# Patient Record
Sex: MALE | Race: WHITE | Employment: OTHER | URBAN - NONMETROPOLITAN AREA
[De-identification: names, ages, dates, MRNs, and addresses within clinical notes are randomized per-mention and may not be internally consistent; named-entity substitution may affect disease eponyms.]

---

## 2017-03-08 ENCOUNTER — OFFICE VISIT (OUTPATIENT)
Dept: FAMILY MEDICINE CLINIC | Age: 27
End: 2017-03-08

## 2017-03-08 VITALS
SYSTOLIC BLOOD PRESSURE: 116 MMHG | DIASTOLIC BLOOD PRESSURE: 58 MMHG | RESPIRATION RATE: 12 BRPM | HEIGHT: 70 IN | TEMPERATURE: 97.8 F | WEIGHT: 96 LBS | BODY MASS INDEX: 13.74 KG/M2 | HEART RATE: 96 BPM

## 2017-03-08 DIAGNOSIS — G83.10 PARESIS OF LOWER EXTREMITY (HCC): ICD-10-CM

## 2017-03-08 DIAGNOSIS — F32.2 SEVERE SINGLE CURRENT EPISODE OF MAJOR DEPRESSIVE DISORDER, WITHOUT PSYCHOTIC FEATURES (HCC): Primary | ICD-10-CM

## 2017-03-08 DIAGNOSIS — G89.4 CHRONIC PAIN SYNDROME: ICD-10-CM

## 2017-03-08 DIAGNOSIS — K21.9 GASTROESOPHAGEAL REFLUX DISEASE WITHOUT ESOPHAGITIS: ICD-10-CM

## 2017-03-08 DIAGNOSIS — D64.89 ANEMIA DUE TO OTHER CAUSE: ICD-10-CM

## 2017-03-08 DIAGNOSIS — R11.0 NAUSEA: ICD-10-CM

## 2017-03-08 DIAGNOSIS — S06.9X9D TBI (TRAUMATIC BRAIN INJURY), WITH LOSS OF CONSCIOUSNESS OF UNSPECIFIED DURATION, SUBSEQUENT ENCOUNTER: ICD-10-CM

## 2017-03-08 DIAGNOSIS — Z93.0 TRACHEOSTOMY IN PLACE (HCC): ICD-10-CM

## 2017-03-08 DIAGNOSIS — J30.0 VASOMOTOR RHINITIS: ICD-10-CM

## 2017-03-08 DIAGNOSIS — K59.09 OTHER CONSTIPATION: ICD-10-CM

## 2017-03-08 DIAGNOSIS — Z93.3 COLOSTOMY PRESENT (HCC): ICD-10-CM

## 2017-03-08 DIAGNOSIS — G82.20 PARAPLEGIA (HCC): ICD-10-CM

## 2017-03-08 DIAGNOSIS — G47.09 OTHER INSOMNIA: ICD-10-CM
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
## 2017-03-08 DIAGNOSIS — J96.10 CHRONIC RESPIRATORY FAILURE, UNSPECIFIED WHETHER WITH HYPOXIA OR HYPERCAPNIA (HCC): ICD-10-CM

## 2017-03-08 PROBLEM — K59.00 CONSTIPATION: Status: ACTIVE | Noted: 2017-03-08

## 2017-03-08 PROBLEM — S06.9XAA TBI (TRAUMATIC BRAIN INJURY): Status: ACTIVE | Noted: 2017-03-08
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
## 2017-03-08 PROCEDURE — 99205 OFFICE O/P NEW HI 60 MIN: CPT | Performed by: FAMILY MEDICINE

## 2017-03-08 RX ORDER — ONDANSETRON 4 MG/1
4 TABLET, FILM COATED ORAL EVERY 8 HOURS PRN
Qty: 90 TABLET | Refills: 5 | Status: ON HOLD | OUTPATIENT
Start: 2017-03-08 | End: 2017-11-28 | Stop reason: ALTCHOICE

## 2017-03-08 RX ORDER — FAMOTIDINE 20 MG/1
20 TABLET, FILM COATED ORAL 2 TIMES DAILY
Qty: 60 TABLET | Refills: 5 | Status: SHIPPED | OUTPATIENT
Start: 2017-03-08 | End: 2017-08-22

## 2017-03-08 RX ORDER — FAMOTIDINE 20 MG/1
20 TABLET, FILM COATED ORAL 2 TIMES DAILY
COMMUNITY
End: 2017-03-08 | Stop reason: SDUPTHER

## 2017-03-08 RX ORDER — LANOLIN ALCOHOL/MO/W.PET/CERES
325 CREAM (GRAM) TOPICAL 2 TIMES DAILY WITH MEALS
COMMUNITY
End: 2017-03-08 | Stop reason: SDUPTHER

## 2017-03-08 RX ORDER — TRAZODONE HYDROCHLORIDE 50 MG/1
50 TABLET ORAL NIGHTLY
COMMUNITY
End: 2017-03-08 | Stop reason: SDUPTHER

## 2017-03-08 RX ORDER — LANOLIN ALCOHOL/MO/W.PET/CERES
325 CREAM (GRAM) TOPICAL 2 TIMES DAILY WITH MEALS
Qty: 60 TABLET | Refills: 5 | Status: SHIPPED | OUTPATIENT
Start: 2017-03-08 | End: 2017-03-09

## 2017-03-08 RX ORDER — FLUTICASONE PROPIONATE 50 MCG
2 SPRAY, SUSPENSION (ML) NASAL 2 TIMES DAILY
Qty: 1 BOTTLE | Refills: 5 | Status: SHIPPED | OUTPATIENT
Start: 2017-03-08 | End: 2017-03-09 | Stop reason: SDUPTHER

## 2017-03-08 RX ORDER — MORPHINE SULFATE 15 MG/1
15 TABLET ORAL 2 TIMES DAILY PRN
COMMUNITY
End: 2017-03-08 | Stop reason: SDUPTHER

## 2017-03-08 RX ORDER — CHOLECALCIFEROL (VITAMIN D3) 125 MCG
50000 TABLET ORAL
COMMUNITY
End: 2017-03-08 | Stop reason: SDUPTHER

## 2017-03-08 RX ORDER — CHOLECALCIFEROL (VITAMIN D3) 125 MCG
50000 TABLET ORAL
Qty: 12 TABLET | Refills: 3 | Status: SHIPPED | OUTPATIENT
Start: 2017-03-08 | End: 2017-06-15 | Stop reason: SDUPTHER

## 2017-03-08 RX ORDER — POLYETHYLENE GLYCOL 3350 17 G/17G
17 POWDER, FOR SOLUTION ORAL DAILY PRN
COMMUNITY
End: 2017-03-08 | Stop reason: SDUPTHER

## 2017-03-08 RX ORDER — ARIPIPRAZOLE 5 MG/1
5 TABLET ORAL DAILY
COMMUNITY
End: 2017-03-08

## 2017-03-08 RX ORDER — TRAZODONE HYDROCHLORIDE 50 MG/1
50 TABLET ORAL NIGHTLY
Qty: 30 TABLET | Refills: 5 | Status: ON HOLD | OUTPATIENT
Start: 2017-03-08 | End: 2017-03-15

## 2017-03-08 RX ORDER — MORPHINE SULFATE 30 MG/1
30 TABLET ORAL EVERY 12 HOURS
Qty: 60 TABLET | Refills: 0 | Status: SHIPPED | OUTPATIENT
Start: 2017-03-08 | End: 2017-03-09

## 2017-03-08 RX ORDER — MIRTAZAPINE 15 MG/1
15 TABLET, FILM COATED ORAL NIGHTLY
COMMUNITY
End: 2017-03-08 | Stop reason: SDUPTHER

## 2017-03-08 RX ORDER — LANOLIN ALCOHOL/MO/W.PET/CERES
6 CREAM (GRAM) TOPICAL NIGHTLY
Qty: 60 TABLET | Refills: 5 | Status: ON HOLD | OUTPATIENT
Start: 2017-03-08 | End: 2017-03-15 | Stop reason: HOSPADM

## 2017-03-08 RX ORDER — MIRTAZAPINE 15 MG/1
15 TABLET, FILM COATED ORAL NIGHTLY
Qty: 30 TABLET | Refills: 5 | Status: ON HOLD | OUTPATIENT
Start: 2017-03-08 | End: 2017-03-15 | Stop reason: HOSPADM

## 2017-03-08 RX ORDER — ARIPIPRAZOLE 5 MG/1
5 TABLET ORAL 2 TIMES DAILY
COMMUNITY
End: 2017-03-08

## 2017-03-08 RX ORDER — DOCUSATE SODIUM 100 MG/1
100 CAPSULE, LIQUID FILLED ORAL 2 TIMES DAILY
Qty: 60 CAPSULE | Refills: 5 | Status: SHIPPED | OUTPATIENT
Start: 2017-03-08 | End: 2017-10-19 | Stop reason: SDUPTHER

## 2017-03-08 RX ORDER — LANOLIN ALCOHOL/MO/W.PET/CERES
6 CREAM (GRAM) TOPICAL NIGHTLY
COMMUNITY
End: 2017-03-08 | Stop reason: SDUPTHER

## 2017-03-08 RX ORDER — GABAPENTIN 300 MG/1
CAPSULE ORAL
Qty: 120 CAPSULE | Refills: 5 | Status: SHIPPED | OUTPATIENT
Start: 2017-03-08 | End: 2017-10-19 | Stop reason: SDUPTHER

## 2017-03-08 RX ORDER — AMANTADINE HYDROCHLORIDE 100 MG/1
100 CAPSULE, GELATIN COATED ORAL 2 TIMES DAILY
Qty: 60 CAPSULE | Refills: 5 | Status: ON HOLD | OUTPATIENT
Start: 2017-03-08 | End: 2017-03-16 | Stop reason: HOSPADM

## 2017-03-08 RX ORDER — GABAPENTIN 300 MG/1
300 CAPSULE ORAL 4 TIMES DAILY
COMMUNITY
End: 2017-03-08 | Stop reason: SDUPTHER

## 2017-03-08 RX ORDER — OXYCODONE HYDROCHLORIDE 5 MG/1
5 TABLET ORAL EVERY 4 HOURS PRN
COMMUNITY
End: 2017-03-08 | Stop reason: SDUPTHER

## 2017-03-08 RX ORDER — AMANTADINE HYDROCHLORIDE 100 MG/1
100 CAPSULE, GELATIN COATED ORAL 2 TIMES DAILY
COMMUNITY
End: 2017-03-08 | Stop reason: SDUPTHER

## 2017-03-08 RX ORDER — POLYETHYLENE GLYCOL 3350 17 G/17G
17 POWDER, FOR SOLUTION ORAL DAILY PRN
Qty: 1 BOTTLE | Refills: 5 | Status: SHIPPED | OUTPATIENT
Start: 2017-03-08 | End: 2017-10-23

## 2017-03-08 RX ORDER — ECHINACEA PURPUREA EXTRACT 125 MG
2 TABLET ORAL PRN
COMMUNITY
End: 2017-04-10 | Stop reason: ALTCHOICE

## 2017-03-08 RX ORDER — QUETIAPINE FUMARATE 25 MG/1
25 TABLET, FILM COATED ORAL 2 TIMES DAILY
Qty: 60 TABLET | Refills: 3 | Status: ON HOLD | OUTPATIENT
Start: 2017-03-08 | End: 2017-03-15 | Stop reason: HOSPADM

## 2017-03-08 RX ORDER — OXYCODONE HYDROCHLORIDE 5 MG/1
TABLET ORAL
Qty: 360 TABLET | Refills: 0 | Status: SHIPPED | OUTPATIENT
Start: 2017-03-08 | End: 2017-03-09

## 2017-03-08 RX ORDER — ONDANSETRON 4 MG/1
4 TABLET, FILM COATED ORAL EVERY 8 HOURS PRN
COMMUNITY
End: 2017-03-08 | Stop reason: SDUPTHER

## 2017-03-08 RX ORDER — DOCUSATE SODIUM 100 MG/1
100 CAPSULE, LIQUID FILLED ORAL 2 TIMES DAILY
COMMUNITY
End: 2017-03-08 | Stop reason: SDUPTHER

## 2017-03-08 RX ORDER — FLUTICASONE PROPIONATE 50 MCG
2 SPRAY, SUSPENSION (ML) NASAL 2 TIMES DAILY
COMMUNITY
End: 2017-03-08 | Stop reason: SDUPTHER

## 2017-03-08 ASSESSMENT — ENCOUNTER SYMPTOMS
HEMOPTYSIS: 0
HEARTBURN: 0
WHEEZING: 0
CONSTIPATION: 0
COUGH: 0
EYE REDNESS: 0
BACK PAIN: 1
NAUSEA: 0
EYE PAIN: 0
VOMITING: 0
SHORTNESS OF BREATH: 0
DIARRHEA: 0
SORE THROAT: 0
BLURRED VISION: 0
ORTHOPNEA: 0
DOUBLE VISION: 0
BLOOD IN STOOL: 0
EYE DISCHARGE: 0

## 2017-03-09 ENCOUNTER — TELEPHONE (OUTPATIENT)
Dept: FAMILY MEDICINE CLINIC | Age: 27
End: 2017-03-09

## 2017-03-09 DIAGNOSIS — J30.0 VASOMOTOR RHINITIS: ICD-10-CM

## 2017-03-09 DIAGNOSIS — D64.89 ANEMIA DUE TO OTHER CAUSE: ICD-10-CM

## 2017-03-09 DIAGNOSIS — G89.4 CHRONIC PAIN SYNDROME: Primary | ICD-10-CM

## 2017-03-09 RX ORDER — OXYCODONE HYDROCHLORIDE 10 MG/1
TABLET ORAL
Qty: 180 TABLET | Refills: 0 | Status: SHIPPED | OUTPATIENT
Start: 2017-03-09 | End: 2017-03-28

## 2017-03-09 RX ORDER — LANOLIN ALCOHOL/MO/W.PET/CERES
325 CREAM (GRAM) TOPICAL 2 TIMES DAILY
Qty: 60 TABLET | Refills: 5 | Status: SHIPPED | OUTPATIENT
Start: 2017-03-09 | End: 2017-10-19 | Stop reason: SDUPTHER

## 2017-03-09 RX ORDER — MORPHINE SULFATE 30 MG/1
30 TABLET, FILM COATED, EXTENDED RELEASE ORAL 2 TIMES DAILY
Qty: 60 TABLET | Refills: 0 | Status: ON HOLD | OUTPATIENT
Start: 2017-03-09 | End: 2017-03-12

## 2017-03-09 RX ORDER — FLUTICASONE PROPIONATE 50 MCG
2 SPRAY, SUSPENSION (ML) NASAL DAILY
Qty: 1 BOTTLE | Refills: 5 | Status: SHIPPED | OUTPATIENT
Start: 2017-03-09 | End: 2017-04-10 | Stop reason: ALTCHOICE

## 2017-03-11 PROBLEM — N39.0 UTI (URINARY TRACT INFECTION): Status: ACTIVE | Noted: 2017-03-11

## 2017-03-11 PROBLEM — J18.9 PNEUMONIA DUE TO INFECTIOUS ORGANISM: Status: ACTIVE | Noted: 2017-03-11

## 2017-03-12 PROBLEM — N31.9 NEUROGENIC DYSFUNCTION OF THE URINARY BLADDER: Chronic | Status: ACTIVE | Noted: 2017-03-12
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
## 2017-03-13 ENCOUNTER — TELEPHONE (OUTPATIENT)
Dept: FAMILY MEDICINE CLINIC | Age: 27
End: 2017-03-13

## 2017-03-16 ENCOUNTER — TELEPHONE (OUTPATIENT)
Dept: FAMILY MEDICINE CLINIC | Age: 27
End: 2017-03-16

## 2017-03-16 DIAGNOSIS — G82.20 PARAPLEGIA (HCC): Primary | ICD-10-CM

## 2017-03-16 RX ORDER — WHEELCHAIR
EACH MISCELLANEOUS
Qty: 1 EACH | Refills: 0 | Status: SHIPPED | OUTPATIENT
Start: 2017-03-16 | End: 2017-08-22

## 2017-03-17 ENCOUNTER — TELEPHONE (OUTPATIENT)
Dept: FAMILY MEDICINE CLINIC | Age: 27
End: 2017-03-17

## 2017-03-20 ENCOUNTER — TELEPHONE (OUTPATIENT)
Dept: FAMILY MEDICINE CLINIC | Age: 27
End: 2017-03-20

## 2017-03-20 DIAGNOSIS — G82.20 PARAPLEGIA (HCC): Primary | ICD-10-CM

## 2017-03-20 DIAGNOSIS — L98.429 SACRAL ULCER, WITH UNSPECIFIED SEVERITY (HCC): ICD-10-CM

## 2017-03-22 ENCOUNTER — TELEPHONE (OUTPATIENT)
Dept: FAMILY MEDICINE CLINIC | Age: 27
End: 2017-03-22

## 2017-03-22 DIAGNOSIS — E46 MALNUTRITION (HCC): Primary | ICD-10-CM

## 2017-03-23 ENCOUNTER — TELEPHONE (OUTPATIENT)
Dept: FAMILY MEDICINE CLINIC | Age: 27
End: 2017-03-23

## 2017-03-27 ENCOUNTER — TELEPHONE (OUTPATIENT)
Dept: FAMILY MEDICINE CLINIC | Age: 27
End: 2017-03-27

## 2017-03-28 ENCOUNTER — OFFICE VISIT (OUTPATIENT)
Dept: FAMILY MEDICINE CLINIC | Age: 27
End: 2017-03-28

## 2017-03-28 ENCOUNTER — TELEPHONE (OUTPATIENT)
Dept: FAMILY MEDICINE CLINIC | Age: 27
End: 2017-03-28

## 2017-03-28 VITALS
TEMPERATURE: 98.2 F | DIASTOLIC BLOOD PRESSURE: 70 MMHG | RESPIRATION RATE: 16 BRPM | HEART RATE: 84 BPM | SYSTOLIC BLOOD PRESSURE: 102 MMHG

## 2017-03-28 DIAGNOSIS — I95.1 ORTHOSTATIC HYPOTENSION: Primary | ICD-10-CM

## 2017-03-28 DIAGNOSIS — S06.9X9D TBI (TRAUMATIC BRAIN INJURY), WITH LOSS OF CONSCIOUSNESS OF UNSPECIFIED DURATION, SUBSEQUENT ENCOUNTER: ICD-10-CM

## 2017-03-28 DIAGNOSIS — L98.429 SACRAL ULCER, WITH UNSPECIFIED SEVERITY (HCC): ICD-10-CM

## 2017-03-28 DIAGNOSIS — F06.31 MOOD DISORDER DUE TO KNOWN PHYSIOLOGICAL CONDITION WITH DEPRESSIVE FEATURES: ICD-10-CM

## 2017-03-28 PROCEDURE — 99495 TRANSJ CARE MGMT MOD F2F 14D: CPT | Performed by: FAMILY MEDICINE

## 2017-03-28 RX ORDER — OXYCODONE HYDROCHLORIDE 15 MG/1
TABLET ORAL
Qty: 180 TABLET | Refills: 0 | Status: SHIPPED | OUTPATIENT
Start: 2017-03-28 | End: 2017-04-10

## 2017-03-30 PROBLEM — L89.154 PRESSURE ULCER OF COCCYGEAL REGION, STAGE 4 (HCC): Status: ACTIVE | Noted: 2017-03-30

## 2017-04-03 ENCOUNTER — TELEPHONE (OUTPATIENT)
Dept: UROLOGY | Age: 27
End: 2017-04-03

## 2017-04-03 ENCOUNTER — OFFICE VISIT (OUTPATIENT)
Dept: CARDIOLOGY | Age: 27
End: 2017-04-03

## 2017-04-03 VITALS
HEART RATE: 70 BPM | HEIGHT: 70 IN | WEIGHT: 104 LBS | SYSTOLIC BLOOD PRESSURE: 121 MMHG | DIASTOLIC BLOOD PRESSURE: 64 MMHG | BODY MASS INDEX: 14.89 KG/M2

## 2017-04-03 DIAGNOSIS — R55 SYNCOPE, UNSPECIFIED SYNCOPE TYPE: ICD-10-CM

## 2017-04-03 DIAGNOSIS — I95.9 HYPOTENSION, UNSPECIFIED HYPOTENSION TYPE: Primary | ICD-10-CM

## 2017-04-03 DIAGNOSIS — R06.00 DYSPNEA, UNSPECIFIED TYPE: ICD-10-CM

## 2017-04-03 PROCEDURE — 99203 OFFICE O/P NEW LOW 30 MIN: CPT | Performed by: NUCLEAR MEDICINE

## 2017-04-03 PROCEDURE — 93000 ELECTROCARDIOGRAM COMPLETE: CPT | Performed by: NUCLEAR MEDICINE

## 2017-04-03 RX ORDER — MIDODRINE HYDROCHLORIDE 2.5 MG/1
2.5 TABLET ORAL 3 TIMES DAILY
Qty: 90 TABLET | Refills: 5 | Status: SHIPPED | OUTPATIENT
Start: 2017-04-03 | End: 2017-04-10 | Stop reason: ALTCHOICE

## 2017-04-03 ASSESSMENT — ENCOUNTER SYMPTOMS
CHEST TIGHTNESS: 0
ANAL BLEEDING: 0
BLOOD IN STOOL: 0
DIARRHEA: 0
SHORTNESS OF BREATH: 0
BACK PAIN: 0
ABDOMINAL DISTENTION: 0
ABDOMINAL PAIN: 0
PHOTOPHOBIA: 0
CONSTIPATION: 0

## 2017-04-04 ENCOUNTER — TELEPHONE (OUTPATIENT)
Dept: FAMILY MEDICINE CLINIC | Age: 27
End: 2017-04-04

## 2017-04-05 ENCOUNTER — TELEPHONE (OUTPATIENT)
Dept: UROLOGY | Age: 27
End: 2017-04-05

## 2017-04-05 DIAGNOSIS — G82.20 PARAPLEGIA (HCC): ICD-10-CM

## 2017-04-05 DIAGNOSIS — Z01.818 PRE-OP TESTING: ICD-10-CM

## 2017-04-05 DIAGNOSIS — N31.9 NEUROGENIC BLADDER: Primary | Chronic | ICD-10-CM

## 2017-04-06 ENCOUNTER — TELEPHONE (OUTPATIENT)
Dept: UROLOGY | Age: 27
End: 2017-04-06

## 2017-04-06 DIAGNOSIS — L89.154 PRESSURE ULCER OF COCCYGEAL REGION, STAGE 4 (HCC): Primary | ICD-10-CM

## 2017-04-06 RX ORDER — MORPHINE SULFATE 15 MG/1
15 TABLET, FILM COATED, EXTENDED RELEASE ORAL 2 TIMES DAILY
Qty: 60 TABLET | Refills: 0 | Status: SHIPPED | OUTPATIENT
Start: 2017-04-06 | End: 2017-04-10

## 2017-04-10 ENCOUNTER — OFFICE VISIT (OUTPATIENT)
Dept: PHYSICAL MEDICINE AND REHAB | Age: 27
End: 2017-04-10

## 2017-04-10 VITALS
DIASTOLIC BLOOD PRESSURE: 79 MMHG | HEIGHT: 70 IN | BODY MASS INDEX: 14.9 KG/M2 | WEIGHT: 104.06 LBS | SYSTOLIC BLOOD PRESSURE: 115 MMHG | HEART RATE: 102 BPM

## 2017-04-10 DIAGNOSIS — S24.109D SPINAL CORD INJURY, THORACIC REGION, SUBSEQUENT ENCOUNTER (HCC): ICD-10-CM

## 2017-04-10 DIAGNOSIS — S06.9X9D TBI (TRAUMATIC BRAIN INJURY), WITH LOSS OF CONSCIOUSNESS OF UNSPECIFIED DURATION, SUBSEQUENT ENCOUNTER: Primary | ICD-10-CM

## 2017-04-10 DIAGNOSIS — G82.20 PARAPLEGIA (HCC): ICD-10-CM

## 2017-04-10 PROCEDURE — 99213 OFFICE O/P EST LOW 20 MIN: CPT | Performed by: NURSE PRACTITIONER

## 2017-04-10 RX ORDER — OXYCODONE HCL 20 MG/1
20 TABLET, FILM COATED, EXTENDED RELEASE ORAL EVERY 8 HOURS
Qty: 90 TABLET | Refills: 0 | Status: SHIPPED | OUTPATIENT
Start: 2017-04-10 | End: 2017-04-12

## 2017-04-12 ENCOUNTER — TELEPHONE (OUTPATIENT)
Dept: PHYSICAL MEDICINE AND REHAB | Age: 27
End: 2017-04-12

## 2017-04-12 ENCOUNTER — TELEPHONE (OUTPATIENT)
Dept: UROLOGY | Age: 27
End: 2017-04-12

## 2017-04-12 RX ORDER — OXYCODONE HYDROCHLORIDE 30 MG/1
30 TABLET, FILM COATED, EXTENDED RELEASE ORAL 2 TIMES DAILY
Qty: 60 EACH | Refills: 0 | Status: SHIPPED | OUTPATIENT
Start: 2017-04-12 | End: 2017-04-24

## 2017-04-13 ENCOUNTER — TELEPHONE (OUTPATIENT)
Dept: CARDIOLOGY | Age: 27
End: 2017-04-13

## 2017-04-13 ENCOUNTER — TELEPHONE (OUTPATIENT)
Dept: UROLOGY | Age: 27
End: 2017-04-13

## 2017-04-13 PROBLEM — L89.512 DECUBITUS ULCER OF RIGHT ANKLE, STAGE 2 (HCC): Status: ACTIVE | Noted: 2017-04-13

## 2017-04-13 PROBLEM — S11.90XA WOUND, OPEN, NECK: Status: ACTIVE | Noted: 2017-04-13

## 2017-04-18 ENCOUNTER — TELEPHONE (OUTPATIENT)
Dept: FAMILY MEDICINE CLINIC | Age: 27
End: 2017-04-18

## 2017-04-18 ENCOUNTER — OFFICE VISIT (OUTPATIENT)
Dept: FAMILY MEDICINE CLINIC | Age: 27
End: 2017-04-18

## 2017-04-18 VITALS
RESPIRATION RATE: 16 BRPM | TEMPERATURE: 98.1 F | SYSTOLIC BLOOD PRESSURE: 130 MMHG | HEART RATE: 64 BPM | DIASTOLIC BLOOD PRESSURE: 78 MMHG

## 2017-04-18 DIAGNOSIS — M25.512 CHRONIC LEFT SHOULDER PAIN: Primary | ICD-10-CM

## 2017-04-18 DIAGNOSIS — M25.512 CHRONIC LEFT SHOULDER PAIN: ICD-10-CM

## 2017-04-18 DIAGNOSIS — G89.29 CHRONIC LEFT SHOULDER PAIN: Primary | ICD-10-CM

## 2017-04-18 DIAGNOSIS — L98.429 SACRAL ULCER, WITH UNSPECIFIED SEVERITY (HCC): ICD-10-CM

## 2017-04-18 DIAGNOSIS — S06.9X9D TBI (TRAUMATIC BRAIN INJURY), WITH LOSS OF CONSCIOUSNESS OF UNSPECIFIED DURATION, SUBSEQUENT ENCOUNTER: Primary | ICD-10-CM

## 2017-04-18 DIAGNOSIS — G89.29 CHRONIC LEFT SHOULDER PAIN: ICD-10-CM

## 2017-04-18 DIAGNOSIS — F32.2 SEVERE SINGLE CURRENT EPISODE OF MAJOR DEPRESSIVE DISORDER, WITHOUT PSYCHOTIC FEATURES (HCC): ICD-10-CM

## 2017-04-18 DIAGNOSIS — Z99.3 WHEELCHAIR DEPENDENT: ICD-10-CM

## 2017-04-18 PROCEDURE — 99214 OFFICE O/P EST MOD 30 MIN: CPT | Performed by: FAMILY MEDICINE

## 2017-04-18 RX ORDER — CUSHION
EACH MISCELLANEOUS
Qty: 1 EACH | Refills: 0 | Status: SHIPPED | OUTPATIENT
Start: 2017-04-18 | End: 2017-08-22

## 2017-04-18 RX ORDER — LANOLIN ALCOHOL/MO/W.PET/CERES
6 CREAM (GRAM) TOPICAL NIGHTLY
Qty: 60 TABLET | Refills: 5 | Status: SHIPPED | OUTPATIENT
Start: 2017-04-18 | End: 2017-10-23

## 2017-04-18 RX ORDER — CYCLOBENZAPRINE HCL 5 MG
5 TABLET ORAL EVERY 8 HOURS PRN
Qty: 90 TABLET | Refills: 2 | Status: SHIPPED | OUTPATIENT
Start: 2017-04-18 | End: 2017-05-18

## 2017-04-18 RX ORDER — OXYCODONE HYDROCHLORIDE 15 MG/1
TABLET ORAL
Qty: 120 TABLET | Refills: 0 | Status: CANCELLED | COMMUNITY
Start: 2017-04-18

## 2017-04-18 RX ORDER — TIZANIDINE 2 MG/1
2 TABLET ORAL EVERY 8 HOURS PRN
Qty: 90 TABLET | Refills: 2 | Status: SHIPPED | OUTPATIENT
Start: 2017-04-18 | End: 2017-04-18

## 2017-04-24 ENCOUNTER — OFFICE VISIT (OUTPATIENT)
Dept: PHYSICAL MEDICINE AND REHAB | Age: 27
End: 2017-04-24

## 2017-04-24 VITALS — SYSTOLIC BLOOD PRESSURE: 114 MMHG | HEART RATE: 120 BPM | HEIGHT: 70 IN | DIASTOLIC BLOOD PRESSURE: 80 MMHG

## 2017-04-24 DIAGNOSIS — S06.9X9D TBI (TRAUMATIC BRAIN INJURY), WITH LOSS OF CONSCIOUSNESS OF UNSPECIFIED DURATION, SUBSEQUENT ENCOUNTER: Primary | ICD-10-CM

## 2017-04-24 DIAGNOSIS — G82.20 PARAPLEGIA (HCC): ICD-10-CM

## 2017-04-24 DIAGNOSIS — S24.109D SPINAL CORD INJURY, THORACIC REGION, SUBSEQUENT ENCOUNTER (HCC): ICD-10-CM
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
## 2017-04-24 PROBLEM — Z93.0 TRACHEOSTOMY IN PLACE (HCC): Status: RESOLVED | Noted: 2017-03-08 | Resolved: 2017-04-24

## 2017-04-24 PROCEDURE — 99213 OFFICE O/P EST LOW 20 MIN: CPT | Performed by: NURSE PRACTITIONER

## 2017-04-24 RX ORDER — OXYCODONE HYDROCHLORIDE 10 MG/1
10 TABLET ORAL EVERY 4 HOURS PRN
Qty: 180 TABLET | Refills: 0 | Status: SHIPPED | OUTPATIENT
Start: 2017-04-24 | End: 2017-05-22 | Stop reason: SDUPTHER

## 2017-04-25 ENCOUNTER — OFFICE VISIT (OUTPATIENT)
Dept: UROLOGY | Age: 27
End: 2017-04-25

## 2017-04-25 VITALS
SYSTOLIC BLOOD PRESSURE: 104 MMHG | BODY MASS INDEX: 15.03 KG/M2 | HEIGHT: 70 IN | WEIGHT: 105 LBS | DIASTOLIC BLOOD PRESSURE: 58 MMHG

## 2017-04-25 DIAGNOSIS — N31.9 NEUROGENIC BLADDER: Primary | ICD-10-CM

## 2017-04-25 PROCEDURE — 99024 POSTOP FOLLOW-UP VISIT: CPT | Performed by: UROLOGY

## 2017-04-29 ENCOUNTER — NURSE TRIAGE (OUTPATIENT)
Dept: ADMINISTRATIVE | Age: 27
End: 2017-04-29

## 2017-05-01 ENCOUNTER — TELEPHONE (OUTPATIENT)
Dept: FAMILY MEDICINE CLINIC | Age: 27
End: 2017-05-01

## 2017-05-01 PROBLEM — Z93.59 SUPRAPUBIC CATHETER (HCC): Status: ACTIVE | Noted: 2017-05-01

## 2017-05-03 ENCOUNTER — TELEPHONE (OUTPATIENT)
Dept: FAMILY MEDICINE CLINIC | Age: 27
End: 2017-05-03

## 2017-05-03 ENCOUNTER — TELEPHONE (OUTPATIENT)
Dept: UROLOGY | Age: 27
End: 2017-05-03

## 2017-05-04 ENCOUNTER — TELEPHONE (OUTPATIENT)
Dept: FAMILY MEDICINE CLINIC | Age: 27
End: 2017-05-04

## 2017-05-08 ENCOUNTER — TELEPHONE (OUTPATIENT)
Dept: FAMILY MEDICINE CLINIC | Age: 27
End: 2017-05-08

## 2017-05-08 RX ORDER — FLUTICASONE PROPIONATE 50 MCG
2 SPRAY, SUSPENSION (ML) NASAL DAILY
Qty: 1 BOTTLE | Refills: 5 | Status: SHIPPED | OUTPATIENT
Start: 2017-05-08 | End: 2017-08-22

## 2017-05-09 ENCOUNTER — OFFICE VISIT (OUTPATIENT)
Dept: FAMILY MEDICINE CLINIC | Age: 27
End: 2017-05-09

## 2017-05-09 VITALS
RESPIRATION RATE: 12 BRPM | BODY MASS INDEX: 15.03 KG/M2 | DIASTOLIC BLOOD PRESSURE: 62 MMHG | HEIGHT: 70 IN | HEART RATE: 110 BPM | TEMPERATURE: 98.6 F | SYSTOLIC BLOOD PRESSURE: 105 MMHG | WEIGHT: 105 LBS

## 2017-05-09 DIAGNOSIS — F32.2 SEVERE SINGLE CURRENT EPISODE OF MAJOR DEPRESSIVE DISORDER, WITHOUT PSYCHOTIC FEATURES (HCC): ICD-10-CM

## 2017-05-09 DIAGNOSIS — G47.09 OTHER INSOMNIA: ICD-10-CM

## 2017-05-09 DIAGNOSIS — S06.9X9D TBI (TRAUMATIC BRAIN INJURY), WITH LOSS OF CONSCIOUSNESS OF UNSPECIFIED DURATION, SUBSEQUENT ENCOUNTER: Primary | ICD-10-CM

## 2017-05-09 DIAGNOSIS — L21.9 SEBORRHEIC DERMATITIS OF SCALP: ICD-10-CM

## 2017-05-09 DIAGNOSIS — L89.154 PRESSURE ULCER OF COCCYGEAL REGION, STAGE 4 (HCC): ICD-10-CM

## 2017-05-09 DIAGNOSIS — G82.20 PARAPLEGIA (HCC): ICD-10-CM

## 2017-05-09 DIAGNOSIS — R55 VASOVAGAL SYNDROME: ICD-10-CM

## 2017-05-09 PROCEDURE — 99214 OFFICE O/P EST MOD 30 MIN: CPT | Performed by: FAMILY MEDICINE

## 2017-05-09 RX ORDER — TRAZODONE HYDROCHLORIDE 100 MG/1
100 TABLET ORAL NIGHTLY PRN
Qty: 30 TABLET | Refills: 2 | Status: SHIPPED | OUTPATIENT
Start: 2017-05-09 | End: 2017-08-22 | Stop reason: SDUPTHER

## 2017-05-10 ENCOUNTER — TELEPHONE (OUTPATIENT)
Dept: FAMILY MEDICINE CLINIC | Age: 27
End: 2017-05-10

## 2017-05-11 ENCOUNTER — TELEPHONE (OUTPATIENT)
Dept: PHYSICAL MEDICINE AND REHAB | Age: 27
End: 2017-05-11

## 2017-05-15 ENCOUNTER — TELEPHONE (OUTPATIENT)
Dept: FAMILY MEDICINE CLINIC | Age: 27
End: 2017-05-15

## 2017-05-15 ENCOUNTER — TELEPHONE (OUTPATIENT)
Dept: UROLOGY | Age: 27
End: 2017-05-15

## 2017-05-15 DIAGNOSIS — Z87.892 HISTORY OF ANAPHYLAXIS: Primary | ICD-10-CM

## 2017-05-15 RX ORDER — EPINEPHRINE 0.3 MG/.3ML
INJECTION SUBCUTANEOUS
Qty: 2 EACH | Refills: 1 | Status: SHIPPED | OUTPATIENT
Start: 2017-05-15

## 2017-05-15 RX ORDER — LEVOFLOXACIN 250 MG/1
250 TABLET ORAL DAILY
Qty: 10 TABLET | Refills: 0 | Status: SHIPPED | OUTPATIENT
Start: 2017-05-15 | End: 2017-05-25

## 2017-05-16 ENCOUNTER — OFFICE VISIT (OUTPATIENT)
Dept: UROLOGY | Age: 27
End: 2017-05-16

## 2017-05-16 ENCOUNTER — TELEPHONE (OUTPATIENT)
Dept: FAMILY MEDICINE CLINIC | Age: 27
End: 2017-05-16

## 2017-05-16 VITALS — SYSTOLIC BLOOD PRESSURE: 122 MMHG | TEMPERATURE: 99.2 F | HEART RATE: 112 BPM | DIASTOLIC BLOOD PRESSURE: 80 MMHG

## 2017-05-16 DIAGNOSIS — M79.89 SWELLING OF BOTH LOWER EXTREMITIES: Primary | ICD-10-CM

## 2017-05-16 DIAGNOSIS — N39.0 RECURRENT UTI: ICD-10-CM

## 2017-05-16 DIAGNOSIS — N31.9 NEUROGENIC BLADDER: Primary | ICD-10-CM

## 2017-05-16 PROCEDURE — 99024 POSTOP FOLLOW-UP VISIT: CPT | Performed by: UROLOGY

## 2017-05-16 RX ORDER — CATHETER MALE,EXTERNAL 41MM
EACH MISCELLANEOUS
Qty: 1 EACH | Refills: 11 | Status: SHIPPED | OUTPATIENT
Start: 2017-05-16 | End: 2020-06-17

## 2017-05-17 RX ORDER — FUROSEMIDE 20 MG/1
20 TABLET ORAL DAILY
Qty: 90 TABLET | Refills: 0 | Status: SHIPPED | OUTPATIENT
Start: 2017-05-17 | End: 2017-08-22 | Stop reason: SDUPTHER

## 2017-05-17 RX ORDER — FUROSEMIDE 20 MG/1
20 TABLET ORAL DAILY
COMMUNITY
End: 2017-05-17 | Stop reason: SDUPTHER

## 2017-05-18 ENCOUNTER — OFFICE VISIT (OUTPATIENT)
Dept: FAMILY MEDICINE CLINIC | Age: 27
End: 2017-05-18

## 2017-05-18 VITALS
RESPIRATION RATE: 14 BRPM | WEIGHT: 153.2 LBS | SYSTOLIC BLOOD PRESSURE: 138 MMHG | BODY MASS INDEX: 21.93 KG/M2 | HEART RATE: 67 BPM | TEMPERATURE: 98.6 F | HEIGHT: 70 IN | DIASTOLIC BLOOD PRESSURE: 98 MMHG

## 2017-05-18 DIAGNOSIS — R60.9 DEPENDENT EDEMA: Primary | ICD-10-CM

## 2017-05-18 PROCEDURE — 99213 OFFICE O/P EST LOW 20 MIN: CPT | Performed by: FAMILY MEDICINE

## 2017-05-22 ENCOUNTER — OFFICE VISIT (OUTPATIENT)
Dept: PHYSICAL MEDICINE AND REHAB | Age: 27
End: 2017-05-22

## 2017-05-22 VITALS
WEIGHT: 153.2 LBS | DIASTOLIC BLOOD PRESSURE: 84 MMHG | SYSTOLIC BLOOD PRESSURE: 122 MMHG | HEART RATE: 109 BPM | BODY MASS INDEX: 21.93 KG/M2 | HEIGHT: 70 IN

## 2017-05-22 DIAGNOSIS — G82.20 PARAPLEGIA (HCC): ICD-10-CM

## 2017-05-22 DIAGNOSIS — M54.5 CHRONIC BILATERAL LOW BACK PAIN, WITH SCIATICA PRESENCE UNSPECIFIED: ICD-10-CM

## 2017-05-22 DIAGNOSIS — G89.29 CHRONIC BILATERAL LOW BACK PAIN, WITH SCIATICA PRESENCE UNSPECIFIED: ICD-10-CM

## 2017-05-22 DIAGNOSIS — S42.302S FRACTURE OF LEFT HUMERUS, SEQUELA: ICD-10-CM

## 2017-05-22 DIAGNOSIS — S42.302S FRACTURE OF LEFT HUMERUS, SEQUELA: Primary | ICD-10-CM

## 2017-05-22 DIAGNOSIS — S06.9X9D TBI (TRAUMATIC BRAIN INJURY), WITH LOSS OF CONSCIOUSNESS OF UNSPECIFIED DURATION, SUBSEQUENT ENCOUNTER: Primary | ICD-10-CM

## 2017-05-22 DIAGNOSIS — S24.109D SPINAL CORD INJURY, THORACIC REGION, SUBSEQUENT ENCOUNTER (HCC): ICD-10-CM

## 2017-05-22 PROCEDURE — 99213 OFFICE O/P EST LOW 20 MIN: CPT | Performed by: NURSE PRACTITIONER

## 2017-05-22 RX ORDER — OXYCODONE HYDROCHLORIDE 10 MG/1
10 TABLET ORAL EVERY 4 HOURS PRN
Qty: 180 TABLET | Refills: 0 | Status: SHIPPED | OUTPATIENT
Start: 2017-05-22 | End: 2017-07-23 | Stop reason: ALTCHOICE

## 2017-05-22 RX ORDER — OXYCODONE HYDROCHLORIDE 10 MG/1
10 TABLET ORAL EVERY 4 HOURS PRN
Qty: 180 TABLET | Refills: 0 | Status: SHIPPED | OUTPATIENT
Start: 2017-05-22 | End: 2017-05-22 | Stop reason: DRUGHIGH

## 2017-05-23 ENCOUNTER — TELEPHONE (OUTPATIENT)
Dept: FAMILY MEDICINE CLINIC | Age: 27
End: 2017-05-23

## 2017-05-23 DIAGNOSIS — R51.9 GENERALIZED HEADACHES: Primary | ICD-10-CM

## 2017-05-23 DIAGNOSIS — F32.2 SEVERE SINGLE CURRENT EPISODE OF MAJOR DEPRESSIVE DISORDER, WITHOUT PSYCHOTIC FEATURES (HCC): ICD-10-CM

## 2017-05-23 RX ORDER — SERTRALINE HYDROCHLORIDE 100 MG/1
100 TABLET, FILM COATED ORAL DAILY
Qty: 30 TABLET | Refills: 5 | Status: SHIPPED | OUTPATIENT
Start: 2017-05-23 | End: 2017-07-13 | Stop reason: ALTCHOICE

## 2017-05-24 RX ORDER — SUMATRIPTAN 50 MG/1
50 TABLET, FILM COATED ORAL
Qty: 9 TABLET | Refills: 3 | Status: SHIPPED | OUTPATIENT
Start: 2017-05-24 | End: 2017-06-02

## 2017-05-25 ENCOUNTER — TELEPHONE (OUTPATIENT)
Dept: FAMILY MEDICINE CLINIC | Age: 27
End: 2017-05-25

## 2017-05-26 ENCOUNTER — TELEPHONE (OUTPATIENT)
Dept: PHYSICAL MEDICINE AND REHAB | Age: 27
End: 2017-05-26

## 2017-05-30 ENCOUNTER — TELEPHONE (OUTPATIENT)
Dept: FAMILY MEDICINE CLINIC | Age: 27
End: 2017-05-30

## 2017-05-30 DIAGNOSIS — G82.20 PARAPLEGIA (HCC): ICD-10-CM

## 2017-05-30 DIAGNOSIS — S06.9X9D TBI (TRAUMATIC BRAIN INJURY), WITH LOSS OF CONSCIOUSNESS OF UNSPECIFIED DURATION, SUBSEQUENT ENCOUNTER: Primary | ICD-10-CM

## 2017-05-31 ENCOUNTER — TELEPHONE (OUTPATIENT)
Dept: FAMILY MEDICINE CLINIC | Age: 27
End: 2017-05-31

## 2017-06-06 ENCOUNTER — TELEPHONE (OUTPATIENT)
Dept: FAMILY MEDICINE CLINIC | Age: 27
End: 2017-06-06

## 2017-06-06 DIAGNOSIS — F32.2 SEVERE SINGLE CURRENT EPISODE OF MAJOR DEPRESSIVE DISORDER, WITHOUT PSYCHOTIC FEATURES (HCC): ICD-10-CM

## 2017-06-06 DIAGNOSIS — G89.21 CHRONIC PAIN DUE TO TRAUMA: Primary | ICD-10-CM

## 2017-06-06 RX ORDER — VENLAFAXINE HYDROCHLORIDE 75 MG/1
75 CAPSULE, EXTENDED RELEASE ORAL DAILY
Qty: 30 CAPSULE | Refills: 3 | Status: SHIPPED | OUTPATIENT
Start: 2017-06-06 | End: 2017-07-18 | Stop reason: SDUPTHER

## 2017-06-08 ENCOUNTER — TELEPHONE (OUTPATIENT)
Dept: FAMILY MEDICINE CLINIC | Age: 27
End: 2017-06-08

## 2017-06-08 ENCOUNTER — TELEPHONE (OUTPATIENT)
Dept: PHYSICAL MEDICINE AND REHAB | Age: 27
End: 2017-06-08

## 2017-06-15 ENCOUNTER — OFFICE VISIT (OUTPATIENT)
Dept: PHYSICAL MEDICINE AND REHAB | Age: 27
End: 2017-06-15

## 2017-06-15 VITALS
BODY MASS INDEX: 21.46 KG/M2 | SYSTOLIC BLOOD PRESSURE: 141 MMHG | DIASTOLIC BLOOD PRESSURE: 89 MMHG | HEIGHT: 70 IN | WEIGHT: 149.91 LBS | HEART RATE: 117 BPM

## 2017-06-15 DIAGNOSIS — S06.9X9D TBI (TRAUMATIC BRAIN INJURY), WITH LOSS OF CONSCIOUSNESS OF UNSPECIFIED DURATION, SUBSEQUENT ENCOUNTER: ICD-10-CM

## 2017-06-15 DIAGNOSIS — M54.5 CHRONIC BILATERAL LOW BACK PAIN, WITH SCIATICA PRESENCE UNSPECIFIED: ICD-10-CM

## 2017-06-15 DIAGNOSIS — L89.154 PRESSURE ULCER OF COCCYGEAL REGION, STAGE 4 (HCC): ICD-10-CM

## 2017-06-15 DIAGNOSIS — S42.302S FRACTURE OF LEFT HUMERUS, SEQUELA: Primary | ICD-10-CM

## 2017-06-15 DIAGNOSIS — G89.29 CHRONIC BILATERAL LOW BACK PAIN, WITH SCIATICA PRESENCE UNSPECIFIED: ICD-10-CM

## 2017-06-15 DIAGNOSIS — G82.20 PARAPLEGIA (HCC): ICD-10-CM

## 2017-06-15 DIAGNOSIS — S24.109D SPINAL CORD INJURY, THORACIC REGION, SUBSEQUENT ENCOUNTER (HCC): ICD-10-CM

## 2017-06-15 PROCEDURE — 99244 OFF/OP CNSLTJ NEW/EST MOD 40: CPT | Performed by: PAIN MEDICINE

## 2017-06-15 RX ORDER — BUPRENORPHINE 7.5 UG/H
1 PATCH TRANSDERMAL WEEKLY
Qty: 4 PATCH | Refills: 0 | Status: SHIPPED | OUTPATIENT
Start: 2017-06-15 | End: 2017-07-13

## 2017-06-15 RX ORDER — ERGOCALCIFEROL 1.25 MG/1
50000 CAPSULE ORAL WEEKLY
COMMUNITY
Start: 2017-06-06 | End: 2017-11-13 | Stop reason: SDUPTHER

## 2017-06-15 RX ORDER — ALBUTEROL SULFATE 2.5 MG/3ML
SOLUTION RESPIRATORY (INHALATION)
Status: ON HOLD | COMMUNITY
Start: 2017-06-06 | End: 2017-11-28

## 2017-06-15 ASSESSMENT — ENCOUNTER SYMPTOMS
WHEEZING: 0
EYE PAIN: 0
BACK PAIN: 1
ABDOMINAL PAIN: 0
VOMITING: 0
CONSTIPATION: 0
SORE THROAT: 0
SINUS PRESSURE: 0
COUGH: 0
COLOR CHANGE: 0
NAUSEA: 0
CHEST TIGHTNESS: 0
RHINORRHEA: 0
SHORTNESS OF BREATH: 0
DIARRHEA: 0
PHOTOPHOBIA: 0

## 2017-06-23 ENCOUNTER — TELEPHONE (OUTPATIENT)
Dept: PHYSICAL MEDICINE AND REHAB | Age: 27
End: 2017-06-23

## 2017-06-26 ENCOUNTER — TELEPHONE (OUTPATIENT)
Dept: PHYSICAL MEDICINE AND REHAB | Age: 27
End: 2017-06-26

## 2017-06-26 ENCOUNTER — TELEPHONE (OUTPATIENT)
Dept: FAMILY MEDICINE CLINIC | Age: 27
End: 2017-06-26

## 2017-06-26 RX ORDER — IBUPROFEN 800 MG/1
800 TABLET ORAL 3 TIMES DAILY PRN
Qty: 90 TABLET | Refills: 0 | Status: SHIPPED | OUTPATIENT
Start: 2017-06-26 | End: 2017-08-22 | Stop reason: SDUPTHER

## 2017-06-29 ENCOUNTER — NURSE ONLY (OUTPATIENT)
Dept: UROLOGY | Age: 27
End: 2017-06-29

## 2017-06-29 ENCOUNTER — TELEPHONE (OUTPATIENT)
Dept: FAMILY MEDICINE CLINIC | Age: 27
End: 2017-06-29

## 2017-06-29 VITALS
HEIGHT: 70 IN | WEIGHT: 145 LBS | BODY MASS INDEX: 20.76 KG/M2 | SYSTOLIC BLOOD PRESSURE: 116 MMHG | DIASTOLIC BLOOD PRESSURE: 68 MMHG

## 2017-06-29 DIAGNOSIS — Z93.59 SUPRAPUBIC CATHETER (HCC): ICD-10-CM

## 2017-06-29 DIAGNOSIS — N31.9 NEUROGENIC BLADDER: Primary | ICD-10-CM

## 2017-06-29 PROBLEM — T21.24XA PARTIAL THICKNESS BURN OF BACK: Status: ACTIVE | Noted: 2017-06-29

## 2017-06-29 PROCEDURE — 99024 POSTOP FOLLOW-UP VISIT: CPT | Performed by: UROLOGY

## 2017-07-03 ENCOUNTER — TELEPHONE (OUTPATIENT)
Age: 27
End: 2017-07-03

## 2017-07-13 ENCOUNTER — TELEPHONE (OUTPATIENT)
Dept: FAMILY MEDICINE CLINIC | Age: 27
End: 2017-07-13

## 2017-07-13 PROBLEM — S21.201A WOUND OF RIGHT SIDE OF BACK: Status: ACTIVE | Noted: 2017-07-13

## 2017-07-13 PROBLEM — T21.34XA: Status: ACTIVE | Noted: 2017-06-29

## 2017-07-18 ENCOUNTER — TELEPHONE (OUTPATIENT)
Dept: FAMILY MEDICINE CLINIC | Age: 27
End: 2017-07-18

## 2017-07-18 ENCOUNTER — HOSPITAL ENCOUNTER (OUTPATIENT)
Dept: PHYSICAL THERAPY | Age: 27
Setting detail: THERAPIES SERIES
Discharge: HOME | End: 2017-07-18
Payer: MEDICAID

## 2017-07-18 ENCOUNTER — OFFICE VISIT (OUTPATIENT)
Dept: FAMILY MEDICINE CLINIC | Age: 27
End: 2017-07-18
Payer: MEDICAID

## 2017-07-18 VITALS
BODY MASS INDEX: 19.33 KG/M2 | HEIGHT: 70 IN | HEART RATE: 110 BPM | RESPIRATION RATE: 16 BRPM | DIASTOLIC BLOOD PRESSURE: 70 MMHG | TEMPERATURE: 98.4 F | WEIGHT: 135 LBS | SYSTOLIC BLOOD PRESSURE: 122 MMHG

## 2017-07-18 DIAGNOSIS — G89.21 CHRONIC PAIN DUE TO TRAUMA: ICD-10-CM

## 2017-07-18 DIAGNOSIS — F42.4 SKIN-PICKING DISORDER: Primary | ICD-10-CM

## 2017-07-18 DIAGNOSIS — G82.20 PARAPLEGIA (HCC): ICD-10-CM

## 2017-07-18 DIAGNOSIS — F32.2 SEVERE SINGLE CURRENT EPISODE OF MAJOR DEPRESSIVE DISORDER, WITHOUT PSYCHOTIC FEATURES (HCC): ICD-10-CM

## 2017-07-18 PROCEDURE — 99214 OFFICE O/P EST MOD 30 MIN: CPT | Performed by: FAMILY MEDICINE

## 2017-07-18 RX ORDER — VENLAFAXINE HYDROCHLORIDE 150 MG/1
150 CAPSULE, EXTENDED RELEASE ORAL DAILY
Qty: 30 CAPSULE | Refills: 5 | Status: SHIPPED | OUTPATIENT
Start: 2017-07-18 | End: 2017-11-13 | Stop reason: SDUPTHER

## 2017-07-18 ASSESSMENT — PATIENT HEALTH QUESTIONNAIRE - PHQ9
SUM OF ALL RESPONSES TO PHQ9 QUESTIONS 1 & 2: 2
SUM OF ALL RESPONSES TO PHQ QUESTIONS 1-9: 2
2. FEELING DOWN, DEPRESSED OR HOPELESS: 1
1. LITTLE INTEREST OR PLEASURE IN DOING THINGS: 1

## 2017-07-19 ENCOUNTER — TELEPHONE (OUTPATIENT)
Dept: FAMILY MEDICINE CLINIC | Age: 27
End: 2017-07-19

## 2017-07-19 DIAGNOSIS — G82.20 PARAPLEGIA (HCC): ICD-10-CM

## 2017-07-19 DIAGNOSIS — F42.4 SKIN-PICKING DISORDER: Primary | ICD-10-CM

## 2017-07-19 DIAGNOSIS — S06.9X9D TBI (TRAUMATIC BRAIN INJURY), WITH LOSS OF CONSCIOUSNESS OF UNSPECIFIED DURATION, SUBSEQUENT ENCOUNTER: Primary | ICD-10-CM

## 2017-07-19 DIAGNOSIS — R20.2 PARESTHESIA: ICD-10-CM

## 2017-07-19 RX ORDER — HYDROXYZINE HYDROCHLORIDE 25 MG/1
25 TABLET, FILM COATED ORAL 3 TIMES DAILY PRN
Qty: 90 TABLET | Refills: 3 | Status: SHIPPED | OUTPATIENT
Start: 2017-07-19 | End: 2017-11-13 | Stop reason: SDUPTHER

## 2017-07-23 ENCOUNTER — APPOINTMENT (OUTPATIENT)
Dept: GENERAL RADIOLOGY | Age: 27
DRG: 720 | End: 2017-07-23
Payer: MEDICAID

## 2017-07-23 ENCOUNTER — HOSPITAL ENCOUNTER (INPATIENT)
Age: 27
LOS: 3 days | Discharge: HOME | DRG: 720 | End: 2017-07-26
Attending: EMERGENCY MEDICINE | Admitting: FAMILY MEDICINE
Payer: MEDICAID

## 2017-07-23 ENCOUNTER — APPOINTMENT (OUTPATIENT)
Dept: CT IMAGING | Age: 27
DRG: 720 | End: 2017-07-23
Payer: MEDICAID

## 2017-07-23 DIAGNOSIS — R11.2 NAUSEA VOMITING AND DIARRHEA: Primary | ICD-10-CM

## 2017-07-23 DIAGNOSIS — L89.154 PRESSURE ULCER OF COCCYGEAL REGION, STAGE 4 (HCC): ICD-10-CM

## 2017-07-23 DIAGNOSIS — Z87.820 HISTORY OF TRAUMATIC BRAIN INJURY: ICD-10-CM

## 2017-07-23 DIAGNOSIS — R19.7 NAUSEA VOMITING AND DIARRHEA: Primary | ICD-10-CM

## 2017-07-23 DIAGNOSIS — N39.0 URINARY TRACT INFECTION, SITE UNSPECIFIED: ICD-10-CM

## 2017-07-23 DIAGNOSIS — L89.93: ICD-10-CM

## 2017-07-23 DIAGNOSIS — G82.20 PARAPLEGIA FOLLOWING SPINAL CORD INJURY (HCC): ICD-10-CM

## 2017-07-23 PROBLEM — K52.9 GASTROENTERITIS: Status: ACTIVE | Noted: 2017-07-23

## 2017-07-23 LAB
ALBUMIN SERPL-MCNC: 3.4 G/DL (ref 3.5–5.1)
ALP BLD-CCNC: 106 U/L (ref 38–126)
ALT SERPL-CCNC: 10 U/L (ref 11–66)
ANION GAP SERPL CALCULATED.3IONS-SCNC: 17 MEQ/L (ref 8–16)
ANISOCYTOSIS: ABNORMAL
AST SERPL-CCNC: 11 U/L (ref 5–40)
AVERAGE GLUCOSE: 90 MG/DL (ref 70–126)
BACTERIA: ABNORMAL /HPF
BASOPHILS # BLD: 0 %
BASOPHILS ABSOLUTE: 0 THOU/MM3 (ref 0–0.1)
BILIRUB SERPL-MCNC: < 0.2 MG/DL (ref 0.3–1.2)
BILIRUBIN DIRECT: < 0.2 MG/DL (ref 0–0.3)
BILIRUBIN URINE: NEGATIVE
BLOOD, URINE: ABNORMAL
BUN BLDV-MCNC: 15 MG/DL (ref 7–22)
CALCIUM SERPL-MCNC: 9.5 MG/DL (ref 8.5–10.5)
CASTS 2: ABNORMAL /LPF
CASTS UA: ABNORMAL /LPF
CHARACTER, URINE: ABNORMAL
CHLORIDE BLD-SCNC: 95 MEQ/L (ref 98–111)
CO2: 26 MEQ/L (ref 23–33)
COLOR: YELLOW
CREAT SERPL-MCNC: 0.9 MG/DL (ref 0.4–1.2)
CRYSTALS, UA: ABNORMAL
EOSINOPHIL # BLD: 0 %
EOSINOPHILS ABSOLUTE: 0 THOU/MM3 (ref 0–0.4)
EPITHELIAL CELLS, UA: ABNORMAL /HPF
GFR SERPL CREATININE-BSD FRML MDRD: > 90 ML/MIN/1.73M2
GLUCOSE BLD-MCNC: 131 MG/DL (ref 70–108)
GLUCOSE URINE: NEGATIVE MG/DL
HBA1C MFR BLD: 5 % (ref 4.4–6.4)
HCT VFR BLD CALC: 33.8 % (ref 42–52)
HEMOGLOBIN: 11.1 GM/DL (ref 14–18)
KETONES, URINE: NEGATIVE
LACTIC ACID: 1.8 MMOL/L (ref 0.5–2.2)
LEUKOCYTE ESTERASE, URINE: ABNORMAL
LYMPHOCYTES # BLD: 8.2 %
LYMPHOCYTES ABSOLUTE: 1.2 THOU/MM3 (ref 1–4.8)
MCH RBC QN AUTO: 27.5 PG (ref 27–31)
MCHC RBC AUTO-ENTMCNC: 33 GM/DL (ref 33–37)
MCV RBC AUTO: 83.4 FL (ref 80–94)
MISCELLANEOUS 2: ABNORMAL
MONOCYTES # BLD: 5.4 %
MONOCYTES ABSOLUTE: 0.8 THOU/MM3 (ref 0.4–1.3)
NITRITE, URINE: POSITIVE
NUCLEATED RED BLOOD CELLS: 0 /100 WBC
OSMOLALITY CALCULATION: 278.3 MOSMOL/KG (ref 275–300)
PDW BLD-RTO: 19.4 % (ref 11.5–14.5)
PH UA: 6
PLATELET # BLD: 327 THOU/MM3 (ref 130–400)
PMV BLD AUTO: 7.5 MCM (ref 7.4–10.4)
POTASSIUM SERPL-SCNC: 3.5 MEQ/L (ref 3.5–5.2)
PROTEIN UA: 100
RBC # BLD: 4.05 MILL/MM3 (ref 4.7–6.1)
RBC # BLD: NORMAL 10*6/UL
RBC URINE: ABNORMAL /HPF
RENAL EPITHELIAL, UA: ABNORMAL
SEG NEUTROPHILS: 86.4 %
SEGMENTED NEUTROPHILS ABSOLUTE COUNT: 12.7 THOU/MM3 (ref 1.8–7.7)
SODIUM BLD-SCNC: 138 MEQ/L (ref 135–145)
SPECIFIC GRAVITY, URINE: 1.02 (ref 1–1.03)
TOTAL PROTEIN: 7.8 G/DL (ref 6.1–8)
UROBILINOGEN, URINE: 0.2 EU/DL
WBC # BLD: 14.7 THOU/MM3 (ref 4.8–10.8)
WBC UA: ABNORMAL /HPF
YEAST: ABNORMAL

## 2017-07-23 PROCEDURE — 2580000003 HC RX 258: Performed by: EMERGENCY MEDICINE

## 2017-07-23 PROCEDURE — 87186 SC STD MICRODIL/AGAR DIL: CPT

## 2017-07-23 PROCEDURE — 87184 SC STD DISK METHOD PER PLATE: CPT

## 2017-07-23 PROCEDURE — 83605 ASSAY OF LACTIC ACID: CPT

## 2017-07-23 PROCEDURE — 87493 C DIFF AMPLIFIED PROBE: CPT

## 2017-07-23 PROCEDURE — 85025 COMPLETE CBC W/AUTO DIFF WBC: CPT

## 2017-07-23 PROCEDURE — 74177 CT ABD & PELVIS W/CONTRAST: CPT

## 2017-07-23 PROCEDURE — 87040 BLOOD CULTURE FOR BACTERIA: CPT

## 2017-07-23 PROCEDURE — A4421 OSTOMY SUPPLY MISC: HCPCS

## 2017-07-23 PROCEDURE — 51701 INSERT BLADDER CATHETER: CPT

## 2017-07-23 PROCEDURE — 6360000002 HC RX W HCPCS: Performed by: FAMILY MEDICINE

## 2017-07-23 PROCEDURE — 80053 COMPREHEN METABOLIC PANEL: CPT

## 2017-07-23 PROCEDURE — 6360000004 HC RX CONTRAST MEDICATION: Performed by: INTERNAL MEDICINE

## 2017-07-23 PROCEDURE — 2580000003 HC RX 258: Performed by: FAMILY MEDICINE

## 2017-07-23 PROCEDURE — 81001 URINALYSIS AUTO W/SCOPE: CPT

## 2017-07-23 PROCEDURE — 99222 1ST HOSP IP/OBS MODERATE 55: CPT | Performed by: FAMILY MEDICINE

## 2017-07-23 PROCEDURE — 99285 EMERGENCY DEPT VISIT HI MDM: CPT

## 2017-07-23 PROCEDURE — 96361 HYDRATE IV INFUSION ADD-ON: CPT

## 2017-07-23 PROCEDURE — 6360000002 HC RX W HCPCS: Performed by: EMERGENCY MEDICINE

## 2017-07-23 PROCEDURE — 82248 BILIRUBIN DIRECT: CPT

## 2017-07-23 PROCEDURE — 83036 HEMOGLOBIN GLYCOSYLATED A1C: CPT

## 2017-07-23 PROCEDURE — 36415 COLL VENOUS BLD VENIPUNCTURE: CPT

## 2017-07-23 PROCEDURE — 87086 URINE CULTURE/COLONY COUNT: CPT

## 2017-07-23 PROCEDURE — 6370000000 HC RX 637 (ALT 250 FOR IP): Performed by: FAMILY MEDICINE

## 2017-07-23 PROCEDURE — 96374 THER/PROPH/DIAG INJ IV PUSH: CPT

## 2017-07-23 PROCEDURE — 87077 CULTURE AEROBIC IDENTIFY: CPT

## 2017-07-23 PROCEDURE — 1210000002 HC MED SURG R&B - NEUROSCIENCE

## 2017-07-23 PROCEDURE — 71020 XR CHEST STANDARD TWO VW: CPT

## 2017-07-23 RX ORDER — TRAZODONE HYDROCHLORIDE 100 MG/1
100 TABLET ORAL NIGHTLY PRN
Status: DISCONTINUED | OUTPATIENT
Start: 2017-07-23 | End: 2017-07-26 | Stop reason: HOSPADM

## 2017-07-23 RX ORDER — ONDANSETRON 2 MG/ML
4 INJECTION INTRAMUSCULAR; INTRAVENOUS ONCE
Status: COMPLETED | OUTPATIENT
Start: 2017-07-23 | End: 2017-07-23

## 2017-07-23 RX ORDER — NICOTINE 21 MG/24HR
1 PATCH, TRANSDERMAL 24 HOURS TRANSDERMAL DAILY
Status: DISCONTINUED | OUTPATIENT
Start: 2017-07-23 | End: 2017-07-26 | Stop reason: HOSPADM

## 2017-07-23 RX ORDER — HYDROXYZINE HYDROCHLORIDE 25 MG/1
25 TABLET, FILM COATED ORAL 3 TIMES DAILY PRN
Status: DISCONTINUED | OUTPATIENT
Start: 2017-07-23 | End: 2017-07-26 | Stop reason: HOSPADM

## 2017-07-23 RX ORDER — ACETAMINOPHEN 325 MG/1
650 TABLET ORAL EVERY 4 HOURS PRN
Status: DISCONTINUED | OUTPATIENT
Start: 2017-07-23 | End: 2017-07-26 | Stop reason: HOSPADM

## 2017-07-23 RX ORDER — FERROUS SULFATE 325(65) MG
325 TABLET ORAL 2 TIMES DAILY
Status: DISCONTINUED | OUTPATIENT
Start: 2017-07-23 | End: 2017-07-26 | Stop reason: HOSPADM

## 2017-07-23 RX ORDER — FLUTICASONE PROPIONATE 50 MCG
2 SPRAY, SUSPENSION (ML) NASAL DAILY
Status: DISCONTINUED | OUTPATIENT
Start: 2017-07-23 | End: 2017-07-26 | Stop reason: HOSPADM

## 2017-07-23 RX ORDER — POTASSIUM CHLORIDE 20MEQ/15ML
40 LIQUID (ML) ORAL PRN
Status: DISCONTINUED | OUTPATIENT
Start: 2017-07-23 | End: 2017-07-26 | Stop reason: HOSPADM

## 2017-07-23 RX ORDER — SODIUM CHLORIDE 0.9 % (FLUSH) 0.9 %
10 SYRINGE (ML) INJECTION EVERY 12 HOURS SCHEDULED
Status: DISCONTINUED | OUTPATIENT
Start: 2017-07-23 | End: 2017-07-26 | Stop reason: HOSPADM

## 2017-07-23 RX ORDER — GABAPENTIN 300 MG/1
300 CAPSULE ORAL 2 TIMES DAILY
Status: DISCONTINUED | OUTPATIENT
Start: 2017-07-23 | End: 2017-07-26 | Stop reason: HOSPADM

## 2017-07-23 RX ORDER — LANOLIN ALCOHOL/MO/W.PET/CERES
6 CREAM (GRAM) TOPICAL NIGHTLY
Status: DISCONTINUED | OUTPATIENT
Start: 2017-07-23 | End: 2017-07-26 | Stop reason: HOSPADM

## 2017-07-23 RX ORDER — FAMOTIDINE 20 MG/1
20 TABLET, FILM COATED ORAL 2 TIMES DAILY
Status: DISCONTINUED | OUTPATIENT
Start: 2017-07-23 | End: 2017-07-26 | Stop reason: HOSPADM

## 2017-07-23 RX ORDER — POTASSIUM CHLORIDE 20 MEQ/1
40 TABLET, EXTENDED RELEASE ORAL PRN
Status: DISCONTINUED | OUTPATIENT
Start: 2017-07-23 | End: 2017-07-26 | Stop reason: HOSPADM

## 2017-07-23 RX ORDER — ASCORBIC ACID 500 MG
500 TABLET ORAL 2 TIMES DAILY
Status: DISCONTINUED | OUTPATIENT
Start: 2017-07-23 | End: 2017-07-26 | Stop reason: HOSPADM

## 2017-07-23 RX ORDER — ONDANSETRON 4 MG/1
4 TABLET, FILM COATED ORAL EVERY 6 HOURS PRN
Status: DISCONTINUED | OUTPATIENT
Start: 2017-07-23 | End: 2017-07-26 | Stop reason: HOSPADM

## 2017-07-23 RX ORDER — FUROSEMIDE 20 MG/1
20 TABLET ORAL DAILY
Status: DISCONTINUED | OUTPATIENT
Start: 2017-07-23 | End: 2017-07-26

## 2017-07-23 RX ORDER — 0.9 % SODIUM CHLORIDE 0.9 %
500 INTRAVENOUS SOLUTION INTRAVENOUS ONCE
Status: COMPLETED | OUTPATIENT
Start: 2017-07-23 | End: 2017-07-23

## 2017-07-23 RX ORDER — POTASSIUM CHLORIDE 7.45 MG/ML
10 INJECTION INTRAVENOUS PRN
Status: DISCONTINUED | OUTPATIENT
Start: 2017-07-23 | End: 2017-07-26 | Stop reason: HOSPADM

## 2017-07-23 RX ORDER — AMANTADINE HYDROCHLORIDE 100 MG/1
100 CAPSULE, GELATIN COATED ORAL 2 TIMES DAILY
Status: DISCONTINUED | OUTPATIENT
Start: 2017-07-23 | End: 2017-07-26 | Stop reason: HOSPADM

## 2017-07-23 RX ORDER — SODIUM CHLORIDE 9 MG/ML
INJECTION, SOLUTION INTRAVENOUS CONTINUOUS
Status: DISCONTINUED | OUTPATIENT
Start: 2017-07-23 | End: 2017-07-25

## 2017-07-23 RX ORDER — GABAPENTIN 300 MG/1
600 CAPSULE ORAL NIGHTLY
Status: DISCONTINUED | OUTPATIENT
Start: 2017-07-23 | End: 2017-07-26 | Stop reason: HOSPADM

## 2017-07-23 RX ORDER — SODIUM CHLORIDE 0.9 % (FLUSH) 0.9 %
10 SYRINGE (ML) INJECTION PRN
Status: DISCONTINUED | OUTPATIENT
Start: 2017-07-23 | End: 2017-07-26 | Stop reason: HOSPADM

## 2017-07-23 RX ORDER — GABAPENTIN 300 MG/1
600 CAPSULE ORAL 2 TIMES DAILY
Status: DISCONTINUED | OUTPATIENT
Start: 2017-07-23 | End: 2017-07-23

## 2017-07-23 RX ORDER — VENLAFAXINE HYDROCHLORIDE 150 MG/1
150 CAPSULE, EXTENDED RELEASE ORAL DAILY
Status: DISCONTINUED | OUTPATIENT
Start: 2017-07-23 | End: 2017-07-26 | Stop reason: HOSPADM

## 2017-07-23 RX ORDER — MIDODRINE HYDROCHLORIDE 2.5 MG/1
2.5 TABLET ORAL 3 TIMES DAILY
Status: DISCONTINUED | OUTPATIENT
Start: 2017-07-23 | End: 2017-07-26 | Stop reason: HOSPADM

## 2017-07-23 RX ADMIN — GABAPENTIN 600 MG: 300 CAPSULE ORAL at 21:30

## 2017-07-23 RX ADMIN — AMANTADINE HYDROCHLORIDE 100 MG: 100 CAPSULE ORAL at 21:32

## 2017-07-23 RX ADMIN — SODIUM CHLORIDE 500 ML: 9 INJECTION, SOLUTION INTRAVENOUS at 11:50

## 2017-07-23 RX ADMIN — MEROPENEM 1 G: 1 INJECTION, POWDER, FOR SOLUTION INTRAVENOUS at 16:56

## 2017-07-23 RX ADMIN — Medication 325 MG: at 20:34

## 2017-07-23 RX ADMIN — PIPERACILLIN SODIUM,TAZOBACTAM SODIUM 3.38 G: 3; .375 INJECTION, POWDER, FOR SOLUTION INTRAVENOUS at 14:11

## 2017-07-23 RX ADMIN — ACETAMINOPHEN 650 MG: 325 TABLET ORAL at 16:55

## 2017-07-23 RX ADMIN — OXYCODONE HYDROCHLORIDE AND ACETAMINOPHEN 500 MG: 500 TABLET ORAL at 20:33

## 2017-07-23 RX ADMIN — MIDODRINE HYDROCHLORIDE 2.5 MG: 2.5 TABLET ORAL at 20:34

## 2017-07-23 RX ADMIN — HYDROXYZINE HYDROCHLORIDE 25 MG: 25 TABLET ORAL at 20:33

## 2017-07-23 RX ADMIN — FAMOTIDINE 20 MG: 20 TABLET, FILM COATED ORAL at 20:34

## 2017-07-23 RX ADMIN — IOPAMIDOL 80 ML: 755 INJECTION, SOLUTION INTRAVENOUS at 19:07

## 2017-07-23 RX ADMIN — MIDODRINE HYDROCHLORIDE 2.5 MG: 2.5 TABLET ORAL at 16:55

## 2017-07-23 RX ADMIN — SODIUM CHLORIDE: 9 INJECTION, SOLUTION INTRAVENOUS at 13:00

## 2017-07-23 RX ADMIN — ONDANSETRON 4 MG: 2 INJECTION INTRAMUSCULAR; INTRAVENOUS at 12:09

## 2017-07-23 RX ADMIN — GABAPENTIN 300 MG: 300 CAPSULE ORAL at 17:57

## 2017-07-23 ASSESSMENT — ENCOUNTER SYMPTOMS
SORE THROAT: 0
BACK PAIN: 0
WHEEZING: 0
DIARRHEA: 1
COUGH: 0
VOMITING: 1
NAUSEA: 0
ABDOMINAL PAIN: 0
BLOOD IN STOOL: 0
SHORTNESS OF BREATH: 0

## 2017-07-23 ASSESSMENT — PAIN DESCRIPTION - FREQUENCY
FREQUENCY: CONTINUOUS
FREQUENCY: CONTINUOUS

## 2017-07-23 ASSESSMENT — PAIN SCALES - GENERAL
PAINLEVEL_OUTOF10: 6
PAINLEVEL_OUTOF10: 7
PAINLEVEL_OUTOF10: 5
PAINLEVEL_OUTOF10: 3

## 2017-07-23 ASSESSMENT — PAIN DESCRIPTION - DESCRIPTORS
DESCRIPTORS: CONSTANT
DESCRIPTORS: CONSTANT

## 2017-07-23 ASSESSMENT — PAIN DESCRIPTION - ORIENTATION
ORIENTATION: LEFT

## 2017-07-23 ASSESSMENT — PAIN DESCRIPTION - PAIN TYPE
TYPE: CHRONIC PAIN

## 2017-07-23 ASSESSMENT — PAIN DESCRIPTION - LOCATION
LOCATION: SHOULDER

## 2017-07-24 ENCOUNTER — HOSPITAL ENCOUNTER (OUTPATIENT)
Dept: PHYSICAL THERAPY | Age: 27
Setting detail: THERAPIES SERIES
Discharge: HOME | End: 2017-07-24
Payer: MEDICAID

## 2017-07-24 LAB
ADENOVIRUS F 40 41 PCR: NOT DETECTED
ANION GAP SERPL CALCULATED.3IONS-SCNC: 17 MEQ/L (ref 8–16)
ANISOCYTOSIS: ABNORMAL
ASTROVIRUS PCR: NOT DETECTED
BASOPHILS # BLD: 0.1 %
BASOPHILS ABSOLUTE: 0 THOU/MM3 (ref 0–0.1)
BUN BLDV-MCNC: 8 MG/DL (ref 7–22)
CALCIUM SERPL-MCNC: 8.6 MG/DL (ref 8.5–10.5)
CAMPYLOBACTER PCR: NOT DETECTED
CHLORIDE BLD-SCNC: 100 MEQ/L (ref 98–111)
CLOSTRIDIUM DIFFICILE, PCR: NOT DETECTED
CO2: 21 MEQ/L (ref 23–33)
CREAT SERPL-MCNC: 0.5 MG/DL (ref 0.4–1.2)
CRYPTOSPORIDIUM PCR: NOT DETECTED
CYCLOSPORIDIUM GI FILM ARRAY: NOT DETECTED
E COLI 0157 PCR: NORMAL
E COLI ENTEROAGGREGATIVE PCR: NOT DETECTED
E COLI ENTEROPATHOGENIC PCR: NOT DETECTED
E COLI ENTEROTOXIGENIC PCR: NOT DETECTED
E COLI SHIGA LIKE TOXIN PCR: NOT DETECTED
E COLI SHIGELLA/ENTEROINVASIVE PCR: NOT DETECTED
E HISTOLYTICA GI FILM ARRAY: NOT DETECTED
EOSINOPHIL # BLD: 0.1 %
EOSINOPHILS ABSOLUTE: 0 THOU/MM3 (ref 0–0.4)
GFR SERPL CREATININE-BSD FRML MDRD: > 90 ML/MIN/1.73M2
GIARDIA LAMBLIA PCR: NOT DETECTED
GLUCOSE BLD-MCNC: 112 MG/DL (ref 70–108)
HCT VFR BLD CALC: 32.4 % (ref 42–52)
HEMOGLOBIN: 10.8 GM/DL (ref 14–18)
LYMPHOCYTES # BLD: 14.7 %
LYMPHOCYTES ABSOLUTE: 1.6 THOU/MM3 (ref 1–4.8)
MAGNESIUM: 1.8 MG/DL (ref 1.6–2.4)
MCH RBC QN AUTO: 27.7 PG (ref 27–31)
MCHC RBC AUTO-ENTMCNC: 33.3 GM/DL (ref 33–37)
MCV RBC AUTO: 83.4 FL (ref 80–94)
MONOCYTES # BLD: 11.4 %
MONOCYTES ABSOLUTE: 1.3 THOU/MM3 (ref 0.4–1.3)
NOROVIRUS GI GII PCR: NOT DETECTED
NUCLEATED RED BLOOD CELLS: 0 /100 WBC
PDW BLD-RTO: 19.7 % (ref 11.5–14.5)
PLATELET # BLD: 285 THOU/MM3 (ref 130–400)
PLESIOMONAS SHIGELLOIDES PCR: NOT DETECTED
PMV BLD AUTO: 7.6 MCM (ref 7.4–10.4)
POTASSIUM SERPL-SCNC: 3.1 MEQ/L (ref 3.5–5.2)
RBC # BLD: 3.89 MILL/MM3 (ref 4.7–6.1)
RBC # BLD: NORMAL 10*6/UL
ROTAVIRUS A PCR: NOT DETECTED
SALMONELLA PCR: NOT DETECTED
SAPOVIRUS PCR: NOT DETECTED
SEG NEUTROPHILS: 73.7 %
SEGMENTED NEUTROPHILS ABSOLUTE COUNT: 8.2 THOU/MM3 (ref 1.8–7.7)
SODIUM BLD-SCNC: 138 MEQ/L (ref 135–145)
VIBRIO CHOLERAE PCR: NOT DETECTED
VIBRIO PCR: NOT DETECTED
WBC # BLD: 11.1 THOU/MM3 (ref 4.8–10.8)
YERSINIA ENTEROCOLITICA PCR: NOT DETECTED

## 2017-07-24 PROCEDURE — 1210000002 HC MED SURG R&B - NEUROSCIENCE

## 2017-07-24 PROCEDURE — 83735 ASSAY OF MAGNESIUM: CPT

## 2017-07-24 PROCEDURE — 2580000003 HC RX 258: Performed by: EMERGENCY MEDICINE

## 2017-07-24 PROCEDURE — 6370000000 HC RX 637 (ALT 250 FOR IP): Performed by: INTERNAL MEDICINE

## 2017-07-24 PROCEDURE — 6360000002 HC RX W HCPCS: Performed by: FAMILY MEDICINE

## 2017-07-24 PROCEDURE — 99232 SBSQ HOSP IP/OBS MODERATE 35: CPT | Performed by: NURSE PRACTITIONER

## 2017-07-24 PROCEDURE — 6370000000 HC RX 637 (ALT 250 FOR IP): Performed by: FAMILY MEDICINE

## 2017-07-24 PROCEDURE — 87507 IADNA-DNA/RNA PROBE TQ 12-25: CPT

## 2017-07-24 PROCEDURE — 80048 BASIC METABOLIC PNL TOTAL CA: CPT

## 2017-07-24 PROCEDURE — 2580000003 HC RX 258: Performed by: FAMILY MEDICINE

## 2017-07-24 PROCEDURE — 85025 COMPLETE CBC W/AUTO DIFF WBC: CPT

## 2017-07-24 PROCEDURE — 36415 COLL VENOUS BLD VENIPUNCTURE: CPT

## 2017-07-24 PROCEDURE — A6212 FOAM DRG <=16 SQ IN W/BORDER: HCPCS

## 2017-07-24 PROCEDURE — 99232 SBSQ HOSP IP/OBS MODERATE 35: CPT | Performed by: INTERNAL MEDICINE

## 2017-07-24 RX ORDER — SUMATRIPTAN 50 MG/1
50 TABLET, FILM COATED ORAL DAILY PRN
Status: DISCONTINUED | OUTPATIENT
Start: 2017-07-24 | End: 2017-07-26 | Stop reason: HOSPADM

## 2017-07-24 RX ORDER — POTASSIUM CHLORIDE 20 MEQ/1
40 TABLET, EXTENDED RELEASE ORAL ONCE
Status: COMPLETED | OUTPATIENT
Start: 2017-07-24 | End: 2017-07-24

## 2017-07-24 RX ORDER — IBUPROFEN 400 MG/1
400 TABLET ORAL ONCE
Status: COMPLETED | OUTPATIENT
Start: 2017-07-24 | End: 2017-07-24

## 2017-07-24 RX ADMIN — FLUTICASONE PROPIONATE 2 SPRAY: 50 SPRAY, METERED NASAL at 10:14

## 2017-07-24 RX ADMIN — ACETAMINOPHEN 650 MG: 325 TABLET ORAL at 10:14

## 2017-07-24 RX ADMIN — GABAPENTIN 300 MG: 300 CAPSULE ORAL at 10:15

## 2017-07-24 RX ADMIN — SODIUM CHLORIDE: 9 INJECTION, SOLUTION INTRAVENOUS at 04:04

## 2017-07-24 RX ADMIN — FUROSEMIDE 20 MG: 20 TABLET ORAL at 10:14

## 2017-07-24 RX ADMIN — VENLAFAXINE HYDROCHLORIDE 150 MG: 150 CAPSULE, EXTENDED RELEASE ORAL at 10:14

## 2017-07-24 RX ADMIN — Medication 6 MG: at 21:51

## 2017-07-24 RX ADMIN — TRAZODONE HYDROCHLORIDE 100 MG: 100 TABLET ORAL at 21:50

## 2017-07-24 RX ADMIN — POTASSIUM CHLORIDE 40 MEQ: 1500 TABLET, EXTENDED RELEASE ORAL at 06:00

## 2017-07-24 RX ADMIN — SODIUM CHLORIDE: 9 INJECTION, SOLUTION INTRAVENOUS at 17:06

## 2017-07-24 RX ADMIN — Medication 10 ML: at 20:10

## 2017-07-24 RX ADMIN — MIDODRINE HYDROCHLORIDE 2.5 MG: 2.5 TABLET ORAL at 13:34

## 2017-07-24 RX ADMIN — Medication 325 MG: at 20:10

## 2017-07-24 RX ADMIN — MEROPENEM 1 G: 1 INJECTION, POWDER, FOR SOLUTION INTRAVENOUS at 16:12

## 2017-07-24 RX ADMIN — AMANTADINE HYDROCHLORIDE 100 MG: 100 CAPSULE ORAL at 20:11

## 2017-07-24 RX ADMIN — OXYCODONE HYDROCHLORIDE AND ACETAMINOPHEN 500 MG: 500 TABLET ORAL at 10:14

## 2017-07-24 RX ADMIN — FAMOTIDINE 20 MG: 20 TABLET, FILM COATED ORAL at 20:11

## 2017-07-24 RX ADMIN — OXYCODONE HYDROCHLORIDE AND ACETAMINOPHEN 500 MG: 500 TABLET ORAL at 20:10

## 2017-07-24 RX ADMIN — AMANTADINE HYDROCHLORIDE 100 MG: 100 CAPSULE ORAL at 10:14

## 2017-07-24 RX ADMIN — GABAPENTIN 600 MG: 300 CAPSULE ORAL at 20:10

## 2017-07-24 RX ADMIN — MIDODRINE HYDROCHLORIDE 2.5 MG: 2.5 TABLET ORAL at 20:10

## 2017-07-24 RX ADMIN — Medication 325 MG: at 10:17

## 2017-07-24 RX ADMIN — ACETAMINOPHEN 650 MG: 325 TABLET ORAL at 04:04

## 2017-07-24 RX ADMIN — MEROPENEM 1 G: 1 INJECTION, POWDER, FOR SOLUTION INTRAVENOUS at 10:15

## 2017-07-24 RX ADMIN — FAMOTIDINE 20 MG: 20 TABLET, FILM COATED ORAL at 10:15

## 2017-07-24 RX ADMIN — GABAPENTIN 300 MG: 300 CAPSULE ORAL at 17:31

## 2017-07-24 RX ADMIN — MEROPENEM 1 G: 1 INJECTION, POWDER, FOR SOLUTION INTRAVENOUS at 00:01

## 2017-07-24 RX ADMIN — IBUPROFEN 400 MG: 400 TABLET, FILM COATED ORAL at 13:33

## 2017-07-24 RX ADMIN — MIDODRINE HYDROCHLORIDE 2.5 MG: 2.5 TABLET ORAL at 10:15

## 2017-07-24 RX ADMIN — ACETAMINOPHEN 650 MG: 325 TABLET ORAL at 00:00

## 2017-07-24 ASSESSMENT — PAIN SCALES - GENERAL
PAINLEVEL_OUTOF10: 0

## 2017-07-25 ENCOUNTER — TELEPHONE (OUTPATIENT)
Dept: FAMILY MEDICINE CLINIC | Age: 27
End: 2017-07-25

## 2017-07-25 PROBLEM — K52.9 GASTROENTERITIS: Status: RESOLVED | Noted: 2017-07-23 | Resolved: 2017-07-25

## 2017-07-25 PROBLEM — Z93.59 SUPRAPUBIC CATHETER (HCC): Status: RESOLVED | Noted: 2017-05-01 | Resolved: 2017-07-25

## 2017-07-25 PROBLEM — S06.9XAA TBI (TRAUMATIC BRAIN INJURY) (HCC): Status: RESOLVED | Noted: 2017-03-08 | Resolved: 2017-07-25

## 2017-07-25 PROBLEM — J18.9 HOSPITAL ACQUIRED PNA: Status: RESOLVED | Noted: 2017-03-11 | Resolved: 2017-07-25

## 2017-07-25 PROBLEM — Z93.3 COLOSTOMY PRESENT (HCC): Status: RESOLVED | Noted: 2017-03-08 | Resolved: 2017-07-25

## 2017-07-25 PROBLEM — F32.2 SEVERE SINGLE CURRENT EPISODE OF MAJOR DEPRESSIVE DISORDER, WITHOUT PSYCHOTIC FEATURES (HCC): Status: RESOLVED | Noted: 2017-03-08 | Resolved: 2017-07-25

## 2017-07-25 PROBLEM — R78.81 E COLI BACTEREMIA: Status: ACTIVE | Noted: 2017-07-25
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
## 2017-07-25 PROBLEM — S21.201A WOUND OF RIGHT SIDE OF BACK: Status: RESOLVED | Noted: 2017-07-13 | Resolved: 2017-07-25
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
## 2017-07-25 PROBLEM — B96.20 E COLI BACTEREMIA: Status: ACTIVE | Noted: 2017-07-25

## 2017-07-25 PROBLEM — J96.10 CHRONIC RESPIRATORY FAILURE (HCC): Status: RESOLVED | Noted: 2017-03-08 | Resolved: 2017-07-25

## 2017-07-25 PROBLEM — N20.0 RIGHT NEPHROLITHIASIS: Status: ACTIVE | Noted: 2017-07-25

## 2017-07-25 PROBLEM — T21.34XA: Status: RESOLVED | Noted: 2017-06-29 | Resolved: 2017-07-25

## 2017-07-25 PROBLEM — S11.90XA WOUND, OPEN, NECK: Status: RESOLVED | Noted: 2017-04-13 | Resolved: 2017-07-25

## 2017-07-25 PROBLEM — K59.00 CONSTIPATION: Status: RESOLVED | Noted: 2017-03-08 | Resolved: 2017-07-25

## 2017-07-25 PROBLEM — L89.512 DECUBITUS ULCER OF RIGHT ANKLE, STAGE 2 (HCC): Status: RESOLVED | Noted: 2017-04-13 | Resolved: 2017-07-25

## 2017-07-25 PROBLEM — E87.6 HYPOKALEMIA: Status: ACTIVE | Noted: 2017-07-25

## 2017-07-25 PROBLEM — N31.9 NEUROGENIC BLADDER: Status: ACTIVE | Noted: 2017-03-12

## 2017-07-25 PROBLEM — A41.9 SEPSIS SECONDARY TO UTI (HCC): Status: ACTIVE | Noted: 2017-03-11

## 2017-07-25 LAB
ALBUMIN SERPL-MCNC: 3 G/DL (ref 3.5–5.1)
ALP BLD-CCNC: 81 U/L (ref 38–126)
ALT SERPL-CCNC: 21 U/L (ref 11–66)
ANION GAP SERPL CALCULATED.3IONS-SCNC: 17 MEQ/L (ref 8–16)
ANISOCYTOSIS: ABNORMAL
AST SERPL-CCNC: 22 U/L (ref 5–40)
BASOPHILS # BLD: 0.5 %
BASOPHILS ABSOLUTE: 0 THOU/MM3 (ref 0–0.1)
BILIRUB SERPL-MCNC: 0.2 MG/DL (ref 0.3–1.2)
BUN BLDV-MCNC: 3 MG/DL (ref 7–22)
CALCIUM SERPL-MCNC: 9.5 MG/DL (ref 8.5–10.5)
CHLORIDE BLD-SCNC: 103 MEQ/L (ref 98–111)
CO2: 25 MEQ/L (ref 23–33)
CREAT SERPL-MCNC: 0.5 MG/DL (ref 0.4–1.2)
EOSINOPHIL # BLD: 0.8 %
EOSINOPHILS ABSOLUTE: 0.1 THOU/MM3 (ref 0–0.4)
FERRITIN: 458 NG/ML (ref 22–322)
FOLATE: 5.8 NG/ML (ref 4.8–24.2)
GFR SERPL CREATININE-BSD FRML MDRD: > 90 ML/MIN/1.73M2
GLUCOSE BLD-MCNC: 97 MG/DL (ref 70–108)
HCT VFR BLD CALC: 33.3 % (ref 42–52)
HEMOGLOBIN: 10.9 GM/DL (ref 14–18)
LACTIC ACID: 1 MMOL/L (ref 0.5–2.2)
LYMPHOCYTES # BLD: 23.8 %
LYMPHOCYTES ABSOLUTE: 1.9 THOU/MM3 (ref 1–4.8)
MAGNESIUM: 1.7 MG/DL (ref 1.6–2.4)
MCH RBC QN AUTO: 27.4 PG (ref 27–31)
MCHC RBC AUTO-ENTMCNC: 32.8 GM/DL (ref 33–37)
MCV RBC AUTO: 83.7 FL (ref 80–94)
MONOCYTES # BLD: 12.3 %
MONOCYTES ABSOLUTE: 1 THOU/MM3 (ref 0.4–1.3)
NUCLEATED RED BLOOD CELLS: 0 /100 WBC
ORGANISM: ABNORMAL
PDW BLD-RTO: 18.8 % (ref 11.5–14.5)
PLATELET # BLD: 301 THOU/MM3 (ref 130–400)
PMV BLD AUTO: 7.6 MCM (ref 7.4–10.4)
POTASSIUM SERPL-SCNC: 3.4 MEQ/L (ref 3.5–5.2)
RBC # BLD: 3.98 MILL/MM3 (ref 4.7–6.1)
RBC # BLD: NORMAL 10*6/UL
RETICULOCYTE ABSOLUTE COUNT: 0.4 % (ref 0.5–2)
SEG NEUTROPHILS: 62.6 %
SEGMENTED NEUTROPHILS ABSOLUTE COUNT: 4.9 THOU/MM3 (ref 1.8–7.7)
SODIUM BLD-SCNC: 145 MEQ/L (ref 135–145)
TOTAL PROTEIN: 7.1 G/DL (ref 6.1–8)
URINE CULTURE REFLEX: ABNORMAL
VITAMIN B-12: 679 PG/ML (ref 211–911)
WBC # BLD: 7.9 THOU/MM3 (ref 4.8–10.8)

## 2017-07-25 PROCEDURE — 6360000002 HC RX W HCPCS: Performed by: INTERNAL MEDICINE

## 2017-07-25 PROCEDURE — 6360000002 HC RX W HCPCS: Performed by: NURSE PRACTITIONER

## 2017-07-25 PROCEDURE — 99233 SBSQ HOSP IP/OBS HIGH 50: CPT | Performed by: INTERNAL MEDICINE

## 2017-07-25 PROCEDURE — 2580000003 HC RX 258: Performed by: EMERGENCY MEDICINE

## 2017-07-25 PROCEDURE — 85045 AUTOMATED RETICULOCYTE COUNT: CPT

## 2017-07-25 PROCEDURE — 80053 COMPREHEN METABOLIC PANEL: CPT

## 2017-07-25 PROCEDURE — A6212 FOAM DRG <=16 SQ IN W/BORDER: HCPCS

## 2017-07-25 PROCEDURE — 85025 COMPLETE CBC W/AUTO DIFF WBC: CPT

## 2017-07-25 PROCEDURE — 1200000003 HC TELEMETRY R&B

## 2017-07-25 PROCEDURE — 6360000002 HC RX W HCPCS: Performed by: FAMILY MEDICINE

## 2017-07-25 PROCEDURE — 2580000003 HC RX 258: Performed by: FAMILY MEDICINE

## 2017-07-25 PROCEDURE — 99232 SBSQ HOSP IP/OBS MODERATE 35: CPT | Performed by: NURSE PRACTITIONER

## 2017-07-25 PROCEDURE — 82607 VITAMIN B-12: CPT

## 2017-07-25 PROCEDURE — 36415 COLL VENOUS BLD VENIPUNCTURE: CPT

## 2017-07-25 PROCEDURE — 82746 ASSAY OF FOLIC ACID SERUM: CPT

## 2017-07-25 PROCEDURE — 2580000003 HC RX 258: Performed by: INTERNAL MEDICINE

## 2017-07-25 PROCEDURE — 83605 ASSAY OF LACTIC ACID: CPT

## 2017-07-25 PROCEDURE — 82728 ASSAY OF FERRITIN: CPT

## 2017-07-25 PROCEDURE — 6370000000 HC RX 637 (ALT 250 FOR IP): Performed by: FAMILY MEDICINE

## 2017-07-25 PROCEDURE — 83735 ASSAY OF MAGNESIUM: CPT

## 2017-07-25 RX ORDER — POTASSIUM CHLORIDE 20 MEQ/1
40 TABLET, EXTENDED RELEASE ORAL ONCE
Status: COMPLETED | OUTPATIENT
Start: 2017-07-25 | End: 2017-07-25

## 2017-07-25 RX ORDER — SODIUM CHLORIDE AND POTASSIUM CHLORIDE .9; .15 G/100ML; G/100ML
SOLUTION INTRAVENOUS CONTINUOUS
Status: DISCONTINUED | OUTPATIENT
Start: 2017-07-25 | End: 2017-07-26 | Stop reason: HOSPADM

## 2017-07-25 RX ADMIN — GABAPENTIN 600 MG: 300 CAPSULE ORAL at 21:53

## 2017-07-25 RX ADMIN — ACETAMINOPHEN 650 MG: 325 TABLET ORAL at 16:20

## 2017-07-25 RX ADMIN — Medication 325 MG: at 21:53

## 2017-07-25 RX ADMIN — Medication 325 MG: at 11:26

## 2017-07-25 RX ADMIN — MIDODRINE HYDROCHLORIDE 2.5 MG: 2.5 TABLET ORAL at 21:53

## 2017-07-25 RX ADMIN — HYDROXYZINE HYDROCHLORIDE 25 MG: 25 TABLET ORAL at 16:20

## 2017-07-25 RX ADMIN — MEROPENEM 1 G: 1 INJECTION, POWDER, FOR SOLUTION INTRAVENOUS at 00:00

## 2017-07-25 RX ADMIN — AMANTADINE HYDROCHLORIDE 100 MG: 100 CAPSULE ORAL at 21:53

## 2017-07-25 RX ADMIN — OXYCODONE HYDROCHLORIDE AND ACETAMINOPHEN 500 MG: 500 TABLET ORAL at 21:53

## 2017-07-25 RX ADMIN — MIDODRINE HYDROCHLORIDE 2.5 MG: 2.5 TABLET ORAL at 11:25

## 2017-07-25 RX ADMIN — POTASSIUM CHLORIDE AND SODIUM CHLORIDE: 900; 150 INJECTION, SOLUTION INTRAVENOUS at 23:51

## 2017-07-25 RX ADMIN — AMANTADINE HYDROCHLORIDE 100 MG: 100 CAPSULE ORAL at 11:25

## 2017-07-25 RX ADMIN — FUROSEMIDE 20 MG: 20 TABLET ORAL at 11:25

## 2017-07-25 RX ADMIN — CEFTRIAXONE 1 G: 1 INJECTION, POWDER, FOR SOLUTION INTRAMUSCULAR; INTRAVENOUS at 11:26

## 2017-07-25 RX ADMIN — MEROPENEM 1 G: 1 INJECTION, POWDER, FOR SOLUTION INTRAVENOUS at 08:48

## 2017-07-25 RX ADMIN — MIDODRINE HYDROCHLORIDE 2.5 MG: 2.5 TABLET ORAL at 16:22

## 2017-07-25 RX ADMIN — ACETAMINOPHEN 650 MG: 325 TABLET ORAL at 06:39

## 2017-07-25 RX ADMIN — FAMOTIDINE 20 MG: 20 TABLET, FILM COATED ORAL at 11:26

## 2017-07-25 RX ADMIN — SODIUM CHLORIDE: 9 INJECTION, SOLUTION INTRAVENOUS at 02:51

## 2017-07-25 RX ADMIN — ENOXAPARIN SODIUM 40 MG: 40 INJECTION SUBCUTANEOUS at 11:24

## 2017-07-25 RX ADMIN — Medication 6 MG: at 21:54

## 2017-07-25 RX ADMIN — FAMOTIDINE 20 MG: 20 TABLET, FILM COATED ORAL at 21:53

## 2017-07-25 RX ADMIN — VENLAFAXINE HYDROCHLORIDE 150 MG: 150 CAPSULE, EXTENDED RELEASE ORAL at 11:25

## 2017-07-25 RX ADMIN — FLUTICASONE PROPIONATE 2 SPRAY: 50 SPRAY, METERED NASAL at 11:27

## 2017-07-25 RX ADMIN — OXYCODONE HYDROCHLORIDE AND ACETAMINOPHEN 500 MG: 500 TABLET ORAL at 11:26

## 2017-07-25 RX ADMIN — POTASSIUM CHLORIDE AND SODIUM CHLORIDE: 900; 150 INJECTION, SOLUTION INTRAVENOUS at 11:24

## 2017-07-25 RX ADMIN — Medication 10 ML: at 21:52

## 2017-07-25 RX ADMIN — TRAZODONE HYDROCHLORIDE 100 MG: 100 TABLET ORAL at 21:53

## 2017-07-25 RX ADMIN — POTASSIUM CHLORIDE 40 MEQ: 1500 TABLET, EXTENDED RELEASE ORAL at 16:20

## 2017-07-25 RX ADMIN — GABAPENTIN 300 MG: 300 CAPSULE ORAL at 11:26

## 2017-07-25 ASSESSMENT — PAIN SCALES - GENERAL
PAINLEVEL_OUTOF10: 0
PAINLEVEL_OUTOF10: 3
PAINLEVEL_OUTOF10: 0
PAINLEVEL_OUTOF10: 0
PAINLEVEL_OUTOF10: 3
PAINLEVEL_OUTOF10: 0
PAINLEVEL_OUTOF10: 0

## 2017-07-25 ASSESSMENT — PAIN DESCRIPTION - LOCATION: LOCATION: SHOULDER

## 2017-07-25 ASSESSMENT — PAIN DESCRIPTION - PROGRESSION: CLINICAL_PROGRESSION: GRADUALLY IMPROVING

## 2017-07-25 ASSESSMENT — PAIN DESCRIPTION - PAIN TYPE: TYPE: CHRONIC PAIN

## 2017-07-25 ASSESSMENT — PAIN DESCRIPTION - ORIENTATION: ORIENTATION: LEFT

## 2017-07-25 ASSESSMENT — PAIN DESCRIPTION - DESCRIPTORS: DESCRIPTORS: ACHING;SHARP;STABBING

## 2017-07-26 ENCOUNTER — APPOINTMENT (OUTPATIENT)
Dept: ULTRASOUND IMAGING | Age: 27
DRG: 720 | End: 2017-07-26
Payer: MEDICAID

## 2017-07-26 VITALS
SYSTOLIC BLOOD PRESSURE: 136 MMHG | RESPIRATION RATE: 15 BRPM | TEMPERATURE: 98.5 F | DIASTOLIC BLOOD PRESSURE: 70 MMHG | BODY MASS INDEX: 18.2 KG/M2 | OXYGEN SATURATION: 98 % | HEART RATE: 70 BPM | WEIGHT: 127.1 LBS | HEIGHT: 70 IN

## 2017-07-26 LAB
ALBUMIN SERPL-MCNC: 3 G/DL (ref 3.5–5.1)
ALP BLD-CCNC: 79 U/L (ref 38–126)
ALT SERPL-CCNC: 65 U/L (ref 11–66)
ANION GAP SERPL CALCULATED.3IONS-SCNC: 15 MEQ/L (ref 8–16)
ANISOCYTOSIS: ABNORMAL
AST SERPL-CCNC: 72 U/L (ref 5–40)
BASOPHILS # BLD: 0.4 %
BASOPHILS ABSOLUTE: 0 THOU/MM3 (ref 0–0.1)
BILIRUB SERPL-MCNC: 0.2 MG/DL (ref 0.3–1.2)
BUN BLDV-MCNC: 3 MG/DL (ref 7–22)
CALCIUM SERPL-MCNC: 9.3 MG/DL (ref 8.5–10.5)
CHLORIDE BLD-SCNC: 103 MEQ/L (ref 98–111)
CO2: 26 MEQ/L (ref 23–33)
CREAT SERPL-MCNC: 0.5 MG/DL (ref 0.4–1.2)
EOSINOPHIL # BLD: 2 %
EOSINOPHILS ABSOLUTE: 0.1 THOU/MM3 (ref 0–0.4)
GFR SERPL CREATININE-BSD FRML MDRD: > 90 ML/MIN/1.73M2
GLUCOSE BLD-MCNC: 97 MG/DL (ref 70–108)
HCT VFR BLD CALC: 32 % (ref 42–52)
HEMOGLOBIN: 10.9 GM/DL (ref 14–18)
LYMPHOCYTES # BLD: 27.6 %
LYMPHOCYTES ABSOLUTE: 2 THOU/MM3 (ref 1–4.8)
MCH RBC QN AUTO: 28.2 PG (ref 27–31)
MCHC RBC AUTO-ENTMCNC: 33.9 GM/DL (ref 33–37)
MCV RBC AUTO: 83.1 FL (ref 80–94)
MONOCYTES # BLD: 9.3 %
MONOCYTES ABSOLUTE: 0.7 THOU/MM3 (ref 0.4–1.3)
NUCLEATED RED BLOOD CELLS: 0 /100 WBC
PDW BLD-RTO: 18.7 % (ref 11.5–14.5)
PLATELET # BLD: 363 THOU/MM3 (ref 130–400)
PMV BLD AUTO: 7.5 MCM (ref 7.4–10.4)
POTASSIUM SERPL-SCNC: 3.1 MEQ/L (ref 3.5–5.2)
RBC # BLD: 3.85 MILL/MM3 (ref 4.7–6.1)
RBC # BLD: NORMAL 10*6/UL
SEG NEUTROPHILS: 60.7 %
SEGMENTED NEUTROPHILS ABSOLUTE COUNT: 4.4 THOU/MM3 (ref 1.8–7.7)
SODIUM BLD-SCNC: 144 MEQ/L (ref 135–145)
TOTAL PROTEIN: 7.2 G/DL (ref 6.1–8)
WBC # BLD: 7.3 THOU/MM3 (ref 4.8–10.8)

## 2017-07-26 PROCEDURE — 2580000003 HC RX 258: Performed by: INTERNAL MEDICINE

## 2017-07-26 PROCEDURE — 99239 HOSP IP/OBS DSCHRG MGMT >30: CPT | Performed by: INTERNAL MEDICINE

## 2017-07-26 PROCEDURE — 36415 COLL VENOUS BLD VENIPUNCTURE: CPT

## 2017-07-26 PROCEDURE — 76775 US EXAM ABDO BACK WALL LIM: CPT

## 2017-07-26 PROCEDURE — 6370000000 HC RX 637 (ALT 250 FOR IP): Performed by: INTERNAL MEDICINE

## 2017-07-26 PROCEDURE — 6370000000 HC RX 637 (ALT 250 FOR IP): Performed by: FAMILY MEDICINE

## 2017-07-26 PROCEDURE — 80053 COMPREHEN METABOLIC PANEL: CPT

## 2017-07-26 PROCEDURE — 99232 SBSQ HOSP IP/OBS MODERATE 35: CPT | Performed by: NURSE PRACTITIONER

## 2017-07-26 PROCEDURE — 2580000003 HC RX 258: Performed by: FAMILY MEDICINE

## 2017-07-26 PROCEDURE — 85025 COMPLETE CBC W/AUTO DIFF WBC: CPT

## 2017-07-26 PROCEDURE — A6212 FOAM DRG <=16 SQ IN W/BORDER: HCPCS

## 2017-07-26 PROCEDURE — 6360000002 HC RX W HCPCS: Performed by: INTERNAL MEDICINE

## 2017-07-26 PROCEDURE — 6360000002 HC RX W HCPCS: Performed by: NURSE PRACTITIONER

## 2017-07-26 RX ORDER — POTASSIUM CHLORIDE 20 MEQ/1
40 TABLET, EXTENDED RELEASE ORAL ONCE
Status: COMPLETED | OUTPATIENT
Start: 2017-07-26 | End: 2017-07-26

## 2017-07-26 RX ORDER — SULFAMETHOXAZOLE AND TRIMETHOPRIM 800; 160 MG/1; MG/1
1 TABLET ORAL 2 TIMES DAILY
Qty: 20 TABLET | Refills: 0 | Status: SHIPPED | OUTPATIENT
Start: 2017-07-26 | End: 2017-08-05

## 2017-07-26 RX ADMIN — FLUTICASONE PROPIONATE 2 SPRAY: 50 SPRAY, METERED NASAL at 08:26

## 2017-07-26 RX ADMIN — Medication 10 ML: at 08:28

## 2017-07-26 RX ADMIN — Medication 325 MG: at 08:27

## 2017-07-26 RX ADMIN — CEFTRIAXONE 1 G: 1 INJECTION, POWDER, FOR SOLUTION INTRAMUSCULAR; INTRAVENOUS at 11:17

## 2017-07-26 RX ADMIN — GABAPENTIN 300 MG: 300 CAPSULE ORAL at 08:27

## 2017-07-26 RX ADMIN — OXYCODONE HYDROCHLORIDE AND ACETAMINOPHEN 500 MG: 500 TABLET ORAL at 08:27

## 2017-07-26 RX ADMIN — MIDODRINE HYDROCHLORIDE 2.5 MG: 2.5 TABLET ORAL at 13:28

## 2017-07-26 RX ADMIN — FUROSEMIDE 20 MG: 20 TABLET ORAL at 08:27

## 2017-07-26 RX ADMIN — SUMATRIPTAN 50 MG: 50 TABLET ORAL at 11:16

## 2017-07-26 RX ADMIN — VENLAFAXINE HYDROCHLORIDE 150 MG: 150 CAPSULE, EXTENDED RELEASE ORAL at 08:27

## 2017-07-26 RX ADMIN — AMANTADINE HYDROCHLORIDE 100 MG: 100 CAPSULE ORAL at 08:27

## 2017-07-26 RX ADMIN — FAMOTIDINE 20 MG: 20 TABLET, FILM COATED ORAL at 08:26

## 2017-07-26 RX ADMIN — MIDODRINE HYDROCHLORIDE 2.5 MG: 2.5 TABLET ORAL at 08:27

## 2017-07-26 RX ADMIN — POTASSIUM CHLORIDE 40 MEQ: 1500 TABLET, EXTENDED RELEASE ORAL at 08:28

## 2017-07-26 RX ADMIN — ENOXAPARIN SODIUM 40 MG: 40 INJECTION SUBCUTANEOUS at 08:29

## 2017-07-26 ASSESSMENT — PAIN SCALES - GENERAL
PAINLEVEL_OUTOF10: 0
PAINLEVEL_OUTOF10: 7
PAINLEVEL_OUTOF10: 0

## 2017-07-27 ENCOUNTER — TELEPHONE (OUTPATIENT)
Dept: FAMILY MEDICINE CLINIC | Age: 27
End: 2017-07-27

## 2017-07-27 DIAGNOSIS — G82.20 PARAPLEGIA (HCC): Primary | ICD-10-CM

## 2017-07-29 LAB
BLOOD CULTURE, ROUTINE: NORMAL
BLOOD CULTURE, ROUTINE: NORMAL

## 2017-07-31 ENCOUNTER — HOSPITAL ENCOUNTER (OUTPATIENT)
Dept: OCCUPATIONAL THERAPY | Age: 27
Discharge: HOME | End: 2017-07-31

## 2017-08-01 ENCOUNTER — APPOINTMENT (OUTPATIENT)
Dept: PHYSICAL THERAPY | Age: 27
End: 2017-08-01
Payer: MEDICAID

## 2017-08-01 ENCOUNTER — APPOINTMENT (OUTPATIENT)
Dept: OCCUPATIONAL THERAPY | Age: 27
End: 2017-08-01
Payer: MEDICAID

## 2017-08-01 ENCOUNTER — HOSPITAL ENCOUNTER (OUTPATIENT)
Dept: SPEECH THERAPY | Age: 27
Setting detail: THERAPIES SERIES
Discharge: HOME | End: 2017-08-01
Payer: MEDICAID

## 2017-08-02 ENCOUNTER — APPOINTMENT (OUTPATIENT)
Dept: SPEECH THERAPY | Age: 27
End: 2017-08-02
Payer: MEDICAID

## 2017-08-02 ENCOUNTER — APPOINTMENT (OUTPATIENT)
Dept: OCCUPATIONAL THERAPY | Age: 27
End: 2017-08-02
Payer: MEDICAID

## 2017-08-03 ENCOUNTER — HOSPITAL ENCOUNTER (OUTPATIENT)
Dept: OCCUPATIONAL THERAPY | Age: 27
Setting detail: THERAPIES SERIES
End: 2017-08-03
Payer: MEDICAID

## 2017-08-03 ENCOUNTER — HOSPITAL ENCOUNTER (OUTPATIENT)
Dept: PHYSICAL THERAPY | Age: 27
Setting detail: THERAPIES SERIES
Discharge: HOME | End: 2017-08-03
Payer: MEDICAID

## 2017-08-03 ENCOUNTER — APPOINTMENT (OUTPATIENT)
Dept: SPEECH THERAPY | Age: 27
End: 2017-08-03
Payer: MEDICAID

## 2017-08-04 ENCOUNTER — HOSPITAL ENCOUNTER (OUTPATIENT)
Dept: OCCUPATIONAL THERAPY | Age: 27
Setting detail: THERAPIES SERIES
Discharge: HOME | End: 2017-08-04
Payer: MEDICAID

## 2017-08-04 ENCOUNTER — HOSPITAL ENCOUNTER (OUTPATIENT)
Dept: SPEECH THERAPY | Age: 27
Setting detail: THERAPIES SERIES
Discharge: HOME | End: 2017-08-04
Payer: MEDICAID

## 2017-08-04 ENCOUNTER — HOSPITAL ENCOUNTER (OUTPATIENT)
Dept: PHYSICAL THERAPY | Age: 27
Setting detail: THERAPIES SERIES
Discharge: HOME | End: 2017-08-04
Payer: MEDICAID

## 2017-08-04 PROCEDURE — 97532 HC COGNITIVE THERAPY 15 MIN: CPT | Performed by: SPEECH-LANGUAGE PATHOLOGIST

## 2017-08-07 ENCOUNTER — TELEPHONE (OUTPATIENT)
Dept: FAMILY MEDICINE CLINIC | Age: 27
End: 2017-08-07

## 2017-08-07 ENCOUNTER — HOSPITAL ENCOUNTER (OUTPATIENT)
Dept: OCCUPATIONAL THERAPY | Age: 27
Setting detail: THERAPIES SERIES
Discharge: HOME | End: 2017-08-07
Payer: MEDICAID

## 2017-08-07 ENCOUNTER — HOSPITAL ENCOUNTER (OUTPATIENT)
Dept: SPEECH THERAPY | Age: 27
Setting detail: THERAPIES SERIES
Discharge: HOME | End: 2017-08-07
Payer: MEDICAID

## 2017-08-07 ENCOUNTER — HOSPITAL ENCOUNTER (OUTPATIENT)
Dept: PHYSICAL THERAPY | Age: 27
Setting detail: THERAPIES SERIES
Discharge: HOME | End: 2017-08-07
Payer: MEDICAID

## 2017-08-07 DIAGNOSIS — F42.4 SKIN-PICKING DISORDER: ICD-10-CM

## 2017-08-07 DIAGNOSIS — F33.2 SEVERE EPISODE OF RECURRENT MAJOR DEPRESSIVE DISORDER, WITHOUT PSYCHOTIC FEATURES (HCC): ICD-10-CM

## 2017-08-07 DIAGNOSIS — R60.9 DEPENDENT EDEMA: Primary | ICD-10-CM

## 2017-08-07 PROCEDURE — 97110 THERAPEUTIC EXERCISES: CPT

## 2017-08-07 PROCEDURE — 97532 HC COGNITIVE THERAPY 15 MIN: CPT | Performed by: SPEECH-LANGUAGE PATHOLOGIST

## 2017-08-07 ASSESSMENT — PAIN SCALES - GENERAL
PAINLEVEL_OUTOF10: 5
PAINLEVEL_OUTOF10: 5

## 2017-08-07 ASSESSMENT — PAIN DESCRIPTION - PAIN TYPE: TYPE: CHRONIC PAIN

## 2017-08-07 ASSESSMENT — PAIN DESCRIPTION - ORIENTATION
ORIENTATION: LEFT
ORIENTATION: LEFT

## 2017-08-07 ASSESSMENT — PAIN DESCRIPTION - LOCATION
LOCATION: SHOULDER
LOCATION: SHOULDER

## 2017-08-09 ENCOUNTER — TELEPHONE (OUTPATIENT)
Dept: UROLOGY | Age: 27
End: 2017-08-09

## 2017-08-15 ENCOUNTER — PROCEDURE VISIT (OUTPATIENT)
Dept: UROLOGY | Age: 27
End: 2017-08-15
Payer: MEDICAID

## 2017-08-15 VITALS
DIASTOLIC BLOOD PRESSURE: 92 MMHG | BODY MASS INDEX: 19.33 KG/M2 | SYSTOLIC BLOOD PRESSURE: 132 MMHG | HEIGHT: 70 IN | WEIGHT: 135 LBS

## 2017-08-15 DIAGNOSIS — N31.9 NEUROGENIC BLADDER: Primary | ICD-10-CM

## 2017-08-15 PROCEDURE — 99213 OFFICE O/P EST LOW 20 MIN: CPT | Performed by: UROLOGY

## 2017-08-16 ENCOUNTER — HOSPITAL ENCOUNTER (OUTPATIENT)
Dept: WOUND CARE | Age: 27
Discharge: HOME | End: 2017-08-16
Payer: MEDICAID

## 2017-08-16 ENCOUNTER — HOSPITAL ENCOUNTER (OUTPATIENT)
Dept: SPEECH THERAPY | Age: 27
Setting detail: THERAPIES SERIES
Discharge: HOME | End: 2017-08-16
Payer: MEDICAID

## 2017-08-16 ENCOUNTER — HOSPITAL ENCOUNTER (OUTPATIENT)
Dept: OCCUPATIONAL THERAPY | Age: 27
Setting detail: THERAPIES SERIES
Discharge: HOME | End: 2017-08-16
Payer: MEDICAID

## 2017-08-16 ENCOUNTER — HOSPITAL ENCOUNTER (OUTPATIENT)
Dept: PHYSICAL THERAPY | Age: 27
Setting detail: THERAPIES SERIES
Discharge: HOME | End: 2017-08-16
Payer: MEDICAID

## 2017-08-16 VITALS
HEART RATE: 70 BPM | OXYGEN SATURATION: 99 % | SYSTOLIC BLOOD PRESSURE: 173 MMHG | WEIGHT: 135 LBS | TEMPERATURE: 97.9 F | RESPIRATION RATE: 16 BRPM | BODY MASS INDEX: 19.37 KG/M2 | DIASTOLIC BLOOD PRESSURE: 107 MMHG

## 2017-08-16 PROCEDURE — 17250 CHEM CAUT OF GRANLTJ TISSUE: CPT

## 2017-08-16 PROCEDURE — 97110 THERAPEUTIC EXERCISES: CPT

## 2017-08-16 PROCEDURE — 97532 HC COGNITIVE THERAPY 15 MIN: CPT | Performed by: SPEECH-LANGUAGE PATHOLOGIST

## 2017-08-16 PROCEDURE — 6370000000 HC RX 637 (ALT 250 FOR IP): Performed by: INTERNAL MEDICINE

## 2017-08-16 PROCEDURE — 97112 NEUROMUSCULAR REEDUCATION: CPT

## 2017-08-16 PROCEDURE — 99214 OFFICE O/P EST MOD 30 MIN: CPT

## 2017-08-16 RX ADMIN — SILVER NITRATE APPLICATORS 2 EACH: 25; 75 STICK TOPICAL at 09:26

## 2017-08-16 ASSESSMENT — PAIN SCALES - GENERAL: PAINLEVEL_OUTOF10: 7

## 2017-08-16 ASSESSMENT — PAIN DESCRIPTION - ORIENTATION: ORIENTATION: LEFT

## 2017-08-16 ASSESSMENT — PAIN DESCRIPTION - LOCATION: LOCATION: SHOULDER

## 2017-08-18 ENCOUNTER — HOSPITAL ENCOUNTER (OUTPATIENT)
Dept: PHYSICAL THERAPY | Age: 27
Setting detail: THERAPIES SERIES
End: 2017-08-18
Payer: MEDICAID

## 2017-08-18 ENCOUNTER — HOSPITAL ENCOUNTER (OUTPATIENT)
Dept: OCCUPATIONAL THERAPY | Age: 27
Setting detail: THERAPIES SERIES
End: 2017-08-18
Payer: MEDICAID

## 2017-08-18 ENCOUNTER — APPOINTMENT (OUTPATIENT)
Dept: SPEECH THERAPY | Age: 27
End: 2017-08-18
Payer: MEDICAID

## 2017-08-22 ENCOUNTER — OFFICE VISIT (OUTPATIENT)
Dept: FAMILY MEDICINE CLINIC | Age: 27
End: 2017-08-22
Payer: MEDICAID

## 2017-08-22 ENCOUNTER — OFFICE VISIT (OUTPATIENT)
Dept: BEHAVIORAL/MENTAL HEALTH CLINIC | Age: 27
End: 2017-08-22
Payer: MEDICAID

## 2017-08-22 ENCOUNTER — TELEPHONE (OUTPATIENT)
Dept: FAMILY MEDICINE CLINIC | Age: 27
End: 2017-08-22

## 2017-08-22 VITALS
RESPIRATION RATE: 12 BRPM | HEART RATE: 62 BPM | WEIGHT: 135 LBS | HEIGHT: 70 IN | SYSTOLIC BLOOD PRESSURE: 124 MMHG | DIASTOLIC BLOOD PRESSURE: 62 MMHG | TEMPERATURE: 98.6 F | BODY MASS INDEX: 19.33 KG/M2

## 2017-08-22 DIAGNOSIS — F41.9 ANXIETY DISORDER, UNSPECIFIED TYPE: Primary | ICD-10-CM

## 2017-08-22 DIAGNOSIS — G43.909 MIGRAINE WITHOUT STATUS MIGRAINOSUS, NOT INTRACTABLE, UNSPECIFIED MIGRAINE TYPE: ICD-10-CM

## 2017-08-22 DIAGNOSIS — F06.31 MOOD DISORDER DUE TO KNOWN PHYSIOLOGICAL CONDITION WITH DEPRESSIVE FEATURES: Primary | ICD-10-CM

## 2017-08-22 DIAGNOSIS — G89.4 CHRONIC PAIN SYNDROME: ICD-10-CM

## 2017-08-22 DIAGNOSIS — F32.2 SEVERE SINGLE CURRENT EPISODE OF MAJOR DEPRESSIVE DISORDER, WITHOUT PSYCHOTIC FEATURES (HCC): ICD-10-CM

## 2017-08-22 DIAGNOSIS — L21.9 SEBORRHEIC DERMATITIS OF SCALP: ICD-10-CM

## 2017-08-22 DIAGNOSIS — M95.4 RIB DEFORMITY: ICD-10-CM

## 2017-08-22 DIAGNOSIS — G82.20 PARAPLEGIA (HCC): ICD-10-CM

## 2017-08-22 DIAGNOSIS — F32.89 DEPRESSIVE DISORDER, NOT ELSEWHERE CLASSIFIED: ICD-10-CM

## 2017-08-22 DIAGNOSIS — R60.0 BILATERAL EDEMA OF LOWER EXTREMITY: ICD-10-CM

## 2017-08-22 PROCEDURE — 99214 OFFICE O/P EST MOD 30 MIN: CPT | Performed by: FAMILY MEDICINE

## 2017-08-22 PROCEDURE — 90791 PSYCH DIAGNOSTIC EVALUATION: CPT | Performed by: PSYCHOLOGIST

## 2017-08-22 RX ORDER — SUMATRIPTAN 100 MG/1
100 TABLET, FILM COATED ORAL
Qty: 9 TABLET | Refills: 3 | Status: ON HOLD | OUTPATIENT
Start: 2017-08-22 | End: 2017-11-28 | Stop reason: SDUPTHER

## 2017-08-22 RX ORDER — FUROSEMIDE 20 MG/1
20 TABLET ORAL DAILY
Qty: 90 TABLET | Refills: 1 | Status: SHIPPED | OUTPATIENT
Start: 2017-08-22 | End: 2017-12-15 | Stop reason: SDUPTHER

## 2017-08-22 RX ORDER — IBUPROFEN 800 MG/1
800 TABLET ORAL 3 TIMES DAILY PRN
Qty: 90 TABLET | Refills: 0 | Status: SHIPPED | OUTPATIENT
Start: 2017-08-22 | End: 2017-11-15 | Stop reason: SDUPTHER

## 2017-08-22 RX ORDER — TRAZODONE HYDROCHLORIDE 100 MG/1
200 TABLET ORAL NIGHTLY PRN
Qty: 60 TABLET | Refills: 2 | Status: SHIPPED | OUTPATIENT
Start: 2017-08-22 | End: 2017-11-13 | Stop reason: SDUPTHER

## 2017-08-22 ASSESSMENT — PATIENT HEALTH QUESTIONNAIRE - PHQ9
SUM OF ALL RESPONSES TO PHQ9 QUESTIONS 1 & 2: 5
SUM OF ALL RESPONSES TO PHQ QUESTIONS 1-9: 13
4. FEELING TIRED OR HAVING LITTLE ENERGY: 0
8. MOVING OR SPEAKING SO SLOWLY THAT OTHER PEOPLE COULD HAVE NOTICED. OR THE OPPOSITE, BEING SO FIGETY OR RESTLESS THAT YOU HAVE BEEN MOVING AROUND A LOT MORE THAN USUAL: 3
5. POOR APPETITE OR OVEREATING: 1
2. FEELING DOWN, DEPRESSED OR HOPELESS: 3
10. IF YOU CHECKED OFF ANY PROBLEMS, HOW DIFFICULT HAVE THESE PROBLEMS MADE IT FOR YOU TO DO YOUR WORK, TAKE CARE OF THINGS AT HOME, OR GET ALONG WITH OTHER PEOPLE: 2
1. LITTLE INTEREST OR PLEASURE IN DOING THINGS: 2
9. THOUGHTS THAT YOU WOULD BE BETTER OFF DEAD, OR OF HURTING YOURSELF: 0
6. FEELING BAD ABOUT YOURSELF - OR THAT YOU ARE A FAILURE OR HAVE LET YOURSELF OR YOUR FAMILY DOWN: 0
7. TROUBLE CONCENTRATING ON THINGS, SUCH AS READING THE NEWSPAPER OR WATCHING TELEVISION: 2
3. TROUBLE FALLING OR STAYING ASLEEP: 2

## 2017-08-23 ENCOUNTER — HOSPITAL ENCOUNTER (OUTPATIENT)
Dept: SPEECH THERAPY | Age: 27
Setting detail: THERAPIES SERIES
Discharge: HOME | End: 2017-08-23
Payer: MEDICAID

## 2017-08-23 ENCOUNTER — HOSPITAL ENCOUNTER (OUTPATIENT)
Dept: PHYSICAL THERAPY | Age: 27
Setting detail: THERAPIES SERIES
Discharge: HOME | End: 2017-08-23
Payer: MEDICAID

## 2017-08-23 ENCOUNTER — TELEPHONE (OUTPATIENT)
Dept: PHYSICAL MEDICINE AND REHAB | Age: 27
End: 2017-08-23

## 2017-08-23 ENCOUNTER — HOSPITAL ENCOUNTER (OUTPATIENT)
Dept: OCCUPATIONAL THERAPY | Age: 27
Setting detail: THERAPIES SERIES
Discharge: HOME | End: 2017-08-23
Payer: MEDICAID

## 2017-08-23 PROCEDURE — 97110 THERAPEUTIC EXERCISES: CPT

## 2017-08-23 PROCEDURE — 97112 NEUROMUSCULAR REEDUCATION: CPT

## 2017-08-23 PROCEDURE — 97532 HC COGNITIVE THERAPY 15 MIN: CPT | Performed by: SPEECH-LANGUAGE PATHOLOGIST

## 2017-08-23 ASSESSMENT — PAIN SCALES - GENERAL: PAINLEVEL_OUTOF10: 6

## 2017-08-23 ASSESSMENT — PAIN DESCRIPTION - LOCATION: LOCATION: SHOULDER

## 2017-08-23 ASSESSMENT — PAIN DESCRIPTION - PAIN TYPE: TYPE: CHRONIC PAIN

## 2017-08-23 ASSESSMENT — PAIN DESCRIPTION - ORIENTATION: ORIENTATION: LEFT

## 2017-08-25 ENCOUNTER — HOSPITAL ENCOUNTER (OUTPATIENT)
Dept: OCCUPATIONAL THERAPY | Age: 27
Setting detail: THERAPIES SERIES
End: 2017-08-25
Payer: MEDICAID

## 2017-08-25 ENCOUNTER — TELEPHONE (OUTPATIENT)
Dept: FAMILY MEDICINE CLINIC | Age: 27
End: 2017-08-25

## 2017-08-25 ENCOUNTER — PRE-PROCEDURE TELEPHONE (OUTPATIENT)
Dept: WOUND CARE | Age: 27
End: 2017-08-25

## 2017-08-25 ENCOUNTER — HOSPITAL ENCOUNTER (OUTPATIENT)
Dept: PHYSICAL THERAPY | Age: 27
Setting detail: THERAPIES SERIES
Discharge: HOME | End: 2017-08-25
Payer: MEDICAID

## 2017-08-25 ENCOUNTER — HOSPITAL ENCOUNTER (OUTPATIENT)
Dept: SPEECH THERAPY | Age: 27
Setting detail: THERAPIES SERIES
Discharge: HOME | End: 2017-08-25
Payer: MEDICAID

## 2017-08-25 DIAGNOSIS — L21.9 SEBORRHEIC DERMATITIS OF SCALP: ICD-10-CM

## 2017-08-25 PROCEDURE — 97532 HC COGNITIVE THERAPY 15 MIN: CPT | Performed by: SPEECH-LANGUAGE PATHOLOGIST

## 2017-08-28 ENCOUNTER — TELEPHONE (OUTPATIENT)
Dept: FAMILY MEDICINE CLINIC | Age: 27
End: 2017-08-28

## 2017-08-28 ENCOUNTER — HOSPITAL ENCOUNTER (OUTPATIENT)
Dept: OCCUPATIONAL THERAPY | Age: 27
Setting detail: THERAPIES SERIES
End: 2017-08-28
Payer: MEDICAID

## 2017-08-28 ENCOUNTER — HOSPITAL ENCOUNTER (OUTPATIENT)
Dept: SPEECH THERAPY | Age: 27
Setting detail: THERAPIES SERIES
Discharge: HOME | End: 2017-08-28
Payer: MEDICAID

## 2017-08-28 ENCOUNTER — HOSPITAL ENCOUNTER (OUTPATIENT)
Dept: PHYSICAL THERAPY | Age: 27
Setting detail: THERAPIES SERIES
Discharge: HOME | End: 2017-08-28
Payer: MEDICAID

## 2017-08-28 PROCEDURE — 97110 THERAPEUTIC EXERCISES: CPT

## 2017-08-28 PROCEDURE — 97532 HC COGNITIVE THERAPY 15 MIN: CPT | Performed by: SPEECH-LANGUAGE PATHOLOGIST

## 2017-08-28 ASSESSMENT — PAIN DESCRIPTION - LOCATION: LOCATION: SHOULDER

## 2017-08-28 ASSESSMENT — PAIN DESCRIPTION - PAIN TYPE: TYPE: CHRONIC PAIN

## 2017-08-28 ASSESSMENT — PAIN SCALES - GENERAL: PAINLEVEL_OUTOF10: 5

## 2017-08-28 ASSESSMENT — PAIN DESCRIPTION - ORIENTATION: ORIENTATION: LEFT

## 2017-08-30 ENCOUNTER — HOSPITAL ENCOUNTER (OUTPATIENT)
Dept: OCCUPATIONAL THERAPY | Age: 27
Setting detail: THERAPIES SERIES
End: 2017-08-30
Payer: MEDICAID

## 2017-08-30 ENCOUNTER — HOSPITAL ENCOUNTER (OUTPATIENT)
Dept: PHYSICAL THERAPY | Age: 27
Setting detail: THERAPIES SERIES
End: 2017-08-30
Payer: MEDICAID

## 2017-08-30 ENCOUNTER — APPOINTMENT (OUTPATIENT)
Dept: SPEECH THERAPY | Age: 27
End: 2017-08-30
Payer: MEDICAID

## 2017-08-30 ENCOUNTER — TELEPHONE (OUTPATIENT)
Dept: UROLOGY | Age: 27
End: 2017-08-30

## 2017-08-30 DIAGNOSIS — R50.9 LOW GRADE FEVER: Primary | ICD-10-CM

## 2017-08-30 RX ORDER — SULFAMETHOXAZOLE AND TRIMETHOPRIM 800; 160 MG/1; MG/1
1 TABLET ORAL 2 TIMES DAILY
Qty: 20 TABLET | Refills: 0 | Status: SHIPPED | OUTPATIENT
Start: 2017-08-30 | End: 2017-09-09

## 2017-08-31 ENCOUNTER — HOSPITAL ENCOUNTER (OUTPATIENT)
Age: 27
Setting detail: SPECIMEN
Discharge: HOME | End: 2017-08-31
Payer: MEDICAID

## 2017-08-31 PROCEDURE — 87086 URINE CULTURE/COLONY COUNT: CPT

## 2017-08-31 PROCEDURE — 87184 SC STD DISK METHOD PER PLATE: CPT

## 2017-08-31 PROCEDURE — 87186 SC STD MICRODIL/AGAR DIL: CPT

## 2017-08-31 PROCEDURE — 87077 CULTURE AEROBIC IDENTIFY: CPT

## 2017-09-01 ENCOUNTER — HOSPITAL ENCOUNTER (OUTPATIENT)
Dept: CT IMAGING | Age: 27
Discharge: HOME | End: 2017-09-01
Payer: MEDICAID

## 2017-09-01 DIAGNOSIS — R20.2 PARESTHESIA: ICD-10-CM

## 2017-09-01 DIAGNOSIS — G82.20 PARAPLEGIA (HCC): ICD-10-CM

## 2017-09-01 DIAGNOSIS — S06.9X9D TBI (TRAUMATIC BRAIN INJURY), WITH LOC OF UNSPECIFIED DURATION, SUBSEQUENT ENCOUNTER: ICD-10-CM

## 2017-09-01 PROCEDURE — 72128 CT CHEST SPINE W/O DYE: CPT

## 2017-09-01 PROCEDURE — 72125 CT NECK SPINE W/O DYE: CPT

## 2017-09-02 LAB
ORGANISM: ABNORMAL
URINE CULTURE, ROUTINE: ABNORMAL

## 2017-09-03 ENCOUNTER — HOSPITAL ENCOUNTER (EMERGENCY)
Age: 27
Discharge: HOME | End: 2017-09-03
Attending: FAMILY MEDICINE
Payer: MEDICAID

## 2017-09-03 VITALS
HEART RATE: 99 BPM | DIASTOLIC BLOOD PRESSURE: 85 MMHG | RESPIRATION RATE: 16 BRPM | TEMPERATURE: 98.8 F | SYSTOLIC BLOOD PRESSURE: 130 MMHG | OXYGEN SATURATION: 97 %

## 2017-09-03 DIAGNOSIS — T83.021A DISLODGED FOLEY CATHETER, INITIAL ENCOUNTER: Primary | ICD-10-CM

## 2017-09-03 PROCEDURE — 99282 EMERGENCY DEPT VISIT SF MDM: CPT

## 2017-09-03 PROCEDURE — 51702 INSERT TEMP BLADDER CATH: CPT

## 2017-09-03 ASSESSMENT — ENCOUNTER SYMPTOMS
DIARRHEA: 0
BLOOD IN STOOL: 0
WHEEZING: 0
BACK PAIN: 0
VOMITING: 0
SHORTNESS OF BREATH: 0
SORE THROAT: 0
ABDOMINAL PAIN: 0
COUGH: 0
NAUSEA: 0

## 2017-09-05 ENCOUNTER — TELEPHONE (OUTPATIENT)
Dept: UROLOGY | Age: 27
End: 2017-09-05

## 2017-09-05 ENCOUNTER — TELEPHONE (OUTPATIENT)
Dept: FAMILY MEDICINE CLINIC | Age: 27
End: 2017-09-05

## 2017-09-05 DIAGNOSIS — R06.2 WHEEZING: Primary | ICD-10-CM

## 2017-09-05 DIAGNOSIS — S06.9X9D TBI (TRAUMATIC BRAIN INJURY), WITH LOC OF UNSPECIFIED DURATION, SUBSEQUENT ENCOUNTER: ICD-10-CM

## 2017-09-05 DIAGNOSIS — G82.20 PARAPLEGIA (HCC): Primary | ICD-10-CM

## 2017-09-05 RX ORDER — ALBUTEROL SULFATE 90 UG/1
2 AEROSOL, METERED RESPIRATORY (INHALATION) EVERY 6 HOURS PRN
Qty: 1 INHALER | Refills: 3 | Status: SHIPPED | OUTPATIENT
Start: 2017-09-05 | End: 2018-10-15 | Stop reason: SDUPTHER

## 2017-09-06 ENCOUNTER — HOSPITAL ENCOUNTER (OUTPATIENT)
Dept: SPEECH THERAPY | Age: 27
Setting detail: THERAPIES SERIES
Discharge: HOME | End: 2017-09-06
Payer: MEDICAID

## 2017-09-06 ENCOUNTER — HOSPITAL ENCOUNTER (OUTPATIENT)
Dept: PHYSICAL THERAPY | Age: 27
Setting detail: THERAPIES SERIES
Discharge: HOME | End: 2017-09-06
Payer: MEDICAID

## 2017-09-06 ENCOUNTER — APPOINTMENT (OUTPATIENT)
Dept: OCCUPATIONAL THERAPY | Age: 27
End: 2017-09-06
Payer: MEDICAID

## 2017-09-06 ENCOUNTER — HOSPITAL ENCOUNTER (OUTPATIENT)
Dept: WOUND CARE | Age: 27
Discharge: HOME | End: 2017-09-06
Payer: MEDICAID

## 2017-09-06 VITALS
DIASTOLIC BLOOD PRESSURE: 78 MMHG | TEMPERATURE: 97.8 F | HEART RATE: 81 BPM | OXYGEN SATURATION: 100 % | SYSTOLIC BLOOD PRESSURE: 133 MMHG | BODY MASS INDEX: 18.65 KG/M2 | WEIGHT: 130 LBS

## 2017-09-06 DIAGNOSIS — L89.324 DECUBITUS ULCER OF LEFT ISCHIUM, STAGE 4 (HCC): ICD-10-CM

## 2017-09-06 DIAGNOSIS — L89.620 DECUBITUS ULCER OF LEFT HEEL, UNSTAGEABLE (HCC): ICD-10-CM

## 2017-09-06 PROCEDURE — 97112 NEUROMUSCULAR REEDUCATION: CPT

## 2017-09-06 PROCEDURE — 97532 HC COGNITIVE THERAPY 15 MIN: CPT | Performed by: SPEECH-LANGUAGE PATHOLOGIST

## 2017-09-06 PROCEDURE — 6370000000 HC RX 637 (ALT 250 FOR IP): Performed by: NURSE PRACTITIONER

## 2017-09-06 PROCEDURE — 97110 THERAPEUTIC EXERCISES: CPT

## 2017-09-06 PROCEDURE — 97597 DBRDMT OPN WND 1ST 20 CM/<: CPT | Performed by: NURSE PRACTITIONER

## 2017-09-06 PROCEDURE — 17250 CHEM CAUT OF GRANLTJ TISSUE: CPT | Performed by: NURSE PRACTITIONER

## 2017-09-06 RX ADMIN — SILVER NITRATE APPLICATORS 2 EACH: 25; 75 STICK TOPICAL at 16:20

## 2017-09-06 ASSESSMENT — PAIN DESCRIPTION - ORIENTATION: ORIENTATION: LEFT

## 2017-09-06 ASSESSMENT — PAIN SCALES - GENERAL
PAINLEVEL_OUTOF10: 5
PAINLEVEL_OUTOF10: 5

## 2017-09-06 ASSESSMENT — PAIN DESCRIPTION - PAIN TYPE: TYPE: CHRONIC PAIN

## 2017-09-06 ASSESSMENT — PAIN DESCRIPTION - LOCATION: LOCATION: SHOULDER

## 2017-09-07 ENCOUNTER — HOSPITAL ENCOUNTER (OUTPATIENT)
Dept: OCCUPATIONAL THERAPY | Age: 27
Setting detail: THERAPIES SERIES
Discharge: HOME | End: 2017-09-07
Payer: MEDICAID

## 2017-09-07 ENCOUNTER — HOSPITAL ENCOUNTER (OUTPATIENT)
Dept: SPEECH THERAPY | Age: 27
Setting detail: THERAPIES SERIES
Discharge: HOME | End: 2017-09-07
Payer: MEDICAID

## 2017-09-07 ENCOUNTER — HOSPITAL ENCOUNTER (OUTPATIENT)
Dept: PHYSICAL THERAPY | Age: 27
Setting detail: THERAPIES SERIES
End: 2017-09-07
Payer: MEDICAID

## 2017-09-07 PROCEDURE — 97110 THERAPEUTIC EXERCISES: CPT

## 2017-09-07 PROCEDURE — 97532 HC COGNITIVE THERAPY 15 MIN: CPT | Performed by: SPEECH-LANGUAGE PATHOLOGIST

## 2017-09-07 ASSESSMENT — PAIN DESCRIPTION - LOCATION: LOCATION: SHOULDER

## 2017-09-07 ASSESSMENT — PAIN DESCRIPTION - ORIENTATION: ORIENTATION: LEFT

## 2017-09-07 ASSESSMENT — PAIN SCALES - GENERAL: PAINLEVEL_OUTOF10: 2

## 2017-09-12 ENCOUNTER — OFFICE VISIT (OUTPATIENT)
Dept: BEHAVIORAL/MENTAL HEALTH CLINIC | Age: 27
End: 2017-09-12
Payer: MEDICAID

## 2017-09-12 ENCOUNTER — APPOINTMENT (OUTPATIENT)
Dept: SPEECH THERAPY | Age: 27
End: 2017-09-12
Payer: MEDICAID

## 2017-09-12 ENCOUNTER — TELEPHONE (OUTPATIENT)
Dept: WOUND CARE | Age: 27
End: 2017-09-12

## 2017-09-12 ENCOUNTER — OFFICE VISIT (OUTPATIENT)
Dept: PHYSICAL MEDICINE AND REHAB | Age: 27
End: 2017-09-12
Payer: MEDICAID

## 2017-09-12 VITALS — HEIGHT: 70 IN | SYSTOLIC BLOOD PRESSURE: 117 MMHG | HEART RATE: 110 BPM | DIASTOLIC BLOOD PRESSURE: 74 MMHG

## 2017-09-12 DIAGNOSIS — N39.0 URINARY TRACT INFECTION ASSOCIATED WITH CATHETERIZATION OF URINARY TRACT, UNSPECIFIED INDWELLING URINARY CATHETER TYPE, INITIAL ENCOUNTER (HCC): Primary | ICD-10-CM

## 2017-09-12 DIAGNOSIS — F41.9 ANXIETY DISORDER, UNSPECIFIED TYPE: Primary | ICD-10-CM

## 2017-09-12 DIAGNOSIS — R25.2 SPASTICITY: ICD-10-CM

## 2017-09-12 DIAGNOSIS — G82.20 PARAPLEGIA (HCC): ICD-10-CM

## 2017-09-12 DIAGNOSIS — T83.511A URINARY TRACT INFECTION ASSOCIATED WITH CATHETERIZATION OF URINARY TRACT, UNSPECIFIED INDWELLING URINARY CATHETER TYPE, INITIAL ENCOUNTER (HCC): Primary | ICD-10-CM

## 2017-09-12 DIAGNOSIS — F32.89 DEPRESSIVE DISORDER, NOT ELSEWHERE CLASSIFIED: ICD-10-CM

## 2017-09-12 PROCEDURE — 99214 OFFICE O/P EST MOD 30 MIN: CPT | Performed by: NURSE PRACTITIONER

## 2017-09-12 PROCEDURE — 90832 PSYTX W PT 30 MINUTES: CPT | Performed by: PSYCHOLOGIST

## 2017-09-12 RX ORDER — BACLOFEN 10 MG/1
10 TABLET ORAL 3 TIMES DAILY
Qty: 90 TABLET | Refills: 0 | Status: SHIPPED | OUTPATIENT
Start: 2017-09-12 | End: 2017-10-03 | Stop reason: DRUGHIGH

## 2017-09-14 ENCOUNTER — TELEPHONE (OUTPATIENT)
Dept: PHYSICAL MEDICINE AND REHAB | Age: 27
End: 2017-09-14

## 2017-09-14 ENCOUNTER — TELEPHONE (OUTPATIENT)
Dept: WOUND CARE | Age: 27
End: 2017-09-14

## 2017-09-15 ENCOUNTER — HOSPITAL ENCOUNTER (OUTPATIENT)
Dept: PHYSICAL THERAPY | Age: 27
Setting detail: THERAPIES SERIES
Discharge: HOME | End: 2017-09-15
Payer: MEDICAID

## 2017-09-15 ENCOUNTER — HOSPITAL ENCOUNTER (OUTPATIENT)
Dept: SPEECH THERAPY | Age: 27
Setting detail: THERAPIES SERIES
Discharge: HOME | End: 2017-09-15
Payer: MEDICAID

## 2017-09-15 ENCOUNTER — HOSPITAL ENCOUNTER (OUTPATIENT)
Dept: OCCUPATIONAL THERAPY | Age: 27
Setting detail: THERAPIES SERIES
Discharge: HOME | End: 2017-09-15
Payer: MEDICAID

## 2017-09-15 PROCEDURE — 97110 THERAPEUTIC EXERCISES: CPT

## 2017-09-15 PROCEDURE — 97532 HC COGNITIVE THERAPY 15 MIN: CPT | Performed by: SPEECH-LANGUAGE PATHOLOGIST

## 2017-09-15 ASSESSMENT — PAIN DESCRIPTION - ORIENTATION: ORIENTATION: LEFT

## 2017-09-15 ASSESSMENT — PAIN SCALES - GENERAL
PAINLEVEL_OUTOF10: 5
PAINLEVEL_OUTOF10: 3

## 2017-09-15 ASSESSMENT — PAIN DESCRIPTION - LOCATION
LOCATION: ABDOMEN
LOCATION: SHOULDER

## 2017-09-18 ENCOUNTER — TELEPHONE (OUTPATIENT)
Dept: WOUND CARE | Age: 27
End: 2017-09-18

## 2017-09-19 ENCOUNTER — TELEPHONE (OUTPATIENT)
Dept: FAMILY MEDICINE CLINIC | Age: 27
End: 2017-09-19

## 2017-09-20 ENCOUNTER — HOSPITAL ENCOUNTER (OUTPATIENT)
Dept: OCCUPATIONAL THERAPY | Age: 27
Setting detail: THERAPIES SERIES
End: 2017-09-20
Payer: MEDICAID

## 2017-09-20 ENCOUNTER — HOSPITAL ENCOUNTER (OUTPATIENT)
Dept: PHYSICAL THERAPY | Age: 27
Setting detail: THERAPIES SERIES
Discharge: HOME | End: 2017-09-20
Payer: MEDICAID

## 2017-09-20 ENCOUNTER — HOSPITAL ENCOUNTER (OUTPATIENT)
Dept: WOUND CARE | Age: 27
Discharge: HOME | End: 2017-09-20
Payer: MEDICAID

## 2017-09-20 ENCOUNTER — HOSPITAL ENCOUNTER (OUTPATIENT)
Dept: SPEECH THERAPY | Age: 27
Setting detail: THERAPIES SERIES
End: 2017-09-20
Payer: MEDICAID

## 2017-09-20 VITALS
SYSTOLIC BLOOD PRESSURE: 132 MMHG | DIASTOLIC BLOOD PRESSURE: 89 MMHG | OXYGEN SATURATION: 99 % | RESPIRATION RATE: 18 BRPM | HEART RATE: 90 BPM | TEMPERATURE: 98.3 F

## 2017-09-20 PROCEDURE — 97597 DBRDMT OPN WND 1ST 20 CM/<: CPT | Performed by: NURSE PRACTITIONER

## 2017-09-20 PROCEDURE — 6370000000 HC RX 637 (ALT 250 FOR IP): Performed by: NURSE PRACTITIONER

## 2017-09-20 PROCEDURE — 17250 CHEM CAUT OF GRANLTJ TISSUE: CPT

## 2017-09-20 PROCEDURE — 17250 CHEM CAUT OF GRANLTJ TISSUE: CPT | Performed by: NURSE PRACTITIONER

## 2017-09-20 RX ADMIN — SILVER NITRATE APPLICATORS 1 EACH: 25; 75 STICK TOPICAL at 14:14

## 2017-09-20 ASSESSMENT — PAIN DESCRIPTION - ORIENTATION: ORIENTATION: LEFT

## 2017-09-20 ASSESSMENT — PAIN DESCRIPTION - PAIN TYPE: TYPE: CHRONIC PAIN

## 2017-09-20 ASSESSMENT — PAIN SCALES - GENERAL: PAINLEVEL_OUTOF10: 4

## 2017-09-20 ASSESSMENT — PAIN DESCRIPTION - LOCATION: LOCATION: SHOULDER

## 2017-09-21 ENCOUNTER — TELEPHONE (OUTPATIENT)
Dept: FAMILY MEDICINE CLINIC | Age: 27
End: 2017-09-21

## 2017-09-21 DIAGNOSIS — N31.9 NEUROGENIC BLADDER: ICD-10-CM

## 2017-09-21 DIAGNOSIS — G82.20 PARAPLEGIA (HCC): Primary | ICD-10-CM

## 2017-09-21 RX ORDER — DIAPER,BRIEF,ADULT, DISPOSABLE
EACH MISCELLANEOUS
Qty: 5 PACKAGE | Refills: 3 | Status: SHIPPED | OUTPATIENT
Start: 2017-09-21 | End: 2020-06-17

## 2017-09-21 RX ORDER — MEDICAL SUPPLY, MISCELLANEOUS
EACH MISCELLANEOUS
Qty: 10 EACH | Refills: 3 | Status: SHIPPED | OUTPATIENT
Start: 2017-09-21 | End: 2018-10-01 | Stop reason: SDUPTHER

## 2017-09-21 RX ORDER — FACIAL-BODY WIPES
EACH TOPICAL
Qty: 96 EACH | Refills: 3 | Status: SHIPPED | OUTPATIENT
Start: 2017-09-21 | End: 2020-06-17

## 2017-09-22 ENCOUNTER — TELEPHONE (OUTPATIENT)
Dept: PHYSICAL MEDICINE AND REHAB | Age: 27
End: 2017-09-22

## 2017-09-22 ENCOUNTER — HOSPITAL ENCOUNTER (OUTPATIENT)
Dept: OCCUPATIONAL THERAPY | Age: 27
Setting detail: THERAPIES SERIES
Discharge: HOME | End: 2017-09-22
Payer: MEDICAID

## 2017-09-22 ENCOUNTER — HOSPITAL ENCOUNTER (OUTPATIENT)
Dept: SPEECH THERAPY | Age: 27
Setting detail: THERAPIES SERIES
Discharge: HOME | End: 2017-09-22
Payer: MEDICAID

## 2017-09-22 ENCOUNTER — HOSPITAL ENCOUNTER (OUTPATIENT)
Dept: PHYSICAL THERAPY | Age: 27
Setting detail: THERAPIES SERIES
Discharge: HOME | End: 2017-09-22
Payer: MEDICAID

## 2017-09-22 LAB — URINE CULTURE, ROUTINE: ABNORMAL

## 2017-09-22 PROCEDURE — 97532 HC COGNITIVE THERAPY 15 MIN: CPT | Performed by: SPEECH-LANGUAGE PATHOLOGIST

## 2017-09-22 PROCEDURE — 97110 THERAPEUTIC EXERCISES: CPT

## 2017-09-22 RX ORDER — MIDODRINE HYDROCHLORIDE 2.5 MG/1
TABLET ORAL
Qty: 84 TABLET | Refills: 1 | Status: SHIPPED | OUTPATIENT
Start: 2017-09-22 | End: 2017-12-08 | Stop reason: SDUPTHER

## 2017-09-22 ASSESSMENT — PAIN SCALES - GENERAL
PAINLEVEL_OUTOF10: 4
PAINLEVEL_OUTOF10: 3

## 2017-09-22 ASSESSMENT — PAIN DESCRIPTION - LOCATION
LOCATION: SHOULDER
LOCATION: SHOULDER

## 2017-09-22 ASSESSMENT — PAIN DESCRIPTION - PAIN TYPE: TYPE: CHRONIC PAIN

## 2017-09-22 ASSESSMENT — PAIN DESCRIPTION - ORIENTATION
ORIENTATION: LEFT
ORIENTATION: LEFT

## 2017-09-26 ENCOUNTER — NURSE ONLY (OUTPATIENT)
Dept: UROLOGY | Age: 27
End: 2017-09-26
Payer: MEDICAID

## 2017-09-26 DIAGNOSIS — N31.9 NEUROGENIC BLADDER: ICD-10-CM

## 2017-09-26 PROCEDURE — 51705 CHANGE OF BLADDER TUBE: CPT | Performed by: UROLOGY

## 2017-09-26 PROCEDURE — 99999 PR OFFICE/OUTPT VISIT,PROCEDURE ONLY: CPT | Performed by: UROLOGY

## 2017-09-27 ENCOUNTER — HOSPITAL ENCOUNTER (OUTPATIENT)
Dept: SPEECH THERAPY | Age: 27
Setting detail: THERAPIES SERIES
End: 2017-09-27
Payer: MEDICAID

## 2017-09-27 ENCOUNTER — APPOINTMENT (OUTPATIENT)
Dept: PHYSICAL THERAPY | Age: 27
End: 2017-09-27
Payer: MEDICAID

## 2017-09-27 ENCOUNTER — TELEPHONE (OUTPATIENT)
Dept: FAMILY MEDICINE CLINIC | Age: 27
End: 2017-09-27

## 2017-09-27 ENCOUNTER — HOSPITAL ENCOUNTER (OUTPATIENT)
Dept: OCCUPATIONAL THERAPY | Age: 27
Setting detail: THERAPIES SERIES
End: 2017-09-27
Payer: MEDICAID

## 2017-09-27 NOTE — TELEPHONE ENCOUNTER
AMB REFERRAL TO PSYCHIATRY/Left c/b message for patient's mother, Maggie Lopez. Need to know if he has psychiatry appt (or had one) scheduled. Need the doctor's name, date and time of appt for documentation purposes.

## 2017-09-29 ENCOUNTER — HOSPITAL ENCOUNTER (OUTPATIENT)
Dept: SPEECH THERAPY | Age: 27
Setting detail: THERAPIES SERIES
Discharge: HOME | End: 2017-09-29
Payer: MEDICAID

## 2017-09-29 ENCOUNTER — HOSPITAL ENCOUNTER (OUTPATIENT)
Dept: OCCUPATIONAL THERAPY | Age: 27
Setting detail: THERAPIES SERIES
Discharge: HOME | End: 2017-09-29
Payer: MEDICAID

## 2017-09-29 ENCOUNTER — HOSPITAL ENCOUNTER (OUTPATIENT)
Dept: PHYSICAL THERAPY | Age: 27
Setting detail: THERAPIES SERIES
Discharge: HOME | End: 2017-09-29
Payer: MEDICAID

## 2017-09-29 PROCEDURE — 97532 HC COGNITIVE THERAPY 15 MIN: CPT | Performed by: SPEECH-LANGUAGE PATHOLOGIST

## 2017-09-29 PROCEDURE — 97110 THERAPEUTIC EXERCISES: CPT

## 2017-09-29 ASSESSMENT — PAIN SCALES - GENERAL: PAINLEVEL_OUTOF10: 7

## 2017-09-29 ASSESSMENT — PAIN DESCRIPTION - ORIENTATION: ORIENTATION: LEFT

## 2017-09-29 NOTE — TELEPHONE ENCOUNTER
Pt. Mother requesting UA results. Do they need sent to the urologist? Mother states pt. Has been sluggish and wanting to stay in bed. She is also asking if his muscle relaxer could be increased. He is currently on Baclofen 10 mg TID. Your office note states pt. Mother will call in one week with an update. Please advise.

## 2017-10-02 ENCOUNTER — OFFICE VISIT (OUTPATIENT)
Dept: NEUROSURGERY | Age: 27
End: 2017-10-02
Payer: MEDICAID

## 2017-10-02 ENCOUNTER — TELEPHONE (OUTPATIENT)
Dept: UROLOGY | Age: 27
End: 2017-10-02

## 2017-10-02 VITALS
WEIGHT: 130 LBS | HEART RATE: 103 BPM | DIASTOLIC BLOOD PRESSURE: 70 MMHG | SYSTOLIC BLOOD PRESSURE: 102 MMHG | BODY MASS INDEX: 18.61 KG/M2 | HEIGHT: 70 IN

## 2017-10-02 DIAGNOSIS — S24.109A: Primary | ICD-10-CM

## 2017-10-02 PROCEDURE — 99201 PR OFFICE OUTPATIENT NEW 10 MINUTES: CPT | Performed by: NEUROLOGICAL SURGERY

## 2017-10-02 RX ORDER — CIPROFLOXACIN 500 MG/1
500 TABLET, FILM COATED ORAL 2 TIMES DAILY
Qty: 14 TABLET | Refills: 0 | Status: SHIPPED | OUTPATIENT
Start: 2017-10-02 | End: 2017-10-09

## 2017-10-02 NOTE — TELEPHONE ENCOUNTER
10/2/17 Angella from Dr Surendra James office () wanted to let Dr Sukhi Andrea know a urine culture was ordered per KARLEE Winchester CNP and wanted Dr Sukhi Andrea to review the results pls, thanks/katiek
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
## 2017-10-03 ENCOUNTER — HOSPITAL ENCOUNTER (OUTPATIENT)
Dept: SPEECH THERAPY | Age: 27
Setting detail: THERAPIES SERIES
Discharge: HOME | End: 2017-10-03
Payer: MEDICAID

## 2017-10-03 ENCOUNTER — HOSPITAL ENCOUNTER (OUTPATIENT)
Dept: OCCUPATIONAL THERAPY | Age: 27
Setting detail: THERAPIES SERIES
Discharge: HOME | End: 2017-10-03
Payer: MEDICAID

## 2017-10-03 ENCOUNTER — HOSPITAL ENCOUNTER (OUTPATIENT)
Dept: PHYSICAL THERAPY | Age: 27
Setting detail: THERAPIES SERIES
Discharge: HOME | End: 2017-10-03
Payer: MEDICAID

## 2017-10-03 PROCEDURE — 97532 HC COGNITIVE THERAPY 15 MIN: CPT

## 2017-10-03 PROCEDURE — 97530 THERAPEUTIC ACTIVITIES: CPT

## 2017-10-03 PROCEDURE — 97110 THERAPEUTIC EXERCISES: CPT

## 2017-10-03 RX ORDER — BACLOFEN 20 MG/1
20 TABLET ORAL 3 TIMES DAILY
Qty: 90 TABLET | Refills: 0 | Status: SHIPPED | OUTPATIENT
Start: 2017-10-03 | End: 2017-11-09 | Stop reason: SDUPTHER

## 2017-10-03 ASSESSMENT — PAIN SCALES - GENERAL: PAINLEVEL_OUTOF10: 4

## 2017-10-03 NOTE — PROGRESS NOTES
[address] [address]  OUTPATIENT OCCUPATIONAL THERAPY  Daily Note  [address]    Time In: 0800  Time Out: 0845  Minutes: 45  Timed Code Treatment Minutes: 45 Minutes     Date: 10/3/2017  Patient Name: Rosaura Reed        CSN: [id number]   : 1990  (24 y.o.)  Gender: male   Referring Practitioner: Dr. J Carlos Baeza  Diagnosis: S06.9X9D TBI with loss of consciousness of unspecified duration, subsequent encounter; G82.20 paraplegia           General:  OT Visit Information  Onset Date: 17  OT Insurance Information: Medicaid - unlimited PT/OT/ST benefits - hot and cold packs are not covered  Total # of Visits to Date: 10  Certification Period Expiration Date: 10/19/17  Progress Note Counter: PN 17, 4/10 for PN  Comments: return to referring physician in 1 month. Restrictions/Precautions:       Position Activity Restriction  Other position/activity restrictions: Memory deficits,suprapubic catheter, decubitus ulcer coccyx, colostomy. paraplegia: impaired sensation. Should be wearing shanda hose and binder, +2 for slide board transfer. Subjective:  Subjective: Patient presents with flat affect today, reporting he is tired and these appointments are \"too early. \" Reports no pain or discomfort in left shoulder this morning. Pain:  Patient Currently in Pain: No       Objective:     Upper Extremity Function  UE PROM: Supine PROM to L shoulder all motions to tolerance, GH mobilizations, massage to anterior shoulder and lateral deltoid to decrease tightness. UE Isometrics: Slide board transfers from power chair to edge of mat to R side with max A for slide board placement, Min A x2 for transfer with patient demonstrating increased use of UE's this date. UE Strengthing: Pink foam gripping with left hand x20 reps for  strength. Supine 1# bicep curls x10, 1# chest press x10, 1# circles CW x10 reps with fatigue noted needing RB.      Activity

## 2017-10-03 NOTE — PROGRESS NOTES
from trunk and LEs being produced during transfer. Transfer to Right today. Exercise 2: Sitting balance: min A to mod assist of one person positioned behind patient while therapist had patient unweight each UE x10 with opposite UE in wt bearing. Posture exercises  shrugs, retro rolls and scapular retractions x10 each focusing on balance, posture, and maintaining midline  Exercise 4: PROM Bilateral LES:  hip flexion SKTC, hip abduction in hooklying, trunk rotation right/left with knees flexed, piriformis stretch 2 ways  5x 10-15 second hold each  Exercise 7: In hooklying: hip abduction/adduction with mod A B and each leg, LTR x10 mod<>max A    Exercise 10: Sitting balance with mirror in front of patient working on sititng balance for short periods of time with assistance of one person behind patient. Patient able to sit without UE support for 10 seconds x2-3 trials with posture corrections and light tactile cues. Activity Tolerance:  Activity Tolerance: Patient Tolerated treatment well    Assessment: Body structures, Functions, Activity limitations: Decreased functional mobility , Decreased ADL status, Decreased ROM, Decreased strength, Decreased balance  Assessment: Did not complete standing frame today due to blood pressure issues with previous trial of standing frame. Patient frequently closing his eyes during therapy and not engaged in activity today. When questioned, patient states he is tired today. Increased difficulty noted with sitting balance today. Prognosis: Fair  Discharge Recommendations: Continue to assess pending progress    Patient Education:  Patient Education: HEP, ROM, sitting balance, frequent position changes and get out of w/c and sidelying in bed more frequently for pressure relief and to promote wound healing and decreasing pressure on ischial and coccyx ulcer.      Plan:  Times per week: 2x  Plan weeks: 8 weeks  Specific instructions for Next Treatment: Monitor wounds for drainage. Initiate standing frame next session. Work on sitting balance with mirror in front of patient. +2 for transfers, bed mobility and sitting balance activities. Further review and instruction in pressure relief in sitting position with lateral shift in w/c, push up with arms using armrests of chair, Position changes in supine to sidelying. Positoning to decrease extensor tone with pillows under knees, sidelying positoin with pillow between knees. Lateral transfers +2 person using sliding board,  PROM BLES. Family instruction and education. Current Treatment Recommendations: ROM, Balance Training, Transfer Training, Functional Mobility Training, Home Exercise Program, Safety Education & Training, Patient/Caregiver Education & Training  Plan Comment: Continue per POC    Goals:  Patient goals : Patient would like to have less assist with his transfers and improve sitting balance. Short term goals  Time Frame for Short term goals: 3 weeks   Short term goal 1: Patient to demonstrate lateral transfers using sliding board with moderate assistance of one person in order to transfer to bed<>w/c. Short term goal 2: Patient to demonstrate knowledge of frequent wt shifts in wc; to decreased pressure on ischiium to provide pressure relief to promote healing of ulcers and prevent  decubitus ulcers. Short term goal 3: Patient to demonstrate increased sitting balance statically with UE support on mat surface with SBA of one person. Progressing towards:  Patient able to sit statically at EOB with UE support with SBA/CGA. Short term goal 4: Patient to demonstrate increased dynamic sitting balance to CGA when reaching within base of support . Short term goal 5: Patient and caregiver instruction in PROM and positioning to decrease extensor tone while laying in bed and sitting in w/c.       Long term goals  Time Frame for Long term goals : 6 weeks  Long term goal 1: Patient to demonstrate lateral transfers using sliding board with minimum assistance of one person in order to transfer from bed<>w/c. Long term goal 2: Patient to demonstrate independence in home ex program for PROM and proper positioning techniques to provide pressure relief and decrease extensor tone. Long term goal 3: Patient to demonstrate stable BP and increased standing tolerance in standing frame for 15 minutes while working on trunk control , posture and wb through LEs with standing frame support. Alice Middleton.  [place], [address], 10/3/2017

## 2017-10-03 NOTE — PROGRESS NOTES
manager following session. Decision made to hold on OP therapies for the time being to address medical conditions, including wounds and decreased sleep. Jaxson Christianson Please refer to CM note for further information. SHORT TERM GOAL #1:Patient will utilize compensatory memory strategies/aids to help with recall of functional information with mod I, 75% of the time for improved success with managing his medications. GOAL MET. CONTINUE FOR CONSISTENCY. INTERVENTIONS:   Pt completed short term memory task characterized by recall of 6 unassociated pictures when provided 30 seconds for initial encoding of information. Following 5 minute time delay, pt independently recalled 6/6 pictures. SHORT TERM GOAL #2:Patient will complete divided attention tasks with no more than 2 errors or redirects within a 3+ minute task for eventual return to the work environment (with assistance as needed). GOAL NOT MET. CONTINUE GOAL. INTERVENTIONS:  Pt completed divided attention task characterized by double visual cancellation of target numbers while simultaneously responding to auditory presentation of target color. Pt completed with 2 visual errors, 4 self-corrections, and required x1 reminder/redirection of the target numbers to dee within visual portion of task. This activity took 3 minutes to complete. SHORT TERM GOAL #3:Patient will complete functional thought organization tasks, including divergent naming with mod I, 75% of the time for successful return to managing his finances. GOAL NOT MET. CONTINUE GOAL. INTERVENTIONS:  Pt completed thought organization task characterized by scheduling x8 errands when provided 3 paragraph narrative of unorganized information. Pt only able to complete 5/8 prompts within this session due to time constraints/shortened session. Pt completed 1/5 prompts independently and required mod assist to complete 4/5 prompts. Decreased thought organization and reasoning noted throughout. Long-term Goals  Long-term Goals: 4 weeks  Long-term Goal 1: Patient will utilize strategies independently at least 75% of the time for successful return to managing his finances and assisting with managing his daily schedule. - GOAL NOT MET. CONTINUE GOAL. Assessment: [x]Progressing towards goals []Not Progressing towards goals    The patient has met 1/3 short term goals and has demonstrated some progress in the remaining goals. However, rehab potential has been somewhat limited due to several cancellations of therapy appointments with patient report of significant fatigue due to lack of sleep. In addition, the patient has several wounds. The patient has improved his short term memory skills within structured tasks, but reports he continues to have difficulty remembering new people's names. The patient would benefit from ongoing intervention to address complex attention and higher level thought organization skills given patient's long term goal to return to a work setting. However, per discussion with patient, mother, and , recommend a hold on therapy for ~4 weeks for patient to address medical barriers.       Plan for Next Session: functional short term memory task, divided attention task, thought organization task, divergent naming    Patient Tolerance of Treatment:  []Tolerated well []Tolerated fair []Required rest breaks  [x]Fatigued     Education:  [x]Education was provided []Education not provided due to:  Learner:  [x]Patient []Significant Other [x]Other - mother   Education provided regarding:  [x]Goals and POC [] Memory strategies   []Home Exercise Program [x]Progress and/or discharge information  Method of Education: [x]Discussion [x]Demonstration []Handout []Other:  Evaluation of Education:  [x]Verbalized understanding []Demonstrates without assistance  []Demonstrates with assistance [x]Needs further instruction    []No evidence of learning  []No family present during the session      Plan: []Continue with current plan of care []Modify current plan of care   [x]Progress note - frequency and duration: hold on therapy to address medical conditions - 4 week recheck recommended    []Discharge notes - reason for discharge:     [x]Patient continues to require treatment by a licensed therapist to address functional deficits as outlined in the established plan of care. Certification required: [x] Yes - See Physician Certification above.       [] No        Time in: 0930   Time out: 0955  Untimed treatment: 0  Timed treatment: 25  Total time: 25 minutes      Shreya Kingsley [address], [address]

## 2017-10-03 NOTE — PROGRESS NOTES
Cm spoke with patient and his mother today after therapy. CM discussed therapists report of patient being unable to tolerate OP therapy, specifically the standing frame and also patient having the inability to participate in therapy due to possible UTI, increasing severity of pressure wounds and need for psychosocial intervention. Both patient and mother agree at this point patient cannot continue OP therapies until these issues above are more resolved. Team/patient/family agree to hold OP therapy at this time. Patient is to follow up with psychology and wound care as well as his family physician and physiatrist to receive recommendations from here - possible return to [facility] care until wounds are improved. Patient's mother to call this CM with patient's plan of care to determine hold from OP therapy or discharge and transition back to [facility]. CM to follow up. Remaining appts cancelled at this time.   Thuy Gonzalez, RN 10/3/2017

## 2017-10-04 ENCOUNTER — TELEPHONE (OUTPATIENT)
Dept: FAMILY MEDICINE CLINIC | Age: 27
End: 2017-10-04

## 2017-10-04 DIAGNOSIS — G82.20 PARAPLEGIA (HCC): Primary | ICD-10-CM

## 2017-10-05 ENCOUNTER — APPOINTMENT (OUTPATIENT)
Dept: OCCUPATIONAL THERAPY | Age: 27
End: 2017-10-05
Payer: MEDICAID

## 2017-10-05 ENCOUNTER — TELEPHONE (OUTPATIENT)
Dept: PHYSICAL MEDICINE AND REHAB | Age: 27
End: 2017-10-05

## 2017-10-05 ENCOUNTER — APPOINTMENT (OUTPATIENT)
Dept: PHYSICAL THERAPY | Age: 27
End: 2017-10-05
Payer: MEDICAID

## 2017-10-05 ENCOUNTER — HOSPITAL ENCOUNTER (OUTPATIENT)
Dept: SPEECH THERAPY | Age: 27
Setting detail: THERAPIES SERIES
End: 2017-10-05
Payer: MEDICAID

## 2017-10-05 NOTE — TELEPHONE ENCOUNTER
Pt. Mother called stating the patient seems to be having trouble with falling forward in his chair. They have a seatbelt but it is not working. She is questioning if there is anything else they can do to help. Please advise.

## 2017-10-06 ENCOUNTER — HOSPITAL ENCOUNTER (OUTPATIENT)
Dept: WOUND CARE | Age: 27
Discharge: HOME | End: 2017-10-06
Payer: MEDICAID

## 2017-10-06 VITALS
HEART RATE: 64 BPM | OXYGEN SATURATION: 100 % | TEMPERATURE: 97.6 F | DIASTOLIC BLOOD PRESSURE: 82 MMHG | SYSTOLIC BLOOD PRESSURE: 164 MMHG | RESPIRATION RATE: 16 BRPM

## 2017-10-06 PROCEDURE — 99214 OFFICE O/P EST MOD 30 MIN: CPT | Performed by: NURSE PRACTITIONER

## 2017-10-06 PROCEDURE — 99214 OFFICE O/P EST MOD 30 MIN: CPT

## 2017-10-06 PROCEDURE — A6223 GAUZE >16<=48 NO W/SAL W/O B: HCPCS

## 2017-10-06 ASSESSMENT — PAIN SCALES - GENERAL: PAINLEVEL_OUTOF10: 3

## 2017-10-06 ASSESSMENT — PAIN DESCRIPTION - PAIN TYPE: TYPE: ACUTE PAIN

## 2017-10-06 ASSESSMENT — PAIN DESCRIPTION - LOCATION: LOCATION: SHOULDER

## 2017-10-06 ASSESSMENT — PAIN DESCRIPTION - ORIENTATION: ORIENTATION: LEFT

## 2017-10-06 NOTE — IP AVS SNAPSHOT
Take 1 tablet by mouth every 8 hours as needed for Nausea or Vomiting                                         polyethylene glycol powder   Commonly known as:  GLYCOLAX   Take 17 g by mouth daily as needed (constipation)                                         sodium hypochlorite 0.125 % Soln external solution   Commonly known as:  DAKINS   Apply Dakin's moistened gauze to coccyx. Cover with dry gauze. Change twice a day. SUMAtriptan 100 MG tablet   Commonly known as:  IMITREX   Take 1 tablet by mouth once as needed for Migraine                                         traZODone 100 MG tablet   Commonly known as:  DESYREL   Take 2 tablets by mouth nightly as needed for Sleep                                         tucks 50 % Pads   Apply 1 each topically as needed for Irritation                                         venlafaxine 150 MG extended release capsule   Commonly known as:  EFFEXOR XR   Take 1 capsule by mouth daily                                         vitamin C 500 MG tablet   Take 500 mg by mouth 2 times daily                                         vitamin D 28321 units Caps capsule   Commonly known as:  ERGOCALCIFEROL   Take 50,000 Units by mouth once a week Sundays                                                 Allergies as of 10/6/2017        Reactions    Bee Venom Shortness Of Breath    Tramadol Hives      Immunizations as of 10/6/2017     No immunizations on file. Last Vitals          Most Recent Value    Temp  97.6 °F (36.4 °C)    Pulse  64    Resp  16    BP  (!)  164/82         After Visit Summary    This summary was created for you. Thank you for entrusting your care to us.   The following information includes details about your hospital/visit stay along with steps you should take to help with your recovery once you leave the hospital.  In this packet, you will find information about the topics listed below: · Instructions about your medications including a list of your home medications  · A summary of your hospital visit  · Follow-up appointments once you have left the hospital  · Your care plan at home      You may receive a survey regarding the care you received during your stay. Your input is valuable to us. We encourage you to complete and return your survey in the envelope provided. We hope you will choose us in the future for your healthcare needs. Patient Information     Patient Name SARAH Gomez 1990      Care Provided at:     Name Address Phone       [address] [phone]            Your Visit    Here you will find information about your visit, including the reason for your visit. Please take this sheet with you when you visit your doctor or other health care provider in the future. It will help determine the best possible medical care for you at that time. If you have any questions once you leave the hospital, please call the department phone number listed below. Why you were here     Your primary diagnosis was:  Not on File      Visit Information     Date & Time Department Dept. Phone    10/6/2017 Zackary Ramos [phone]       Follow-up Appointments    Below is a list of your follow-up and future appointments. This may not be a complete list as you may have made appointments directly with providers that we are not aware of or your providers may have made some for you. Please call your providers to confirm appointments. It is important to keep your appointments. Please bring your current insurance card, photo ID, co-pay, and all medication bottles to your appointment. If self-pay, payment is expected at the time of service.         Future Appointments     10/9/2017 9:45 AM     Appointment with Zenaida Ashford MD at [facility] ([phone]) Please arrive 15 minutes prior to appointment time, bring insurance card and photo ID. Please arrive 15 minutes prior to appointment time, bring insurance card and photo ID.   [address] [facility] [address]       10/16/2017 3:00 PM     Appointment with Rex Cabrera NP at [address] ([phone])   Please arrive 15 minutes prior to appointment, bring photo ID and insurance card. [address] [facility] [address]  [facility] [address]       10/20/2017 1:00 PM     Appointment with Gilberto Puente CNP at [address] [facility] ([phone])   Please arrive 15 minutes prior to appointment time, bring insurance card and photo ID.    [address]  [facility] [address]       11/7/2017 1:30 PM     Appointment with Barbara Broussard MD at [facility] ([phone])   If this is a fasting lab, please do not eat or drink past midnight other than water. If this is a fasting lab, please do not eat or drink past midnight other than water. [address] [facility].  [address]  [facility] [address]       11/22/2017 1:40 PM     Appointment with Isabel Macias DO at [address] [place] [address] ([phone])   Please arrive 15 minutes prior to appointment, bring photo ID and insurance card. [address] [facility] [id number]         Preventive Care        Date Due    HIV screening is recommended for all people regardless of risk factors  aged 15-65 years at least once (lifetime) who have never been HIV tested.  7/17/2007    Tetanus Combination Vaccine (1 - Tdap) 7/17/2011    Pneumococcal Vaccine - Pneumovax for adults aged 19-64 years with: chronic heart disease, chronic lung disease, diabetes mellitus, alcoholism, chronic liver disease, or cigarette smoking. (1 of 1 - PPSV23) 7/17/2011    Yearly Flu Vaccine (1) 9/1/2017                 Care Plan Once You Return Home    This section includes instructions you will need to follow once you leave the hospital.  Your care team will discuss these with you, so you and Important information for a smoker       SMOKING: QUIT SMOKING. THIS IS THE MOST IMPORTANT ACTION YOU CAN TAKE TO IMPROVE YOUR CURRENT AND FUTURE HEALTH. Call the [facility] [place] NOW ([phone])    Smoking harms nonsmokers. When nonsmokers are around people who smoke, they absorb nicotine, carbon monoxide, and other ingredients of tobacco smoke. DO NOT SMOKE AROUND CHILDREN     Children exposed to secondhand smoke are at an increased risk of:  Sudden Infant Death Syndrome (SIDS), acute respiratory infections, inflammation of the middle ear, and severe asthma. Over a longer time, it causes heart disease and lung cancer. There is no safe level of exposure to secondhand smoke. MyChart Signup     Our records indicate that you have declined MyChart signup. View your information online  ? Review your current list of  medications, immunization, and allergies. ? Review your future test results online . ? Review your discharge instructions provided by your caregivers at discharge    Certain functionality such as prescription refills, scheduling appointments or sending messages to your provider are not activated if your provider does not use [employer] in his/her office    For questions regarding your [employer] account call [phone]. If you have a clinical question, please call your doctor's office. The information on all pages of the After Visit Summary has been reviewed with me, the patient and/or responsible adult, by my health care provider(s). I had the opportunity to ask questions regarding this information. I understand I should dispose of my armband safely at home to protect my health information. A complete copy of the After Visit Summary has been given to me, the patient and/or responsible adult.            Patient Signature/Responsible Adult:____________________    Clinician Signature:_____________________ Date:_____________________    Time:_____________________

## 2017-10-06 NOTE — TELEPHONE ENCOUNTER
Increase tilt in chair as tolerated. If unable, will need to contact wheelchair vendor for further adjustments/recommendations.   Thanks

## 2017-10-06 NOTE — IP AVS SNAPSHOT
Patient Information     Patient Name SARAH Mccoy [facility] 1990         This is your updated medication list to keep with you all times      ASK your doctor about these medications     * albuterol (2.5 MG/3ML) 0.083% nebulizer solution   Commonly known as:  PROVENTIL       * albuterol sulfate  (90 Base) MCG/ACT inhaler   Commonly known as:  VENTOLIN HFA   Inhale 2 puffs into the lungs every 6 hours as needed for Wheezing       amantadine 100 MG capsule   Commonly known as:  SYMMETREL   Take 1 capsule by mouth 2 times daily       baclofen 20 MG tablet   Commonly known as:  LIORESAL   Take 1 tablet by mouth 3 times daily       * Bedside Drainage Bag Misc   Large 2000 cc bedside drainage bag       * Urinary Leg Bag Misc   900 cc Aleida Urinary catheter leg bag       * Urinary Leg Bag Straps Misc   Leg bag straps to go with urinary catheter leg bag       betamethasone valerate 0.1 % cream   Commonly known as:  VALISONE   Apply topically 2 times daily PRN. ciprofloxacin 500 MG tablet   Commonly known as:  CIPRO   Take 1 tablet by mouth 2 times daily for 7 days       * DEPEND UNDERWEAR SM/MED Misc   Use depends prn for incontinence care       * PREVAIL WET WIPES Misc   Use wet wipes prn for personal care       docusate sodium 100 MG capsule   Commonly known as:  DOCQLACE   Take 1 capsule by mouth 2 times daily       EPINEPHrine 0.3 MG/0.3ML Soaj injection   Commonly known as:  EPIPEN   Use as directed for allergic reaction       ferrous sulfate 325 (65 Fe) MG EC tablet   Commonly known as:  FE TABS   Take 1 tablet by mouth 2 times daily       Foly Catheter Lubricious Misc   16 Fr 10 cc rodrigues       furosemide 20 MG tablet   Commonly known as:  LASIX   Take 1 tablet by mouth daily       gabapentin 300 MG capsule   Commonly known as:  NEURONTIN   Twice a day and two capsules at bedtime.        ibuprofen 800 MG tablet   Commonly known as:  ADVIL;MOTRIN Take 1 tablet by mouth 3 times daily as needed for Pain       * JOBST KNEE HIGH COMPRESSION SM Misc   1 each by Does not apply route daily       * JOBST ACTIVE 20-30MMHG MEDIUM Misc   Apply q daily       * T.E.D. Knee Length/M-Regular Misc   Use as directed       Latex Gloves Medium Misc   Use gloves prn for personal care       Lift Chair Misc   by Does not apply route Use as directed       melatonin 3 MG Tabs tablet   Take 2 tablets by mouth nightly       midodrine 2.5 MG tablet   Commonly known as:  PROAMATINE   TAKE 1 TABLET THREE TIMES A DAY       Nebulizer Compressor Kit   1 kit by Does not apply route once for 1 dose       ondansetron 4 MG tablet   Commonly known as:  ZOFRAN   Take 1 tablet by mouth every 8 hours as needed for Nausea or Vomiting       polyethylene glycol powder   Commonly known as:  GLYCOLAX   Take 17 g by mouth daily as needed (constipation)       sodium hypochlorite 0.125 % Soln external solution   Commonly known as:  DAKINS   Apply Dakin's moistened gauze to coccyx. Cover with dry gauze. Change twice a day. SUMAtriptan 100 MG tablet   Commonly known as:  IMITREX   Take 1 tablet by mouth once as needed for Migraine       traZODone 100 MG tablet   Commonly known as:  DESYREL   Take 2 tablets by mouth nightly as needed for Sleep       tucks 50 % Pads   Apply 1 each topically as needed for Irritation       venlafaxine 150 MG extended release capsule   Commonly known as:  EFFEXOR XR   Take 1 capsule by mouth daily       vitamin C 500 MG tablet       vitamin D 02426 units Caps capsule   Commonly known as:  ERGOCALCIFEROL       * Notice: This list has 10 medication(s) that are the same as other medications prescribed for you. Read the directions carefully, and ask your doctor or other care provider to review them with you.

## 2017-10-09 ENCOUNTER — TELEPHONE (OUTPATIENT)
Dept: FAMILY MEDICINE CLINIC | Age: 27
End: 2017-10-09

## 2017-10-09 ENCOUNTER — OFFICE VISIT (OUTPATIENT)
Dept: CARDIOLOGY CLINIC | Age: 27
End: 2017-10-09
Payer: MEDICAID

## 2017-10-09 VITALS
HEART RATE: 93 BPM | WEIGHT: 135 LBS | DIASTOLIC BLOOD PRESSURE: 92 MMHG | SYSTOLIC BLOOD PRESSURE: 120 MMHG | HEIGHT: 70 IN | BODY MASS INDEX: 19.33 KG/M2

## 2017-10-09 DIAGNOSIS — R00.0 TACHYCARDIA: Primary | ICD-10-CM

## 2017-10-09 DIAGNOSIS — R55 SYNCOPE AND COLLAPSE: ICD-10-CM

## 2017-10-09 PROCEDURE — 99213 OFFICE O/P EST LOW 20 MIN: CPT | Performed by: NUCLEAR MEDICINE

## 2017-10-09 PROCEDURE — 93000 ELECTROCARDIOGRAM COMPLETE: CPT | Performed by: NUCLEAR MEDICINE

## 2017-10-09 NOTE — PROGRESS NOTES
bag 1 each 11    Incontinence Supplies (URINARY LEG BAG) [facility] 900 cc [place] Urinary catheter leg bag 1 each 11    Incontinence Supplies (URINARY LEG BAG STRAPS) MISC Leg bag straps to go with urinary catheter leg bag 1 each 11    Catheters (FOLY CATHETER LUBRICIOUS) MISC 16 Fr 10 cc rodrigues 1 each 11    EPINEPHrine (EPIPEN) 0.3 MG/0.3ML SOAJ injection Use as directed for allergic reaction 2 each 1    Lift Chair MISC by Does not apply route Use as directed 1 each 0    amantadine (SYMMETREL) 100 MG capsule Take 1 capsule by mouth 2 times daily 60 capsule 3    ferrous sulfate (FE TABS) 325 (65 FE) MG EC tablet Take 1 tablet by mouth 2 times daily 60 tablet 5    docusate sodium (DOCQLACE) 100 MG capsule Take 1 capsule by mouth 2 times daily 60 capsule 5    gabapentin (NEURONTIN) 300 MG capsule Twice a day and two capsules at bedtime. 120 capsule 5    polyethylene glycol (GLYCOLAX) powder Take 17 g by mouth daily as needed (constipation) 1 Bottle 5    ondansetron (ZOFRAN) 4 MG tablet Take 1 tablet by mouth every 8 hours as needed for Nausea or Vomiting (Patient taking differently: Take 4 mg by mouth every 6 hours as needed for Nausea or Vomiting ) 90 tablet 5    Respiratory Therapy Supplies (NEBULIZER COMPRESSOR) KIT 1 kit by Does not apply route once for 1 dose 1 kit 0    melatonin 3 MG TABS tablet Take 2 tablets by mouth nightly 60 tablet 5     No current facility-administered medications for this visit.       Allergies   Allergen Reactions    Bee Venom Shortness Of Breath    Tramadol Hives     Health Maintenance   Topic Date Due    HIV screen  07/17/2007    DTaP/Tdap/Td vaccine (1 - Tdap) 07/17/2011    Pneumococcal med risk (1 of 1 - PPSV23) 07/17/2011    Flu vaccine (1) 09/01/2017       Subjective:  Review of Systems  General:   No fever, no chills, No fatigue or weight loss  Pulmonary:    No dyspnea, no wheezing  Cardiac:    Denies recent chest pain,   GI:     No nausea or vomiting, no abdominal pain  Neuro:    No dizziness or light headedness,   Musculoskeletal:  lower extremity issues   Extremities:   No edema, good peripheral pulses      Objective:  Physical Exam  BP (!) 120/92   Pulse 93   Ht 5' 10\" (1.778 m)   Wt 135 lb (61.2 kg)   BMI 19.37 kg/m²   General:   Well developed, well nourished  Lungs:   Clear to auscultation  Heart:    Normal S1 S2, Slight murmur. no rubs, no gallops  Abdomen:   Soft, non tender, no organomegalies, positive bowel sounds  Extremities:   No edema, no cyanosis, good peripheral pulses  Neurological:   Awake, alert, oriented. Chronic lower extremity parralysis   Musculoskelatal:  No obvious deformities    Assessment:     1. Tachycardia  EKG 12 Lead   2. Syncope and collapse     orthostatic issues   higher Bp at times   ECG in office was done today. I reviewed the ECG. No acute findings    Plan:  No Follow-up on file. Consider weaning midodrine   Vs just monitor for now   Continue risk factor modification and medical management  Thank you for allowing me to participate in the care of your patient. Please don't hesitate to contact me regarding any further issues related to the patient care    Orders Placed:  Orders Placed This Encounter   Procedures    EKG 12 Lead     Order Specific Question:   Reason for Exam?     Answer:   Irregular heart rate       Medications Prescribed:  No orders of the defined types were placed in this encounter. Discussed use, benefit, and side effects of prescribed medications. All patient questions answered. Pt voiced understanding. Instructed to continue current medications, diet and exercise. Continue risk factor modification and medical management. Patient agreed with treatment plan. Follow up as directed.     Electronically signed by Jacob Sherwood MD on 10/9/2017 at 10:30 AM

## 2017-10-09 NOTE — PROGRESS NOTES
Patient here for 6 mo follow up to echo. Patient complains of some elevated BP in therapy. 180/100's. Patient denies chest pain and sob.

## 2017-10-09 NOTE — PROGRESS NOTES
the left heel. 10/6/17: The coccyx and left ischium pressure ulcers continue to decline. Patient is spending majority of the day sitting in his chair and he continues to smoke. Wound/Ulcer Pain Timing/Severity: mild  Quality of pain: aching, tender  Severity:  3 / 10   Modifying Factors: Pain is relieved/improved with rest  Associated Signs/Symptoms: drainage and pain    Interval History:   Patient presents today for follow up on wound/ulcer's progression. The patient is currently on antibiotics. Current dressing care includes Left ischium, coccyx- Pack wounds with dakins moistened gauze. Cover with dry gauze/ABD pad. Change twice daily. Back, left heel- Cover wound with AMD gauze and secure with tape. Change daily.         PAST MEDICAL HISTORY        Diagnosis Date    Depression     Hyperthyroidism 9/2014    MDRO (multiple drug resistant organisms) resistance     MRSA LUNGS    Pneumonia     TMJ locking        PAST SURGICAL HISTORY    Past Surgical History:   Procedure Laterality Date    APPENDECTOMY      BACK SURGERY  11/2016    BRAIN SURGERY  11/05/2016    CLOT REMOVED    CYSTOSCOPY  04/17/2017    FACIAL RECONSTRUCTION SURGERY  2012    left zygomatic arch and sinus    FRACTURE SURGERY Left 11/2016    HARDWARE REMOVAL  2012    left zygomatic arch    OTHER SURGICAL HISTORY  01/09/2017    Laparoscopic Diverting Colostomy    OTHER SURGICAL HISTORY  01/09/2017    Excisional Debridment Sacral Decubitus Ulcer    OTHER SURGICAL HISTORY  04/17/2017    Placement of suprapubic catheter    TONSILLECTOMY         FAMILY HISTORY    Family History   Problem Relation Age of Onset    Heart Disease Mother     Heart Disease Father        SOCIAL HISTORY    Social History   Substance Use Topics    Smoking status: Current Every Day Smoker     Packs/day: 1.00     Years: 10.00     Types: Cigarettes    Smokeless tobacco: Never Used    Alcohol use No       ALLERGIES    Allergies   Allergen Reactions    Bee Venom 0.083% nebulizer solution       Elastic Bandages & Supports (JOBST KNEE HIGH COMPRESSION SM) MISC 1 each by Does not apply route daily 2 each 0    Incontinence Supplies (BEDSIDE DRAINAGE BAG) MISC Large 2000 cc bedside drainage bag 1 each 11    Incontinence Supplies (URINARY LEG BAG) [facility] 900 cc Aleida Urinary catheter leg bag 1 each 11    Incontinence Supplies (URINARY LEG BAG STRAPS) MISC Leg bag straps to go with urinary catheter leg bag 1 each 11    Catheters (FOLY CATHETER LUBRICIOUS) MISC 16 Fr 10 cc rodrigues 1 each 11    Lift Chair MISC by Does not apply route Use as directed 1 each 0    melatonin 3 MG TABS tablet Take 2 tablets by mouth nightly 60 tablet 5    amantadine (SYMMETREL) 100 MG capsule Take 1 capsule by mouth 2 times daily 60 capsule 3    ferrous sulfate (FE TABS) 325 (65 FE) MG EC tablet Take 1 tablet by mouth 2 times daily 60 tablet 5    docusate sodium (DOCQLACE) 100 MG capsule Take 1 capsule by mouth 2 times daily 60 capsule 5    gabapentin (NEURONTIN) 300 MG capsule Twice a day and two capsules at bedtime. 120 capsule 5    polyethylene glycol (GLYCOLAX) powder Take 17 g by mouth daily as needed (constipation) 1 Bottle 5    ondansetron (ZOFRAN) 4 MG tablet Take 1 tablet by mouth every 8 hours as needed for Nausea or Vomiting (Patient taking differently: Take 4 mg by mouth every 6 hours as needed for Nausea or Vomiting ) 90 tablet 5    baclofen (LIORESAL) 20 MG tablet Take 1 tablet by mouth 3 times daily 90 tablet 0    Respiratory Therapy Supplies (NEBULIZER COMPRESSOR) KIT 1 kit by Does not apply route once for 1 dose 1 kit 0    SUMAtriptan (IMITREX) 100 MG tablet Take 1 tablet by mouth once as needed for Migraine 9 tablet 3    EPINEPHrine (EPIPEN) 0.3 MG/0.3ML SOAJ injection Use as directed for allergic reaction 2 each 1     No current facility-administered medications on file prior to encounter.         REVIEW OF SYSTEMS    Constitutional: negative for chills, fevers and sweats  Eyes: negative for irritation and redness  Ears, nose, mouth, throat, and face: negative for hearing loss and hoarseness  Respiratory: negative for cough and shortness of breath  Cardiovascular: negative for chest pain, dyspnea and lower extremity edema  Gastrointestinal: positive for colostomy, negative for abdominal pain, nausea and vomiting  Integument/breast: positive for coccyx ulcer, left ischium ulcer, left heel ulcer, and upper back burn, negative for rash  Musculoskeletal:positive for paraplegia  Neurological: negative for dizziness, speech problems and positive for paraplegia  Behavioral/Psych: positive for good mood    Objective:      BP (!) 164/82   Pulse 64   Temp 97.6 °F (36.4 °C) (Oral)   Resp 16   SpO2 100%     Wt Readings from Last 3 Encounters:   10/02/17 130 lb (59 kg)   09/06/17 130 lb (59 kg)   08/22/17 135 lb (61.2 kg)       PHYSICAL EXAM    General Appearance: alert and oriented to person, place and time    Skin: warm and dry  Head: normocephalic and atraumatic  Eyes: pupils equal, round, and reactive to light  ENT: hearing grossly normal bilaterally  Pulmonary/Chest: clear to auscultation bilaterally- no wheezes, rales or rhonchi, normal air movement, no respiratory distress  Cardiovascular: normal rate and normal S1 and S2  Abdomen: soft, non-tender, bowel sounds normal   Extremities: no cyanosis, no clubbing and no edema  Musculoskeletal: normal range of motion, no joint deformity, paraplegia lower extremities  Neurologic: speech normal and gait abnormal- wheelchair bound  Colostomy, LLQ: pouch intact  Coccyx: pressure ulcer stage 4- Ulcer bed is 70% pink/red hyper-granular, 20% yellow slough, and 10% black necrotic noted. Bone palpable in the base. Draining moderate amount of serosanguineous drainage.  No odor noted.  Periulcer skin is slightly macerated with scarring noted.  No warmth or induration noted. Mid upper back: Full thickness. Healed. Scarring noted.   Left ischium: stage 4- Ulcer bed is 50% red, 10% brown/black, and 40% thick yellow slough. Bone is palpable in the base. Periulcer skin is intact. No redness, warmth, or induration noted. Left heel: unstageable- Ulcer bed was 60% moist, yellow slough and 40% brown. Moderate amount of serosanguineous drainage noted. Periulcer skin is intact. No redness, warmth, or induration noted. Coccyx    Left ischium    Left heel    Mid upper back    Pressure Ulcer 03/12/17 Coccyx stage 4 #1 (Active)   Kiana-wound Assessment Maceration;Dry;Pink; White 10/6/2017  1:37 PM   Kiana-Wound Texture Scarring 10/6/2017  1:37 PM   Kiana-Wound Moisture Dry 10/6/2017  1:37 PM   Kiana-Wound Color Pink; White 10/6/2017  1:37 PM   Wound Assessment Yellow;Red;Black 10/6/2017  1:37 PM   Wound Length (cm) 3.9 cm 10/6/2017  1:37 PM   Wound Width (cm) 3.8 cm 10/6/2017  1:37 PM   Wound Depth (cm)  0.7 10/6/2017  1:37 PM   Calculated Wound Size (cm^2) (l*w) 14.82 cm^2 10/6/2017  1:37 PM   Change in Wound Size % (l*w) 78.89 10/6/2017  1:37 PM   Dressing Status Intact 10/6/2017  1:37 PM   Dressing Changed Changed/New 10/6/2017  1:37 PM   Dressing/Treatment Packing 9/20/2017  2:21 PM   Wound Cleansed Rinsed/Irrigated with saline 10/6/2017  1:37 PM   Drainage Amount Moderate 10/6/2017  1:37 PM   Drainage Description Serosanguinous 10/6/2017  1:37 PM   Odor None 10/6/2017  1:37 PM   [place]%Wound Bed 50 9/6/2017  3:37 PM   Red%Wound Bed 70 10/6/2017  1:37 PM   Yellow%Wound Bed 20 10/6/2017  1:37 PM   Black%Wound Bed 10 10/6/2017  1:37 PM   Margins Attached edges 10/6/2017  1:37 PM   Number of days: 211       Pressure Ulcer 07/23/17 Heel Left DTPI #3 (Active)   Kiana-wound Assessment Dry 10/6/2017  1:37 PM   Kiana-Wound Texture Scarring 9/20/2017  1:52 PM   Kiana-Wound Moisture Dry 10/6/2017  1:37 PM   Kiana-Wound Color Ecchymosis 10/6/2017  1:37 PM   Wound Assessment Black; Yellow 10/6/2017  1:37 PM   Wound Length (cm) 1 cm 10/6/2017  1:37 PM   Wound Width (cm) 1.1 days: 76       Wound 07/13/17 Back Left;Upper #4 (Active)   Dressing Status Intact 8/16/2017  1:29 PM   Dressing Changed Changed/New 8/16/2017  1:29 PM   Wound Cleansed Rinsed/Irrigated with saline 8/16/2017  1:29 PM   Wound Length (cm) 0 cm 9/6/2017  3:37 PM   Wound Width (cm) 0 cm 9/6/2017  3:37 PM   Wound Depth (cm)  0 9/6/2017  3:37 PM   Calculated Wound Size (cm^2) (l*w) 0 cm^2 9/6/2017  3:37 PM   Change in Wound Size % (l*w) 100 9/6/2017  3:37 PM   Margins Attached edges 8/16/2017  1:29 PM   Kiana-wound Assessment Dry;Pink 8/16/2017  1:29 PM   Red%Wound Bed 90 8/16/2017  1:29 PM   Yellow%Wound Bed 10 8/16/2017  1:29 PM   Drainage Amount Moderate 8/16/2017  1:29 PM   Drainage Description Yellow;Green 8/16/2017  1:29 PM   Odor None 8/16/2017  1:29 PM   Number of days: 87       Wound 08/16/17 Back Upper #4 (Active)   Wound Type Wound 10/6/2017  1:37 PM   Dressing Status Intact 10/6/2017  1:37 PM   Dressing Changed Changed/New 10/6/2017  1:37 PM   Dressing/Treatment Open to air 10/6/2017  1:37 PM   Wound Cleansed Rinsed/Irrigated with saline 10/6/2017  1:37 PM   Wound Length (cm) 0.1 cm 10/6/2017  1:37 PM   Wound Width (cm) 0.1 cm 10/6/2017  1:37 PM   Wound Depth (cm)  scab 10/6/2017  1:37 PM   Calculated Wound Size (cm^2) (l*w) 0.01 cm^2 10/6/2017  1:37 PM   Change in Wound Size % (l*w) 99.09 10/6/2017  1:37 PM   Wound Assessment Black 10/6/2017  1:37 PM   Margins Attached edges 10/6/2017  1:37 PM   Kiana-wound Assessment Dry;Pink 10/6/2017  1:37 PM   [place]%Wound Bed 100 9/6/2017  3:37 PM   Red%Wound Bed 20 9/20/2017  1:52 PM   Yellow%Wound Bed 80 9/20/2017  1:52 PM   Black%Wound Bed 100 10/6/2017  1:37 PM   Drainage Amount None 10/6/2017  1:37 PM   Drainage Description Serous 9/6/2017  3:37 PM   Odor None 10/6/2017  1:37 PM   Op First Treatment Date 08/16/17 8/16/2017  1:29 PM   Number of days: 48       LABS      CBC:   Lab Results   Component Value Date    WBC 7.3 07/26/2017    HGB 10.9 07/26/2017    HCT 32.0 couple hours to limit time in chair.   -Reposition yourself in chair every 1-2 hours.    -Call [facility] to order your ostomy and catheter supplies, number [phone].  -We ordered more gauze and tape today. Home Care: [facility] 230 nurse wound care visits, continue aide services with supervisory nurse visits.       Wound Location: Coccyx, left ischium, left heel      Do not shower, take baths or get wound wet, unless otherwise instructed by your Wound Care doctor.   Zara Offer all dressings clean & dry.      Dressing orders: Cleanse all wounds with normal saline and gauze, pat dry with gauze.     Left ischium, coccyx- Pack wounds with dakins moistened gauze. Cover with dry gauze/ABD pad. Change twice daily.      Left heel- Cover wound with iodoflex (remove mesh from one side and apply that side to wound. Cover with gauze and secure with tape. Change every other day.      Follow up visit: 2 weeks on Friday October 20th at 1:00 pm      Keep next scheduled appointment. Please give 24 hour notice if unable to keep appointment. [phone]      If you experience any of the following, please call the Wound Care Service during business hours: [phone].                        *Increase in pain                        *Temperature over 101                        *Increase in drainage from your wound or a foul odor                        *Uncontrolled swelling                        *Need for compression bandage changes due to slippage, breakthrough drainage      If you need medical attention outside of business hours, please contact your Primary Care Doctor or go to the nearest emergency room.      Electronically signed by Linden Sharma CNP on 10/9/17 at 8:26 AM            Electronically signed by Linden Sharma CNP on 10/9/2017 at 8:26 AM

## 2017-10-12 ENCOUNTER — HOSPITAL ENCOUNTER (OUTPATIENT)
Dept: OCCUPATIONAL THERAPY | Age: 27
Setting detail: THERAPIES SERIES
End: 2017-10-12
Payer: MEDICAID

## 2017-10-12 ENCOUNTER — HOSPITAL ENCOUNTER (OUTPATIENT)
Dept: SPEECH THERAPY | Age: 27
Setting detail: THERAPIES SERIES
Discharge: HOME | End: 2017-10-12
Payer: MEDICAID

## 2017-10-12 ENCOUNTER — APPOINTMENT (OUTPATIENT)
Dept: PHYSICAL THERAPY | Age: 27
End: 2017-10-12
Payer: MEDICAID

## 2017-10-18 DIAGNOSIS — R15.9 INCONTINENCE OF FECES, UNSPECIFIED FECAL INCONTINENCE TYPE: Primary | ICD-10-CM

## 2017-10-19 ENCOUNTER — TELEPHONE (OUTPATIENT)
Dept: FAMILY MEDICINE CLINIC | Age: 27
End: 2017-10-19

## 2017-10-19 DIAGNOSIS — G89.4 CHRONIC PAIN SYNDROME: ICD-10-CM

## 2017-10-19 DIAGNOSIS — K59.09 OTHER CONSTIPATION: ICD-10-CM

## 2017-10-19 RX ORDER — LANOLIN ALCOHOL/MO/W.PET/CERES
325 CREAM (GRAM) TOPICAL 2 TIMES DAILY
Qty: 60 TABLET | Refills: 5 | Status: SHIPPED | OUTPATIENT
Start: 2017-10-19 | End: 2018-04-02 | Stop reason: SDUPTHER

## 2017-10-19 RX ORDER — DOCUSATE SODIUM 100 MG/1
100 CAPSULE, LIQUID FILLED ORAL 2 TIMES DAILY
Qty: 60 CAPSULE | Refills: 5 | Status: SHIPPED | OUTPATIENT
Start: 2017-10-19 | End: 2018-04-02 | Stop reason: SDUPTHER

## 2017-10-19 RX ORDER — GABAPENTIN 300 MG/1
CAPSULE ORAL
Qty: 120 CAPSULE | Refills: 5 | Status: ON HOLD | OUTPATIENT
Start: 2017-10-19 | End: 2017-11-28

## 2017-10-19 NOTE — PROGRESS NOTES
[facility]    Patient Name: Michelle Wolfe        CSN: [id number]   YOB: 1990  Gender: male  Referring Practitioner: Dr. Brynn Olivas  Diagnosis: X64.8C6N (ICD-10-CM) - TBI (traumatic brain injury), with loss of consciousness of unspecified duration, subsequent [id number] (ICD-10-CM) - Paraplegia ([facility] [address]    Patient is discharged from Physical Therapy services at this time. See last note for details related to results of therapy and goal achievement.     Reason for discharge:  Patient is being discharged from PT due to now receiving home health PT.       Deepali Dougherty, [address]

## 2017-10-19 NOTE — TELEPHONE ENCOUNTER
10/19/17   [facility] called requesting a refill on the following medications:  Requested Prescriptions     Pending Prescriptions Disp Refills    ferrous sulfate (FE TABS) 325 (65 Fe) MG EC tablet 60 tablet 5     Sig: Take 1 tablet by mouth 2 times daily    gabapentin (NEURONTIN) 300 MG capsule 120 capsule 5     Sig: Twice a day and two capsules at bedtime.     docusate sodium (DOCQLACE) 100 MG capsule 60 capsule 5     Sig: Take 1 capsule by mouth 2 times daily     Pharmacy verified:  [email or web address]      Date of last visit: 8/22/17  Date of next visit (if applicable): 19/71/64  blm      Date of last fill and quantity (to be completed by clinical staff)  Pharmacy name: [facility]

## 2017-10-19 NOTE — TELEPHONE ENCOUNTER
On 8/9/17: AMB REFERRAL TO PSYCHIATRY/ as noted on 8/7/17  Spoke to pt's mother Jeovany Mckenzie (listed on pt's HIPAA form) & notified her of Dr Froy Tucker response from earlier today. Erlin Wolf stated she had not gotten the VM that was left) Pt's mother was also notified of the protocol for a referral to Psychiatry, the pt must first check w/their ins to find out who will be covered. The pt must then call & make the appt themselves. Pt's mother was told this ofc could mail her a list of local Psychiatrists that she could ask her ins about if that would be helpful & the pt's mother stated she would appreciate that. The pt's address listed in the chart was confirmed & the referral/list of providers was mailed. Pt's mother expressed understanding of the info given. On 9/27/17: AMB REFERRAL TO PSYCHIATRY/Left c/b message for patient's mother, Jeovany Agent. Need to know if he has psychiatry appt (or had one) scheduled. Need the doctor's name, date and time of appt for documentation purposes. Never received c/b message. Please advise.

## 2017-10-20 NOTE — PROGRESS NOTES
[address]    Date: 10/20/2017  Patient Name: Corina Horne        CSN: [id number]   : 1990  (24 y.o.)  Gender: male   Referring Practitioner: Dr. Iesha Naidu  Diagnosis: S06.9X9D TBI with loss of consciousness of unspecified duration, subsequent encounter; G82.20 paraplegia     Patient is discharged from Occupational Therapy services at this time. See last note for details related to results of therapy and goal achievement. Reason for discharge:  [facility] RN CM spoke with the patient's mother and she reported [facility]. Discharge this date.         Jessi Varghese, JOANIE, OTR/L 1145

## 2017-10-23 ENCOUNTER — HOSPITAL ENCOUNTER (OUTPATIENT)
Dept: WOUND CARE | Age: 27
Discharge: HOME | End: 2017-10-23
Payer: MEDICAID

## 2017-10-23 VITALS
OXYGEN SATURATION: 99 % | RESPIRATION RATE: 18 BRPM | SYSTOLIC BLOOD PRESSURE: 139 MMHG | TEMPERATURE: 98.3 F | HEART RATE: 62 BPM | DIASTOLIC BLOOD PRESSURE: 84 MMHG | WEIGHT: 139.2 LBS | BODY MASS INDEX: 19.97 KG/M2

## 2017-10-23 DIAGNOSIS — L89.324 DECUBITUS ULCER OF LEFT ISCHIUM, STAGE 4 (HCC): ICD-10-CM

## 2017-10-23 DIAGNOSIS — L89.623 DECUBITUS ULCER OF LEFT HEEL, STAGE 3 (HCC): ICD-10-CM

## 2017-10-23 PROCEDURE — A6197 ALGINATE DRSG >16 <=48 SQ IN: HCPCS

## 2017-10-23 PROCEDURE — 99214 OFFICE O/P EST MOD 30 MIN: CPT | Performed by: NURSE PRACTITIONER

## 2017-10-23 PROCEDURE — 99214 OFFICE O/P EST MOD 30 MIN: CPT

## 2017-10-23 ASSESSMENT — PAIN DESCRIPTION - LOCATION: LOCATION: SHOULDER

## 2017-10-23 ASSESSMENT — PAIN SCALES - GENERAL: PAINLEVEL_OUTOF10: 8

## 2017-10-23 ASSESSMENT — PAIN DESCRIPTION - PROGRESSION: CLINICAL_PROGRESSION: NOT CHANGED

## 2017-10-23 ASSESSMENT — PAIN DESCRIPTION - ORIENTATION: ORIENTATION: LEFT

## 2017-10-23 ASSESSMENT — PAIN DESCRIPTION - DESCRIPTORS: DESCRIPTORS: BURNING;STABBING

## 2017-10-23 ASSESSMENT — PAIN DESCRIPTION - FREQUENCY: FREQUENCY: CONTINUOUS

## 2017-10-23 ASSESSMENT — PAIN DESCRIPTION - ONSET: ONSET: ON-GOING

## 2017-10-23 ASSESSMENT — PAIN DESCRIPTION - PAIN TYPE: TYPE: CHRONIC PAIN

## 2017-10-23 NOTE — PLAN OF CARE
Problem: Pressure Ulcer:  Goal: Will show no infection signs and symptoms  Will show no infection signs and symptoms   Outcome: Ongoing  Pt presents to wound clinic for follow up of multiple wounds. No s/s of infection. Pt was afebrile. New wound noted on lower and mid abdomen. Wound on lower back healed. Pt advised per provider to Lay down at least twice daily for a couple hours to limit time in chair. Pt instructed to Reposition in chair every 1-2 hours. Pt advised to Off load heels to prevent new pressure wounds. Pt advised per provider at Next appointment bring extra flange and ostomy bag for stoma. Will adjust. Pt instructed per provider to Apply skin prep before applying flange. Pt instructed per provider to Increase protein. Pt advised to Try protein shake or, eat eggs. Next appt in 2 weeks. Comments: Care plan reviewed with patient and family. Patient and family verbalize understanding of the plan of care and contribute to goal setting.

## 2017-10-23 NOTE — PROGRESS NOTES
Navid OCATS has reviewed and agrees with CARINA Roberts LPN's shift  Assessment.     Asa Scriver  10/23/2017

## 2017-10-24 PROBLEM — L89.623 DECUBITUS ULCER OF LEFT HEEL, STAGE 3 (HCC): Status: ACTIVE | Noted: 2017-09-06
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
## 2017-10-30 ENCOUNTER — TELEPHONE (OUTPATIENT)
Dept: PHYSICAL MEDICINE AND REHAB | Age: 27
End: 2017-10-30

## 2017-10-30 NOTE — TELEPHONE ENCOUNTER
Called patient to reschedule, mother wanted advice about medication. States that the medication is not working, he is taking baclofen 20mg 3x a day. States that the patient is having severe leg stiffness, shakiness and jerking. Would like to know how to handle. Please advise.

## 2017-11-07 ENCOUNTER — NURSE ONLY (OUTPATIENT)
Dept: UROLOGY | Age: 27
End: 2017-11-07
Payer: MEDICAID

## 2017-11-07 ENCOUNTER — TELEPHONE (OUTPATIENT)
Dept: FAMILY MEDICINE CLINIC | Age: 27
End: 2017-11-07

## 2017-11-07 DIAGNOSIS — N31.9 NEUROGENIC BLADDER: ICD-10-CM

## 2017-11-07 PROCEDURE — 51705 CHANGE OF BLADDER TUBE: CPT | Performed by: UROLOGY

## 2017-11-07 PROCEDURE — 99999 PR OFFICE/OUTPT VISIT,PROCEDURE ONLY: CPT | Performed by: UROLOGY

## 2017-11-07 NOTE — PROGRESS NOTES
I have personally verified, reviewed, released, signed, authenticated, authorized, confirmed,finalized, and approved the actions of the LPN (or CMA).     Ashley Gaston MD on 11/7/2017 at 2:23 PM

## 2017-11-07 NOTE — TELEPHONE ENCOUNTER
1st attempt to contact the pt re:overdue that Xray Dr Jeanne Lesches ordered on 8/22/17. Spoke to pt's mother, Mor Thomas (listed on HIPAA) who stated she had been told not to have the testing done. Mor Thomas states she does believe it may have actually gotten done through a different Dr when the pt was in the hospital. Order canceled.

## 2017-11-08 ENCOUNTER — TELEPHONE (OUTPATIENT)
Dept: FAMILY MEDICINE CLINIC | Age: 27
End: 2017-11-08

## 2017-11-08 DIAGNOSIS — L30.4 INTERTRIGO: Primary | ICD-10-CM

## 2017-11-08 RX ORDER — NYSTATIN 100000 [USP'U]/G
POWDER TOPICAL
Qty: 1 BOTTLE | Refills: 5 | Status: SHIPPED | OUTPATIENT
Start: 2017-11-08 | End: 2017-11-09 | Stop reason: SDUPTHER

## 2017-11-08 NOTE — TELEPHONE ENCOUNTER
Patients father is asking for refill of nystatin powder. I do not see on med list.  He uses for rash between his legs.   dolv 8/22/17  donv 11/15/17  Pharmacy is [facility]

## 2017-11-08 NOTE — TELEPHONE ENCOUNTER
Per HIPAA left detailed message letting pt's dad, Bryson Govea, know a new script was sent to the pharmacy.

## 2017-11-09 ENCOUNTER — OFFICE VISIT (OUTPATIENT)
Dept: PHYSICAL MEDICINE AND REHAB | Age: 27
End: 2017-11-09
Payer: MEDICAID

## 2017-11-09 ENCOUNTER — HOSPITAL ENCOUNTER (OUTPATIENT)
Age: 27
Setting detail: SPECIMEN
Discharge: HOME | End: 2017-11-09
Payer: MEDICAID

## 2017-11-09 VITALS
WEIGHT: 139 LBS | HEART RATE: 92 BPM | DIASTOLIC BLOOD PRESSURE: 81 MMHG | BODY MASS INDEX: 19.9 KG/M2 | HEIGHT: 70 IN | SYSTOLIC BLOOD PRESSURE: 124 MMHG

## 2017-11-09 DIAGNOSIS — N39.0 URINARY TRACT INFECTION ASSOCIATED WITH CATHETERIZATION OF URINARY TRACT, UNSPECIFIED INDWELLING URINARY CATHETER TYPE, INITIAL ENCOUNTER (HCC): ICD-10-CM

## 2017-11-09 DIAGNOSIS — G82.20 PARAPLEGIA (HCC): Primary | ICD-10-CM

## 2017-11-09 DIAGNOSIS — L30.4 INTERTRIGO: ICD-10-CM

## 2017-11-09 DIAGNOSIS — R25.2 SPASTICITY: ICD-10-CM

## 2017-11-09 DIAGNOSIS — T83.511A URINARY TRACT INFECTION ASSOCIATED WITH CATHETERIZATION OF URINARY TRACT, UNSPECIFIED INDWELLING URINARY CATHETER TYPE, INITIAL ENCOUNTER (HCC): ICD-10-CM

## 2017-11-09 PROCEDURE — 99213 OFFICE O/P EST LOW 20 MIN: CPT | Performed by: NURSE PRACTITIONER

## 2017-11-09 PROCEDURE — 87086 URINE CULTURE/COLONY COUNT: CPT

## 2017-11-09 RX ORDER — CYCLOBENZAPRINE HCL 5 MG
5 TABLET ORAL 3 TIMES DAILY PRN
Qty: 90 TABLET | Refills: 0 | Status: SHIPPED | OUTPATIENT
Start: 2017-11-09 | End: 2017-12-07 | Stop reason: SDUPTHER

## 2017-11-09 RX ORDER — BACLOFEN 20 MG/1
20 TABLET ORAL 3 TIMES DAILY
Qty: 90 TABLET | Refills: 0 | Status: SHIPPED | OUTPATIENT
Start: 2017-11-09 | End: 2017-12-18 | Stop reason: SDUPTHER

## 2017-11-09 RX ORDER — NYSTATIN 100000 [USP'U]/G
POWDER TOPICAL
Qty: 1 BOTTLE | Refills: 5 | Status: SHIPPED | OUTPATIENT
Start: 2017-11-09 | End: 2018-09-19 | Stop reason: ALTCHOICE

## 2017-11-09 RX ORDER — CYCLOBENZAPRINE HCL 5 MG
5 TABLET ORAL 3 TIMES DAILY PRN
Qty: 30 TABLET | Refills: 0 | Status: SHIPPED | OUTPATIENT
Start: 2017-11-09 | End: 2017-11-09
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
## 2017-11-10 LAB
ORGANISM: ABNORMAL
URINE CULTURE, ROUTINE: ABNORMAL

## 2017-11-13 ENCOUNTER — TELEPHONE (OUTPATIENT)
Dept: FAMILY MEDICINE CLINIC | Age: 27
End: 2017-11-13

## 2017-11-13 ENCOUNTER — HOSPITAL ENCOUNTER (OUTPATIENT)
Dept: WOUND CARE | Age: 27
Discharge: HOME | End: 2017-11-13
Payer: MEDICAID

## 2017-11-13 VITALS
TEMPERATURE: 99.3 F | OXYGEN SATURATION: 97 % | HEART RATE: 122 BPM | SYSTOLIC BLOOD PRESSURE: 114 MMHG | DIASTOLIC BLOOD PRESSURE: 63 MMHG | RESPIRATION RATE: 18 BRPM

## 2017-11-13 DIAGNOSIS — S31.109A WOUND OF ABDOMEN: ICD-10-CM

## 2017-11-13 DIAGNOSIS — F42.4 SKIN-PICKING DISORDER: ICD-10-CM

## 2017-11-13 DIAGNOSIS — F32.2 SEVERE SINGLE CURRENT EPISODE OF MAJOR DEPRESSIVE DISORDER, WITHOUT PSYCHOTIC FEATURES (HCC): ICD-10-CM

## 2017-11-13 DIAGNOSIS — F42.4 COMPULSIVE SKIN PICKING: ICD-10-CM

## 2017-11-13 DIAGNOSIS — Z43.3 COLOSTOMY CARE (HCC): ICD-10-CM

## 2017-11-13 DIAGNOSIS — G89.21 CHRONIC PAIN DUE TO TRAUMA: ICD-10-CM

## 2017-11-13 PROCEDURE — A6258 TRANSPARENT FILM >16<=48 IN: HCPCS

## 2017-11-13 PROCEDURE — 17250 CHEM CAUT OF GRANLTJ TISSUE: CPT | Performed by: NURSE PRACTITIONER

## 2017-11-13 PROCEDURE — A6259 TRANSPARENT FILM > 48 SQ IN: HCPCS

## 2017-11-13 PROCEDURE — 99214 OFFICE O/P EST MOD 30 MIN: CPT | Performed by: NURSE PRACTITIONER

## 2017-11-13 PROCEDURE — 17250 CHEM CAUT OF GRANLTJ TISSUE: CPT

## 2017-11-13 PROCEDURE — A6403 STERILE GAUZE>16 <= 48 SQ IN: HCPCS

## 2017-11-13 PROCEDURE — 6370000000 HC RX 637 (ALT 250 FOR IP): Performed by: NURSE PRACTITIONER

## 2017-11-13 PROCEDURE — A4421 OSTOMY SUPPLY MISC: HCPCS

## 2017-11-13 RX ORDER — ERGOCALCIFEROL 1.25 MG/1
50000 CAPSULE ORAL WEEKLY
Qty: 12 CAPSULE | Refills: 3 | Status: SHIPPED | OUTPATIENT
Start: 2017-11-13 | End: 2018-11-30

## 2017-11-13 RX ORDER — HYDROXYZINE HYDROCHLORIDE 25 MG/1
25 TABLET, FILM COATED ORAL 3 TIMES DAILY PRN
Qty: 90 TABLET | Refills: 3 | Status: SHIPPED | OUTPATIENT
Start: 2017-11-13 | End: 2017-11-15 | Stop reason: SDUPTHER

## 2017-11-13 RX ORDER — TRAZODONE HYDROCHLORIDE 100 MG/1
200 TABLET ORAL NIGHTLY PRN
Qty: 60 TABLET | Refills: 2 | Status: SHIPPED | OUTPATIENT
Start: 2017-11-13 | End: 2018-02-05 | Stop reason: SDUPTHER

## 2017-11-13 RX ORDER — VENLAFAXINE HYDROCHLORIDE 150 MG/1
150 CAPSULE, EXTENDED RELEASE ORAL DAILY
Qty: 30 CAPSULE | Refills: 5 | Status: SHIPPED | OUTPATIENT
Start: 2017-11-13 | End: 2018-05-24 | Stop reason: SDUPTHER

## 2017-11-13 RX ADMIN — SILVER NITRATE APPLICATORS 1 EACH: 25; 75 STICK TOPICAL at 13:51

## 2017-11-13 ASSESSMENT — PAIN DESCRIPTION - ORIENTATION: ORIENTATION: LEFT

## 2017-11-13 ASSESSMENT — PAIN DESCRIPTION - PAIN TYPE: TYPE: CHRONIC PAIN

## 2017-11-13 ASSESSMENT — PAIN DESCRIPTION - LOCATION: LOCATION: SHOULDER

## 2017-11-13 ASSESSMENT — PAIN SCALES - GENERAL: PAINLEVEL_OUTOF10: 4

## 2017-11-13 NOTE — PLAN OF CARE
Problem: Pressure Ulcer:  Goal: Signs of wound healing will improve  Signs of wound healing will improve   Outcome: Ongoing  Patient presents to wound clinic for follow up of coccyx, left ischium, abdomen, left heel wounds and colostomy care. New wounds to abdomen and back from patient itching/scratching. Instructed patient to follow up with psychiatrist regarding anxiety, itching/scratching. New ostomy supply order numbers given and sample kit ordered from [facility]. Wounds dressed as ordered. Follow up in wound clinic in 3 weeks. Comments: Care plan reviewed with patient and father. Patient and father verbalize understanding of the plan of care and contribute to goal setting.

## 2017-11-13 NOTE — PROGRESS NOTES
left ischium and the left heel. 10/6/17: The coccyx and left ischium pressure ulcers continue to decline. Patient is spending majority of the day sitting in his chair and he continues to smoke. 10/23/17: Patients left ischium and coccyx ulcers are slightly improved. The left heel ulcer has declined. His mother thinks it is related to the Iodoflex. He has multiple new open wounds to his right upper back and lower abdomen from picking and scratching. He mom is trying to get an appt set up to see Dr. Cristy Lubin. He had been seeing a psychologist but they are unable to prescribe medications for him. He has seen Dr. Cristy Lubin in the past.   11/13/17: Patient's mother is been unsuccessful with getting him an appointment scheduled with Dr. Cristy Lubin. He continues to pick and create more open wounds to his abdomen and right upper back. Instructed them to make him an appointment with [facility] he has seen another psychiatrist there. His left heel, coccyx, and left ischium ulcer is improved since his previous visit. He is spending less time up in his chair and relieving pressure to the areas. He has been having issues with his ostomy flange is leaking every 1-2 days. Wound/Ulcer Pain Timing/Severity: mild  Quality of pain: aching  Severity:  4 / 10   Modifying Factors: Pain is relieved/improved with rest  Associated Signs/Symptoms: drainage and pain    Interval History:   Patient presents today for follow up on wound/ulcer's progression. The patient is currently not on antibiotics. Current dressing care includes Left ischium, coccyx- Pack wounds with dakins moistened gauze. Cover with dry gauze/ABD pad. Change twice daily.      Left heel- Apply aquacel ag to wound bed. Cover with bordered foam. Wrap with kerlix. Change every 3 days or as needed for increase drainage.      Abdomen-  Apply bordered foam to lower abdomen wounds. Change every 3 days.  Upper/mid abdomen wound-   Apply triple antibiotic to wound daily.      Upper back- 18   SpO2 97%     Wt Readings from Last 3 Encounters:   11/09/17 139 lb (63 kg)   10/23/17 139 lb 3.2 oz (63.1 kg)   10/09/17 135 lb (61.2 kg)       PHYSICAL EXAM    General Appearance: alert and oriented to person, place and time    Skin: warm and dry  Head: normocephalic and atraumatic  Eyes: pupils equal, round, and reactive to light  ENT: hearing grossly normal bilaterally  Pulmonary/Chest: clear to auscultation bilaterally- no wheezes, rales or rhonchi, normal air movement, no respiratory distress  Cardiovascular: normal rate and normal S1 and S2  Abdomen: soft, non-tender, bowel sounds normal   Extremities: no cyanosis, no clubbing and no edema  Musculoskeletal: no joint deformity, paraplegia lower extremities  Neurologic: speech normal and gait abnormal- wheelchair bound  Colostomy, LLQ: Pouching system removed. Large amount stool noted on the skin leaking from the back of the flange. Stoma is red, moist, protrudes slightly, measures 1-1/4 inches. Crease noted around the stoma with deep crease noted between the stoma and the left upper thigh/groin fold. Abdomen is soft. Lower abdomen: small superficial scattered open areas noted from picking and digging with red/yellow dry bases. Draining moderate amount of serosanguinous drainage. Periwound skin is intact. No redness, warmth, or induration noted. Coccyx: pressure ulcer stage 4- Significantly improved. Measurements are smaller. Ulcer bed is 100% pink/red hyper-granular. Bone palpable in the base. Draining moderate amount of serosanguineous drainage.  No odor noted.  Periulcer skin is slightly macerated, and irritated with scarring noted.  No redness, warmth, or induration noted. Right upper back: scattered superficial open areas noted with dry yellow bases from patient picking and digging. Draining small amount of drainage. Periwound skin is intact. No redness, warmth, or induration noted. Left ischium: stage 4- Significantly improved. Black%Wound Bed 10 10/6/2017  1:37 PM   Margins Attached edges 11/13/2017  1:22 PM   Number of days: 246       Pressure Ulcer 07/23/17 Heel Left #3 (Active)   Kiana-wound Assessment Calloused;Dry 11/13/2017  1:22 PM   Kiana-Wound Texture Scarring 9/20/2017  1:52 PM   Kiana-Wound Moisture Dry 11/13/2017  1:22 PM   Kiana-Wound Color Ecchymosis 10/6/2017  1:37 PM   Wound Assessment Black; Yellow 10/6/2017  1:37 PM   Wound Length (cm) 0.5 cm 11/13/2017  1:22 PM   Wound Width (cm) 0.9 cm 11/13/2017  1:22 PM   Wound Depth (cm)  eschar 11/13/2017  1:22 PM   Calculated Wound Size (cm^2) (l*w) 0.45 cm^2 11/13/2017  1:22 PM   Change in Wound Size % (l*w) 96.25 11/13/2017  1:22 PM   Dressing Status Intact 11/13/2017  1:22 PM   Dressing Changed Changed/New 11/13/2017  1:22 PM   Wound Cleansed Rinsed/Irrigated with saline 11/13/2017  1:22 PM   Drainage Amount None 11/13/2017  1:22 PM   Drainage Description Serosanguinous; Yellow 10/23/2017  2:34 PM   Odor None 11/13/2017  1:22 PM   Undermining Starts ___ O'Clock 12 10/23/2017  3:02 PM   Undermining Ends___ O'Clock 12 10/23/2017  3:02 PM   Undermining Maxium Distance (cm) 0.7 10/23/2017  3:02 PM   [place]%Wound Bed 50 10/23/2017  2:34 PM   Yellow%Wound Bed 50 10/23/2017  2:34 PM   Black%Wound Bed 100 11/13/2017  1:22 PM   Margins Attached edges 11/13/2017  1:22 PM   Number of days: 113       Pressure Ulcer 07/24/17 Ischium Left  #2 (Active)   Kiana-wound Assessment White;Purple;Pink 11/13/2017  1:22 PM   Kiana-Wound Texture Scarring 10/23/2017  3:02 PM   Kiana-Wound Moisture Macerated 11/13/2017  1:22 PM   Kiana-Wound Color Red 10/23/2017  3:02 PM   Wound Assessment Red 11/13/2017  1:22 PM   Wound Length (cm) 1.4 cm 11/13/2017  1:22 PM   Wound Width (cm) 1.4 cm 11/13/2017  1:22 PM   Wound Depth (cm)  1.9 11/13/2017  1:22 PM   Calculated Wound Size (cm^2) (l*w) 1.96 cm^2 11/13/2017  1:22 PM   Change in Wound Size % (l*w) 34.67 11/13/2017  1:22 PM   Dressing Status Intact 11/13/2017  1:22 PM Dressing Changed Changed/New 11/13/2017  1:22 PM   Dressing/Treatment Packing 11/13/2017  1:22 PM   Wound Cleansed Rinsed/Irrigated with saline 11/13/2017  1:22 PM   Drainage Amount Moderate 11/13/2017  1:22 PM   Drainage Description Yellow 11/13/2017  1:22 PM   Odor None 11/13/2017  1:22 PM   Tunneling Position ___ O'Clock 12 10/6/2017  1:37 PM   Tunneling Maxium Distance (cm) 2.5 10/6/2017  1:37 PM   Undermining Starts ___ O'Clock 9 11/13/2017  1:22 PM   Undermining Ends___ O'Clock 2 11/13/2017  1:22 PM   Undermining Maxium Distance (cm) 2.5 11/13/2017  1:22 PM   [employer]%Wound Bed 50 9/6/2017  3:25 PM   Red%Wound Bed 100 11/13/2017  1:22 PM   Yellow%Wound Bed 60 10/23/2017  3:02 PM   Black%Wound Bed 25 8/16/2017  1:29 PM   Purple%Wound Bed 10 10/23/2017  3:02 PM   Margins Attached edges; Unattached edges 11/13/2017  1:22 PM   Number of days: 112       Wound 08/16/17 Back Upper #4 (Active)   Wound Type Wound 11/13/2017  1:22 PM   Dressing Status Intact 11/13/2017  1:22 PM   Dressing Changed Changed/New 11/13/2017  1:22 PM   Dressing/Treatment Open to air 10/6/2017  1:37 PM   Wound Cleansed Rinsed/Irrigated with saline 11/13/2017  1:22 PM   Wound Length (cm) 3.6 cm 11/13/2017  1:22 PM   Wound Width (cm) 4.5 cm 11/13/2017  1:22 PM   Wound Depth (cm)  0.05 11/13/2017  1:22 PM   Calculated Wound Size (cm^2) (l*w) 16.2 cm^2 11/13/2017  1:22 PM   Change in Wound Size % (l*w) -1372.73 11/13/2017  1:22 PM   Wound Assessment Yellow 11/13/2017  1:22 PM   Margins Attached edges 11/13/2017  1:22 PM   Kiana-wound Assessment Dry;Pink 11/13/2017  1:22 PM   [employer]%Wound Bed 100 9/6/2017  3:37 PM   Red%Wound Bed 20 9/20/2017  1:52 PM   Yellow%Wound Bed 60 11/13/2017  1:22 PM   Black%Wound Bed 100 10/6/2017  1:37 PM   Other%Wound Bed 40 11/13/2017  1:22 PM   Drainage Amount Small 11/13/2017  1:22 PM   Drainage Description Serosanguinous; Yellow 11/13/2017  1:22 PM   Odor None 11/13/2017  1:22 PM   Op First Treatment Date 08/16/17 8/16/2017  1:29 PM   Number of days: 89       Wound 10/23/17 Abdomen Right;Distal;Lower #5 (Active)   Wound Type Wound 11/13/2017  1:22 PM   Dressing Status Intact 11/13/2017  1:22 PM   Dressing Changed Changed/New 11/13/2017  1:22 PM   Wound Cleansed Rinsed/Irrigated with saline 11/13/2017  1:22 PM   Wound Length (cm) 1.1 cm 11/13/2017  1:22 PM   Wound Width (cm) 1.7 cm 11/13/2017  1:22 PM   Wound Depth (cm)  0.05 11/13/2017  1:22 PM   Calculated Wound Size (cm^2) (l*w) 1.87 cm^2 11/13/2017  1:22 PM   Change in Wound Size % (l*w) 46.57 11/13/2017  1:22 PM   Wound Assessment Yellow;Pink 11/13/2017  1:22 PM   Margins Attached edges 11/13/2017  1:22 PM   Kiana-wound Assessment Dry;Pink 11/13/2017  1:22 PM   [place]%Wound Bed 10 11/13/2017  1:22 PM   Yellow%Wound Bed 90 11/13/2017  1:22 PM   Drainage Amount Small 11/13/2017  1:22 PM   Drainage Description Yellow 11/13/2017  1:22 PM   Odor None 11/13/2017  1:22 PM   Op First Treatment Date 10/23/17 10/23/2017  3:09 PM   Number of days: 21       Wound 14/06/12 Umbilicus #6 (Active)   Wound Type Wound 11/13/2017  1:22 PM   Dressing Status Intact 11/13/2017  1:22 PM   Dressing Changed Changed/New 11/13/2017  1:22 PM   Wound Cleansed Rinsed/Irrigated with saline 11/13/2017  1:22 PM   Wound Length (cm) 0.6 cm 11/13/2017  1:22 PM   Wound Width (cm) 1.5 cm 11/13/2017  1:22 PM   Wound Depth (cm)  0.05 11/13/2017  1:22 PM   Calculated Wound Size (cm^2) (l*w) 0.9 cm^2 11/13/2017  1:22 PM   Wound Assessment Yellow 11/13/2017  1:22 PM   Margins Attached edges 11/13/2017  1:22 PM   Kiana-wound Assessment Dry;Pink;Red 11/13/2017  1:22 PM   Yellow%Wound Bed 100 11/13/2017  1:22 PM   Drainage Amount Small 11/13/2017  1:22 PM   Drainage Description Yellow 11/13/2017  1:22 PM   Odor None 11/13/2017  1:22 PM   Op First Treatment Date 11/13/17 11/13/2017  1:22 PM   Number of days: 0       Wound 11/13/17 Abdomen Right;Proximal #7 (Active)   Wound Type Wound 11/13/2017  1:22 PM   Dressing Status Intact 11/13/2017  1:22 PM   Dressing Changed Changed/New 11/13/2017  1:22 PM   Wound Cleansed Rinsed/Irrigated with saline 11/13/2017  1:22 PM   Wound Length (cm) 0.5 cm 11/13/2017  1:22 PM   Wound Width (cm) 0.5 cm 11/13/2017  1:22 PM   Wound Depth (cm)  superficial 11/13/2017  1:22 PM   Calculated Wound Size (cm^2) (l*w) 0.25 cm^2 11/13/2017  1:22 PM   Wound Assessment Yellow;Red 11/13/2017  1:22 PM   Margins Attached edges 11/13/2017  1:22 PM   Kiana-wound Assessment Red;Dry 11/13/2017  1:22 PM   Red%Wound Bed 40 11/13/2017  1:22 PM   Yellow%Wound Bed 60 11/13/2017  1:22 PM   Drainage Amount Scant 11/13/2017  1:22 PM   Drainage Description Yellow 11/13/2017  1:22 PM   Odor None 11/13/2017  1:22 PM   Op First Treatment Date 11/13/17 11/13/2017  1:22 PM   Number of days: 0       Wound 11/13/17 Abdomen Mid;Lower #8 (Active)   Wound Type Wound 11/13/2017  1:22 PM   Dressing Status Intact 11/13/2017  1:22 PM   Dressing Changed Changed/New 11/13/2017  1:22 PM   Wound Cleansed Rinsed/Irrigated with saline 11/13/2017  1:22 PM   Wound Length (cm) 0.6 cm 11/13/2017  1:22 PM   Wound Width (cm) 0.9 cm 11/13/2017  1:22 PM   Wound Depth (cm)  scab 11/13/2017  1:22 PM   Calculated Wound Size (cm^2) (l*w) 0.54 cm^2 11/13/2017  1:22 PM   Wound Assessment Black 11/13/2017  1:22 PM   Margins Attached edges 11/13/2017  1:22 PM   Kiana-wound Assessment Dry;Pink;Red 11/13/2017  1:22 PM   Black%Wound Bed 100 11/13/2017  1:22 PM   Drainage Amount None 11/13/2017  1:22 PM   Odor None 11/13/2017  1:22 PM   Op First Treatment Date 11/13/17 11/13/2017  1:22 PM   Number of days: 0       LABS      CBC:   Lab Results   Component Value Date    WBC 7.3 07/26/2017    HGB 10.9 07/26/2017    HCT 32.0 07/26/2017    MCV 83.1 07/26/2017     07/26/2017     BMP:   Lab Results   Component Value Date     07/26/2017    K 3.1 07/26/2017     07/26/2017    CO2 26 07/26/2017    BUN 3 07/26/2017    CREATININE 0.5 07/26/2017     PT/INR:   Lab Hyperkeratotic      Pain Control:   N/A    Method: silver nitrate    Location of Chemical Cautery:   Wound/Ulcer #1    Procedural Pain: 0  / 10     Post Procedural Pain: 0 / 10     Response to treatment: Well tolerated by patient. Plan:     Patient examined and evaluated. Patient has been much more compliant with staying off of his coccyx and ischium. His coccyx, left ischium, and left heel ulcers are significantly improved. He has more wounds noted to his right upper back and right abdomen from picking and digging at his skin. He has not yet seen a psychiatrist.  He is excited that his ulcers are getting better. He states that he can get Botox injections in his legs when his ulcers have improved and is looking forward to that. Call [facility] to get appointment with the psychiatrist he has been seen there in the past    Silver nitrate applied to the coccyx friable/hyper granular tissue today    Colostomy was assessed due to frequent leaking. Patient needs a precut pouching system in order for his flange to be smaller and not interfere with his groin fold. It appears that his groin fold/crease is causing his flange to pop off and leak. Applied a ConvaTec two-piece 1-3/4 inch Durahesive convex flange precut to 1-1/4 inches with non-drainable pouch. Patient and his father were instructed on pouching application instructions as well as pouch maintenance. Both verbalized understanding. Sample kit will be ordered of the above products as well as a drainable pouch for them to try, will be sent to the [place] [employer]. See discharge instructions for product numbers and ordering information. Continue to limit time in the chair to no more than 2 hours at one time    Use offloading boots when in bed to elevate heels    Coccyx- Cleanse wound with dakins solution and gauze. Pack wound with dry AMD gauze. Apply zinc oxide cream to irritated skin around wound. Cover with dry gauze/ABD pad.  Secure otherwise instructed by your Wound Care doctor.   Herbert Dozier all dressings clean & dry.      Dressing orders: Cleanse all wounds with normal saline and gauze, pat dry with clean gauze.     Coccyx- Cleanse wound with dakins solution and gauze. Pack wound with dry AMD gauze. Apply zinc oxide cream to irritated skin around wound. Cover with dry gauze/ABD pad. Secure with medipore tape. Change twice daily. Left ischium- Pack wound with dakins moistened gauze. Cover with dry gauze/ABD pad. Secure with medipore tape. Change twice daily.     Left heel- Apply betadine to wound bed. Cover with gauze. Wrap with kerlix. Change every other day.     Abdomen and Right upper back wounds- Apply betadine to wounds. Cover with gauze and secure with tape or tegaderm. Change every other day.        Follow up visit: 3 weeks on Monday December 4th at 1:00 pm      Keep next scheduled appointment. Please give 24 hour notice if unable to keep appointment. [phone]      If you experience any of the following, please call the Wound Care Service during business hours: [phone].                        *Increase in pain                        *Temperature over 101                        *Increase in drainage from your wound or a foul odor                        *Uncontrolled swelling                        *Need for compression bandage changes due to slippage, breakthrough drainage      If you need medical attention outside of business hours, please contact your Primary Care Doctor or go to the nearest emergency room. Ostomy Care-    [facility] Nurse:  Jaron Castañeda CNP, CWOCN-AP       Supplies   Size   Order #     Convatec Convex Flange 1 1/4  Pre-cut [id number]   Convatec Pouch Deysi-fit Natura Closed End Pouch (nondrainable), opaque  [id number]   Convatec Deysi-fit Natura Drainable Pouch, opaque  [id number]     Change your pouch 1-2   times / week. Application of Pouch:  1.  Assemble above supplies in order of application before removing pouch.  2. Remove your worn appliance by gently pulling away from skin and discard. 3. Wash skin with warm water, rinse and pat dry. 4. Inspect your skin for redness or irritation. If present, apply a small amount of powder to skin around stoma. Brush off excess powder with a Kleenex. Do not use ointments.  ___  Stoma Powder  (for wet, weepy skin)  ___  Desenex Powder  ([employer])  (itches, rash)  5. Center the flange around your stoma. Smooth the adhesive collar to your abdomen. 6. Apply your pouch  7.  Discard nondrainable pouching system    Electronically signed by Linden Sharma CNP on 11/13/17 at 2:29 PM                  Electronically signed by Linden Sharma CNP on 11/13/2017 at 2:30 PM

## 2017-11-14 ENCOUNTER — TELEPHONE (OUTPATIENT)
Dept: FAMILY MEDICINE CLINIC | Age: 27
End: 2017-11-14

## 2017-11-14 NOTE — TELEPHONE ENCOUNTER
Called pt's listed number and pt's mom answered. She is not listed on pt's HIPAA so I informed her that I could not give her results, but I did ask her to have her son call when he is available. She got very angry and cursed me out, she told me she had no clue why she wasn't on the form because she does all his DrAisha Appointment scheduling. She then told me I better not dare call her number again. I removed her from the pt's chart and removed pt's listed number.

## 2017-11-15 ENCOUNTER — OFFICE VISIT (OUTPATIENT)
Dept: FAMILY MEDICINE CLINIC | Age: 27
End: 2017-11-15
Payer: MEDICAID

## 2017-11-15 VITALS
HEART RATE: 102 BPM | RESPIRATION RATE: 14 BRPM | SYSTOLIC BLOOD PRESSURE: 84 MMHG | DIASTOLIC BLOOD PRESSURE: 60 MMHG | TEMPERATURE: 98.5 F

## 2017-11-15 DIAGNOSIS — R60.9 DEPENDENT EDEMA: ICD-10-CM

## 2017-11-15 DIAGNOSIS — G89.4 CHRONIC PAIN SYNDROME: ICD-10-CM

## 2017-11-15 DIAGNOSIS — G43.709 CHRONIC MIGRAINE WITHOUT AURA WITHOUT STATUS MIGRAINOSUS, NOT INTRACTABLE: ICD-10-CM

## 2017-11-15 DIAGNOSIS — G82.20 PARAPLEGIA (HCC): ICD-10-CM

## 2017-11-15 DIAGNOSIS — F42.4 SKIN-PICKING DISORDER: ICD-10-CM

## 2017-11-15 DIAGNOSIS — F06.31 MOOD DISORDER DUE TO KNOWN PHYSIOLOGICAL CONDITION WITH DEPRESSIVE FEATURES: Primary | ICD-10-CM

## 2017-11-15 PROCEDURE — 99214 OFFICE O/P EST MOD 30 MIN: CPT | Performed by: FAMILY MEDICINE

## 2017-11-15 RX ORDER — HYDROXYZINE HYDROCHLORIDE 25 MG/1
50 TABLET, FILM COATED ORAL 3 TIMES DAILY PRN
Qty: 180 TABLET | Refills: 3 | Status: SHIPPED | OUTPATIENT
Start: 2017-11-15 | End: 2018-03-08 | Stop reason: SDUPTHER

## 2017-11-15 RX ORDER — IBUPROFEN 800 MG/1
800 TABLET ORAL 3 TIMES DAILY PRN
Qty: 90 TABLET | Refills: 5 | Status: SHIPPED | OUTPATIENT
Start: 2017-11-15 | End: 2018-04-28 | Stop reason: SDUPTHER

## 2017-11-15 NOTE — PROGRESS NOTES
Visit Information    Have you changed or started any medications since your last visit including any over-the-counter medicines, vitamins, or herbal medicines? no   Are you having any side effects from any of your medications? -  no  Have you stopped taking any of your medications? Is so, why? -  no    Have you seen any other physician or provider since your last visit? Yes - Records Obtained  Have you had any other diagnostic tests since your last visit? No  Have you been seen in the emergency room and/or had an admission to a hospital since we last saw you? No  Have you had your routine dental cleaning in the past 6 months? no    Have you activated your [employer] account? If not, what are your barriers? No     Patient Care Team:  Travis More DO as PCP - General (Family Medicine)    Medical History Review  Past Medical, Family, and Social History     Defer to provider.
Disease Father          I have reviewed the patient's past medical history, past surgical history, allergies, medications, social and family history and I have made updates where appropriate. ROS        PHYSICAL EXAM:  BP 84/60   Pulse 102   Temp 98.5 °F (36.9 °C) (Oral)   Resp 14     General Appearance: well developed and well- nourished, in no acute distress  Head: normocephalic and atraumatic  ENT: external ear and ear canal normal bilaterally, nose without deformity, nasal mucosa and turbinates normal without polyps, oropharynx normal, dentition is normal for age, no lip or gum lesions noted  Neck: supple and non-tender without mass, no thyromegaly or thyroid nodules, no cervical lymphadenopathy  Pulmonary/Chest: clear to auscultation bilaterally- no wheezes, rales or rhonchi, normal air movement, no respiratory distress or retractions  Cardiovascular: normal rate, regular rhythm, normal S1 and S2, no murmurs, rubs, clicks, or gallops, distal pulses intact  Extremities: no cyanosis, clubbing or edema of the lower extremities  Psych:  Normal affect without evidence of depression or anxiety, insight and judgement are appropriate, memory appears intact  Skin: warm and dry, multiple excoriations noted on the abdomen, face and back    ASSESSMENT & PLAN  Sarah Cornelius was seen today for 3 month follow-up. 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

## 2017-11-27 ENCOUNTER — APPOINTMENT (OUTPATIENT)
Dept: CT IMAGING | Age: 27
DRG: 247 | End: 2017-11-27
Payer: MEDICAID

## 2017-11-27 ENCOUNTER — HOSPITAL ENCOUNTER (INPATIENT)
Age: 27
LOS: 1 days | Discharge: HOME HEALTH | DRG: 247 | End: 2017-11-29
Attending: INTERNAL MEDICINE | Admitting: INTERNAL MEDICINE
Payer: MEDICAID

## 2017-11-27 DIAGNOSIS — R41.82 ALTERED MENTAL STATUS, UNSPECIFIED ALTERED MENTAL STATUS TYPE: Primary | ICD-10-CM

## 2017-11-27 DIAGNOSIS — R45.851 DEPRESSION WITH SUICIDAL IDEATION: ICD-10-CM

## 2017-11-27 DIAGNOSIS — F32.A DEPRESSION WITH SUICIDAL IDEATION: ICD-10-CM

## 2017-11-27 LAB
ACETAMINOPHEN LEVEL: < 5 UG/ML (ref 0–20)
ALBUMIN SERPL-MCNC: 3.8 G/DL (ref 3.5–5.1)
ALP BLD-CCNC: 125 U/L (ref 38–126)
ALT SERPL-CCNC: 7 U/L (ref 11–66)
AMPHETAMINE+METHAMPHETAMINE URINE SCREEN: NEGATIVE
ANION GAP SERPL CALCULATED.3IONS-SCNC: 12 MEQ/L (ref 8–16)
ANISOCYTOSIS: ABNORMAL
AST SERPL-CCNC: 14 U/L (ref 5–40)
BACTERIA: ABNORMAL /HPF
BARBITURATE QUANTITATIVE URINE: NEGATIVE
BASOPHILS # BLD: 0.1 %
BASOPHILS ABSOLUTE: 0 THOU/MM3 (ref 0–0.1)
BENZODIAZEPINE QUANTITATIVE URINE: NEGATIVE
BILIRUB SERPL-MCNC: 0.2 MG/DL (ref 0.3–1.2)
BILIRUBIN URINE: NEGATIVE
BLOOD, URINE: ABNORMAL
BUN BLDV-MCNC: 8 MG/DL (ref 7–22)
CALCIUM SERPL-MCNC: 9.2 MG/DL (ref 8.5–10.5)
CANNABINOID QUANTITATIVE URINE: POSITIVE
CASTS 2: ABNORMAL /LPF
CASTS UA: ABNORMAL /LPF
CHARACTER, URINE: ABNORMAL
CHLORIDE BLD-SCNC: 99 MEQ/L (ref 98–111)
CO2: 30 MEQ/L (ref 23–33)
COCAINE METABOLITE QUANTITATIVE URINE: NEGATIVE
COLOR: YELLOW
CREAT SERPL-MCNC: 0.6 MG/DL (ref 0.4–1.2)
CRYSTALS, UA: ABNORMAL
EKG ATRIAL RATE: 88 BPM
EKG P AXIS: 76 DEGREES
EKG P-R INTERVAL: 156 MS
EKG Q-T INTERVAL: 362 MS
EKG QRS DURATION: 92 MS
EKG QTC CALCULATION (BAZETT): 438 MS
EKG R AXIS: 72 DEGREES
EKG T AXIS: 83 DEGREES
EKG VENTRICULAR RATE: 88 BPM
EOSINOPHIL # BLD: 3.2 %
EOSINOPHILS ABSOLUTE: 0.3 THOU/MM3 (ref 0–0.4)
EPITHELIAL CELLS, UA: ABNORMAL /HPF
ETHYL ALCOHOL, SERUM: < 0.01 %
GFR SERPL CREATININE-BSD FRML MDRD: > 90 ML/MIN/1.73M2
GLUCOSE BLD-MCNC: 85 MG/DL (ref 70–108)
GLUCOSE URINE: NEGATIVE MG/DL
HCT VFR BLD CALC: 40.5 % (ref 42–52)
HEMOGLOBIN: 13.3 GM/DL (ref 14–18)
KETONES, URINE: NEGATIVE
LACTIC ACID: 2.3 MMOL/L (ref 0.5–2.2)
LEUKOCYTE ESTERASE, URINE: ABNORMAL
LYMPHOCYTES # BLD: 23 %
LYMPHOCYTES ABSOLUTE: 2.3 THOU/MM3 (ref 1–4.8)
MCH RBC QN AUTO: 28.1 PG (ref 27–31)
MCHC RBC AUTO-ENTMCNC: 32.8 GM/DL (ref 33–37)
MCV RBC AUTO: 85.8 FL (ref 80–94)
MISCELLANEOUS 2: ABNORMAL
MONOCYTES # BLD: 4.6 %
MONOCYTES ABSOLUTE: 0.5 THOU/MM3 (ref 0.4–1.3)
NITRITE, URINE: POSITIVE
NUCLEATED RED BLOOD CELLS: 0 /100 WBC
OPIATES, URINE: NEGATIVE
OSMOLALITY CALCULATION: 278.8 MOSMOL/KG (ref 275–300)
OXYCODONE: NEGATIVE
PDW BLD-RTO: 17 % (ref 11.5–14.5)
PH UA: 6.5
PHENCYCLIDINE QUANTITATIVE URINE: NEGATIVE
PLATELET # BLD: 407 THOU/MM3 (ref 130–400)
PMV BLD AUTO: 7 MCM (ref 7.4–10.4)
POTASSIUM SERPL-SCNC: 3.4 MEQ/L (ref 3.5–5.2)
PROCALCITONIN: 0.03 NG/ML (ref 0.01–0.09)
PROTEIN UA: ABNORMAL
RBC # BLD: 4.73 MILL/MM3 (ref 4.7–6.1)
RBC URINE: ABNORMAL /HPF
RENAL EPITHELIAL, UA: ABNORMAL
SALICYLATE, SERUM: < 0.3 MG/DL (ref 2–10)
SEG NEUTROPHILS: 69.1 %
SEGMENTED NEUTROPHILS ABSOLUTE COUNT: 6.8 THOU/MM3 (ref 1.8–7.7)
SODIUM BLD-SCNC: 141 MEQ/L (ref 135–145)
SPECIFIC GRAVITY, URINE: 1.01 (ref 1–1.03)
TOTAL PROTEIN: 7.6 G/DL (ref 6.1–8)
TSH SERPL DL<=0.05 MIU/L-ACNC: 1.01 UIU/ML (ref 0.4–4.2)
UROBILINOGEN, URINE: 0.2 EU/DL
WBC # BLD: 9.8 THOU/MM3 (ref 4.8–10.8)
WBC UA: > 200 /HPF
YEAST: ABNORMAL

## 2017-11-27 PROCEDURE — 99223 1ST HOSP IP/OBS HIGH 75: CPT | Performed by: INTERNAL MEDICINE

## 2017-11-27 PROCEDURE — 96375 TX/PRO/DX INJ NEW DRUG ADDON: CPT

## 2017-11-27 PROCEDURE — 81001 URINALYSIS AUTO W/SCOPE: CPT

## 2017-11-27 PROCEDURE — 87086 URINE CULTURE/COLONY COUNT: CPT

## 2017-11-27 PROCEDURE — 6360000002 HC RX W HCPCS: Performed by: PHYSICIAN ASSISTANT

## 2017-11-27 PROCEDURE — 36415 COLL VENOUS BLD VENIPUNCTURE: CPT

## 2017-11-27 PROCEDURE — 93005 ELECTROCARDIOGRAM TRACING: CPT

## 2017-11-27 PROCEDURE — 96361 HYDRATE IV INFUSION ADD-ON: CPT

## 2017-11-27 PROCEDURE — 6360000004 HC RX CONTRAST MEDICATION: Performed by: PHYSICIAN ASSISTANT

## 2017-11-27 PROCEDURE — G0480 DRUG TEST DEF 1-7 CLASSES: HCPCS

## 2017-11-27 PROCEDURE — 80050 GENERAL HEALTH PANEL: CPT

## 2017-11-27 PROCEDURE — 80307 DRUG TEST PRSMV CHEM ANLYZR: CPT

## 2017-11-27 PROCEDURE — 96376 TX/PRO/DX INJ SAME DRUG ADON: CPT

## 2017-11-27 PROCEDURE — 84145 PROCALCITONIN (PCT): CPT

## 2017-11-27 PROCEDURE — 83605 ASSAY OF LACTIC ACID: CPT

## 2017-11-27 PROCEDURE — 99285 EMERGENCY DEPT VISIT HI MDM: CPT

## 2017-11-27 PROCEDURE — 74177 CT ABD & PELVIS W/CONTRAST: CPT

## 2017-11-27 PROCEDURE — 96374 THER/PROPH/DIAG INJ IV PUSH: CPT

## 2017-11-27 PROCEDURE — 70450 CT HEAD/BRAIN W/O DYE: CPT

## 2017-11-27 PROCEDURE — 2580000003 HC RX 258: Performed by: PHYSICIAN ASSISTANT

## 2017-11-27 RX ORDER — DIPHENHYDRAMINE HYDROCHLORIDE 50 MG/ML
25 INJECTION INTRAMUSCULAR; INTRAVENOUS ONCE
Status: COMPLETED | OUTPATIENT
Start: 2017-11-27 | End: 2017-11-27

## 2017-11-27 RX ORDER — 0.9 % SODIUM CHLORIDE 0.9 %
1000 INTRAVENOUS SOLUTION INTRAVENOUS ONCE
Status: COMPLETED | OUTPATIENT
Start: 2017-11-27 | End: 2017-11-27

## 2017-11-27 RX ADMIN — DIPHENHYDRAMINE HYDROCHLORIDE 25 MG: 50 INJECTION, SOLUTION INTRAMUSCULAR; INTRAVENOUS at 21:01

## 2017-11-27 RX ADMIN — SODIUM CHLORIDE 1000 ML: 9 INJECTION, SOLUTION INTRAVENOUS at 19:49

## 2017-11-27 RX ADMIN — CEFTRIAXONE 1 G: 1 INJECTION, POWDER, FOR SOLUTION INTRAMUSCULAR; INTRAVENOUS at 19:49

## 2017-11-27 RX ADMIN — IOPAMIDOL 80 ML: 755 INJECTION, SOLUTION INTRAVENOUS at 22:38

## 2017-11-27 RX ADMIN — DIPHENHYDRAMINE HYDROCHLORIDE 25 MG: 50 INJECTION, SOLUTION INTRAMUSCULAR; INTRAVENOUS at 19:49

## 2017-11-27 ASSESSMENT — ENCOUNTER SYMPTOMS
DIARRHEA: 0
VOMITING: 0
ABDOMINAL PAIN: 0
EYE DISCHARGE: 0
WHEEZING: 0
RHINORRHEA: 0
NAUSEA: 0
BACK PAIN: 0
EYE REDNESS: 0
SORE THROAT: 0
SHORTNESS OF BREATH: 0
COUGH: 0

## 2017-11-27 ASSESSMENT — SLEEP AND FATIGUE QUESTIONNAIRES
DIFFICULTY ARISING: NO
DO YOU USE A SLEEP AID: YES
DO YOU HAVE DIFFICULTY SLEEPING: YES
SLEEP PATTERN: DIFFICULTY FALLING ASLEEP;DIFFICULTY ARISING;RESTLESSNESS
AVERAGE NUMBER OF SLEEP HOURS: 6
DIFFICULTY FALLING ASLEEP: YES
DIFFICULTY STAYING ASLEEP: YES
RESTFUL SLEEP: NO

## 2017-11-27 ASSESSMENT — PAIN SCALES - GENERAL: PAINLEVEL_OUTOF10: 7

## 2017-11-27 ASSESSMENT — PAIN DESCRIPTION - ORIENTATION: ORIENTATION: LEFT

## 2017-11-27 ASSESSMENT — LIFESTYLE VARIABLES: HISTORY_ALCOHOL_USE: NO

## 2017-11-27 ASSESSMENT — PAIN DESCRIPTION - PAIN TYPE: TYPE: CHRONIC PAIN

## 2017-11-27 ASSESSMENT — PAIN DESCRIPTION - LOCATION: LOCATION: SHOULDER

## 2017-11-27 ASSESSMENT — PAIN DESCRIPTION - FREQUENCY: FREQUENCY: CONTINUOUS

## 2017-11-27 NOTE — ED TRIAGE NOTES
Patient to ED with c/o altered mental status and mental health problem. Patient states his daughters mom won't let him see her and he has been upset about that. Reports feeling suicidal. Denies plan. Mom states he has not been making sense when he is talking for the past day and a half. Mom states he has been picking at his skin for the past couple months. Respirations easy and unlabored.

## 2017-11-27 NOTE — ED PROVIDER NOTES
[facility] EMERGENCY DEPT      CHIEF COMPLAINT       Chief Complaint   Patient presents with    Altered Mental Status     Not acting right.  Mental Health Problem    Suicidal       Nurses Notes reviewed and I agree except as noted in the HPI. HISTORY OF PRESENT ILLNESS    Ziyad Mcadams is a 24 y.o. male who presents for evaluation of AMS/speech difficulties. The pt's mother states the patient was not making sense when he spoke last night. She states he was missing words and his speech was slow. She reports calling the pt's PCP, who told the to present to the ED, though the pt did not want to come. She states the symptoms persisted today, prompting her to bring the pt to the ED. The pt reports a h/o anxiety, depression, paralysis, and narcotic abuse. He states he has been depressed for the past couple of weeks due to his child's mother not allowing him to see his child. Per father, the pt cried for 3 hours straight today. The pt states he has been experiencing auditory hallucinations, though that is normal for him. He states one voice told him to kill himself, though he denies feeling suicidal or having any plan to act on the voice's suggestion. He states he has had angry thoughts to hurt someone else, though he denies wanting to act on it. He states he was arrested for narcotic abuse 3 years ago, though he has been clean since. He was put on Flexeril 1 week ago and recently had his Hydroxyzine increased from 25 to 50. He states he has been doing fine on these medications until last night. He reports missing 2 doses of his amantadine, though it is not abnormal. He states he smokes 2 bowls to 1 joint per day for pain control. He denies any other drugs. No further complaints at the time of the initial encounter. HPI provided by the pt, his mother, and his father. REVIEW OF SYSTEMS     Review of Systems   Constitutional: Negative for appetite change, chills, fatigue and fever.    HENT: Negative for congestion, ear pain, rhinorrhea and sore throat. Eyes: Negative for discharge, redness and visual disturbance. Respiratory: Negative for cough, shortness of breath and wheezing. Cardiovascular: Negative for chest pain, palpitations and leg swelling. Gastrointestinal: Negative for abdominal pain, diarrhea, nausea and vomiting. Genitourinary: Negative for decreased urine volume, difficulty urinating and dysuria. Musculoskeletal: Negative for arthralgias, back pain, joint swelling and neck pain. Skin: Negative for pallor and rash. Allergic/Immunologic: Negative for environmental allergies. Neurological: Positive for speech difficulty. Negative for dizziness, syncope, weakness, light-headedness and headaches. Hematological: Negative for adenopathy. Psychiatric/Behavioral: Positive for dysphoric mood and hallucinations (Chronic). Negative for agitation, confusion and suicidal ideas. The patient is nervous/anxious. PAST MEDICAL HISTORY    has a past medical history of Depression; Hyperthyroidism; MDRO (multiple drug resistant organisms) resistance; Pneumonia; and TMJ locking. SURGICAL HISTORY      has a past surgical history that includes Appendectomy; Facial reconstruction surgery (2012); Tonsillectomy; Hardware Removal (2012); other surgical history (01/09/2017); other surgical history (01/09/2017); brain surgery (11/05/2016); back surgery (11/2016); fracture surgery (Left, 11/2016); Cystocopy (04/17/2017); and other surgical history (04/17/2017).     CURRENT MEDICATIONS       Previous Medications    ALBUTEROL (PROVENTIL) (2.5 MG/3ML) 0.083% NEBULIZER SOLUTION        ALBUTEROL SULFATE HFA (VENTOLIN HFA) 108 (90 BASE) MCG/ACT INHALER    Inhale 2 puffs into the lungs every 6 hours as needed for Wheezing    AMANTADINE (SYMMETREL) 100 MG CAPSULE    Take 1 capsule by mouth 2 times daily    ASCORBIC ACID (VITAMIN C) 500 MG TABLET    Take 500 mg by mouth 2 times daily    BACLOFEN (LIORESAL) 20 MG TABLET    Take 1 tablet by mouth 3 times daily    CATHETERS (FOLY CATHETER LUBRICIOUS) MISC    16 Fr 10 cc rodrigues    CYCLOBENZAPRINE (FLEXERIL) 5 MG TABLET    Take 1 tablet by mouth 3 times daily as needed for Muscle spasms    DISPOSABLE GLOVES (LATEX GLOVES MEDIUM) MISC    Use gloves prn for personal care    DOCUSATE SODIUM (DOCQLACE) 100 MG CAPSULE    Take 1 capsule by mouth 2 times daily    ELASTIC BANDAGES & SUPPORTS (JOBST ACTIVE 20-30MMHG MEDIUM) MISC    Apply q daily    ELASTIC BANDAGES & SUPPORTS (JOBST KNEE HIGH COMPRESSION SM) MISC    1 each by Does not apply route daily    ELASTIC BANDAGES & SUPPORTS (T.E.D. KNEE LENGTH/M-REGULAR) MISC    Use as directed    EPINEPHRINE (EPIPEN) 0.3 MG/0.3ML SOAJ INJECTION    Use as directed for allergic reaction    FERROUS SULFATE (FE TABS) 325 (65 FE) MG EC TABLET    Take 1 tablet by mouth 2 times daily    FUROSEMIDE (LASIX) 20 MG TABLET    Take 1 tablet by mouth daily    GABAPENTIN (NEURONTIN) 300 MG CAPSULE    Twice a day and two capsules at bedtime. HYDROXYZINE (ATARAX) 25 MG TABLET    Take 2 tablets by mouth 3 times daily as needed for Itching    IBUPROFEN (ADVIL;MOTRIN) 800 MG TABLET    Take 1 tablet by mouth 3 times daily as needed for Pain    INCONTINENCE SUPPLIES (BEDSIDE DRAINAGE BAG) MISC    Large 2000 cc bedside drainage bag    INCONTINENCE SUPPLIES (URINARY LEG BAG STRAPS) MISC    Leg bag straps to go with urinary catheter leg bag    INCONTINENCE SUPPLIES (URINARY LEG BAG) MISC    900 cc [place] Urinary catheter leg bag    INCONTINENCE SUPPLY DISPOSABLE (DEPEND UNDERWEAR SM/MED) MISC    Use depends prn for incontinence care    INCONTINENCE SUPPLY DISPOSABLE (PREVAIL WET WIPES) MISC    Use wet wipes prn for personal care    LIFT CHAIR [facility]    by Does not apply route Use as directed    MIDODRINE (PROAMATINE) 2.5 MG TABLET    TAKE 1 TABLET THREE TIMES A DAY    NYSTATIN (MYCOSTATIN) 443270 UNIT/GM POWDER    Apply 3 times daily.     ONDANSETRON (ZOFRAN) 4 otherwise stated below. CT HEAD WO CONTRAST   Final Result   No mass effect or acute hemorrhage. **This report has been created using voice recognition software. It may contain minor errors which are inherent in voice recognition technology. **      Final report electronically signed by Dr. Octavio Cotter on 11/27/2017 5:55 PM      CT ABDOMEN PELVIS W IV CONTRAST Additional Contrast? Oral    (Results Pending)       LABS:   Labs Reviewed   CBC WITH AUTO DIFFERENTIAL - Abnormal; Notable for the following:        Result Value    Hemoglobin 13.3 (*)     Hematocrit 40.5 (*)     MCHC 32.8 (*)     RDW 17.0 (*)     Platelets 570 (*)     MPV 7.0 (*)     All other components within normal limits   COMPREHENSIVE METABOLIC PANEL - Abnormal; Notable for the following:     Potassium 3.4 (*)     Total Bilirubin 0.2 (*)     ALT 7 (*)     All other components within normal limits   LACTIC ACID, PLASMA - Abnormal; Notable for the following:     Lactic Acid 2.3 (*)     All other components within normal limits   SALICYLATE LEVEL - Abnormal; Notable for the following:     Salicylate, Serum < 0.3 (*)     All other components within normal limits   URINE WITH REFLEXED MICRO - Abnormal; Notable for the following:     Blood, Urine MODERATE (*)     Protein, UA TRACE (*)     Nitrite, Urine POSITIVE (*)     Leukocyte Esterase, Urine LARGE (*)     Character, Urine TURBID (*)     All other components within normal limits   URINE CULTURE, REFLEXED   URINE DRUG SCREEN   ETHANOL   ACETAMINOPHEN LEVEL   TSH WITH REFLEX   GLOMERULAR FILTRATION RATE, ESTIMATED   OSMOLALITY   ANION GAP   PROCALCITONIN       EMERGENCY DEPARTMENT COURSE:   Vitals:    Vitals:    11/27/17 1608 11/27/17 1708 11/27/17 1902 11/27/17 2119   BP: 104/78 112/72 (!) 130/97 128/78   Pulse: 97 76 76 76   Resp: 16 16 16 16   Temp: 97.7 °F (36.5 °C)      TempSrc: Oral      SpO2: 96% 96% 96% 96%   Weight: 134 lb (60.8 kg)      Height: 5' 10\" (1.778 m)        The patient was seen in emergency room by me. Old records were reviewed. Physical exam revealed a tearful, depressed young man who is otherwise neurologically intact and non-toxic appearing. Appropriate labs and imaging were ordered. The patient was closely observed and vital signs monitored. Labs and imaging were reviewed upon completion. Labs reflect no acute abnormalities although urine was pending. Imaging reveals no acute abnormal intracranial process. The results thus far were discussed with the patient and family. On re-exam the patient is stable with good vital signs. The patient was turned over to [facility] for evaluation of medical clearance and pending ABBIE evaluation. The patient and family are aware of provider change. CRITICAL CARE:   None    CONSULTS:  None    PROCEDURES:  None    FINAL IMPRESSION      1. Altered mental status, unspecified altered mental status type    2. Depression with suicidal ideation          DISPOSITION/PLAN   Pending; likely admission      (Please note that portions of this note were completed with a voice recognition program.  Efforts were made to edit the dictations but occasionally words are mis-transcribed.)    Scribe:  Calli Dhaliwal 11/27/17 4:37 PM Scribing for and in the presence of CHIP Wallace. Signed by: Jaime Man, 11/27/17 9:41 PM    Provider:  I personally performed the services described in the documentation, reviewed and edited the documentation which was dictated to the scribe in my presence, and it accurately records my words and actions.     CHIP Wallace 11/27/17 9:41 PM    CHIP Wallace PA-C  11/27/17 9405

## 2017-11-27 NOTE — ED NOTES
Patient resting in bed. Respirations easy and unlabored. No distress noted. Call light within reach.         Luis Felipe Ochoa RN  11/27/17 1318

## 2017-11-27 NOTE — ED NOTES
Mother came in to register pt making it sound like pt already here and after pt registration started she states pt is 5 minutes away. Waiting for pt to arrive, charge nurse made aware of this pt.      Donn Altamirano, RN  11/27/17 0278 [facility]  11/27/17 0813

## 2017-11-28 ENCOUNTER — APPOINTMENT (OUTPATIENT)
Dept: CT IMAGING | Age: 27
DRG: 247 | End: 2017-11-28
Payer: MEDICAID

## 2017-11-28 ENCOUNTER — APPOINTMENT (OUTPATIENT)
Dept: INTERVENTIONAL RADIOLOGY/VASCULAR | Age: 27
DRG: 247 | End: 2017-11-28
Payer: MEDICAID

## 2017-11-28 PROBLEM — N39.0 UTI (URINARY TRACT INFECTION): Status: ACTIVE | Noted: 2017-11-28

## 2017-11-28 PROBLEM — K56.1 INTUSSUSCEPTION (HCC): Status: ACTIVE | Noted: 2017-11-28

## 2017-11-28 PROBLEM — R07.9 CHEST PAIN: Status: ACTIVE | Noted: 2017-11-28

## 2017-11-28 LAB
ANION GAP SERPL CALCULATED.3IONS-SCNC: 14 MEQ/L (ref 8–16)
BUN BLDV-MCNC: 5 MG/DL (ref 7–22)
CALCIUM SERPL-MCNC: 9.4 MG/DL (ref 8.5–10.5)
CHLORIDE BLD-SCNC: 100 MEQ/L (ref 98–111)
CO2: 28 MEQ/L (ref 23–33)
CREAT SERPL-MCNC: 0.5 MG/DL (ref 0.4–1.2)
GFR SERPL CREATININE-BSD FRML MDRD: > 90 ML/MIN/1.73M2
GLUCOSE BLD-MCNC: 89 MG/DL (ref 70–108)
ORGANISM: ABNORMAL
POTASSIUM SERPL-SCNC: 4.1 MEQ/L (ref 3.5–5.2)
SODIUM BLD-SCNC: 142 MEQ/L (ref 135–145)
URINE CULTURE REFLEX: ABNORMAL

## 2017-11-28 PROCEDURE — A6252 ABSORPT DRG >16 <=48 W/O BDR: HCPCS

## 2017-11-28 PROCEDURE — 6370000000 HC RX 637 (ALT 250 FOR IP): Performed by: HOSPITALIST

## 2017-11-28 PROCEDURE — 36415 COLL VENOUS BLD VENIPUNCTURE: CPT

## 2017-11-28 PROCEDURE — 6360000002 HC RX W HCPCS: Performed by: INTERNAL MEDICINE

## 2017-11-28 PROCEDURE — A4421 OSTOMY SUPPLY MISC: HCPCS

## 2017-11-28 PROCEDURE — 87040 BLOOD CULTURE FOR BACTERIA: CPT

## 2017-11-28 PROCEDURE — 6360000002 HC RX W HCPCS

## 2017-11-28 PROCEDURE — 2580000003 HC RX 258: Performed by: HOSPITALIST

## 2017-11-28 PROCEDURE — 6370000000 HC RX 637 (ALT 250 FOR IP): Performed by: INTERNAL MEDICINE

## 2017-11-28 PROCEDURE — 74177 CT ABD & PELVIS W/CONTRAST: CPT

## 2017-11-28 PROCEDURE — 6360000002 HC RX W HCPCS: Performed by: HOSPITALIST

## 2017-11-28 PROCEDURE — A6446 CONFORM BAND S W>=3" <5"/YD: HCPCS

## 2017-11-28 PROCEDURE — A6210 FOAM DRG >16<=48 SQ IN W/O B: HCPCS

## 2017-11-28 PROCEDURE — 93970 EXTREMITY STUDY: CPT

## 2017-11-28 PROCEDURE — 6360000004 HC RX CONTRAST MEDICATION: Performed by: INTERNAL MEDICINE

## 2017-11-28 PROCEDURE — 94640 AIRWAY INHALATION TREATMENT: CPT

## 2017-11-28 PROCEDURE — 1200000000 HC SEMI PRIVATE

## 2017-11-28 PROCEDURE — 80048 BASIC METABOLIC PNL TOTAL CA: CPT

## 2017-11-28 RX ORDER — GENTAMICIN SULFATE 80 MG/100ML
80 INJECTION, SOLUTION INTRAVENOUS EVERY 8 HOURS
Status: DISCONTINUED | OUTPATIENT
Start: 2017-11-28 | End: 2017-11-28

## 2017-11-28 RX ORDER — ONDANSETRON 4 MG/1
4 TABLET, FILM COATED ORAL EVERY 6 HOURS PRN
Status: DISCONTINUED | OUTPATIENT
Start: 2017-11-28 | End: 2017-11-29 | Stop reason: HOSPADM

## 2017-11-28 RX ORDER — ERGOCALCIFEROL 1.25 MG/1
50000 CAPSULE ORAL WEEKLY
Status: DISCONTINUED | OUTPATIENT
Start: 2017-12-03 | End: 2017-11-29 | Stop reason: HOSPADM

## 2017-11-28 RX ORDER — MIDODRINE HYDROCHLORIDE 2.5 MG/1
2.5 TABLET ORAL
Status: DISCONTINUED | OUTPATIENT
Start: 2017-11-28 | End: 2017-11-29 | Stop reason: HOSPADM

## 2017-11-28 RX ORDER — DOCUSATE SODIUM 100 MG/1
100 CAPSULE, LIQUID FILLED ORAL 2 TIMES DAILY
Status: DISCONTINUED | OUTPATIENT
Start: 2017-11-28 | End: 2017-11-29 | Stop reason: HOSPADM

## 2017-11-28 RX ORDER — ASCORBIC ACID 500 MG
500 TABLET ORAL 2 TIMES DAILY
Status: DISCONTINUED | OUTPATIENT
Start: 2017-11-28 | End: 2017-11-29 | Stop reason: HOSPADM

## 2017-11-28 RX ORDER — BACLOFEN 10 MG/1
20 TABLET ORAL 3 TIMES DAILY
Status: DISCONTINUED | OUTPATIENT
Start: 2017-11-28 | End: 2017-11-29 | Stop reason: HOSPADM

## 2017-11-28 RX ORDER — SUMATRIPTAN 100 MG/1
100 TABLET, FILM COATED ORAL
COMMUNITY
End: 2018-05-15

## 2017-11-28 RX ORDER — NICOTINE 21 MG/24HR
1 PATCH, TRANSDERMAL 24 HOURS TRANSDERMAL DAILY
Status: DISCONTINUED | OUTPATIENT
Start: 2017-11-28 | End: 2017-11-29 | Stop reason: HOSPADM

## 2017-11-28 RX ORDER — VENLAFAXINE HYDROCHLORIDE 150 MG/1
150 CAPSULE, EXTENDED RELEASE ORAL DAILY
Status: DISCONTINUED | OUTPATIENT
Start: 2017-11-28 | End: 2017-11-29 | Stop reason: HOSPADM

## 2017-11-28 RX ORDER — CYCLOBENZAPRINE HCL 10 MG
5 TABLET ORAL 3 TIMES DAILY PRN
Status: DISCONTINUED | OUTPATIENT
Start: 2017-11-28 | End: 2017-11-29 | Stop reason: HOSPADM

## 2017-11-28 RX ORDER — FERROUS SULFATE 325(65) MG
325 TABLET ORAL 2 TIMES DAILY
Status: DISCONTINUED | OUTPATIENT
Start: 2017-11-28 | End: 2017-11-29 | Stop reason: HOSPADM

## 2017-11-28 RX ORDER — FUROSEMIDE 20 MG/1
20 TABLET ORAL DAILY
Status: DISCONTINUED | OUTPATIENT
Start: 2017-11-28 | End: 2017-11-29 | Stop reason: HOSPADM

## 2017-11-28 RX ORDER — SODIUM HYPOCHLORITE 1.25 MG/ML
SOLUTION TOPICAL 2 TIMES DAILY
Status: DISCONTINUED | OUTPATIENT
Start: 2017-11-28 | End: 2017-11-29 | Stop reason: HOSPADM

## 2017-11-28 RX ORDER — ALBUTEROL SULFATE 90 UG/1
2 AEROSOL, METERED RESPIRATORY (INHALATION) EVERY 6 HOURS PRN
Status: DISCONTINUED | OUTPATIENT
Start: 2017-11-28 | End: 2017-11-29 | Stop reason: HOSPADM

## 2017-11-28 RX ORDER — SUMATRIPTAN 50 MG/1
100 TABLET, FILM COATED ORAL
Status: ACTIVE | OUTPATIENT
Start: 2017-11-28 | End: 2017-11-28

## 2017-11-28 RX ORDER — LORAZEPAM 2 MG/ML
INJECTION INTRAMUSCULAR
Status: DISPENSED
Start: 2017-11-28 | End: 2017-11-28

## 2017-11-28 RX ORDER — TRAZODONE HYDROCHLORIDE 100 MG/1
200 TABLET ORAL NIGHTLY PRN
Status: DISCONTINUED | OUTPATIENT
Start: 2017-11-28 | End: 2017-11-29 | Stop reason: HOSPADM

## 2017-11-28 RX ORDER — GABAPENTIN 300 MG/1
300 CAPSULE ORAL 2 TIMES DAILY
Status: DISCONTINUED | OUTPATIENT
Start: 2017-11-28 | End: 2017-11-29 | Stop reason: HOSPADM

## 2017-11-28 RX ORDER — GABAPENTIN 300 MG/1
600 CAPSULE ORAL NIGHTLY
Status: DISCONTINUED | OUTPATIENT
Start: 2017-11-28 | End: 2017-11-29 | Stop reason: HOSPADM

## 2017-11-28 RX ORDER — AMANTADINE HYDROCHLORIDE 100 MG/1
100 CAPSULE, GELATIN COATED ORAL DAILY
COMMUNITY
End: 2018-02-05 | Stop reason: SDUPTHER

## 2017-11-28 RX ORDER — GABAPENTIN 300 MG/1
CAPSULE ORAL
COMMUNITY
End: 2018-04-02 | Stop reason: SDUPTHER

## 2017-11-28 RX ORDER — LORAZEPAM 2 MG/ML
1 INJECTION INTRAMUSCULAR ONCE
Status: COMPLETED | OUTPATIENT
Start: 2017-11-28 | End: 2017-11-28

## 2017-11-28 RX ORDER — IBUPROFEN 800 MG/1
800 TABLET ORAL 3 TIMES DAILY PRN
Status: DISCONTINUED | OUTPATIENT
Start: 2017-11-28 | End: 2017-11-29 | Stop reason: HOSPADM

## 2017-11-28 RX ORDER — ALBUTEROL SULFATE 2.5 MG/3ML
2.5 SOLUTION RESPIRATORY (INHALATION) 4 TIMES DAILY
Status: DISCONTINUED | OUTPATIENT
Start: 2017-11-28 | End: 2017-11-29 | Stop reason: HOSPADM

## 2017-11-28 RX ORDER — HYDROXYZINE HYDROCHLORIDE 25 MG/1
50 TABLET, FILM COATED ORAL 3 TIMES DAILY PRN
Status: DISCONTINUED | OUTPATIENT
Start: 2017-11-28 | End: 2017-11-29 | Stop reason: HOSPADM

## 2017-11-28 RX ORDER — AMANTADINE HYDROCHLORIDE 100 MG/1
100 CAPSULE, GELATIN COATED ORAL 2 TIMES DAILY
Status: DISCONTINUED | OUTPATIENT
Start: 2017-11-28 | End: 2017-11-29 | Stop reason: HOSPADM

## 2017-11-28 RX ORDER — LORAZEPAM 2 MG/ML
INJECTION INTRAMUSCULAR
Status: COMPLETED
Start: 2017-11-28 | End: 2017-11-28

## 2017-11-28 RX ADMIN — IOPAMIDOL 90 ML: 755 INJECTION, SOLUTION INTRAVENOUS at 17:51

## 2017-11-28 RX ADMIN — GABAPENTIN 300 MG: 300 CAPSULE ORAL at 12:07

## 2017-11-28 RX ADMIN — VENLAFAXINE HYDROCHLORIDE 150 MG: 150 CAPSULE, EXTENDED RELEASE ORAL at 12:07

## 2017-11-28 RX ADMIN — TRAZODONE HYDROCHLORIDE 200 MG: 100 TABLET ORAL at 22:10

## 2017-11-28 RX ADMIN — AMANTADINE HYDROCHLORIDE 100 MG: 100 CAPSULE ORAL at 10:53

## 2017-11-28 RX ADMIN — LORAZEPAM 1 MG: 2 INJECTION INTRAMUSCULAR; INTRAVENOUS at 02:16

## 2017-11-28 RX ADMIN — BACLOFEN 20 MG: 10 TABLET ORAL at 10:53

## 2017-11-28 RX ADMIN — CYCLOBENZAPRINE 5 MG: 10 TABLET, FILM COATED ORAL at 10:56

## 2017-11-28 RX ADMIN — MIDODRINE HYDROCHLORIDE 2.5 MG: 2.5 TABLET ORAL at 10:53

## 2017-11-28 RX ADMIN — CYCLOBENZAPRINE 5 MG: 10 TABLET, FILM COATED ORAL at 22:11

## 2017-11-28 RX ADMIN — Medication 500 MG: at 22:10

## 2017-11-28 RX ADMIN — WATER 2 G: 1 INJECTION INTRAMUSCULAR; INTRAVENOUS; SUBCUTANEOUS at 12:09

## 2017-11-28 RX ADMIN — Medication 500 MG: at 10:55

## 2017-11-28 RX ADMIN — DOCUSATE SODIUM 100 MG: 100 CAPSULE ORAL at 22:10

## 2017-11-28 RX ADMIN — ALBUTEROL SULFATE 2.5 MG: 2.5 SOLUTION RESPIRATORY (INHALATION) at 14:04

## 2017-11-28 RX ADMIN — BACLOFEN 20 MG: 10 TABLET ORAL at 22:10

## 2017-11-28 RX ADMIN — ONDANSETRON HYDROCHLORIDE 4 MG: 4 TABLET, FILM COATED ORAL at 16:21

## 2017-11-28 RX ADMIN — FUROSEMIDE 20 MG: 20 TABLET ORAL at 10:53

## 2017-11-28 RX ADMIN — ONDANSETRON HYDROCHLORIDE 4 MG: 4 TABLET, FILM COATED ORAL at 09:24

## 2017-11-28 RX ADMIN — MIDODRINE HYDROCHLORIDE 2.5 MG: 2.5 TABLET ORAL at 14:20

## 2017-11-28 RX ADMIN — GABAPENTIN 600 MG: 300 CAPSULE ORAL at 22:10

## 2017-11-28 RX ADMIN — FERROUS SULFATE TAB 325 MG (65 MG ELEMENTAL FE) 325 MG: 325 (65 FE) TAB at 10:53

## 2017-11-28 RX ADMIN — FERROUS SULFATE TAB 325 MG (65 MG ELEMENTAL FE) 325 MG: 325 (65 FE) TAB at 22:10

## 2017-11-28 RX ADMIN — LORAZEPAM 1 MG: 2 INJECTION INTRAMUSCULAR at 02:16

## 2017-11-28 RX ADMIN — ENOXAPARIN SODIUM 40 MG: 40 INJECTION SUBCUTANEOUS at 10:59

## 2017-11-28 NOTE — ED NOTES
Patient resting in bed call light in reach states no needs at this time no acute distress noted respirations easy and unlabored at this time        Martha Linares, RN  11/27/17 1039

## 2017-11-28 NOTE — CONSULTS
Consult Note    Patient: Niru Harrison : 1990  Med Rec#: [id number] Acc#: [id number]   Provider Performing Procedure: Darlene Hudson  Primary Care Physician: Mariela Mustafa DO     Chief Complaint   Patient presents with    Altered Mental Status     Not acting right.  Mental Health Problem    Suicidal   Has abnormal CT scan suspicious for intussusception , no symptoms has colostomy no abdominal pain or other GI symptoms. ROS  General Denies any fever or chills  HEENT Denies any diplopia, tinnitus or vertigo  Resp Denies SOB, chest pain, hemoptysis  Cardiac Denies any chest pain, palpitations, claudication or edema  GI Denies any melena, hematochezia, hematemesis or pyrosis   Denies any frequency, urgency, hesitancy or incontinence  Heme Denies bruising or bleeding easily  Endocrine Denies any history of diabetes or thyroid disease  Neuro Denies any focal motor or sensory deficits  Psychiatric Denies anxiety, depression, suicidal ideation  Skin Denies rashes, itching, open sores    MEDICAL HISTORY   has a past medical history of Depression; Hyperthyroidism; MDRO (multiple drug resistant organisms) resistance; Pneumonia; and TMJ locking. SURGICAL HISTORY   has a past surgical history that includes Appendectomy; Facial reconstruction surgery (); Tonsillectomy; Hardware Removal (); other surgical history (2017); other surgical history (2017); brain surgery (2016); back surgery (2016); fracture surgery (Left, 2016); Cystocopy (2017); and other surgical history (2017).   Additional information:       ALLERGIES   Allergies as of 2017 - Review Complete 2017   Allergen Reaction Noted    Bee venom Shortness Of Breath 2014    Tramadol Hives 2015     Additional information:       MEDICATIONS   Coumadin Use Last 7 Days [x]No []Yes  Antiplatelet drug therapy use last 7 days  [x]No []Yes  Other anticoagulant use last 7 days  [x]No []Yes  Current Facility-Administered Medications   Medication Dose Route Frequency Provider Last Rate Last Dose    enoxaparin (LOVENOX) injection 40 mg  40 mg Subcutaneous Daily Blaise Morelos MD   40 mg at 11/28/17 1059    nicotine (NICODERM CQ) 21 MG/24HR 1 patch  1 patch Transdermal Daily Blaise Morelos MD        albuterol (PROVENTIL) nebulizer solution 2.5 mg  2.5 mg Nebulization 4x daily Blaise Morelos MD   2.5 mg at 11/28/17 1404    albuterol sulfate  (90 Base) MCG/ACT inhaler 2 puff  2 puff Inhalation Q6H PRN Blaise Morelos MD        amantadine (SYMMETREL) capsule 100 mg  100 mg Oral BID Balise Morelos MD   100 mg at 11/28/17 1053    vitamin C (ASCORBIC ACID) tablet 500 mg  500 mg Oral BID Blaise Morelos MD   500 mg at 11/28/17 1055    baclofen (LIORESAL) tablet 20 mg  20 mg Oral TID Blaise Morelos MD   20 mg at 11/28/17 1053    cyclobenzaprine (FLEXERIL) tablet 5 mg  5 mg Oral TID PRN Blaise Morelos MD   5 mg at 11/28/17 1056    docusate sodium (COLACE) capsule 100 mg  100 mg Oral BID Blaise Morelos MD        ferrous sulfate tablet 325 mg  325 mg Oral BID Blaise Morelos MD   325 mg at 11/28/17 1053    furosemide (LASIX) tablet 20 mg  20 mg Oral Daily Blaise Morelos MD   20 mg at 11/28/17 1053    gabapentin (NEURONTIN) capsule 300 mg  300 mg Oral BID Blaise oMrelos MD   300 mg at 11/28/17 1207    hydrOXYzine (ATARAX) tablet 50 mg  50 mg Oral TID PRN Blaise Morelos MD        ibuprofen (ADVIL;MOTRIN) tablet 800 mg  800 mg Oral TID PRN Blaise Morelos MD        midodrine (PROAMATINE) tablet 2.5 mg  2.5 mg Oral TID WC Blaise Morelos MD   2.5 mg at 11/28/17 1420    miconazole (MICOTIN) 2 % powder   Topical BID PRN Blaise Morelos MD        ondansetron [facility] tablet 4 mg  4 mg Oral Q6H PRN Blaise Morelos MD   4 mg at 11/28/17 4676    Unremarkable. No mass. Kidneys and ureters: Unremarkable. No hydroureteronephrosis, mass, or cyst. No renal or ureteral calculi. Stomach, small bowel, and colon: Stomach and duodenum are unremarkable. There is a left lower quadrant colostomy with distal descending colectomy and proximal sigmoid colectomy. ? A blind ending rectosigmoid colon with a staple line closure is seen. ? Findings are consistent with a Moni's procedure. There is a moderate to large amount of stool throughout the colon consistent with constipation. There is a right upper quadrant enteroenteric intussusception (axial image 34, coronal image 23, sagittal image 44) which appears to be nonobstructing in nature. Proximal to the intussusception there is no bowel dilatation and enteric contrast has passed  into the more distal small bowel. It is possible that the intussusception has occurred recently and may be intermittent. No bowel wall thickening. Appendix: The appendix is not visualized however there are no findings to suggest acute appendicitis. . Omentum and mesentery: Unremarkable Abdominal and pelvic body wall soft tissues: Unremarkable Aorta, vascular: No aortic aneurysm or dissection. No significant vascular abnormality. Reproductive: Unremarkable Bladder: There is again a suprapubic bladder catheter. Bladder is empty. No stones. Intraperitoneal Space: There is no ascites, abscess, adenopathy, or mass. No pneumoperitoneum. Soft Tissues: Unremarkable. Musculoskeletal structures: There is again an old healed fracture deformity of the right ilium. . There is a probably chronic lower sacral decubitus ulcer with stable partial destruction of the lower sacrum consistent with chronic osteomyelitis. No significant interval change is noted to suggest acute osteomyelitis. Nonobstructing right upper quadrant enteroenteric intussusception. See discussion above. Status post Moni's procedure.  Moderate to large amount of stool throughout the colon

## 2017-11-28 NOTE — PROGRESS NOTES
View patient chart to enter times for statistics. SW completed assessment on 2nd shift but transitioned to this SW on 3rd.

## 2017-11-28 NOTE — ED NOTES
Patient resting in bed. Respirations easy and unlabored. No distress noted. Call light within reach. PAC at bedside.      Cindy Story RN  11/27/17 6814
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
## 2017-11-28 NOTE — PROGRESS NOTES
Pt admitted to  [id number] from ED via motorized wheelchair. Complaints: altered mental status. IV:INT right forearm. IV site free of s/s of infection or infiltration. Vital signs obtained. Assessment and data collection initiated. Oriented to room. All questions answered with no further questions at this time. Fall prevention and safety brochure discussed with patient.    Az Ruiz RN 11/28/2017 4:34 AM

## 2017-11-28 NOTE — PROGRESS NOTES
Spoke with Jennifer Jimenez in xray reguarding patient not drinking all contrast.  Will do what can be done for pictures

## 2017-11-28 NOTE — H&P
patient lives with  parents. Grandmother has blood clots. Great grandmother has blood clots. MEDICATIONS:  Ibuprofen 800 mg three times a day, Atarax 50 mg three times  a day, trazodone 200 mg at night p.r.n., Effexor 150 mg daily, vitamin D  50,000 units weekly, Flexeril 5000 units three times a day, baclofen 20 mg  three times a day, ferrous sulfate 325 mg twice a day, Neurontin, Colace  100 mg twice daily, midodrine 2.5 mg daily, albuterol 2 puffs every 8  hours, Imitrex 100 mg p.r.n., Lasix 20 mg daily, amantadine 100 mg twice a  day, Zofran 4 mg every 8 hours. REVIEW OF SYSTEMS:  CNS:  No headaches, no dizziness, no lightheadedness. NOSE AND THROAT:  No runny nose. No postnasal drip. RESPIRATORY SYSTEM:  He has a bit of cough. He had chest pain last time  for 20 minutes. CARDIOVASCULAR:  Chest pain, did not sustain. It is the  first time he is having it. GI:  Good appetite. No abdominal pain. Her colostomy bag seems to be  working. No abdominal distention. He cannot feel pain below the T4 level. URINARY SYSTEM:  No dysuria, frequency, urgency or hematuria. PERIPHERAL VASCULAR SYSTEM:  No claudication or cramps. PERIPHERAL NERVOUS SYSTEM:  No neuropathy. SKIN:  No bruises, rashes, ecchymosis. HEMATOLOGY/ONCOLOGY:  No active bleeding. EARS:  No hearing loss, tinnitus or ringing. PSYCHIATRY:  No depression. He has no disorientation in time, place, and  person. CENTRAL NERVOUS SYSTEM:  He seems to have autonomic neuropathy with heat  and cold sensations and sweating. PHYSICAL EXAMINATION:  GENERAL:  He is conscious, alert, oriented in time, place, person. VITAL SIGNS:  Blood pressure 124/74, pulse 80 per minute, respirations 18,  temperature 97.7. HEAD AND SCALP:  Atraumatic. Oropharynx soft hard palate. Dentition is  normal.  CHEST:  Chest wall palpation, percussion, auscultation is normal.  Basal  crackles.   HEART:  Palpation, percussion, auscultation is normal.  First and second  heart sounds only heard. ABDOMEN:  Full, soft. Tenderness cannot be elicited. Colostomy does not  have much stool in it. Bowel sounds are normal.  No hernia. MUSCULOSKELETAL SYSTEM:  Shoulders, elbows, wrists, fingers full range of  movement actively and passively in all directions. NEUROLOGICAL SYSTEM:  Hips, knees and ankles only passive range of movement  with some contractures. There are some involuntary contractions of the  lower extremities. VASCULAR SYSTEM:  Radial, ulnar, dorsalis pedis, posterior tibial, femoral  and carotid pulses. SKIN:  No bruises, rashes, ecchymosis, petechial hemorrhages. EYES:  No double vision or blurred vision. EARS:  No hearing loss, tinnitus. External auditory canals normal.  PSYCHIATRY:  Some depression and anxiety because he wants to quit smoking. LABORATORY DATA:  Urine tox screen positive for cannabinoids. Sodium 141,  potassium 3.4, chloride 99, CO2 30, BUN 8, creatinine 0.6, osmolality 278. Liver enzymes negative. White cell count 9.8, hemoglobin 13.3, hematocrit  40.5. Urinalysis positive for nitrite, large amount of leukocyte esterase,  white cell count more than 200 per high-power field, turbid urine, large  amount of leukocyte esterase. DIAGNOSTIC DATA:  Chest x-ray, she has no acute process. ASSESSMENT:  1. The patient has chest pain. He is bed ridden and could have a  pulmonary embolus. 2.  The patient has urinary tract infection, neurogenic bladder from  paraplegia, multiple drug-resistant organisms. We have had some cultures  in the past.  Enterobacter cloacae was cultured on 08/31/2017. It was  sensitive to Bactrim, gentamicin, Cipro, and cefepime. The patient has  anxiety. Also has autonomic dysfunction with sweating, feeling heat and  cold from spinal autonomic dysfunction from paraplegia. PLAN:  Send urine for culture. Ativan 1 mg every 6 hours p.r.n., Nicoderm  21 mg daily. Continue Rocephin 1 gm daily.   Order a Doppler ultrasound of  the lower extremities. She already had IV contrast.  If it is positive, we  will get a PE study. DVT prophylaxis with Lovenox 40 mg subcutaneously. Activity, bedrest, moving upper extremities. Can turn the patient  frequently every 2-3 hours. If the patient does not get better, we will  consult Infectious Disease.         Valentino Kotyk, MD, [facility]    D: 11/28/2017 1:27:03       T: 11/28/2017 1:29:47     [email or web address]  Job#: [id number]     Doc#: [id number]    CC:

## 2017-11-28 NOTE — ED NOTES
Dressings applied to patients abdomen 4x4 and tape patient picking at self states that the benydral didn't help.  Lights dimmed and tv turned on for redirection and decrease in stimulation patient tolerating well at this time states no other needs family at bedside      Latisha Pedroza[address]  11/27/17 2035

## 2017-11-28 NOTE — PROGRESS NOTES
(g): 85-98 grams    Estimated Intake vs Estimated Needs: Intake Less Than Needs    Nutrition Risk Level: High    Nutrition Interventions:   Continue current diet, Start ONS  Continued Inpatient Monitoring, Education not appropriate at this time    Nutrition Evaluation:   · Evaluation: Goals set   · Goals: Pt. to consume greater than 75% of meals during LOS    · Monitoring: Meal Intake, Supplement Intake, Diet Tolerance, Skin Integrity, Wound Healing, Weight, Pertinent Labs, Nausea or Vomiting, Constipation    See Adult Nutrition Doc Flowsheet for more detail.      Electronically signed by Vishal Goncalves RD, LD on 11/28/17 at 3:24 PM    Contact Number: [phone]

## 2017-11-28 NOTE — PROGRESS NOTES
Pharmacy Medication History Note      List of current medications patient is taking is complete. Source of information: Patient, [facility] list    Changes made to medication list:  Medications removed (include reason, ex. therapy complete or physician discontinued): · Albuterol nebs- doesn't use anymore; nebulizer doesn't work  · Zofran - not currently taking    Medications added/doses adjusted:  · Decreased amantadine to 100mg QAM  · Clarified gabapentin as 300mg in morning, 300mg in afternoon and 600mg at bedtime      Other notes (ex. Recent course of antibiotics, Coumadin dosing):  · Patient states he only takes amantadine 100mg once a day instead of BID because it interfered with his sleep  · Denies use of other OTC or herbal medications.           Electronically signed by Shazia Ledesma, [facility] on 11/28/2017 at 4:23 PM

## 2017-11-28 NOTE — ED NOTES
Patient resting in bed with call light in reach states no needs at this time. Respirations are easy and unlabored at this time call light within reach plan of care reviewed with patient voices understanding.  elimination offered 2400ml of urine out from rodrigues catheter      Joana Pichardo RN  11/27/17 1666

## 2017-11-29 VITALS
OXYGEN SATURATION: 95 % | TEMPERATURE: 98.9 F | BODY MASS INDEX: 20.64 KG/M2 | SYSTOLIC BLOOD PRESSURE: 110 MMHG | RESPIRATION RATE: 18 BRPM | WEIGHT: 144.18 LBS | HEIGHT: 70 IN | HEART RATE: 117 BPM | DIASTOLIC BLOOD PRESSURE: 71 MMHG

## 2017-11-29 PROBLEM — F32.A DEPRESSION: Status: ACTIVE | Noted: 2017-11-29

## 2017-11-29 LAB
ANION GAP SERPL CALCULATED.3IONS-SCNC: 16 MEQ/L (ref 8–16)
ANISOCYTOSIS: ABNORMAL
BASOPHILS # BLD: 0.6 %
BASOPHILS ABSOLUTE: 0.1 THOU/MM3 (ref 0–0.1)
BUN BLDV-MCNC: 9 MG/DL (ref 7–22)
CALCIUM SERPL-MCNC: 10.1 MG/DL (ref 8.5–10.5)
CHLORIDE BLD-SCNC: 97 MEQ/L (ref 98–111)
CO2: 30 MEQ/L (ref 23–33)
CREAT SERPL-MCNC: 0.9 MG/DL (ref 0.4–1.2)
EOSINOPHIL # BLD: 1.9 %
EOSINOPHILS ABSOLUTE: 0.2 THOU/MM3 (ref 0–0.4)
GFR SERPL CREATININE-BSD FRML MDRD: > 90 ML/MIN/1.73M2
GLUCOSE BLD-MCNC: 89 MG/DL (ref 70–108)
GLUCOSE BLD-MCNC: 91 MG/DL (ref 70–108)
HCT VFR BLD CALC: 49.3 % (ref 42–52)
HEMOGLOBIN: 16.2 GM/DL (ref 14–18)
LYMPHOCYTES # BLD: 18.9 %
LYMPHOCYTES ABSOLUTE: 2.1 THOU/MM3 (ref 1–4.8)
MCH RBC QN AUTO: 28.1 PG (ref 27–31)
MCHC RBC AUTO-ENTMCNC: 32.9 GM/DL (ref 33–37)
MCV RBC AUTO: 85.2 FL (ref 80–94)
MONOCYTES # BLD: 7.1 %
MONOCYTES ABSOLUTE: 0.8 THOU/MM3 (ref 0.4–1.3)
NUCLEATED RED BLOOD CELLS: 0 /100 WBC
PDW BLD-RTO: 16.4 % (ref 11.5–14.5)
PLATELET # BLD: 425 THOU/MM3 (ref 130–400)
PMV BLD AUTO: 7.4 MCM (ref 7.4–10.4)
POTASSIUM SERPL-SCNC: 4.2 MEQ/L (ref 3.5–5.2)
PROCALCITONIN: 0.04 NG/ML (ref 0.01–0.09)
RBC # BLD: 5.78 MILL/MM3 (ref 4.7–6.1)
SEG NEUTROPHILS: 71.5 %
SEGMENTED NEUTROPHILS ABSOLUTE COUNT: 7.8 THOU/MM3 (ref 1.8–7.7)
SODIUM BLD-SCNC: 143 MEQ/L (ref 135–145)
WBC # BLD: 10.9 THOU/MM3 (ref 4.8–10.8)

## 2017-11-29 PROCEDURE — 85025 COMPLETE CBC W/AUTO DIFF WBC: CPT

## 2017-11-29 PROCEDURE — 99233 SBSQ HOSP IP/OBS HIGH 50: CPT | Performed by: PHYSICIAN ASSISTANT

## 2017-11-29 PROCEDURE — 6370000000 HC RX 637 (ALT 250 FOR IP): Performed by: INTERNAL MEDICINE

## 2017-11-29 PROCEDURE — 84145 PROCALCITONIN (PCT): CPT

## 2017-11-29 PROCEDURE — A6252 ABSORPT DRG >16 <=48 W/O BDR: HCPCS

## 2017-11-29 PROCEDURE — 82948 REAGENT STRIP/BLOOD GLUCOSE: CPT

## 2017-11-29 PROCEDURE — 99239 HOSP IP/OBS DSCHRG MGMT >30: CPT | Performed by: HOSPITALIST

## 2017-11-29 PROCEDURE — 36415 COLL VENOUS BLD VENIPUNCTURE: CPT

## 2017-11-29 PROCEDURE — 2580000003 HC RX 258: Performed by: HOSPITALIST

## 2017-11-29 PROCEDURE — 80048 BASIC METABOLIC PNL TOTAL CA: CPT

## 2017-11-29 PROCEDURE — 6360000002 HC RX W HCPCS: Performed by: INTERNAL MEDICINE

## 2017-11-29 PROCEDURE — 6360000002 HC RX W HCPCS: Performed by: HOSPITALIST

## 2017-11-29 RX ADMIN — DAKIN'S SOLUTION 0.125% (QUARTER STRENGTH): 0.12 SOLUTION at 00:45

## 2017-11-29 RX ADMIN — DOCUSATE SODIUM 100 MG: 100 CAPSULE ORAL at 09:13

## 2017-11-29 RX ADMIN — IBUPROFEN 800 MG: 800 TABLET, FILM COATED ORAL at 14:12

## 2017-11-29 RX ADMIN — FERROUS SULFATE TAB 325 MG (65 MG ELEMENTAL FE) 325 MG: 325 (65 FE) TAB at 09:13

## 2017-11-29 RX ADMIN — WATER 2 G: 1 INJECTION INTRAMUSCULAR; INTRAVENOUS; SUBCUTANEOUS at 01:48

## 2017-11-29 RX ADMIN — AMANTADINE HYDROCHLORIDE 100 MG: 100 CAPSULE ORAL at 09:13

## 2017-11-29 RX ADMIN — MIDODRINE HYDROCHLORIDE 2.5 MG: 2.5 TABLET ORAL at 09:15

## 2017-11-29 RX ADMIN — BACLOFEN 20 MG: 10 TABLET ORAL at 09:14

## 2017-11-29 RX ADMIN — AMANTADINE HYDROCHLORIDE 100 MG: 100 CAPSULE ORAL at 01:48

## 2017-11-29 RX ADMIN — ENOXAPARIN SODIUM 40 MG: 40 INJECTION SUBCUTANEOUS at 09:15

## 2017-11-29 RX ADMIN — ONDANSETRON HYDROCHLORIDE 4 MG: 4 TABLET, FILM COATED ORAL at 14:13

## 2017-11-29 RX ADMIN — FUROSEMIDE 20 MG: 20 TABLET ORAL at 09:13

## 2017-11-29 RX ADMIN — VENLAFAXINE HYDROCHLORIDE 150 MG: 150 CAPSULE, EXTENDED RELEASE ORAL at 09:13

## 2017-11-29 RX ADMIN — DAKIN'S SOLUTION 0.125% (QUARTER STRENGTH): 0.12 SOLUTION at 09:17

## 2017-11-29 RX ADMIN — BACLOFEN 20 MG: 10 TABLET ORAL at 14:13

## 2017-11-29 RX ADMIN — GABAPENTIN 300 MG: 300 CAPSULE ORAL at 14:13

## 2017-11-29 RX ADMIN — MIDODRINE HYDROCHLORIDE 2.5 MG: 2.5 TABLET ORAL at 14:22

## 2017-11-29 RX ADMIN — Medication 500 MG: at 09:16

## 2017-11-29 RX ADMIN — GABAPENTIN 300 MG: 300 CAPSULE ORAL at 09:14

## 2017-11-29 ASSESSMENT — PAIN SCALES - GENERAL: PAINLEVEL_OUTOF10: 7

## 2017-11-29 NOTE — PROGRESS NOTES
Called by Marily Blanchard RN to assess leaking ostomy pouching system. Tech in room assisting with cleansing patients upper body. Patients father present and stated the ostomy pouching is not leaking from the flange, the pouching system leaked from the end of the bag. The patients father applied a clip to help keep the velcro closure closed. Partial bath given at this time, to legs, feet and  Buttocks. Linen changed under patient. Changed dressings to coccyx and left ischium healing stage 4. Applied ordered dakins to left ischium and AMD gauze to coccyx ulcer. Noted to right lateral ankle a small unstageable. Area is a black eschar. Area measured 0.7 cm x 0.7 cm. Painted area with betadine to eschar and covered with 4x4 gauze. Repositioned patient with pillow support. Family will bring in offloading boots. Recommended SPR mattress again to bed, educated patient and patients family in regards to the risks and benefits for a low air loss mattress. Patient stated he did not want mattress to bed. Patient stated he wants to be discharged today. Will continue to follow.
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
## 2017-11-29 NOTE — PLAN OF CARE
Problem: Discharge Planning:  Goal: Patients continuum of care needs are met  Patients continuum of care needs are met  Outcome: Ongoing  Home with parents-[facility] for therapies and [facility]isha Este[facility] for aides services. Patient is a home care waiver consumer. See SW notes.
Problem: Nutrition  Goal: Optimal nutrition therapy  Outcome: Ongoing  Nutrition Problem: Inadequate oral intake  Intervention: Food and/or Nutrient Delivery: Continue current diet, Start ONS, Vitamin Supplement, Continue current ONS  Nutritional Goals: Pt. to consume greater than 75% of meals during LOS
Problem: Suicide risk  Goal: Provide patient with safe environment  Provide patient with safe environment   Outcome: Ongoing  Sitter at the bedside. He denies having any suicidal feelings at this time or an active plan. Problem: Risk for Impaired Skin Integrity  Goal: Tissue integrity - skin and mucous membranes  Structural intactness and normal physiological function of skin and  mucous membranes. Outcome: Ongoing  Dressings changed per ostomy this am. Order received this afternoon for an SPR mattress and he is refusing that. He states that he is unable to sleep on the air mattress. Turned and repositioned every two hours. Tolerated well    Problem: Nutrition  Goal: Optimal nutrition therapy  Outcome: Ongoing  Appetite is poor today. Denied nausea. Comments: Care plan reviewed with patient. Patient verbalize understanding of the plan of care and contribute to goal setting.
Problem: Suicide risk  Goal: Provide patient with safe environment  Provide patient with safe environment   Outcome: Ongoing  Sitter at the bedside. He denies having any suicidal feelings at this time or an active plan. Problem: Risk for Impaired Skin Integrity  Goal: Tissue integrity - skin and mucous membranes  Structural intactness and normal physiological function of skin and  mucous membranes. Outcome: Ongoing  Dressings changed per ostomy this am. Order received this afternoon for an SPR mattress and he is refusing that. He states that he is unable to sleep on the air mattress. Turned and repositioned every two hours. Tolerated well. Problem: Nutrition  Goal: Optimal nutrition therapy  Outcome: Ongoing  Appetite is poor today. He has had nausea today. Zofran is effective.
nani-stomal skin and back of flange. Applied paste to inner edge of flange when cement became tacky. Applied to pt notching away the distal edge. Applied Brava strips to edges. Tolerated well. Denies needs at present. Supplies in room. Will continue to follow. Comments:      Coccyx     Left ischium     LEft side     Left upper back     Left mid back     Right lower abdomen     Lower mid abdomen     Left upper abdomen     Left posterior heel      Care plan reviewed with patient and RN. Patient and RN verbalize understanding of the plan of care and contribute to goal setting.

## 2017-11-29 NOTE — DISCHARGE SUMMARY
discharge instruction. Available consultant are in agreement with discharge planning. Exam:     Vitals:  Vitals:    11/29/17 0545 11/29/17 0900 11/29/17 1415 11/29/17 1646   BP: 106/70 114/71 (!) 88/56 110/71   Pulse: 74 88 131 117   Resp: 16 14 18   Temp: 98.2 °F (36.8 °C) 98.9 °F (37.2 °C)  98.9 °F (37.2 °C)   TempSrc: Oral Oral  Oral   SpO2: 96% 95%     Weight:       Height:         Weight: Weight: 144 lb 2.9 oz (65.4 kg)     24 hour intake/output:  Intake/Output Summary (Last 24 hours) at 11/29/17 1828  Last data filed at 11/29/17 1333   Gross per 24 hour   Intake             1560 ml   Output             2525 ml   Net             -965 ml           See my note today. Labs: For convenience and continuity at follow-up the following most recent labs are provided:      CBC:    Lab Results   Component Value Date    WBC 10.9 11/29/2017    HGB 16.2 11/29/2017    HCT 49.3 11/29/2017     11/29/2017       Renal:  Lab Results   Component Value Date     11/29/2017    K 4.2 11/29/2017    CL 97 11/29/2017    CO2 30 11/29/2017    BUN 9 11/29/2017    CREATININE 0.9 11/29/2017    CALCIUM 10.1 11/29/2017         Significant Diagnostic Studies    Radiology:   CT ENTEROGRAPHY W CONTRAST   Final Result   INTERVAL RESOLUTION OF THE ENTEROENTERIC INTUSSUSCEPTION. NO EVIDENCE OF FOCAL ABNORMALITY OR POSSIBLE LEAD POINT LESION, WITHIN THE LIMITS OF THIS EXAM.      INCIDENTAL FINDINGS EVIDENT, AS DISCUSSED. **This report has been created using voice recognition software. It may contain minor errors which are inherent in voice recognition technology. **            Final report electronically signed by Dr. Juma Gomez on 11/29/2017 8:39 AM      VL DUP LOWER EXTREMITY VENOUS BILATERAL   Final Result   No sonographic evidence of deep venous thrombosis in either lower extremity. **This report has been created using voice recognition software.   It may contain minor errors which are inherent in

## 2017-11-29 NOTE — PROGRESS NOTES
Patient being discharged per physician order. All discharge instructions reviewed with patient with all questions answered. Verbalized to patient and mother that follow appt need to be made due to offices already closed to make appt. Verbalized understanding to make follow up appointments. All medications reviewed with patient also. Patient being discharged without complaints of respiratory distress or pain.
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
## 2017-11-29 NOTE — PROGRESS NOTES
clinically significant  · Ideal Body Wt: 150 lb (68 kg), % Ideal Body 96% - adjusted for paraplegia  · BMI Classification: BMI 18.5 - 24.9 Normal Weight (20.7)  · Comparative Standards (Estimated Nutrition Needs):  · Estimated Daily Total Kcal: 9482-0629 kcal/day (subtracted 5% for paraplegia)  · Estimated Daily Protein (g): 85-98 grams    Estimated Intake vs Estimated Needs: Intake Less Than Needs    Nutrition Risk Level: High    Nutrition Interventions:   Continue current diet, Start ONS, Vitamin Supplement, Continue current ONS  Continued Inpatient Monitoring, Education Initiated (Encouraged adequate po intake of meals at best effort & good lean protein sources daily)    Nutrition Evaluation:   · Evaluation: Progress towards goals declining   · Goals: Pt. to consume greater than 75% of meals during LOS    · Monitoring: Meal Intake, Supplement Intake, Diet Tolerance, Nausea or Vomiting, Skin Integrity, Wound Healing, Weight, Comparative Standards, Pertinent Labs    See Adult Nutrition Doc Flowsheet for more detail.      Electronically signed by Nurys Art RD, LD on 11/29/17 at 2:23 PM    Contact Number: [phone]

## 2017-11-29 NOTE — PROGRESS NOTES
Tried to wake pt up for breathing tx, he said to come back later when he's awake. Will check back in a bit.

## 2017-11-30 NOTE — PROGRESS NOTES
[address] [address]  SPEECH THERAPY QUICK DISCHARGE NOTE  OUTPATIENT    **Note completed on 11/30/17    [x]Outpatient [address]  [][place] [address]  [][facility]      Name: Giovanni Salas  YOB: 1990  Gender: male  Patient Name: Giovanni Salas        CSN: [id number]   Referring Physician:  Dr. Ubaldo Gallegos  Diagnosis:  TBI with loss of consciousness of unspecified duration, subsequent encounter; paraplegia  Secondary Diagnosis: cognitive deficits, short term memory deficits, attention deficits    Patient is discharged from Speech Therapy services at this time. See last progress note for details related to results of therapy and goal achievement. Reason for discharge:  Patient transitioned to home health therapy.       Cassandra Porter M.S. [id number] [id number]
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
## 2017-12-03 LAB
BLOOD CULTURE, ROUTINE: NORMAL
BLOOD CULTURE, ROUTINE: NORMAL

## 2017-12-05 ENCOUNTER — TELEPHONE (OUTPATIENT)
Dept: FAMILY MEDICINE CLINIC | Age: 27
End: 2017-12-05

## 2017-12-05 NOTE — TELEPHONE ENCOUNTER
Patient was referred to [facility] 11/15/17. Referral note states that Dr Oxana Armenta is no longer located at [facility] so a message was left for their office to return our call. Referral was never closed and no f/u completed.  Were we supposed to schedule an appointment for the pt?

## 2017-12-05 NOTE — TELEPHONE ENCOUNTER
Spoke with pts mother and informed her of Dr Pastor Johnson recommendation. She states that she tried to schedule with [facility], but she was told that they do not make regular appointments and the schedule there is based on first arrival. She was instructed to bring the pt in at 8am and he would be put on a list to be evaluated that day. They are unable to give a waiting time frame and this makes scheduling the pt for care there very difficult because of the risk of sore formation after sitting for long periods of time as well as risk for incontinence while waiting. Please advise.

## 2017-12-07 ENCOUNTER — OFFICE VISIT (OUTPATIENT)
Dept: PHYSICAL MEDICINE AND REHAB | Age: 27
End: 2017-12-07
Payer: MEDICAID

## 2017-12-07 VITALS
DIASTOLIC BLOOD PRESSURE: 77 MMHG | SYSTOLIC BLOOD PRESSURE: 123 MMHG | WEIGHT: 144.18 LBS | HEIGHT: 70 IN | BODY MASS INDEX: 20.64 KG/M2 | HEART RATE: 78 BPM

## 2017-12-07 DIAGNOSIS — R25.2 SPASTICITY: ICD-10-CM

## 2017-12-07 DIAGNOSIS — G82.20 PARAPLEGIA (HCC): Primary | ICD-10-CM

## 2017-12-07 PROCEDURE — 99213 OFFICE O/P EST LOW 20 MIN: CPT | Performed by: NURSE PRACTITIONER

## 2017-12-07 RX ORDER — CYCLOBENZAPRINE HCL 5 MG
7.5 TABLET ORAL 3 TIMES DAILY PRN
Qty: 90 TABLET | Refills: 2 | Status: SHIPPED | OUTPATIENT
Start: 2017-12-07 | End: 2018-02-02 | Stop reason: SDUPTHER

## 2017-12-07 NOTE — PROGRESS NOTES
[address] [facility]  Outpatient progress note    Chief Complaint:   Chief Complaint   Patient presents with    Follow-up        Subjective: Corina Horne is a 24 y.o. male who returns to the office today for further follow up. Spasticity in patient's legs is improved since last visit, feel that the flexeril helped, although there is still room for improvement. Therapy is now able to stretch him a little better. He was recently in the hospital with depression as well as some GI issues. Patient states he continues to be depressed. Denies any thoughts of harming himself or others. Mother shares he is also having some compulsive behaviors of picking at his skin, he has multiple large open wounds on his back and stomach. Patient states he does not even realize he is doing it. He has an appointment to see wound clinic tomorrow. Dr. Harmeet Reyes office has recommended he see psychiatry, and patient has been to [facility] in the past. Mother shares that she is having difficulty with them because it is on a first come first serve basis, and patient is not allowed to be up in his chair for greater than 2 hours at a time, so they cannot wait long in the waiting room. She has tried calling several times to explain with no success. She is following with Dr. Harmeet Reyes office in regards to getting into contact with psychiatrist.     Otherwise overall doing OK.       Review of Systems:  CONSTITUTIONAL: negative  RESPIRATORY: negative  CARDIOVASCULAR: negative  GASTROINTESTINAL: negative  GENITOURINARY: positive for neurogenic bladder; suprapubic cath in place  MUSCULOSKELETAL: positive for pain, decreased ROM in left shoulder, muscle weakness  NEUROLOGICAL: positive for weakness, paraplegia  BEHAVIOR/PSYCH: hx bipolar, memory problems  10 point system review otherwise negative    Physical Exam:  /77 (Site: Right Arm, Position: Sitting)   Pulse 78   Ht with trazodone  · Continue with wound management  · Continue bowel and bladder program  · Continue baclofen  · Increase Flexeril to 7.5 mg TID  · Agree with psychiatry, Dr. Bowen Vallejo office working on referral    Return in about 2 months (around 2/7/2018). It was my pleasure to evaluate [address] today. Please call with any concerns or questions.   20 minutes spent in evaluation efforts    Arin Anaya NP

## 2017-12-08 ENCOUNTER — HOSPITAL ENCOUNTER (OUTPATIENT)
Dept: WOUND CARE | Age: 27
Discharge: HOME | End: 2017-12-08
Payer: MEDICAID

## 2017-12-08 VITALS
SYSTOLIC BLOOD PRESSURE: 133 MMHG | TEMPERATURE: 97.7 F | RESPIRATION RATE: 18 BRPM | HEIGHT: 70 IN | DIASTOLIC BLOOD PRESSURE: 80 MMHG | OXYGEN SATURATION: 97 % | HEART RATE: 93 BPM

## 2017-12-08 DIAGNOSIS — L89.324 DECUBITUS ULCER OF LEFT ISCHIUM, STAGE 4 (HCC): ICD-10-CM

## 2017-12-08 DIAGNOSIS — S31.109A WOUND OF ABDOMEN: ICD-10-CM

## 2017-12-08 DIAGNOSIS — L89.510: ICD-10-CM

## 2017-12-08 DIAGNOSIS — F42.4 COMPULSIVE SKIN PICKING: ICD-10-CM

## 2017-12-08 DIAGNOSIS — L89.623 DECUBITUS ULCER OF LEFT HEEL, STAGE 3 (HCC): ICD-10-CM

## 2017-12-08 PROBLEM — S21.209A WOUND OF BACK: Status: ACTIVE | Noted: 2017-07-13

## 2017-12-08 PROCEDURE — 99214 OFFICE O/P EST MOD 30 MIN: CPT | Performed by: NURSE PRACTITIONER

## 2017-12-08 PROCEDURE — 17250 CHEM CAUT OF GRANLTJ TISSUE: CPT

## 2017-12-08 PROCEDURE — 17250 CHEM CAUT OF GRANLTJ TISSUE: CPT | Performed by: NURSE PRACTITIONER

## 2017-12-08 PROCEDURE — A4421 OSTOMY SUPPLY MISC: HCPCS

## 2017-12-08 PROCEDURE — A4367 OSTOMY BELT: HCPCS

## 2017-12-08 PROCEDURE — A6207 CONTACT LAYER >16<= 48 SQ IN: HCPCS

## 2017-12-08 PROCEDURE — 6370000000 HC RX 637 (ALT 250 FOR IP): Performed by: NURSE PRACTITIONER

## 2017-12-08 RX ORDER — MIDODRINE HYDROCHLORIDE 2.5 MG/1
TABLET ORAL
Qty: 90 TABLET | Refills: 3 | Status: SHIPPED | OUTPATIENT
Start: 2017-12-08 | End: 2018-03-30 | Stop reason: SDUPTHER

## 2017-12-08 RX ADMIN — SILVER NITRATE APPLICATORS 2 EACH: 25; 75 STICK TOPICAL at 11:08

## 2017-12-08 ASSESSMENT — PAIN DESCRIPTION - ONSET: ONSET: ON-GOING

## 2017-12-08 ASSESSMENT — PAIN DESCRIPTION - PAIN TYPE: TYPE: CHRONIC PAIN

## 2017-12-08 ASSESSMENT — PAIN DESCRIPTION - DESCRIPTORS: DESCRIPTORS: STABBING;SHARP

## 2017-12-08 ASSESSMENT — PAIN DESCRIPTION - ORIENTATION: ORIENTATION: LEFT

## 2017-12-08 ASSESSMENT — PAIN DESCRIPTION - LOCATION: LOCATION: SHOULDER

## 2017-12-08 ASSESSMENT — PAIN DESCRIPTION - PROGRESSION: CLINICAL_PROGRESSION: NOT CHANGED

## 2017-12-08 ASSESSMENT — PAIN SCALES - GENERAL: PAINLEVEL_OUTOF10: 5

## 2017-12-08 ASSESSMENT — PAIN DESCRIPTION - FREQUENCY: FREQUENCY: CONTINUOUS

## 2017-12-08 NOTE — PLAN OF CARE
Problem: Pressure Ulcer:  Goal: Signs of wound healing will improve  Signs of wound healing will improve   Outcome: Not Met This Shift  Pt presents to wound clinic for follow up of coccyx, back, ischium, left heel and abdomen wound. Left heel healed. New wounds noted on upper and lower back and, right ankle. Coccyx Wound measures smaller in size. Stoma appear red and moist. Kiana skin appears slightly irritated with some redness noted. No s/s of infection. Pt was afebrile. Pt instructed to Lay down at least twice daily for a couple hours to limit time in chair. Pt instructed to Reposition yourself in chair every 1-2 hours. Pt instructed Off load heels. Pt advised toWear off loading boots when laying down or sitting in chair. Pt instructed to Increase protein. Try protein shake, eat eggs. Pt advised to Make appointment with psychiatrist regarding anxiety and itching/scratching. Pt advised it is Ok to go to [facility] and sit for appointment. Pt instructed to Wear gloves to help stop scratching. Pt advised not to smoke with gloves on. Silver nitrate applied to coccyx/ischium wound per provider. Pt advised Wound may appear grayish. Pt advised we Will order ostomy samples through [facility]. Cleansed all wounds with normal saline and gauze. Patted dry with clean gauze. Coccyx- ( At home pt is to cleanse wound with dakins solution and gauze.) Packed wound with dry AMD gauze. Applied zinc oxide cream to irritated skin around wound. Covered with dry gauze/ABD pad. Secured with medipore tape. Pt advised to Change twice daily. Left ischium- Packed wound with saline moistened gauze. ( At home pt is to pack wound with dakins moistened gauze.) Covered with dry gauze/ABD pad. Secured with medipore tape. Pt advised to Change twice daily. Right ankle- Applied mepitel ag to wound bed. Covered with bordered foam to wound bed. Pt advised to Change every 3 days. Abdomen and upper/lower back wounds-  Applied sensacare to wound beds.  Pt advised to Apply daily. Next appt in 3 weeks.           Supplies Size Order #   Aleida Convex Flange 1 1/4 [id number]   Adapt Powder  [id number]   Aleida drainable pouch with clamp    40438   Adapt belt  7300   Adapt paste  [id number]    Nuhope Adhesive    2401      Change your pouch  1-2                    times / week.     Application of Pouch:  1. Assemble above supplies in order of application before removing pouch. 2. Cut opening in flange. 3. Remove your worn appliance by gently pulling away from skin and discard. 4. Wash skin with warm water, rinse and pat dry. 5. Inspect your skin for redness or irritation. If present, apply a small amount of powder to skin around stoma. Brush off excess powder with a Kleenex. Do not use ointments. __X_        Stoma Powder             (for wet, weepy skin)   ___X        Desenex Powder         ([employer])  (itches, rash)       6. Apply thin layer of nuhope adhesive to back of flange. Let It dry until it gets tachy. 7.  Apply paste to inner opening of flange. 8. Center the flange around your stoma. Smooth the adhesive collar to your abdomen. 9. Apply your pouch  10. Apply belt snuggly. Comments: Care plan reviewed with patient and parents. Patient and parents verbalize understanding of the plan of care and contribute to goal setting.

## 2017-12-08 NOTE — PROGRESS NOTES
Navid COATS has reviewed and agrees with CARINA Dinero LPN's shift  Assessment.     Xavier Richardson  12/8/2017
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
## 2017-12-13 ENCOUNTER — TELEPHONE (OUTPATIENT)
Dept: FAMILY MEDICINE CLINIC | Age: 27
End: 2017-12-13

## 2017-12-15 DIAGNOSIS — F32.2 SEVERE SINGLE CURRENT EPISODE OF MAJOR DEPRESSIVE DISORDER, WITHOUT PSYCHOTIC FEATURES (HCC): ICD-10-CM

## 2017-12-15 DIAGNOSIS — R60.0 BILATERAL EDEMA OF LOWER EXTREMITY: ICD-10-CM

## 2017-12-15 RX ORDER — LANOLIN ALCOHOL/MO/W.PET/CERES
6 CREAM (GRAM) TOPICAL NIGHTLY
Qty: 60 TABLET | Refills: 5 | Status: SHIPPED | OUTPATIENT
Start: 2017-12-15 | End: 2018-07-23

## 2017-12-15 RX ORDER — FUROSEMIDE 20 MG/1
20 TABLET ORAL DAILY
Qty: 90 TABLET | Refills: 1 | Status: SHIPPED | OUTPATIENT
Start: 2017-12-15 | End: 2018-05-24 | Stop reason: SDUPTHER

## 2017-12-18 RX ORDER — BACLOFEN 20 MG/1
20 TABLET ORAL 3 TIMES DAILY
Qty: 90 TABLET | Refills: 0 | Status: SHIPPED | OUTPATIENT
Start: 2017-12-18 | End: 2018-01-16 | Stop reason: SDUPTHER

## 2017-12-21 ENCOUNTER — NURSE ONLY (OUTPATIENT)
Dept: UROLOGY | Age: 27
End: 2017-12-21

## 2017-12-21 DIAGNOSIS — R82.90 CLOUDY URINE: Primary | ICD-10-CM

## 2017-12-21 DIAGNOSIS — N31.9 NEUROGENIC BLADDER: ICD-10-CM

## 2017-12-23 LAB
ORGANISM: ABNORMAL
URINE CULTURE, ROUTINE: ABNORMAL

## 2017-12-26 ENCOUNTER — TELEPHONE (OUTPATIENT)
Dept: UROLOGY | Age: 27
End: 2017-12-26

## 2017-12-26 ENCOUNTER — TELEPHONE (OUTPATIENT)
Dept: FAMILY MEDICINE CLINIC | Age: 27
End: 2017-12-26

## 2017-12-26 DIAGNOSIS — G82.20 PARAPLEGIA (HCC): Primary | ICD-10-CM

## 2017-12-26 RX ORDER — SULFAMETHOXAZOLE AND TRIMETHOPRIM 800; 160 MG/1; MG/1
1 TABLET ORAL 2 TIMES DAILY
Qty: 20 TABLET | Refills: 0 | Status: SHIPPED | OUTPATIENT
Start: 2017-12-26 | End: 2018-01-05

## 2018-01-04 ENCOUNTER — TELEPHONE (OUTPATIENT)
Dept: FAMILY MEDICINE CLINIC | Age: 28
End: 2018-01-04

## 2018-01-04 NOTE — TELEPHONE ENCOUNTER
Concerning AMB EXTERNAL REFERRAL TO PSYCHIATRY -    On 11/15/17 by Maria G Barriga is no longer at [facility] left message to call office back to schedule pt w someone else    On 12/13/17 by Chun c/b message for Srikanth's mother, Shalini Hodgson, asking if he was ever able to get in with a psychiatrist. If so, we need the doctor's name and date of appt. No response. Please advise.

## 2018-01-10 ENCOUNTER — TELEPHONE (OUTPATIENT)
Dept: FAMILY MEDICINE CLINIC | Age: 28
End: 2018-01-10

## 2018-01-11 ENCOUNTER — HOSPITAL ENCOUNTER (OUTPATIENT)
Dept: WOUND CARE | Age: 28
Discharge: HOME | End: 2018-01-11
Payer: MEDICAID

## 2018-01-11 VITALS
SYSTOLIC BLOOD PRESSURE: 97 MMHG | DIASTOLIC BLOOD PRESSURE: 59 MMHG | HEART RATE: 97 BPM | OXYGEN SATURATION: 96 % | RESPIRATION RATE: 18 BRPM | TEMPERATURE: 98.4 F

## 2018-01-11 PROBLEM — L89.513 DECUBITUS ULCER OF RIGHT ANKLE, STAGE 3 (HCC): Status: ACTIVE | Noted: 2017-12-08

## 2018-01-11 PROCEDURE — 99214 OFFICE O/P EST MOD 30 MIN: CPT | Performed by: NURSE PRACTITIONER

## 2018-01-11 PROCEDURE — 99214 OFFICE O/P EST MOD 30 MIN: CPT

## 2018-01-11 PROCEDURE — A6210 FOAM DRG >16<=48 SQ IN W/O B: HCPCS

## 2018-01-11 PROCEDURE — A6206 CONTACT LAYER <= 16 SQ IN: HCPCS

## 2018-01-11 ASSESSMENT — PAIN SCALES - GENERAL: PAINLEVEL_OUTOF10: 0

## 2018-01-11 NOTE — PROGRESS NOTES
ulcer to the left ischium and the left heel. 10/6/17: The coccyx and left ischium pressure ulcers continue to decline. Patient is spending majority of the day sitting in his chair and he continues to smoke. 10/23/17: Patients left ischium and coccyx ulcers are slightly improved. The left heel ulcer has declined. His mother thinks it is related to the Iodoflex. He has multiple new open wounds to his right upper back and lower abdomen from picking and scratching. He mom is trying to get an appt set up to see Dr. Krzysztof Naqvi. He had been seeing a psychologist but they are unable to prescribe medications for him. He has seen Dr. Krzysztof Naqvi in the past.   11/13/17: Patient's mother is been unsuccessful with getting him an appointment scheduled with Dr. Joanne Curling continues to pick and create more open wounds to his abdomen and right upper back.  Instructed them to make him an appointment with [facility] he has seen another psychiatrist there.  His left heel, coccyx, and left ischium ulcer is improved since his previous visit. Troy Velasquez is spending less time up in his chair and relieving pressure to the areas. Troy Velasquez has been having issues with his ostomy flanges leaking every 1-2 days. 12/8/17: Patient was recently admitted to the hospital from 11/27/2017 through 11/29/2017 for suicidal thoughts and intussusception. He was cleared by psychiatry inpatient and referred to [facility] [place] after discharge. He continues to pick him to get his skin causing multiple open wounds across his back and abdomen. Patient does not realize that he is picking. They have not taken to [facility] yet because they state that he will have to sit and wait to be seen rather than being able to schedule an appointment. They were concerned about him having to sit and wait for more than 2 hours due to it causing increased pressure on his coccyx and ischium.   I did instruct them that at this point it is more important for him to go and be seen and get a treatment tablets by mouth 3 times daily as needed for Muscle spasms 90 tablet 2    amantadine (SYMMETREL) 100 MG capsule Take 100 mg by mouth daily      SUMAtriptan (IMITREX) 100 MG tablet Take 100 mg by mouth once as needed for Migraine      gabapentin (NEURONTIN) 300 MG capsule 1 capsule in morning, 1 capsule in afternoon and 2 capsules at bedtime      ibuprofen (ADVIL;MOTRIN) 800 MG tablet Take 1 tablet by mouth 3 times daily as needed for Pain 90 tablet 5    traZODone (DESYREL) 100 MG tablet Take 2 tablets by mouth nightly as needed for Sleep 60 tablet 2    venlafaxine (EFFEXOR XR) 150 MG extended release capsule Take 1 capsule by mouth daily 30 capsule 5    vitamin D (ERGOCALCIFEROL) 56005 units CAPS capsule Take 1 capsule by mouth once a week Sundays 12 capsule 3    nystatin (MYCOSTATIN) 479736 UNIT/GM powder Apply 3 times daily. 1 Bottle 5    ferrous sulfate (FE TABS) 325 (65 Fe) MG EC tablet Take 1 tablet by mouth 2 times daily 60 tablet 5    docusate sodium (DOCQLACE) 100 MG capsule Take 1 capsule by mouth 2 times daily 60 capsule 5    Disposable Gloves (LATEX GLOVES MEDIUM) MISC Use gloves prn for personal care 10 each 3    Incontinence Supply Disposable (DEPEND UNDERWEAR SM/MED) MISC Use depends prn for incontinence care 5 Package 3    Incontinence Supply Disposable (PREVAIL WET WIPES) MISC Use wet wipes prn for personal care 96 each 3    albuterol sulfate HFA (VENTOLIN HFA) 108 (90 Base) MCG/ACT inhaler Inhale 2 puffs into the lungs every 6 hours as needed for Wheezing 1 Inhaler 3    Elastic Bandages & Supports (T.E.D. KNEE LENGTH/M-REGULAR) MISC Use as directed 2 each 0    Elastic Bandages & Supports (JOBST ACTIVE 20-30MMHG MEDIUM) MISC Apply q daily 2 each 0    Ascorbic Acid (VITAMIN C) 500 MG tablet Take 500 mg by mouth 2 times daily      sodium hypochlorite (DAKINS) 0.125 % SOLN external solution Apply Dakin's moistened gauze to coccyx. Cover with dry gauze. Change twice a day.  1 Bottle 2  Elastic Bandages & Supports (JOBST KNEE HIGH COMPRESSION SM) MISC 1 each by Does not apply route daily 2 each 0    Incontinence Supplies (BEDSIDE DRAINAGE BAG) MISC Large 2000 cc bedside drainage bag 1 each 11    Incontinence Supplies (URINARY LEG BAG) [facility] 900 cc Aleida Urinary catheter leg bag 1 each 11    Incontinence Supplies (URINARY LEG BAG STRAPS) MISC Leg bag straps to go with urinary catheter leg bag 1 each 11    Catheters (FOLY CATHETER LUBRICIOUS) MISC 16 Fr 10 cc rodrigues 1 each 11    Lift Chair MISC by Does not apply route Use as directed 1 each 0    Respiratory Therapy Supplies (NEBULIZER COMPRESSOR) KIT 1 kit by Does not apply route once for 1 dose 1 kit 0    EPINEPHrine (EPIPEN) 0.3 MG/0.3ML SOAJ injection Use as directed for allergic reaction 2 each 1     No current facility-administered medications on file prior to encounter.         REVIEW OF SYSTEMS    Constitutional: negative for chills, fevers and sweats  Eyes: negative for irritation and redness  Ears, nose, mouth, throat, and face: negative for hearing loss and hoarseness  Respiratory: negative for cough and shortness of breath  Cardiovascular: negative for chest pain, dyspnea and lower extremity edema  Gastrointestinal: positive for colostomy, negative for abdominal pain, nausea and vomiting  Genitourinary: positive for suprapubic catheter  Integument/breast: positive for coccyx ulcer, left ischium ulcer, left heel ulcer, right lateral malleolus ulcer, upper and lower back wounds, and lower abdomen wounds, negative for rash  Musculoskeletal:positive for paraplegia  Neurological: negative for dizziness, speech problems and positive for paraplegia  Behavioral/Psych: positive for good mood    Objective:      BP (!) 97/59   Pulse 97   Temp 98.4 °F (36.9 °C) (Oral)   Resp 18   SpO2 96%     Wt Readings from Last 3 Encounters:   12/07/17 144 lb 2.9 oz (65.4 kg)   11/28/17 144 lb 2.9 oz (65.4 kg)   11/09/17 139 lb (63 kg) PHYSICAL EXAM    General Appearance: alert and oriented to person, place and time    Skin: warm and dry  Head: normocephalic and atraumatic  Eyes: pupils equal, round, and reactive to light  ENT: hearing grossly normal bilaterally  Pulmonary/Chest: clear to auscultation bilaterally- no wheezes, rales or rhonchi, normal air movement, no respiratory distress  Cardiovascular: normal rate and normal S1 and S2  Abdomen: soft, non-tender, bowel sounds normal   Extremities: no cyanosis, no clubbing and no edema  Musculoskeletal: no joint deformity, paraplegia lower extremities  Neurologic: speech normal and gait abnormal- wheelchair bound, frequent muscle spasms  Colostomy, LLQ: Pouching system intact. Patient denies any issues with skin irritation or leaking. Lower abdomen: multiple small superficial scattered open areas noted with stable scabs. Draining scant amount of serosanguinous drainage. Periwound skin is intact. No redness, warmth, or induration noted.    Coccyx: pressure ulcer stage 4- Significantly improved.  Measurements are smaller.  Ulcer bed is 100% pink/red hyper-granular and friable. Draining moderate amount of serosanguineous drainage.  No odor noted.  Periulcer skin is  intact, slightly red with scarring noted.  No warmth or induration noted. Upper and lower back: Multiple scattered superficial open areas noted with dry stable scabs. Draining scant amount of serosanguinous drainage. Periwound skin is intact. No redness, warmth, or induration noted. Left ischium: stage 4- Measurements are smaller tunneling is improved.  Ulcer bed is 100% red and friable, unable to visualize the entire base. Bone is palpable in the base with undermining/tunneling noted on the 12 o'clock side. Draining moderate amount of yellow/serosanguinous drainage. Periulcer skin is intact with scarring noted. No redness, warmth, or induration noted. Left heel: stage 3 pressure ulcer- Nearly healed. Small scab noted. 1/11/2018  1:33 PM   Drainage Description Yellow;Green 12/8/2017 10:30 AM   Odor None 1/11/2018  1:33 PM   Margins Attached edges 1/11/2018  1:33 PM   Kiana-wound Assessment Dry 1/11/2018  1:33 PM   [place]%Wound Bed 20 12/8/2017 10:30 AM   Red%Wound Bed 100 1/11/2018  1:33 PM   Yellow%Wound Bed 80 12/8/2017 10:30 AM   Black%Wound Bed 100 11/13/2017  1:22 PM   Op First Treatment Date 11/13/17 11/13/2017  1:22 PM   Number of days: 59       Wound 11/29/17 Ankle Right;Lateral #9 (Active)   Wound Image   12/8/2017 10:28 AM   Wound Type Wound 1/11/2018  1:33 PM   Dressing Status Intact 1/11/2018  1:33 PM   Dressing Changed Changed/New 1/11/2018  1:33 PM   Wound Cleansed Rinsed/Irrigated with saline 1/11/2018  1:33 PM   Wound Length (cm) 0.5 cm 1/11/2018  1:33 PM   Wound Width (cm) 0.4 cm 1/11/2018  1:33 PM   Wound Depth (cm)  0.05 1/11/2018  1:33 PM   Calculated Wound Size (cm^2) (l*w) 0.2 cm^2 1/11/2018  1:33 PM   Change in Wound Size % (l*w) 77.27 1/11/2018  1:33 PM   Wound Assessment Red 1/11/2018  1:33 PM   Drainage Amount Scant 1/11/2018  1:33 PM   Drainage Description Serosanguinous 1/11/2018  1:33 PM   Odor None 1/11/2018  1:33 PM   Margins Attached edges 1/11/2018  1:33 PM   Kiana-wound Assessment Dry 1/11/2018  1:33 PM   Red%Wound Bed 100 1/11/2018  1:33 PM   Black%Wound Bed 100 12/8/2017 10:28 AM   Number of days: 43       Wound 12/08/17 Back Lower #3 (Active)   Wound Type Wound 1/11/2018  1:33 PM   Dressing Status Intact 1/11/2018  1:33 PM   Dressing Changed Changed/New 1/11/2018  1:33 PM   Wound Cleansed Rinsed/Irrigated with saline 1/11/2018  1:33 PM   Wound Length (cm) 0.6 cm 1/11/2018  1:33 PM   Wound Width (cm) 0.6 cm 1/11/2018  1:33 PM   Wound Depth (cm)  scab 1/11/2018  1:33 PM   Calculated Wound Size (cm^2) (l*w) 0.36 cm^2 1/11/2018  1:33 PM   Change in Wound Size % (l*w) 99.88 1/11/2018  1:33 PM   Wound Assessment Red 1/11/2018  1:33 PM   Drainage Amount None 1/11/2018  1:33 PM   Drainage Description wound with normal saline and gauze. Pat dry with clean gauze. Apply zinc oxide to wound bed. Cover with gauze. Tape in place. Change every 3 days.     Abdomen and upper/lower back wounds- Cleanse wounds with normal saline and gauze. Pat dry with clean gauze. Apply udderbalm to wound beds. Apply daily. Continue to turn and reposition every 2 hours. Limit time sitting in a chair to no more than 2 hours at one time. Treatment:   Orders Placed This Encounter   Procedures    Wound care     Right ankle: zinc oxide, gauze, tape. Left ischium and coccxy: zinc to nani wound, dry amd gauze to wound bed, dry 4x4 gauze,tape. Standing Status:   Standing     Number of Occurrences:   1         Antibiotics: No    Follow up: 4 weeks    Please see attached Discharge Instructions    Written patient dismissal instructions given to patient and signed by patient or POA. Discharge Instructions         Visit Discharge/Physician Orders:   - Renford Italian down at least twice daily for a couple hours to limit time in chair.   - Reposition yourself in chair every 1-2 hours.    - Off load heels. Wear off loading boots when laying down or sitting in chair.  - Increase protein. Try protein shake, eat eggs. - Continue appt with [facility] and psychologist.  - Wear gloves to help stop scratching. Do not smoke with gloves on.        Wound Location: Coccyx, left ischium, abdomen, upper/lower back, right ankle, left heel      Do not shower, take baths or get wound wet, unless otherwise instructed by your Wound Care doctor.   [facility] all dressings clean & dry.      Dressing orders: Cleanse all wounds with normal saline and gauze, pat dry with clean gauze.     Coccyx- Cleanse wound with dakins solution and gauze. Pack wound with dry AMD gauze. Apply zinc oxide cream to irritated skin around wound. Cover with dry gauze/ABD pad. Secure with medipore tape.  Change twice daily.     Left ischium- Cleanse wound with normal saline or wound cleanser

## 2018-01-16 ENCOUNTER — TELEPHONE (OUTPATIENT)
Dept: FAMILY MEDICINE CLINIC | Age: 28
End: 2018-01-16

## 2018-01-16 DIAGNOSIS — R11.0 NAUSEA: Primary | ICD-10-CM

## 2018-01-16 RX ORDER — ONDANSETRON 4 MG/1
4 TABLET, FILM COATED ORAL EVERY 8 HOURS PRN
Qty: 90 TABLET | Refills: 5 | Status: SHIPPED | OUTPATIENT
Start: 2018-01-16 | End: 2019-07-18 | Stop reason: SDUPTHER

## 2018-01-16 RX ORDER — BACLOFEN 20 MG/1
20 TABLET ORAL 3 TIMES DAILY
Qty: 90 TABLET | Refills: 0 | Status: SHIPPED | OUTPATIENT
Start: 2018-01-16 | End: 2018-02-05 | Stop reason: SDUPTHER

## 2018-01-26 ENCOUNTER — TELEPHONE (OUTPATIENT)
Dept: FAMILY MEDICINE CLINIC | Age: 28
End: 2018-01-26

## 2018-01-26 ENCOUNTER — PRE-PROCEDURE TELEPHONE (OUTPATIENT)
Dept: WOUND CARE | Age: 28
End: 2018-01-26

## 2018-01-26 DIAGNOSIS — G82.20 PARAPLEGIA (HCC): Primary | ICD-10-CM

## 2018-01-26 NOTE — TELEPHONE ENCOUNTER
Home PT called stating pt is doing much better in PT and wounds are looking a lot better. PT wanted to know if we could switch pt to out pt PT. Advised blayne WANG. Per Blayne WANG PT advised that is fine as long as pt and his parents are ok with this.

## 2018-02-01 ENCOUNTER — TELEPHONE (OUTPATIENT)
Dept: PHYSICAL MEDICINE AND REHAB | Age: 28
End: 2018-02-01

## 2018-02-01 ENCOUNTER — NURSE ONLY (OUTPATIENT)
Dept: UROLOGY | Age: 28
End: 2018-02-01
Payer: MEDICAID

## 2018-02-01 DIAGNOSIS — N31.9 NEUROGENIC BLADDER: ICD-10-CM

## 2018-02-01 DIAGNOSIS — F42.4 COMPULSIVE SKIN PICKING: Primary | ICD-10-CM

## 2018-02-01 PROCEDURE — 51705 CHANGE OF BLADDER TUBE: CPT | Performed by: UROLOGY

## 2018-02-02 RX ORDER — CYCLOBENZAPRINE HCL 10 MG
10 TABLET ORAL 3 TIMES DAILY PRN
Qty: 90 TABLET | Refills: 5 | Status: SHIPPED | OUTPATIENT
Start: 2018-02-02 | End: 2018-07-20 | Stop reason: SDUPTHER

## 2018-02-05 ENCOUNTER — TELEPHONE (OUTPATIENT)
Dept: FAMILY MEDICINE CLINIC | Age: 28
End: 2018-02-05

## 2018-02-05 DIAGNOSIS — F32.2 SEVERE SINGLE CURRENT EPISODE OF MAJOR DEPRESSIVE DISORDER, WITHOUT PSYCHOTIC FEATURES (HCC): ICD-10-CM

## 2018-02-05 RX ORDER — TRAZODONE HYDROCHLORIDE 100 MG/1
200 TABLET ORAL NIGHTLY PRN
Qty: 60 TABLET | Refills: 2 | Status: SHIPPED | OUTPATIENT
Start: 2018-02-05 | End: 2018-04-06 | Stop reason: SDUPTHER

## 2018-02-05 RX ORDER — BACLOFEN 20 MG/1
20 TABLET ORAL 3 TIMES DAILY
Qty: 90 TABLET | Refills: 0 | Status: SHIPPED | OUTPATIENT
Start: 2018-02-05 | End: 2018-03-08 | Stop reason: SDUPTHER

## 2018-02-05 RX ORDER — AMANTADINE HYDROCHLORIDE 100 MG/1
100 CAPSULE, GELATIN COATED ORAL DAILY
Qty: 60 CAPSULE | Refills: 5 | Status: SHIPPED | OUTPATIENT
Start: 2018-02-05 | End: 2018-07-23 | Stop reason: SDUPTHER

## 2018-02-08 ENCOUNTER — OFFICE VISIT (OUTPATIENT)
Dept: PHYSICAL MEDICINE AND REHAB | Age: 28
End: 2018-02-08
Payer: MEDICAID

## 2018-02-08 VITALS
DIASTOLIC BLOOD PRESSURE: 82 MMHG | WEIGHT: 144.18 LBS | HEIGHT: 70 IN | HEART RATE: 90 BPM | BODY MASS INDEX: 20.64 KG/M2 | SYSTOLIC BLOOD PRESSURE: 122 MMHG

## 2018-02-08 DIAGNOSIS — G82.20 PARAPLEGIA (HCC): Primary | ICD-10-CM

## 2018-02-08 DIAGNOSIS — R25.2 SPASTICITY: ICD-10-CM

## 2018-02-08 PROCEDURE — 99213 OFFICE O/P EST LOW 20 MIN: CPT | Performed by: NURSE PRACTITIONER

## 2018-02-09 ENCOUNTER — TELEPHONE (OUTPATIENT)
Dept: FAMILY MEDICINE CLINIC | Age: 28
End: 2018-02-09

## 2018-02-09 NOTE — TELEPHONE ENCOUNTER
Debbie Brewster with [employer] called wanting to check if the sig on Amantadine 100 mg has changed from BID to QD. In media tab the order that was recd from [id number] is for BID. Please advise if the sig is to be changed to QD.

## 2018-02-12 ENCOUNTER — HOSPITAL ENCOUNTER (OUTPATIENT)
Dept: WOUND CARE | Age: 28
Discharge: HOME | End: 2018-02-12
Payer: MEDICAID

## 2018-02-12 VITALS
DIASTOLIC BLOOD PRESSURE: 73 MMHG | TEMPERATURE: 97.7 F | SYSTOLIC BLOOD PRESSURE: 118 MMHG | BODY MASS INDEX: 19.94 KG/M2 | HEART RATE: 96 BPM | HEIGHT: 70 IN | OXYGEN SATURATION: 100 % | RESPIRATION RATE: 18 BRPM | WEIGHT: 139.3 LBS

## 2018-02-12 DIAGNOSIS — S31.109A WOUND OF ABDOMEN: ICD-10-CM

## 2018-02-12 DIAGNOSIS — F42.4 COMPULSIVE SKIN PICKING: ICD-10-CM

## 2018-02-12 DIAGNOSIS — L89.324 DECUBITUS ULCER OF LEFT ISCHIUM, STAGE 4 (HCC): ICD-10-CM

## 2018-02-12 DIAGNOSIS — L89.623 DECUBITUS ULCER OF LEFT HEEL, STAGE 3 (HCC): ICD-10-CM

## 2018-02-12 PROCEDURE — 99213 OFFICE O/P EST LOW 20 MIN: CPT | Performed by: NURSE PRACTITIONER

## 2018-02-12 PROCEDURE — 17250 CHEM CAUT OF GRANLTJ TISSUE: CPT | Performed by: NURSE PRACTITIONER

## 2018-02-12 PROCEDURE — 6370000000 HC RX 637 (ALT 250 FOR IP): Performed by: NURSE PRACTITIONER

## 2018-02-12 PROCEDURE — 17250 CHEM CAUT OF GRANLTJ TISSUE: CPT

## 2018-02-12 RX ADMIN — SILVER NITRATE APPLICATORS 1 EACH: 25; 75 STICK TOPICAL at 14:01

## 2018-02-12 ASSESSMENT — PAIN DESCRIPTION - PROGRESSION: CLINICAL_PROGRESSION: NOT CHANGED

## 2018-02-12 ASSESSMENT — PAIN DESCRIPTION - FREQUENCY: FREQUENCY: CONTINUOUS

## 2018-02-12 ASSESSMENT — PAIN SCALES - GENERAL: PAINLEVEL_OUTOF10: 3

## 2018-02-12 ASSESSMENT — PAIN DESCRIPTION - DESCRIPTORS: DESCRIPTORS: OTHER (COMMENT)

## 2018-02-12 ASSESSMENT — PAIN DESCRIPTION - ONSET: ONSET: ON-GOING

## 2018-02-12 ASSESSMENT — PAIN DESCRIPTION - LOCATION: LOCATION: SHOULDER

## 2018-02-12 ASSESSMENT — PAIN DESCRIPTION - PAIN TYPE: TYPE: CHRONIC PAIN

## 2018-02-12 ASSESSMENT — PAIN DESCRIPTION - ORIENTATION: ORIENTATION: LEFT

## 2018-02-12 NOTE — PROGRESS NOTES
treatment plan established for his psychological issues rather than waiting. [facility] continues to have issues with his ostomy pouches leaking. The pouching system recommended at the last visit leaked within a couple of hours. The inpatient ostomy nurses suggested Nu Hope adhesive which they have been doing and may have had decreased leaking with this but he continues to have difficulty with peristomal skin irritation and weepy skin. He has a new pressure ulcer noted to the right lateral ankle which his mother believes is related to his ankle rubbing on his wheelchair wheel when up.  1/11/18: He denies any issues with his ostomy pouches, he has not had any leaking. His ulcers have all improved since his previous visit. He continues to have significant amount of tunneling to the left ischium ulcer. He has still not gotten into his psychiatrist but his picking has improved with his new Atarax dosage and if he does start to pick his parents apply gloves to his hands. They stopped using the Mepitel AG and foam to the right ankle ulcer due to is causing increased irritation, they have been applying zinc cream instead. 2/12/18: Patients picking and creating wounds has improved, he is working with [employer]. He has been having issues with his ostomy flanges leaking several times a week. Wound/Ulcer Pain Timing/Severity: none  Quality of pain: N/A  Severity:  0 / 10   Modifying Factors: None  Associated Signs/Symptoms: drainage    Interval History:   Patient presents today for follow up on wound/ulcer's progression. The patient is currently not on antibiotics. Current dressing care includes:    Coccyx- Cleanse wound with dakins solution and gauze. Pack wound with dry AMD gauze. Apply zinc oxide cream to irritated skin around wound. Cover with dry gauze/ABD pad. Secure with medipore tape. Change twice daily.     Left ischium- Cleanse wound with Dakin's and gauze. Pat dry with clean gauze.  Apply zinc oxide cream to 6 hours as needed for Wheezing 1 Inhaler 3    Elastic Bandages & Supports (T.E.D. KNEE LENGTH/M-REGULAR) MISC Use as directed 2 each 0    Elastic Bandages & Supports (JOBST ACTIVE 20-30MMHG MEDIUM) MISC Apply q daily 2 each 0    sodium hypochlorite (DAKINS) 0.125 % SOLN external solution Apply Dakin's moistened gauze to coccyx. Cover with dry gauze. Change twice a day. 1 Bottle 2    Elastic Bandages & Supports (JOBST KNEE HIGH COMPRESSION SM) MISC 1 each by Does not apply route daily 2 each 0    Incontinence Supplies (BEDSIDE DRAINAGE BAG) MISC Large 2000 cc bedside drainage bag 1 each 11    Incontinence Supplies (URINARY LEG BAG) MISC 900 cc Aleida Urinary catheter leg bag 1 each 11    Incontinence Supplies (URINARY LEG BAG STRAPS) MISC Leg bag straps to go with urinary catheter leg bag 1 each 11    Catheters (FOLY CATHETER LUBRICIOUS) MISC 16 Fr 10 cc rodrigues 1 each 11    EPINEPHrine (EPIPEN) 0.3 MG/0.3ML SOAJ injection Use as directed for allergic reaction 2 each 1    Lift Chair MISC by Does not apply route Use as directed 1 each 0    nystatin (MYCOSTATIN) 012765 UNIT/GM powder Apply 3 times daily. 1 Bottle 5    Ascorbic Acid (VITAMIN C) 500 MG tablet Take 500 mg by mouth 2 times daily       No current facility-administered medications on file prior to encounter.         REVIEW OF SYSTEMS    Constitutional: negative for chills, fevers and sweats  Eyes: negative for irritation and redness  Ears, nose, mouth, throat, and face: negative for hearing loss and hoarseness  Respiratory: negative for cough and shortness of breath  Cardiovascular: negative for chest pain, dyspnea and lower extremity edema  Gastrointestinal: positive for colostomy, negative for abdominal pain, nausea and vomiting  Genitourinary: positive for suprapubic catheter  Integument/breast: positive for coccyx ulcer, left ischium ulcer, left heel ulcer, right lateral malleolus ulcer, upper and lower back wounds, groin wound, and lower Margins Attached edges; Unattached edges 2/12/2018  1:21 PM   Number of days: 337       Pressure Ulcer 07/23/17 Heel Left DTPI #3 (Active)   Kiana-wound Assessment Calloused;Dry 11/13/2017  1:22 PM   Kiana-Wound Texture Scarring 9/20/2017  1:52 PM   Kiana-Wound Moisture Dry 11/13/2017  1:22 PM   Kiana-Wound Color Ecchymosis 10/6/2017  1:37 PM   Wound Assessment Black; Yellow 10/6/2017  1:37 PM   Wound Length (cm) 0 cm 12/8/2017 10:28 AM   Wound Width (cm) 0 cm 12/8/2017 10:28 AM   Wound Depth (cm)  0 12/8/2017 10:28 AM   Calculated Wound Size (cm^2) (l*w) 0 cm^2 12/8/2017 10:28 AM   Change in Wound Size % (l*w) 100 12/8/2017 10:28 AM   Dressing Status Intact 11/13/2017  1:22 PM   Dressing Changed Changed/New 11/13/2017  1:22 PM   Wound Cleansed Rinsed/Irrigated with saline 11/13/2017  1:22 PM   Drainage Amount None 11/13/2017  1:22 PM   Drainage Description Serosanguinous; Yellow 10/23/2017  2:34 PM   Odor None 11/13/2017  1:22 PM   Undermining Starts ___ O'Clock 12 10/23/2017  3:02 PM   Undermining Ends___ O'Clock 12 10/23/2017  3:02 PM   Undermining Maxium Distance (cm) 0.7 10/23/2017  3:02 PM   [place]%Wound Bed 50 10/23/2017  2:34 PM   Yellow%Wound Bed 50 10/23/2017  2:34 PM   Black%Wound Bed 100 11/13/2017  1:22 PM   Margins Attached edges 11/13/2017  1:22 PM   Number of days: 204       Pressure Ulcer 07/24/17 Ischium Left DTPI #2 (Active)   Kiana-wound Assessment Maceration; White 2/12/2018  1:21 PM   Kiana-Wound Texture Localized edema 2/12/2018  1:21 PM   Kiana-Wound Moisture Macerated 2/12/2018  1:21 PM   Kiana-Wound Color White 2/12/2018  1:21 PM   Wound Assessment Red 11/13/2017  1:22 PM   Wound Length (cm) 0.5 cm 2/12/2018  1:21 PM   Wound Width (cm) 0.5 cm 2/12/2018  1:21 PM   Wound Depth (cm)  0.9 2/12/2018  1:21 PM   Calculated Wound Size (cm^2) (l*w) 0.25 cm^2 2/12/2018  1:21 PM   Change in Wound Size % (l*w) 91.67 2/12/2018  1:21 PM   Dressing Status Intact; New drainage; Old drainage 2/12/2018  1:21 PM   Dressing Dressing Changed Changed/New 2/12/2018  1:21 PM   Dressing/Treatment Open to air 2/12/2018  1:21 PM   Wound Cleansed Rinsed/Irrigated with saline 2/12/2018  1:21 PM   Wound Length (cm) 0.8 cm 2/12/2018  1:21 PM   Wound Width (cm) 1.5 cm 2/12/2018  1:21 PM   Wound Depth (cm)  scan 2/12/2018  1:21 PM   Calculated Wound Size (cm^2) (l*w) 1.2 cm^2 2/12/2018  1:21 PM   Change in Wound Size % (l*w) -122.22 2/12/2018  1:21 PM   Wound Assessment Brown;Red 2/12/2018  1:21 PM   Drainage Amount None 2/12/2018  1:21 PM   Drainage Description Yellow;Green 12/8/2017 10:30 AM   Odor None 2/12/2018  1:21 PM   Margins Attached edges 2/12/2018  1:21 PM   Kiana-wound Assessment Dry;Pink 2/12/2018  1:21 PM   [place]%Wound Bed 20 12/8/2017 10:30 AM   Red%Wound Bed 50 2/12/2018  1:21 PM   Yellow%Wound Bed 80 12/8/2017 10:30 AM   Black%Wound Bed 100 11/13/2017  1:22 PM   Other%Wound Bed 50 2/12/2018  1:21 PM   Op First Treatment Date 11/13/17 11/13/2017  1:22 PM   Number of days: 91       Wound 11/29/17 Ankle Right;Lateral #9 (Active)   Wound Image   2/12/2018  1:21 PM   Wound Type Wound 2/12/2018  1:21 PM   Dressing Status Intact 2/12/2018  1:21 PM   Dressing Changed Changed/New 2/12/2018  1:21 PM   Wound Cleansed Rinsed/Irrigated with saline 2/12/2018  1:21 PM   Wound Length (cm) 0.7 cm 2/12/2018  1:21 PM   Wound Width (cm) 0.6 cm 2/12/2018  1:21 PM   Wound Depth (cm)  scab 2/12/2018  1:21 PM   Calculated Wound Size (cm^2) (l*w) 0.42 cm^2 2/12/2018  1:21 PM   Change in Wound Size % (l*w) 52.27 2/12/2018  1:21 PM   Wound Assessment Brown;Red 2/12/2018  1:21 PM   Drainage Amount None 2/12/2018  1:21 PM   Drainage Description Serosanguinous 1/11/2018  1:33 PM   Odor None 2/12/2018  1:21 PM   Margins Attached edges 2/12/2018  1:21 PM   Kiana-wound Assessment Dry 2/12/2018  1:21 PM   Red%Wound Bed 50 2/12/2018  1:21 PM   Black%Wound Bed 100 12/8/2017 10:28 AM   Other%Wound Bed 50 2/12/2018  1:21 PM   Number of days: 75       Wound 12/08/17 Back Right ureteral stone N20.1    Decubitus ulcer of left ischium, stage 4 ([facility]) L89.324    Decubitus ulcer of left heel, stage 3 ([facility]) K96.475    Spasticity M92.1    Self-inflicted injury O57.17    Compulsive skin picking L98.1    Wound of abdomen S31.109A    Colostomy care ([facility]) Z43.3    Intussusception ([facility])  entero enteric non obstructing K56.1    UTI (urinary tract infection) N39.0    Chest pain R07.9    Altered mental status R41.82    Depression F32.9    Bacteria in urine R82.71    Suicidal thoughts R45.851    Decubitus ulcer of right ankle, stage 3 ([address] L89.513       Chemical Cautery    Performed by: Malia Torres CNP    Consent obtained? Yes, verbal     Time out taken: Yes    Tissue: friable     Pain Control:   N/A    Method: silver nitrate    Location of Chemical Cautery:   Wound/Ulcer #1    Procedural Pain: 0  / 10     Post Procedural Pain: 0 / 10     Response to treatment: Well tolerated by patient. Plan:     Patient examined and evaluated. Patient's coccyx, left ischium, and right lateral ankle ulcers continue to improve. His picking at his skin has improved although he does have a new open area to the right groin from picking. [facility] to [facility]. He has been having issues with his ostomy flange is leaking, it appears to be related to how low on his abdomen is ostomy flange sits in relation to his groin and then he also has spasms in his legs which interferes seal.    Silver nitrate applied to the coccyx ulcer friable tissue today    Patient is ostomy pouch was changed and his stoma and skin was assessed. Apply his [place] convex flange with drainable Aleida pouch using stoma paste. Patient was provided with samples of a [place] 2 piece 1-3/4 inch pouching system with flat flange and drainable pouch. Was also given samples of a Coloplast 2 piece 2-1/4 inch light convex click flange with drainable pouch.   His mother was educated on proper application and how to open and close the pouches. Coccyx- Cleanse wound with dakins solution and gauze. Pack wound with dry AMD gauze. Leave tail outside of wound. Cover with dry gauze/ABD pad. Secure with medipore tape. Change twice daily.     Left ischium- Cleanse wound with normal saline or wound cleanser and gauze. Pat dry with clean gauze. Apply zinc oxide cream to skin around wound as needed for irritated skin. Pack lightly with dry amd gauze. Leave tail outside wound. Cover with dry gauze. Secure with medipore tape. Change twice daily.     Right ankle-  Cleanse wound with normal saline and gauze. Pat dry with clean gauze. Apply zinc oxide to wound bed as needed for irritated skin. Cover with bordered foam dressing. Change every 3 days.     Abdomen and upper/lower back wounds/right groin- Cleanse wounds with normal saline and gauze. Pat dry with clean gauze. Apply utterbalm or zinc oxide to wound beds. Apply daily. Treatment:   Orders Placed This Encounter   Procedures    Apply dressing     Cleanse all wounds with normal saline and gauze, pat dry with clean gauze.     Coccyx-  Pack wound with dry AMD gauze. Leave tail outside of wound. Cover with dry gauze/ABD pad. Secure with medipore tape.      Left ischium- Cleanse wound with normal saline or wound cleanser and gauze. Pat dry with clean gauze. Apply zinc oxide cream to skin around wound as needed for irritated skin. Pack lightly with dry amd gauze. Leave tail outside wound. Cover with dry gauze. Secure with medipore tape. Change twice daily.     Right ankle-  Cover with bordered foam dressing.         Standing Status:   Standing     Number of Occurrences:   1         Antibiotics: No    Follow up: 4 weeks    Please see attached Discharge Instructions    Written patient dismissal instructions given to patient and signed by patient or POA.          Discharge Instructions         Visit Discharge/Physician Orders:   - Lay down at least twice daily for a couple hours to limit time in chair.   - Reposition yourself in chair every 1-2 hours.    - Off load heels. Wear off loading boots when laying down or sitting in chair.  - Increase protein. Try protein shake, eat eggs. - Wear gloves to help stop scratching. Do not smoke with gloves on.   - Silver nitrate applied to coccyx wound. Wound may appear grayish. - Pt given samples of aleida 2 piece 1 3/4 flange flat with tapeless border and drainable clamp pouch and  Coloplast 2 piece-  2 1/4 click convex light flange with drainable pouch        Wound Location: Coccyx, left ischium, abdomen, upper/lower back, right ankle, left heel, right groin, ostomy       Do not shower, take baths or get wound wet, unless otherwise instructed by your Wound Care doctor.       Keep all dressings clean & dry.      Dressing orders: Cleanse all wounds with normal saline and gauze, pat dry with clean gauze.     Coccyx- Cleanse wound with dakins solution and gauze. Pack wound with dry AMD gauze. Leave tail outside of wound. Cover with dry gauze/ABD pad. Secure with medipore tape. Change twice daily.     Left ischium- Cleanse wound with normal saline or wound cleanser and gauze. Pat dry with clean gauze. Apply zinc oxide cream to skin around wound as needed for irritated skin. Pack lightly with dry amd gauze. Leave tail outside wound. Cover with dry gauze. Secure with medipore tape. Change twice daily.     Right ankle-  Cleanse wound with normal saline and gauze. Pat dry with clean gauze. Apply zinc oxide to wound bed as needed for irritated skin. Cover with bordered foam dressing. Change every 3 days.     Abdomen and upper/lower back wounds/right groin- Cleanse wounds with normal saline and gauze. Pat dry with clean gauze. Apply utterbalm to wound beds.  Apply daily.              Supplies Size Order #   Aleida Convex Flange 1 1/4 [id number]   Adapt Powder   [id number]   Aleida drainable pouch with clamp    93671   Adapt belt   7300   Adapt paste   32288    Nuhope Adhesive    2401      Change your SOATB  2-8                    XTCLZ / week.     Application of Pouch:  1. Assemble above supplies in order of application before removing pouch. 2. Cut opening in flange. 3. Remove your worn appliance by gently pulling away from skin and discard. 4. Wash skin with warm water, rinse and pat dry.    5. Inspect your skin for redness or irritation.  If present, apply a small amount of powder to skin around stoma.  Brush off excess powder with a Kleenex. Do not use ointments. __X_        Stoma Powder             (for wet, weepy skin)   ___X        Desenex Powder         ([employer])  (itches, rash)       6.   Apply thin layer of nuhope adhesive to back of flange. Let It dry until it gets tachy.             7.  Apply paste to inner opening of flange. 8. Center the flange around your stoma.  Smooth the adhesive collar to your abdomen. 9. Apply your pouch  10. Apply belt snuggly.      Follow up visit: 1 month -  March 12th @ 2 :30 pm     Keep next scheduled appointment. Please give 24 hour notice if unable to keep appointment.  [phone]      If you experience any of the following, please call the Wound Care Service during business hours: [phone].                        *Increase in pain                        *Temperature over 101                        *Increase in drainage from your wound or a foul odor                        *Uncontrolled swelling                        *Need for compression bandage changes due to slippage, breakthrough drainage      If you need medical attention outside of business hours, please contact your Primary Care Doctor or go to the nearest emergency room.      Electronically signed by Jonh Agarwal CNP on 2/12/18 at 4:25 PM            Electronically signed by Jonh Agarwal CNP on 2/12/2018 at 4:26 PM

## 2018-02-13 ENCOUNTER — HOSPITAL ENCOUNTER (OUTPATIENT)
Dept: PHYSICAL THERAPY | Age: 28
Setting detail: THERAPIES SERIES
Discharge: HOME | End: 2018-02-13
Payer: MEDICAID

## 2018-02-13 ENCOUNTER — APPOINTMENT (OUTPATIENT)
Dept: OCCUPATIONAL THERAPY | Age: 28
End: 2018-02-13
Payer: MEDICAID

## 2018-02-13 ENCOUNTER — APPOINTMENT (OUTPATIENT)
Dept: PHYSICAL THERAPY | Age: 28
End: 2018-02-13
Payer: MEDICAID

## 2018-02-13 ENCOUNTER — HOSPITAL ENCOUNTER (OUTPATIENT)
Dept: PHYSICAL THERAPY | Age: 28
Setting detail: THERAPIES SERIES
End: 2018-02-13
Payer: MEDICAID

## 2018-02-19 ENCOUNTER — HOSPITAL ENCOUNTER (OUTPATIENT)
Dept: OCCUPATIONAL THERAPY | Age: 28
Setting detail: THERAPIES SERIES
End: 2018-02-19
Payer: MEDICAID

## 2018-02-22 ENCOUNTER — HOSPITAL ENCOUNTER (OUTPATIENT)
Dept: OCCUPATIONAL THERAPY | Age: 28
Setting detail: THERAPIES SERIES
Discharge: HOME | End: 2018-02-22
Payer: MEDICAID

## 2018-02-22 PROCEDURE — 97110 THERAPEUTIC EXERCISES: CPT

## 2018-02-22 ASSESSMENT — PAIN DESCRIPTION - ORIENTATION: ORIENTATION: LEFT

## 2018-02-22 ASSESSMENT — PAIN DESCRIPTION - LOCATION: LOCATION: SHOULDER;NECK

## 2018-02-22 NOTE — PROGRESS NOTES
[address] [address]  OUTPATIENT OCCUPATIONAL THERAPY  Daily Note  [address]    Time In: 1500  Time Out: 1515  Minutes: 15  Timed Code Treatment Minutes: 15 Minutes     Date: 2018  Patient Name: Ese Nixon        CSN: [id number]   : 1990  (24 y.o.)  Gender: male   Referring Practitioner: Dr. Robel Hubbard  Diagnosis: Paraplegia G82.20          General:  OT Visit Information  Onset Date: 18  OT Insurance Information: Medicaid - unlimited PT/OT/ST benefits - hot and cold packs are not covered  Total # of Visits to Date: 2  Certification Period Expiration Date: 18  Progress Note Counter: 2/10 for PN  Chart Reviewed: Yes (TBI, SCI T2, Neurogenic bowel/bladder, Stage I pressure ulcer, compulsive skin pickiong, Spasticity in the lower extremity, anxiety/bipolar)    Restrictions/Precautions:       Position Activity Restriction  Other position/activity restrictions: slide board transfer; TBI; SCI T2; Neurogenic bowel/bladdeer; compulsive skin picking; spasticity lowe extremities; suprapubic catheter; stage I pressure ulcer, anxiety/bipolar         Subjective:  Subjective: Patient into the clinic appearing pale with flushed cheeks and forehead. Discouraged treatment this date until he felt better but patient reported that he wanted to power through it. Reported that he started taking medication for a UTI this date, no fevers present. However, when sitting forward in the chair, patient reported that he could NOT continue with therapy this date. He did not feel well. Checked sugar and at 103. Provided orange juice and one bite of cracker with peanut butter. Pain:  Patient Currently in Pain: Yes  Pain Assessment:  (no number provided.  )  Pain Location: Shoulder, Neck  Pain Orientation: Left       Objective:     Upper Extremity Function  UE AROM: sitting scapular pinches, backward shoulder circles.     UE Stretching: sitting left scapular mobs

## 2018-02-26 ENCOUNTER — APPOINTMENT (OUTPATIENT)
Dept: OCCUPATIONAL THERAPY | Age: 28
End: 2018-02-26
Payer: MEDICAID

## 2018-02-26 ENCOUNTER — TELEPHONE (OUTPATIENT)
Dept: FAMILY MEDICINE CLINIC | Age: 28
End: 2018-02-26

## 2018-02-26 NOTE — TELEPHONE ENCOUNTER
Recommend we see their recommendation first.  If sx are worsening or if the confusion returns, recommend that he go to the ER.

## 2018-02-26 NOTE — TELEPHONE ENCOUNTER
Patient is having a temp of 101-102 yesterday. Today it is 99. His wound is having brown discharge. He is acting ok today. Yesterday he seemed a little disoriented. His nose is a little stuffy. They called wound clinic and lvm but havent heard back. Not sure if he needs seen today at that office or here?

## 2018-02-27 ENCOUNTER — TELEPHONE (OUTPATIENT)
Dept: WOUND CARE | Age: 28
End: 2018-02-27

## 2018-02-27 ENCOUNTER — HOSPITAL ENCOUNTER (OUTPATIENT)
Age: 28
Discharge: HOME | End: 2018-02-27
Payer: MEDICAID

## 2018-02-27 ENCOUNTER — TELEPHONE (OUTPATIENT)
Dept: UROLOGY | Age: 28
End: 2018-02-27

## 2018-02-27 DIAGNOSIS — R50.9 FEVER, UNSPECIFIED FEVER CAUSE: ICD-10-CM

## 2018-02-27 DIAGNOSIS — R82.90 CLOUDY URINE: ICD-10-CM

## 2018-02-27 DIAGNOSIS — R82.90 CLOUDY URINE: Primary | ICD-10-CM

## 2018-02-27 PROCEDURE — 87147 CULTURE TYPE IMMUNOLOGIC: CPT

## 2018-02-27 PROCEDURE — 87186 SC STD MICRODIL/AGAR DIL: CPT

## 2018-02-27 PROCEDURE — 87077 CULTURE AEROBIC IDENTIFY: CPT

## 2018-02-27 PROCEDURE — 87086 URINE CULTURE/COLONY COUNT: CPT

## 2018-02-27 NOTE — TELEPHONE ENCOUNTER
Patient mother called in (on [employer]) son has symptoms of UTI- fever, thick urine with a tan color. She has kits at home, will place clean bag and collect urine from there. Is taking patient UA to new [facility].

## 2018-02-27 NOTE — TELEPHONE ENCOUNTER
Reported patients symptoms to RICARDO Bee CNP including mother stating pink color in urinary bag/tubing. CNP recommends patient be seen by family dr or urologist for urine culture, updated mother of this. CNP wants ischium dressings to be changed to dakins moistened gauze, change once daily and increase to twice daily if needed for increased drainage. Mother verbalized understanding of new dressing orders. Instructed mother to call with any increased redness, swelling, foul smelling odor of ischium wound.

## 2018-02-28 ENCOUNTER — TELEPHONE (OUTPATIENT)
Dept: UROLOGY | Age: 28
End: 2018-02-28

## 2018-02-28 LAB
ORGANISM: ABNORMAL
URINE CULTURE, ROUTINE: ABNORMAL

## 2018-02-28 RX ORDER — SULFAMETHOXAZOLE AND TRIMETHOPRIM 800; 160 MG/1; MG/1
1 TABLET ORAL 2 TIMES DAILY
Qty: 10 TABLET | Refills: 0 | Status: SHIPPED | OUTPATIENT
Start: 2018-02-28 | End: 2018-03-05

## 2018-02-28 NOTE — TELEPHONE ENCOUNTER
Please let patient know I sent Bactrim for him to pharmacy. Urine culture is positive. Sensitivity is not resulted but since he has fever we need to start him on something.

## 2018-03-01 ENCOUNTER — TELEPHONE (OUTPATIENT)
Dept: UROLOGY | Age: 28
End: 2018-03-01

## 2018-03-01 NOTE — TELEPHONE ENCOUNTER
Pt's urine significant for staph sensitive to the Bactrim sent to the pharmacy already by [facility].

## 2018-03-02 ENCOUNTER — HOSPITAL ENCOUNTER (OUTPATIENT)
Dept: OCCUPATIONAL THERAPY | Age: 28
Setting detail: THERAPIES SERIES
Discharge: HOME | End: 2018-03-02
Payer: MEDICAID

## 2018-03-02 PROCEDURE — 97110 THERAPEUTIC EXERCISES: CPT

## 2018-03-05 RX ORDER — CHOLECALCIFEROL (VITAMIN D3) 125 MCG
50000 TABLET ORAL
Qty: 30 TABLET | OUTPATIENT
Start: 2018-03-05

## 2018-03-06 ENCOUNTER — HOSPITAL ENCOUNTER (OUTPATIENT)
Dept: OCCUPATIONAL THERAPY | Age: 28
Setting detail: THERAPIES SERIES
Discharge: HOME | End: 2018-03-06
Payer: MEDICAID

## 2018-03-06 PROCEDURE — 97110 THERAPEUTIC EXERCISES: CPT

## 2018-03-06 ASSESSMENT — PAIN DESCRIPTION - ORIENTATION: ORIENTATION: LEFT

## 2018-03-06 ASSESSMENT — PAIN SCALES - GENERAL: PAINLEVEL_OUTOF10: 3

## 2018-03-06 ASSESSMENT — PAIN DESCRIPTION - LOCATION: LOCATION: SHOULDER

## 2018-03-08 ENCOUNTER — HOSPITAL ENCOUNTER (OUTPATIENT)
Dept: OCCUPATIONAL THERAPY | Age: 28
Setting detail: THERAPIES SERIES
Discharge: HOME | End: 2018-03-08
Payer: MEDICAID

## 2018-03-08 ENCOUNTER — TELEPHONE (OUTPATIENT)
Dept: PHYSICAL MEDICINE AND REHAB | Age: 28
End: 2018-03-08

## 2018-03-08 ENCOUNTER — TELEPHONE (OUTPATIENT)
Dept: FAMILY MEDICINE CLINIC | Age: 28
End: 2018-03-08

## 2018-03-08 DIAGNOSIS — R11.0 NAUSEA: Primary | ICD-10-CM

## 2018-03-08 DIAGNOSIS — F42.4 SKIN-PICKING DISORDER: ICD-10-CM

## 2018-03-08 DIAGNOSIS — F06.31 MOOD DISORDER DUE TO KNOWN PHYSIOLOGICAL CONDITION WITH DEPRESSIVE FEATURES: ICD-10-CM

## 2018-03-08 PROCEDURE — 97110 THERAPEUTIC EXERCISES: CPT

## 2018-03-08 RX ORDER — BACLOFEN 20 MG/1
20 TABLET ORAL 3 TIMES DAILY
Qty: 90 TABLET | Refills: 5 | Status: SHIPPED | OUTPATIENT
Start: 2018-03-08 | End: 2018-08-08 | Stop reason: SDUPTHER

## 2018-03-08 RX ORDER — ERGOCALCIFEROL 1.25 MG/1
CAPSULE ORAL
Qty: 4 CAPSULE | Refills: 1 | OUTPATIENT
Start: 2018-03-08

## 2018-03-08 RX ORDER — ERGOCALCIFEROL 1.25 MG/1
50000 CAPSULE ORAL WEEKLY
Qty: 12 CAPSULE | Refills: 3 | OUTPATIENT
Start: 2018-03-08

## 2018-03-08 RX ORDER — HYDROXYZINE 50 MG/1
TABLET, FILM COATED ORAL
Qty: 90 TABLET | Refills: 5 | Status: SHIPPED | OUTPATIENT
Start: 2018-03-08 | End: 2018-08-08 | Stop reason: SDUPTHER

## 2018-03-08 RX ORDER — ONDANSETRON 4 MG/1
4 TABLET, ORALLY DISINTEGRATING ORAL EVERY 8 HOURS PRN
Qty: 90 TABLET | Refills: 2 | Status: SHIPPED | OUTPATIENT
Start: 2018-03-08 | End: 2018-03-15 | Stop reason: SDUPTHER

## 2018-03-08 ASSESSMENT — PAIN SCALES - GENERAL: PAINLEVEL_OUTOF10: 7

## 2018-03-08 ASSESSMENT — PAIN DESCRIPTION - ORIENTATION: ORIENTATION: LEFT

## 2018-03-08 ASSESSMENT — PAIN DESCRIPTION - LOCATION: LOCATION: SHOULDER

## 2018-03-08 NOTE — PROGRESS NOTES
1# 10 reps and 2# 10 reps from side to overhead to tolerance. Activity Tolerance: Additional Comments: Patient tolerated treatment well. Increased motivation continues. Trialed 2# weight with alphabet but noted increased difficulty and decreased stability with 2#, decreased to 1#. Assessment:  Assessment: Patient tolerated treatment well. Eager to increase to weights, education provided regarding appropriate progression with exercises.       Patient Education:  Patient Education: review safety with shoulder strengthening progression            Plan:  Current Treatment Recommendations: Modalities (comment), Self-Care / ADL, Manual Therapy:  Jt Manip, Manual Therapy:  STM, Safety Education & Training, ROM, Patient/Caregiver Education & Training, Strengthening  Plan Comment: Continue with established POC  Specific instructions for Next Treatment: ROM, strengthening B UE, AE as needed, modalities as needed to increase soft tissue length or pain management, ADLs          Melania Pham [place], OTR/L [id number]

## 2018-03-12 ENCOUNTER — HOSPITAL ENCOUNTER (OUTPATIENT)
Dept: WOUND CARE | Age: 28
Discharge: HOME | End: 2018-03-12
Payer: MEDICAID

## 2018-03-12 ENCOUNTER — NURSE ONLY (OUTPATIENT)
Dept: UROLOGY | Age: 28
End: 2018-03-12
Payer: MEDICAID

## 2018-03-12 VITALS
WEIGHT: 149 LBS | BODY MASS INDEX: 21.33 KG/M2 | DIASTOLIC BLOOD PRESSURE: 76 MMHG | OXYGEN SATURATION: 95 % | RESPIRATION RATE: 18 BRPM | TEMPERATURE: 98.1 F | SYSTOLIC BLOOD PRESSURE: 139 MMHG | HEIGHT: 70 IN | HEART RATE: 79 BPM

## 2018-03-12 DIAGNOSIS — N31.9 NEUROGENIC BLADDER: ICD-10-CM

## 2018-03-12 PROBLEM — T21.34XA: Status: RESOLVED | Noted: 2017-06-29 | Resolved: 2018-03-12

## 2018-03-12 PROCEDURE — 17250 CHEM CAUT OF GRANLTJ TISSUE: CPT

## 2018-03-12 PROCEDURE — 99999 PR OFFICE/OUTPT VISIT,PROCEDURE ONLY: CPT | Performed by: UROLOGY

## 2018-03-12 PROCEDURE — 17250 CHEM CAUT OF GRANLTJ TISSUE: CPT | Performed by: NURSE PRACTITIONER

## 2018-03-12 PROCEDURE — 51705 CHANGE OF BLADDER TUBE: CPT | Performed by: UROLOGY

## 2018-03-12 PROCEDURE — 6370000000 HC RX 637 (ALT 250 FOR IP): Performed by: NURSE PRACTITIONER

## 2018-03-12 RX ADMIN — SILVER NITRATE APPLICATORS 1 EACH: 25; 75 STICK TOPICAL at 15:50

## 2018-03-12 NOTE — PROGRESS NOTES
Following Dr. Benitez Pomona of [facility]. Changed 20 Fr suprapubic Catheter without difficulty. Once balloon was deflated, removed catheter without difficulty. Cleansed suprapubic opening with betadine swab. 20 Fr regular rodrigues was inserted without difficulty. Catheter was flushed with 10cc water insuring return and inflated balloon with   10 ml of water. Rodrigues Catheter was hooked up to leg bag with straps. Patient instructed on catheter care including draining catheter bag and keeping catheter bag above the knee to prevent pulling on catheter causing blood.

## 2018-03-12 NOTE — PROGRESS NOTES
Secure with medipore tape. Change twice daily.     Left ischium- Cleanse wound with normal saline or wound cleanser and gauze. Pat dry with clean gauze. Apply zinc oxide cream to skin around wound as needed for irritated skin. Pack lightly with dry amd gauze. Leave tail outside wound. Cover with dry gauze. Secure with medipore tape. Change twice daily.     Right ankle-  Cleanse wound with normal saline and gauze. Pat dry with clean gauze. Apply zinc oxide to wound bed as needed for irritated skin. Cover with bordered foam dressing. Change every 3 days.     Abdomen and upper/lower back wounds/right groin- Cleanse wounds with normal saline and gauze. Pat dry with clean gauze. Apply utterbalm or zinc oxide to wound beds. Apply daily.          PAST MEDICAL HISTORY        Diagnosis Date    Depression     Hyperthyroidism 9/2014    MDRO (multiple drug resistant organisms) resistance     MRSA LUNGS    Pneumonia     TMJ locking        PAST SURGICAL HISTORY    Past Surgical History:   Procedure Laterality Date    APPENDECTOMY      BACK SURGERY  11/2016    BRAIN SURGERY  11/05/2016    CLOT REMOVED    CYSTOSCOPY  04/17/2017    FACIAL RECONSTRUCTION SURGERY  2012    left zygomatic arch and sinus    FRACTURE SURGERY Left 11/2016    HARDWARE REMOVAL  2012    left zygomatic arch    OTHER SURGICAL HISTORY  01/09/2017    Laparoscopic Diverting Colostomy    OTHER SURGICAL HISTORY  01/09/2017    Excisional Debridment Sacral Decubitus Ulcer    OTHER SURGICAL HISTORY  04/17/2017    Placement of suprapubic catheter    TONSILLECTOMY         FAMILY HISTORY    Family History   Problem Relation Age of Onset    Heart Disease Mother     Heart Disease Father        SOCIAL HISTORY    Social History   Substance Use Topics    Smoking status: Current Every Day Smoker     Packs/day: 1.00     Years: 10.00     Types: Cigarettes, Cigars    Smokeless tobacco: Never Used    Alcohol use No       ALLERGIES    Allergies   Allergen for irritation and redness  Ears, nose, mouth, throat, and face: negative for hearing loss and hoarseness  Respiratory: negative for cough and shortness of breath  Cardiovascular: negative for chest pain, dyspnea and lower extremity edema  Gastrointestinal: positive for colostomy, negative for abdominal pain, nausea and vomiting  Genitourinary: positive for suprapubic catheter  Integument/breast: positive for coccyx ulcer, left ischium ulcer, right lateral malleolus ulcer, right upper back wound, and lower abdomen wounds, negative for rash  Musculoskeletal: positive for paraplegia  Neurological: negative for dizziness, speech problems and positive for paraplegia  Behavioral/Psych: positive for good mood    Objective:      /76   Pulse 79   Temp 98.1 °F (36.7 °C) (Oral)   Resp 18   Ht 5' 10\" (1.778 m)   Wt 149 lb (67.6 kg)   SpO2 95%   BMI 21.38 kg/m²     Wt Readings from Last 3 Encounters:   03/12/18 149 lb (67.6 kg)   02/12/18 139 lb 4.8 oz (63.2 kg)   02/08/18 144 lb 2.9 oz (65.4 kg)       PHYSICAL EXAM    General Appearance: alert and oriented to person, place and time    Skin: warm and dry  Head: normocephalic and atraumatic  Eyes: pupils equal, round, and reactive to light  ENT: hearing grossly normal bilaterally  Pulmonary/Chest: clear to auscultation bilaterally- no wheezes, rales or rhonchi, normal air movement, no respiratory distress  Cardiovascular: normal rate and normal S1 and S2  Abdomen: soft, non-tender, bowel sounds normal   Extremities: no cyanosis, no clubbing and no edema  Musculoskeletal: no joint deformity, paraplegia lower extremities  Neurologic: speech normal and gait abnormal- wheelchair bound, frequent muscle spasms  Colostomy, LLQ: pouch intact  Lower abdomen: Significantly improved. Two small scabbed areas noted. No drainage. Periwound skin is intact. No redness, warmth, or induration noted.    Right groin: Significantly improved. Stable scab noted. No drainage.   Periwound 3:24 PM   Drainage Description Yellow 3/12/2018  3:24 PM   Odor None 3/12/2018  3:24 PM   Undermining Starts ___ O'Clock 12 3/12/2018  3:24 PM   Undermining Ends___ O'Clock 3 3/12/2018  3:24 PM   Undermining Maxium Distance (cm) 1.2 3/12/2018  3:24 PM   Bier%Wound Bed 70 12/8/2017 10:40 AM   Red%Wound Bed 10 3/12/2018  3:24 PM   Yellow%Wound Bed 90 3/12/2018  3:24 PM   Black%Wound Bed 10 10/6/2017  1:37 PM   Margins Unattached edges; Attached edges 3/12/2018  3:24 PM   Number of days: 365       Pressure Ulcer 07/24/17 Ischium Left  #2 (Active)   Kiana-wound Assessment Dry 3/12/2018  3:24 PM   Kiana-Wound Texture Localized edema 2/12/2018  1:21 PM   Kiana-Wound Moisture Dry 3/12/2018  3:24 PM   Kiana-Wound Color Pink 3/12/2018  3:24 PM   Wound Assessment Red 11/13/2017  1:22 PM   Wound Length (cm) 0.5 cm 3/12/2018  3:24 PM   Wound Width (cm) 0.5 cm 3/12/2018  3:24 PM   Wound Depth (cm)  1.5 3/12/2018  3:24 PM   Calculated Wound Size (cm^2) (l*w) 0.25 cm^2 3/12/2018  3:24 PM   Change in Wound Size % (l*w) 91.67 3/12/2018  3:24 PM   Dressing Status Intact; Old drainage; Changed 3/12/2018  3:24 PM   Dressing Changed Changed/New 3/12/2018  3:24 PM   Dressing/Treatment Packing 11/13/2017  1:22 PM   Wound Cleansed Rinsed/Irrigated with saline 3/12/2018  3:24 PM   Drainage Amount Moderate 3/12/2018  3:24 PM   Drainage Description Serosanguinous 3/12/2018  3:24 PM   Odor None 3/12/2018  3:24 PM   Tunneling Position ___ O'Clock 12 3/12/2018  3:24 PM   Tunneling Maxium Distance (cm) 6.2 3/12/2018  3:24 PM   Undermining Starts ___ O'Clock 0 12/8/2017 10:40 AM   Undermining Ends___ O'Clock 0 12/8/2017 10:40 AM   Undermining Maxium Distance (cm) 0 12/8/2017 10:40 AM   Bier%Wound Bed 100 2/12/2018  1:21 PM   Red%Wound Bed 100 3/12/2018  3:24 PM   Yellow%Wound Bed 50 12/8/2017 10:40 AM   Black%Wound Bed 25 8/16/2017  1:29 PM   Purple%Wound Bed 10 10/23/2017  3:02 PM   Margins Attached edges; Unattached edges 3/12/2018  3:24 PM   Number of injury Z72.89    Compulsive skin picking L98.1    Wound of abdomen S31.109A    Colostomy care ([facility] [address] Z43.3    Intussusception (HCC)  entero enteric non obstructing K56.1    UTI (urinary tract infection) N39.0    Chest pain R07.9    Altered mental status R41.82    Depression F32.9    Bacteria in urine R82.71    Suicidal thoughts R45.851    Decubitus ulcer of right ankle, stage 3 ([facility] [address] L89.513         Chemical Cautery    Performed by: Alexus Aceves CNP    Consent obtained? Yes, verbal     Time out taken: Yes    Tissue: friable      Pain Control:   N/A    Method: silver nitrate    Location of Chemical Cautery:   Wound/Ulcer #1    Procedural Pain: 0  / 10     Post Procedural Pain: 0 / 10     Response to treatment: Well tolerated by patient. Plan:     Patient examined and evaluated. The left ischium ulcer is improving. The coccyx ulcer is slightly larger. Patient has been spending more time on his back with head of bed elevated. He has been getting more pressure to the area. Reeducated patient on the importance of turning and frequent repositioning. He verbalized understanding. Patient has also not been consistantly wearing his off loading boots to the bilateral heels when in bed. Silver nitrate applied to the coccyx ulcer friable tissue    Coccyx- Cleanse wound with dakins solution and gauze. Pack wound with dry AMD gauze. Leave tail outside of wound. Cover with dry gauze/ABD pad. Secure with medipore tape. Change twice daily. Left ischium- Cleanse wound with normal saline or wound cleanser and gauze. Pat dry with clean gauze. Apply zinc oxide cream to skin around wound as needed for irritated skin. Pack lightly with dry AMD ribbon. Leave tail outside wound. Cover with dry gauze. Secure with medipore tape. Change twice daily. Right ankle-  Cleanse wound with normal saline and gauze. Pat dry with clean gauze. Apply zinc oxide to wound bed as needed for irritated skin.  Cover with

## 2018-03-13 ENCOUNTER — HOSPITAL ENCOUNTER (OUTPATIENT)
Dept: OCCUPATIONAL THERAPY | Age: 28
Setting detail: THERAPIES SERIES
Discharge: HOME | End: 2018-03-13
Payer: MEDICAID

## 2018-03-13 PROCEDURE — 97110 THERAPEUTIC EXERCISES: CPT

## 2018-03-13 ASSESSMENT — PAIN DESCRIPTION - ORIENTATION: ORIENTATION: LEFT

## 2018-03-13 ASSESSMENT — PAIN SCALES - GENERAL: PAINLEVEL_OUTOF10: 4

## 2018-03-13 ASSESSMENT — PAIN DESCRIPTION - LOCATION: LOCATION: SHOULDER

## 2018-03-15 RX ORDER — ONDANSETRON 4 MG/1
4 TABLET, ORALLY DISINTEGRATING ORAL EVERY 8 HOURS PRN
Qty: 90 TABLET | Refills: 2 | Status: SHIPPED | OUTPATIENT
Start: 2018-03-15 | End: 2018-04-13 | Stop reason: SDUPTHER

## 2018-03-15 NOTE — TELEPHONE ENCOUNTER
This script went to incorrect pharmacy.   Please sent the dissolveable Zofran to [facility]

## 2018-03-15 NOTE — PROGRESS NOTES
[place]  CASE CONFERENCE    Date: 3/15/2018  Patient Name: Darin Cervantes,  Gender:  male        : 1990  (27 y.o.)      Outpatient Case Conference held on 3/15/2018. Plan of care reviewed and progress discussed. Patient currently receiving OT services at this time. Focused on left shoulder pain and B UE strength. Patient using heavier weights than recommended at home. Educated patient regarding appropriate strengthening with weights and progression. Voices understanding but does not follow through. Recommend PT services, but patient not in agreement at this time.       Andreas Juan, MOT, OTR/L [id number]
abduction/adduction 2 sets of 10 (patient doubled band without instruction from Luisa Kuhn), supine L shoulder rhythmic stabilization 30 seconds on 30 seconds off 3 intervals, green theraband bicep curls 2 sets of 15, tricep extension 2 sets of 15, yellow theraband D2 flexion x10 reps, 2# flexion full arc of motion x10 reps. 2# scapular punch x8 reps before reporting pain so stopped. Activity Tolerance: Additional Comments: Patient tolerated treatment well. Question benefit of HEP that patient is completing at home, using 15# weights with decreased shoulder stability. Assessment:  Assessment: Patient is progressing towards goals.      Patient Education:  Patient Education: review safety with shoulder strengthening progression    Plan:  Plan Comment: Continue with established Meg #[id number]

## 2018-03-16 ENCOUNTER — APPOINTMENT (OUTPATIENT)
Dept: OCCUPATIONAL THERAPY | Age: 28
End: 2018-03-16
Payer: MEDICAID

## 2018-03-19 ENCOUNTER — TELEPHONE (OUTPATIENT)
Dept: PHYSICAL MEDICINE AND REHAB | Age: 28
End: 2018-03-19

## 2018-03-19 ENCOUNTER — APPOINTMENT (OUTPATIENT)
Dept: OCCUPATIONAL THERAPY | Age: 28
End: 2018-03-19
Payer: MEDICAID

## 2018-03-19 DIAGNOSIS — G82.20 PARAPLEGIA (HCC): Primary | ICD-10-CM

## 2018-03-22 ENCOUNTER — HOSPITAL ENCOUNTER (OUTPATIENT)
Dept: OCCUPATIONAL THERAPY | Age: 28
Setting detail: THERAPIES SERIES
Discharge: HOME | End: 2018-03-22
Payer: MEDICAID

## 2018-03-22 NOTE — PROGRESS NOTES
[address]  OCCUPATIONAL THERAPY MISSED TREATMENT NOTE  [facility]      Date: 3/22/2018  Patient Name: Shanti Pearson        CSN: [id number]   : 1990  (24 y.o.)  Gender: male   Referring Practitioner: Dr. Vladimir Ambrose  Diagnosis: Paraplegia G82.20         REASON FOR MISSED TREATMENT:  Patient called the clinic this date and requested to speak with a therapist.  I returned a call to the patient. He questioned if he was going to get to lift weights today in therapy. I instructed him that there is a potential to increase by 1#. Patient reported that he desired to use heavier weights. I instructed the patient that heavier weights than what he is currently lifting is not appropriate for his shoulder due to lack of motion and current strength abilities. I educated the patient that too great of weights is not appropriate for his shoulder and would create other painful conditions/injuries. Patient reported that he wanted to cancel his appointment this date and would discuss with his mother whether he wanted to continue with therapy at all at this time.       Marin Judd, MOT, OTR/L [id number]

## 2018-03-26 ENCOUNTER — OFFICE VISIT (OUTPATIENT)
Dept: PHYSICAL MEDICINE AND REHAB | Age: 28
End: 2018-03-26
Payer: MEDICAID

## 2018-03-26 VITALS
WEIGHT: 149.03 LBS | DIASTOLIC BLOOD PRESSURE: 86 MMHG | HEIGHT: 70 IN | SYSTOLIC BLOOD PRESSURE: 125 MMHG | HEART RATE: 80 BPM | BODY MASS INDEX: 21.34 KG/M2

## 2018-03-26 DIAGNOSIS — R25.2 SPASTICITY: ICD-10-CM

## 2018-03-26 DIAGNOSIS — G82.20 PARAPLEGIA (HCC): Primary | ICD-10-CM

## 2018-03-26 PROCEDURE — 99213 OFFICE O/P EST LOW 20 MIN: CPT | Performed by: NURSE PRACTITIONER

## 2018-03-26 NOTE — PROGRESS NOTES
[address] [facility]  Outpatient progress note    Chief Complaint:   Chief Complaint   Patient presents with    Follow-up     discuss botox injections        Subjective: Lindsay Alexandra is a 24 y.o. male who returns to the office today for further follow up. Patient continues with spasticity in bilateral lower limbs. Reports no improvement with current doses of baclofen and flexeril. Would like to be evaluated for botulinum toxin injections today. He is no longer doing any therapy. Patient states he was frustrated that they weren't working with his legs. He did not want to participate any longer. Discussed importance of doing therapy in conjunction with the botulinum toxin injections in order to get maximum results. Patient and father express understanding. He continues to follow with wound management, wounds are healing well. The scattered wounds over his trunk are beginning to scab over. Father states the picking is improving. He is following with [employer]. Patient continues to be more talkative than previous, mood is improved.       Review of Systems:  CONSTITUTIONAL: negative  RESPIRATORY: negative  CARDIOVASCULAR: negative  GASTROINTESTINAL: positive for neurogenic bowel- ostomy in place  GENITOURINARY: positive for neurogenic bladder; suprapubic cath in place  MUSCULOSKELETAL: positive for pain, decreased ROM in left shoulder, muscle weakness  NEUROLOGICAL: positive for weakness, paraplegia  BEHAVIOR/PSYCH: hx bipolar, memory problems  10 point system review otherwise negative    Physical Exam:  /86 (Site: Right Arm, Position: Sitting)   Pulse 80   Ht 5' 10\" (1.778 m)   Wt 149 lb 0.5 oz (67.6 kg)   BMI 21.38 kg/m²      awake  Orientation:   person, place, time  Mood: within normal limits  Affect: calm, talkative  General appearance: Patient is well nourished, well developed, well groomed and in no acute

## 2018-03-28 ENCOUNTER — PRE-PROCEDURE TELEPHONE (OUTPATIENT)
Dept: WOUND CARE | Age: 28
End: 2018-03-28

## 2018-03-30 RX ORDER — MIDODRINE HYDROCHLORIDE 2.5 MG/1
TABLET ORAL
Qty: 90 TABLET | Refills: 3 | Status: SHIPPED | OUTPATIENT
Start: 2018-03-30 | End: 2018-07-20 | Stop reason: SDUPTHER

## 2018-04-02 DIAGNOSIS — K59.09 OTHER CONSTIPATION: ICD-10-CM

## 2018-04-02 RX ORDER — GABAPENTIN 300 MG/1
CAPSULE ORAL
Qty: 120 CAPSULE | Refills: 5 | Status: SHIPPED | OUTPATIENT
Start: 2018-04-02 | End: 2018-09-10 | Stop reason: SDUPTHER

## 2018-04-02 RX ORDER — LANOLIN ALCOHOL/MO/W.PET/CERES
325 CREAM (GRAM) TOPICAL 2 TIMES DAILY
Qty: 60 TABLET | Refills: 5 | Status: SHIPPED | OUTPATIENT
Start: 2018-04-02 | End: 2018-09-10 | Stop reason: SDUPTHER

## 2018-04-02 RX ORDER — DOCUSATE SODIUM 100 MG/1
100 CAPSULE, LIQUID FILLED ORAL 2 TIMES DAILY
Qty: 60 CAPSULE | Refills: 5 | Status: SHIPPED | OUTPATIENT
Start: 2018-04-02 | End: 2018-08-08 | Stop reason: SDUPTHER

## 2018-04-06 DIAGNOSIS — F32.2 SEVERE SINGLE CURRENT EPISODE OF MAJOR DEPRESSIVE DISORDER, WITHOUT PSYCHOTIC FEATURES (HCC): ICD-10-CM

## 2018-04-06 RX ORDER — TRAZODONE HYDROCHLORIDE 100 MG/1
200 TABLET ORAL NIGHTLY PRN
Qty: 60 TABLET | Refills: 2 | Status: SHIPPED | OUTPATIENT
Start: 2018-04-06 | End: 2018-06-29 | Stop reason: SDUPTHER

## 2018-04-09 ENCOUNTER — OFFICE VISIT (OUTPATIENT)
Dept: PHYSICAL MEDICINE AND REHAB | Age: 28
End: 2018-04-09
Payer: MEDICAID

## 2018-04-09 VITALS
HEART RATE: 98 BPM | WEIGHT: 149 LBS | SYSTOLIC BLOOD PRESSURE: 120 MMHG | HEIGHT: 70 IN | BODY MASS INDEX: 21.33 KG/M2 | DIASTOLIC BLOOD PRESSURE: 77 MMHG

## 2018-04-09 DIAGNOSIS — R25.2 SPASTICITY: Primary | ICD-10-CM

## 2018-04-09 DIAGNOSIS — G82.20 PARAPLEGIA (HCC): ICD-10-CM

## 2018-04-09 PROCEDURE — 64644 CHEMODENERV 1 EXTREM 5/> MUS: CPT | Performed by: PHYSICAL MEDICINE & REHABILITATION

## 2018-04-09 PROCEDURE — 64643 CHEMODENERV 1 EXTREM 1-4 EA: CPT | Performed by: PHYSICAL MEDICINE & REHABILITATION

## 2018-04-12 PROBLEM — N39.0 UTI (URINARY TRACT INFECTION): Status: RESOLVED | Noted: 2017-11-28 | Resolved: 2018-04-12

## 2018-04-13 ENCOUNTER — HOSPITAL ENCOUNTER (OUTPATIENT)
Dept: WOUND CARE | Age: 28
Discharge: HOME | End: 2018-04-13
Payer: MEDICAID

## 2018-04-13 VITALS
OXYGEN SATURATION: 97 % | RESPIRATION RATE: 18 BRPM | SYSTOLIC BLOOD PRESSURE: 100 MMHG | HEART RATE: 95 BPM | TEMPERATURE: 98.4 F | DIASTOLIC BLOOD PRESSURE: 67 MMHG

## 2018-04-13 PROCEDURE — 17250 CHEM CAUT OF GRANLTJ TISSUE: CPT | Performed by: NURSE PRACTITIONER

## 2018-04-13 PROCEDURE — 17250 CHEM CAUT OF GRANLTJ TISSUE: CPT

## 2018-04-13 PROCEDURE — 6370000000 HC RX 637 (ALT 250 FOR IP): Performed by: NURSE PRACTITIONER

## 2018-04-13 RX ADMIN — SILVER NITRATE APPLICATORS 2 EACH: 25; 75 STICK TOPICAL at 14:57

## 2018-04-16 ENCOUNTER — TELEPHONE (OUTPATIENT)
Dept: PHYSICAL MEDICINE AND REHAB | Age: 28
End: 2018-04-16

## 2018-04-23 ENCOUNTER — NURSE ONLY (OUTPATIENT)
Dept: UROLOGY | Age: 28
End: 2018-04-23
Payer: MEDICAID

## 2018-04-23 DIAGNOSIS — N31.9 NEUROGENIC BLADDER: Primary | ICD-10-CM

## 2018-04-23 PROCEDURE — 99999 PR OFFICE/OUTPT VISIT,PROCEDURE ONLY: CPT | Performed by: UROLOGY

## 2018-04-23 PROCEDURE — 51705 CHANGE OF BLADDER TUBE: CPT | Performed by: UROLOGY

## 2018-04-24 ENCOUNTER — TELEPHONE (OUTPATIENT)
Dept: UROLOGY | Age: 28
End: 2018-04-24

## 2018-04-28 DIAGNOSIS — G89.4 CHRONIC PAIN SYNDROME: ICD-10-CM

## 2018-04-28 RX ORDER — IBUPROFEN 800 MG/1
800 TABLET ORAL 3 TIMES DAILY PRN
Qty: 90 TABLET | Refills: 5 | Status: SHIPPED | OUTPATIENT
Start: 2018-04-28 | End: 2018-11-05 | Stop reason: SDUPTHER

## 2018-05-15 ENCOUNTER — OFFICE VISIT (OUTPATIENT)
Dept: FAMILY MEDICINE CLINIC | Age: 28
End: 2018-05-15
Payer: MEDICAID

## 2018-05-15 VITALS
HEART RATE: 68 BPM | SYSTOLIC BLOOD PRESSURE: 124 MMHG | RESPIRATION RATE: 20 BRPM | DIASTOLIC BLOOD PRESSURE: 72 MMHG | WEIGHT: 142 LBS | BODY MASS INDEX: 20.37 KG/M2 | TEMPERATURE: 98.8 F

## 2018-05-15 DIAGNOSIS — G89.4 CHRONIC PAIN SYNDROME: ICD-10-CM

## 2018-05-15 DIAGNOSIS — Z43.3 COLOSTOMY CARE (HCC): ICD-10-CM

## 2018-05-15 DIAGNOSIS — G82.20 PARAPLEGIA (HCC): Primary | ICD-10-CM

## 2018-05-15 DIAGNOSIS — G43.709 CHRONIC MIGRAINE WITHOUT AURA WITHOUT STATUS MIGRAINOSUS, NOT INTRACTABLE: ICD-10-CM

## 2018-05-15 PROCEDURE — 99214 OFFICE O/P EST MOD 30 MIN: CPT | Performed by: FAMILY MEDICINE

## 2018-05-15 RX ORDER — RIZATRIPTAN BENZOATE 5 MG/1
5 TABLET ORAL
Qty: 15 TABLET | Refills: 3 | Status: SHIPPED | OUTPATIENT
Start: 2018-05-15 | End: 2018-05-15 | Stop reason: SDUPTHER

## 2018-05-15 RX ORDER — RIZATRIPTAN BENZOATE 5 MG/1
5 TABLET ORAL
Qty: 15 TABLET | Refills: 3 | Status: SHIPPED | OUTPATIENT
Start: 2018-05-15 | End: 2020-07-24 | Stop reason: SDUPTHER

## 2018-05-15 RX ORDER — ARIPIPRAZOLE 1 MG/ML
5 SOLUTION ORAL DAILY
COMMUNITY
End: 2018-09-19 | Stop reason: ALTCHOICE

## 2018-05-16 ENCOUNTER — TELEPHONE (OUTPATIENT)
Dept: UROLOGY | Age: 28
End: 2018-05-16

## 2018-05-22 ENCOUNTER — OFFICE VISIT (OUTPATIENT)
Dept: PHYSICAL MEDICINE AND REHAB | Age: 28
End: 2018-05-22
Payer: MEDICAID

## 2018-05-22 VITALS
DIASTOLIC BLOOD PRESSURE: 85 MMHG | SYSTOLIC BLOOD PRESSURE: 122 MMHG | BODY MASS INDEX: 20.33 KG/M2 | WEIGHT: 141.98 LBS | HEIGHT: 70 IN | HEART RATE: 99 BPM

## 2018-05-22 DIAGNOSIS — G82.20 PARAPLEGIA (HCC): ICD-10-CM

## 2018-05-22 DIAGNOSIS — R25.2 SPASTICITY: Primary | ICD-10-CM

## 2018-05-22 PROCEDURE — 99213 OFFICE O/P EST LOW 20 MIN: CPT | Performed by: NURSE PRACTITIONER

## 2018-05-24 DIAGNOSIS — R60.0 BILATERAL EDEMA OF LOWER EXTREMITY: ICD-10-CM

## 2018-05-24 DIAGNOSIS — F32.2 SEVERE SINGLE CURRENT EPISODE OF MAJOR DEPRESSIVE DISORDER, WITHOUT PSYCHOTIC FEATURES (HCC): ICD-10-CM

## 2018-05-24 DIAGNOSIS — G89.21 CHRONIC PAIN DUE TO TRAUMA: ICD-10-CM

## 2018-05-24 RX ORDER — FUROSEMIDE 20 MG/1
20 TABLET ORAL DAILY
Qty: 90 TABLET | Refills: 1 | Status: SHIPPED | OUTPATIENT
Start: 2018-05-24 | End: 2018-10-29 | Stop reason: ALTCHOICE

## 2018-05-24 RX ORDER — VENLAFAXINE HYDROCHLORIDE 150 MG/1
150 CAPSULE, EXTENDED RELEASE ORAL DAILY
Qty: 30 CAPSULE | Refills: 5 | Status: SHIPPED | OUTPATIENT
Start: 2018-05-24 | End: 2018-11-05 | Stop reason: SDUPTHER

## 2018-06-04 ENCOUNTER — NURSE ONLY (OUTPATIENT)
Dept: UROLOGY | Age: 28
End: 2018-06-04
Payer: MEDICAID

## 2018-06-04 DIAGNOSIS — N31.9 NEUROGENIC BLADDER: Primary | ICD-10-CM

## 2018-06-04 PROCEDURE — 99999 PR OFFICE/OUTPT VISIT,PROCEDURE ONLY: CPT | Performed by: UROLOGY

## 2018-06-04 PROCEDURE — 51705 CHANGE OF BLADDER TUBE: CPT | Performed by: UROLOGY

## 2018-06-12 ENCOUNTER — TELEPHONE (OUTPATIENT)
Dept: FAMILY MEDICINE CLINIC | Age: 28
End: 2018-06-12

## 2018-06-15 ENCOUNTER — HOSPITAL ENCOUNTER (OUTPATIENT)
Dept: WOUND CARE | Age: 28
Discharge: HOME | End: 2018-06-15
Payer: MEDICAID

## 2018-06-15 VITALS
DIASTOLIC BLOOD PRESSURE: 67 MMHG | HEART RATE: 99 BPM | OXYGEN SATURATION: 97 % | RESPIRATION RATE: 18 BRPM | TEMPERATURE: 98 F | SYSTOLIC BLOOD PRESSURE: 118 MMHG

## 2018-06-15 PROBLEM — K56.1 INTUSSUSCEPTION (HCC): Status: RESOLVED | Noted: 2017-11-28 | Resolved: 2018-06-15

## 2018-06-15 PROCEDURE — 99213 OFFICE O/P EST LOW 20 MIN: CPT | Performed by: NURSE PRACTITIONER

## 2018-06-15 PROCEDURE — 17250 CHEM CAUT OF GRANLTJ TISSUE: CPT | Performed by: NURSE PRACTITIONER

## 2018-06-15 PROCEDURE — 6370000000 HC RX 637 (ALT 250 FOR IP): Performed by: NURSE PRACTITIONER

## 2018-06-15 PROCEDURE — 17250 CHEM CAUT OF GRANLTJ TISSUE: CPT

## 2018-06-15 RX ADMIN — SILVER NITRATE APPLICATORS 1 EACH: 25; 75 STICK TOPICAL at 11:19

## 2018-06-15 ASSESSMENT — PAIN SCALES - GENERAL: PAINLEVEL_OUTOF10: 0

## 2018-06-21 ENCOUNTER — TELEPHONE (OUTPATIENT)
Dept: FAMILY MEDICINE CLINIC | Age: 28
End: 2018-06-21

## 2018-06-29 DIAGNOSIS — F32.2 SEVERE SINGLE CURRENT EPISODE OF MAJOR DEPRESSIVE DISORDER, WITHOUT PSYCHOTIC FEATURES (HCC): ICD-10-CM

## 2018-06-29 RX ORDER — TRAZODONE HYDROCHLORIDE 100 MG/1
200 TABLET ORAL NIGHTLY PRN
Qty: 60 TABLET | Refills: 2 | Status: SHIPPED | OUTPATIENT
Start: 2018-06-29 | End: 2018-09-10 | Stop reason: SDUPTHER

## 2018-07-13 ENCOUNTER — HOSPITAL ENCOUNTER (OUTPATIENT)
Dept: WOUND CARE | Age: 28
Discharge: HOME | End: 2018-07-13
Payer: MEDICAID

## 2018-07-13 VITALS
SYSTOLIC BLOOD PRESSURE: 88 MMHG | DIASTOLIC BLOOD PRESSURE: 51 MMHG | TEMPERATURE: 97.9 F | RESPIRATION RATE: 16 BRPM | HEART RATE: 107 BPM | OXYGEN SATURATION: 95 %

## 2018-07-13 PROCEDURE — A4419 OST PCH FOR BAR W FLANGE/FLT: HCPCS

## 2018-07-13 PROCEDURE — 6370000000 HC RX 637 (ALT 250 FOR IP): Performed by: NURSE PRACTITIONER

## 2018-07-13 PROCEDURE — 17250 CHEM CAUT OF GRANLTJ TISSUE: CPT

## 2018-07-13 PROCEDURE — 17250 CHEM CAUT OF GRANLTJ TISSUE: CPT | Performed by: NURSE PRACTITIONER

## 2018-07-13 PROCEDURE — A4406 PECTIN BASED OSTOMY PASTE: HCPCS

## 2018-07-13 PROCEDURE — A4407 EXT WEAR OST SKN BARR <=4SQ": HCPCS

## 2018-07-13 PROCEDURE — A6022 COLLAGEN DRSG>16<=48 SQ IN: HCPCS

## 2018-07-13 RX ADMIN — SILVER NITRATE APPLICATORS 1 EACH: 25; 75 STICK TOPICAL at 14:35

## 2018-07-13 ASSESSMENT — PAIN SCALES - GENERAL: PAINLEVEL_OUTOF10: 0

## 2018-07-13 NOTE — PROGRESS NOTES
recommended at the last visit leaked within a couple of hours. The inpatient ostomy nurses suggested Nu Hope adhesive which they have been doing and may have had decreased leaking with this but he continues to have difficulty with peristomal skin irritation and weepy skin. He has a new pressure ulcer noted to the right lateral ankle which his mother believes is related to his ankle rubbing on his wheelchair wheel when up.  1/11/18: He denies any issues with his ostomy pouches, he has not had any leaking.  His ulcers have all improved since his previous visit. Amie Andersen continues to have significant amount of tunneling to the left ischium ulcer. Amie Andersen has still not gotten into his psychiatrist but his picking has improved with his new Atarax dosage and if he does start to pick his parents apply gloves to his hands. They stopped using the Mepitel AG and foam to the right ankle ulcer due to is causing increased irritation, they have been applying zinc cream instead. 2/12/18: Patients picking and creating wounds has improved, he is working with [employer]. He has been having issues with his ostomy flanges leaking several times a week. 3/12/18: Patient's had a fever last week and was having increased drainage from his left ischium ulcer as well as dark discolored urine. He was started on Bactrim which is now completed and the left ischium treatment was switched back to Dakin's. His urine and ulcer drainage is improved. His ostomy pouches are working well. 4/13/18: The coccyx ulcer is improving but the tape is irritating the skin to his bilateral buttocks. His left ischial ulcer is becoming more and more narrow but continues to have significant depth. His back wounds are healed and he only has a couple a small scabbed areas remaining to his lower abdomen and groin. 6/15/18: The right lateral malleolus and right groin wound/ulcers are healed. The left ischium ulcer is improved. The coccyx ulcer is stable.  He continues by mouth 3 times daily as needed for Muscle spasms 90 tablet 5    ondansetron (ZOFRAN) 4 MG tablet Take 1 tablet by mouth every 8 hours as needed for Nausea or Vomiting 90 tablet 5    melatonin 3 MG TABS tablet Take 2 tablets by mouth nightly 60 tablet 5    CRANBERRY-VITAMIN C PO Take by mouth      vitamin D (ERGOCALCIFEROL) 08937 units CAPS capsule Take 1 capsule by mouth once a week Sundays 12 capsule 3    nystatin (MYCOSTATIN) 067021 UNIT/GM powder Apply 3 times daily. 1 Bottle 5    Disposable Gloves (LATEX GLOVES MEDIUM) MISC Use gloves prn for personal care 10 each 3    Incontinence Supply Disposable (DEPEND UNDERWEAR SM/MED) MISC Use depends prn for incontinence care 5 Package 3    Incontinence Supply Disposable (PREVAIL WET WIPES) MISC Use wet wipes prn for personal care 96 each 3    albuterol sulfate HFA (VENTOLIN HFA) 108 (90 Base) MCG/ACT inhaler Inhale 2 puffs into the lungs every 6 hours as needed for Wheezing 1 Inhaler 3    Elastic Bandages & Supports (T.E.D. KNEE LENGTH/M-REGULAR) MISC Use as directed 2 each 0    Elastic Bandages & Supports (JOBST ACTIVE 20-30MMHG MEDIUM) MISC Apply q daily 2 each 0    Ascorbic Acid (VITAMIN C) 500 MG tablet Take 500 mg by mouth 2 times daily      sodium hypochlorite (DAKINS) 0.125 % SOLN external solution Apply Dakin's moistened gauze to coccyx. Cover with dry gauze. Change twice a day.  1 Bottle 2    Elastic Bandages & Supports (JOBST KNEE HIGH COMPRESSION SM) MISC 1 each by Does not apply route daily 2 each 0    Incontinence Supplies (BEDSIDE DRAINAGE BAG) MISC Large 2000 cc bedside drainage bag 1 each 11    Incontinence Supplies (URINARY LEG BAG) [facility] 900 cc [place] Urinary catheter leg bag 1 each 11    Incontinence Supplies (URINARY LEG BAG STRAPS) MISC Leg bag straps to go with urinary catheter leg bag 1 each 11    Catheters (FOLY CATHETER LUBRICIOUS) MISC 16 Fr 10 cc rodrigues 1 each 11    Lift Chair MISC by Does not apply route Use as directed 1 Reposition yourself in chair every 1-2 hours.    - Off load heels. Wear off loading boots when laying down or sitting in chair.  - Increase protein. Try protein shake, eat eggs. - Wear gloves to help stop scratching. Do not smoke with gloves on.   - Silver nitrate applied to coccyx wound today. Wound Location: Coccyx, Colostomy       Do not shower, take baths or get wound wet, unless otherwise instructed by your Wound Care doctor.       Keep all dressings clean & dry.      Dressing orders:      Coccyx- Cleanse wound with dakins solution and gauze. Pack wound with promogran and moisten 1-2 drops of saline dry AMD gauze. Cover with dry gauze/ABD pad. Secure with opsite or tape. Change daily.         Supplies Size Order #   [place] Convex Flange 1 1/4 [id number]   Adapt Powder   [id number]   Aleida drainable pouch with clamp    73089   Adapt belt   7300   Adapt paste   54796    Nuhope Adhesive    2401      Change your VAPWX  4-5                    AMWEN / week.     Application of Pouch:  1. Assemble above supplies in order of application before removing pouch. 2. Cut opening in flange. 3. Remove your worn appliance by gently pulling away from skin and discard. 4. Wash skin with warm water, rinse and pat dry.    5. Inspect your skin for redness or irritation.  If present, apply a small amount of powder to skin around stoma.  Brush off excess powder with a Kleenex. Do not use ointments. __X_        Stoma Powder             (for wet, weepy skin)   ___X        Desenex Powder         ([employer])  (itches, rash)       6.   Apply thin layer of nuhope adhesive to back of flange. Let It dry until it gets tachy.             7.  Apply paste to inner opening of flange. 8. Center the flange around your stoma.  Smooth the adhesive collar to your abdomen. 9. Apply your pouch  10. Apply belt snuggly.      Follow up visit: 4 weeks     Keep next scheduled appointment. Please give 24 hour notice if unable to keep appointment.

## 2018-07-13 NOTE — PLAN OF CARE
Problem: Wound:  Goal: Will show signs of wound healing; wound closure and no evidence of infection  Will show signs of wound healing; wound closure and no evidence of infection   Outcome: Ongoing  Patient presents to wound clinic for follow up appointment. Back and ischium wounds are healed. Colostomy is leaking, new flange/pouch applied with NuHope adhesive and ostomy paste. Silver nitrate applied to coccyx wound per Ines Bee CNP. Coccyx wound dressed with promogran and moistened with saline, covered with AMD gauze and secured with opsite- dressing to be changed daily. Follow up in wound clinic in 4 weeks. Comments: Care plan reviewed with patient and father. Patient and father verbalize understanding of the plan of care and contribute to goal setting.

## 2018-07-20 RX ORDER — MIDODRINE HYDROCHLORIDE 2.5 MG/1
TABLET ORAL
Qty: 90 TABLET | Refills: 3 | Status: SHIPPED | OUTPATIENT
Start: 2018-07-20 | End: 2018-11-02 | Stop reason: SDUPTHER

## 2018-07-20 RX ORDER — CYCLOBENZAPRINE HCL 10 MG
10 TABLET ORAL 3 TIMES DAILY PRN
Qty: 90 TABLET | Refills: 0 | Status: SHIPPED | OUTPATIENT
Start: 2018-07-20 | End: 2018-07-23 | Stop reason: SDUPTHER

## 2018-07-23 ENCOUNTER — NURSE ONLY (OUTPATIENT)
Dept: UROLOGY | Age: 28
End: 2018-07-23
Payer: MEDICAID

## 2018-07-23 ENCOUNTER — OFFICE VISIT (OUTPATIENT)
Dept: PHYSICAL MEDICINE AND REHAB | Age: 28
End: 2018-07-23
Payer: MEDICAID

## 2018-07-23 VITALS
BODY MASS INDEX: 20.19 KG/M2 | HEIGHT: 70 IN | WEIGHT: 141 LBS | DIASTOLIC BLOOD PRESSURE: 89 MMHG | SYSTOLIC BLOOD PRESSURE: 132 MMHG | HEART RATE: 88 BPM

## 2018-07-23 DIAGNOSIS — N31.9 NEUROGENIC BLADDER: Primary | ICD-10-CM

## 2018-07-23 DIAGNOSIS — G82.20 PARAPLEGIA (HCC): Primary | ICD-10-CM

## 2018-07-23 DIAGNOSIS — R25.2 SPASTICITY: ICD-10-CM

## 2018-07-23 PROCEDURE — 99999 PR OFFICE/OUTPT VISIT,PROCEDURE ONLY: CPT | Performed by: UROLOGY

## 2018-07-23 PROCEDURE — 51705 CHANGE OF BLADDER TUBE: CPT | Performed by: UROLOGY

## 2018-07-23 PROCEDURE — 99213 OFFICE O/P EST LOW 20 MIN: CPT | Performed by: NURSE PRACTITIONER

## 2018-07-23 PROCEDURE — 64644 CHEMODENERV 1 EXTREM 5/> MUS: CPT | Performed by: PHYSICAL MEDICINE & REHABILITATION

## 2018-07-23 PROCEDURE — 64645 CHEMODENERV 1 EXTREM 5/> EA: CPT | Performed by: PHYSICAL MEDICINE & REHABILITATION

## 2018-07-23 RX ORDER — AMANTADINE HYDROCHLORIDE 100 MG/1
100 CAPSULE, GELATIN COATED ORAL DAILY
Qty: 60 CAPSULE | Refills: 5 | Status: SHIPPED | OUTPATIENT
Start: 2018-07-23 | End: 2020-01-13 | Stop reason: SDUPTHER

## 2018-07-23 RX ORDER — CYCLOBENZAPRINE HCL 10 MG
10 TABLET ORAL 3 TIMES DAILY
Qty: 90 TABLET | Refills: 5 | Status: SHIPPED | OUTPATIENT
Start: 2018-07-23 | End: 2019-02-27 | Stop reason: SDUPTHER

## 2018-07-23 NOTE — PROGRESS NOTES
[address] [facility]  Outpatient progress note    Chief Complaint:   Chief Complaint   Patient presents with    Botox Injection     800 units bilateral lower limbs         Subjective: Laurie Beard is a 34 y.o. male who returns to the office today for further follow up. Patient is here for repeat botulinum toxin injections to bilateral lower limbs. First round of injections were not very beneficial. Discussed that 2 rounds of botulinum toxin injections is a reasonable trial to determine if they will be effective. Dr. Booker Nevarez is doing increased dosing (800 units) and attempting some different muscle groups to see if this will help. Patient remains on Flexeril and baclofen. He is having a cousin help stretch him. He continues to follow with wound management, wound is healing well. The scattered wounds over his trunk are beginning to scab over. He is following with [facility] for psychiatry. Currently overall mood is improved, patient much more interactive.       Review of Systems:  CONSTITUTIONAL: negative  RESPIRATORY: negative  CARDIOVASCULAR: negative  GASTROINTESTINAL: positive for neurogenic bowel- ostomy in place  GENITOURINARY: positive for neurogenic bladder; suprapubic cath in place  MUSCULOSKELETAL: positive for pain, decreased ROM in left shoulder, muscle weakness  NEUROLOGICAL: positive for weakness, paraplegia  BEHAVIOR/PSYCH: hx bipolar, memory problems  10 point system review otherwise negative    Physical Exam:  /89 (Site: Right Arm, Position: Sitting)   Pulse 88   Ht 5' 10\" (1.778 m)   Wt 141 lb (64 kg)   BMI 20.23 kg/m²      awake  Orientation:   person, place, time  Mood: within normal limits  Affect: calm  General appearance: Patient is well nourished, well developed, well groomed and in no acute distress    Memory:   abnormal - does not recall events of hospitalization or accident; overall improved

## 2018-08-08 DIAGNOSIS — K59.09 OTHER CONSTIPATION: ICD-10-CM

## 2018-08-08 DIAGNOSIS — F06.31 MOOD DISORDER DUE TO KNOWN PHYSIOLOGICAL CONDITION WITH DEPRESSIVE FEATURES: ICD-10-CM

## 2018-08-08 DIAGNOSIS — F42.4 SKIN-PICKING DISORDER: ICD-10-CM

## 2018-08-08 RX ORDER — HYDROXYZINE 50 MG/1
TABLET, FILM COATED ORAL
Qty: 90 TABLET | Refills: 5 | Status: SHIPPED | OUTPATIENT
Start: 2018-08-08 | End: 2019-01-26 | Stop reason: SDUPTHER

## 2018-08-08 RX ORDER — DOCUSATE SODIUM 100 MG/1
100 CAPSULE, LIQUID FILLED ORAL 2 TIMES DAILY
Qty: 60 CAPSULE | Refills: 5 | Status: SHIPPED | OUTPATIENT
Start: 2018-08-08 | End: 2018-08-28 | Stop reason: SDUPTHER

## 2018-08-08 RX ORDER — BACLOFEN 20 MG/1
20 TABLET ORAL 3 TIMES DAILY
Qty: 90 TABLET | Refills: 5 | Status: SHIPPED | OUTPATIENT
Start: 2018-08-08 | End: 2019-01-26 | Stop reason: SDUPTHER

## 2018-08-10 ENCOUNTER — HOSPITAL ENCOUNTER (OUTPATIENT)
Dept: WOUND CARE | Age: 28
Discharge: HOME | End: 2018-08-10
Payer: MEDICAID

## 2018-08-10 VITALS
SYSTOLIC BLOOD PRESSURE: 104 MMHG | OXYGEN SATURATION: 96 % | HEART RATE: 116 BPM | RESPIRATION RATE: 16 BRPM | DIASTOLIC BLOOD PRESSURE: 59 MMHG | TEMPERATURE: 97.9 F

## 2018-08-10 DIAGNOSIS — S91.101A OPEN WOUND OF RIGHT GREAT TOE, INITIAL ENCOUNTER: ICD-10-CM

## 2018-08-10 DIAGNOSIS — L89.312 DECUBITUS ULCER OF RIGHT ISCHIUM, STAGE 2 (HCC): ICD-10-CM

## 2018-08-10 DIAGNOSIS — L03.031 CELLULITIS OF GREAT TOE OF RIGHT FOOT: Primary | ICD-10-CM

## 2018-08-10 DIAGNOSIS — B36.9 FUNGAL DERMATITIS: ICD-10-CM

## 2018-08-10 PROCEDURE — 99213 OFFICE O/P EST LOW 20 MIN: CPT | Performed by: NURSE PRACTITIONER

## 2018-08-10 PROCEDURE — 11750 EXCISION NAIL&NAIL MATRIX: CPT | Performed by: NURSE PRACTITIONER

## 2018-08-10 PROCEDURE — 11730 AVULSION NAIL PLATE SIMPLE 1: CPT

## 2018-08-10 PROCEDURE — A6022 COLLAGEN DRSG>16<=48 SQ IN: HCPCS

## 2018-08-10 PROCEDURE — A4364 ADHESIVE, LIQUID OR EQUAL: HCPCS

## 2018-08-10 PROCEDURE — 2709999900 HC NON-CHARGEABLE SUPPLY

## 2018-08-10 RX ORDER — DOXYCYCLINE HYCLATE 100 MG/1
100 CAPSULE ORAL 2 TIMES DAILY
Qty: 28 CAPSULE | Refills: 0 | Status: SHIPPED | OUTPATIENT
Start: 2018-08-10 | End: 2018-08-15 | Stop reason: SDUPTHER

## 2018-08-10 ASSESSMENT — PAIN DESCRIPTION - LOCATION: LOCATION: SHOULDER

## 2018-08-10 ASSESSMENT — PAIN DESCRIPTION - ORIENTATION: ORIENTATION: LEFT

## 2018-08-10 ASSESSMENT — PAIN DESCRIPTION - PAIN TYPE: TYPE: CHRONIC PAIN

## 2018-08-10 ASSESSMENT — PAIN DESCRIPTION - FREQUENCY: FREQUENCY: CONTINUOUS

## 2018-08-10 ASSESSMENT — PAIN SCALES - GENERAL: PAINLEVEL_OUTOF10: 3
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
## 2018-08-10 NOTE — PLAN OF CARE
Problem: Wound:  Goal: Will show signs of wound healing; wound closure and no evidence of infection  Will show signs of wound healing; wound closure and no evidence of infection   Outcome: Ongoing  Pt presents to wound clinic for follow up of coccyx and ischium wounds. Wounds measure larger in size. New wound noted to right great toe wound. Right great toe has redness and swelling noted. Warm to touch. Purulent drainage noted of right great toe. No odor noted. Pt was afebrile. Toe nail avulsions on right great toe. Script called into pharmacy for antibiotic. Pt given order for xray advised to get done before next appt. Pt advised to not wear jeans everyday to decrease pressure on coccyx. Supplies ordered through [facility]. Pt advised to apply otc antifungal cream to rash area on buttocks. Pt advised every 1-2 hrs reposition in chair from side to side or when lying down. Pt advised to off load heels at all times. Coccyx- Pt advised to Cleanse wound with dakins solution and gauze. Today cleansed wound with normal saline. Applied aquacel ag to wound bed. Covered with bordered foam dressing. Pt advised to Change every 3 days. Right great toe wound-   Cleansed wound with normal saline and gauze. Patted dry with clean gauze. Pt advised to Apply antibiotic ointment to adaptic. Today applied betadine to wound bed. Covered wound with adaptic. Pt advised when adaptic is gone use gauze. Then covered with gauze. Wrapped with sindy. Pt advised to Change daily. Next appt in 1 week. Comments: Care plan reviewed with patient and father. Patient and father verbalize understanding of the plan of care and contribute to goal setting.

## 2018-08-15 RX ORDER — DOXYCYCLINE HYCLATE 100 MG/1
100 CAPSULE ORAL 2 TIMES DAILY
Qty: 28 CAPSULE | Refills: 0 | Status: SHIPPED | OUTPATIENT
Start: 2018-08-15 | End: 2018-08-15 | Stop reason: CLARIF

## 2018-08-15 RX ORDER — DOXYCYCLINE HYCLATE 100 MG/1
100 CAPSULE ORAL 2 TIMES DAILY
Qty: 28 CAPSULE | Refills: 0 | Status: SHIPPED | OUTPATIENT
Start: 2018-08-15 | End: 2018-08-29

## 2018-08-20 ENCOUNTER — TELEPHONE (OUTPATIENT)
Dept: FAMILY MEDICINE CLINIC | Age: 28
End: 2018-08-20

## 2018-08-20 DIAGNOSIS — F41.9 ANXIETY: Primary | ICD-10-CM

## 2018-08-20 RX ORDER — BUSPIRONE HYDROCHLORIDE 10 MG/1
10 TABLET ORAL 3 TIMES DAILY
Qty: 90 TABLET | Refills: 5 | Status: SHIPPED | OUTPATIENT
Start: 2018-08-20 | End: 2019-01-26 | Stop reason: SDUPTHER

## 2018-08-20 NOTE — TELEPHONE ENCOUNTER
Patient is calling in to see if Dr Oakley Gitelman will prescribe his buspirone 10 mg that he was taking three times daily to [facility]. He said he was originally prescribed from [facility] for his anxiety. Please advise.

## 2018-08-24 ENCOUNTER — TELEPHONE (OUTPATIENT)
Dept: FAMILY MEDICINE CLINIC | Age: 28
End: 2018-08-24

## 2018-08-24 NOTE — TELEPHONE ENCOUNTER
Pt's mother, Brittany Ruff (listed on HIPAA) called very upset stating [facility] Persons came to take the pt's hospital bad \"right out from under him. \" Brittany Ruff states Tommiealyn Persons told her they've tried numerous times to get a Med Nec form from our ofc & received no reply. There is no record of this in the pt's chart. Brittany Ruff stated her , Nazario Negron (also listed on HIPAA) had called our ofc over two months ago re:this (there is also no phone encounter re:this call). [facility] was contacted & according to their records their ofc had made several attempts including a certified letter to contact the pt's parents NOT our ofc as they did not even have the pt's PCP's name or contact info. [facility]. [facility] was given Dr Paula Brewster name/info & they will fax the form to our ofc to be completed.     Nazario Soninichole, pt's father was contacted & given the [address] stated he had called our ofc but only to get the fax number which he states he gave to [facility]. Pt's father was told as soon as our ofc receives the form it will be completed/faxed back to [facility].

## 2018-08-28 DIAGNOSIS — K59.09 OTHER CONSTIPATION: ICD-10-CM

## 2018-08-28 RX ORDER — DOCUSATE SODIUM 100 MG/1
100 CAPSULE, LIQUID FILLED ORAL 2 TIMES DAILY
Qty: 60 CAPSULE | Refills: 5 | Status: SHIPPED | OUTPATIENT
Start: 2018-08-28 | End: 2018-10-29 | Stop reason: ALTCHOICE

## 2018-08-29 ENCOUNTER — HOSPITAL ENCOUNTER (OUTPATIENT)
Dept: WOUND CARE | Age: 28
Discharge: HOME | End: 2018-08-29
Payer: MEDICAID

## 2018-08-29 VITALS
DIASTOLIC BLOOD PRESSURE: 73 MMHG | HEART RATE: 105 BPM | BODY MASS INDEX: 20.15 KG/M2 | SYSTOLIC BLOOD PRESSURE: 115 MMHG | RESPIRATION RATE: 16 BRPM | WEIGHT: 140.4 LBS | TEMPERATURE: 97.9 F | OXYGEN SATURATION: 100 %

## 2018-08-29 PROCEDURE — 17250 CHEM CAUT OF GRANLTJ TISSUE: CPT | Performed by: NURSE PRACTITIONER

## 2018-08-29 PROCEDURE — 99213 OFFICE O/P EST LOW 20 MIN: CPT

## 2018-08-29 PROCEDURE — 17250 CHEM CAUT OF GRANLTJ TISSUE: CPT

## 2018-08-29 PROCEDURE — 2709999900 HC NON-CHARGEABLE SUPPLY

## 2018-08-29 PROCEDURE — 6370000000 HC RX 637 (ALT 250 FOR IP): Performed by: NURSE PRACTITIONER

## 2018-08-29 RX ORDER — GINSENG 100 MG
CAPSULE ORAL ONCE
Status: COMPLETED | OUTPATIENT
Start: 2018-08-29 | End: 2018-08-29

## 2018-08-29 RX ADMIN — SILVER NITRATE APPLICATORS 1 EACH: 25; 75 STICK TOPICAL at 15:38

## 2018-08-29 RX ADMIN — BACITRACIN: 500 OINTMENT TOPICAL at 15:54

## 2018-08-29 NOTE — PLAN OF CARE
Problem: Wound:  Intervention: Assess wound size, appearance and drainage  Care plan reviewed with patient and parents. Patient and parents verbalize understanding of the plan of care and contribute to goal setting. Goal: Will show signs of wound healing; wound closure and no evidence of infection  Will show signs of wound healing; wound closure and no evidence of infection   Outcome: Ongoing  Pt.'s wounds are measuring smaller. Silver nitrate applied to the coccyx wound. Pt. To cleanse coccyx wound with dakins, apply aqaucel ag rolan bordered foam every 3 days. Bordered foam applied to right buttock and zinc to the reddened areas. Bacitracin and gauze applied to the right great toe change daily. Follow up in 3 weeks. Pt to get x-ray of right great toe.     Comments:
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
## 2018-09-06 ENCOUNTER — TELEPHONE (OUTPATIENT)
Dept: UROLOGY | Age: 28
End: 2018-09-06

## 2018-09-06 NOTE — TELEPHONE ENCOUNTER
Pt is an sp cath change and was scheduled on [place] schedule Monday. Pt is going to need to be rescheduled on a day when Dr Teresa Mason or Dr Arely Brink are here as he probably shouldn't wait until Dr Abimbola Harrison is back from being OOT.

## 2018-09-07 ENCOUNTER — TELEPHONE (OUTPATIENT)
Dept: UROLOGY | Age: 28
End: 2018-09-07

## 2018-09-07 NOTE — TELEPHONE ENCOUNTER
Patient needs rescheduled, this is an SP cath change and office visit, needs to be scheduled with Dr. Graciela Mina not an NP.  Thank you

## 2018-09-10 DIAGNOSIS — F32.2 SEVERE SINGLE CURRENT EPISODE OF MAJOR DEPRESSIVE DISORDER, WITHOUT PSYCHOTIC FEATURES (HCC): ICD-10-CM

## 2018-09-10 RX ORDER — GABAPENTIN 300 MG/1
CAPSULE ORAL
Qty: 120 CAPSULE | Refills: 5 | Status: SHIPPED | OUTPATIENT
Start: 2018-09-10 | End: 2019-02-20 | Stop reason: SDUPTHER

## 2018-09-10 RX ORDER — TRAZODONE HYDROCHLORIDE 100 MG/1
200 TABLET ORAL NIGHTLY PRN
Qty: 60 TABLET | Refills: 2 | Status: SHIPPED | OUTPATIENT
Start: 2018-09-10 | End: 2018-11-30 | Stop reason: SDUPTHER

## 2018-09-10 RX ORDER — LANOLIN ALCOHOL/MO/W.PET/CERES
325 CREAM (GRAM) TOPICAL 2 TIMES DAILY
Qty: 60 TABLET | Refills: 5 | Status: SHIPPED | OUTPATIENT
Start: 2018-09-10 | End: 2019-02-20 | Stop reason: SDUPTHER

## 2018-09-14 ENCOUNTER — NURSE ONLY (OUTPATIENT)
Dept: UROLOGY | Age: 28
End: 2018-09-14
Payer: MEDICAID

## 2018-09-14 DIAGNOSIS — N31.9 NEUROGENIC BLADDER: Primary | ICD-10-CM

## 2018-09-14 DIAGNOSIS — R33.9 URINARY RETENTION: ICD-10-CM

## 2018-09-14 PROCEDURE — 99999 PR OFFICE/OUTPT VISIT,PROCEDURE ONLY: CPT | Performed by: UROLOGY

## 2018-09-14 PROCEDURE — 51705 CHANGE OF BLADDER TUBE: CPT | Performed by: UROLOGY

## 2018-09-14 NOTE — PROGRESS NOTES
Following Dr. Poli Rodriguez of [facility]. Changed 20 Fr suprapubic Catheter without difficulty. Once balloon was deflated, removed catheter without difficulty. Cleansed suprapubic opening with betadine swab. 20 Fr regular rodrigues was inserted without difficulty. Catheter was flushed with 10cc water insuring return and inflated balloon with 10 ml of water. Rodrigues Catheter was hooked up to leg bag with straps. Patient instructed on catheter care including draining catheter bag and keeping catheter bag above the knee to prevent pulling on catheter causing blood. Patient to follow up in 6 weeks.

## 2018-09-19 ENCOUNTER — HOSPITAL ENCOUNTER (OUTPATIENT)
Dept: WOUND CARE | Age: 28
Discharge: HOME | End: 2018-09-19
Payer: MEDICAID

## 2018-09-19 VITALS
SYSTOLIC BLOOD PRESSURE: 98 MMHG | HEIGHT: 70 IN | WEIGHT: 136.3 LBS | BODY MASS INDEX: 19.51 KG/M2 | DIASTOLIC BLOOD PRESSURE: 50 MMHG | RESPIRATION RATE: 16 BRPM | TEMPERATURE: 98.4 F | HEART RATE: 129 BPM | OXYGEN SATURATION: 94 %

## 2018-09-19 DIAGNOSIS — L89.623 DECUBITUS ULCER OF LEFT HEEL, STAGE 3 (HCC): ICD-10-CM

## 2018-09-19 DIAGNOSIS — L89.892 PRESSURE ULCER OF TOE OF LEFT FOOT, STAGE 2 (HCC): ICD-10-CM

## 2018-09-19 DIAGNOSIS — S31.109A WOUND OF ABDOMEN: ICD-10-CM

## 2018-09-19 DIAGNOSIS — L03.031 CELLULITIS OF GREAT TOE OF RIGHT FOOT: ICD-10-CM

## 2018-09-19 DIAGNOSIS — L89.513 DECUBITUS ULCER OF RIGHT ANKLE, STAGE 3 (HCC): ICD-10-CM

## 2018-09-19 DIAGNOSIS — L89.324 DECUBITUS ULCER OF LEFT ISCHIUM, STAGE 4 (HCC): ICD-10-CM

## 2018-09-19 DIAGNOSIS — B36.9 FUNGAL DERMATITIS: ICD-10-CM

## 2018-09-19 DIAGNOSIS — L30.9 DERMATITIS DUE TO UNKNOWN CAUSE: Primary | ICD-10-CM

## 2018-09-19 DIAGNOSIS — L89.312 DECUBITUS ULCER OF RIGHT ISCHIUM, STAGE 2 (HCC): ICD-10-CM

## 2018-09-19 DIAGNOSIS — F42.4 COMPULSIVE SKIN PICKING: ICD-10-CM

## 2018-09-19 PROCEDURE — 17250 CHEM CAUT OF GRANLTJ TISSUE: CPT

## 2018-09-19 PROCEDURE — 2709999900 HC NON-CHARGEABLE SUPPLY

## 2018-09-19 PROCEDURE — 6370000000 HC RX 637 (ALT 250 FOR IP): Performed by: NURSE PRACTITIONER

## 2018-09-19 PROCEDURE — 17250 CHEM CAUT OF GRANLTJ TISSUE: CPT | Performed by: NURSE PRACTITIONER

## 2018-09-19 RX ADMIN — SILVER NITRATE APPLICATORS 1 EACH: 25; 75 STICK TOPICAL at 14:49

## 2018-09-19 ASSESSMENT — PAIN DESCRIPTION - ORIENTATION: ORIENTATION: LEFT

## 2018-09-19 ASSESSMENT — PAIN DESCRIPTION - PAIN TYPE: TYPE: CHRONIC PAIN

## 2018-09-19 ASSESSMENT — PAIN SCALES - GENERAL: PAINLEVEL_OUTOF10: 4

## 2018-09-19 ASSESSMENT — PAIN DESCRIPTION - FREQUENCY: FREQUENCY: CONTINUOUS

## 2018-09-19 ASSESSMENT — PAIN DESCRIPTION - ONSET: ONSET: ON-GOING

## 2018-09-19 ASSESSMENT — PAIN DESCRIPTION - DESCRIPTORS: DESCRIPTORS: CONSTANT

## 2018-09-19 ASSESSMENT — PAIN DESCRIPTION - LOCATION: LOCATION: SHOULDER

## 2018-09-19 ASSESSMENT — PAIN DESCRIPTION - PROGRESSION: CLINICAL_PROGRESSION: NOT CHANGED

## 2018-09-19 NOTE — PROGRESS NOTES
Navid COATS has reviewed and agrees with CARINA Hidalgo LPN's shift  Assessment.     Chidi Jarquin  9/19/2018
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
## 2018-09-25 ENCOUNTER — HOSPITAL ENCOUNTER (OUTPATIENT)
Dept: GENERAL RADIOLOGY | Age: 28
Discharge: HOME | End: 2018-09-25
Payer: MEDICAID

## 2018-09-25 ENCOUNTER — HOSPITAL ENCOUNTER (OUTPATIENT)
Age: 28
Discharge: HOME | End: 2018-09-25
Payer: MEDICAID

## 2018-09-25 DIAGNOSIS — L03.031 CELLULITIS OF GREAT TOE OF RIGHT FOOT: ICD-10-CM

## 2018-09-25 PROCEDURE — 73660 X-RAY EXAM OF TOE(S): CPT

## 2018-10-01 ENCOUNTER — HOSPITAL ENCOUNTER (OUTPATIENT)
Dept: WOUND CARE | Age: 28
Discharge: HOME | End: 2018-10-01
Payer: MEDICAID

## 2018-10-01 VITALS
TEMPERATURE: 98.1 F | SYSTOLIC BLOOD PRESSURE: 101 MMHG | DIASTOLIC BLOOD PRESSURE: 68 MMHG | HEART RATE: 100 BPM | RESPIRATION RATE: 20 BRPM | OXYGEN SATURATION: 99 %

## 2018-10-01 DIAGNOSIS — L89.513 DECUBITUS ULCER OF RIGHT ANKLE, STAGE 3 (HCC): ICD-10-CM

## 2018-10-01 DIAGNOSIS — L89.623 DECUBITUS ULCER OF LEFT HEEL, STAGE 3 (HCC): ICD-10-CM

## 2018-10-01 DIAGNOSIS — B36.9 FUNGAL DERMATITIS: ICD-10-CM

## 2018-10-01 DIAGNOSIS — L30.9 DERMATITIS DUE TO UNKNOWN CAUSE: ICD-10-CM

## 2018-10-01 DIAGNOSIS — N31.9 NEUROGENIC BLADDER: ICD-10-CM

## 2018-10-01 DIAGNOSIS — S31.109A WOUND OF ABDOMEN: ICD-10-CM

## 2018-10-01 DIAGNOSIS — G82.20 PARAPLEGIA (HCC): ICD-10-CM

## 2018-10-01 DIAGNOSIS — F42.4 COMPULSIVE SKIN PICKING: ICD-10-CM

## 2018-10-01 DIAGNOSIS — L89.324 DECUBITUS ULCER OF LEFT ISCHIUM, STAGE 4 (HCC): ICD-10-CM

## 2018-10-01 DIAGNOSIS — L89.312 DECUBITUS ULCER OF RIGHT ISCHIUM, STAGE 2 (HCC): ICD-10-CM

## 2018-10-01 DIAGNOSIS — L89.892 PRESSURE ULCER OF TOE OF LEFT FOOT, STAGE 2 (HCC): ICD-10-CM

## 2018-10-01 DIAGNOSIS — L03.031 CELLULITIS OF GREAT TOE OF RIGHT FOOT: ICD-10-CM

## 2018-10-01 PROCEDURE — 2709999900 HC NON-CHARGEABLE SUPPLY

## 2018-10-01 PROCEDURE — 17250 CHEM CAUT OF GRANLTJ TISSUE: CPT

## 2018-10-01 PROCEDURE — 11730 AVULSION NAIL PLATE SIMPLE 1: CPT

## 2018-10-01 PROCEDURE — 17250 CHEM CAUT OF GRANLTJ TISSUE: CPT | Performed by: NURSE PRACTITIONER

## 2018-10-01 RX ORDER — MEDICAL SUPPLY, MISCELLANEOUS
EACH MISCELLANEOUS
Qty: 10 EACH | Refills: 3 | Status: SHIPPED | OUTPATIENT
Start: 2018-10-01 | End: 2021-02-12

## 2018-10-01 ASSESSMENT — PAIN DESCRIPTION - FREQUENCY: FREQUENCY: CONTINUOUS

## 2018-10-01 ASSESSMENT — PAIN DESCRIPTION - LOCATION: LOCATION: SHOULDER

## 2018-10-01 ASSESSMENT — PAIN SCALES - GENERAL: PAINLEVEL_OUTOF10: 6

## 2018-10-01 ASSESSMENT — PAIN DESCRIPTION - ONSET: ONSET: ON-GOING

## 2018-10-01 ASSESSMENT — PAIN DESCRIPTION - DESCRIPTORS: DESCRIPTORS: CONSTANT

## 2018-10-01 ASSESSMENT — PAIN DESCRIPTION - PROGRESSION: CLINICAL_PROGRESSION: NOT CHANGED

## 2018-10-01 ASSESSMENT — PAIN DESCRIPTION - ORIENTATION: ORIENTATION: LEFT

## 2018-10-01 ASSESSMENT — PAIN DESCRIPTION - PAIN TYPE: TYPE: CHRONIC PAIN

## 2018-10-01 NOTE — PROGRESS NOTES
negative. Objective:      /68   Pulse 100   Temp 98.1 °F (36.7 °C) (Oral)   Resp 20   SpO2 99%     Wt Readings from Last 3 Encounters:   09/19/18 136 lb 4.8 oz (61.8 kg)   08/29/18 140 lb 6.4 oz (63.7 kg)   07/23/18 141 lb (64 kg)       PHYSICAL EXAMINATION  CONSTITUTIONAL:  awake, alert, cooperative, no apparent distress, and appears stated age  EYES:  pupils equal, round and reactive to light, extra ocular muscles intact, sclera clear, conjunctiva normal  ENT:  normocepalic, without obvious abnormality  NECK:  Supple, symmetrical, trachea midline, no adenopathy, thyroid symmetric, not enlarged and no tenderness, skin normal  LUNGS:  No increased work of breathing, good air exchange, clear to auscultation bilaterally, no crackles or wheezing  CARDIOVASCULAR: regular rate and rhythm  ABDOMEN: Diverting colostomy  Vascular: Dorsalis pedis and posterior tibial pulses are palpable bilaterally. Skin temperature is warm to warm from proximal tibial tuberosity to distal digits. CFT immediate to exposed digits. Edema nonpitting edema, right hallux. Dermatologic: see wound documentation for further details. Pressure induced ulceration of right hallux pulp, with questionable periosteal reaction on xray. Superficial excoriations on dorsum of 1st/2nd digits, left foot, stable. Neurovascular: epicritic and protopathic sensations are grossly diminished  Musculoskeletal: paraplegic, with gross motor function/spasm. Rigid contracture right hallux, with pressure induced ulceration and incurvation of nail. Semi reducible hammertoes left 1st/2nd digits. Pressure Ulcer 03/12/17 Coccyx #1 (Active)   Kiana-wound Assessment Dry;Red 10/1/2018 11:14 AM   Kiana-Wound Texture Scarring 10/1/2018 11:14 AM   Kiana-Wound Moisture Dry;Macerated; Weeping 10/1/2018 11:14 AM   Kiana-Wound Color Red; White 10/1/2018 11:14 AM   Wound Assessment Red;Yellow 7/13/2018  2:08 PM   Wound Length (cm) 3.4 cm 10/1/2018 11:14 AM 10/1/2018 11:14 AM   Purple%Wound Bed 100 9/19/2018  2:26 PM   Op First Treatment Date 08/10/18 8/10/2018  2:36 PM   Number of days: 51       Wound 09/19/18 Toe (Comment  which one) Left 2nd toe- #6 (Active)   Wound Image   9/19/2018  2:30 PM   Wound Type Wound 10/1/2018 11:14 AM   Dressing Changed Changed/New 10/1/2018 11:14 AM   Wound Cleansed Rinsed/Irrigated with saline 10/1/2018 11:14 AM   Wound Length (cm) 1.5 cm 10/1/2018 11:14 AM   Wound Width (cm) 0.7 cm 10/1/2018 11:14 AM   Wound Depth (cm)  scab 10/1/2018 11:14 AM   Calculated Wound Size (cm^2) (l*w) 1.05 cm^2 10/1/2018 11:14 AM   Change in Wound Size % (l*w) -1650 10/1/2018 11:14 AM   Wound Assessment Red 9/19/2018  2:30 PM   Drainage Amount None 10/1/2018 11:14 AM   Drainage Description Serosanguinous 9/19/2018  2:30 PM   Odor None 10/1/2018 11:14 AM   Margins Attached edges 10/1/2018 11:14 AM   Kiana-wound Assessment Dry 10/1/2018 11:14 AM   Red%Wound Bed 100 9/19/2018  2:30 PM   Yellow%Wound Bed 75 10/1/2018 11:14 AM   Black%Wound Bed 25 10/1/2018 11:14 AM   Op First Treatment Date 09/19/18 9/19/2018  2:30 PM   Number of days: 11       LABS      CBC:   Lab Results   Component Value Date    WBC 10.9 11/29/2017    HGB 16.2 11/29/2017    HCT 49.3 11/29/2017    MCV 85.2 11/29/2017     11/29/2017     BMP:   Lab Results   Component Value Date     11/29/2017    K 4.2 11/29/2017    CL 97 11/29/2017    CO2 30 11/29/2017    BUN 9 11/29/2017    CREATININE 0.9 11/29/2017     PT/INR:   Lab Results   Component Value Date    PROTIME 14.1 01/09/2017    INR 1.22 01/09/2017     Prealbumin:   Lab Results   Component Value Date    PREALBUMIN 19.3 03/06/2017     Albumin:  Lab Results   Component Value Date    LABALBU 3.8 11/27/2017     Sed Rate:  Lab Results   Component Value Date    SEDRATE 1 02/14/2015     CRP:   Lab Results   Component Value Date    CRP 0.32 02/14/2015     Micro:   Lab Results   Component Value Date    BC No growth-preliminary  No growth loading boots when laying down or sitting in chair.  - Increase protein. Try protein shake, eat eggs. - Wear gloves to help stop scratching. Do not smoke with gloves on. - Apply pinxav or zinc to rash area on buttocks   - Do not wear jeans daily or shoes  - [facility]   - Silver nitrate applied to coccyx wound bed. Wound may appear grayish  - Steroid cream script sent to pharmacy  - Apply steroid cream to rash/red areas on buttocks twice daily      Wound Location: Coccyx, Colostomy , right great toe, left 2nd toe  Do not shower, take baths or get wound wet, unless otherwise instructed by your Wound Care doctor. Keep all dressings clean & dry. Dressing orders:   Abdomen-  Apply vaseline to wound/scabbed areas once daily. Coccyx- Cleanse wound with dakins solution and gauze. Apply aquacel ag to wound bed. Cover with gauze and 1 ABD pads. Hold in place with tape. Change every other day. right great toe- Cover toe wound with Adaptic and gauze. Hold in place with coban. Leave in place until Friday and then remove. Apply betadine and a band aid to toe and change once daily and as needed. Left 2 nd toe wound-   Cleanse wound with normal saline and gauze. Pat dry with clean gauze. Apply betadine to wound beds. Cover with band aids.  Change once daily.        Naresh Olivas [address] [place] [address]   Electronically signed by Joanna Perez DPM on 10/1/2018 at 3:01 PM

## 2018-10-01 NOTE — PROGRESS NOTES
tobacco: Never Used    Alcohol use No       ALLERGIES    Allergies   Allergen Reactions    Bee Venom Shortness Of Breath    Tramadol Hives       MEDICATIONS    Current Outpatient Prescriptions on File Prior to Encounter   Medication Sig Dispense Refill    fluocinonide (LIDEX) 0.05 % cream Apply topically 2 times daily. 60 g 2    ferrous sulfate (FE TABS) 325 (65 Fe) MG EC tablet Take 1 tablet by mouth 2 times daily 60 tablet 5    gabapentin (NEURONTIN) 300 MG capsule 1 capsule in morning, 1 capsule in afternoon and 2 capsules at bedtime . 120 capsule 5    traZODone (DESYREL) 100 MG tablet Take 2 tablets by mouth nightly as needed for Sleep 60 tablet 2    busPIRone (BUSPAR) 10 MG tablet Take 1 tablet by mouth 3 times daily 90 tablet 5    baclofen (LIORESAL) 20 MG tablet Take 1 tablet by mouth 3 times daily 90 tablet 5    cyclobenzaprine (FLEXERIL) 10 MG tablet Take 1 tablet by mouth 3 times daily 90 tablet 5    amantadine (SYMMETREL) 100 MG capsule Take 1 capsule by mouth daily 60 capsule 5    midodrine (PROAMATINE) 2.5 MG tablet TAKE 1 TABLET THREE TIMES A DAY 90 tablet 3    venlafaxine (EFFEXOR XR) 150 MG extended release capsule Take 1 capsule by mouth daily 30 capsule [address] by Does not apply route [facility] bed with 4 rails. 1 each 0    Misc.  Devices (EXTENDABLE BEDSIDE RAIL) MISC For hospital bed 2 each 0    ibuprofen (ADVIL;MOTRIN) 800 MG tablet Take 1 tablet by mouth 3 times daily as needed for Pain 90 tablet 5    ondansetron (ZOFRAN) 4 MG tablet Take 1 tablet by mouth every 8 hours as needed for Nausea or Vomiting 90 tablet 5    vitamin D (ERGOCALCIFEROL) 49180 units CAPS capsule Take 1 capsule by mouth once a week Sundays 12 capsule 3    Incontinence Supply Disposable (DEPEND UNDERWEAR SM/MED) MISC Use depends prn for incontinence care 5 Package 3    Incontinence Supply Disposable (PREVAIL WET WIPES) MISC Use wet wipes prn for personal care 96 each 3    albuterol sulfate HFA (VENTOLIN HFA) 108 (90 Base) MCG/ACT inhaler Inhale 2 puffs into the lungs every 6 hours as needed for Wheezing 1 Inhaler 3    Elastic Bandages & Supports (T.E.D. KNEE LENGTH/M-REGULAR) MISC Use as directed 2 each 0    Elastic Bandages & Supports (JOBST ACTIVE 20-30MMHG MEDIUM) MISC Apply q daily 2 each 0    Ascorbic Acid (VITAMIN C) 500 MG tablet Take 500 mg by mouth 2 times daily      sodium hypochlorite (DAKINS) 0.125 % SOLN external solution Apply Dakin's moistened gauze to coccyx. Cover with dry gauze. Change twice a day. 1 Bottle 2    Elastic Bandages & Supports (JOBST KNEE HIGH COMPRESSION SM) MISC 1 each by Does not apply route daily 2 each 0    Incontinence Supplies (BEDSIDE DRAINAGE BAG) MISC Large 2000 cc bedside drainage bag 1 each 11    Incontinence Supplies (URINARY LEG BAG) MISC 900 cc Aleida Urinary catheter leg bag 1 each 11    Incontinence Supplies (URINARY LEG BAG STRAPS) MISC Leg bag straps to go with urinary catheter leg bag 1 each 11    Catheters (FOLY CATHETER LUBRICIOUS) MISC 16 Fr 10 cc rodrigues 1 each 11    Lift Chair MISC by Does not apply route Use as directed 1 each 0    docusate sodium (DOCQLACE) 100 MG capsule Take 1 capsule by mouth 2 times daily 60 capsule 5    hydrOXYzine (ATARAX) 50 MG tablet TAKE 1 TABLET BY MOUTH THREE TIMES A DAY AS NEEDED FOR ITCHING 90 tablet 5    furosemide (LASIX) 20 MG tablet Take 1 tablet by mouth daily 90 tablet 1    rizatriptan (MAXALT) 5 MG tablet Take 1 tablet by mouth once as needed for Migraine May repeat in 2 hours if needed 15 tablet 3    Respiratory Therapy Supplies (NEBULIZER COMPRESSOR) KIT 1 kit by Does not apply route once for 1 dose 1 kit 0    EPINEPHrine (EPIPEN) 0.3 MG/0.3ML SOAJ injection Use as directed for allergic reaction 2 each 1     No current facility-administered medications on file prior to encounter.         REVIEW OF SYSTEMS    Constitutional: negative for chills, fevers and sweats  Eyes: serosanguineous drainage.  No odor noted.  Periulcer skin is scarred with some dry rashy skin noted. No warmth or induration noted. Right buttock: Improved. Dry, flaky, skin with red rash noted. Right ischium: healed with dry skin noted. No warmth or induration noted. Right great toe: assessed by Dr. Nicole Mg. Nail removed by Dr. Nicole Mg. Left great toe: Small stable scabbed area noted. No drainage noted. No redness, warmth, or induration noted. Left 2nd toe: Ulcer is dry scab. Periulcer skin is intact. No drainage noted. No warmth or induration noted. Right great toe    Left 1st and 2nd toes    Pressure Ulcer 03/12/17 Coccyx #1 (Active)   Kiana-wound Assessment Dry;Red 10/1/2018 11:14 AM   Kiana-Wound Texture Scarring 10/1/2018 11:14 AM   Kiana-Wound Moisture Dry;Macerated; Weeping 10/1/2018 11:14 AM   Kiana-Wound Color Red; White 10/1/2018 11:14 AM   Wound Assessment Red;Yellow 7/13/2018  2:08 PM   Wound Length (cm) 3.4 cm 10/1/2018 11:14 AM   Wound Width (cm) 1.5 cm 10/1/2018 11:14 AM   Wound Depth (cm)  0.8 10/1/2018 11:14 AM   Calculated Wound Size (cm^2) (l*w) 5.1 cm^2 10/1/2018 11:14 AM   Change in Wound Size % (l*w) 92.74 10/1/2018 11:14 AM   Dressing Status Intact; New drainage; Old drainage 10/1/2018 11:14 AM   Dressing Changed Changed/New 10/1/2018 11:14 AM   Dressing/Treatment Packing 9/20/2017  2:21 PM   Wound Cleansed Rinsed/Irrigated with saline 10/1/2018 11:14 AM   Drainage Amount Moderate 10/1/2018 11:14 AM   Drainage Description Serosanguinous 10/1/2018 11:14 AM   Odor None 10/1/2018 11:14 AM   Undermining Starts ___ O'Clock 12 10/1/2018 11:14 AM   Undermining Ends___ O'Clock 2 10/1/2018 11:14 AM   Undermining Maxium Distance (cm) 0.7 10/1/2018 11:14 AM   Kerr%Wound Bed 70 12/8/2017 10:40 AM   Red%Wound Bed 75 10/1/2018 11:14 AM   Yellow%Wound Bed 25 10/1/2018 11:14 AM   Black%Wound Bed 10 10/6/2017  1:37 PM   Margins Epibole (rolled edges) 10/1/2018 11:14 AM   Number of days: 853 Wound 08/10/18 Toe (Comment  which one) Right great (Active) #2   Wound Image   9/19/2018  2:26 PM   Wound Type Wound 10/1/2018 11:14 AM   Dressing Status Intact; Old drainage 8/29/2018  2:49 PM   Dressing Changed Changed/New 10/1/2018 11:14 AM   Dressing/Treatment Antibacterial Ointment;Dry dressing 8/29/2018  2:49 PM   Wound Cleansed Rinsed/Irrigated with saline 8/29/2018  2:49 PM   Wound Length (cm) 0.6 cm 10/1/2018 11:14 AM   Wound Width (cm) 1.3 cm 10/1/2018 11:14 AM   Wound Depth (cm)  0.1 10/1/2018 11:14 AM   Calculated Wound Size (cm^2) (l*w) 0.78 cm^2 10/1/2018 11:14 AM   Change in Wound Size % (l*w) 60 10/1/2018 11:14 AM   Wound Assessment Red;Yellow 10/1/2018 11:14 AM   Drainage Amount Scant 10/1/2018 11:14 AM   Drainage Description Serosanguinous 10/1/2018 11:14 AM   Odor None 10/1/2018 11:14 AM   Margins Attached edges 10/1/2018 11:14 AM   Kiana-wound Assessment Dry;Red 10/1/2018 11:14 AM   Red%Wound Bed 50 10/1/2018 11:14 AM   Yellow%Wound Bed 50 10/1/2018 11:14 AM   Purple%Wound Bed 100 9/19/2018  2:26 PM   Op First Treatment Date 08/10/18 8/10/2018  2:36 PM   Number of days: 51       Wound 09/19/18 Toe (Comment  which one) Left 2nd toe- #6 (Active)   Wound Image   9/19/2018  2:30 PM   Wound Type Wound 10/1/2018 11:14 AM   Dressing Changed Changed/New 10/1/2018 11:14 AM   Wound Cleansed Rinsed/Irrigated with saline 10/1/2018 11:14 AM   Wound Length (cm) 1.5 cm 10/1/2018 11:14 AM   Wound Width (cm) 0.7 cm 10/1/2018 11:14 AM   Wound Depth (cm)  scab 10/1/2018 11:14 AM   Calculated Wound Size (cm^2) (l*w) 1.05 cm^2 10/1/2018 11:14 AM   Change in Wound Size % (l*w) -1650 10/1/2018 11:14 AM   Wound Assessment Red 9/19/2018  2:30 PM   Drainage Amount None 10/1/2018 11:14 AM   Drainage Description Serosanguinous 9/19/2018  2:30 PM   Odor None 10/1/2018 11:14 AM   Margins Attached edges 10/1/2018 11:14 AM   Kiana-wound Assessment Dry 10/1/2018 11:14 AM   Red%Wound Bed 100 9/19/2018  2:30 PM   Yellow%Wound Bed Hold in place with tape. Change every other day.     right great toe- Cover toe wound with Adaptic and gauze. Hold in place with coban. Leave in place until Friday and then remove. Apply betadine and a band aid to toe and change once daily and as needed. Left 2 nd toe wound-   Cleanse wound with normal saline and gauze. Pat dry with clean gauze. Apply betadine to wound beds. Cover with band aids. Change once daily.      Supplies Size Order #   Aleida Convex Flange 1 1/4 [id number]   Adapt Powder   2375   Aleida drainable pouch with clamp    46178   Adapt belt   7300   Adapt paste   85523    Nuhope Adhesive    2401      Change your LAQNK  7-3                    PDDTS / week.     Application of Pouch:  1. Assemble above supplies in order of application before removing pouch. 2. Cut opening in flange. 3. Remove your worn appliance by gently pulling away from skin and discard. 4. Wash skin with warm water, rinse and pat dry.    5. Inspect your skin for redness or irritation.  If present, apply a small amount of powder to skin around stoma.  Brush off excess powder with a Kleenex. Do not use ointments. __X_        Stoma Powder             (for wet, weepy skin)   ___X        Desenex Powder         ([employer])  (itches, rash)       6.   Apply thin layer of nuhope adhesive to back of flange. Let It dry until it gets tachy.             7.  Apply paste to inner opening of flange. 8. Center the flange around your stoma.  Smooth the adhesive collar to your abdomen. 9. Apply your pouch  10. Apply belt snuggly.      Follow up visit:   Thelma Arevalo CNP - 3 weeks October      Keep next scheduled appointment. Please give 24 hour notice if unable to keep appointment.  [phone]      If you experience any of the following, please call the Wound Care Service during business hours: [phone].                        *Increase in pain                        *Temperature over 101                        *Increase in drainage from

## 2018-10-03 NOTE — PROGRESS NOTES
NPO after midnight  [employer] and drivers license  Wear comfortable clean clothing  Do not bring jewelry   Shower night before and morning of surgery with a liquid antibacterial soap  Bring medications in original bottles  Follow all instructions given by your physician   needed at discharge  Call [phone] for any questions

## 2018-10-03 NOTE — PROGRESS NOTES
In preparation for their surgical procedure above patient was screened for Obstructive Sleep Apnea (YESSENIA) using the STOP-Bang Questionnaire by the Pre-Admission Testing department. This is a pre-surgical screening tool for patient safety and serves as a recommendation, this WILL NOT cause cancellation of surgery. STOP-Bang Questionnaire  * Do you currently see a pulmonologist?  No     If yes STOP, do not complete. Patient follows with  .    1. Do you snore loudly (able to be heard in the next room)? No    2. Do you often feel tired or sleepy during the daytime? No       3. Has anyone ever told you that you stop breathing during your sleep? No    4. Do you have or are you being treated for high blood pressure? No      5. BMI more than 35? BMI (Calculated): 18.7        No    6. Age over 48 years? 34 y.o. No    7. Neck Circumference greater than 17 inches for male or 16 inches for female? Measured           (visits only)            Not Applicable    8. Gender Male? Yes      TOTAL SCORE: 1    YESSENIA - Low Risk : Yes to 0 - 2 questions  YESSENIA - Intermediate Risk : Yes to 3 - 4 questions  YESSENIA - High Risk : Yes to 5 - 8 questions    Adapted from:   STOP Questionnaire: A Tool to Screen Patients for Obstructive Sleep Apnea   MELISSA Milton.P.C., ADRIEL Deluna.B.B.S., Nanette Alexander M.D., Laurie Hall. Eddy Ferguson, Ph.D., ADRIEL Mcwilliams.B.B.S., Kaitlin Senior M.Sc., Marla Loya M.D., Mone Bull. MELISSA Greene.P.C.    Anesthesiology 2008; 502:020-24 Copyright 2008, the [address] of [employer]est[employer], [facility] 37.   ----------------------------------------------------------------------------------------------------------------

## 2018-10-04 NOTE — PROGRESS NOTES
Notifies Aubrie Xie at Dr Jerod Barrera office for lab and cxr reports for the chart also H&P and orders

## 2018-10-05 ENCOUNTER — HOSPITAL ENCOUNTER (OUTPATIENT)
Age: 28
Setting detail: OUTPATIENT SURGERY
Discharge: HOME | End: 2018-10-05
Attending: PODIATRIST | Admitting: PODIATRIST
Payer: MEDICAID

## 2018-10-05 VITALS
OXYGEN SATURATION: 98 % | BODY MASS INDEX: 18.61 KG/M2 | RESPIRATION RATE: 18 BRPM | SYSTOLIC BLOOD PRESSURE: 153 MMHG | WEIGHT: 130 LBS | DIASTOLIC BLOOD PRESSURE: 87 MMHG | HEIGHT: 70 IN | TEMPERATURE: 99.2 F | HEART RATE: 79 BPM

## 2018-10-05 DIAGNOSIS — S91.101A OPEN WOUND OF RIGHT GREAT TOE, INITIAL ENCOUNTER: ICD-10-CM

## 2018-10-05 DIAGNOSIS — L03.031 CELLULITIS OF GREAT TOE OF RIGHT FOOT: Primary | ICD-10-CM

## 2018-10-05 PROCEDURE — 87205 SMEAR GRAM STAIN: CPT

## 2018-10-05 PROCEDURE — 2500000003 HC RX 250 WO HCPCS: Performed by: PODIATRIST

## 2018-10-05 PROCEDURE — 87075 CULTR BACTERIA EXCEPT BLOOD: CPT

## 2018-10-05 PROCEDURE — 87077 CULTURE AEROBIC IDENTIFY: CPT

## 2018-10-05 PROCEDURE — 87102 FUNGUS ISOLATION CULTURE: CPT

## 2018-10-05 PROCEDURE — 87186 SC STD MICRODIL/AGAR DIL: CPT

## 2018-10-05 PROCEDURE — 88311 DECALCIFY TISSUE: CPT

## 2018-10-05 PROCEDURE — 87147 CULTURE TYPE IMMUNOLOGIC: CPT

## 2018-10-05 PROCEDURE — 3600000013 HC SURGERY LEVEL 3 ADDTL 15MIN: Performed by: PODIATRIST

## 2018-10-05 PROCEDURE — 88305 TISSUE EXAM BY PATHOLOGIST: CPT

## 2018-10-05 PROCEDURE — 7100000011 HC PHASE II RECOVERY - ADDTL 15 MIN: Performed by: PODIATRIST

## 2018-10-05 PROCEDURE — 3600000003 HC SURGERY LEVEL 3 BASE: Performed by: PODIATRIST

## 2018-10-05 PROCEDURE — 87116 MYCOBACTERIA CULTURE: CPT

## 2018-10-05 PROCEDURE — 87070 CULTURE OTHR SPECIMN AEROBIC: CPT

## 2018-10-05 PROCEDURE — 2580000003 HC RX 258: Performed by: STUDENT IN AN ORGANIZED HEALTH CARE EDUCATION/TRAINING PROGRAM

## 2018-10-05 PROCEDURE — 7100000010 HC PHASE II RECOVERY - FIRST 15 MIN: Performed by: PODIATRIST

## 2018-10-05 PROCEDURE — 2709999900 HC NON-CHARGEABLE SUPPLY: Performed by: PODIATRIST

## 2018-10-05 RX ORDER — SODIUM CHLORIDE 0.9 % (FLUSH) 0.9 %
10 SYRINGE (ML) INJECTION PRN
Status: DISCONTINUED | OUTPATIENT
Start: 2018-10-05 | End: 2018-10-05 | Stop reason: HOSPADM

## 2018-10-05 RX ORDER — BUPIVACAINE HYDROCHLORIDE 5 MG/ML
INJECTION, SOLUTION EPIDURAL; INTRACAUDAL PRN
Status: DISCONTINUED | OUTPATIENT
Start: 2018-10-05 | End: 2018-10-05 | Stop reason: HOSPADM

## 2018-10-05 RX ORDER — ONDANSETRON 2 MG/ML
4 INJECTION INTRAMUSCULAR; INTRAVENOUS EVERY 6 HOURS PRN
Status: DISCONTINUED | OUTPATIENT
Start: 2018-10-05 | End: 2018-10-05 | Stop reason: HOSPADM

## 2018-10-05 RX ORDER — SODIUM CHLORIDE 0.9 % (FLUSH) 0.9 %
10 SYRINGE (ML) INJECTION EVERY 12 HOURS SCHEDULED
Status: DISCONTINUED | OUTPATIENT
Start: 2018-10-05 | End: 2018-10-05 | Stop reason: HOSPADM

## 2018-10-05 RX ORDER — CEFADROXIL 500 MG/1
500 CAPSULE ORAL 2 TIMES DAILY
Qty: 20 CAPSULE | Refills: 0 | Status: SHIPPED | OUTPATIENT
Start: 2018-10-05 | End: 2018-10-15

## 2018-10-05 RX ORDER — HYDROCODONE BITARTRATE AND ACETAMINOPHEN 5; 325 MG/1; MG/1
1 TABLET ORAL EVERY 4 HOURS PRN
Qty: 42 TABLET | Refills: 0 | Status: SHIPPED | OUTPATIENT
Start: 2018-10-05 | End: 2018-10-12

## 2018-10-05 RX ORDER — ACETAMINOPHEN 325 MG/1
650 TABLET ORAL EVERY 4 HOURS PRN
Status: DISCONTINUED | OUTPATIENT
Start: 2018-10-05 | End: 2018-10-05 | Stop reason: HOSPADM

## 2018-10-05 RX ORDER — SODIUM CHLORIDE 9 MG/ML
INJECTION, SOLUTION INTRAVENOUS CONTINUOUS
Status: DISCONTINUED | OUTPATIENT
Start: 2018-10-05 | End: 2018-10-05 | Stop reason: HOSPADM

## 2018-10-05 RX ADMIN — SODIUM CHLORIDE: 9 INJECTION, SOLUTION INTRAVENOUS at 13:06

## 2018-10-05 ASSESSMENT — PAIN - FUNCTIONAL ASSESSMENT: PAIN_FUNCTIONAL_ASSESSMENT: 0-10

## 2018-10-05 ASSESSMENT — PAIN SCALES - GENERAL
PAINLEVEL_OUTOF10: 4
PAINLEVEL_OUTOF10: 0

## 2018-10-05 ASSESSMENT — PAIN DESCRIPTION - DESCRIPTORS: DESCRIPTORS: ACHING

## 2018-10-05 NOTE — PROGRESS NOTES
Patient admitted to [facility]. Allergy and fall risk bands placed on patient. Family at bedside. Dr. Christie Mireles called to update about patient wheezing .  No new orders

## 2018-10-05 NOTE — H&P (VIEW-ONLY)
Supply Disposable (PREVAIL WET WIPES) MISC Use wet wipes prn for personal care 96 each 3    albuterol sulfate HFA (VENTOLIN HFA) 108 (90 Base) MCG/ACT inhaler Inhale 2 puffs into the lungs every 6 hours as needed for Wheezing 1 Inhaler 3    Elastic Bandages & Supports (T.E.D. KNEE LENGTH/M-REGULAR) MISC Use as directed 2 each 0    Elastic Bandages & Supports (JOBST ACTIVE 20-30MMHG MEDIUM) MISC Apply q daily 2 each 0    Ascorbic Acid (VITAMIN C) 500 MG tablet Take 500 mg by mouth 2 times daily      sodium hypochlorite (DAKINS) 0.125 % SOLN external solution Apply Dakin's moistened gauze to coccyx. Cover with dry gauze. Change twice a day. 1 Bottle 2    Elastic Bandages & Supports (JOBST KNEE HIGH COMPRESSION SM) MISC 1 each by Does not apply route daily 2 each 0    Incontinence Supplies (BEDSIDE DRAINAGE BAG) MISC Large 2000 cc bedside drainage bag 1 each 11    Incontinence Supplies (URINARY LEG BAG) [facility] 900 cc Aleida Urinary catheter leg bag 1 each 11    Incontinence Supplies (URINARY LEG BAG STRAPS) MISC Leg bag straps to go with urinary catheter leg bag 1 each 11    Catheters (FOLY CATHETER LUBRICIOUS) MISC 16 Fr 10 cc rodrigues 1 each 11    Lift Chair MISC by Does not apply route Use as directed 1 each 0    docusate sodium (DOCQLACE) 100 MG capsule Take 1 capsule by mouth 2 times daily 60 capsule 5    hydrOXYzine (ATARAX) 50 MG tablet TAKE 1 TABLET BY MOUTH THREE TIMES A DAY AS NEEDED FOR ITCHING 90 tablet 5    furosemide (LASIX) 20 MG tablet Take 1 tablet by mouth daily 90 tablet 1    rizatriptan (MAXALT) 5 MG tablet Take 1 tablet by mouth once as needed for Migraine May repeat in 2 hours if needed 15 tablet 3    Respiratory Therapy Supplies (NEBULIZER COMPRESSOR) KIT 1 kit by Does not apply route once for 1 dose 1 kit 0    EPINEPHrine (EPIPEN) 0.3 MG/0.3ML SOAJ injection Use as directed for allergic reaction 2 each 1     No current facility-administered medications on file prior to encounter. REVIEW OF SYSTEMS    A comprehensive review of systems was negative. Objective:      /68   Pulse 100   Temp 98.1 °F (36.7 °C) (Oral)   Resp 20   SpO2 99%     Wt Readings from Last 3 Encounters:   09/19/18 136 lb 4.8 oz (61.8 kg)   08/29/18 140 lb 6.4 oz (63.7 kg)   07/23/18 141 lb (64 kg)       PHYSICAL EXAMINATION  CONSTITUTIONAL:  awake, alert, cooperative, no apparent distress, and appears stated age  EYES:  pupils equal, round and reactive to light, extra ocular muscles intact, sclera clear, conjunctiva normal  ENT:  normocepalic, without obvious abnormality  NECK:  Supple, symmetrical, trachea midline, no adenopathy, thyroid symmetric, not enlarged and no tenderness, skin normal  LUNGS:  No increased work of breathing, good air exchange, clear to auscultation bilaterally, no crackles or wheezing  CARDIOVASCULAR: regular rate and rhythm  ABDOMEN: Diverting colostomy  Vascular: Dorsalis pedis and posterior tibial pulses are palpable bilaterally. Skin temperature is warm to warm from proximal tibial tuberosity to distal digits. CFT immediate to exposed digits. Edema nonpitting edema, right hallux. Dermatologic: see wound documentation for further details. Pressure induced ulceration of right hallux pulp, with questionable periosteal reaction on xray. Superficial excoriations on dorsum of 1st/2nd digits, left foot, stable. Neurovascular: epicritic and protopathic sensations are grossly diminished  Musculoskeletal: paraplegic, with gross motor function/spasm. Rigid contracture right hallux, with pressure induced ulceration and incurvation of nail. Semi reducible hammertoes left 1st/2nd digits. Pressure Ulcer 03/12/17 Coccyx #1 (Active)   Kiana-wound Assessment Dry;Red 10/1/2018 11:14 AM   Kiana-Wound Texture Scarring 10/1/2018 11:14 AM   Kiana-Wound Moisture Dry;Macerated; Weeping 10/1/2018 11:14 AM   Kiana-Wound Color Red; White 10/1/2018 11:14 AM   Wound Assessment Red;Yellow 7/13/2018  2:08 PM   Wound Length (cm) 3.4 cm 10/1/2018 11:14 AM   Wound Width (cm) 1.5 cm 10/1/2018 11:14 AM   Wound Depth (cm)  0.8 10/1/2018 11:14 AM   Calculated Wound Size (cm^2) (l*w) 5.1 cm^2 10/1/2018 11:14 AM   Change in Wound Size % (l*w) 92.74 10/1/2018 11:14 AM   Dressing Status Intact; New drainage; Old drainage 10/1/2018 11:14 AM   Dressing Changed Changed/New 10/1/2018 11:14 AM   Dressing/Treatment Packing 9/20/2017  2:21 PM   Wound Cleansed Rinsed/Irrigated with saline 10/1/2018 11:14 AM   Drainage Amount Moderate 10/1/2018 11:14 AM   Drainage Description Serosanguinous 10/1/2018 11:14 AM   Odor None 10/1/2018 11:14 AM   Undermining Starts ___ O'Clock 12 10/1/2018 11:14 AM   Undermining Ends___ O'Clock 2 10/1/2018 11:14 AM   Undermining Maxium Distance (cm) 0.7 10/1/2018 11:14 AM   Molino%Wound Bed 70 12/8/2017 10:40 AM   Red%Wound Bed 75 10/1/2018 11:14 AM   Yellow%Wound Bed 25 10/1/2018 11:14 AM   Black%Wound Bed 10 10/6/2017  1:37 PM   Margins Epibole (rolled edges) 10/1/2018 11:14 AM   Number of days: 568       Wound 08/10/18 Toe (Comment  which one) Right great (Active)   Wound Image   9/19/2018  2:26 PM   Wound Type Wound 10/1/2018 11:14 AM   Dressing Status Intact; Old drainage 8/29/2018  2:49 PM   Dressing Changed Changed/New 10/1/2018 11:14 AM   Dressing/Treatment Antibacterial Ointment;Dry dressing 8/29/2018  2:49 PM   Wound Cleansed Rinsed/Irrigated with saline 8/29/2018  2:49 PM   Wound Length (cm) 0.6 cm 10/1/2018 11:14 AM   Wound Width (cm) 1.3 cm 10/1/2018 11:14 AM   Wound Depth (cm)  0.1 10/1/2018 11:14 AM   Calculated Wound Size (cm^2) (l*w) 0.78 cm^2 10/1/2018 11:14 AM   Change in Wound Size % (l*w) 60 10/1/2018 11:14 AM   Wound Assessment Red;Yellow 10/1/2018 11:14 AM   Drainage Amount Scant 10/1/2018 11:14 AM   Drainage Description Serosanguinous 10/1/2018 11:14 AM   Odor None 10/1/2018 11:14 AM   Margins Attached edges 10/1/2018 11:14 AM   Kiana-wound Assessment Dry;Red 10/1/2018 11:14 evaluated  Nail plate avulsion performed to right hallux  Site dresses with xeroform and coban  Plan for terminal symes amputatation 10. 5.18    Treatment:   No orders of the defined types were placed in this encounter. Visit Discharge/Physician Orders:   - Dorthula Ferns down at least twice daily for a couple hours to limit time in chair.   - Reposition yourself in chair every 1-2 hours.    - Off load heels and the right great toe. Wear off loading boots when laying down or sitting in chair.  - Increase protein. Try protein shake, eat eggs. - Wear gloves to help stop scratching. Do not smoke with gloves on. - Apply pinxav or zinc to rash area on buttocks   - Do not wear jeans daily or shoes  - [facility]   - Silver nitrate applied to coccyx wound bed. Wound may appear grayish  - Steroid cream script sent to pharmacy  - Apply steroid cream to rash/red areas on buttocks twice daily      Wound Location: Coccyx, Colostomy , right great toe, left 2nd toe  Do not shower, take baths or get wound wet, unless otherwise instructed by your Wound Care doctor. Keep all dressings clean & dry. Dressing orders:      Abdomen-  Apply vaseline to wound/scabbed areas once daily. Coccyx- Cleanse wound with dakins solution and gauze. Apply aquacel ag to wound bed. Cover with gauze and 1 ABD pads. Hold in place with tape. Change every other day. right great toe- Cover toe wound with Adaptic and gauze. Hold in place with coban. Leave in place until Friday and then remove. Apply betadine and a band aid to toe and change once daily and as needed. left 2 nd toe wound-   Cleanse wound with normal saline and gauze. Pat dry with clean gauze. Apply betadine to wound beds. Cover with band aids.  Change once daily.    Visit Discharge/Physician Orders:   - Dorthula Ferns down at least twice daily for a couple hours to limit time in chair.   - Reposition yourself in chair every 1-2 hours.    - Off load heels and the right great toe. Wear off loading boots when laying down or sitting in chair.  - Increase protein. Try protein shake, eat eggs. - Wear gloves to help stop scratching. Do not smoke with gloves on. - Apply pinxav or zinc to rash area on buttocks   - Do not wear jeans daily or shoes  - [facility]   - Silver nitrate applied to coccyx wound bed. Wound may appear grayish  - Steroid cream script sent to pharmacy  - Apply steroid cream to rash/red areas on buttocks twice daily      Wound Location: Coccyx, Colostomy , right great toe, left 2nd toe  Do not shower, take baths or get wound wet, unless otherwise instructed by your Wound Care doctor. Keep all dressings clean & dry. Dressing orders:   Abdomen-  Apply vaseline to wound/scabbed areas once daily. Coccyx- Cleanse wound with dakins solution and gauze. Apply aquacel ag to wound bed. Cover with gauze and 1 ABD pads. Hold in place with tape. Change every other day. right great toe- Cover toe wound with Adaptic and gauze. Hold in place with coban. Leave in place until Friday and then remove. Apply betadine and a band aid to toe and change once daily and as needed. Left 2 nd toe wound-   Cleanse wound with normal saline and gauze. Pat dry with clean gauze. Apply betadine to wound beds. Cover with band aids.  Change once daily.        Naresh Olivas [address] [place] [address]   Electronically signed by Jeni Saenz DPM on 10/5/2018 at 7:26 AM

## 2018-10-05 NOTE — BRIEF OP NOTE
Brief Postoperative Note  ______________________________________________________________    Patient: Monica Huff  YOB: 1990  MRN: [id number]  Date of Procedure: 10/5/2018    Pre-Op Diagnosis: RIGHT HALLUX    Post-Op Diagnosis: Same       Procedure(s):  RIGHT HALLUX AMPUTATION    Anesthesia: Anesthesia type not filed in the log. Surgeon(s):  Basilia Noriega DPM     Assistants:   Denise Rodriguez PGY1    Staff:  Scrub Person First: Amie Ignacio     Estimated Blood Loss: * No values recorded between 10/5/2018  2:46 PM and 10/5/2018  3:15 PM 10 mL    Complications: None    Specimens:   ID Type Source Tests Collected by Time Destination   A : BIG RIGHT TOE Tissue Toe SURGICAL PATHOLOGY, FUNGUS CULTURE, TISSUE CULTURE, FUNGAL STAIN, ACID FAST CULTURE, ANAEROBIC AND AEROBIC CULTURE Basilia Noriega DPM 10/5/2018 1512        Implants:  * No implants in log *      Drains:   Colostomy LUQ (Active)   Peristomal Assessment Clean; Intact 10/5/2018 12:49 PM       Suprapubic Catheter 18 fr (Active)   Urine Color Yellow 10/5/2018 12:49 PM       Findings: consistent with diagnosis     Denise Rodriguez DPM  Date: 10/5/2018  Time: 3:18 PM

## 2018-10-10 LAB
AEROBIC CULTURE: ABNORMAL
AEROBIC CULTURE: ABNORMAL
ANAEROBIC CULTURE: ABNORMAL
GRAM STAIN RESULT: ABNORMAL
ORGANISM: ABNORMAL
ORGANISM: ABNORMAL

## 2018-10-15 DIAGNOSIS — R06.2 WHEEZING: ICD-10-CM

## 2018-10-15 RX ORDER — ALBUTEROL SULFATE 90 UG/1
2 AEROSOL, METERED RESPIRATORY (INHALATION) EVERY 6 HOURS PRN
Qty: 1 INHALER | Refills: 3 | Status: SHIPPED | OUTPATIENT
Start: 2018-10-15 | End: 2020-07-24 | Stop reason: SDUPTHER

## 2018-10-22 ENCOUNTER — HOSPITAL ENCOUNTER (OUTPATIENT)
Dept: WOUND CARE | Age: 28
Discharge: HOME | End: 2018-10-22
Payer: MEDICAID

## 2018-10-22 VITALS
BODY MASS INDEX: 20.63 KG/M2 | OXYGEN SATURATION: 99 % | HEART RATE: 74 BPM | WEIGHT: 144.1 LBS | SYSTOLIC BLOOD PRESSURE: 140 MMHG | DIASTOLIC BLOOD PRESSURE: 64 MMHG | RESPIRATION RATE: 16 BRPM | TEMPERATURE: 97.8 F | HEIGHT: 70 IN

## 2018-10-22 DIAGNOSIS — L89.312 DECUBITUS ULCER OF RIGHT ISCHIUM, STAGE 2 (HCC): ICD-10-CM

## 2018-10-22 DIAGNOSIS — L89.324 DECUBITUS ULCER OF LEFT ISCHIUM, STAGE 4 (HCC): ICD-10-CM

## 2018-10-22 DIAGNOSIS — L89.513 DECUBITUS ULCER OF RIGHT ANKLE, STAGE 3 (HCC): ICD-10-CM

## 2018-10-22 DIAGNOSIS — F42.4 COMPULSIVE SKIN PICKING: ICD-10-CM

## 2018-10-22 DIAGNOSIS — S31.109A WOUND OF ABDOMEN: ICD-10-CM

## 2018-10-22 DIAGNOSIS — L89.623 DECUBITUS ULCER OF LEFT HEEL, STAGE 3 (HCC): ICD-10-CM

## 2018-10-22 DIAGNOSIS — L03.031 CELLULITIS OF GREAT TOE OF RIGHT FOOT: ICD-10-CM

## 2018-10-22 DIAGNOSIS — B36.9 FUNGAL DERMATITIS: ICD-10-CM

## 2018-10-22 DIAGNOSIS — L30.9 DERMATITIS DUE TO UNKNOWN CAUSE: ICD-10-CM

## 2018-10-22 DIAGNOSIS — L89.892 PRESSURE ULCER OF TOE OF LEFT FOOT, STAGE 2 (HCC): ICD-10-CM

## 2018-10-22 PROCEDURE — 6370000000 HC RX 637 (ALT 250 FOR IP): Performed by: NURSE PRACTITIONER

## 2018-10-22 PROCEDURE — 87077 CULTURE AEROBIC IDENTIFY: CPT

## 2018-10-22 PROCEDURE — 87186 SC STD MICRODIL/AGAR DIL: CPT

## 2018-10-22 PROCEDURE — 17250 CHEM CAUT OF GRANLTJ TISSUE: CPT | Performed by: NURSE PRACTITIONER

## 2018-10-22 PROCEDURE — 17250 CHEM CAUT OF GRANLTJ TISSUE: CPT

## 2018-10-22 PROCEDURE — 87205 SMEAR GRAM STAIN: CPT

## 2018-10-22 PROCEDURE — 87184 SC STD DISK METHOD PER PLATE: CPT

## 2018-10-22 PROCEDURE — 87070 CULTURE OTHR SPECIMN AEROBIC: CPT

## 2018-10-22 PROCEDURE — 2709999900 HC NON-CHARGEABLE SUPPLY

## 2018-10-22 RX ORDER — CEFADROXIL 500 MG/1
500 CAPSULE ORAL 2 TIMES DAILY
Qty: 20 CAPSULE | Refills: 0 | Status: SHIPPED | OUTPATIENT
Start: 2018-10-22 | End: 2018-11-01

## 2018-10-22 RX ORDER — DOXYCYCLINE HYCLATE 100 MG
100 TABLET ORAL 2 TIMES DAILY
Qty: 20 TABLET | Refills: 0 | Status: SHIPPED | OUTPATIENT
Start: 2018-10-22 | End: 2018-11-01

## 2018-10-22 RX ORDER — DOXYCYCLINE HYCLATE 100 MG/1
100 CAPSULE ORAL 2 TIMES DAILY
COMMUNITY
End: 2018-10-29 | Stop reason: SDUPTHER

## 2018-10-22 RX ADMIN — SILVER NITRATE APPLICATORS 2 EACH: 25; 75 STICK TOPICAL at 10:15

## 2018-10-22 ASSESSMENT — PAIN DESCRIPTION - FREQUENCY: FREQUENCY: CONTINUOUS

## 2018-10-22 ASSESSMENT — PAIN DESCRIPTION - ORIENTATION: ORIENTATION: LEFT;RIGHT

## 2018-10-22 ASSESSMENT — PAIN DESCRIPTION - LOCATION: LOCATION: SHOULDER;TOE (COMMENT WHICH ONE)

## 2018-10-22 ASSESSMENT — PAIN SCALES - GENERAL: PAINLEVEL_OUTOF10: 4

## 2018-10-22 ASSESSMENT — PAIN DESCRIPTION - DESCRIPTORS: DESCRIPTORS: CONSTANT

## 2018-10-22 ASSESSMENT — PAIN DESCRIPTION - PAIN TYPE: TYPE: CHRONIC PAIN

## 2018-10-22 ASSESSMENT — PAIN DESCRIPTION - ONSET: ONSET: ON-GOING

## 2018-10-22 ASSESSMENT — PAIN DESCRIPTION - PROGRESSION: CLINICAL_PROGRESSION: NOT CHANGED

## 2018-10-22 NOTE — H&P
Take 1 tablet by mouth 3 times daily 90 tablet 5    hydrOXYzine (ATARAX) 50 MG tablet TAKE 1 TABLET BY MOUTH THREE TIMES A DAY AS NEEDED FOR ITCHING 90 tablet 5    cyclobenzaprine (FLEXERIL) 10 MG tablet Take 1 tablet by mouth 3 times daily 90 tablet 5    amantadine (SYMMETREL) 100 MG capsule Take 1 capsule by mouth daily 60 capsule 5    midodrine (PROAMATINE) 2.5 MG tablet TAKE 1 TABLET THREE TIMES A DAY 90 tablet 3    venlafaxine (EFFEXOR XR) 150 MG extended release capsule Take 1 capsule by mouth daily 30 capsule 5    furosemide (LASIX) 20 MG tablet Take 1 tablet by mouth daily 90 tablet 1   [address] [facility] [address] by Does not apply route [facility]. 1 each 0    Misc. Devices (EXTENDABLE BEDSIDE RAIL) MISC For hospital bed 2 each 0    rizatriptan (MAXALT) 5 MG tablet Take 1 tablet by mouth once as needed for Migraine May repeat in 2 hours if needed 15 tablet 3    ibuprofen (ADVIL;MOTRIN) 800 MG tablet Take 1 tablet by mouth 3 times daily as needed for Pain 90 tablet 5    ondansetron (ZOFRAN) 4 MG tablet Take 1 tablet by mouth every 8 hours as needed for Nausea or Vomiting 90 tablet 5    vitamin D (ERGOCALCIFEROL) 39354 units CAPS capsule Take 1 capsule by mouth once a week Sundays 12 capsule 3    Incontinence Supply Disposable (DEPEND UNDERWEAR SM/MED) MISC Use depends prn for incontinence care 5 Package 3    Incontinence Supply Disposable (PREVAIL WET WIPES) MISC Use wet wipes prn for personal care 96 each 3    Respiratory Therapy Supplies (NEBULIZER COMPRESSOR) KIT 1 kit by Does not apply route once for 1 dose 1 kit 0    Elastic Bandages & Supports (T.E.D.  KNEE LENGTH/M-REGULAR) MISC Use as directed 2 each 0    Elastic Bandages & Supports (JOBST ACTIVE 20-30MMHG MEDIUM) MISC Apply q daily 2 each 0    Ascorbic Acid (VITAMIN C) 500 MG tablet Take 500 mg by mouth 2 times daily      sodium hypochlorite (DAKINS) 0.125 % SOLN external solution Apply Dakin's moistened gauze QNBRV  7-5                    WSTLV / week.     Application of Pouch:  1. Assemble above supplies in order of application before removing pouch. 2. Cut opening in flange. 3. Remove your worn appliance by gently pulling away from skin and discard. 4. Wash skin with warm water, rinse and pat dry.    5. Inspect your skin for redness or irritation.  If present, apply a small amount of powder to skin around stoma.  Brush off excess powder with a Kleenex. Do not use ointments. __X_        Stoma Powder             (for wet, weepy skin)   ___X        Desenex Powder         ([employer])  (itches, rash)       6.   Apply thin layer of nuhope adhesive to back of flange. Let It dry until it gets tachy.             7.  Apply paste to inner opening of flange. 8. Center the flange around your stoma.  Smooth the adhesive collar to your abdomen. 9. Apply your pouch  10. Apply belt snuggly.      Follow up visit:   Dr. Bisi Luna and Ines WANG - 2 weeks November 5th @ 10:30 am      Keep next scheduled appointment. Please give 24 hour notice if unable to keep appointment.  [phone]      If you experience any of the following, please call the Wound Care Service during business hours: [phone].                        *Increase in pain                        *Temperature over 101                        *Increase in drainage from your wound or a foul odor                        *Uncontrolled swelling                        *Need for compression bandage changes due to slippage, breakthrough drainage      If you need medical attention outside of business hours, please contact your Primary Care Doctor or go to the nearest emergency room    Electronically signed by RANJIT Carreon CNP on 10/22/18 at [address] [address] [facility]   Electronically signed by Cb Enciso DPM on 10/22/2018 at 10:29 AM

## 2018-10-22 NOTE — PROGRESS NOTES
[facility]          Progress Note and Procedure Note      [facility]-BEHAVIORAL HEALTH DEPARTMENT  MEDICAL RECORD NUMBER:  [id number]  AGE: 34 y.o. GENDER: male  : 1990  EPISODE DATE:  10/22/2018    Subjective:     Chief Complaint   Patient presents with    Wound Check     coccyx, right foot         HISTORY of PRESENT ILLNESS LISA Ramirez is a 34 y.o. male Established patient, who presents today for wound/ulcer evaluation. History of Wound Context: Patient is s/p right hallux amputation on 10.05.18   Wound/Ulcer Pain Timing/Severity: intermittent  Quality of pain: sharp, aching, throbbing, shooting  Severity:  4 / 10   Modifying Factors: Pain worsens with palpation/touch  Associated Signs/Symptoms: erythema and drainage    Interval History:   Patient presents today for follow up on wound/ulcer's progression. The patient is currently on antibiotics. Current dressing care includes betadine soaked gauze to the right great toe. Patient states that he originally wanted to preserve as much as the toe as possible and elected to have just a partial amputation of the right great toe (DOS: 10.05.18) Patient states that the right great toe has continued to cause him pain and he would like to have the whole toe amputated.          PAST MEDICAL HISTORY        Diagnosis Date    Depression     Fracture of lower leg     Hyperthyroidism 2014    MDRO (multiple drug resistant organisms) resistance     MRSA LUNGS    Pneumonia        PAST SURGICAL HISTORY    Past Surgical History:   Procedure Laterality Date    APPENDECTOMY      BACK SURGERY  2016    BRAIN SURGERY  2016    CLOT REMOVED    CYSTOSCOPY  2017    FACIAL RECONSTRUCTION SURGERY  2012    left zygomatic arch and sinus    FRACTURE SURGERY Left 2016    HARDWARE REMOVAL  2012    left zygomatic arch    OTHER SURGICAL HISTORY  2017    Laparoscopic Diverting Colostomy    OTHER SURGICAL HISTORY  2017    Excisional 11/29/2017     11/29/2017     BMP:   Lab Results   Component Value Date     11/29/2017    K 4.2 11/29/2017    CL 97 11/29/2017    CO2 30 11/29/2017    BUN 9 11/29/2017    CREATININE 0.9 11/29/2017     PT/INR:   Lab Results   Component Value Date    PROTIME 14.1 01/09/2017    INR 1.22 01/09/2017     Prealbumin:   Lab Results   Component Value Date    PREALBUMIN 19.3 03/06/2017     Albumin:  Lab Results   Component Value Date    LABALBU 3.8 11/27/2017     Sed Rate:  Lab Results   Component Value Date    SEDRATE 1 02/14/2015     CRP:   Lab Results   Component Value Date    CRP 0.32 02/14/2015     Micro:   Lab Results   Component Value Date    BC No growth-preliminary  No growth   11/28/2017    BC No growth-preliminary  No growth   11/28/2017      Hemoglobin A1C:   Lab Results   Component Value Date    LABA1C 5.0 07/23/2017       Assessment:     Ulcer Identification:  Ulcer Type: non-healing surgical  Contributing Factors: chronic pressure, decreased mobility and shear force    Wound: Wound dehiscence    Depth of Diabetic/Pressure/Non Pressure Ulcers or Wound:  \"N/A    Patient Active Problem List   Diagnosis Code    Paraplegia (formerly [facility]) G82.20    Chronic pain syndrome G89.4    Sepsis secondary to UTI ([facility] [address] A41.9, N39.0    Neurogenic bladder N31.9    Mood disorder due to known physiological condition with depressive features F06.31    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    Pressure ulcer of coccygeal region, stage 4 (formerly [facility]) L89.154    Wound of back S21.209A    E coli bacteremia R78.81    Right nephrolithiasis N20.0    Hypokalemia E87.6    Right ureteral stone N20.1    Decubitus ulcer of left ischium, stage 4 (formerly [facility]) L89.324    Decubitus ulcer of left heel, stage 3 (formerly [facility]) L89.623    Spasticity Z49.0    Self-inflicted injury Y38.18    Compulsive skin picking L98.1    Wound of abdomen S31.109A    Colostomy care (formerly [facility]) Z43.3    Chest pain R07.9    Altered mental status R41.82    Depression F32.9    and Ines CNP - 2 weeks      Keep next scheduled appointment. Please give 24 hour notice if unable to keep appointment. [phone]      If you experience any of the following, please call the Wound Care Service during business hours: [phone].                        *Increase in pain                        *Temperature over 101                        *Increase in drainage from your wound or a foul odor                        *Uncontrolled swelling                        *Need for compression bandage changes due to slippage, breakthrough drainage      If you need medical attention outside of business hours, please contact your Primary Care Doctor or go to the nearest emergency room        I saw and evaluated the patient independently bedside. Please refer to resident physicians note for further details. Discussed with resident assessment and findings, I agree with plan as documented.      Naresh [facility]   Electronically signed by Farhan Del Rosario DPM on 10/22/2018 at 10:28 AM

## 2018-10-22 NOTE — PROGRESS NOTES
at the last visit leaked within a couple of hours. The inpatient ostomy nurses suggested Nu Hope adhesive which they have been doing and may have had decreased leaking with this but he continues to have difficulty with peristomal skin irritation and weepy skin. He has a new pressure ulcer noted to the right lateral ankle which his mother believes is related to his ankle rubbing on his wheelchair wheel when up.  1/11/18: He denies any issues with his ostomy pouches, he has not had any leaking.  His ulcers have all improved since his previous visit. Brian Mcadams continues to have significant amount of tunneling to the left ischium ulcer. Brian Mcadams has still not gotten into his psychiatrist but his picking has improved with his new Atarax dosage and if he does start to pick his parents apply gloves to his hands. They stopped using the Mepitel AG and foam to the right ankle ulcer due to is causing increased irritation, they have been applying zinc cream instead. 2/12/18: Patients picking and creating wounds has improved, he is working with [employer]. He has been having issues with his ostomy flanges leaking several times a week. 3/12/18: Patient's had a fever last week and was having increased drainage from his left ischium ulcer as well as dark discolored urine. He was started on Bactrim which is now completed and the left ischium treatment was switched back to Dakin's. His urine and ulcer drainage is improved. His ostomy pouches are working well. 4/13/18: The coccyx ulcer is improving but the tape is irritating the skin to his bilateral buttocks. His left ischial ulcer is becoming more and more narrow but continues to have significant depth. His back wounds are healed and he only has a couple a small scabbed areas remaining to his lower abdomen and groin. 6/15/18: The right lateral malleolus and right groin wound/ulcers are healed. The left ischium ulcer is improved. The coccyx ulcer is stable.  He continues to have Status Changed 10/22/2018  9:42 AM   Dressing Changed Changed/New 10/22/2018  9:42 AM   Dressing/Treatment Packing 9/20/2017  2:21 PM   Wound Cleansed Rinsed/Irrigated with saline 10/22/2018  9:42 AM   Drainage Amount Moderate 10/22/2018  9:42 AM   Drainage Description Serosanguinous; Yellow;Green 10/22/2018  9:42 AM   Odor Mild 10/22/2018  9:42 AM   Undermining Starts ___ O'Clock 0 10/22/2018  9:42 AM   Undermining Ends___ O'Clock 0 10/22/2018  9:42 AM   Undermining Maxium Distance (cm) 0 10/22/2018  9:42 AM   [place]%Wound Bed 70 12/8/2017 10:40 AM   Red%Wound Bed 50 10/22/2018  9:42 AM   Yellow%Wound Bed 40 10/22/2018  9:42 AM   Black%Wound Bed 10 10/6/2017  1:37 PM   Margins Epibole (rolled edges) 10/22/2018  9:42 AM   Number of days: 589       Wound 08/10/18 Toe (Comment  which one) Right great (Active)   Wound Image   10/22/2018  9:37 AM   Wound Type Wound 10/22/2018  9:37 AM   Dressing Status Intact; New drainage; Old drainage 10/22/2018  9:37 AM   Dressing Changed Changed/New 10/22/2018  9:37 AM   Dressing/Treatment Antibacterial Ointment;Dry dressing 8/29/2018  2:49 PM   Wound Cleansed Rinsed/Irrigated with saline 10/22/2018  9:37 AM   Wound Length (cm) 1.2 cm 10/22/2018  9:37 AM   Wound Width (cm) 4.5 cm 10/22/2018  9:37 AM   Wound Depth (cm)  0.4 10/22/2018  9:37 AM   Calculated Wound Size (cm^2) (l*w) 5.4 cm^2 10/22/2018  9:37 AM   Change in Wound Size % (l*w) -176.92 10/22/2018  9:37 AM   Wound Assessment Yellow;Red 10/22/2018  9:37 AM   Drainage Amount Moderate 10/22/2018  9:37 AM   Drainage Description Serosanguinous; Yellow 10/22/2018  9:37 AM   Odor None 10/22/2018  9:37 AM   Margins Attached edges 10/22/2018  9:37 AM   Kiana-wound Assessment White;Red;Maceration 10/22/2018  9:37 AM   Red%Wound Bed 25 10/22/2018  9:37 AM   Yellow%Wound Bed 75 10/22/2018  9:37 AM   Purple%Wound Bed 100 9/19/2018  2:26 PM   Op First Treatment Date 08/10/18 8/10/2018  2:36 PM   Number of days: 72       Wound 09/19/18 Toe D39.040    Spasticity K06.8    Self-inflicted injury E25.68    Compulsive skin picking L98.1    Wound of abdomen S31.109A    Colostomy care ([facility]) Z43.3    Chest pain R07.9    Altered mental status R41.82    Depression F32.9    Bacteria in urine R82.71    Suicidal thoughts R45.851    Decubitus ulcer of right ankle, stage 3 ([facility]) L89.513    Decubitus ulcer of right ischium, stage 2 L89.312    Cellulitis of great toe of right foot L03.031    Fungal dermatitis B36.9    Open wound of right great toe S91.101A    Pressure ulcer of toe of left foot, stage 2 L89.892    Dermatitis due to unknown cause L30.9       Chemical Cautery    Performed by: RANJIT Bower CNP    Consent obtained? Yes, verbal    Time out taken: Yes    Tissue: friable     Pain Control:   N/A    Method: silver nitrate    Location of Chemical Cautery:   Wound/Ulcer #1 and 7    Procedural Pain: 0  / 10     Post Procedural Pain: 0 / 10     Response to treatment: Well tolerated by patient. Plan:     Patient examined and evaluated. The coccyx ulcer has declined. The left ischium ulcer has reopened. He has been spending a large amount of time sitting in his new wheelchair. The right great toe is infected. He will be going back to surgery later this week for amputation of the right great toe. Culture of the left ischium was obtained    Silver nitrate applied to the coccyx and left ischium ulcers    Right great toe care managed per Dr. Ventura Thomas planned for later this week    Doxycycline and Keflex ordered per Dr. Lorena Livingston    Patient instructed to limit time in the wheelchair to no more than 2 hours at a time    Abdomen-  Apply vaseline to wound/scabbed areas once daily. Coccyx/left ischium- Cleanse wound with dakins solution and gauze. Apply aquacel ag to wound bed. Tuck a piece of aquacel ag into left ischium wound. Cover with abd pad. Tape in place.  Change every other day.     Right great toe, left 2 nd toe

## 2018-10-25 ENCOUNTER — TELEPHONE (OUTPATIENT)
Dept: WOUND CARE | Age: 28
End: 2018-10-25

## 2018-10-25 NOTE — TELEPHONE ENCOUNTER
Per Ines Culture for wound came back positive. Pts dad advised per provider Continue kelfex and doxycycline for infection. Advised to continue cleaning ischium wound with dakins. Pts dad voiced understanding.

## 2018-10-26 LAB
AEROBIC CULTURE: ABNORMAL
GRAM STAIN RESULT: ABNORMAL
ORGANISM: ABNORMAL

## 2018-10-29 ENCOUNTER — HOSPITAL ENCOUNTER (OUTPATIENT)
Dept: WOUND CARE | Age: 28
Discharge: HOME | End: 2018-10-29
Payer: MEDICAID

## 2018-10-29 VITALS
TEMPERATURE: 98.6 F | RESPIRATION RATE: 18 BRPM | SYSTOLIC BLOOD PRESSURE: 123 MMHG | DIASTOLIC BLOOD PRESSURE: 80 MMHG | OXYGEN SATURATION: 99 % | HEART RATE: 90 BPM

## 2018-10-29 PROCEDURE — 17250 CHEM CAUT OF GRANLTJ TISSUE: CPT | Performed by: NURSE PRACTITIONER

## 2018-10-29 PROCEDURE — 17250 CHEM CAUT OF GRANLTJ TISSUE: CPT

## 2018-10-29 PROCEDURE — 2709999900 HC NON-CHARGEABLE SUPPLY

## 2018-10-29 PROCEDURE — 6370000000 HC RX 637 (ALT 250 FOR IP): Performed by: NURSE PRACTITIONER

## 2018-10-29 RX ADMIN — SILVER NITRATE APPLICATORS 1 EACH: 25; 75 STICK TOPICAL at 11:45

## 2018-10-29 ASSESSMENT — PAIN SCALES - GENERAL: PAINLEVEL_OUTOF10: 4

## 2018-10-29 ASSESSMENT — PAIN DESCRIPTION - ORIENTATION: ORIENTATION: LEFT

## 2018-10-29 ASSESSMENT — PAIN DESCRIPTION - PAIN TYPE: TYPE: CHRONIC PAIN

## 2018-10-29 ASSESSMENT — PAIN DESCRIPTION - LOCATION: LOCATION: SHOULDER

## 2018-10-29 NOTE — PROGRESS NOTES
[facility]          Progress Note and Procedure Note      Dany Craw [facility]  MEDICAL RECORD NUMBER:  [id number]  AGE: 34 y.o. GENDER: male  : 1990  EPISODE DATE:  10/29/2018    Subjective:     Chief Complaint   Patient presents with    Wound Check     left ischium, coccyx, right great toe, left second toe         HISTORY of PRESENT ILLNESS HPI     Ariana Martinez is a 34 y.o. male Established patient, who presents today for wound/ulcer evaluation. 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 The patient is currently on antibiotics. Patient had originally wanted to preserve as much as the right great toe as possible and elected to have just a partial amputation of the right great toe (DOS: 10.05.18) but went on to have a total hallux amputation with disarticulation at the level of the MPJ (DOS: 10.24.18).          PAST MEDICAL HISTORY        Diagnosis Date    Depression     Fracture of lower leg     Hyperthyroidism 2014    MDRO (multiple drug resistant organisms) resistance     MRSA LUNGS    Pneumonia        PAST SURGICAL HISTORY    Past Surgical History:   Procedure Laterality Date    APPENDECTOMY      BACK SURGERY  2016    BRAIN SURGERY  2016    CLOT REMOVED    CYSTOSCOPY  2017    FACIAL RECONSTRUCTION SURGERY  2012    left zygomatic arch and sinus    FRACTURE SURGERY Left 2016    HARDWARE REMOVAL  2012    left zygomatic arch    OTHER SURGICAL HISTORY  2017    Laparoscopic Diverting Colostomy    OTHER SURGICAL HISTORY  2017    Excisional Debridment Sacral Decubitus Ulcer    OTHER SURGICAL HISTORY  2017    Placement of suprapubic catheter    [facility] OFFICE/OUTPT VISIT,PROCEDURE ONLY Right 10/5/2018    RIGHT HALLUX AMPUTATION performed by Annie Faria DPM at [address] [place] [address] Drive Right     great toe     TONSILLECTOMY         FAMILY HISTORY    Family History   Problem Relation Age of Onset    Heart Disease Mother     Heart Disease Father        SOCIAL HISTORY    Social History   Substance Use Topics    Smoking status: Current Every Day Smoker     Packs/day: 1.00     Years: 10.00     Types: Cigarettes, Cigars    Smokeless tobacco: Never Used    Alcohol use No       ALLERGIES    Allergies   Allergen Reactions    Bee Venom Shortness Of Breath    Tramadol Hives       MEDICATIONS    Current Outpatient Prescriptions on File Prior to Encounter   Medication Sig Dispense Refill    doxycycline hyclate (VIBRA-TABS) 100 MG tablet Take 1 tablet by mouth 2 times daily for 10 days 20 tablet 0    cefadroxil (DURICEF) 500 MG capsule Take 1 capsule by mouth 2 times daily for 10 days Take with doxycycline for MRSA and strep coverage 20 capsule 0    albuterol sulfate HFA (VENTOLIN HFA) 108 (90 Base) MCG/ACT inhaler Inhale 2 puffs into the lungs every 6 hours as needed for Wheezing 1 Inhaler 3    Disposable Gloves (LATEX GLOVES MEDIUM) MISC Use gloves prn for personal care 10 each 3    fluocinonide (LIDEX) 0.05 % cream Apply topically 2 times daily. 60 g 2    ferrous sulfate (FE TABS) 325 (65 Fe) MG EC tablet Take 1 tablet by mouth 2 times daily 60 tablet 5    gabapentin (NEURONTIN) 300 MG capsule 1 capsule in morning, 1 capsule in afternoon and 2 capsules at bedtime .  120 capsule 5    traZODone (DESYREL) 100 MG tablet Take 2 tablets by mouth nightly as needed for Sleep 60 tablet 2    busPIRone (BUSPAR) 10 MG tablet Take 1 tablet by mouth 3 times daily 90 tablet 5    baclofen (LIORESAL) 20 MG tablet Take 1 tablet by mouth 3 times daily 90 tablet 5    hydrOXYzine (ATARAX) 50 MG tablet TAKE 1 TABLET BY MOUTH THREE TIMES A DAY AS NEEDED FOR ITCHING 90 tablet 5    cyclobenzaprine (FLEXERIL) 10 MG tablet Take 1 tablet by mouth 3 times daily 90 tablet 5    midodrine (PROAMATINE) 2.5 MG tablet TAKE

## 2018-10-29 NOTE — PROGRESS NOTES
leaking. The pouching system recommended at the last visit leaked within a couple of hours. The inpatient ostomy nurses suggested Nu Hope adhesive which they have been doing and may have had decreased leaking with this but he continues to have difficulty with peristomal skin irritation and weepy skin. He has a new pressure ulcer noted to the right lateral ankle which his mother believes is related to his ankle rubbing on his wheelchair wheel when up.  1/11/18: He denies any issues with his ostomy pouches, he has not had any leaking.  His ulcers have all improved since his previous visit. [facility] continues to have significant amount of tunneling to the left ischium ulcer. [facility] has still not gotten into his psychiatrist but his picking has improved with his new Atarax dosage and if he does start to pick his parents apply gloves to his hands. They stopped using the Mepitel AG and foam to the right ankle ulcer due to is causing increased irritation, they have been applying zinc cream instead. 2/12/18: Patients picking and creating wounds has improved, he is working with [employer]. He has been having issues with his ostomy flanges leaking several times a week. 3/12/18: Patient's had a fever last week and was having increased drainage from his left ischium ulcer as well as dark discolored urine. He was started on Bactrim which is now completed and the left ischium treatment was switched back to Dakin's. His urine and ulcer drainage is improved. His ostomy pouches are working well. 4/13/18: The coccyx ulcer is improving but the tape is irritating the skin to his bilateral buttocks. His left ischial ulcer is becoming more and more narrow but continues to have significant depth. His back wounds are healed and he only has a couple a small scabbed areas remaining to his lower abdomen and groin. 6/15/18: The right lateral malleolus and right groin wound/ulcers are healed. The left ischium ulcer is improved.  The coccyx ulcer is stable. He continues to have scattered scabbed areas to the abdomen from picking as well as an open area to the right upper back. 7/13/18: The left ischium wound has healed with a dimpled area, no drainage for the last several weeks. The coccyx ulcer continues to slowly improve. The back wounds are healed. 8/10/18: The coccyx ulcer is larger and he has a new ulcer noted to the right ischium. There is a significant rash noted to the right hip and buttock which extends around the coccyx ulcer and right ischium ulcer. Dad states that he has been wearing jeans mostly lately and he thinks that it is causing the irritation and decline in the coccyx ulcer. His right great toe is infected with cellulitis noted. He states that they noticed the redness and infection 2 days ago and drained purulent drainage from the area. Patient states that this nail has previously fallen off.   8/29/18: The right great toe has improved significantly since his previous visit. He has not gotten his xray of the toe yet. The coccyx ulcer has improved. He continues to have irritation to the right buttock but the right ischial ulcer is healed. He will have his Doxycycline completed by tomorrow. 9/19/18: The coccyx ulcer has declined significantly. There is also a small new ulcer just above the coccyx ulcer. He spent a couple of days at his mom's home sitting in his chair for a long period of time. He continues to struggle with right great toe wound and also has a new ulcer to the left 2nd toe. He still has not gotten the x ray of the right great toe. He continues with rash to the right buttock. 10/1/18: The coccyx ulcer is improved. The dermatitis to the right buttock has improved with use of the Lidex cream. The right great toe xray from 9/25/18 showed no radiographic evidence of osteomyelitis. Dr. Tang Flores assessed his right and left toes today.    10/22/18: Patient received his new wheelchair and has been spending a large amount of time sitting in his chair. He states that the air was removed from his ROHO cushion by the tech that delivered his chair. His left ischium ulcer has reopened and is draining. The coccyx ulcer is having drainage as well. The distal portion of the right great toe was amputated on 10/5/18 by Dr. Bobby Goodson. Tissue culture from 10/5/18 was positive for MRSA and Strep agalactiae (Group B). He is having issues with healing and infection in the right great toe he is currently on Doxycycline. Started on Becca on 10/22/18. Culture of the left ischium from 10/22/18 was positive for Proteus mirabilis, Acinetobacter baumannii, Streptococcus agalactiae (Group B), reviewed with Dr. Carrie Casanova, continue Doxycycline and Duricef and irrigate ulcers with Dakin's at each dressing change. 10/29/18: The coccyx and left ischium ulcers have improved since last week. He has been eating more protein since his last visit. He also has shortened the amount of time his is spending in the chair. 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 Bobby Goodson. Wound/Ulcer Pain Timing/Severity: mild  Quality of pain: aching, tender  Severity:  4 / 10   Modifying Factors: Pain is relieved/improved with rest  Associated Signs/Symptoms: drainage    Interval History:   Patient presents today for follow up on wound/ulcer's progression. The patient is currently on antibiotics. Current dressing care includes:    Abdomen-  Apply vaseline to wound/scabbed areas once daily.      Coccyx/left ischium- Cleanse wound with dakins solution and gauze. Apply aquacel ag to wound bed. Tuck a piece of aquacel ag into left ischium wound. Cover with abd pad. Tape in place. Change every other day.     Right great toe, left 2 nd toe wound-   Cleanse wound with normal saline and gauze. Pat dry with clean gauze. Apply betadine to wound beds. Cover with band aids or gauze and sindy. Change once daily.          PAST MEDICAL HISTORY hallux incision    Pressure Ulcer 03/12/17 Coccyx #1 (Active)   Kiana-wound Assessment Maceration; White 10/29/2018 11:29 AM   Kiana-Wound Texture Scarring 10/29/2018 11:29 AM   Kiana-Wound Moisture Macerated 10/29/2018 11:29 AM   Kiana-Wound Color Red 10/22/2018  9:42 AM   Wound Assessment Yellow;Red;Epithelialization 10/29/2018 11:29 AM   Wound Length (cm) 3 cm 10/29/2018 11:29 AM   Wound Width (cm) 2 cm 10/29/2018 11:29 AM   Wound Depth (cm)  0.4 10/29/2018 11:29 AM   Calculated Wound Size (cm^2) (l*w) 6 cm^2 10/29/2018 11:29 AM   Change in Wound Size % (l*w) 91.45 10/29/2018 11:29 AM   Dressing Status Changed 10/29/2018 11:29 AM   Dressing Changed Changed/New 10/29/2018 11:29 AM   Dressing/Treatment Packing 9/20/2017  2:21 PM   Wound Cleansed Rinsed/Irrigated with saline 10/29/2018 11:29 AM   Drainage Amount Moderate 10/29/2018 11:29 AM   Drainage Description Serosanguinous; Yellow 10/29/2018 11:29 AM   Odor None 10/29/2018 11:29 AM   Undermining Starts ___ O'Clock 11 10/29/2018 11:29 AM   Undermining Ends___ O'Clock 3 10/29/2018 11:29 AM   Undermining Maxium Distance (cm) 0.4 10/29/2018 11:29 AM   [place]%Wound Bed 70 12/8/2017 10:40 AM   Red%Wound Bed 50 10/29/2018 11:29 AM   Yellow%Wound Bed 30 10/29/2018 11:29 AM   Black%Wound Bed 20 epi 10/29/2018 11:29 AM   Margins Epibole (rolled edges) 10/29/2018 11:29 AM   Number of days: 596       Wound 08/10/18 Toe (Comment  which one) Right great #8 (Active)   Wound Image   10/29/2018 10:57 AM   Wound Type Incision 10/29/2018 10:57 AM   Dressing Status Dry 10/29/2018 10:57 AM   Dressing Changed Changed/New 10/29/2018 10:57 AM   Dressing/Treatment Antibacterial Ointment;Dry dressing 8/29/2018  2:49 PM   Wound Cleansed Rinsed/Irrigated with saline 10/29/2018 10:57 AM   Wound Length (cm) 6.5 cm 10/29/2018 10:57 AM   Wound Width (cm) 0 cm 10/29/2018 10:57 AM   Wound Depth (cm)  0 10/29/2018 10:57 AM   Calculated Wound Size (cm^2) (l*w) 0 cm^2 10/29/2018 10:57 AM   Change in Wound Size % (l*w) 100 10/29/2018 10:57 AM   Wound Assessment Intact;Dry 10/29/2018 11:22 AM   Drainage Amount None 10/29/2018 11:22 AM   Drainage Description Serosanguinous; Yellow 10/22/2018  9:37 AM   Odor None 10/29/2018 11:22 AM   Margins Attached edges 10/22/2018  9:37 AM   Kiana-wound Assessment Swelling 10/29/2018 11:22 AM   Red%Wound Bed 25 10/22/2018  9:37 AM   Yellow%Wound Bed 75 10/22/2018  9:37 AM   Purple%Wound Bed 100 9/19/2018  2:26 PM   Op First Treatment Date 08/10/18 8/10/2018  2:36 PM   Number of days: 79       Wound 10/22/18 Ischium Left #7 (Active)   Wound Image   10/29/2018 11:29 AM   Wound Type Wound 10/29/2018 11:29 AM   Dressing Status Intact; New drainage; Old drainage 10/29/2018 11:29 AM   Dressing Changed Changed/New 10/29/2018 11:29 AM   Wound Cleansed Rinsed/Irrigated with saline 10/29/2018 11:29 AM   Wound Length (cm) 2.7 cm 10/29/2018 11:29 AM   Wound Width (cm) 1.6 cm 10/29/2018 11:29 AM   Wound Depth (cm)  0.1 10/29/2018 11:29 AM   Calculated Wound Size (cm^2) (l*w) 4.32 cm^2 10/29/2018 11:29 AM   Change in Wound Size % (l*w) 28 10/29/2018 11:29 AM   Distance Tunneling (cm) 1.5 cm 10/29/2018 11:29 AM   Tunneling Position ___ O'Clock 12 10/29/2018 11:29 AM   Wound Assessment Yellow;Pink 10/29/2018 11:29 AM   Drainage Amount Moderate 10/29/2018 11:29 AM   Drainage Description Yellow;Serous 10/29/2018 11:29 AM   Odor None 10/29/2018 11:29 AM   Margins Attached edges; Unattached edges 10/22/2018  9:42 AM   Kiana-wound Assessment White; Maceration 10/29/2018 11:29 AM   [place]%Wound Bed 80 10/29/2018 11:29 AM   Yellow%Wound Bed 20 10/29/2018 11:29 AM   Culture Taken Yes 10/22/2018  9:42 AM   Op First Treatment Date 10/22/18 10/22/2018  9:42 AM   Number of days: 7       LABS      CBC:   Lab Results   Component Value Date    WBC 10.9 11/29/2017    HGB 16.2 11/29/2017    HCT 49.3 11/29/2017    MCV 85.2 11/29/2017     11/29/2017     BMP:   Lab Results   Component Value Date     11/29/2017    K by patient or POA. Discharge Instructions         Visit Discharge/Physician Orders:   - Mikal Armen down at least twice daily for a couple hours to limit time in chair. Do not sit for more than 2 hrs at a time.  - Reposition yourself in chair every 1-2 hours.    - Off load heels and the right great toe. Wear off loading boots when laying down or sitting in chair.  - Increase protein. Try protein shake, eat eggs. - Wear gloves to help stop scratching. Do not smoke with gloves on.   - Do not wear jeans daily or shoes. - Apply steroid cream to rash/red areas on buttocks twice daily.  -Take Doxycycline and Cefaxroxil antibiotics as prescribed. -Silver nitrate applied to coccyx and left ischium wounds, will appear gray in color. Wound Location: Coccyx, Right great toe incision, Left ischium       Do not shower, take baths or get wound wet, unless otherwise instructed by your Wound Care doctor.   Leila Arroyo all dressings clean & dry.      Dressing orders:      Abdomen-  Apply vaseline to wound/scabbed areas once daily. Coccyx/Left ischium- Cleanse wounds with dakins solution and gauze. Pat dry with clean gauze. Apply aquacel ag to wound beds. Tuck a piece of aquacel ag into left ischium wound. Cover with gauze and ABD pads. Tape in place. Change every other day.     Right great toe incision- Cleanse wound with normal saline and gauze. Pat dry with clean gauze.      Supplies Size Order #   Aleida Convex Flange 1 1/4 [id number]   Adapt Powder   [id number]   Aleida drainable pouch with clamp    28796   Adapt belt   7300   Adapt paste   49072    Nuhope Adhesive    2401      Change your IXYPA  6-8                    BRRGR / week.     Application of Pouch:  1. Assemble above supplies in order of application before removing pouch. 2. Cut opening in flange. 3. Remove your worn appliance by gently pulling away from skin and discard. 4. Wash skin with warm water, rinse and pat dry.    5.  Inspect your skin for redness or

## 2018-11-05 DIAGNOSIS — F32.2 SEVERE SINGLE CURRENT EPISODE OF MAJOR DEPRESSIVE DISORDER, WITHOUT PSYCHOTIC FEATURES (HCC): ICD-10-CM

## 2018-11-05 DIAGNOSIS — G89.4 CHRONIC PAIN SYNDROME: ICD-10-CM

## 2018-11-05 DIAGNOSIS — G89.21 CHRONIC PAIN DUE TO TRAUMA: ICD-10-CM

## 2018-11-05 LAB
FUNGUS IDENTIFIED: NORMAL
FUNGUS SMEAR: NORMAL

## 2018-11-05 RX ORDER — MIDODRINE HYDROCHLORIDE 2.5 MG/1
TABLET ORAL
Qty: 90 TABLET | Refills: 3 | Status: SHIPPED | OUTPATIENT
Start: 2018-11-05 | End: 2018-12-03 | Stop reason: ALTCHOICE

## 2018-11-05 RX ORDER — IBUPROFEN 800 MG/1
800 TABLET ORAL 3 TIMES DAILY PRN
Qty: 90 TABLET | Refills: 5 | Status: SHIPPED | OUTPATIENT
Start: 2018-11-05 | End: 2019-04-16 | Stop reason: SDUPTHER

## 2018-11-05 RX ORDER — VENLAFAXINE HYDROCHLORIDE 150 MG/1
150 CAPSULE, EXTENDED RELEASE ORAL DAILY
Qty: 30 CAPSULE | Refills: 5 | Status: SHIPPED | OUTPATIENT
Start: 2018-11-05 | End: 2019-04-16 | Stop reason: SDUPTHER

## 2018-11-18 LAB — AFB CULTURE & SMEAR: NORMAL

## 2018-11-26 ENCOUNTER — HOSPITAL ENCOUNTER (OUTPATIENT)
Dept: WOUND CARE | Age: 28
Discharge: HOME | End: 2018-11-26
Payer: MEDICAID

## 2018-11-26 ENCOUNTER — TELEPHONE (OUTPATIENT)
Dept: CARDIOLOGY CLINIC | Age: 28
End: 2018-11-26

## 2018-11-26 VITALS
WEIGHT: 139.3 LBS | DIASTOLIC BLOOD PRESSURE: 75 MMHG | BODY MASS INDEX: 19.94 KG/M2 | RESPIRATION RATE: 18 BRPM | SYSTOLIC BLOOD PRESSURE: 130 MMHG | HEIGHT: 70 IN | TEMPERATURE: 98 F | OXYGEN SATURATION: 100 % | HEART RATE: 63 BPM

## 2018-11-26 DIAGNOSIS — L30.9 DERMATITIS DUE TO UNKNOWN CAUSE: ICD-10-CM

## 2018-11-26 DIAGNOSIS — L03.031 CELLULITIS OF GREAT TOE OF RIGHT FOOT: ICD-10-CM

## 2018-11-26 DIAGNOSIS — L89.312 DECUBITUS ULCER OF RIGHT ISCHIUM, STAGE 2 (HCC): ICD-10-CM

## 2018-11-26 DIAGNOSIS — L89.324 DECUBITUS ULCER OF LEFT ISCHIUM, STAGE 4 (HCC): ICD-10-CM

## 2018-11-26 DIAGNOSIS — S31.109A WOUND OF ABDOMEN: ICD-10-CM

## 2018-11-26 DIAGNOSIS — L89.310 PRESSURE INJURY OF RIGHT BUTTOCK, UNSTAGEABLE (HCC): ICD-10-CM

## 2018-11-26 DIAGNOSIS — L89.513 DECUBITUS ULCER OF RIGHT ANKLE, STAGE 3 (HCC): ICD-10-CM

## 2018-11-26 DIAGNOSIS — L89.892 PRESSURE ULCER OF TOE OF LEFT FOOT, STAGE 2 (HCC): ICD-10-CM

## 2018-11-26 DIAGNOSIS — B36.9 FUNGAL DERMATITIS: ICD-10-CM

## 2018-11-26 DIAGNOSIS — L89.623 DECUBITUS ULCER OF LEFT HEEL, STAGE 3 (HCC): ICD-10-CM

## 2018-11-26 DIAGNOSIS — F42.4 COMPULSIVE SKIN PICKING: ICD-10-CM

## 2018-11-26 PROCEDURE — 17250 CHEM CAUT OF GRANLTJ TISSUE: CPT

## 2018-11-26 PROCEDURE — 2709999900 HC NON-CHARGEABLE SUPPLY

## 2018-11-26 PROCEDURE — 6370000000 HC RX 637 (ALT 250 FOR IP): Performed by: NURSE PRACTITIONER

## 2018-11-26 PROCEDURE — 17250 CHEM CAUT OF GRANLTJ TISSUE: CPT | Performed by: NURSE PRACTITIONER

## 2018-11-26 RX ADMIN — SILVER NITRATE APPLICATORS 1 EACH: 25; 75 STICK TOPICAL at 10:51

## 2018-11-26 ASSESSMENT — PAIN DESCRIPTION - PAIN TYPE: TYPE: CHRONIC PAIN

## 2018-11-26 ASSESSMENT — PAIN SCALES - GENERAL: PAINLEVEL_OUTOF10: 4

## 2018-11-26 ASSESSMENT — PAIN DESCRIPTION - LOCATION: LOCATION: SHOULDER

## 2018-11-26 ASSESSMENT — PAIN DESCRIPTION - ORIENTATION: ORIENTATION: LEFT

## 2018-11-26 ASSESSMENT — PAIN DESCRIPTION - FREQUENCY: FREQUENCY: CONTINUOUS

## 2018-11-26 ASSESSMENT — PAIN DESCRIPTION - ONSET: ONSET: ON-GOING

## 2018-11-26 ASSESSMENT — PAIN DESCRIPTION - DESCRIPTORS: DESCRIPTORS: CONSTANT;OTHER (COMMENT)

## 2018-11-26 ASSESSMENT — PAIN DESCRIPTION - PROGRESSION: CLINICAL_PROGRESSION: NOT CHANGED

## 2018-11-26 NOTE — PLAN OF CARE
Problem: Wound:  Goal: Will show signs of wound healing; wound closure and no evidence of infection  Will show signs of wound healing; wound closure and no evidence of infection   Outcome: Ongoing  Pt presents to wound clinic for follow up of left ischium, coccyx wounds and, right great toe incision. Coccyx wound measures smaller in size than last appt. New wound noted on right buttocks. Left ischium measures larger in size. Right great toe incision appears dry and intact. Edges well approximated. No s/s of infection. Pt was afebrile. Silver nitrate applied to coccyx and left ischium per provider. Pt advised to Lay down at least twice daily for a couple hours to limit time in chair. Pt advised Do not sit for more than 2 hrs at a time. Pt advised to   Reposition yourself in chair every 1-2 hours. Pt advised to Off load heels and the right great toe. Pt advised Wear off loading boots when laying down or sitting in chair. Pt insturcted per provider to Increase protein. Try protein shake, eat eggs. Pt advised to Wear gloves to help stop scratching. Pt advised Do not smoke with gloves on. Pt advised Do not wear jeans daily or shoes to decrease friction. Abdomen-  Pt advised to Apply vaseline to wound/scabbed areas once daily. Coccyx/Left ischium- Pt advised to Cleanse wounds with dakins solution and gauze. Today cleansed wound with normal saline and gauze. Patted dry with clean gauze. Applied aquacel ag to wound beds. Tucked a piece of aquacel ag into left ischium wound. Covered with gauze and ABD pads. Taped in place. Pt advised to Change every other day. Right buttocks-  Pt advised to Apply lidex cream as needed for increase redness. Today applied zinc oxide cream. Pt advised to apply daily and as needed. Pt advised to Keep area clean. Right great toe incision- Applied betadine to incision line. Covered with gauze. Wrapped with kerlix and coban.  Pt advised  Leave dressing intact until follow up appointment with

## 2018-11-26 NOTE — PROGRESS NOTES
chair. He states that the air was removed from his ROHO cushion by the tech that delivered his chair. His left ischium ulcer has reopened and is draining. The coccyx ulcer is having drainage as well. The distal portion of the right great toe was amputated on 10/5/18 by Dr. Mike Aldridge. Tissue culture from 10/5/18 was positive for MRSA and Strep agalactiae (Group B). He is having issues with healing and infection in the right great toe he is currently on Doxycycline. Started on Becca on 10/22/18. Culture of the left ischium from 10/22/18 was positive for Proteus mirabilis, Acinetobacter baumannii, Streptococcus agalactiae (Group B), reviewed with Dr. Yesi Zambrano, continue Doxycycline and Duricef and irrigate ulcers with Dakin's at each dressing change. 10/29/18: The coccyx and left ischium ulcers have improved since last week. He has been eating more protein since his last visit. He also has shortened the amount of time his is spending in the chair. 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 Mike Aldridge. 11/26/18: The coccyx ulcer has declined since previous visit. He has a new ulcer noted to the right buttock. His dad states that his coccyx ulcer had been significantly improved until he wore jeans and sat up in his wheelchair for Thanksgiving. Wound/Ulcer Pain Timing/Severity: mild  Quality of pain: aching, tender  Severity:  4 / 10   Modifying Factors: Pain is relieved/improved with rest  Associated Signs/Symptoms: drainage    Interval History:   Patient presents today for follow up on wound/ulcer's progression. The patient is currently not on antibiotics. Current dressing care include:    Abdomen-  Apply vaseline to wound/scabbed areas once daily.      Coccyx/Left ischium- Cleanse wounds with dakins solution and gauze. Pat dry with clean gauze. Apply aquacel ag to wound beds. Tuck a piece of aquacel ag into left ischium wound. Cover with gauze and ABD pads.  Tape in for good mood    Objective:      /75 Comment: manual   Pulse 63   Temp 98 °F (36.7 °C) (Oral)   Resp 18   Ht 5' 10\" (1.778 m)   Wt 139 lb 4.8 oz (63.2 kg)   SpO2 100%   BMI 19.99 kg/m²     Wt Readings from Last 3 Encounters:   11/26/18 139 lb 4.8 oz (63.2 kg)   10/22/18 144 lb 1.6 oz (65.4 kg)   10/03/18 130 lb (59 kg)       PHYSICAL EXAM    General Appearance: alert and oriented to person, place and time    Skin: warm and dry  Head: normocephalic and atraumatic  Eyes: pupils equal, round, and reactive to light  ENT: hearing grossly normal bilaterally  Pulmonary/Chest: clear to auscultation bilaterally- no wheezes, rales or rhonchi, normal air movement, no respiratory distress  Cardiovascular: normal rate and regular rhythm  Abdomen: soft, non-tender, bowel sounds normal   Extremities: no cyanosis, no clubbing and no edema  Musculoskeletal: no joint deformity, paraplegia lower extremities, history of amputation of right hallux  Neurologic: speech normal and gait abnormal- wheelchair bound, frequent muscle spasms  Colostomy, LLQ: pouch intact  Lower abdomen: Scattered intact, dry, scabbed areas noted with scarring from constant picking.  No redness, warmth, or induration noted. Coccyx: pressure ulcer stage 4- Measurements are larger. Ulcer bed is 50% pink/red, friable tissue, 20% black, and 30% thin yellow slough. Draining moderate amount of serosanguineous drainage.  No odor noted.  Periulcer skin is scarred and intact. No warmth or induration noted. Right buttock: Pressure injury-deep tissue- Ulcer bed is 20% purple and 80% red. Draining small amount of serosanguinous drainage. Periulcer skin is red, scarred and intact. No warmth or induration noted. Right great toe: Incision intact with sutures noted. Small black area noted to the center. Periwound skin is intact. No redness, warmth, or induration noted. Left ischium: Pressure ulcer/injury stage 4. Measurements are stable.  Unable to visualize Well tolerated by patient. Plan:     Patient examined and evaluated. Patient has a new pressure ulcer to the right buttock and the coccyx ulcer is declined. Father states that patient was up in his wheelchair for a long period of time on Thanksgiving wearing jeans which irritated his ulcers. ROHO cushion appears to be low on air, father will adjust the pressure. Silver nitrate applied to the coccyx and left buttock    Patient advised to limit time up in his wheelchair to no more than 2 hours at a time    Avoid wearing jeans or pants with thick seams    Wound care as follows: Abdomen-  Apply vaseline to wound/scabbed areas once daily.      Coccyx/Left ischium- Cleanse wounds with dakins solution and gauze. Pat dry with clean gauze. Apply aquacel ag to wound beds. Tuck a piece of aquacel ag into left ischium wound. Cover with gauze and ABD pads. Tape in place. Change every other day. Right buttocks-  Apply lidex cream as needed for increase redness. Apply zinc oxide cream daily and as needed. Keep area clean.      Right great toe incision- Apply betadine to incision line. Cover with gauze. Wrap with kerlix and coban. Leave dressing intact until follow up appointment. Antibiotics: No    Follow up: 3 weeks, follow up with Dr. Fortino Marcus for right foot incision as previously scheduled    Please see attached Discharge Instructions    Written patient dismissal instructions given to patient and signed by patient or POA. Discharge Instructions         Visit Discharge/Physician Orders:   - Ron Gene down at least twice daily for a couple hours to limit time in chair. Do not sit for more than 2 hrs at a time.  - Reposition yourself in chair every 1-2 hours.    - Off load heels and the right great toe. Wear off loading boots when laying down or sitting in chair.  - Increase protein. Try protein shake, eat eggs. - Wear gloves to help stop scratching.  Do not smoke with gloves on.   - Do not wear jeans daily or

## 2018-11-30 DIAGNOSIS — F32.2 SEVERE SINGLE CURRENT EPISODE OF MAJOR DEPRESSIVE DISORDER, WITHOUT PSYCHOTIC FEATURES (HCC): ICD-10-CM

## 2018-11-30 RX ORDER — CHOLECALCIFEROL (VITAMIN D3) 125 MCG
50000 TABLET ORAL
Qty: 12 TABLET | Refills: 1 | Status: SHIPPED | OUTPATIENT
Start: 2018-11-30 | End: 2019-05-20 | Stop reason: SDUPTHER

## 2018-11-30 RX ORDER — TRAZODONE HYDROCHLORIDE 100 MG/1
200 TABLET ORAL NIGHTLY PRN
Qty: 60 TABLET | Refills: 5 | Status: SHIPPED | OUTPATIENT
Start: 2018-11-30 | End: 2019-05-20 | Stop reason: SDUPTHER

## 2018-12-03 ENCOUNTER — HOSPITAL ENCOUNTER (OUTPATIENT)
Dept: WOUND CARE | Age: 28
Discharge: HOME | End: 2018-12-03
Payer: MEDICAID

## 2018-12-03 VITALS
OXYGEN SATURATION: 96 % | TEMPERATURE: 97.6 F | HEART RATE: 96 BPM | RESPIRATION RATE: 18 BRPM | SYSTOLIC BLOOD PRESSURE: 123 MMHG | DIASTOLIC BLOOD PRESSURE: 82 MMHG

## 2018-12-03 PROCEDURE — 2709999900 HC NON-CHARGEABLE SUPPLY

## 2018-12-03 PROCEDURE — 99212 OFFICE O/P EST SF 10 MIN: CPT

## 2018-12-03 ASSESSMENT — PAIN DESCRIPTION - ORIENTATION: ORIENTATION: LEFT

## 2018-12-03 ASSESSMENT — PAIN DESCRIPTION - LOCATION: LOCATION: SHOULDER

## 2018-12-03 ASSESSMENT — PAIN SCALES - GENERAL: PAINLEVEL_OUTOF10: 4

## 2018-12-03 ASSESSMENT — PAIN DESCRIPTION - PAIN TYPE: TYPE: CHRONIC PAIN

## 2018-12-03 ASSESSMENT — PAIN DESCRIPTION - DESCRIPTORS: DESCRIPTORS: CONSTANT

## 2018-12-17 ENCOUNTER — HOSPITAL ENCOUNTER (OUTPATIENT)
Dept: WOUND CARE | Age: 28
Discharge: HOME | End: 2018-12-17
Payer: MEDICAID

## 2018-12-17 VITALS
BODY MASS INDEX: 19.76 KG/M2 | OXYGEN SATURATION: 100 % | SYSTOLIC BLOOD PRESSURE: 110 MMHG | DIASTOLIC BLOOD PRESSURE: 68 MMHG | TEMPERATURE: 97.9 F | WEIGHT: 137.7 LBS | HEART RATE: 97 BPM | RESPIRATION RATE: 16 BRPM

## 2018-12-17 DIAGNOSIS — L89.610 PRESSURE INJURY OF RIGHT HEEL, UNSTAGEABLE (HCC): ICD-10-CM

## 2018-12-17 PROCEDURE — 17250 CHEM CAUT OF GRANLTJ TISSUE: CPT

## 2018-12-17 PROCEDURE — 2709999900 HC NON-CHARGEABLE SUPPLY

## 2018-12-17 PROCEDURE — 17250 CHEM CAUT OF GRANLTJ TISSUE: CPT | Performed by: NURSE PRACTITIONER

## 2018-12-17 PROCEDURE — 6370000000 HC RX 637 (ALT 250 FOR IP): Performed by: NURSE PRACTITIONER

## 2018-12-17 RX ADMIN — SILVER NITRATE APPLICATORS 2 EACH: 25; 75 STICK TOPICAL at 14:10

## 2018-12-17 ASSESSMENT — PAIN SCALES - GENERAL: PAINLEVEL_OUTOF10: 5

## 2018-12-17 ASSESSMENT — PAIN DESCRIPTION - DESCRIPTORS: DESCRIPTORS: CONSTANT

## 2018-12-17 ASSESSMENT — PAIN DESCRIPTION - LOCATION: LOCATION: SHOULDER

## 2018-12-17 ASSESSMENT — PAIN DESCRIPTION - PAIN TYPE: TYPE: CHRONIC PAIN

## 2018-12-17 ASSESSMENT — PAIN DESCRIPTION - ORIENTATION: ORIENTATION: LEFT

## 2018-12-17 NOTE — PROGRESS NOTES
includes:    Abdomen-  Apply vaseline to wound/scabbed areas once daily.      Coccyx/Left ischium- Cleanse wounds with dakins solution and gauze. Pat dry with clean gauze. Apply aquacel ag to wound beds. Tuck a piece of aquacel ag into left ischium wound. Cover with gauze and ABD pads. Tape in place. Change every other day.     Right buttocks-  Apply lidex cream as needed for increase redness. Apply zinc oxide cream daily and as needed. Keep area clean.      Right great toe incision- Apply betadine to incision line. Cover with gauze. Wrap with kerlix and coban. Leave dressing intact until follow up appointment.          PAST MEDICAL HISTORY        Diagnosis Date    Depression     Fracture of lower leg     Hyperthyroidism 9/2014    MDRO (multiple drug resistant organisms) resistance     MRSA LUNGS    Pneumonia        PAST SURGICAL HISTORY    Past Surgical History:   Procedure Laterality Date    APPENDECTOMY      BACK SURGERY  11/2016    BRAIN SURGERY  11/05/2016    CLOT REMOVED    CYSTOSCOPY  04/17/2017    FACIAL RECONSTRUCTION SURGERY  2012    left zygomatic arch and sinus    FRACTURE SURGERY Left 11/2016    HARDWARE REMOVAL  2012    left zygomatic arch    OTHER SURGICAL HISTORY  01/09/2017    Laparoscopic Diverting Colostomy    OTHER SURGICAL HISTORY  01/09/2017    Excisional Debridment Sacral Decubitus Ulcer    OTHER SURGICAL HISTORY  04/17/2017    Placement of suprapubic catheter    [place] OFFICE/OUTPT VISIT,PROCEDURE ONLY Right 10/5/2018    RIGHT HALLUX AMPUTATION performed by Annie Faria DPM at [address] [place] [address] TOE AMPUTATION Right     great toe     TONSILLECTOMY         FAMILY HISTORY    Family History   Problem Relation Age of Onset    Heart Disease Mother     Heart Disease Father        SOCIAL HISTORY    Social History   Substance Use Topics    Smoking status: Current Every Day Smoker     Packs/day: 1.00     Years: 10.00     Types: Cigarettes, Cigars    Smokeless tobacco: Never Used   Faith Diana (Active)   Wound Image   12/17/2018  1:57 PM   Dressing Status Intact 12/17/2018  1:57 PM   Dressing Changed Changed/New 12/17/2018  1:57 PM   Wound Cleansed Rinsed/Irrigated with saline 12/17/2018  1:57 PM   Wound Length (cm) 2 cm 12/17/2018  1:57 PM   Wound Width (cm) 1 cm 12/17/2018  1:57 PM   Wound Depth (cm) 0.3 cm 12/17/2018  1:57 PM   Wound Surface Area (cm^2) 2 cm^2 12/17/2018  1:57 PM   Wound Volume (cm^3) 0.6 cm^3 12/17/2018  1:57 PM   Distance Tunneling (cm) 3.5 cm 12/17/2018  1:57 PM   Tunneling Position ___ O'Clock 11 12/17/2018  1:57 PM   Wound Assessment Yellow;Red 12/17/2018  1:57 PM   Drainage Amount Moderate 12/17/2018  1:57 PM   Drainage Description Serosanguinous; Yellow 12/17/2018  1:57 PM   Odor None 12/17/2018  1:57 PM   Margins Attached edges 12/17/2018  1:57 PM   Kiana-wound Assessment Dry;Pink 12/17/2018  1:57 PM   Red%Wound Bed 20 12/17/2018  1:57 PM   Yellow%Wound Bed 80 12/17/2018  1:57 PM   Number of days: 56       Wound 11/26/18 Buttocks Right #3 (Active)   Wound Image   12/17/2018  1:57 PM   Dressing Status Intact 12/17/2018  1:57 PM   Dressing Changed Changed/New 12/17/2018  1:57 PM   Wound Cleansed Rinsed/Irrigated with saline 12/17/2018  1:57 PM   Wound Length (cm) 1 cm 12/17/2018  1:57 PM   Wound Width (cm) 2 cm 12/17/2018  1:57 PM   Wound Depth (cm) 0.1 cm 12/17/2018  1:57 PM   Wound Surface Area (cm^2) 2 cm^2 12/17/2018  1:57 PM   Wound Volume (cm^3) 0.2 cm^3 12/17/2018  1:57 PM   Wound Assessment Yellow;Red;Pink;Epithelialization 12/17/2018  1:57 PM   Drainage Amount Small 12/17/2018  1:57 PM   Drainage Description Serosanguinous; Yellow 12/17/2018  1:57 PM   Odor None 12/17/2018  1:57 PM   Margins Attached edges 12/17/2018  1:57 PM   Kiana-wound Assessment Dry;Pink 12/17/2018  1:57 PM   [place]holson%Wound Bed 10 12/17/2018  1:57 PM   Red%Wound Bed 10 12/17/2018  1:57 PM   Yellow%Wound Bed 70 12/17/2018  1:57 PM   Other%Wound Bed 10 epi 12/17/2018  1:57 PM   Number of days: 21       Wound stage 4  Pressure injury of right buttock, unstageable  Pressure injury of right heel, unstableable      Contributing Factors: decreased mobility, smoking, and malnutrition    Patient Active Problem List   Diagnosis Code    Paraplegia ([facility]) G82.20    Chronic pain syndrome G89.4    Sepsis secondary to UTI ([address] A41.9, N39.0    Neurogenic bladder N31.9    Mood disorder due to known physiological condition with depressive features F06.31    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    Pressure ulcer of coccygeal region, stage 4 ([facility]) L89.154    Wound of back S21.209A    E coli bacteremia R78.81    Right nephrolithiasis N20.0    Hypokalemia E87.6    Right ureteral stone N20.1    Decubitus ulcer of left ischium, stage 4 ([facility]) L89.324    Decubitus ulcer of left heel, stage 3 ([facility]) O51.854    Spasticity K26.8    Self-inflicted injury J24.17    Compulsive skin picking L98.1    Wound of abdomen S31.109A    Colostomy care ([facility]) Z43.3    Chest pain R07.9    Altered mental status R41.82    Depression F32.9    Bacteria in urine R82.71    Suicidal thoughts R45.851    Decubitus ulcer of right ankle, stage 3 ([facility]) L89.513    Decubitus ulcer of right ischium, stage 2 L89.312    Cellulitis of great toe of right foot L03.031    Fungal dermatitis B36.9    Open wound of right great toe S91.101A    Pressure ulcer of toe of left foot, stage 2 L89.892    Dermatitis due to unknown cause L30.9    Pressure injury of right buttock, unstageable ([facility]) L89.310    Pressure injury of right heel, unstageable ([facility] L89.610       Chemical Cautery    Performed by: RANJIT Hickey CNP    Consent obtained? Yes, verbal     Time out taken: Yes    Tissue: friable [x] Hypergranulation        Pain Control:    N/A    Method: silver nitrate    Location of Chemical Cautery:   Wound/Ulcer #1, 2 and 3    Procedural Pain: 0  / 10     Post Procedural Pain: 0 / 10     Response to treatment: Well tolerated by patient.       Plan:

## 2018-12-24 NOTE — PROGRESS NOTES
Readings from Last 3 Encounters:   12/17/18 137 lb 11.2 oz (62.5 kg)   11/26/18 139 lb 4.8 oz (63.2 kg)   10/22/18 144 lb 1.6 oz (65.4 kg)       PHYSICAL EXAM    Vascular: DP/PT pulses palpable. Skin temperature is warm to warm proximal tibia to distal remaining digits. CFT intact to nani-incision site. Derm: surgical site appears clinically healed, sutures removed from site. Neurovascular: paraplegic, with gross motor function  Musculoskeletal: deferred due to post op status               Wound Care Documentation:  Wound 03/12/17 Coccyx #1 (Active)   Wound Image   12/17/2018  1:57 PM   Dressing Status Intact 12/17/2018  1:57 PM   Dressing Changed Changed/New 12/17/2018  1:57 PM   Wound Cleansed Rinsed/Irrigated with saline 12/17/2018  1:57 PM   Wound Length (cm) 3.5 cm 12/17/2018  1:57 PM   Wound Width (cm) 1.5 cm 12/17/2018  1:57 PM   Wound Depth (cm) 0.6 cm 12/17/2018  1:57 PM   Wound Surface Area (cm^2) 5.25 cm^2 12/17/2018  1:57 PM   Wound Volume (cm^3) 3.15 cm^3 12/17/2018  1:57 PM   Undermining Starts ___ O'Clock 12 12/17/2018  1:57 PM   Undermining Ends___ O'Clock 3 12/17/2018  1:57 PM   Undermining Maxium Distance (cm) 0.6 12/17/2018  1:57 PM   Wound Assessment Yellow;Red 12/17/2018  1:57 PM   Drainage Amount Moderate 12/17/2018  1:57 PM   Drainage Description Serosanguinous; Yellow 12/17/2018  1:57 PM   Odor None 12/17/2018  1:57 PM   Margins Attached edges; Unattached edges 12/17/2018  1:57 PM   Nani-wound Assessment White; Maceration 12/17/2018  1:57 PM   Red%Wound Bed 50 12/17/2018  1:57 PM   Yellow%Wound Bed 50 12/17/2018  1:57 PM   Number of days: 651       Wound 10/22/18 Ischium Left #2 (Active)   Wound Image   12/17/2018  1:57 PM   Dressing Status Intact 12/17/2018  1:57 PM   Dressing Changed Changed/New 12/17/2018  1:57 PM   Wound Cleansed Rinsed/Irrigated with saline 12/17/2018  1:57 PM   Wound Length (cm) 2 cm 12/17/2018  1:57 PM   Wound Width (cm) 1 cm 12/17/2018  1:57 PM   Wound Depth (cm) 0.3

## 2019-01-23 ENCOUNTER — HOSPITAL ENCOUNTER (OUTPATIENT)
Dept: WOUND CARE | Age: 29
Discharge: HOME | End: 2019-01-23
Payer: MEDICAID

## 2019-01-23 VITALS
OXYGEN SATURATION: 98 % | BODY MASS INDEX: 19.41 KG/M2 | HEART RATE: 99 BPM | DIASTOLIC BLOOD PRESSURE: 68 MMHG | TEMPERATURE: 97.8 F | WEIGHT: 135.3 LBS | RESPIRATION RATE: 16 BRPM | SYSTOLIC BLOOD PRESSURE: 109 MMHG

## 2019-01-23 PROBLEM — L89.613 PRESSURE INJURY OF RIGHT HEEL, STAGE 3 (HCC): Status: ACTIVE | Noted: 2018-12-17

## 2019-01-23 PROCEDURE — 17250 CHEM CAUT OF GRANLTJ TISSUE: CPT | Performed by: NURSE PRACTITIONER

## 2019-01-23 PROCEDURE — 2709999900 HC NON-CHARGEABLE SUPPLY

## 2019-01-23 PROCEDURE — 6370000000 HC RX 637 (ALT 250 FOR IP): Performed by: NURSE PRACTITIONER

## 2019-01-23 PROCEDURE — 17250 CHEM CAUT OF GRANLTJ TISSUE: CPT

## 2019-01-23 RX ADMIN — SILVER NITRATE APPLICATORS 2 EACH: 25; 75 STICK TOPICAL at 14:07

## 2019-01-23 ASSESSMENT — PAIN SCALES - GENERAL: PAINLEVEL_OUTOF10: 0

## 2019-01-24 ENCOUNTER — TELEPHONE (OUTPATIENT)
Dept: UROLOGY | Age: 29
End: 2019-01-24

## 2019-01-26 DIAGNOSIS — F06.31 MOOD DISORDER DUE TO KNOWN PHYSIOLOGICAL CONDITION WITH DEPRESSIVE FEATURES: ICD-10-CM

## 2019-01-26 DIAGNOSIS — F41.9 ANXIETY: ICD-10-CM

## 2019-01-26 DIAGNOSIS — F42.4 SKIN-PICKING DISORDER: ICD-10-CM

## 2019-01-28 ENCOUNTER — TELEPHONE (OUTPATIENT)
Dept: WOUND CARE | Age: 29
End: 2019-01-28

## 2019-01-28 RX ORDER — BUSPIRONE HYDROCHLORIDE 10 MG/1
10 TABLET ORAL 3 TIMES DAILY
Qty: 90 TABLET | Refills: 5 | Status: SHIPPED | OUTPATIENT
Start: 2019-01-28 | End: 2019-08-01 | Stop reason: SDUPTHER

## 2019-01-28 RX ORDER — BACLOFEN 20 MG/1
20 TABLET ORAL 3 TIMES DAILY
Qty: 90 TABLET | Refills: 5 | Status: SHIPPED | OUTPATIENT
Start: 2019-01-28 | End: 2019-08-01 | Stop reason: SDUPTHER

## 2019-01-28 RX ORDER — HYDROXYZINE 50 MG/1
TABLET, FILM COATED ORAL
Qty: 90 TABLET | Refills: 5 | Status: SHIPPED | OUTPATIENT
Start: 2019-01-28 | End: 2019-08-01 | Stop reason: SDUPTHER

## 2019-02-04 ENCOUNTER — OFFICE VISIT (OUTPATIENT)
Dept: UROLOGY | Age: 29
End: 2019-02-04
Payer: MEDICAID

## 2019-02-04 VITALS
BODY MASS INDEX: 19.33 KG/M2 | DIASTOLIC BLOOD PRESSURE: 60 MMHG | WEIGHT: 135 LBS | SYSTOLIC BLOOD PRESSURE: 90 MMHG | HEIGHT: 70 IN

## 2019-02-04 DIAGNOSIS — N31.9 NEUROGENIC BLADDER: Primary | ICD-10-CM

## 2019-02-04 PROCEDURE — 51705 CHANGE OF BLADDER TUBE: CPT | Performed by: UROLOGY

## 2019-02-20 RX ORDER — LANOLIN ALCOHOL/MO/W.PET/CERES
325 CREAM (GRAM) TOPICAL 2 TIMES DAILY
Qty: 60 TABLET | Refills: 5 | Status: SHIPPED | OUTPATIENT
Start: 2019-02-20 | End: 2019-08-08 | Stop reason: SDUPTHER

## 2019-02-20 RX ORDER — GABAPENTIN 300 MG/1
CAPSULE ORAL
Qty: 120 CAPSULE | Refills: 5 | Status: SHIPPED | OUTPATIENT
Start: 2019-02-20 | End: 2019-08-08 | Stop reason: SDUPTHER

## 2019-02-22 ENCOUNTER — TELEPHONE (OUTPATIENT)
Dept: WOUND CARE | Age: 29
End: 2019-02-22

## 2019-02-26 ENCOUNTER — TELEPHONE (OUTPATIENT)
Dept: FAMILY MEDICINE CLINIC | Age: 29
End: 2019-02-26

## 2019-02-26 DIAGNOSIS — M79.605 PAIN IN BOTH LOWER EXTREMITIES: ICD-10-CM

## 2019-02-26 DIAGNOSIS — R53.1 GENERALIZED WEAKNESS: Primary | ICD-10-CM

## 2019-02-26 DIAGNOSIS — M79.604 PAIN IN BOTH LOWER EXTREMITIES: ICD-10-CM

## 2019-02-26 DIAGNOSIS — K59.09 OTHER CONSTIPATION: ICD-10-CM

## 2019-02-26 RX ORDER — DOCUSATE SODIUM 100 MG/1
100 CAPSULE, LIQUID FILLED ORAL 2 TIMES DAILY
Qty: 60 CAPSULE | Refills: 5 | Status: SHIPPED | OUTPATIENT
Start: 2019-02-26 | End: 2021-01-13 | Stop reason: SDUPTHER

## 2019-02-27 RX ORDER — CYCLOBENZAPRINE HCL 10 MG
10 TABLET ORAL 3 TIMES DAILY
Qty: 90 TABLET | Refills: 5 | Status: SHIPPED | OUTPATIENT
Start: 2019-02-27 | End: 2019-08-08 | Stop reason: SDUPTHER

## 2019-03-01 ENCOUNTER — APPOINTMENT (OUTPATIENT)
Dept: GENERAL RADIOLOGY | Age: 29
DRG: 380 | End: 2019-03-01
Payer: MEDICAID

## 2019-03-01 ENCOUNTER — APPOINTMENT (OUTPATIENT)
Dept: CT IMAGING | Age: 29
DRG: 380 | End: 2019-03-01
Payer: MEDICAID

## 2019-03-01 ENCOUNTER — HOSPITAL ENCOUNTER (OUTPATIENT)
Dept: WOUND CARE | Age: 29
Discharge: HOME | DRG: 380 | End: 2019-03-01
Payer: MEDICAID

## 2019-03-01 ENCOUNTER — HOSPITAL ENCOUNTER (INPATIENT)
Age: 29
LOS: 4 days | Discharge: HOME | DRG: 380 | End: 2019-03-05
Attending: FAMILY MEDICINE | Admitting: INTERNAL MEDICINE
Payer: MEDICAID

## 2019-03-01 VITALS
RESPIRATION RATE: 18 BRPM | SYSTOLIC BLOOD PRESSURE: 96 MMHG | HEART RATE: 109 BPM | DIASTOLIC BLOOD PRESSURE: 50 MMHG | TEMPERATURE: 97.9 F | OXYGEN SATURATION: 96 %

## 2019-03-01 DIAGNOSIS — L08.9 PRESSURE INJURY, STAGE 4, WITH INFECTION (HCC): ICD-10-CM

## 2019-03-01 DIAGNOSIS — L89.312 PRESSURE INJURY OF RIGHT BUTTOCK, STAGE 2 (HCC): ICD-10-CM

## 2019-03-01 DIAGNOSIS — L03.317 CELLULITIS OF BUTTOCK: ICD-10-CM

## 2019-03-01 DIAGNOSIS — L89.94 PRESSURE INJURY, STAGE 4, WITH INFECTION (HCC): ICD-10-CM

## 2019-03-01 DIAGNOSIS — L89.154 PRESSURE INJURY OF SACRAL REGION, STAGE 4 (HCC): Primary | ICD-10-CM

## 2019-03-01 PROBLEM — L89.313 PRESSURE INJURY OF RIGHT ISCHIUM, STAGE 3 (HCC): Status: ACTIVE | Noted: 2018-08-10

## 2019-03-01 PROBLEM — L03.90 CELLULITIS: Status: ACTIVE | Noted: 2019-03-01

## 2019-03-01 LAB
ALBUMIN SERPL-MCNC: 2.9 G/DL (ref 3.5–5.1)
ALP BLD-CCNC: 115 U/L (ref 38–126)
ALT SERPL-CCNC: 9 U/L (ref 11–66)
ANION GAP SERPL CALCULATED.3IONS-SCNC: 12 MEQ/L (ref 8–16)
AST SERPL-CCNC: 14 U/L (ref 5–40)
BASOPHILS # BLD: 0.5 %
BASOPHILS ABSOLUTE: 0.1 THOU/MM3 (ref 0–0.1)
BILIRUB SERPL-MCNC: 0.2 MG/DL (ref 0.3–1.2)
BUN BLDV-MCNC: 5 MG/DL (ref 7–22)
CALCIUM SERPL-MCNC: 8.6 MG/DL (ref 8.5–10.5)
CHLORIDE BLD-SCNC: 101 MEQ/L (ref 98–111)
CO2: 27 MEQ/L (ref 23–33)
CREAT SERPL-MCNC: 0.5 MG/DL (ref 0.4–1.2)
EOSINOPHIL # BLD: 1.7 %
EOSINOPHILS ABSOLUTE: 0.2 THOU/MM3 (ref 0–0.4)
ERYTHROCYTE [DISTWIDTH] IN BLOOD BY AUTOMATED COUNT: 14.8 % (ref 11.5–14.5)
ERYTHROCYTE [DISTWIDTH] IN BLOOD BY AUTOMATED COUNT: 47.4 FL (ref 35–45)
GFR SERPL CREATININE-BSD FRML MDRD: > 90 ML/MIN/1.73M2
GLUCOSE BLD-MCNC: 98 MG/DL (ref 70–108)
HCT VFR BLD CALC: 37.5 % (ref 42–52)
HEMOGLOBIN: 12.6 GM/DL (ref 14–18)
IMMATURE GRANS (ABS): 0.04 THOU/MM3 (ref 0–0.07)
IMMATURE GRANULOCYTES: 0.3 %
LACTIC ACID, SEPSIS: 1.5 MMOL/L (ref 0.5–1.9)
LACTIC ACID, SEPSIS: 1.7 MMOL/L (ref 0.5–1.9)
LYMPHOCYTES # BLD: 22.3 %
LYMPHOCYTES ABSOLUTE: 2.8 THOU/MM3 (ref 1–4.8)
MCH RBC QN AUTO: 29.4 PG (ref 26–33)
MCHC RBC AUTO-ENTMCNC: 33.6 GM/DL (ref 32.2–35.5)
MCV RBC AUTO: 87.6 FL (ref 80–94)
MONOCYTES # BLD: 4.7 %
MONOCYTES ABSOLUTE: 0.6 THOU/MM3 (ref 0.4–1.3)
NUCLEATED RED BLOOD CELLS: 0 /100 WBC
OSMOLALITY CALCULATION: 276.6 MOSMOL/KG (ref 275–300)
PLATELET # BLD: 398 THOU/MM3 (ref 130–400)
PMV BLD AUTO: 8.6 FL (ref 9.4–12.4)
POTASSIUM SERPL-SCNC: 3 MEQ/L (ref 3.5–5.2)
RBC # BLD: 4.28 MILL/MM3 (ref 4.7–6.1)
SEG NEUTROPHILS: 70.5 %
SEGMENTED NEUTROPHILS ABSOLUTE COUNT: 8.7 THOU/MM3 (ref 1.8–7.7)
SODIUM BLD-SCNC: 140 MEQ/L (ref 135–145)
TOTAL PROTEIN: 6.3 G/DL (ref 6.1–8)
WBC # BLD: 12.4 THOU/MM3 (ref 4.8–10.8)

## 2019-03-01 PROCEDURE — 87147 CULTURE TYPE IMMUNOLOGIC: CPT

## 2019-03-01 PROCEDURE — 83605 ASSAY OF LACTIC ACID: CPT

## 2019-03-01 PROCEDURE — 99213 OFFICE O/P EST LOW 20 MIN: CPT | Performed by: NURSE PRACTITIONER

## 2019-03-01 PROCEDURE — 2580000003 HC RX 258: Performed by: PHYSICIAN ASSISTANT

## 2019-03-01 PROCEDURE — 87040 BLOOD CULTURE FOR BACTERIA: CPT

## 2019-03-01 PROCEDURE — 6360000002 HC RX W HCPCS: Performed by: PHYSICIAN ASSISTANT

## 2019-03-01 PROCEDURE — 1200000003 HC TELEMETRY R&B

## 2019-03-01 PROCEDURE — 99214 OFFICE O/P EST MOD 30 MIN: CPT

## 2019-03-01 PROCEDURE — 6360000004 HC RX CONTRAST MEDICATION: Performed by: PHYSICIAN ASSISTANT

## 2019-03-01 PROCEDURE — 87070 CULTURE OTHR SPECIMN AEROBIC: CPT

## 2019-03-01 PROCEDURE — 87186 SC STD MICRODIL/AGAR DIL: CPT

## 2019-03-01 PROCEDURE — 99284 EMERGENCY DEPT VISIT MOD MDM: CPT

## 2019-03-01 PROCEDURE — 17250 CHEM CAUT OF GRANLTJ TISSUE: CPT | Performed by: NURSE PRACTITIONER

## 2019-03-01 PROCEDURE — 71045 X-RAY EXAM CHEST 1 VIEW: CPT

## 2019-03-01 PROCEDURE — 80053 COMPREHEN METABOLIC PANEL: CPT

## 2019-03-01 PROCEDURE — 6370000000 HC RX 637 (ALT 250 FOR IP): Performed by: NURSE PRACTITIONER

## 2019-03-01 PROCEDURE — 87205 SMEAR GRAM STAIN: CPT

## 2019-03-01 PROCEDURE — 85025 COMPLETE CBC W/AUTO DIFF WBC: CPT

## 2019-03-01 PROCEDURE — 2709999900 HC NON-CHARGEABLE SUPPLY

## 2019-03-01 PROCEDURE — 36415 COLL VENOUS BLD VENIPUNCTURE: CPT

## 2019-03-01 PROCEDURE — 87077 CULTURE AEROBIC IDENTIFY: CPT

## 2019-03-01 PROCEDURE — 6370000000 HC RX 637 (ALT 250 FOR IP): Performed by: PHYSICIAN ASSISTANT

## 2019-03-01 PROCEDURE — 74177 CT ABD & PELVIS W/CONTRAST: CPT

## 2019-03-01 RX ORDER — GABAPENTIN 300 MG/1
300 CAPSULE ORAL 3 TIMES DAILY
Status: DISCONTINUED | OUTPATIENT
Start: 2019-03-01 | End: 2019-03-05 | Stop reason: HOSPADM

## 2019-03-01 RX ORDER — VENLAFAXINE HYDROCHLORIDE 150 MG/1
150 CAPSULE, EXTENDED RELEASE ORAL DAILY
Status: DISCONTINUED | OUTPATIENT
Start: 2019-03-02 | End: 2019-03-05 | Stop reason: HOSPADM

## 2019-03-01 RX ORDER — 0.9 % SODIUM CHLORIDE 0.9 %
500 INTRAVENOUS SOLUTION INTRAVENOUS ONCE
Status: COMPLETED | OUTPATIENT
Start: 2019-03-01 | End: 2019-03-01

## 2019-03-01 RX ORDER — ONDANSETRON 2 MG/ML
4 INJECTION INTRAMUSCULAR; INTRAVENOUS EVERY 6 HOURS PRN
Status: DISCONTINUED | OUTPATIENT
Start: 2019-03-01 | End: 2019-03-05 | Stop reason: HOSPADM

## 2019-03-01 RX ORDER — HYDROXYZINE HYDROCHLORIDE 25 MG/1
50 TABLET, FILM COATED ORAL EVERY 6 HOURS PRN
Status: DISCONTINUED | OUTPATIENT
Start: 2019-03-01 | End: 2019-03-05 | Stop reason: HOSPADM

## 2019-03-01 RX ORDER — SODIUM HYPOCHLORITE 1.25 MG/ML
SOLUTION TOPICAL DAILY
Status: DISCONTINUED | OUTPATIENT
Start: 2019-03-02 | End: 2019-03-05 | Stop reason: HOSPADM

## 2019-03-01 RX ORDER — SUMATRIPTAN 50 MG/1
50 TABLET, FILM COATED ORAL ONCE
Status: DISCONTINUED | OUTPATIENT
Start: 2019-03-01 | End: 2019-03-04

## 2019-03-01 RX ORDER — FERROUS SULFATE 325(65) MG
325 TABLET ORAL 2 TIMES DAILY
Status: DISCONTINUED | OUTPATIENT
Start: 2019-03-01 | End: 2019-03-05 | Stop reason: HOSPADM

## 2019-03-01 RX ORDER — ERGOCALCIFEROL 1.25 MG/1
50000 CAPSULE ORAL
Status: DISCONTINUED | OUTPATIENT
Start: 2019-03-04 | End: 2019-03-05 | Stop reason: HOSPADM

## 2019-03-01 RX ORDER — SODIUM CHLORIDE 0.9 % (FLUSH) 0.9 %
10 SYRINGE (ML) INJECTION EVERY 12 HOURS SCHEDULED
Status: DISCONTINUED | OUTPATIENT
Start: 2019-03-01 | End: 2019-03-05 | Stop reason: HOSPADM

## 2019-03-01 RX ORDER — IPRATROPIUM BROMIDE AND ALBUTEROL SULFATE 2.5; .5 MG/3ML; MG/3ML
1 SOLUTION RESPIRATORY (INHALATION)
Status: DISCONTINUED | OUTPATIENT
Start: 2019-03-02 | End: 2019-03-05 | Stop reason: HOSPADM

## 2019-03-01 RX ORDER — POTASSIUM CHLORIDE 20 MEQ/1
40 TABLET, EXTENDED RELEASE ORAL ONCE
Status: COMPLETED | OUTPATIENT
Start: 2019-03-01 | End: 2019-03-01

## 2019-03-01 RX ORDER — BUSPIRONE HYDROCHLORIDE 10 MG/1
10 TABLET ORAL 3 TIMES DAILY
Status: DISCONTINUED | OUTPATIENT
Start: 2019-03-01 | End: 2019-03-01 | Stop reason: CLARIF

## 2019-03-01 RX ORDER — SODIUM CHLORIDE 9 MG/ML
INJECTION, SOLUTION INTRAVENOUS CONTINUOUS
Status: DISCONTINUED | OUTPATIENT
Start: 2019-03-01 | End: 2019-03-05 | Stop reason: HOSPADM

## 2019-03-01 RX ORDER — CYCLOBENZAPRINE HCL 10 MG
10 TABLET ORAL 3 TIMES DAILY
Status: DISCONTINUED | OUTPATIENT
Start: 2019-03-01 | End: 2019-03-05 | Stop reason: HOSPADM

## 2019-03-01 RX ORDER — AMANTADINE HYDROCHLORIDE 100 MG/1
100 CAPSULE, GELATIN COATED ORAL DAILY
Status: DISCONTINUED | OUTPATIENT
Start: 2019-03-02 | End: 2019-03-05 | Stop reason: HOSPADM

## 2019-03-01 RX ORDER — DOCUSATE SODIUM 100 MG/1
100 CAPSULE, LIQUID FILLED ORAL 2 TIMES DAILY
Status: DISCONTINUED | OUTPATIENT
Start: 2019-03-01 | End: 2019-03-05 | Stop reason: HOSPADM

## 2019-03-01 RX ORDER — TRAZODONE HYDROCHLORIDE 100 MG/1
200 TABLET ORAL NIGHTLY PRN
Status: DISCONTINUED | OUTPATIENT
Start: 2019-03-02 | End: 2019-03-05 | Stop reason: HOSPADM

## 2019-03-01 RX ORDER — SODIUM CHLORIDE 0.9 % (FLUSH) 0.9 %
10 SYRINGE (ML) INJECTION PRN
Status: DISCONTINUED | OUTPATIENT
Start: 2019-03-01 | End: 2019-03-05 | Stop reason: HOSPADM

## 2019-03-01 RX ORDER — BACLOFEN 10 MG/1
20 TABLET ORAL 3 TIMES DAILY
Status: DISCONTINUED | OUTPATIENT
Start: 2019-03-01 | End: 2019-03-05 | Stop reason: HOSPADM

## 2019-03-01 RX ADMIN — SODIUM CHLORIDE 500 ML: 9 INJECTION, SOLUTION INTRAVENOUS at 16:59

## 2019-03-01 RX ADMIN — SODIUM CHLORIDE: 9 INJECTION, SOLUTION INTRAVENOUS at 18:42

## 2019-03-01 RX ADMIN — IOPAMIDOL 80 ML: 755 INJECTION, SOLUTION INTRAVENOUS at 17:57

## 2019-03-01 RX ADMIN — SILVER NITRATE APPLICATORS 2 EACH: 25; 75 STICK TOPICAL at 15:00

## 2019-03-01 RX ADMIN — PIPERACILLIN SODIUM,TAZOBACTAM SODIUM 4.5 G: 4; .5 INJECTION, POWDER, FOR SOLUTION INTRAVENOUS at 19:31

## 2019-03-01 RX ADMIN — VANCOMYCIN HYDROCHLORIDE 1000 MG: 1 INJECTION, POWDER, LYOPHILIZED, FOR SOLUTION INTRAVENOUS at 21:23

## 2019-03-01 RX ADMIN — POTASSIUM CHLORIDE 40 MEQ: 1500 TABLET, EXTENDED RELEASE ORAL at 18:42

## 2019-03-01 ASSESSMENT — PAIN DESCRIPTION - DESCRIPTORS
DESCRIPTORS: ACHING
DESCRIPTORS: ACHING

## 2019-03-01 ASSESSMENT — ENCOUNTER SYMPTOMS
NAUSEA: 0
EYE REDNESS: 0
VOMITING: 0
ABDOMINAL PAIN: 0
COUGH: 0
RHINORRHEA: 0
WHEEZING: 0
DIARRHEA: 0
SORE THROAT: 0
BACK PAIN: 0
SHORTNESS OF BREATH: 0
EYE DISCHARGE: 0

## 2019-03-01 ASSESSMENT — PAIN DESCRIPTION - ORIENTATION
ORIENTATION: LEFT

## 2019-03-01 ASSESSMENT — PAIN SCALES - GENERAL
PAINLEVEL_OUTOF10: 4
PAINLEVEL_OUTOF10: 4
PAINLEVEL_OUTOF10: 7

## 2019-03-01 ASSESSMENT — PAIN DESCRIPTION - LOCATION
LOCATION: SHOULDER

## 2019-03-01 ASSESSMENT — PAIN DESCRIPTION - PAIN TYPE
TYPE: CHRONIC PAIN
TYPE: ACUTE PAIN
TYPE: CHRONIC PAIN

## 2019-03-01 ASSESSMENT — PAIN DESCRIPTION - FREQUENCY: FREQUENCY: CONTINUOUS

## 2019-03-02 PROBLEM — E44.0 MODERATE MALNUTRITION (HCC): Status: ACTIVE | Noted: 2019-03-02

## 2019-03-02 PROBLEM — S30.811A EXCORIATION OF ABDOMEN: Status: ACTIVE | Noted: 2019-03-02

## 2019-03-02 LAB
ALBUMIN SERPL-MCNC: 2.6 G/DL (ref 3.5–5.1)
ANION GAP SERPL CALCULATED.3IONS-SCNC: 9 MEQ/L (ref 8–16)
BUN BLDV-MCNC: 4 MG/DL (ref 7–22)
CALCIUM SERPL-MCNC: 8.4 MG/DL (ref 8.5–10.5)
CHLORIDE BLD-SCNC: 111 MEQ/L (ref 98–111)
CO2: 24 MEQ/L (ref 23–33)
CREAT SERPL-MCNC: 0.5 MG/DL (ref 0.4–1.2)
ERYTHROCYTE [DISTWIDTH] IN BLOOD BY AUTOMATED COUNT: 14.9 % (ref 11.5–14.5)
ERYTHROCYTE [DISTWIDTH] IN BLOOD BY AUTOMATED COUNT: 48.2 FL (ref 35–45)
GFR SERPL CREATININE-BSD FRML MDRD: > 90 ML/MIN/1.73M2
GLUCOSE BLD-MCNC: 105 MG/DL (ref 70–108)
HCT VFR BLD CALC: 38.5 % (ref 42–52)
HEMOGLOBIN: 12.6 GM/DL (ref 14–18)
MCH RBC QN AUTO: 29.3 PG (ref 26–33)
MCHC RBC AUTO-ENTMCNC: 32.7 GM/DL (ref 32.2–35.5)
MCV RBC AUTO: 89.5 FL (ref 80–94)
PHOSPHORUS: 3.7 MG/DL (ref 2.4–4.7)
PLATELET # BLD: 344 THOU/MM3 (ref 130–400)
PMV BLD AUTO: 8.9 FL (ref 9.4–12.4)
POTASSIUM SERPL-SCNC: 4.1 MEQ/L (ref 3.5–5.2)
RBC # BLD: 4.3 MILL/MM3 (ref 4.7–6.1)
SODIUM BLD-SCNC: 144 MEQ/L (ref 135–145)
WBC # BLD: 10.6 THOU/MM3 (ref 4.8–10.8)

## 2019-03-02 PROCEDURE — 6360000002 HC RX W HCPCS: Performed by: INTERNAL MEDICINE

## 2019-03-02 PROCEDURE — 94760 N-INVAS EAR/PLS OXIMETRY 1: CPT

## 2019-03-02 PROCEDURE — 97530 THERAPEUTIC ACTIVITIES: CPT

## 2019-03-02 PROCEDURE — 85027 COMPLETE CBC AUTOMATED: CPT

## 2019-03-02 PROCEDURE — 1200000003 HC TELEMETRY R&B

## 2019-03-02 PROCEDURE — 6370000000 HC RX 637 (ALT 250 FOR IP): Performed by: INTERNAL MEDICINE

## 2019-03-02 PROCEDURE — 2580000003 HC RX 258: Performed by: INTERNAL MEDICINE

## 2019-03-02 PROCEDURE — 97161 PT EVAL LOW COMPLEX 20 MIN: CPT

## 2019-03-02 PROCEDURE — 80069 RENAL FUNCTION PANEL: CPT

## 2019-03-02 PROCEDURE — 99223 1ST HOSP IP/OBS HIGH 75: CPT | Performed by: INTERNAL MEDICINE

## 2019-03-02 PROCEDURE — 36415 COLL VENOUS BLD VENIPUNCTURE: CPT

## 2019-03-02 PROCEDURE — 94640 AIRWAY INHALATION TREATMENT: CPT

## 2019-03-02 RX ORDER — ACETAMINOPHEN 325 MG/1
650 TABLET ORAL EVERY 4 HOURS PRN
Status: DISCONTINUED | OUTPATIENT
Start: 2019-03-02 | End: 2019-03-05 | Stop reason: HOSPADM

## 2019-03-02 RX ORDER — BUSPIRONE HYDROCHLORIDE 7.5 MG/1
11.25 TABLET ORAL 3 TIMES DAILY
Status: DISCONTINUED | OUTPATIENT
Start: 2019-03-02 | End: 2019-03-05 | Stop reason: HOSPADM

## 2019-03-02 RX ORDER — NICOTINE 21 MG/24HR
1 PATCH, TRANSDERMAL 24 HOURS TRANSDERMAL DAILY
Status: DISCONTINUED | OUTPATIENT
Start: 2019-03-02 | End: 2019-03-05 | Stop reason: HOSPADM

## 2019-03-02 RX ORDER — IBUPROFEN 800 MG/1
800 TABLET ORAL EVERY 8 HOURS PRN
Status: DISCONTINUED | OUTPATIENT
Start: 2019-03-02 | End: 2019-03-05 | Stop reason: HOSPADM

## 2019-03-02 RX ADMIN — CYCLOBENZAPRINE HYDROCHLORIDE 10 MG: 10 TABLET, FILM COATED ORAL at 10:23

## 2019-03-02 RX ADMIN — VENLAFAXINE HYDROCHLORIDE 150 MG: 150 CAPSULE, EXTENDED RELEASE ORAL at 10:06

## 2019-03-02 RX ADMIN — BACLOFEN 20 MG: 10 TABLET ORAL at 15:03

## 2019-03-02 RX ADMIN — PIPERACILLIN SODIUM,TAZOBACTAM SODIUM 3.38 G: 3; .375 INJECTION, POWDER, FOR SOLUTION INTRAVENOUS at 04:37

## 2019-03-02 RX ADMIN — PIPERACILLIN SODIUM,TAZOBACTAM SODIUM 3.38 G: 3; .375 INJECTION, POWDER, FOR SOLUTION INTRAVENOUS at 15:03

## 2019-03-02 RX ADMIN — BUSPIRONE HYDROCHLORIDE 11.25 MG: 7.5 TABLET ORAL at 10:06

## 2019-03-02 RX ADMIN — IPRATROPIUM BROMIDE AND ALBUTEROL SULFATE 1 AMPULE: .5; 3 SOLUTION RESPIRATORY (INHALATION) at 15:32

## 2019-03-02 RX ADMIN — GABAPENTIN 300 MG: 300 CAPSULE ORAL at 10:06

## 2019-03-02 RX ADMIN — IPRATROPIUM BROMIDE AND ALBUTEROL SULFATE 1 AMPULE: .5; 3 SOLUTION RESPIRATORY (INHALATION) at 07:27

## 2019-03-02 RX ADMIN — CYCLOBENZAPRINE HYDROCHLORIDE 10 MG: 10 TABLET, FILM COATED ORAL at 15:09

## 2019-03-02 RX ADMIN — IBUPROFEN 800 MG: 800 TABLET ORAL at 22:35

## 2019-03-02 RX ADMIN — DOCUSATE SODIUM 100 MG: 100 CAPSULE, LIQUID FILLED ORAL at 10:06

## 2019-03-02 RX ADMIN — IBUPROFEN 800 MG: 800 TABLET ORAL at 15:03

## 2019-03-02 RX ADMIN — IPRATROPIUM BROMIDE AND ALBUTEROL SULFATE 1 AMPULE: .5; 3 SOLUTION RESPIRATORY (INHALATION) at 11:45

## 2019-03-02 RX ADMIN — CYCLOBENZAPRINE HYDROCHLORIDE 10 MG: 10 TABLET, FILM COATED ORAL at 22:35

## 2019-03-02 RX ADMIN — AMANTADINE HYDROCHLORIDE 100 MG: 100 CAPSULE ORAL at 10:06

## 2019-03-02 RX ADMIN — VANCOMYCIN HYDROCHLORIDE 1000 MG: 1 INJECTION, POWDER, LYOPHILIZED, FOR SOLUTION INTRAVENOUS at 10:31

## 2019-03-02 RX ADMIN — FERROUS SULFATE TAB 325 MG (65 MG ELEMENTAL FE) 325 MG: 325 (65 FE) TAB at 10:06

## 2019-03-02 RX ADMIN — ENOXAPARIN SODIUM 40 MG: 40 INJECTION SUBCUTANEOUS at 10:05

## 2019-03-02 RX ADMIN — GABAPENTIN 300 MG: 300 CAPSULE ORAL at 22:36

## 2019-03-02 RX ADMIN — GABAPENTIN 300 MG: 300 CAPSULE ORAL at 15:03

## 2019-03-02 RX ADMIN — VANCOMYCIN HYDROCHLORIDE 1000 MG: 1 INJECTION, POWDER, LYOPHILIZED, FOR SOLUTION INTRAVENOUS at 22:20

## 2019-03-02 RX ADMIN — BUSPIRONE HYDROCHLORIDE 11.25 MG: 7.5 TABLET ORAL at 22:36

## 2019-03-02 RX ADMIN — BUSPIRONE HYDROCHLORIDE 11.25 MG: 7.5 TABLET ORAL at 15:03

## 2019-03-02 RX ADMIN — BACLOFEN 20 MG: 10 TABLET ORAL at 22:35

## 2019-03-02 RX ADMIN — FLUOCINONIDE: 0.5 CREAM TOPICAL at 10:06

## 2019-03-02 RX ADMIN — FERROUS SULFATE TAB 325 MG (65 MG ELEMENTAL FE) 325 MG: 325 (65 FE) TAB at 22:36

## 2019-03-02 RX ADMIN — DAKIN'S SOLUTION 0.125% (QUARTER STRENGTH): 0.12 SOLUTION at 10:25

## 2019-03-02 RX ADMIN — PIPERACILLIN SODIUM,TAZOBACTAM SODIUM 3.38 G: 3; .375 INJECTION, POWDER, FOR SOLUTION INTRAVENOUS at 23:00

## 2019-03-02 RX ADMIN — BACLOFEN 20 MG: 10 TABLET ORAL at 10:06

## 2019-03-02 ASSESSMENT — PAIN DESCRIPTION - PAIN TYPE: TYPE: CHRONIC PAIN

## 2019-03-02 ASSESSMENT — PAIN DESCRIPTION - LOCATION
LOCATION: SHOULDER
LOCATION: INCISION;SHOULDER

## 2019-03-02 ASSESSMENT — PAIN SCALES - GENERAL
PAINLEVEL_OUTOF10: 6
PAINLEVEL_OUTOF10: 0
PAINLEVEL_OUTOF10: 7
PAINLEVEL_OUTOF10: 6
PAINLEVEL_OUTOF10: 6
PAINLEVEL_OUTOF10: 4

## 2019-03-02 ASSESSMENT — PAIN DESCRIPTION - ORIENTATION
ORIENTATION: LEFT
ORIENTATION: LEFT

## 2019-03-03 LAB
ANION GAP SERPL CALCULATED.3IONS-SCNC: 10 MEQ/L (ref 8–16)
BASOPHILS # BLD: 0.7 %
BASOPHILS ABSOLUTE: 0.1 THOU/MM3 (ref 0–0.1)
BUN BLDV-MCNC: 5 MG/DL (ref 7–22)
CALCIUM SERPL-MCNC: 8.6 MG/DL (ref 8.5–10.5)
CHLORIDE BLD-SCNC: 105 MEQ/L (ref 98–111)
CO2: 27 MEQ/L (ref 23–33)
CREAT SERPL-MCNC: 0.5 MG/DL (ref 0.4–1.2)
EOSINOPHIL # BLD: 1.9 %
EOSINOPHILS ABSOLUTE: 0.2 THOU/MM3 (ref 0–0.4)
ERYTHROCYTE [DISTWIDTH] IN BLOOD BY AUTOMATED COUNT: 14.9 % (ref 11.5–14.5)
ERYTHROCYTE [DISTWIDTH] IN BLOOD BY AUTOMATED COUNT: 47.9 FL (ref 35–45)
GFR SERPL CREATININE-BSD FRML MDRD: > 90 ML/MIN/1.73M2
GLUCOSE BLD-MCNC: 89 MG/DL (ref 70–108)
HCT VFR BLD CALC: 37.9 % (ref 42–52)
HEMOGLOBIN: 12.6 GM/DL (ref 14–18)
IMMATURE GRANS (ABS): 0.02 THOU/MM3 (ref 0–0.07)
IMMATURE GRANULOCYTES: 0.2 %
LYMPHOCYTES # BLD: 23 %
LYMPHOCYTES ABSOLUTE: 2 THOU/MM3 (ref 1–4.8)
MCH RBC QN AUTO: 29.5 PG (ref 26–33)
MCHC RBC AUTO-ENTMCNC: 33.2 GM/DL (ref 32.2–35.5)
MCV RBC AUTO: 88.8 FL (ref 80–94)
MONOCYTES # BLD: 6.9 %
MONOCYTES ABSOLUTE: 0.6 THOU/MM3 (ref 0.4–1.3)
NUCLEATED RED BLOOD CELLS: 0 /100 WBC
PLATELET # BLD: 368 THOU/MM3 (ref 130–400)
PMV BLD AUTO: 8.7 FL (ref 9.4–12.4)
POTASSIUM SERPL-SCNC: 3.4 MEQ/L (ref 3.5–5.2)
RBC # BLD: 4.27 MILL/MM3 (ref 4.7–6.1)
SEG NEUTROPHILS: 67.3 %
SEGMENTED NEUTROPHILS ABSOLUTE COUNT: 6 THOU/MM3 (ref 1.8–7.7)
SODIUM BLD-SCNC: 142 MEQ/L (ref 135–145)
VANCOMYCIN TROUGH: 12.6 UG/ML (ref 5–15)
WBC # BLD: 8.9 THOU/MM3 (ref 4.8–10.8)

## 2019-03-03 PROCEDURE — 80202 ASSAY OF VANCOMYCIN: CPT

## 2019-03-03 PROCEDURE — 2580000003 HC RX 258: Performed by: INTERNAL MEDICINE

## 2019-03-03 PROCEDURE — 99232 SBSQ HOSP IP/OBS MODERATE 35: CPT | Performed by: INTERNAL MEDICINE

## 2019-03-03 PROCEDURE — 6370000000 HC RX 637 (ALT 250 FOR IP): Performed by: INTERNAL MEDICINE

## 2019-03-03 PROCEDURE — 85025 COMPLETE CBC W/AUTO DIFF WBC: CPT

## 2019-03-03 PROCEDURE — 94640 AIRWAY INHALATION TREATMENT: CPT

## 2019-03-03 PROCEDURE — 36415 COLL VENOUS BLD VENIPUNCTURE: CPT

## 2019-03-03 PROCEDURE — 80048 BASIC METABOLIC PNL TOTAL CA: CPT

## 2019-03-03 PROCEDURE — 6360000002 HC RX W HCPCS: Performed by: INTERNAL MEDICINE

## 2019-03-03 PROCEDURE — 1200000003 HC TELEMETRY R&B

## 2019-03-03 PROCEDURE — 99222 1ST HOSP IP/OBS MODERATE 55: CPT | Performed by: SURGERY

## 2019-03-03 RX ORDER — HYDROCODONE BITARTRATE AND ACETAMINOPHEN 5; 325 MG/1; MG/1
1 TABLET ORAL EVERY 4 HOURS PRN
Status: DISCONTINUED | OUTPATIENT
Start: 2019-03-03 | End: 2019-03-05 | Stop reason: HOSPADM

## 2019-03-03 RX ADMIN — HYDROCODONE BITARTRATE AND ACETAMINOPHEN 1 TABLET: 5; 325 TABLET ORAL at 21:42

## 2019-03-03 RX ADMIN — BACLOFEN 20 MG: 10 TABLET ORAL at 21:42

## 2019-03-03 RX ADMIN — SODIUM CHLORIDE: 9 INJECTION, SOLUTION INTRAVENOUS at 18:51

## 2019-03-03 RX ADMIN — IPRATROPIUM BROMIDE AND ALBUTEROL SULFATE 1 AMPULE: .5; 3 SOLUTION RESPIRATORY (INHALATION) at 07:59

## 2019-03-03 RX ADMIN — GABAPENTIN 300 MG: 300 CAPSULE ORAL at 21:41

## 2019-03-03 RX ADMIN — VENLAFAXINE HYDROCHLORIDE 150 MG: 150 CAPSULE, EXTENDED RELEASE ORAL at 08:15

## 2019-03-03 RX ADMIN — HYDROXYZINE HYDROCHLORIDE 50 MG: 25 TABLET ORAL at 14:22

## 2019-03-03 RX ADMIN — GABAPENTIN 300 MG: 300 CAPSULE ORAL at 14:22

## 2019-03-03 RX ADMIN — PIPERACILLIN SODIUM,TAZOBACTAM SODIUM 3.38 G: 3; .375 INJECTION, POWDER, FOR SOLUTION INTRAVENOUS at 08:15

## 2019-03-03 RX ADMIN — FLUOCINONIDE: 0.5 CREAM TOPICAL at 08:15

## 2019-03-03 RX ADMIN — HYDROCODONE BITARTRATE AND ACETAMINOPHEN 1 TABLET: 5; 325 TABLET ORAL at 16:22

## 2019-03-03 RX ADMIN — AMANTADINE HYDROCHLORIDE 100 MG: 100 CAPSULE ORAL at 08:15

## 2019-03-03 RX ADMIN — CYCLOBENZAPRINE HYDROCHLORIDE 10 MG: 10 TABLET, FILM COATED ORAL at 21:42

## 2019-03-03 RX ADMIN — BACLOFEN 20 MG: 10 TABLET ORAL at 08:15

## 2019-03-03 RX ADMIN — BUSPIRONE HYDROCHLORIDE 11.25 MG: 7.5 TABLET ORAL at 08:15

## 2019-03-03 RX ADMIN — FERROUS SULFATE TAB 325 MG (65 MG ELEMENTAL FE) 325 MG: 325 (65 FE) TAB at 08:15

## 2019-03-03 RX ADMIN — BACLOFEN 20 MG: 10 TABLET ORAL at 14:22

## 2019-03-03 RX ADMIN — HYDROXYZINE HYDROCHLORIDE 50 MG: 25 TABLET ORAL at 08:15

## 2019-03-03 RX ADMIN — BUSPIRONE HYDROCHLORIDE 11.25 MG: 7.5 TABLET ORAL at 21:41

## 2019-03-03 RX ADMIN — GABAPENTIN 300 MG: 300 CAPSULE ORAL at 08:15

## 2019-03-03 RX ADMIN — BUSPIRONE HYDROCHLORIDE 11.25 MG: 7.5 TABLET ORAL at 14:22

## 2019-03-03 RX ADMIN — DAKIN'S SOLUTION 0.125% (QUARTER STRENGTH) 473 ML: 0.12 SOLUTION at 14:23

## 2019-03-03 RX ADMIN — FLUOCINONIDE: 0.5 CREAM TOPICAL at 21:42

## 2019-03-03 RX ADMIN — IPRATROPIUM BROMIDE AND ALBUTEROL SULFATE 1 AMPULE: .5; 3 SOLUTION RESPIRATORY (INHALATION) at 16:10

## 2019-03-03 RX ADMIN — IPRATROPIUM BROMIDE AND ALBUTEROL SULFATE 1 AMPULE: .5; 3 SOLUTION RESPIRATORY (INHALATION) at 11:49

## 2019-03-03 RX ADMIN — PIPERACILLIN SODIUM,TAZOBACTAM SODIUM 3.38 G: 3; .375 INJECTION, POWDER, FOR SOLUTION INTRAVENOUS at 16:22

## 2019-03-03 RX ADMIN — IBUPROFEN 800 MG: 800 TABLET ORAL at 14:22

## 2019-03-03 RX ADMIN — VANCOMYCIN HYDROCHLORIDE 1000 MG: 1 INJECTION, POWDER, LYOPHILIZED, FOR SOLUTION INTRAVENOUS at 09:33

## 2019-03-03 RX ADMIN — CYCLOBENZAPRINE HYDROCHLORIDE 10 MG: 10 TABLET, FILM COATED ORAL at 14:22

## 2019-03-03 RX ADMIN — IBUPROFEN 800 MG: 800 TABLET ORAL at 08:15

## 2019-03-03 RX ADMIN — VANCOMYCIN HYDROCHLORIDE 1000 MG: 1 INJECTION, POWDER, LYOPHILIZED, FOR SOLUTION INTRAVENOUS at 21:42

## 2019-03-03 RX ADMIN — CYCLOBENZAPRINE HYDROCHLORIDE 10 MG: 10 TABLET, FILM COATED ORAL at 08:15

## 2019-03-03 ASSESSMENT — PAIN DESCRIPTION - DESCRIPTORS
DESCRIPTORS: STABBING;SHARP
DESCRIPTORS: SHARP

## 2019-03-03 ASSESSMENT — PAIN DESCRIPTION - LOCATION
LOCATION: SHOULDER

## 2019-03-03 ASSESSMENT — PAIN DESCRIPTION - PAIN TYPE
TYPE: CHRONIC PAIN

## 2019-03-03 ASSESSMENT — PAIN DESCRIPTION - ORIENTATION
ORIENTATION: LEFT

## 2019-03-03 ASSESSMENT — PAIN SCALES - GENERAL
PAINLEVEL_OUTOF10: 6
PAINLEVEL_OUTOF10: 4
PAINLEVEL_OUTOF10: 5
PAINLEVEL_OUTOF10: 4
PAINLEVEL_OUTOF10: 4
PAINLEVEL_OUTOF10: 8
PAINLEVEL_OUTOF10: 3
PAINLEVEL_OUTOF10: 6

## 2019-03-03 ASSESSMENT — PAIN DESCRIPTION - FREQUENCY
FREQUENCY: CONTINUOUS
FREQUENCY: CONTINUOUS

## 2019-03-04 LAB
AEROBIC CULTURE: ABNORMAL
GRAM STAIN RESULT: ABNORMAL
ORGANISM: ABNORMAL

## 2019-03-04 PROCEDURE — 2580000003 HC RX 258: Performed by: INTERNAL MEDICINE

## 2019-03-04 PROCEDURE — 6360000002 HC RX W HCPCS: Performed by: INTERNAL MEDICINE

## 2019-03-04 PROCEDURE — 6370000000 HC RX 637 (ALT 250 FOR IP): Performed by: INTERNAL MEDICINE

## 2019-03-04 PROCEDURE — 99232 SBSQ HOSP IP/OBS MODERATE 35: CPT | Performed by: INTERNAL MEDICINE

## 2019-03-04 PROCEDURE — 94640 AIRWAY INHALATION TREATMENT: CPT

## 2019-03-04 PROCEDURE — 1200000003 HC TELEMETRY R&B

## 2019-03-04 PROCEDURE — 99231 SBSQ HOSP IP/OBS SF/LOW 25: CPT | Performed by: SURGERY

## 2019-03-04 RX ORDER — SULFAMETHOXAZOLE AND TRIMETHOPRIM 800; 160 MG/1; MG/1
1 TABLET ORAL EVERY 12 HOURS SCHEDULED
Status: DISCONTINUED | OUTPATIENT
Start: 2019-03-04 | End: 2019-03-05 | Stop reason: HOSPADM

## 2019-03-04 RX ADMIN — HYDROCODONE BITARTRATE AND ACETAMINOPHEN 1 TABLET: 5; 325 TABLET ORAL at 21:21

## 2019-03-04 RX ADMIN — FERROUS SULFATE TAB 325 MG (65 MG ELEMENTAL FE) 325 MG: 325 (65 FE) TAB at 10:23

## 2019-03-04 RX ADMIN — BUSPIRONE HYDROCHLORIDE 11.25 MG: 7.5 TABLET ORAL at 10:24

## 2019-03-04 RX ADMIN — GABAPENTIN 300 MG: 300 CAPSULE ORAL at 14:00

## 2019-03-04 RX ADMIN — IPRATROPIUM BROMIDE AND ALBUTEROL SULFATE 1 AMPULE: .5; 3 SOLUTION RESPIRATORY (INHALATION) at 16:32

## 2019-03-04 RX ADMIN — PIPERACILLIN SODIUM,TAZOBACTAM SODIUM 3.38 G: 3; .375 INJECTION, POWDER, FOR SOLUTION INTRAVENOUS at 00:05

## 2019-03-04 RX ADMIN — VANCOMYCIN HYDROCHLORIDE 1000 MG: 1 INJECTION, POWDER, LYOPHILIZED, FOR SOLUTION INTRAVENOUS at 10:34

## 2019-03-04 RX ADMIN — HYDROCODONE BITARTRATE AND ACETAMINOPHEN 1 TABLET: 5; 325 TABLET ORAL at 10:24

## 2019-03-04 RX ADMIN — CYCLOBENZAPRINE HYDROCHLORIDE 10 MG: 10 TABLET, FILM COATED ORAL at 14:06

## 2019-03-04 RX ADMIN — BUSPIRONE HYDROCHLORIDE 11.25 MG: 7.5 TABLET ORAL at 14:00

## 2019-03-04 RX ADMIN — CYCLOBENZAPRINE HYDROCHLORIDE 10 MG: 10 TABLET, FILM COATED ORAL at 10:24

## 2019-03-04 RX ADMIN — CYCLOBENZAPRINE HYDROCHLORIDE 10 MG: 10 TABLET, FILM COATED ORAL at 21:21

## 2019-03-04 RX ADMIN — HYDROCODONE BITARTRATE AND ACETAMINOPHEN 1 TABLET: 5; 325 TABLET ORAL at 15:31

## 2019-03-04 RX ADMIN — BACLOFEN 20 MG: 10 TABLET ORAL at 14:01

## 2019-03-04 RX ADMIN — BUSPIRONE HYDROCHLORIDE 11.25 MG: 7.5 TABLET ORAL at 21:21

## 2019-03-04 RX ADMIN — ERGOCALCIFEROL 50000 UNITS: 1.25 CAPSULE ORAL at 10:23

## 2019-03-04 RX ADMIN — SODIUM CHLORIDE: 9 INJECTION, SOLUTION INTRAVENOUS at 15:31

## 2019-03-04 RX ADMIN — VENLAFAXINE HYDROCHLORIDE 150 MG: 150 CAPSULE, EXTENDED RELEASE ORAL at 10:23

## 2019-03-04 RX ADMIN — BACLOFEN 20 MG: 10 TABLET ORAL at 10:24

## 2019-03-04 RX ADMIN — BACLOFEN 20 MG: 10 TABLET ORAL at 21:21

## 2019-03-04 RX ADMIN — SULFAMETHOXAZOLE AND TRIMETHOPRIM 1 TABLET: 800; 160 TABLET ORAL at 21:21

## 2019-03-04 RX ADMIN — DAKIN'S SOLUTION 0.125% (QUARTER STRENGTH): 0.12 SOLUTION at 10:37

## 2019-03-04 RX ADMIN — TRAZODONE HYDROCHLORIDE 200 MG: 100 TABLET ORAL at 00:06

## 2019-03-04 RX ADMIN — IPRATROPIUM BROMIDE AND ALBUTEROL SULFATE 1 AMPULE: .5; 3 SOLUTION RESPIRATORY (INHALATION) at 11:44

## 2019-03-04 RX ADMIN — ONDANSETRON 4 MG: 2 INJECTION INTRAMUSCULAR; INTRAVENOUS at 10:33

## 2019-03-04 RX ADMIN — GABAPENTIN 300 MG: 300 CAPSULE ORAL at 10:23

## 2019-03-04 RX ADMIN — GABAPENTIN 300 MG: 300 CAPSULE ORAL at 21:21

## 2019-03-04 RX ADMIN — AMANTADINE HYDROCHLORIDE 100 MG: 100 CAPSULE ORAL at 10:24

## 2019-03-04 ASSESSMENT — PAIN SCALES - GENERAL
PAINLEVEL_OUTOF10: 7
PAINLEVEL_OUTOF10: 7
PAINLEVEL_OUTOF10: 5
PAINLEVEL_OUTOF10: 7
PAINLEVEL_OUTOF10: 7
PAINLEVEL_OUTOF10: 9
PAINLEVEL_OUTOF10: 5

## 2019-03-04 ASSESSMENT — ENCOUNTER SYMPTOMS
CHOKING: 0
COUGH: 0
SINUS PAIN: 0
CHEST TIGHTNESS: 0
PHOTOPHOBIA: 0
VOICE CHANGE: 0
ABDOMINAL DISTENTION: 0
SINUS PRESSURE: 0
SHORTNESS OF BREATH: 0
ABDOMINAL PAIN: 0

## 2019-03-04 ASSESSMENT — PAIN DESCRIPTION - ORIENTATION: ORIENTATION: LEFT

## 2019-03-04 ASSESSMENT — PAIN DESCRIPTION - LOCATION: LOCATION: SHOULDER

## 2019-03-04 ASSESSMENT — PAIN DESCRIPTION - FREQUENCY: FREQUENCY: CONTINUOUS

## 2019-03-04 ASSESSMENT — PAIN DESCRIPTION - PAIN TYPE: TYPE: CHRONIC PAIN

## 2019-03-05 ENCOUNTER — TELEPHONE (OUTPATIENT)
Dept: FAMILY MEDICINE CLINIC | Age: 29
End: 2019-03-05

## 2019-03-05 VITALS
OXYGEN SATURATION: 100 % | WEIGHT: 135.1 LBS | TEMPERATURE: 98.6 F | BODY MASS INDEX: 19.34 KG/M2 | HEART RATE: 70 BPM | RESPIRATION RATE: 18 BRPM | DIASTOLIC BLOOD PRESSURE: 80 MMHG | SYSTOLIC BLOOD PRESSURE: 115 MMHG | HEIGHT: 70 IN

## 2019-03-05 DIAGNOSIS — R29.898 UPPER EXTREMITY WEAKNESS: Primary | ICD-10-CM

## 2019-03-05 PROCEDURE — 99239 HOSP IP/OBS DSCHRG MGMT >30: CPT | Performed by: INTERNAL MEDICINE

## 2019-03-05 PROCEDURE — 6370000000 HC RX 637 (ALT 250 FOR IP): Performed by: INTERNAL MEDICINE

## 2019-03-05 PROCEDURE — 2580000003 HC RX 258: Performed by: INTERNAL MEDICINE

## 2019-03-05 PROCEDURE — 94640 AIRWAY INHALATION TREATMENT: CPT

## 2019-03-05 RX ORDER — SULFAMETHOXAZOLE AND TRIMETHOPRIM 800; 160 MG/1; MG/1
1 TABLET ORAL EVERY 12 HOURS SCHEDULED
Qty: 20 TABLET | Refills: 0 | Status: SHIPPED | OUTPATIENT
Start: 2019-03-05 | End: 2019-03-15

## 2019-03-05 RX ADMIN — SODIUM CHLORIDE, PRESERVATIVE FREE 10 ML: 5 INJECTION INTRAVENOUS at 08:39

## 2019-03-05 RX ADMIN — IPRATROPIUM BROMIDE AND ALBUTEROL SULFATE 1 AMPULE: .5; 3 SOLUTION RESPIRATORY (INHALATION) at 08:22

## 2019-03-05 RX ADMIN — AMANTADINE HYDROCHLORIDE 100 MG: 100 CAPSULE ORAL at 08:37

## 2019-03-05 RX ADMIN — IBUPROFEN 800 MG: 800 TABLET ORAL at 08:49

## 2019-03-05 RX ADMIN — CYCLOBENZAPRINE HYDROCHLORIDE 10 MG: 10 TABLET, FILM COATED ORAL at 08:37

## 2019-03-05 RX ADMIN — VENLAFAXINE HYDROCHLORIDE 150 MG: 150 CAPSULE, EXTENDED RELEASE ORAL at 08:36

## 2019-03-05 RX ADMIN — GABAPENTIN 300 MG: 300 CAPSULE ORAL at 08:36

## 2019-03-05 RX ADMIN — HYDROCODONE BITARTRATE AND ACETAMINOPHEN 1 TABLET: 5; 325 TABLET ORAL at 11:37

## 2019-03-05 RX ADMIN — SULFAMETHOXAZOLE AND TRIMETHOPRIM 1 TABLET: 800; 160 TABLET ORAL at 08:36

## 2019-03-05 RX ADMIN — BUSPIRONE HYDROCHLORIDE 11.25 MG: 7.5 TABLET ORAL at 08:36

## 2019-03-05 RX ADMIN — HYDROCODONE BITARTRATE AND ACETAMINOPHEN 1 TABLET: 5; 325 TABLET ORAL at 02:36

## 2019-03-05 RX ADMIN — BACLOFEN 20 MG: 10 TABLET ORAL at 08:37

## 2019-03-05 RX ADMIN — FERROUS SULFATE TAB 325 MG (65 MG ELEMENTAL FE) 325 MG: 325 (65 FE) TAB at 08:36

## 2019-03-05 ASSESSMENT — PAIN SCALES - GENERAL
PAINLEVEL_OUTOF10: 7
PAINLEVEL_OUTOF10: 8
PAINLEVEL_OUTOF10: 5
PAINLEVEL_OUTOF10: 8

## 2019-03-05 ASSESSMENT — PAIN DESCRIPTION - PAIN TYPE: TYPE: CHRONIC PAIN

## 2019-03-05 ASSESSMENT — PAIN DESCRIPTION - ORIENTATION: ORIENTATION: LEFT

## 2019-03-05 ASSESSMENT — PAIN DESCRIPTION - LOCATION: LOCATION: BACK;SHOULDER

## 2019-03-06 ASSESSMENT — ENCOUNTER SYMPTOMS
VOICE CHANGE: 0
TROUBLE SWALLOWING: 0

## 2019-03-07 LAB
BLOOD CULTURE, ROUTINE: NORMAL
BLOOD CULTURE, ROUTINE: NORMAL

## 2019-03-13 ENCOUNTER — TELEPHONE (OUTPATIENT)
Dept: FAMILY MEDICINE CLINIC | Age: 29
End: 2019-03-13

## 2019-03-13 ENCOUNTER — OFFICE VISIT (OUTPATIENT)
Dept: FAMILY MEDICINE CLINIC | Age: 29
End: 2019-03-13
Payer: MEDICAID

## 2019-03-13 ENCOUNTER — TELEPHONE (OUTPATIENT)
Dept: WOUND CARE | Age: 29
End: 2019-03-13

## 2019-03-13 VITALS
TEMPERATURE: 98.5 F | SYSTOLIC BLOOD PRESSURE: 114 MMHG | RESPIRATION RATE: 14 BRPM | HEART RATE: 108 BPM | DIASTOLIC BLOOD PRESSURE: 60 MMHG

## 2019-03-13 DIAGNOSIS — M79.604 PAIN IN BOTH LOWER EXTREMITIES: Primary | ICD-10-CM

## 2019-03-13 DIAGNOSIS — Z43.3 COLOSTOMY CARE (HCC): ICD-10-CM

## 2019-03-13 DIAGNOSIS — E44.0 MODERATE MALNUTRITION (HCC): ICD-10-CM

## 2019-03-13 DIAGNOSIS — G82.20 PARAPLEGIA (HCC): ICD-10-CM

## 2019-03-13 DIAGNOSIS — M79.605 PAIN IN BOTH LOWER EXTREMITIES: Primary | ICD-10-CM

## 2019-03-13 DIAGNOSIS — L89.154 PRESSURE INJURY OF SACRAL REGION, STAGE 4 (HCC): ICD-10-CM

## 2019-03-13 DIAGNOSIS — L30.9 DERMATITIS DUE TO UNKNOWN CAUSE: ICD-10-CM

## 2019-03-13 PROCEDURE — 99215 OFFICE O/P EST HI 40 MIN: CPT | Performed by: FAMILY MEDICINE

## 2019-03-13 RX ORDER — CIPROFLOXACIN 500 MG/1
500 TABLET, FILM COATED ORAL 2 TIMES DAILY
COMMUNITY
End: 2019-07-08 | Stop reason: ALTCHOICE

## 2019-03-13 RX ORDER — HYDROCODONE BITARTRATE AND ACETAMINOPHEN 5; 325 MG/1; MG/1
1 TABLET ORAL EVERY 6 HOURS PRN
Qty: 28 TABLET | Refills: 0 | Status: SHIPPED | OUTPATIENT
Start: 2019-03-13 | End: 2019-03-25 | Stop reason: SDUPTHER

## 2019-03-13 RX ORDER — DOXYCYCLINE HYCLATE 100 MG/1
100 CAPSULE ORAL 2 TIMES DAILY
COMMUNITY
End: 2019-07-08 | Stop reason: ALTCHOICE

## 2019-03-20 ENCOUNTER — HOSPITAL ENCOUNTER (OUTPATIENT)
Dept: WOUND CARE | Age: 29
Discharge: HOME | End: 2019-03-20
Payer: MEDICAID

## 2019-03-20 VITALS
DIASTOLIC BLOOD PRESSURE: 53 MMHG | RESPIRATION RATE: 16 BRPM | SYSTOLIC BLOOD PRESSURE: 92 MMHG | OXYGEN SATURATION: 99 % | HEART RATE: 122 BPM | TEMPERATURE: 97.8 F

## 2019-03-20 DIAGNOSIS — Z79.2 LONG TERM (CURRENT) USE OF ANTIBIOTICS: ICD-10-CM

## 2019-03-20 DIAGNOSIS — M46.28 CHRONIC OSTEOMYELITIS OF COCCYX (HCC): ICD-10-CM

## 2019-03-20 DIAGNOSIS — M86.652 OSTEOMYELITIS, CHRONIC, PELVIC REGION, LEFT (HCC): ICD-10-CM

## 2019-03-20 PROCEDURE — 2709999900 HC NON-CHARGEABLE SUPPLY

## 2019-03-20 PROCEDURE — 6370000000 HC RX 637 (ALT 250 FOR IP): Performed by: NURSE PRACTITIONER

## 2019-03-20 PROCEDURE — 17250 CHEM CAUT OF GRANLTJ TISSUE: CPT | Performed by: NURSE PRACTITIONER

## 2019-03-20 PROCEDURE — 99214 OFFICE O/P EST MOD 30 MIN: CPT | Performed by: NURSE PRACTITIONER

## 2019-03-20 PROCEDURE — 17250 CHEM CAUT OF GRANLTJ TISSUE: CPT

## 2019-03-20 RX ADMIN — SILVER NITRATE APPLICATORS 2 EACH: 25; 75 STICK TOPICAL at 15:40

## 2019-03-20 ASSESSMENT — PAIN SCALES - GENERAL: PAINLEVEL_OUTOF10: 0

## 2019-03-21 ENCOUNTER — TELEPHONE (OUTPATIENT)
Dept: WOUND CARE | Age: 29
End: 2019-03-21

## 2019-03-21 DIAGNOSIS — L89.313 PRESSURE INJURY OF RIGHT ISCHIUM, STAGE 3 (HCC): ICD-10-CM

## 2019-03-21 DIAGNOSIS — L89.892 PRESSURE ULCER OF TOE OF LEFT FOOT, STAGE 2 (HCC): ICD-10-CM

## 2019-03-21 DIAGNOSIS — L89.513 DECUBITUS ULCER OF RIGHT ANKLE, STAGE 3 (HCC): ICD-10-CM

## 2019-03-21 DIAGNOSIS — S31.109A WOUND OF ABDOMEN: ICD-10-CM

## 2019-03-21 DIAGNOSIS — L89.623 DECUBITUS ULCER OF LEFT HEEL, STAGE 3 (HCC): ICD-10-CM

## 2019-03-21 DIAGNOSIS — L89.94 PRESSURE INJURY, STAGE 4, WITH INFECTION (HCC): ICD-10-CM

## 2019-03-21 DIAGNOSIS — B36.9 FUNGAL DERMATITIS: ICD-10-CM

## 2019-03-21 DIAGNOSIS — L03.031 CELLULITIS OF GREAT TOE OF RIGHT FOOT: ICD-10-CM

## 2019-03-21 DIAGNOSIS — L89.324 DECUBITUS ULCER OF LEFT ISCHIUM, STAGE 4 (HCC): ICD-10-CM

## 2019-03-21 DIAGNOSIS — L08.9 PRESSURE INJURY, STAGE 4, WITH INFECTION (HCC): ICD-10-CM

## 2019-03-21 DIAGNOSIS — F42.4 COMPULSIVE SKIN PICKING: ICD-10-CM

## 2019-03-21 DIAGNOSIS — L30.9 DERMATITIS DUE TO UNKNOWN CAUSE: ICD-10-CM

## 2019-03-25 ENCOUNTER — TELEPHONE (OUTPATIENT)
Dept: FAMILY MEDICINE CLINIC | Age: 29
End: 2019-03-25

## 2019-03-25 DIAGNOSIS — M79.604 PAIN IN BOTH LOWER EXTREMITIES: ICD-10-CM

## 2019-03-25 DIAGNOSIS — M79.605 PAIN IN BOTH LOWER EXTREMITIES: ICD-10-CM

## 2019-03-25 RX ORDER — HYDROCODONE BITARTRATE AND ACETAMINOPHEN 5; 325 MG/1; MG/1
1 TABLET ORAL EVERY 8 HOURS PRN
Qty: 15 TABLET | Refills: 0 | Status: SHIPPED | OUTPATIENT
Start: 2019-03-25 | End: 2019-03-30

## 2019-04-10 ENCOUNTER — HOSPITAL ENCOUNTER (OUTPATIENT)
Dept: WOUND CARE | Age: 29
Discharge: HOME | End: 2019-04-10
Payer: MEDICAID

## 2019-04-10 ENCOUNTER — HOSPITAL ENCOUNTER (OUTPATIENT)
Age: 29
Discharge: HOME | End: 2019-04-10
Payer: MEDICAID

## 2019-04-10 VITALS
BODY MASS INDEX: 19.34 KG/M2 | DIASTOLIC BLOOD PRESSURE: 53 MMHG | WEIGHT: 134.8 LBS | OXYGEN SATURATION: 98 % | RESPIRATION RATE: 16 BRPM | SYSTOLIC BLOOD PRESSURE: 86 MMHG | HEART RATE: 110 BPM | TEMPERATURE: 97.9 F

## 2019-04-10 DIAGNOSIS — L89.513 DECUBITUS ULCER OF RIGHT ANKLE, STAGE 3 (HCC): ICD-10-CM

## 2019-04-10 DIAGNOSIS — M46.28 CHRONIC OSTEOMYELITIS OF COCCYX (HCC): ICD-10-CM

## 2019-04-10 DIAGNOSIS — L89.313 PRESSURE INJURY OF RIGHT ISCHIUM, STAGE 3 (HCC): ICD-10-CM

## 2019-04-10 DIAGNOSIS — L89.324 DECUBITUS ULCER OF LEFT ISCHIUM, STAGE 4 (HCC): ICD-10-CM

## 2019-04-10 DIAGNOSIS — L89.94 PRESSURE INJURY, STAGE 4, WITH INFECTION (HCC): ICD-10-CM

## 2019-04-10 DIAGNOSIS — L89.613 PRESSURE INJURY OF RIGHT HEEL, STAGE 3 (HCC): ICD-10-CM

## 2019-04-10 DIAGNOSIS — L89.154 PRESSURE ULCER OF COCCYGEAL REGION, STAGE 4 (HCC): ICD-10-CM

## 2019-04-10 DIAGNOSIS — Z79.2 LONG TERM (CURRENT) USE OF ANTIBIOTICS: ICD-10-CM

## 2019-04-10 DIAGNOSIS — F42.4 COMPULSIVE SKIN PICKING: ICD-10-CM

## 2019-04-10 DIAGNOSIS — L89.892 PRESSURE ULCER OF TOE OF LEFT FOOT, STAGE 2 (HCC): ICD-10-CM

## 2019-04-10 DIAGNOSIS — B36.9 FUNGAL DERMATITIS: ICD-10-CM

## 2019-04-10 DIAGNOSIS — M86.652 OSTEOMYELITIS, CHRONIC, PELVIC REGION, LEFT (HCC): ICD-10-CM

## 2019-04-10 DIAGNOSIS — L03.031 CELLULITIS OF GREAT TOE OF RIGHT FOOT: ICD-10-CM

## 2019-04-10 DIAGNOSIS — S31.109A WOUND OF ABDOMEN: ICD-10-CM

## 2019-04-10 DIAGNOSIS — L08.9 PRESSURE INJURY, STAGE 4, WITH INFECTION (HCC): ICD-10-CM

## 2019-04-10 DIAGNOSIS — L89.623 DECUBITUS ULCER OF LEFT HEEL, STAGE 3 (HCC): ICD-10-CM

## 2019-04-10 DIAGNOSIS — S91.101D OPEN WOUND OF RIGHT GREAT TOE, SUBSEQUENT ENCOUNTER: ICD-10-CM

## 2019-04-10 DIAGNOSIS — M46.28 CHRONIC OSTEOMYELITIS OF COCCYX (HCC): Primary | ICD-10-CM

## 2019-04-10 DIAGNOSIS — L30.9 DERMATITIS DUE TO UNKNOWN CAUSE: ICD-10-CM

## 2019-04-10 LAB
ANION GAP SERPL CALCULATED.3IONS-SCNC: 10 MEQ/L (ref 8–16)
BUN BLDV-MCNC: 6 MG/DL (ref 7–22)
C-REACTIVE PROTEIN: 3.13 MG/DL (ref 0–1)
CALCIUM SERPL-MCNC: 9.3 MG/DL (ref 8.5–10.5)
CHLORIDE BLD-SCNC: 99 MEQ/L (ref 98–111)
CO2: 30 MEQ/L (ref 23–33)
CREAT SERPL-MCNC: 0.6 MG/DL (ref 0.4–1.2)
ERYTHROCYTE [DISTWIDTH] IN BLOOD BY AUTOMATED COUNT: 13.4 % (ref 11.5–14.5)
ERYTHROCYTE [DISTWIDTH] IN BLOOD BY AUTOMATED COUNT: 45.6 FL (ref 35–45)
GFR SERPL CREATININE-BSD FRML MDRD: > 90 ML/MIN/1.73M2
GLUCOSE BLD-MCNC: 90 MG/DL (ref 70–108)
HCT VFR BLD CALC: 43 % (ref 42–52)
HEMOGLOBIN: 14 GM/DL (ref 14–18)
MCH RBC QN AUTO: 29.8 PG (ref 26–33)
MCHC RBC AUTO-ENTMCNC: 32.6 GM/DL (ref 32.2–35.5)
MCV RBC AUTO: 91.5 FL (ref 80–94)
PLATELET # BLD: 390 THOU/MM3 (ref 130–400)
PMV BLD AUTO: 9.2 FL (ref 9.4–12.4)
POTASSIUM SERPL-SCNC: 4.3 MEQ/L (ref 3.5–5.2)
RBC # BLD: 4.7 MILL/MM3 (ref 4.7–6.1)
SODIUM BLD-SCNC: 139 MEQ/L (ref 135–145)
WBC # BLD: 11.1 THOU/MM3 (ref 4.8–10.8)

## 2019-04-10 PROCEDURE — 86140 C-REACTIVE PROTEIN: CPT

## 2019-04-10 PROCEDURE — 80048 BASIC METABOLIC PNL TOTAL CA: CPT

## 2019-04-10 PROCEDURE — 36415 COLL VENOUS BLD VENIPUNCTURE: CPT

## 2019-04-10 PROCEDURE — 17250 CHEM CAUT OF GRANLTJ TISSUE: CPT | Performed by: NURSE PRACTITIONER

## 2019-04-10 PROCEDURE — 17250 CHEM CAUT OF GRANLTJ TISSUE: CPT

## 2019-04-10 PROCEDURE — 85027 COMPLETE CBC AUTOMATED: CPT

## 2019-04-10 PROCEDURE — 99213 OFFICE O/P EST LOW 20 MIN: CPT | Performed by: NURSE PRACTITIONER

## 2019-04-10 PROCEDURE — 6370000000 HC RX 637 (ALT 250 FOR IP): Performed by: NURSE PRACTITIONER

## 2019-04-10 PROCEDURE — 2709999900 HC NON-CHARGEABLE SUPPLY

## 2019-04-10 RX ADMIN — SILVER NITRATE APPLICATORS 3 EACH: 25; 75 STICK TOPICAL at 15:20

## 2019-04-10 ASSESSMENT — PAIN SCALES - GENERAL: PAINLEVEL_OUTOF10: 4

## 2019-04-10 ASSESSMENT — PAIN DESCRIPTION - FREQUENCY: FREQUENCY: CONTINUOUS

## 2019-04-10 ASSESSMENT — PAIN DESCRIPTION - LOCATION: LOCATION: SHOULDER

## 2019-04-10 ASSESSMENT — PAIN DESCRIPTION - PAIN TYPE: TYPE: CHRONIC PAIN

## 2019-04-10 ASSESSMENT — PAIN DESCRIPTION - DESCRIPTORS: DESCRIPTORS: CONSTANT

## 2019-04-10 ASSESSMENT — PAIN DESCRIPTION - ORIENTATION: ORIENTATION: LEFT
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
## 2019-04-11 RX ORDER — CIPROFLOXACIN 500 MG/1
500 TABLET, FILM COATED ORAL 2 TIMES DAILY
Qty: 60 TABLET | Refills: 0 | Status: SHIPPED | OUTPATIENT
Start: 2019-04-11 | End: 2019-05-11

## 2019-04-11 RX ORDER — DOXYCYCLINE HYCLATE 100 MG/1
100 CAPSULE ORAL 2 TIMES DAILY
Qty: 60 CAPSULE | Refills: 0 | Status: SHIPPED | OUTPATIENT
Start: 2019-04-11 | End: 2019-05-11

## 2019-04-16 ENCOUNTER — TELEPHONE (OUTPATIENT)
Dept: UROLOGY | Age: 29
End: 2019-04-16

## 2019-04-16 DIAGNOSIS — G89.4 CHRONIC PAIN SYNDROME: ICD-10-CM

## 2019-04-16 DIAGNOSIS — G89.21 CHRONIC PAIN DUE TO TRAUMA: ICD-10-CM

## 2019-04-16 DIAGNOSIS — F32.2 SEVERE SINGLE CURRENT EPISODE OF MAJOR DEPRESSIVE DISORDER, WITHOUT PSYCHOTIC FEATURES (HCC): ICD-10-CM

## 2019-04-16 RX ORDER — IBUPROFEN 800 MG/1
800 TABLET ORAL 3 TIMES DAILY PRN
Qty: 90 TABLET | Refills: 5 | Status: SHIPPED | OUTPATIENT
Start: 2019-04-16 | End: 2019-10-03 | Stop reason: SDUPTHER

## 2019-04-16 RX ORDER — VENLAFAXINE HYDROCHLORIDE 150 MG/1
150 CAPSULE, EXTENDED RELEASE ORAL DAILY
Qty: 30 CAPSULE | Refills: 5 | Status: SHIPPED | OUTPATIENT
Start: 2019-04-16 | End: 2019-10-03 | Stop reason: SDUPTHER

## 2019-05-17 ENCOUNTER — HOSPITAL ENCOUNTER (OUTPATIENT)
Dept: WOUND CARE | Age: 29
Discharge: HOME | End: 2019-05-17
Payer: MEDICAID

## 2019-05-17 ENCOUNTER — HOSPITAL ENCOUNTER (OUTPATIENT)
Age: 29
Discharge: HOME | End: 2019-05-17
Payer: MEDICAID

## 2019-05-17 VITALS
WEIGHT: 122.9 LBS | OXYGEN SATURATION: 98 % | HEART RATE: 110 BPM | SYSTOLIC BLOOD PRESSURE: 98 MMHG | BODY MASS INDEX: 17.63 KG/M2 | RESPIRATION RATE: 16 BRPM | DIASTOLIC BLOOD PRESSURE: 58 MMHG | TEMPERATURE: 98.2 F

## 2019-05-17 DIAGNOSIS — M46.28 CHRONIC OSTEOMYELITIS OF COCCYX (HCC): ICD-10-CM

## 2019-05-17 DIAGNOSIS — Z79.2 LONG TERM (CURRENT) USE OF ANTIBIOTICS: ICD-10-CM

## 2019-05-17 DIAGNOSIS — M86.652 OSTEOMYELITIS, CHRONIC, PELVIC REGION, LEFT (HCC): ICD-10-CM

## 2019-05-17 DIAGNOSIS — M46.28 CHRONIC OSTEOMYELITIS OF COCCYX (HCC): Primary | ICD-10-CM

## 2019-05-17 DIAGNOSIS — L89.154 PRESSURE ULCER OF COCCYGEAL REGION, STAGE 4 (HCC): ICD-10-CM

## 2019-05-17 DIAGNOSIS — F42.4 COMPULSIVE SKIN PICKING: ICD-10-CM

## 2019-05-17 DIAGNOSIS — L89.313 PRESSURE INJURY OF RIGHT ISCHIUM, STAGE 3 (HCC): ICD-10-CM

## 2019-05-17 DIAGNOSIS — L89.324 DECUBITUS ULCER OF LEFT ISCHIUM, STAGE 4 (HCC): ICD-10-CM

## 2019-05-17 DIAGNOSIS — S31.109A WOUND OF ABDOMEN: ICD-10-CM

## 2019-05-17 LAB
ALBUMIN SERPL-MCNC: 3.3 G/DL (ref 3.5–5.1)
ALP BLD-CCNC: 137 U/L (ref 38–126)
ALT SERPL-CCNC: 6 U/L (ref 11–66)
ANION GAP SERPL CALCULATED.3IONS-SCNC: 15 MEQ/L (ref 8–16)
AST SERPL-CCNC: 10 U/L (ref 5–40)
BILIRUB SERPL-MCNC: 0.2 MG/DL (ref 0.3–1.2)
BILIRUBIN DIRECT: < 0.2 MG/DL (ref 0–0.3)
BUN BLDV-MCNC: 4 MG/DL (ref 7–22)
C-REACTIVE PROTEIN: 3.07 MG/DL (ref 0–1)
CALCIUM SERPL-MCNC: 9.8 MG/DL (ref 8.5–10.5)
CHLORIDE BLD-SCNC: 103 MEQ/L (ref 98–111)
CO2: 26 MEQ/L (ref 23–33)
CREAT SERPL-MCNC: 0.9 MG/DL (ref 0.4–1.2)
ERYTHROCYTE [DISTWIDTH] IN BLOOD BY AUTOMATED COUNT: 13.8 % (ref 11.5–14.5)
ERYTHROCYTE [DISTWIDTH] IN BLOOD BY AUTOMATED COUNT: 44.1 FL (ref 35–45)
GFR SERPL CREATININE-BSD FRML MDRD: > 90 ML/MIN/1.73M2
GLUCOSE BLD-MCNC: 70 MG/DL (ref 70–108)
HCT VFR BLD CALC: 44.1 % (ref 42–52)
HEMOGLOBIN: 14.7 GM/DL (ref 14–18)
MCH RBC QN AUTO: 29.5 PG (ref 26–33)
MCHC RBC AUTO-ENTMCNC: 33.3 GM/DL (ref 32.2–35.5)
MCV RBC AUTO: 88.6 FL (ref 80–94)
PLATELET # BLD: 661 THOU/MM3 (ref 130–400)
PMV BLD AUTO: 8.3 FL (ref 9.4–12.4)
POTASSIUM SERPL-SCNC: 4.7 MEQ/L (ref 3.5–5.2)
RBC # BLD: 4.98 MILL/MM3 (ref 4.7–6.1)
SODIUM BLD-SCNC: 144 MEQ/L (ref 135–145)
TOTAL PROTEIN: 8 G/DL (ref 6.1–8)
WBC # BLD: 12.2 THOU/MM3 (ref 4.8–10.8)

## 2019-05-17 PROCEDURE — 82248 BILIRUBIN DIRECT: CPT

## 2019-05-17 PROCEDURE — 2709999900 HC NON-CHARGEABLE SUPPLY

## 2019-05-17 PROCEDURE — 85027 COMPLETE CBC AUTOMATED: CPT

## 2019-05-17 PROCEDURE — 6370000000 HC RX 637 (ALT 250 FOR IP): Performed by: NURSE PRACTITIONER

## 2019-05-17 PROCEDURE — 36415 COLL VENOUS BLD VENIPUNCTURE: CPT

## 2019-05-17 PROCEDURE — 17250 CHEM CAUT OF GRANLTJ TISSUE: CPT | Performed by: NURSE PRACTITIONER

## 2019-05-17 PROCEDURE — 17250 CHEM CAUT OF GRANLTJ TISSUE: CPT

## 2019-05-17 PROCEDURE — 80053 COMPREHEN METABOLIC PANEL: CPT

## 2019-05-17 PROCEDURE — 86140 C-REACTIVE PROTEIN: CPT

## 2019-05-17 RX ORDER — CIPROFLOXACIN 500 MG/1
500 TABLET, FILM COATED ORAL 2 TIMES DAILY
Qty: 60 TABLET | Refills: 0 | Status: SHIPPED | OUTPATIENT
Start: 2019-05-17 | End: 2019-06-16

## 2019-05-17 RX ORDER — DOXYCYCLINE HYCLATE 100 MG/1
100 CAPSULE ORAL 2 TIMES DAILY
Qty: 60 CAPSULE | Refills: 0 | Status: SHIPPED | OUTPATIENT
Start: 2019-05-17 | End: 2019-06-16

## 2019-05-17 RX ADMIN — SILVER NITRATE APPLICATORS 2 EACH: 25; 75 STICK TOPICAL at 13:57

## 2019-05-17 ASSESSMENT — PAIN DESCRIPTION - ORIENTATION: ORIENTATION: LEFT

## 2019-05-17 ASSESSMENT — PAIN DESCRIPTION - PAIN TYPE: TYPE: CHRONIC PAIN

## 2019-05-17 ASSESSMENT — PAIN SCALES - GENERAL: PAINLEVEL_OUTOF10: 5

## 2019-05-17 ASSESSMENT — PAIN DESCRIPTION - LOCATION: LOCATION: SHOULDER
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
## 2019-05-20 DIAGNOSIS — F32.2 SEVERE SINGLE CURRENT EPISODE OF MAJOR DEPRESSIVE DISORDER, WITHOUT PSYCHOTIC FEATURES (HCC): ICD-10-CM

## 2019-05-20 RX ORDER — TRAZODONE HYDROCHLORIDE 100 MG/1
200 TABLET ORAL NIGHTLY PRN
Qty: 60 TABLET | Refills: 5 | Status: SHIPPED | OUTPATIENT
Start: 2019-05-20 | End: 2019-10-31 | Stop reason: SDUPTHER

## 2019-05-20 RX ORDER — CHOLECALCIFEROL (VITAMIN D3) 125 MCG
50000 TABLET ORAL
Qty: 12 TABLET | Refills: 1 | Status: SHIPPED | OUTPATIENT
Start: 2019-05-20 | End: 2019-10-31 | Stop reason: SDUPTHER

## 2019-05-20 NOTE — TELEPHONE ENCOUNTER
Please note the dosage difference on the ergocalciferol. The current dosage listed has been the same dosage that the pt has been taking, but a different dosage is on the refill form.

## 2019-05-28 ENCOUNTER — TELEPHONE (OUTPATIENT)
Dept: WOUND CARE | Age: 29
End: 2019-05-28

## 2019-05-28 NOTE — TELEPHONE ENCOUNTER
Patient called and left message requesting DME order for air mattress to be faxed to his [facility]  Conor Wilkins as he has lost the order. DME order faxed to Conor Wilkins at [facility], Fax # [phone]. Called to update patient that order was faxed as requested, no answer, left voicemail.

## 2019-06-05 ENCOUNTER — TELEPHONE (OUTPATIENT)
Dept: WOUND CARE | Age: 29
End: 2019-06-05

## 2019-06-05 NOTE — TELEPHONE ENCOUNTER
Called pt. Spoke with Father advised dietician referral was made. Called Call center [facility] stated they will call and make pt an appt due to referral being in the [facility].

## 2019-07-01 ENCOUNTER — TELEPHONE (OUTPATIENT)
Dept: WOUND CARE | Age: 29
End: 2019-07-01

## 2019-07-08 ENCOUNTER — HOSPITAL ENCOUNTER (OUTPATIENT)
Dept: WOUND CARE | Age: 29
Discharge: HOME | End: 2019-07-08
Payer: MEDICAID

## 2019-07-08 ENCOUNTER — HOSPITAL ENCOUNTER (OUTPATIENT)
Age: 29
Discharge: HOME | End: 2019-07-08
Payer: MEDICAID

## 2019-07-08 VITALS
RESPIRATION RATE: 16 BRPM | OXYGEN SATURATION: 100 % | SYSTOLIC BLOOD PRESSURE: 98 MMHG | HEART RATE: 110 BPM | DIASTOLIC BLOOD PRESSURE: 50 MMHG | TEMPERATURE: 98.3 F

## 2019-07-08 DIAGNOSIS — L89.324 DECUBITUS ULCER OF LEFT ISCHIUM, STAGE 4 (HCC): ICD-10-CM

## 2019-07-08 DIAGNOSIS — L89.320 PRESSURE INJURY OF LEFT BUTTOCK, UNSTAGEABLE (HCC): ICD-10-CM

## 2019-07-08 DIAGNOSIS — M46.28 CHRONIC OSTEOMYELITIS OF COCCYX (HCC): Primary | ICD-10-CM

## 2019-07-08 DIAGNOSIS — M46.28 CHRONIC OSTEOMYELITIS OF COCCYX (HCC): ICD-10-CM

## 2019-07-08 DIAGNOSIS — Z79.2 LONG TERM (CURRENT) USE OF ANTIBIOTICS: ICD-10-CM

## 2019-07-08 DIAGNOSIS — M86.652 OSTEOMYELITIS, CHRONIC, PELVIC REGION, LEFT (HCC): ICD-10-CM

## 2019-07-08 DIAGNOSIS — L89.310 PRESSURE INJURY OF RIGHT BUTTOCK, UNSTAGEABLE (HCC): ICD-10-CM

## 2019-07-08 DIAGNOSIS — L89.154 PRESSURE ULCER OF COCCYGEAL REGION, STAGE 4 (HCC): ICD-10-CM

## 2019-07-08 LAB
ANION GAP SERPL CALCULATED.3IONS-SCNC: 12 MEQ/L (ref 8–16)
BUN BLDV-MCNC: 4 MG/DL (ref 7–22)
C-REACTIVE PROTEIN: 19.49 MG/DL (ref 0–1)
CALCIUM SERPL-MCNC: 8.8 MG/DL (ref 8.5–10.5)
CHLORIDE BLD-SCNC: 99 MEQ/L (ref 98–111)
CO2: 28 MEQ/L (ref 23–33)
CREAT SERPL-MCNC: 0.7 MG/DL (ref 0.4–1.2)
ERYTHROCYTE [DISTWIDTH] IN BLOOD BY AUTOMATED COUNT: 14.2 % (ref 11.5–14.5)
ERYTHROCYTE [DISTWIDTH] IN BLOOD BY AUTOMATED COUNT: 45.6 FL (ref 35–45)
GFR SERPL CREATININE-BSD FRML MDRD: > 90 ML/MIN/1.73M2
GLUCOSE BLD-MCNC: 83 MG/DL (ref 70–108)
HCT VFR BLD CALC: 33.4 % (ref 42–52)
HEMOGLOBIN: 10.9 GM/DL (ref 14–18)
MCH RBC QN AUTO: 29 PG (ref 26–33)
MCHC RBC AUTO-ENTMCNC: 32.6 GM/DL (ref 32.2–35.5)
MCV RBC AUTO: 88.8 FL (ref 80–94)
PLATELET # BLD: 347 THOU/MM3 (ref 130–400)
PMV BLD AUTO: 8.6 FL (ref 9.4–12.4)
POTASSIUM SERPL-SCNC: 3.5 MEQ/L (ref 3.5–5.2)
RBC # BLD: 3.76 MILL/MM3 (ref 4.7–6.1)
SODIUM BLD-SCNC: 139 MEQ/L (ref 135–145)
WBC # BLD: 10.5 THOU/MM3 (ref 4.8–10.8)

## 2019-07-08 PROCEDURE — 17250 CHEM CAUT OF GRANLTJ TISSUE: CPT

## 2019-07-08 PROCEDURE — 85027 COMPLETE CBC AUTOMATED: CPT

## 2019-07-08 PROCEDURE — 2709999900 HC NON-CHARGEABLE SUPPLY

## 2019-07-08 PROCEDURE — 6370000000 HC RX 637 (ALT 250 FOR IP): Performed by: NURSE PRACTITIONER

## 2019-07-08 PROCEDURE — 36415 COLL VENOUS BLD VENIPUNCTURE: CPT

## 2019-07-08 PROCEDURE — 86140 C-REACTIVE PROTEIN: CPT

## 2019-07-08 PROCEDURE — 99213 OFFICE O/P EST LOW 20 MIN: CPT | Performed by: NURSE PRACTITIONER

## 2019-07-08 PROCEDURE — 80048 BASIC METABOLIC PNL TOTAL CA: CPT

## 2019-07-08 PROCEDURE — 17250 CHEM CAUT OF GRANLTJ TISSUE: CPT | Performed by: NURSE PRACTITIONER

## 2019-07-08 RX ORDER — DOXYCYCLINE HYCLATE 100 MG/1
100 CAPSULE ORAL 2 TIMES DAILY
Qty: 90 CAPSULE | Refills: 0 | Status: SHIPPED | OUTPATIENT
Start: 2019-07-08 | End: 2019-08-22

## 2019-07-08 RX ORDER — GREEN TEA/HOODIA GORDONII 315-12.5MG
1 CAPSULE ORAL 2 TIMES DAILY
Qty: 90 TABLET | Refills: 0 | Status: SHIPPED | OUTPATIENT
Start: 2019-07-08 | End: 2019-08-22

## 2019-07-08 RX ORDER — CIPROFLOXACIN 500 MG/1
500 TABLET, FILM COATED ORAL 2 TIMES DAILY
Qty: 90 TABLET | Refills: 0 | Status: SHIPPED | OUTPATIENT
Start: 2019-07-08 | End: 2019-08-22

## 2019-07-08 RX ADMIN — SILVER NITRATE APPLICATORS 2 EACH: 25; 75 STICK TOPICAL at 16:29

## 2019-07-08 ASSESSMENT — PAIN DESCRIPTION - ORIENTATION: ORIENTATION: LEFT

## 2019-07-08 ASSESSMENT — PAIN DESCRIPTION - LOCATION: LOCATION: SHOULDER

## 2019-07-08 ASSESSMENT — PAIN SCALES - GENERAL: PAINLEVEL_OUTOF10: 5

## 2019-07-13 NOTE — PROGRESS NOTES
Kita Silva continues to smoke and has been eating poorly. He missed his last appt and ran out of antibiotics approx 1 week ago, he did not call the clinic. He has new pressure injuries and his ulcers have all declined with new pressure areas noted. Wound/Ulcer Pain Timing/Severity: none  Quality of pain: N/A  Severity:  0 / 10   Modifying Factors: None  Associated Signs/Symptoms: drainage    Interval History:   Patient presents today for follow up on wound/ulcer's progression. The patient is currently not on antibiotics. Current dressing care includes:    Abdomen/right heel - Apply vaseline to wound/scabbed areas once daily.      Left ischium- Cleanse wounds with dakins solution and gauze. Pack with AMD ribbon. Cover with gauze and ABD pads. Secure with tape. Change daily.     Coccyx- Cleanse wound with dakins solution and gauze. Cover wound with aquacel ag, gauze, ABD pad, tape in place and change daily.     Right ischium- Cleanse wound with dakins solution and gauze. Apply aquacel ag and bordered foam. Change three times per week.     Buttocks- Apply zinc oxide cream as needed.         PAST MEDICAL HISTORY        Diagnosis Date    Depression     Fracture of lower leg     Hyperthyroidism 9/2014    MDRO (multiple drug resistant organisms) resistance     MRSA LUNGS    Pneumonia        PAST SURGICAL HISTORY    Past Surgical History:   Procedure Laterality Date    APPENDECTOMY      BACK SURGERY  11/2016    BRAIN SURGERY  11/05/2016    CLOT REMOVED    CYSTOSCOPY  04/17/2017    FACIAL RECONSTRUCTION SURGERY  2012    left zygomatic arch and sinus    FRACTURE SURGERY Left 11/2016    HARDWARE REMOVAL  2012    left zygomatic arch    OTHER SURGICAL HISTORY  01/09/2017    Laparoscopic Diverting Colostomy    OTHER SURGICAL HISTORY  01/09/2017    Excisional Debridment Sacral Decubitus Ulcer    OTHER SURGICAL HISTORY  04/17/2017    Placement of suprapubic catheter    HI OFFICE/OUTPT VISIT,PROCEDURE ONLY Right FOR ITCHING 90 tablet 5    albuterol sulfate HFA (VENTOLIN HFA) 108 (90 Base) MCG/ACT inhaler Inhale 2 puffs into the lungs every 6 hours as needed for Wheezing 1 Inhaler 3    amantadine (SYMMETREL) 100 MG capsule Take 1 capsule by mouth daily 60 capsule 5    rizatriptan (MAXALT) 5 MG tablet Take 1 tablet by mouth once as needed for Migraine May repeat in 2 hours if needed 15 tablet 3    ondansetron (ZOFRAN) 4 MG tablet Take 1 tablet by mouth every 8 hours as needed for Nausea or Vomiting 90 tablet 5    sodium hypochlorite (DAKINS) 0.125 % SOLN external solution Apply Dakin's moistened gauze to coccyx. Cover with dry gauze. Change twice a day. 1 Bottle 2    Misc. Devices (BATH/SHOWER SEAT) [address] Shower chair with wheels and arm lifts and seat belt. 1 each 0    Misc. Devices MISC Protinex X shots, use TID. 90 each 5    Disposable Gloves (LATEX GLOVES MEDIUM) MISC Use gloves prn for personal care 10 each [address] by Does not apply route [facility] bed with 4 rails. 1 each 0    Misc. Devices (EXTENDABLE BEDSIDE RAIL) MISC For hospital bed 2 each 0    Incontinence Supply Disposable (DEPEND UNDERWEAR SM/MED) MISC Use depends prn for incontinence care 5 Package 3    Incontinence Supply Disposable (PREVAIL WET WIPES) MISC Use wet wipes prn for personal care 96 each 3    Respiratory Therapy Supplies (NEBULIZER COMPRESSOR) KIT 1 kit by Does not apply route once for 1 dose 1 kit 0    Elastic Bandages & Supports (T.E.D.  KNEE LENGTH/M-REGULAR) MISC Use as directed 2 each 0    Elastic Bandages & Supports (JOBST ACTIVE 20-30MMHG MEDIUM) MISC Apply q daily 2 each 0    Elastic Bandages & Supports (JOBST KNEE HIGH COMPRESSION SM) MISC 1 each by Does not apply route daily 2 each 0    Incontinence Supplies (BEDSIDE DRAINAGE BAG) MISC Large 2000 cc bedside drainage bag 1 each 11    Incontinence Supplies (URINARY LEG BAG) MISC 900 cc [place] Urinary catheter leg bag 1 each 11    Incontinence Supplies (URINARY LEG BAG STRAPS) MISC Leg bag straps to go with urinary catheter leg bag 1 each 11    Catheters (FOLY CATHETER LUBRICIOUS) MISC 16 Fr 10 cc rodrigues 1 each 11    EPINEPHrine (EPIPEN) 0.3 MG/0.3ML SOAJ injection Use as directed for allergic reaction 2 each 1    Lift Chair MISC by Does not apply route Use as directed 1 each 0     No current facility-administered medications on file prior to encounter.         REVIEW OF SYSTEMS    Constitutional: negative for fevers, chills, and sweats  Eyes: negative for irritation and redness  Ears, nose, mouth, throat, and face: negative for hearing loss and hoarseness  Respiratory: positive for cough; negative for shortness of breath  Cardiovascular: negative for chest pain, dyspnea and lower extremity edema  Gastrointestinal: positive for colostomy, negative for abdominal pain, nausea and vomiting  Genitourinary: positive for suprapubic catheter  Integument/breast: positive for coccyx ulcer, left ischium ulcer, left buttock, right ischium, and multiple abdomen wounds  Musculoskeletal: positive for paraplegia, history of amputation of right hallux  Neurological: negative for dizziness, speech problems and positive for paraplegia  Behavioral/Psych: positive for good mood    Objective:      BP (!) 98/50   Pulse 110   Temp 98.3 °F (36.8 °C) (Oral)   Resp 16   SpO2 100%     Wt Readings from Last 3 Encounters:   05/17/19 122 lb 14.4 oz (55.7 kg)   04/10/19 134 lb 12.8 oz (61.1 kg)   03/01/19 135 lb 1.6 oz (61.3 kg)       PHYSICAL EXAM    General Appearance: alert and oriented to person, place and time; pale   Skin: warm and dry  Head: normocephalic and atraumatic  Eyes: conjunctivae normal  ENT: hearing grossly normal bilaterally  Pulmonary/Chest: clear to auscultation bilaterally- no wheezes, rales or rhonchi, normal air movement, no respiratory distress  Cardiovascular: normal rate and regular rhythm  Abdomen: soft, non-tender, bowel sounds normal   Extremities: no 7/8/2019  3:41 PM   [place]%Wound Bed 100 3/1/2019  1:53 PM   Red%Wound Bed 100 7/8/2019  3:41 PM   Yellow%Wound Bed 50 5/17/2019  1:51 PM   Number of days: 263       Wound 11/26/18 Buttocks Right #3 (Active)   Wound Image   12/17/2018  1:57 PM   Dressing Status Intact 12/17/2018  1:57 PM   Dressing Changed Changed/New 12/17/2018  1:57 PM   Wound Cleansed Rinsed/Irrigated with saline 12/17/2018  1:57 PM   Wound Length (cm) 0 cm 1/23/2019  1:23 PM   Wound Width (cm) 0 cm 1/23/2019  1:23 PM   Wound Depth (cm) 0 cm 1/23/2019  1:23 PM   Wound Surface Area (cm^2) 0 cm^2 1/23/2019  1:23 PM   Change in Wound Size % (l*w) 100 1/23/2019  1:23 PM   Wound Volume (cm^3) 0 cm^3 1/23/2019  1:23 PM   Wound Healing % 100 1/23/2019  1:23 PM   Wound Assessment Yellow;Red;Pink;Epithelialization 12/17/2018  1:57 PM   Drainage Amount Small 12/17/2018  1:57 PM   Drainage Description Serosanguinous; Yellow 12/17/2018  1:57 PM   Odor None 12/17/2018  1:57 PM   Margins Attached edges 12/17/2018  1:57 PM   Kiana-wound Assessment Dry;Pink 12/17/2018  1:57 PM   [place]%Wound Bed 10 12/17/2018  1:57 PM   Red%Wound Bed 10 12/17/2018  1:57 PM   Yellow%Wound Bed 70 12/17/2018  1:57 PM   Other%Wound Bed 10 epi 12/17/2018  1:57 PM   Number of days: 228       Wound 03/01/19 Ischium Right #6 (Active)   Wound Image   7/8/2019  3:41 PM   Wound Pressure Stage  4 3/1/2019  1:53 PM   Dressing Status Intact 7/8/2019  3:41 PM   Dressing Changed Changed/New 7/8/2019  3:41 PM   Wound Cleansed Rinsed/Irrigated with saline 7/8/2019  3:41 PM   Wound Length (cm) 5.3 cm 7/8/2019  3:41 PM   Wound Width (cm) 3 cm 7/8/2019  3:41 PM   Wound Depth (cm) 0.05 cm 7/8/2019  3:41 PM   Wound Surface Area (cm^2) 15.9 cm^2 7/8/2019  3:41 PM   Change in Wound Size % (l*w) 51.47 7/8/2019  3:41 PM   Wound Volume (cm^3) 0.8 cm^3 7/8/2019  3:41 PM   Wound Healing % 92 7/8/2019  3:41 PM   Wound Assessment Yellow; Red 7/8/2019  3:41 PM   Drainage Amount Moderate 7/8/2019  3:41 PM   Drainage

## 2019-07-14 PROBLEM — L89.320 PRESSURE INJURY OF LEFT BUTTOCK, UNSTAGEABLE (HCC): Status: ACTIVE | Noted: 2018-11-26

## 2019-07-18 ENCOUNTER — TELEPHONE (OUTPATIENT)
Dept: FAMILY MEDICINE CLINIC | Age: 29
End: 2019-07-18

## 2019-07-18 ENCOUNTER — TELEPHONE (OUTPATIENT)
Dept: INTERNAL MEDICINE CLINIC | Age: 29
End: 2019-07-18

## 2019-07-18 DIAGNOSIS — R11.0 NAUSEA: ICD-10-CM

## 2019-07-18 RX ORDER — ONDANSETRON 4 MG/1
4 TABLET, FILM COATED ORAL EVERY 8 HOURS PRN
Qty: 90 TABLET | Refills: 5 | Status: SHIPPED | OUTPATIENT
Start: 2019-07-18 | End: 2020-07-24 | Stop reason: SDUPTHER

## 2019-07-18 NOTE — TELEPHONE ENCOUNTER
Patient is asking if he can have an rx for zofran. He usually keeps some on hand and he is out.   Pharmacy [facility] [address]  dolv 3/13  [place]

## 2019-07-24 ENCOUNTER — OFFICE VISIT (OUTPATIENT)
Dept: INTERNAL MEDICINE CLINIC | Age: 29
End: 2019-07-24
Payer: MEDICAID

## 2019-07-24 DIAGNOSIS — E44.0 MODERATE MALNUTRITION (HCC): ICD-10-CM

## 2019-07-24 DIAGNOSIS — L89.324 STAGE IV PRESSURE ULCER OF LEFT BUTTOCK (HCC): ICD-10-CM

## 2019-07-24 DIAGNOSIS — M46.28 ABSCESS OF SACRUM (HCC): ICD-10-CM

## 2019-07-24 DIAGNOSIS — L89.313 STAGE III PRESSURE ULCER OF RIGHT BUTTOCK (HCC): ICD-10-CM

## 2019-07-24 DIAGNOSIS — L89.154 STAGE IV PRESSURE ULCER OF SACRAL REGION (HCC): ICD-10-CM

## 2019-07-24 PROCEDURE — 97802 MEDICAL NUTRITION INDIV IN: CPT | Performed by: DIETITIAN, REGISTERED

## 2019-07-24 NOTE — PROGRESS NOTES
[address] [place] [address]. [address] [place] [address] Yusef., [facility], [address]  [phone] (phone)  [phone] (fax)    Patient Name: Fletcher Laurent. Date of Birth: [id number]. MRN: [id number]      Assessment: Patient is a 34 y.o. male seen for Initial MNT visit for moderate protein/calorie malnutrition, decubitus ulcers    -Nutritionally relevant labs:   Lab Results   Component Value Date/Time    LABA1C 5.0 07/23/2017 11:41 AM    GLUCOSE 83 07/08/2019 05:00 PM    GLUCOSE 70 05/17/2019 03:23 PM    GLUCOSE 109 12/30/2016 06:50 PM    CHOL 138 02/14/2015 08:22 AM    HDL 38 02/14/2015 08:22 AM    LDLCALC 83 02/14/2015 08:22 AM    TRIG 83 02/14/2015 08:22 AM     Dad states he is resistant to eat sometimes but dad always encouraging him to eat and drink his protein supplement drinks and Ensure. Pt has a prescription for 3 Ensure Enlive supplement drinks/day and 2 - 2 oz proteinex 2GO. This is provided to the home by [facility]  Pt resistant to drink the Ensure. Pt has an aversion to meat and pork and chicken. Pt states he had allergy reaction to pork when he was young and has not eaten meat since then. Pt is ok with Eggs, fish, tuna, shrimp, lobster. Dislikes salmon. Up ~ noon  Eats once or twice/day  -Food recall:   Yesterday    3 pm:  Fish sandwich from [employer]   and ate 3 oranges yesterday too. 6 pm:  3 eggs and 3 hashbrown   8-9 pm:  pbutter and banana sandwich  Pt does not have the sensation of hunger/appetite or satiety. Pt does not feel his ulcers. -Main Beverages: Drinks ~ 6 Pepsi/day and can go through a case of water in 2-3 days. -Impression of Dietary Intake: inadequate calorie and protein intake. .    Current Outpatient Medications on File Prior to Visit   Medication Sig Dispense Refill    ondansetron (ZOFRAN) 4 MG tablet Take 1 tablet by mouth every 8 hours as needed for Nausea or Vomiting 90 tablet 5    ciprofloxacin (CIPRO) 500 MG tablet Take 1

## 2019-07-24 NOTE — LETTER
wound healing, meal planning tips, protein content of foods, CIB supplement samples and coupons . Patient Instructions   1.)  Make your 2% milk double protein by adding non-fat dry milk powder. - 90 gms protein/day    2.)  Drink CIB or other supplement drink everyday. - Can make Strawberry Ensure into shakes or puddings    3.)  Try 1 box of Chris for wound healing.  - Drink 1-2 pkts/day    4.)  Eat 3 meals/day - follow the clock of when to eat  Noon - 1 pm  5-6 pm  9 pm    5.)  Bring a one week food log to next dietitian appt. -Nutrition prescription: 1800 - 2000 calories/day, 90 gm protein/day. Comprehension verified using teachback method. Monitoring/Evaluation:   -Followup visit: 5 weeks with dietitian.   -Receptiveness to education/goals: no interest  -Evaluation of education: Needs reinforcement  -Readiness to change: precontemplative- Eating structured meal times since cannot go by hunger.    -Expected compliance: fair. Thank you for your referral of this patient. If you have questions, please do not hesitate to call me. I look forward to following Tiffany Hammer along with you.     Sincerely,        Elias Calvo, RD, LD    CC providers:  DO Liliana Campa [address] [place] [address], RANJIT - CNP  [address] [phone]  VIA In [place]

## 2019-07-26 ENCOUNTER — TELEPHONE (OUTPATIENT)
Dept: CARDIOLOGY CLINIC | Age: 29
End: 2019-07-26

## 2019-07-26 NOTE — TELEPHONE ENCOUNTER
PATIENT NO SHOWED APPOINTMENT IN February 2019. LM FOR PATIENT TO CALL TO SEE IF HE WANTS TO SCHEDULE ANOTHER APPT WITH DR Akau Arias.

## 2019-08-01 DIAGNOSIS — F06.31 MOOD DISORDER DUE TO KNOWN PHYSIOLOGICAL CONDITION WITH DEPRESSIVE FEATURES: ICD-10-CM

## 2019-08-01 DIAGNOSIS — F42.4 SKIN-PICKING DISORDER: ICD-10-CM

## 2019-08-01 DIAGNOSIS — F41.9 ANXIETY: ICD-10-CM

## 2019-08-01 RX ORDER — BUSPIRONE HYDROCHLORIDE 10 MG/1
10 TABLET ORAL 3 TIMES DAILY
Qty: 90 TABLET | Refills: 5 | Status: SHIPPED | OUTPATIENT
Start: 2019-08-01 | End: 2019-12-30

## 2019-08-01 RX ORDER — BACLOFEN 20 MG/1
20 TABLET ORAL 3 TIMES DAILY
Qty: 90 TABLET | Refills: 5 | Status: SHIPPED | OUTPATIENT
Start: 2019-08-01 | End: 2019-12-30

## 2019-08-01 RX ORDER — AMANTADINE HYDROCHLORIDE 100 MG/1
100 CAPSULE, GELATIN COATED ORAL DAILY
Qty: 60 CAPSULE | Refills: 5 | OUTPATIENT
Start: 2019-08-01

## 2019-08-01 RX ORDER — HYDROXYZINE 50 MG/1
TABLET, FILM COATED ORAL
Qty: 90 TABLET | Refills: 5 | Status: SHIPPED | OUTPATIENT
Start: 2019-08-01 | End: 2019-12-30

## 2019-08-14 ENCOUNTER — HOSPITAL ENCOUNTER (OUTPATIENT)
Dept: WOUND CARE | Age: 29
Discharge: HOME | End: 2019-08-14
Payer: MEDICAID

## 2019-08-14 VITALS
SYSTOLIC BLOOD PRESSURE: 132 MMHG | DIASTOLIC BLOOD PRESSURE: 84 MMHG | HEART RATE: 85 BPM | RESPIRATION RATE: 16 BRPM | TEMPERATURE: 98.3 F

## 2019-08-14 DIAGNOSIS — M46.28 CHRONIC OSTEOMYELITIS OF COCCYX (HCC): Primary | ICD-10-CM

## 2019-08-14 DIAGNOSIS — Z79.2 LONG TERM (CURRENT) USE OF ANTIBIOTICS: ICD-10-CM

## 2019-08-14 DIAGNOSIS — M86.652 OSTEOMYELITIS, CHRONIC, PELVIC REGION, LEFT (HCC): ICD-10-CM

## 2019-08-14 PROCEDURE — 99213 OFFICE O/P EST LOW 20 MIN: CPT | Performed by: NURSE PRACTITIONER

## 2019-08-14 PROCEDURE — 17250 CHEM CAUT OF GRANLTJ TISSUE: CPT

## 2019-08-14 PROCEDURE — 6370000000 HC RX 637 (ALT 250 FOR IP): Performed by: NURSE PRACTITIONER

## 2019-08-14 PROCEDURE — 2709999900 HC NON-CHARGEABLE SUPPLY

## 2019-08-14 PROCEDURE — 17250 CHEM CAUT OF GRANLTJ TISSUE: CPT | Performed by: NURSE PRACTITIONER

## 2019-08-14 RX ADMIN — SILVER NITRATE APPLICATORS 1 EACH: 25; 75 STICK TOPICAL at 15:06

## 2019-08-14 ASSESSMENT — PAIN SCALES - GENERAL: PAINLEVEL_OUTOF10: 7

## 2019-08-14 ASSESSMENT — PAIN DESCRIPTION - ORIENTATION: ORIENTATION: LEFT

## 2019-08-14 ASSESSMENT — PAIN DESCRIPTION - PAIN TYPE: TYPE: CHRONIC PAIN

## 2019-08-14 ASSESSMENT — PAIN DESCRIPTION - LOCATION: LOCATION: SHOULDER

## 2019-08-14 NOTE — PLAN OF CARE
Pt. Seen today for multiple wounds. Patient is noted to have new scabbed areas on the buttocks from picking. Pt. Also has new areas to the left lateral ankle and the left plantar foot. Patients dad states these areas are also from picking. Silver nitrate applied to the buttocks wounds. Dressing orders as given below. Patient states to provider during visit that he has had thoughts of committing suicide. Patient has previously went to [facility]. Patient states he is not suicidal at this time and does not have a plan. Patient was given information on the hopeline and instructed to follow up at [facility]. Patient also instructed to go the the ED if he feels suicidal.   Visit Discharge/Physician Orders:   - Ceron Halter down at least twice daily for a couple hours to limit time in chair. Do not sit for more than 2 hrs at a time.  - Reposition yourself in chair and bed every 1-2 hours. - Off load heels. Wear off loading boots when laying down or sitting in chair.  - Increase amount of food you are eating. Increase protein. Try protein shakes/shots (recommend 3 a day). Eat eggs. Need 80-90 grams daily to heal.  - Wear gloves to help stop scratching. Do not smoke with gloves on.   - Do not wear jeans daily or shoes. - Try to cut back on vaping.  - Have labs drawn as soon as possible and before appointment in 1 month- CBC, BMP, CRP (will call in more antibiotic once lab work received)- Lab vouchers given today for labs to have done now and then again in 1 month. - Silver nitrate applied to coccyx and right ischium wounds today, will appear gray in color. Dressing orders:       Left ischium- Cleanse wounds with dakins solution and gauze. Pack with AMD ribbon. Cover with gauze and ABD pad. Secure with tape. Change daily. Coccyx- Cleanse wound with dakins solution and gauze. Cover wound with aquacel ag, gauze, ABD pad, secure with tape. Change daily. Right ischium- Cleanse wound with dakins solution and gauze.  Apply aquacel ag and bordered foam. Change three times per week. Left buttock- Cleanse wound with dakins solution and gauze. Apply bordered foam dressing. Change three times per week. Left ankle- Cleanse wound with normal saline or wound cleanser and gauze. Pat dry with clean gauze. Apply a silicone bordered foam to the area and change 3 times per week. Left plantar foot- Apply betadine to the area daily     Follow up visit: 4 weeks on Monday September 16th at 1:30 pm  Care plan reviewed with patient and father. Patient and father verbalize understanding of the plan of care and contribute to goal setting.        Left ankle    Left plantar foot    Left buttock    Right ischium    coccyx    Left ischium

## 2019-08-14 NOTE — PROGRESS NOTES
16 Fr 10 cc rodrigues 1 each 11    Lift Chair MISC by Does not apply route Use as directed 1 each 0    rizatriptan (MAXALT) 5 MG tablet Take 1 tablet by mouth once as needed for Migraine May repeat in 2 hours if needed 15 tablet 3    Respiratory Therapy Supplies (NEBULIZER COMPRESSOR) KIT 1 kit by Does not apply route once for 1 dose 1 kit 0    EPINEPHrine (EPIPEN) 0.3 MG/0.3ML SOAJ injection Use as directed for allergic reaction 2 each 1     No current facility-administered medications on file prior to encounter.         REVIEW OF SYSTEMS    Constitutional: negative for fevers, chills, and sweats  Eyes: negative for irritation and redness  Ears, nose, mouth, throat, and face: negative for hearing loss and hoarseness  Respiratory: positive for cough; negative for shortness of breath  Cardiovascular: negative for chest pain, dyspnea and lower extremity edema  Gastrointestinal: positive for colostomy, negative for abdominal pain, nausea and vomiting  Genitourinary: positive for suprapubic catheter  Integument/breast: positive for coccyx ulcer, left ischium ulcer, left buttock, right ankle ulcer, right planter foot, right ischium, bilateral toe wounds, and multiple abdomen wounds  Musculoskeletal: positive for paraplegia, history of amputation of right hallux  Neurological: negative for dizziness, speech problems and positive for paraplegia  Behavioral/Psych: positive for good mood    Objective:      /84   Pulse 85   Temp 98.3 °F (36.8 °C) (Oral)   Resp 16     Wt Readings from Last 3 Encounters:   05/17/19 122 lb 14.4 oz (55.7 kg)   04/10/19 134 lb 12.8 oz (61.1 kg)   03/01/19 135 lb 1.6 oz (61.3 kg)       PHYSICAL EXAM    General Appearance: alert and oriented to person, place and time; pale   Skin: warm and dry  Head: normocephalic and atraumatic  Eyes: conjunctivae normal  ENT: hearing grossly normal bilaterally  Pulmonary/Chest: clear to auscultation bilaterally- no wheezes, rales or rhonchi, normal air

## 2019-08-20 ENCOUNTER — HOSPITAL ENCOUNTER (OUTPATIENT)
Age: 29
Discharge: HOME | End: 2019-08-20
Payer: MEDICAID

## 2019-08-20 DIAGNOSIS — M86.652 OSTEOMYELITIS, CHRONIC, PELVIC REGION, LEFT (HCC): ICD-10-CM

## 2019-08-20 DIAGNOSIS — M46.28 CHRONIC OSTEOMYELITIS OF COCCYX (HCC): ICD-10-CM

## 2019-08-20 DIAGNOSIS — Z79.2 LONG TERM (CURRENT) USE OF ANTIBIOTICS: ICD-10-CM

## 2019-08-20 LAB
ANION GAP SERPL CALCULATED.3IONS-SCNC: 14 MEQ/L (ref 8–16)
BUN BLDV-MCNC: 4 MG/DL (ref 7–22)
C-REACTIVE PROTEIN: 3.55 MG/DL (ref 0–1)
CALCIUM SERPL-MCNC: 9.7 MG/DL (ref 8.5–10.5)
CHLORIDE BLD-SCNC: 99 MEQ/L (ref 98–111)
CO2: 28 MEQ/L (ref 23–33)
CREAT SERPL-MCNC: 0.6 MG/DL (ref 0.4–1.2)
ERYTHROCYTE [DISTWIDTH] IN BLOOD BY AUTOMATED COUNT: 14.2 % (ref 11.5–14.5)
ERYTHROCYTE [DISTWIDTH] IN BLOOD BY AUTOMATED COUNT: 48.9 FL (ref 35–45)
GFR SERPL CREATININE-BSD FRML MDRD: > 90 ML/MIN/1.73M2
GLUCOSE BLD-MCNC: 85 MG/DL (ref 70–108)
HCT VFR BLD CALC: 40.8 % (ref 42–52)
HEMOGLOBIN: 12.7 GM/DL (ref 14–18)
MCH RBC QN AUTO: 29.3 PG (ref 26–33)
MCHC RBC AUTO-ENTMCNC: 31.1 GM/DL (ref 32.2–35.5)
MCV RBC AUTO: 94 FL (ref 80–94)
PLATELET # BLD: 363 THOU/MM3 (ref 130–400)
PMV BLD AUTO: 9 FL (ref 9.4–12.4)
POTASSIUM SERPL-SCNC: 3.9 MEQ/L (ref 3.5–5.2)
RBC # BLD: 4.34 MILL/MM3 (ref 4.7–6.1)
SODIUM BLD-SCNC: 141 MEQ/L (ref 135–145)
WBC # BLD: 8 THOU/MM3 (ref 4.8–10.8)

## 2019-08-20 PROCEDURE — 85027 COMPLETE CBC AUTOMATED: CPT

## 2019-08-20 PROCEDURE — 36415 COLL VENOUS BLD VENIPUNCTURE: CPT

## 2019-08-20 PROCEDURE — 86140 C-REACTIVE PROTEIN: CPT

## 2019-08-20 PROCEDURE — 80048 BASIC METABOLIC PNL TOTAL CA: CPT

## 2019-08-23 RX ORDER — CIPROFLOXACIN 500 MG/1
500 TABLET, FILM COATED ORAL 2 TIMES DAILY
Qty: 60 TABLET | Refills: 0 | Status: SHIPPED | OUTPATIENT
Start: 2019-08-23 | End: 2019-09-22

## 2019-08-23 RX ORDER — GREEN TEA/HOODIA GORDONII 315-12.5MG
1 CAPSULE ORAL 2 TIMES DAILY
Qty: 60 TABLET | Refills: 0 | Status: SHIPPED | OUTPATIENT
Start: 2019-08-23 | End: 2019-09-22

## 2019-08-23 RX ORDER — DOXYCYCLINE HYCLATE 100 MG/1
100 CAPSULE ORAL 2 TIMES DAILY
Qty: 60 CAPSULE | Refills: 0 | Status: SHIPPED | OUTPATIENT
Start: 2019-08-23 | End: 2019-09-22

## 2019-09-16 ENCOUNTER — HOSPITAL ENCOUNTER (OUTPATIENT)
Age: 29
Discharge: HOME | End: 2019-09-16
Payer: MEDICAID

## 2019-09-16 ENCOUNTER — HOSPITAL ENCOUNTER (OUTPATIENT)
Dept: WOUND CARE | Age: 29
Discharge: HOME | End: 2019-09-16
Payer: MEDICAID

## 2019-09-16 VITALS
HEART RATE: 89 BPM | SYSTOLIC BLOOD PRESSURE: 118 MMHG | TEMPERATURE: 98.2 F | RESPIRATION RATE: 16 BRPM | DIASTOLIC BLOOD PRESSURE: 79 MMHG

## 2019-09-16 DIAGNOSIS — L89.623 DECUBITUS ULCER OF LEFT HEEL, STAGE 3 (HCC): ICD-10-CM

## 2019-09-16 DIAGNOSIS — L89.313 PRESSURE INJURY OF RIGHT ISCHIUM, STAGE 3 (HCC): ICD-10-CM

## 2019-09-16 DIAGNOSIS — L89.513 DECUBITUS ULCER OF RIGHT ANKLE, STAGE 3 (HCC): ICD-10-CM

## 2019-09-16 DIAGNOSIS — L03.031 CELLULITIS OF GREAT TOE OF RIGHT FOOT: ICD-10-CM

## 2019-09-16 DIAGNOSIS — L89.892 PRESSURE ULCER OF TOE OF LEFT FOOT, STAGE 2 (HCC): ICD-10-CM

## 2019-09-16 DIAGNOSIS — F42.4 COMPULSIVE SKIN PICKING: ICD-10-CM

## 2019-09-16 DIAGNOSIS — Z79.2 LONG TERM (CURRENT) USE OF ANTIBIOTICS: ICD-10-CM

## 2019-09-16 DIAGNOSIS — L89.94 PRESSURE INJURY, STAGE 4, WITH INFECTION (HCC): ICD-10-CM

## 2019-09-16 DIAGNOSIS — B36.9 FUNGAL DERMATITIS: ICD-10-CM

## 2019-09-16 DIAGNOSIS — M86.652 OSTEOMYELITIS, CHRONIC, PELVIC REGION, LEFT (HCC): ICD-10-CM

## 2019-09-16 DIAGNOSIS — S91.101S OPEN WOUND OF RIGHT GREAT TOE, SEQUELA: ICD-10-CM

## 2019-09-16 DIAGNOSIS — L89.324 DECUBITUS ULCER OF LEFT ISCHIUM, STAGE 4 (HCC): ICD-10-CM

## 2019-09-16 DIAGNOSIS — M46.28 CHRONIC OSTEOMYELITIS OF COCCYX (HCC): Primary | ICD-10-CM

## 2019-09-16 DIAGNOSIS — L30.9 DERMATITIS DUE TO UNKNOWN CAUSE: ICD-10-CM

## 2019-09-16 DIAGNOSIS — S31.109A WOUND OF ABDOMEN: ICD-10-CM

## 2019-09-16 DIAGNOSIS — L08.9 PRESSURE INJURY, STAGE 4, WITH INFECTION (HCC): ICD-10-CM

## 2019-09-16 LAB
ANION GAP SERPL CALCULATED.3IONS-SCNC: 14 MEQ/L (ref 8–16)
BUN BLDV-MCNC: 8 MG/DL (ref 7–22)
C-REACTIVE PROTEIN: 4.98 MG/DL (ref 0–1)
CALCIUM SERPL-MCNC: 9.8 MG/DL (ref 8.5–10.5)
CHLORIDE BLD-SCNC: 101 MEQ/L (ref 98–111)
CO2: 26 MEQ/L (ref 23–33)
CREAT SERPL-MCNC: 0.6 MG/DL (ref 0.4–1.2)
ERYTHROCYTE [DISTWIDTH] IN BLOOD BY AUTOMATED COUNT: 13.9 % (ref 11.5–14.5)
ERYTHROCYTE [DISTWIDTH] IN BLOOD BY AUTOMATED COUNT: 46.8 FL (ref 35–45)
GFR SERPL CREATININE-BSD FRML MDRD: > 90 ML/MIN/1.73M2
GLUCOSE BLD-MCNC: 87 MG/DL (ref 70–108)
HCT VFR BLD CALC: 40.9 % (ref 42–52)
HEMOGLOBIN: 12.7 GM/DL (ref 14–18)
MCH RBC QN AUTO: 28.9 PG (ref 26–33)
MCHC RBC AUTO-ENTMCNC: 31.1 GM/DL (ref 32.2–35.5)
MCV RBC AUTO: 93 FL (ref 80–94)
PLATELET # BLD: 383 THOU/MM3 (ref 130–400)
PMV BLD AUTO: 8.6 FL (ref 9.4–12.4)
POTASSIUM SERPL-SCNC: 4.4 MEQ/L (ref 3.5–5.2)
RBC # BLD: 4.4 MILL/MM3 (ref 4.7–6.1)
SODIUM BLD-SCNC: 141 MEQ/L (ref 135–145)
WBC # BLD: 10 THOU/MM3 (ref 4.8–10.8)

## 2019-09-16 PROCEDURE — 36415 COLL VENOUS BLD VENIPUNCTURE: CPT

## 2019-09-16 PROCEDURE — 80048 BASIC METABOLIC PNL TOTAL CA: CPT

## 2019-09-16 PROCEDURE — 85027 COMPLETE CBC AUTOMATED: CPT

## 2019-09-16 PROCEDURE — 2709999900 HC NON-CHARGEABLE SUPPLY

## 2019-09-16 PROCEDURE — 17250 CHEM CAUT OF GRANLTJ TISSUE: CPT | Performed by: NURSE PRACTITIONER

## 2019-09-16 PROCEDURE — 86140 C-REACTIVE PROTEIN: CPT

## 2019-09-16 PROCEDURE — 99213 OFFICE O/P EST LOW 20 MIN: CPT | Performed by: NURSE PRACTITIONER

## 2019-09-16 PROCEDURE — 17250 CHEM CAUT OF GRANLTJ TISSUE: CPT

## 2019-09-16 PROCEDURE — 6370000000 HC RX 637 (ALT 250 FOR IP): Performed by: NURSE PRACTITIONER

## 2019-09-16 RX ADMIN — SILVER NITRATE APPLICATORS 1 EACH: 25; 75 STICK TOPICAL at 14:17

## 2019-09-16 ASSESSMENT — PAIN SCALES - GENERAL: PAINLEVEL_OUTOF10: 3

## 2019-09-16 ASSESSMENT — PAIN DESCRIPTION - PAIN TYPE: TYPE: CHRONIC PAIN

## 2019-09-16 ASSESSMENT — PAIN DESCRIPTION - LOCATION: LOCATION: SHOULDER

## 2019-09-16 NOTE — PROGRESS NOTES
1:28 PM   Wound Volume (cm^3) 0.46 cm^3 9/16/2019  1:28 PM   Wound Healing % 23 9/16/2019  1:28 PM   Distance Tunneling (cm) 0 cm 3/20/2019  3:08 PM   Tunneling Position ___ O'Clock 12 9/16/2019  1:28 PM   Undermining Starts ___ O'Clock 4.6 9/16/2019  1:28 PM   Wound Assessment Pink 9/16/2019  1:28 PM   Drainage Amount Moderate 9/16/2019  1:28 PM   Drainage Description Serosanguinous;Green 9/16/2019  1:28 PM   Odor None 9/16/2019  1:28 PM   Margins Attached edges; Unattached edges 9/16/2019  1:28 PM   Kiana-wound Assessment White;Pink;Maceration 9/16/2019  1:28 PM   Anderson%Wound Bed 100 9/16/2019  1:28 PM   Red%Wound Bed 100 8/14/2019  2:29 PM   Yellow%Wound Bed 50 5/17/2019  1:51 PM   Number of days: 335       Wound 11/26/18 Buttocks Right #3 (Active)   Wound Image   12/17/2018  1:57 PM   Dressing Status Intact 12/17/2018  1:57 PM   Dressing Changed Changed/New 12/17/2018  1:57 PM   Wound Cleansed Rinsed/Irrigated with saline 12/17/2018  1:57 PM   Wound Length (cm) 0 cm 1/23/2019  1:23 PM   Wound Width (cm) 0 cm 1/23/2019  1:23 PM   Wound Depth (cm) 0 cm 1/23/2019  1:23 PM   Wound Surface Area (cm^2) 0 cm^2 1/23/2019  1:23 PM   Change in Wound Size % (l*w) 100 1/23/2019  1:23 PM   Wound Volume (cm^3) 0 cm^3 1/23/2019  1:23 PM   Wound Healing % 100 1/23/2019  1:23 PM   Wound Assessment Yellow;Red;Pink;Epithelialization 12/17/2018  1:57 PM   Drainage Amount Small 12/17/2018  1:57 PM   Drainage Description Serosanguinous; Yellow 12/17/2018  1:57 PM   Odor None 12/17/2018  1:57 PM   Margins Attached edges 12/17/2018  1:57 PM   Kiana-wound Assessment Dry;Pink 12/17/2018  1:57 PM   Anderson%Wound Bed 10 12/17/2018  1:57 PM   Red%Wound Bed 10 12/17/2018  1:57 PM   Yellow%Wound Bed 70 12/17/2018  1:57 PM   Other%Wound Bed 10 epi 12/17/2018  1:57 PM   Number of days: 300       Wound 03/01/19 Ischium Right #6 (Active)   Wound Image   8/14/2019  2:29 PM   Wound Pressure Stage  4 3/1/2019  1:53 PM   Dressing Status Intact; Old drainage Albumin:  Lab Results   Component Value Date    LABALBU 3.3 05/17/2019     Sed Rate:  Lab Results   Component Value Date    SEDRATE 1 02/14/2015     CRP:   Lab Results   Component Value Date    CRP 4.98 09/16/2019     Micro:   Lab Results   Component Value Date    BC No growth-preliminary  No growth   03/01/2019      Hemoglobin A1C:   Lab Results   Component Value Date    LABA1C 5.0 07/23/2017       Assessment:     Chronic osteomyelitis of coccyx  Chronic osteomyelitis of left ischium  Pressure ulcer/injury coccyx, stage 4  Pressure ulcer/injury of left ischium, stage 4  Pressure ulcer/injury of left buttock, unstageable-healed  Pressure ulcer/injury of right ischium, stage 3-healed  Pressure ulcer/injury right ankle, stage 3  Wound of abdomen, partial thickness  Compulsive skin picking  Long term use of antibiotics  Moderate malnutrition     Contributing Factors: decreased mobility, chronic pressure, smoking, nonadherence, and malnutrition    Patient Active Problem List   Diagnosis Code    Paraplegia ([facility] [address] G82.20    Chronic pain syndrome G89.4    Sepsis secondary to UTI ([facility] [address] A41.9, N39.0    Neurogenic bladder N31.9    Mood disorder due to known physiological condition with depressive features F06.31    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    Pressure ulcer of coccygeal region, stage 4 ([facility]) L89.154    Wound of back S21.209A    E coli bacteremia R78.81    Right nephrolithiasis N20.0    Hypokalemia E87.6    Right ureteral stone N20.1    Decubitus ulcer of left ischium, stage 4 ([facility]) L89.324    Decubitus ulcer of left heel, stage 3 ([facility]) U31.378    Spasticity L89.4    Self-inflicted injury U48.97    Compulsive skin picking L98.1    Wound of abdomen S31.109A    Colostomy care ([facility]) Z43.3    Chest pain R07.9    Altered mental status R41.82    Depression F32.9    Bacteria in urine R82.71    Suicidal thoughts R45.851    Decubitus ulcer of right ankle, stage 3 ([facility]) L89.513    Pressure injury of right ischium, stage 3 ([facility]) L89.313    Cellulitis of great toe of right foot L03.031    Fungal dermatitis B36.9    Open wound of right great toe S91.101A    Pressure ulcer of toe of left foot, stage 2 L89.892    Dermatitis due to unknown cause L30.9    Pressure injury of left buttock, unstageable ([facility]) L89.320    Pressure injury of right heel, stage 3 ([facility]) L89.613    Pressure injury, stage 4, with infection ([facility]) L89.94, L08.9    Cellulitis L03.90    Excoriation of abdomen S30.811A    Pressure injury of sacral region, stage 4 ([facility]) L89.154    Moderate malnutrition ([facility]) E44.0    Chronic osteomyelitis of coccyx ([facility]) M46.28    Osteomyelitis, chronic, ischium, left ([facility]) P50.832    Long term (current) use of antibiotics Z79.2       Chemical Cautery    Performed by: RANJTI Pyle - CNP    Consent obtained? Yes    Time out taken: Yes    Tissue: friable [x] Hypergranulation     Pain Control:   insensate    Method: silver nitrate    Location of Chemical Cautery:   Wound/Ulcer #1 and 2    Procedural Pain: 0  / 10     Post Procedural Pain: 0 / 10     Response to treatment: Well tolerated by patient. Plan:     Patient examined and evaluated. The right ischium and left buttock ulcers are healed. The left ischium ulcer is larger and the coccyx ulcer is improved. He is tolerating his antibiotics well. Labs from 8/20/19 and 9/16/19 were reviewed: CRP is 4.98 (normal is less than 1)    Silver nitrate applied to the coccyx and left ischium ulcers hypergranulation    Continue Cipro and Doxycycline- prescription sent to pharmacy for an additional 30 days    Continue high protein diet    Low air loss mattress to bed     Recommended smoking cessation     Turn and reposition every 2 hours and as needed     Limit sitting to no more than 2 hours at one time     Off loading boots when in bed    Left ischium- Cleanse wounds with dakins solution and gauze. Pack with AMD ribbon.  Cover with gauze and ABD pad. Secure with tape. Change daily.     Coccyx- Cleanse wound with dakins solution and gauze. Pack dakins moist gauze into the wound and cover with a dry gauze, ABD pad, secure with tape. Change daily.     Right foot- Apply vaseline to the foot daily    Right second toe- Apply betadine to the toe daily    Right ankle- cleanse with saline and gauze cover with a bordered foam change 3 times per week    Treatment:   Orders Placed This Encounter   Procedures    CBC     Standing Status:   Future     Standing Expiration Date:   9/16/2020    Basic Metabolic Panel     Standing Status:   Future     Standing Expiration Date:   9/16/2020    C-Reactive Protein     Standing Status:   Future     Standing Expiration Date:   9/16/2020    CBC     Standing Status:   Future     Standing Expiration Date:   9/16/2020    Basic Metabolic Panel     Standing Status:   Future     Standing Expiration Date:   9/16/2020    C-Reactive Protein     Standing Status:   Future     Standing Expiration Date:   9/16/2020         Antibiotics: Yes    Follow up: 4 weeks    Please see attached Discharge Instructions    Written patient dismissal instructions given to patient and signed by patient or POA. Discharge Instructions       Visit Discharge/Physician Orders:   - [place] Railing down at least twice daily for a couple hours to limit time in chair. Do not sit for more than 2 hrs at a time.  - Reposition yourself in chair and bed every 1-2 hours.    - Off load heels. Wear off loading boots when laying down or sitting in chair.  - Increase amount of food you are eating. Increase protein. Try protein shakes/shots (recommend 3 a day). Eat eggs. Need 80-90 grams daily to heal.  - Wear gloves to help stop scratching. Do not smoke with gloves on.   - Do not wear jeans daily or shoes.   - Try to cut back on vaping.  - Have labs drawn as soon as possible and before appointment in 1 month- CBC, BMP, CRP (will call in more antibiotic once lab work

## 2019-09-20 RX ORDER — CIPROFLOXACIN 500 MG/1
500 TABLET, FILM COATED ORAL 2 TIMES DAILY
Qty: 60 TABLET | Refills: 1 | Status: SHIPPED | OUTPATIENT
Start: 2019-09-20 | End: 2019-10-20

## 2019-09-20 RX ORDER — DOXYCYCLINE HYCLATE 100 MG/1
100 CAPSULE ORAL 2 TIMES DAILY
Qty: 60 CAPSULE | Refills: 1 | Status: SHIPPED | OUTPATIENT
Start: 2019-09-20 | End: 2019-10-20

## 2019-09-20 RX ORDER — GREEN TEA/HOODIA GORDONII 315-12.5MG
1 CAPSULE ORAL 2 TIMES DAILY
Qty: 60 TABLET | Refills: 1 | Status: SHIPPED | OUTPATIENT
Start: 2019-09-20 | End: 2019-10-20

## 2019-10-03 DIAGNOSIS — G89.21 CHRONIC PAIN DUE TO TRAUMA: ICD-10-CM

## 2019-10-03 DIAGNOSIS — G89.4 CHRONIC PAIN SYNDROME: ICD-10-CM

## 2019-10-03 DIAGNOSIS — F32.2 SEVERE SINGLE CURRENT EPISODE OF MAJOR DEPRESSIVE DISORDER, WITHOUT PSYCHOTIC FEATURES (HCC): ICD-10-CM

## 2019-10-03 RX ORDER — VENLAFAXINE HYDROCHLORIDE 150 MG/1
150 CAPSULE, EXTENDED RELEASE ORAL DAILY
Qty: 30 CAPSULE | Refills: 5 | Status: SHIPPED | OUTPATIENT
Start: 2019-10-03 | End: 2020-03-20

## 2019-10-03 RX ORDER — IBUPROFEN 800 MG/1
800 TABLET ORAL 3 TIMES DAILY PRN
Qty: 84 TABLET | Refills: 1 | Status: SHIPPED | OUTPATIENT
Start: 2019-10-03 | End: 2019-11-27 | Stop reason: SDUPTHER

## 2019-10-10 ENCOUNTER — OFFICE VISIT (OUTPATIENT)
Dept: FAMILY MEDICINE CLINIC | Age: 29
End: 2019-10-10
Payer: MEDICAID

## 2019-10-10 VITALS
DIASTOLIC BLOOD PRESSURE: 79 MMHG | RESPIRATION RATE: 10 BRPM | SYSTOLIC BLOOD PRESSURE: 118 MMHG | HEART RATE: 80 BPM | TEMPERATURE: 98.1 F

## 2019-10-10 DIAGNOSIS — G82.20 PARAPLEGIA (HCC): ICD-10-CM

## 2019-10-10 DIAGNOSIS — F06.31 MOOD DISORDER DUE TO KNOWN PHYSIOLOGICAL CONDITION WITH DEPRESSIVE FEATURES: ICD-10-CM

## 2019-10-10 DIAGNOSIS — M79.602 LEFT ARM PAIN: ICD-10-CM

## 2019-10-10 DIAGNOSIS — R11.0 NAUSEA: ICD-10-CM

## 2019-10-10 DIAGNOSIS — G89.4 CHRONIC PAIN SYNDROME: Primary | ICD-10-CM

## 2019-10-10 PROCEDURE — 99213 OFFICE O/P EST LOW 20 MIN: CPT | Performed by: FAMILY MEDICINE

## 2019-10-10 RX ORDER — ONDANSETRON 4 MG/1
4 TABLET, ORALLY DISINTEGRATING ORAL 3 TIMES DAILY PRN
Qty: 90 TABLET | Refills: 5 | Status: SHIPPED | OUTPATIENT
Start: 2019-10-10 | End: 2021-01-25

## 2019-10-10 RX ORDER — ARIPIPRAZOLE 5 MG/1
5 TABLET ORAL EVERY EVENING
Qty: 30 TABLET | Refills: 5 | Status: SHIPPED | OUTPATIENT
Start: 2019-10-10 | End: 2020-01-13

## 2019-10-14 ENCOUNTER — TELEPHONE (OUTPATIENT)
Dept: FAMILY MEDICINE CLINIC | Age: 29
End: 2019-10-14

## 2019-10-14 DIAGNOSIS — G82.20 PARAPLEGIA (HCC): ICD-10-CM

## 2019-10-15 ENCOUNTER — TELEPHONE (OUTPATIENT)
Dept: FAMILY MEDICINE CLINIC | Age: 29
End: 2019-10-15

## 2019-10-15 DIAGNOSIS — G82.20 PARAPLEGIA (HCC): ICD-10-CM

## 2019-10-16 ENCOUNTER — TELEPHONE (OUTPATIENT)
Dept: WOUND CARE | Age: 29
End: 2019-10-16

## 2019-10-31 RX ORDER — ERGOCALCIFEROL 1.25 MG/1
CAPSULE ORAL
Qty: 4 CAPSULE | Refills: 3 | Status: SHIPPED | OUTPATIENT
Start: 2019-10-31 | End: 2020-02-19

## 2019-11-01 ENCOUNTER — HOSPITAL ENCOUNTER (OUTPATIENT)
Dept: WOUND CARE | Age: 29
Discharge: HOME | End: 2019-11-01
Payer: MEDICAID

## 2019-11-01 VITALS
DIASTOLIC BLOOD PRESSURE: 84 MMHG | SYSTOLIC BLOOD PRESSURE: 137 MMHG | TEMPERATURE: 98.3 F | HEART RATE: 78 BPM | RESPIRATION RATE: 16 BRPM | BODY MASS INDEX: 19.96 KG/M2 | WEIGHT: 139.1 LBS

## 2019-11-01 DIAGNOSIS — B36.9 FUNGAL DERMATITIS: ICD-10-CM

## 2019-11-01 DIAGNOSIS — L89.623 DECUBITUS ULCER OF LEFT HEEL, STAGE 3 (HCC): ICD-10-CM

## 2019-11-01 DIAGNOSIS — L03.031 CELLULITIS OF GREAT TOE OF RIGHT FOOT: ICD-10-CM

## 2019-11-01 DIAGNOSIS — L08.9 PRESSURE INJURY, STAGE 4, WITH INFECTION (HCC): ICD-10-CM

## 2019-11-01 DIAGNOSIS — S91.101S OPEN WOUND OF RIGHT GREAT TOE, SEQUELA: ICD-10-CM

## 2019-11-01 DIAGNOSIS — F42.4 COMPULSIVE SKIN PICKING: ICD-10-CM

## 2019-11-01 DIAGNOSIS — L97.512 SKIN ULCER OF SECOND TOE OF RIGHT FOOT WITH FAT LAYER EXPOSED (HCC): ICD-10-CM

## 2019-11-01 DIAGNOSIS — L89.324 DECUBITUS ULCER OF LEFT ISCHIUM, STAGE 4 (HCC): ICD-10-CM

## 2019-11-01 DIAGNOSIS — L89.313 PRESSURE INJURY OF RIGHT ISCHIUM, STAGE 3 (HCC): ICD-10-CM

## 2019-11-01 DIAGNOSIS — L89.513 DECUBITUS ULCER OF RIGHT ANKLE, STAGE 3 (HCC): ICD-10-CM

## 2019-11-01 DIAGNOSIS — S31.109A WOUND OF ABDOMEN: ICD-10-CM

## 2019-11-01 DIAGNOSIS — L89.94 PRESSURE INJURY, STAGE 4, WITH INFECTION (HCC): ICD-10-CM

## 2019-11-01 DIAGNOSIS — L89.892 PRESSURE ULCER OF TOE OF LEFT FOOT, STAGE 2 (HCC): ICD-10-CM

## 2019-11-01 DIAGNOSIS — L30.9 DERMATITIS DUE TO UNKNOWN CAUSE: ICD-10-CM

## 2019-11-01 PROBLEM — S91.101A OPEN WOUND OF RIGHT GREAT TOE: Status: RESOLVED | Noted: 2018-08-10 | Resolved: 2019-11-01

## 2019-11-01 PROCEDURE — 17250 CHEM CAUT OF GRANLTJ TISSUE: CPT | Performed by: NURSE PRACTITIONER

## 2019-11-01 PROCEDURE — 99214 OFFICE O/P EST MOD 30 MIN: CPT | Performed by: NURSE PRACTITIONER

## 2019-11-01 PROCEDURE — 2709999900 HC NON-CHARGEABLE SUPPLY

## 2019-11-01 PROCEDURE — 6370000000 HC RX 637 (ALT 250 FOR IP): Performed by: NURSE PRACTITIONER

## 2019-11-01 PROCEDURE — 99213 OFFICE O/P EST LOW 20 MIN: CPT

## 2019-11-01 RX ADMIN — SILVER NITRATE APPLICATORS 4 EACH: 25; 75 STICK TOPICAL at 15:46

## 2019-11-06 ENCOUNTER — OFFICE VISIT (OUTPATIENT)
Dept: UROLOGY | Age: 29
End: 2019-11-06
Payer: MEDICAID

## 2019-11-06 VITALS
WEIGHT: 130 LBS | SYSTOLIC BLOOD PRESSURE: 128 MMHG | DIASTOLIC BLOOD PRESSURE: 76 MMHG | HEIGHT: 70 IN | BODY MASS INDEX: 18.61 KG/M2

## 2019-11-06 DIAGNOSIS — Q53.112 UNILATERAL INGUINAL TESTIS: Primary | ICD-10-CM

## 2019-11-06 DIAGNOSIS — N31.9 NEUROGENIC BLADDER: ICD-10-CM

## 2019-11-06 PROCEDURE — 99213 OFFICE O/P EST LOW 20 MIN: CPT | Performed by: NURSE PRACTITIONER

## 2019-11-07 ENCOUNTER — TELEPHONE (OUTPATIENT)
Dept: UROLOGY | Age: 29
End: 2019-11-07

## 2019-11-20 ENCOUNTER — TELEPHONE (OUTPATIENT)
Dept: WOUND CARE | Age: 29
End: 2019-11-20

## 2019-12-05 ENCOUNTER — TELEPHONE (OUTPATIENT)
Dept: FAMILY MEDICINE CLINIC | Age: 29
End: 2019-12-05

## 2020-01-09 ENCOUNTER — HOSPITAL ENCOUNTER (OUTPATIENT)
Dept: WOUND CARE | Age: 30
Discharge: HOME | End: 2020-01-09
Payer: MEDICAID

## 2020-01-09 VITALS
DIASTOLIC BLOOD PRESSURE: 66 MMHG | RESPIRATION RATE: 16 BRPM | HEART RATE: 90 BPM | TEMPERATURE: 98.4 F | OXYGEN SATURATION: 100 % | SYSTOLIC BLOOD PRESSURE: 130 MMHG

## 2020-01-09 PROCEDURE — 17250 CHEM CAUT OF GRANLTJ TISSUE: CPT | Performed by: NURSE PRACTITIONER

## 2020-01-09 PROCEDURE — 17250 CHEM CAUT OF GRANLTJ TISSUE: CPT

## 2020-01-09 PROCEDURE — 2709999900 HC NON-CHARGEABLE SUPPLY

## 2020-01-09 PROCEDURE — 6370000000 HC RX 637 (ALT 250 FOR IP): Performed by: NURSE PRACTITIONER

## 2020-01-09 PROCEDURE — 99214 OFFICE O/P EST MOD 30 MIN: CPT | Performed by: NURSE PRACTITIONER

## 2020-01-09 RX ADMIN — SILVER NITRATE APPLICATORS 1 EACH: 25; 75 STICK TOPICAL at 15:31

## 2020-01-13 ENCOUNTER — OFFICE VISIT (OUTPATIENT)
Dept: FAMILY MEDICINE CLINIC | Age: 30
End: 2020-01-13
Payer: MEDICAID

## 2020-01-13 VITALS
HEART RATE: 92 BPM | SYSTOLIC BLOOD PRESSURE: 104 MMHG | TEMPERATURE: 98.6 F | DIASTOLIC BLOOD PRESSURE: 62 MMHG | RESPIRATION RATE: 12 BRPM

## 2020-01-13 PROCEDURE — 99214 OFFICE O/P EST MOD 30 MIN: CPT | Performed by: FAMILY MEDICINE

## 2020-01-13 RX ORDER — VENLAFAXINE HYDROCHLORIDE 75 MG/1
75 CAPSULE, EXTENDED RELEASE ORAL DAILY
Qty: 30 CAPSULE | Refills: 5 | Status: SHIPPED | OUTPATIENT
Start: 2020-01-13 | End: 2020-06-12

## 2020-01-13 RX ORDER — PANTOPRAZOLE SODIUM 40 MG/1
40 TABLET, DELAYED RELEASE ORAL
Qty: 90 TABLET | Refills: 1 | Status: SHIPPED | OUTPATIENT
Start: 2020-01-13 | End: 2020-06-12

## 2020-01-13 RX ORDER — AMANTADINE HYDROCHLORIDE 100 MG/1
100 CAPSULE, GELATIN COATED ORAL DAILY
Qty: 60 CAPSULE | Refills: 5 | Status: SHIPPED | OUTPATIENT
Start: 2020-01-13 | End: 2020-12-29 | Stop reason: SDUPTHER

## 2020-01-13 NOTE — PROGRESS NOTES
Helen Bradshaw is a 34 y.o. 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 Patient states that he is not taking this because he 'didn't feel right'. Feels like his mood recently is 'good'. Sleep is 'decent'. Does note some anhedonia. Parents note that he is not very active. He is still picking at his skin at times. No SI. States that he is having some intermittent bloating of the abdomen. States that it will look like his stomach is swelling. No abd pain, but does not have sensation there. He is having recurring N/V. Has about 1 emesis at least every other day. Has to take the Zofran consistently to help wit this. No GERD sx, heartburn, erudication.       Health Maintenance   Topic Date Due    Varicella Vaccine (1 of 2 - 2-dose childhood series) 07/17/1993    DTaP/Tdap/Td vaccine (1 - Tdap) 07/17/2003    HIV screen  07/17/2007    Flu vaccine (1) 09/01/2019    HPV vaccine  Aged Out    Pneumococcal 0-64 years Vaccine  Aged Out       Past Medical History:   Diagnosis Date    Depression     Fracture of lower leg     Hyperthyroidism 9/2014    MDRO (multiple drug resistant organisms) resistance     MRSA LUNGS    Pneumonia        Past Surgical History:   Procedure Laterality Date    APPENDECTOMY      BACK SURGERY  11/2016    BRAIN SURGERY  11/05/2016    CLOT REMOVED    CYSTOSCOPY  04/17/2017    FACIAL RECONSTRUCTION SURGERY  2012    left zygomatic arch and sinus    FRACTURE SURGERY Left 11/2016    HARDWARE REMOVAL  2012    left zygomatic arch    OTHER SURGICAL HISTORY 01/09/2017    Laparoscopic Diverting Colostomy    OTHER SURGICAL HISTORY  01/09/2017    Excisional Debridment Sacral Decubitus Ulcer    OTHER SURGICAL HISTORY  04/17/2017    Placement of suprapubic catheter    DC OFFICE/OUTPT VISIT,PROCEDURE ONLY Right 10/5/2018    RIGHT HALLUX AMPUTATION performed by Audrey Milton DPM at [address] [place] [address] TOE AMPUTATION Right     great toe     TONSILLECTOMY         Social History     Tobacco Use    Smoking status: Former Smoker     Packs/day: 0.25     Years: 10.00     Pack years: 2.50     Types: Cigarettes, Cigars    Smokeless tobacco: Never Used   Substance Use Topics    Alcohol use: No    Drug use: Yes     Types: Marijuana       Family History   Problem Relation Age of Onset    Heart Disease Mother     Heart Disease Father          I have reviewed the patient's past medical history, past surgical history, allergies, medications, social and family history and I have made updates where appropriate.     Review of Systems        PHYSICAL EXAM:  /62   Pulse 92   Temp 98.6 °F (37 °C) (Oral)   Resp 12     General Appearance: well developed and well- nourished, in no acute distress  Head: normocephalic and atraumatic  ENT: external ear and ear canal normal bilaterally, nose without deformity, nasal mucosa and turbinates normal without polyps, oropharynx normal, dentition is normal for age, no lipor gum lesions noted  Neck: supple and non-tender without mass, no thyromegaly or thyroid nodules, no cervical lymphadenopathy  Pulmonary/Chest: clear to auscultation bilaterally- no wheezes, rales or rhonchi, normal air movement, no respiratory distress or retractions  Cardiovascular: normal rate, regular rhythm, normal S1 and S2, no murmurs, rubs, clicks, or gallops, distal pulses intact  Abdomen: soft, non-tender, non-distended, no rebound or guarding, no masses or hernias noted, no hepatospleenomegaly, colostomy in place  Extremities: no cyanosis, clubbing or edema of the

## 2020-01-16 ENCOUNTER — TELEPHONE (OUTPATIENT)
Dept: FAMILY MEDICINE CLINIC | Age: 30
End: 2020-01-16

## 2020-01-16 NOTE — TELEPHONE ENCOUNTER
I spoke with the pts dad Eugene Arias who stated that he spoke with someone @ PT who told him that the order for PT  after 3 months and a new order was needed   I called [facility] @ [phone] and spoke with Mckenzie Honeycutt who verified that a new order was needed   Please advise

## 2020-01-16 NOTE — TELEPHONE ENCOUNTER
Thanks  [facility] to contact pt to get schedule  Mane Rizzo (carlie) has been notified

## 2020-01-19 NOTE — PROGRESS NOTES
feel.  Wound/Ulcer Pain Timing/Severity: none  Quality of pain: N/A  Severity:  0 / 10   Modifying Factors: None  Associated Signs/Symptoms: drainage    Interval History:   Patient presents today for follow up on wound/ulcer's progression. The patient is currently not on antibiotics. Current dressing care includes:    Right 2nd distal toe and right lateral ankle- betadine daily.      Left ischium- Cleanse wounds with dakins solution and gauze. Pack with AMD ribbon. Cover with gauze and ABD pad. Secure with tape. Change every three days .     Coccyx- Cleanse wound with dakins solution and gauze. Pack dakins moist gauze into the wound and cover with a dry gauze, ABD pad, secure with tape. Change daily.  For a couple of weeks then change back to packing with alginate.     Abdomen Apply vaseline daily        PAST MEDICAL HISTORY        Diagnosis Date    Depression     Fracture of lower leg     Hyperthyroidism 9/2014    MDRO (multiple drug resistant organisms) resistance     MRSA LUNGS    Pneumonia        PAST SURGICAL HISTORY    Past Surgical History:   Procedure Laterality Date    APPENDECTOMY      BACK SURGERY  11/2016    BRAIN SURGERY  11/05/2016    CLOT REMOVED    CYSTOSCOPY  04/17/2017    FACIAL RECONSTRUCTION SURGERY  2012    left zygomatic arch and sinus    FRACTURE SURGERY Left 11/2016    HARDWARE REMOVAL  2012    left zygomatic arch    OTHER SURGICAL HISTORY  01/09/2017    Laparoscopic Diverting Colostomy    OTHER SURGICAL HISTORY  01/09/2017    Excisional Debridment Sacral Decubitus Ulcer    OTHER SURGICAL HISTORY  04/17/2017    Placement of suprapubic catheter    [place] OFFICE/OUTPT VISIT,PROCEDURE ONLY Right 10/5/2018    RIGHT HALLUX AMPUTATION performed by Emilia Mcwilliams DPM at [facility] TOE AMPUTATION Right     great toe     TONSILLECTOMY         FAMILY HISTORY    Family History   Problem Relation Age of Onset    Heart Disease Mother     Heart Disease Father        SOCIAL HISTORY    Social History     Tobacco Use    Smoking status: Former Smoker     Packs/day: 0.25     Years: 10.00     Pack years: 2.50     Types: Cigarettes, Cigars    Smokeless tobacco: Never Used   Substance Use Topics    Alcohol use: No    Drug use: Yes     Types: Marijuana       ALLERGIES    Allergies   Allergen Reactions    Bee Venom Shortness Of Breath    Tramadol Hives       MEDICATIONS    Current Outpatient Medications on File Prior to Encounter   Medication Sig Dispense Refill    hydrOXYzine (ATARAX) 50 MG tablet TAKE 1 TABLET BY MOUTH THREE TIMES A DAY AS NEEDED FOR ITCHING 270 tablet 3    baclofen (LIORESAL) 20 MG tablet TAKE 1 TABLET BY MOUTH 3 TIMES DAILY 270 tablet 3    busPIRone (BUSPAR) 10 MG tablet TAKE 1 TABLET BY MOUTH THREE TIMES A  tablet 3    ibuprofen (ADVIL;MOTRIN) 800 MG tablet TAKE 1 TABLET BY MOUTH 3 TIMES DAILY AS NEEDED FOR PAIN 90 tablet 2    traZODone (DESYREL) 100 MG tablet TAKE 2 TABLETS BY MOUTH AT BEDTIME AS NEEDED FOR SLEEP 60 tablet 3    vitamin D (ERGOCALCIFEROL) 1.25 MG (55415 UT) CAPS capsule TAKE 1 CAPSULE BY MOUTH EVERY 7 DAYS 4 capsule 3    Misc. Devices (BATH/SHOWER SEAT) MISC Shower Chair on wheels 1 each 0    ondansetron (ZOFRAN-ODT) 4 MG disintegrating tablet Take 1 tablet by mouth 3 times daily as needed for Nausea or Vomiting 90 tablet 5    venlafaxine (EFFEXOR XR) 150 MG extended release capsule TAKE 1 CAPSULE BY MOUTH DAILY 30 capsule 5    ferrous sulfate 325 (65 Fe) MG tablet TAKE 1 TABLET BY MOUTH TWICE A DAY WITH MEALS 180 tablet 3    gabapentin (NEURONTIN) 300 MG capsule TAKE 1 CAPSULE TWICE A DAY AND TAKE 2 CAPSULES AT BEDTIME 360 capsule 1    cyclobenzaprine (FLEXERIL) 10 MG tablet TAKE 1 TABLET BY MOUTH 3 TIMES DAILY 270 tablet 1    ondansetron (ZOFRAN) 4 MG tablet Take 1 tablet by mouth every 8 hours as needed for Nausea or Vomiting 90 tablet 5    Misc. Devices MISC Protinex X shots, use TID.  90 each 5    fluocinonide (LIDEX) 0.05 % cream Apply topically 2 times daily. 60 g 5    docusate sodium (DOCQLACE) 100 MG capsule Take 1 capsule by mouth 2 times daily 60 capsule 5    albuterol sulfate HFA (VENTOLIN HFA) 108 (90 Base) MCG/ACT inhaler Inhale 2 puffs into the lungs every 6 hours as needed for Wheezing 1 Inhaler 3    Disposable Gloves (LATEX GLOVES MEDIUM) MISC Use gloves prn for personal care 10 each [address] by Does not apply route [facility] bed with 4 rails. 1 each 0    Misc. Devices (EXTENDABLE BEDSIDE RAIL) MISC For hospital bed 2 each 0    Incontinence Supply Disposable (DEPEND UNDERWEAR SM/MED) MISC Use depends prn for incontinence care 5 Package 3    Incontinence Supply Disposable (PREVAIL WET WIPES) MISC Use wet wipes prn for personal care 96 each 3    Elastic Bandages & Supports (T.E.D. KNEE LENGTH/M-REGULAR) MISC Use as directed 2 each 0    Elastic Bandages & Supports (JOBST ACTIVE 20-30MMHG MEDIUM) MISC Apply q daily 2 each 0    sodium hypochlorite (DAKINS) 0.125 % SOLN external solution Apply Dakin's moistened gauze to coccyx. Cover with dry gauze. Change twice a day.  1 Bottle 2    Elastic Bandages & Supports (JOBST KNEE HIGH COMPRESSION SM) MISC 1 each by Does not apply route daily 2 each 0    Incontinence Supplies (BEDSIDE DRAINAGE BAG) MISC Large 2000 cc bedside drainage bag 1 each 11    Incontinence Supplies (URINARY LEG BAG) MISC 900 cc [place] Urinary catheter leg bag 1 each 11    Incontinence Supplies (URINARY LEG BAG STRAPS) MISC Leg bag straps to go with urinary catheter leg bag 1 each 11    Catheters (FOLY CATHETER LUBRICIOUS) MISC 16 Fr 10 cc rodrigues 1 each 11    Lift Chair MISC by Does not apply route Use as directed 1 each 0    rizatriptan (MAXALT) 5 MG tablet Take 1 tablet by mouth once as needed for Migraine May repeat in 2 hours if needed 15 tablet 3    Respiratory Therapy Supplies (NEBULIZER COMPRESSOR) KIT 1 kit by Does not apply route once for 1 dose 1 kit 0    EPINEPHrine (EPIPEN) 0.3 MG/0.3ML SOAJ injection Use as directed for allergic reaction 2 each 1     No current facility-administered medications on file prior to encounter.         REVIEW OF SYSTEMS    Constitutional: negative for fevers, chills, and sweats  Eyes: negative for irritation and redness  Ears, nose, mouth, throat, and face: negative for hearing loss and hoarseness  Respiratory: positive for cough; negative for shortness of breath  Cardiovascular: negative for chest pain, dyspnea and lower extremity edema  Gastrointestinal: positive for colostomy, negative for abdominal pain, nausea and vomiting  Genitourinary: positive for suprapubic catheter  Integument/breast: positive for coccyx ulcer, left ischium ulcer, and right ankle ulcer  Musculoskeletal: positive for paraplegia, history of amputation of right hallux  Neurological: negative for dizziness, speech problems and positive for paraplegia  Behavioral/Psych: positive for good mood    Objective:      /66   Pulse 90   Temp 98.4 °F (36.9 °C) (Oral)   Resp 16   SpO2 100%     Wt Readings from Last 3 Encounters:   11/06/19 130 lb (59 kg)   11/01/19 139 lb 1.6 oz (63.1 kg)   05/17/19 122 lb 14.4 oz (55.7 kg)       PHYSICAL EXAM    General Appearance: alert and oriented to person, place and time; pale   Skin: warm and dry  Head: normocephalic and atraumatic  Eyes: conjunctivae normal  ENT: hearing grossly normal bilaterally  Pulmonary/Chest: clear to auscultation bilaterally- no wheezes, rales or rhonchi, normal air movement, no respiratory distress  Cardiovascular: normal rate and regular rhythm  Abdomen: soft, non-tender, bowel sounds normal   Extremities: no cyanosis, no clubbing and no edema  Musculoskeletal: lower extremities contractures, paraplegia lower extremities, hammertoes to right foot, history of amputation of right hallux  Neurologic: speech normal and gait abnormal- wheelchair bound, frequent muscle spasms  Colostomy, LLQ: pouch intact  Abdomen: Multiple superficial scabbed areas noted scattered to the abdomen which are dry and scabbed.   Coccyx: pressure ulcer/injury stage 4- Measurements are stable. Ulcer bed is 50% pale pink/red, friable/hyper granulation tissue and 50% yellow slough with small islands of epithelization, ulcer edges are rolled and undermining noted on the 12:00 side which probes to bone. Draining moderate to large amount of serosanguineous drainage. Periulcer skin is scarred and intact. No warmth or induration noted. Silver nitrate applied to the ulcer bed. Left ischium: Pressure ulcer/injury stage 4. Ulcer is stable continues to have significant depth. Outer ulcer bed is 100% pink, unable to visualize ulcer bed. Draining moderate amount of serosanguinous drainage. Periulcer skin is scarred and intact. No warmth or induration noted. Silver nitrate applied to the ulcer bed. Right lateral malleolus: Pressure injury stage 3- Improved, measurements are smaller. Ulcer bed is scab/eschar which was removed exposed red ulcer bed. Moderate serosanguinous drainage. Periulcer skin is scarred and intact. No redness, warmth, or induration noted.        Coccyx    Left ischium    Right lateral ankle    Wound 03/12/17 Coccyx #1 (Active)   Dressing Changed Changed/New 1/9/2020  3:00 PM   Wound Cleansed Rinsed/Irrigated with saline 1/9/2020  3:00 PM   Wound Length (cm) 5.5 cm 1/9/2020  3:00 PM   Wound Width (cm) 5.4 cm 1/9/2020  3:00 PM   Wound Depth (cm) 1.3 cm 1/9/2020  3:00 PM   Wound Surface Area (cm^2) 29.7 cm^2 1/9/2020  3:00 PM   Change in Wound Size % (l*w) -465.71 1/9/2020  3:00 PM   Wound Volume (cm^3) 38.61 cm^3 1/9/2020  3:00 PM   Wound Healing % -1126 1/9/2020  3:00 PM   Undermining Starts ___ O'Clock 11 1/9/2020  3:00 PM   Undermining Ends___ O'Clock 3 1/9/2020  3:00 PM   Undermining Maxium Distance (cm) 1.9 1/9/2020  3:00 PM   Wound Assessment White;Yellow;Red 1/9/2020  3:00 PM   Drainage Amount Large 1/9/2020  3:00 PM 09/16/2019     BMP:   Lab Results   Component Value Date     09/16/2019    K 4.4 09/16/2019     09/16/2019    CO2 26 09/16/2019    PHOS 3.7 03/02/2019    BUN 8 09/16/2019    CREATININE 0.6 09/16/2019     PT/INR:   Lab Results   Component Value Date    PROTIME 14.1 01/09/2017    INR 1.22 01/09/2017     Prealbumin:   Lab Results   Component Value Date    PREALBUMIN 19.3 03/06/2017     Albumin:  Lab Results   Component Value Date    LABALBU 3.3 05/17/2019     Sed Rate:  Lab Results   Component Value Date    SEDRATE 1 02/14/2015     CRP:   Lab Results   Component Value Date    CRP 4.98 09/16/2019     Micro:   Lab Results   Component Value Date    BC No growth-preliminary  No growth   03/01/2019      Hemoglobin A1C:   Lab Results   Component Value Date    LABA1C 5.0 07/23/2017       Assessment:     Pressure ulcer/injury coccyx, stage 4  Pressure ulcer/injury of left ischium, stage 4  Pressure ulcer/injury right ankle, stage 3  Wound of abdomen, partial thickness  Compulsive skin picking  Moderate malnutrition     Contributing Factors: decreased mobility, chronic pressure, smoking, nonadherence, and malnutrition    Patient Active Problem List   Diagnosis Code    Paraplegia ([facility]) G82.20    Chronic pain syndrome G89.4    Sepsis secondary to UTI ([facility] A41.9, N39.0    Neurogenic bladder N31.9    Mood disorder due to known physiological condition with depressive features F06.31    Peristomal skin complication 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.5    Pressure ulcer of coccygeal region, stage 4 ([facility]) L89.154    Wound of back S21.209A    E coli bacteremia R78.81    Right nephrolithiasis N20.0    Hypokalemia E87.6    Right ureteral stone N20.1    Decubitus ulcer of left ischium, stage 4 ([facility]) L89.324    Decubitus ulcer of left heel, stage 3 ([facility]) L89.623    Spasticity T09.0    Self-inflicted injury L51.68    Compulsive skin picking L98.1    Wound of abdomen S31.109A    Colostomy care ([facility]) Z43.3    Chest pain R07.9    Altered mental status R41.82    Depression F32.9    Bacteria in urine R82.71    Suicidal thoughts R45.851    Decubitus ulcer of right ankle, stage 3 ([facility]) L89.513    Pressure injury of right ischium, stage 3 ([facility]) L89.313    Fungal dermatitis B36.9    Pressure ulcer of toe of left foot, stage 2 ([facility]) X40.728    Dermatitis due to unknown cause L30.9    Pressure injury of left buttock, unstageable ([facility]) L89.320    Pressure injury of right heel, stage 3 ([facility]) L89.613    Pressure injury, stage 4, with infection ([facility]) L89.94, L08.9    Cellulitis L03.90    Excoriation of abdomen S30.811A    Pressure injury of sacral region, stage 4 ([facility]) L89.154    Moderate malnutrition ([facility]) E44.0    Chronic osteomyelitis of coccyx ([facility]) M46.28    Osteomyelitis, chronic, ischium, left ([facility]) M86.652    Long term (current) use of antibiotics Z79.2    Skin ulcer of second toe of right foot with fat layer exposed ([address] L97.512    Unilateral inguinal testis Q53.112       Chemical Cautery    Performed by: RANJIT Iyer - CNP    Consent obtained? Yes    Time out taken: Yes    Tissue: friable     Pain Control:   N/A    Method: silver nitrate    Location of Chemical Cautery:   Wound/Ulcer #1 and 2    Procedural Pain: 0  / 10     Post Procedural Pain: 0 / 10     Response to treatment: Well tolerated by patient. Plan:     Patient examined and evaluated. The coccyx and left ischium ulcer is stable. He is doing better with a high protein diet. Ulcers will likely not heal without a flap. Discussed offloading with patient and also encouraged him to follow up with rehab physician about aggressive therapy- he did not participate much after his injury.      Silver nitrate applied to the coccyx and left ischium ulcers    Continue high protein diet     Low air loss mattress to bed-patient is still not received this     Recommended smoking cessation     Turn and reposition every 2 hours and as needed     Limit sitting to no more than 2 hours at one time     Off loading boots when in bed    Left ischium- Cleanse wounds with dakins solution and gauze. Pack with AMD ribbon. Cover with gauze and ABD pad. Secure with tape. Change every day.     Coccyx- Cleanse wound with dakins solution and gauze. Apply silver alginate into the wound and cover with gauze, ABD pad and tape into place. Change daily     Right ankle- Apply betadine to the wound every day    Antibiotics: No    Follow up: 6 weeks    Please see attached Discharge Instructions    Written patient dismissal instructions given to patient and signed by patient or POA. Discharge Instructions         Discharge Instructions        Visit Discharge/Physician Orders:   - Melania Courtney down at least twice daily for a couple hours to limit time in chair. Do not sit for more than 2 hrs at a time.  - Reposition yourself in chair and bed every 1-2 hours.    - Off load heels. Wear off loading boots when laying down or sitting in chair.  - Increase amount of food you are eating. Increase protein. Try protein shakes/shots (recommend 3 a day). Eat eggs. Need 80-90 grams daily to heal.  - Wear gloves to help stop scratching. Do not smoke with gloves on.   - Do not wear jeans daily or shoes. - Try to cut back on vaping.  - Silver nitrate applied to coccyx and left ischium wounds today, will appear gray in color  - get an appointment with Dr. Helena Brown to discuss options for therapy or botox injections  [facility]     Wound Location: Coccyx, Left ischium, Right ischium, Left buttock      Do not shower, take baths or get wound wet, unless otherwise instructed by your Wound Care doctor.   Valery Hoops all dressings clean & dry.      Dressing orders:       Left ischium- Cleanse wounds with dakins solution and gauze. Pack with AMD ribbon. Cover with gauze and ABD pad. Secure with tape. Change every day.     Coccyx- Cleanse wound with dakins solution and gauze.  Apply silver alginate into the wound and cover with gauze, ABD pad and tape into place. Change daily     Right ankle- Apply betadine to the wound every day        Follow up visit: 6 weeks on 2/20/2020 at 2:30pm     Keep next scheduled appointment. Please give 24 hour notice if unable to keep appointment.  [phone]      If you experience any of the following, please call the Wound Care Service during business hours: [phone].                        *Increase in pain                        *Temperature over 101                        *Increase in drainage from your wound or a foul odor                        *Uncontrolled swelling                        *Need for compression bandage changes due to slippage, breakthrough drainage      If you need medical attention outside of business hours, please contact your Primary Care Doctor or go to the nearest emergency room     Electronically signed by RANJIT Dunn CNP on 1/19/20 at 4:17 PM            Electronically signed by RANJIT Dunn CNP on 1/19/2020 at 4:18 PM

## 2020-01-28 ENCOUNTER — HOSPITAL ENCOUNTER (OUTPATIENT)
Dept: PHYSICAL THERAPY | Age: 30
Setting detail: THERAPIES SERIES
Discharge: HOME | End: 2020-01-28
Payer: MEDICAID

## 2020-01-28 PROCEDURE — 97162 PT EVAL MOD COMPLEX 30 MIN: CPT

## 2020-01-28 ASSESSMENT — PAIN DESCRIPTION - FREQUENCY: FREQUENCY: INTERMITTENT

## 2020-01-28 ASSESSMENT — PAIN DESCRIPTION - ORIENTATION: ORIENTATION: UPPER

## 2020-01-28 ASSESSMENT — PAIN DESCRIPTION - PAIN TYPE: TYPE: CHRONIC PAIN

## 2020-01-28 ASSESSMENT — PAIN DESCRIPTION - LOCATION: LOCATION: BACK

## 2020-01-28 ASSESSMENT — PAIN SCALES - GENERAL: PAINLEVEL_OUTOF10: 6

## 2020-01-28 NOTE — PROGRESS NOTES
** PLEASE SIGN, DATE AND TIME CERTIFICATION BELOW AND RETURN TO [facility] OUTPATIENT REHABILITATION (FAX #: [phone]). ATTEST/CO-SIGN IF ACCESSING VIA [employer]. THANK YOU.**    I certify that I have examined the patient below and determined that Physical Medicine and Rehabilitation service is necessary and that I approve the established plan of care for up to 90 days or as specifically noted. Attestation, signature or co-signature of physician indicates approval of certification requirements.    ________________________ ____________ __________  Physician Signature   Date   Time       [place]     Time In: 5605  Time Out: 1600  Minutes: 48  Timed Code Treatment Minutes: 0 Minutes                Date: 2020  Patient Name: Caitlyn Vazquez,  Gender:  male        CSN: [id number]   : 1990  (34 y.o.)        Referring Practitioner: Debbie Rodriguez DO       Diagnosis: G82.20 (ICD-10-CM) - Paraplegia ([facility]  Treatment Diagnosis: paraplegia with spasticity/rigidity of LEs effecting mobility and ADLs with transfers, bed mobility. Additional Pertinent Hx: comorbidities: history of TBI, paraplegia, neurogenic bladder with suprapubic catheter, history of ulcers and wound care, s/p left humeral fracture. bipolar, anxiety disorder, colostomy. General:  PT Visit Information  Onset Date: 20  PT Insurance Information: Traditional Medicaid unlimited visits for PT, OT, ST. aquatic yes, modalities yes HP, CP not covered, precertification N/A, FCE covered benefit. Total # of Visits to Date: 1  Plan of Care/Certification Expiration Date: 20  Progress Note Counter: 1/10 for PN                          See Medical History Questionnaire for information related to allergies and medications. Subjective:  Chart Reviewed: Yes  Family / Caregiver Present: Yes  Comments: Follow up with Dr. Alysia Crespo next month. right knee 10 degrees to 110 degrees,    right hip flexion 90 degrees, right ankle df past neutral 10 degrees. ROM LLE: PROM left knee 40 degrees to 130 degrees flexion, left hip flexion 95 degrees. left ankle df past neutral 10 degrees. Comment: strength LUE: shoulder flexion 4-/5 abduction 4-/5, ER/IR 5/5, elbow flexion 5/5. Comment: strength RUE: shoulder flexion 4/5, abduction 4/5, ER/IR 5/5, elbow flexion 5/5,     Tone RLE  RLE Tone: Hypertonic  Tone Description: Note extensor pattern with rigid spasticity when supinelying on wedge and when seated in chair with foot straps off. Note difficulty with PROM due to ridgity in extensor pattern. Tone LLE  LLE Tone: Hypertonic  Tone Description: Note flexor pattern with rigid spasticity when supinelying on wedge and when seated in chair with foot straps off. Noted difficulty with PROM  due to spasticity and rigidity in extension pattern. Overall Sensation Status: Impaired(impaired sensation from nipple line and below.  )           Supine to Sit: Stand by assistance, Contact guard assistance  Sit to Supine: Stand by assistance, Contact guard assistance                Transfers  Lateral Transfers: Stand by assistance, Contact guard assistance  Comment: use of sliding board for transfers from w/c <>mat table. Patient able to position sliding board for transfer to and from w/c for transfer. Balance  Posture: Fair  Sitting - Static: Fair  Comments: Patient able to support self on mat table with UEs with CGA of one person for static sitting. Exercises  Exercise 1: PROM LEs supinelying with upper trunk/head on wedge with slight trunk flexion:   Exercise 2: PROM hip flexion, knee extension/flexion, hip abduction in hooklying, ankle df (while seated in wc/)  Note interference with PROM due to spasiticy with rigidity with RLE in extensor pattern and LLE in flexor pattern.   Significant rigidity with interference with

## 2020-02-10 ENCOUNTER — TELEPHONE (OUTPATIENT)
Dept: FAMILY MEDICINE CLINIC | Age: 30
End: 2020-02-10

## 2020-02-10 NOTE — TELEPHONE ENCOUNTER
[facility] Transitions Initial Follow Up Call    Outreach made within 2 business days of discharge: Yes    Patient: Helen Bradshaw Patient : 1990   MRN: [id number]  Reason for Admission: There are no discharge diagnoses documented for the most recent discharge. Discharge Date: 3/5/19       Spoke with: LM for patient to return call at their earliest convenience. Discharge department/facility: [facility] Interactive Patient Contact:  Was patient able to fill all prescriptions:   Was patient instructed to bring all medications to the follow-up visit:   Is patient taking all medications as directed in the discharge summary?    Does patient understand their discharge instructions:   Does patient have questions or concerns that need addressed prior to 7-14 day follow up office visit:     Scheduled appointment with PCP within 7-14 days    Follow Up  Future Appointments   Date Time Provider Sheri Metzger   2020  4:00 PM Eladio Mccartney, [address]   2020  9:00 AM Royal Spears, [address]   2020  2:00 PM Rossi Chavez MD [facility] Pain [facility] - [address] [facility]   2020 11:00 AM Gladys Madrigal MD [address] [facility] Urology [facility] [address] [facility]   2020  2:30 PM RANJIT Philip -  [facility]   2020  1:45 PM Royal Spears, [address] [facility] ([address] [place] [address])

## 2020-02-11 NOTE — TELEPHONE ENCOUNTER
[facility] Trans[facility] Initial Follow Up Call    Outreach made within 2 business days of discharge: Yes    Patient: Gueronce Cue Patient : 1990   MRN: [id number]  Reason for Admission: There are no discharge diagnoses documented for the most recent discharge. Discharge Date: 3/5/19       Spoke with: Jodi Jarrett pt's father on signed hipaa    Discharge department/facility: Psychiatric    [facility] Interactive Patient Contact:  Was patient able to fill all prescriptions: Yes  Was patient instructed to bring all medications to the follow-up visit: Yes  Is patient taking all medications as directed in the discharge summary?  Yes  Does patient understand their discharge instructions: Yes  Does patient have questions or concerns that need addressed prior to 7-14 day follow up office visit: no    Scheduled appointment with PCP within 7-14 days    Future Appointments   Date Time Provider Sheri Metzger   2020  4:00 PM Rodolfo Lange, [address]   2020  2:00 PM Walter Roberto MD [facility] - [facility] [address]   2020  3:00 PM Adelia Raymond Alex   2020 11:00 AM MD [place] [address] Urology [facility] - [place] [address]   2020  2:30 PM RANJIT Bain -  [address]   2020  1:45 PM Adelia Raymond [facility]     Follow Up      Collette Long, LPN

## 2020-02-12 ENCOUNTER — HOSPITAL ENCOUNTER (OUTPATIENT)
Dept: OCCUPATIONAL THERAPY | Age: 30
Setting detail: THERAPIES SERIES
Discharge: HOME | End: 2020-02-12
Payer: MEDICAID

## 2020-02-12 PROCEDURE — 97165 OT EVAL LOW COMPLEX 30 MIN: CPT

## 2020-02-12 ASSESSMENT — PAIN DESCRIPTION - PAIN TYPE: TYPE: CHRONIC PAIN

## 2020-02-12 ASSESSMENT — PAIN DESCRIPTION - LOCATION: LOCATION: SHOULDER

## 2020-02-12 ASSESSMENT — PAIN SCALES - GENERAL: PAINLEVEL_OUTOF10: 5

## 2020-02-12 ASSESSMENT — PAIN DESCRIPTION - ORIENTATION: ORIENTATION: LEFT

## 2020-02-12 NOTE — DISCHARGE SUMMARY
** PLEASE SIGN, DATE AND TIME CERTIFICATION BELOW AND RETURN TO [facility] OUTPATIENT REHABILITATION (FAX #: [phone]). ATTEST/CO-SIGN IF ACCESSING VIA [employer]. THANK YOU.**    I certify that I have examined the patient below and determined that Physical Medicine and Rehabilitation service is necessary and that I approve the established plan of care for up to 90 days or as specifically noted. Attestation, signature or co-signature of physician indicates approval of certification requirements.    ________________________ ____________ __________  Physician Signature   Date   Time     23    Time In: 1600  Time Out: 1625  Minutes: 25  Timed Code Treatment Minutes: 0 Minutes                Date: 2020  Patient Name: Laurie Anderson        CSN: [id number]     : 1990  (34 y.o.)  Gender: male   Referring Practitioner: Dr. Shalini Polanco. Diagnosis: Paraplegia G82.20  Treatment Diagnosis: L scapular pain  Additional Pertinent Hx: Patient was recommended by PT for OT referral to set up HEP for UB strengthening. Laurie Anderson  has a past medical history of Depression, Fracture of lower leg, Hyperthyroidism (2014), MDRO (multiple drug resistant organisms) resistance, and Pneumonia. Laurie Anderson  has a past surgical history that includes Appendectomy; Facial reconstruction surgery (); Tonsillectomy; Hardware Removal (); other surgical history (2017); other surgical history (2017); brain surgery (2016); back surgery (2016); fracture surgery (Left, 2016); Cystocopy (2017); other surgical history (2017); pr office/outpt visit,procedure only (Right, 10/5/2018); and Toe amputation (Right). See Medical History Questionnaire for information related to allergies and medications.     General:  OT Visit Information  Onset Date: 20  OT Insurance Information: Medicaid - unlimited visits based on medical necessity  Total # of Visits to Date: 1  Certification Period Expiration Date: 03/12/20  Progress Note Counter: 1  Comments: back to MD 2/18/20  Chart Reviewed: Yes  Patient assessed for rehabilitation services?: Yes    Restrictions/Precautions:  Restrictions/Precautions: General Precautions              Subjective:  Subjective: Patient cooperative, interested in HEP only. Pain:  Pain Assessment  Patient Currently in Pain: Yes  Pain Assessment: 0-10  Pain Level: 5  Pain Type: Chronic pain  Pain Location: Shoulder(levator scap region)  Pain Orientation: Left    Social/Functional History:    Lives With: Other (comment)(mother and father)  Type of Home: House  Home Layout: One level  Home Access: Ramped entrance     Bathroom Shower/Tub: Shower chair with back, Walk-in shower       ADL Assistance: Independent(dressing, placing shoes on )          Mode of Transportation: [employer]  Occupation: On disability  Leisure & Hobbies: Medicinal Marijuana, video games. Objective                       Sensation  Overall Sensation Status: Impaired(impaired sensation from nipple line and below.  )    Palpation: Patient reports of soreness and tenderness at left levator scap muscle region, trigger point felt. LUE AROM (degrees)  LUE AROM : WFL          RUE AROM (degrees)  RUE AROM : WFL       LUE Strength  Gross LUE Strength: WFL                   RUE Strength  Gross RUE Strength: WFL                                                                          ADL  Additional Comments: Patient reports difficulty wtih bathing. Activity Tolerance: Additional Comments: Tolerated evaluation well.      Assessment:  Performance deficits / Impairments: Decreased posture  Prognosis: Good  Clinical Decision Making: Clinical Decision making was of Low Complexity as the result of analysis of data from a problem focused assessment, a consideration of a limited number of treatment options, no significant comorbidities affecting the plan of care and no modification or assistance required to complete the evaluation. Patient Education:  OT Education: OT Role, Home Exercise Program, Plan of Care          Treatment Initiated : Education and demonstration of HEP for posture re-education that he can complete in his chair - Blue theraband Riivald in reclined position, serratus anterior punches, chin tucks, lumbar roll support for better posture. Plan:  Plan Comment: Evaluation only. No further OT at this time. Evaluation Complexity: Based on the findings of patient history, examination, clinical presentation, and decision making during this evaluation, this patient is of low complexity.     [address] OTR/L, [place] [address]

## 2020-02-17 ENCOUNTER — OFFICE VISIT (OUTPATIENT)
Dept: PHYSICAL MEDICINE AND REHAB | Age: 30
End: 2020-02-17
Payer: MEDICAID

## 2020-02-17 VITALS
DIASTOLIC BLOOD PRESSURE: 84 MMHG | RESPIRATION RATE: 14 BRPM | SYSTOLIC BLOOD PRESSURE: 118 MMHG | HEIGHT: 70 IN | BODY MASS INDEX: 19.25 KG/M2 | OXYGEN SATURATION: 95 % | HEART RATE: 87 BPM | WEIGHT: 134.48 LBS

## 2020-02-17 PROCEDURE — 99204 OFFICE O/P NEW MOD 45 MIN: CPT | Performed by: PHYSICAL MEDICINE & REHABILITATION

## 2020-02-17 RX ORDER — NABUMETONE 750 MG/1
750 TABLET, FILM COATED ORAL 2 TIMES DAILY
Qty: 60 TABLET | Refills: 3 | Status: SHIPPED | OUTPATIENT
Start: 2020-02-17 | End: 2020-03-26 | Stop reason: ALTCHOICE

## 2020-02-17 RX ORDER — LIDOCAINE 50 MG/G
1 PATCH TOPICAL DAILY
Qty: 10 PATCH | Refills: 0 | Status: SHIPPED | OUTPATIENT
Start: 2020-02-17 | End: 2020-02-27

## 2020-02-17 RX ORDER — BACLOFEN 20 MG/1
20 TABLET ORAL 4 TIMES DAILY
Qty: 120 TABLET | Refills: 2 | Status: SHIPPED | OUTPATIENT
Start: 2020-02-17 | End: 2020-03-02 | Stop reason: SDUPTHER
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
## 2020-02-18 ENCOUNTER — OFFICE VISIT (OUTPATIENT)
Dept: FAMILY MEDICINE CLINIC | Age: 30
End: 2020-02-18
Payer: MEDICAID

## 2020-02-18 VITALS
SYSTOLIC BLOOD PRESSURE: 104 MMHG | RESPIRATION RATE: 12 BRPM | DIASTOLIC BLOOD PRESSURE: 58 MMHG | HEART RATE: 92 BPM | TEMPERATURE: 97.7 F

## 2020-02-18 PROCEDURE — 99214 OFFICE O/P EST MOD 30 MIN: CPT | Performed by: FAMILY MEDICINE

## 2020-02-18 RX ORDER — CEFDINIR 300 MG/1
300 CAPSULE ORAL 2 TIMES DAILY
Status: ON HOLD | COMMUNITY
End: 2021-02-19 | Stop reason: ALTCHOICE

## 2020-02-19 ENCOUNTER — TELEPHONE (OUTPATIENT)
Dept: FAMILY MEDICINE CLINIC | Age: 30
End: 2020-02-19

## 2020-02-20 ENCOUNTER — HOSPITAL ENCOUNTER (OUTPATIENT)
Dept: WOUND CARE | Age: 30
Discharge: HOME | End: 2020-02-20
Payer: MEDICAID

## 2020-02-20 VITALS
SYSTOLIC BLOOD PRESSURE: 144 MMHG | DIASTOLIC BLOOD PRESSURE: 78 MMHG | RESPIRATION RATE: 18 BRPM | OXYGEN SATURATION: 98 % | HEART RATE: 102 BPM | BODY MASS INDEX: 21.39 KG/M2 | WEIGHT: 149.1 LBS | TEMPERATURE: 97.8 F

## 2020-02-20 PROCEDURE — 99214 OFFICE O/P EST MOD 30 MIN: CPT | Performed by: NURSE PRACTITIONER

## 2020-02-20 PROCEDURE — 99213 OFFICE O/P EST LOW 20 MIN: CPT

## 2020-02-20 PROCEDURE — 2709999900 HC NON-CHARGEABLE SUPPLY

## 2020-02-20 NOTE — PROGRESS NOTES
Wade Segura R.N. has reviewed and agrees with CARINA Dillon LPN's shift   Assessment.     Anamika Organ  2/20/2020

## 2020-02-20 NOTE — PLAN OF CARE
Problem: Wound:  Goal: Skin integrity intact  Outcome: Ongoing   Pt. Seen today for multiple wounds see AVS for new orders. Discharge from clinic. Care plan reviewed with patient and family. Patient and family verbalize understanding of the plan of care and contribute to goal setting.
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
## 2020-03-02 ENCOUNTER — TELEPHONE (OUTPATIENT)
Dept: PHYSICAL MEDICINE AND REHAB | Age: 30
End: 2020-03-02

## 2020-03-02 RX ORDER — BACLOFEN 20 MG/1
20 TABLET ORAL 4 TIMES DAILY
Qty: 120 TABLET | Refills: 1 | Status: SHIPPED | OUTPATIENT
Start: 2020-03-02 | End: 2020-06-03 | Stop reason: SDUPTHER

## 2020-03-05 ENCOUNTER — HOSPITAL ENCOUNTER (OUTPATIENT)
Dept: MRI IMAGING | Age: 30
Discharge: HOME | End: 2020-03-05
Payer: MEDICAID

## 2020-03-05 PROCEDURE — A9579 GAD-BASE MR CONTRAST NOS,1ML: HCPCS | Performed by: FAMILY MEDICINE

## 2020-03-05 PROCEDURE — 6360000004 HC RX CONTRAST MEDICATION: Performed by: FAMILY MEDICINE

## 2020-03-05 PROCEDURE — 72156 MRI NECK SPINE W/O & W/DYE: CPT

## 2020-03-05 PROCEDURE — 72157 MRI CHEST SPINE W/O & W/DYE: CPT

## 2020-03-05 RX ADMIN — GADOTERIDOL 15 ML: 279.3 INJECTION, SOLUTION INTRAVENOUS at 20:08

## 2020-03-09 ENCOUNTER — TELEPHONE (OUTPATIENT)
Dept: UROLOGY | Age: 30
End: 2020-03-09

## 2020-03-09 ENCOUNTER — TELEPHONE (OUTPATIENT)
Dept: FAMILY MEDICINE CLINIC | Age: 30
End: 2020-03-09
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
## 2020-03-10 ENCOUNTER — TELEPHONE (OUTPATIENT)
Dept: FAMILY MEDICINE CLINIC | Age: 30
End: 2020-03-10

## 2020-03-16 ENCOUNTER — TELEPHONE (OUTPATIENT)
Dept: PHYSICAL MEDICINE AND REHAB | Age: 30
End: 2020-03-16

## 2020-03-23 ENCOUNTER — TELEPHONE (OUTPATIENT)
Dept: FAMILY MEDICINE CLINIC | Age: 30
End: 2020-03-23

## 2020-03-24 ENCOUNTER — TELEPHONE (OUTPATIENT)
Dept: PHYSICAL MEDICINE AND REHAB | Age: 30
End: 2020-03-24

## 2020-03-26 ENCOUNTER — VIRTUAL VISIT (OUTPATIENT)
Dept: PHYSICAL MEDICINE AND REHAB | Age: 30
End: 2020-03-26
Payer: MEDICAID

## 2020-03-26 PROCEDURE — 99214 OFFICE O/P EST MOD 30 MIN: CPT | Performed by: PHYSICAL MEDICINE & REHABILITATION

## 2020-03-26 RX ORDER — IBUPROFEN 800 MG/1
800 TABLET ORAL EVERY 8 HOURS PRN
Qty: 90 TABLET | Refills: 1 | Status: SHIPPED | OUTPATIENT
Start: 2020-03-26 | End: 2020-06-12 | Stop reason: SDUPTHER

## 2020-03-26 RX ORDER — GABAPENTIN 600 MG/1
600 TABLET ORAL 3 TIMES DAILY
Qty: 90 TABLET | Refills: 3 | Status: SHIPPED | OUTPATIENT
Start: 2020-03-26 | End: 2020-06-17

## 2020-03-26 RX ORDER — METHYLPREDNISOLONE 4 MG/1
TABLET ORAL
Qty: 1 KIT | Refills: 0 | Status: SHIPPED | OUTPATIENT
Start: 2020-03-26 | End: 2020-04-01

## 2020-03-26 NOTE — PROGRESS NOTES
FOLLOW UP APPOINTMENT: VIRTUAL VISIT         Subha May MD    3/26/2020       Yamilet Speaker is a 34 y.o. male, who came for a  follow up to discuss other treatment options    Currently complaining of upper middle back and below the shoulder blade. He will have an appointment with the surgeon on April 8, 2021. SCI level at T1-T6, Complete BENNIE. Denies fever or chills. No new neurological symptoms in the last 3 months. Doing well with ADLS, on currently with suprapubic catheter and colostomy. Upper extremity strength is stable, without any new weakness. Review of Treatment:   1. Gabapentin 300 mg BID, then 600 mg at HS   2. Baclofen 20 mg QID.      Allergies   Allergen Reactions    Bee Venom Shortness Of Breath    Tramadol Hives       Social History     Socioeconomic History    Marital status: Single     Spouse name: Not on file    Number of children: 1    Years of education: Not on file    Highest education level: Not on file   Occupational History    Not on file   Social Needs    Financial resource strain: Not on file    Food insecurity     Worry: Not on file     Inability: Not on file    Transportation needs     Medical: Not on file     Non-medical: Not on file   Tobacco Use    Smoking status: Former Smoker     Packs/day: 0.25     Years: 10.00     Pack years: 2.50     Types: Cigarettes, Cigars    Smokeless tobacco: Never Used   Substance and Sexual Activity    Alcohol use: No    Drug use: Yes     Types: Marijuana    Sexual activity: Never   Lifestyle    Physical activity     Days per week: Not on file     Minutes per session: Not on file    Stress: Not on file   Relationships    Social connections     Talks on phone: Not on file     Gets together: Not on file     Attends Hinduism service: Not on file     Active member of club or organization: Not on file     Attends meetings of clubs or organizations: Not on file     Relationship status: Not on file   [facility] Intimate partner violence     Fear of current or ex partner: Not on file     Emotionally abused: Not on file     Physically abused: Not on file     Forced sexual activity: Not on file   Other Topics Concern    Not on file   Social History Narrative    Not on file       Past Medical History:   Diagnosis Date    Depression     Fracture of lower leg     Hyperthyroidism 9/2014    MDRO (multiple drug resistant organisms) resistance     MRSA LUNGS    Pneumonia        Past Surgical History:   Procedure Laterality Date    APPENDECTOMY      BACK SURGERY  11/2016    BRAIN SURGERY  11/05/2016    CLOT REMOVED    CYSTOSCOPY  04/17/2017    FACIAL RECONSTRUCTION SURGERY  2012    left zygomatic arch and sinus    FRACTURE SURGERY Left 11/2016    HARDWARE REMOVAL  2012    left zygomatic arch    OTHER SURGICAL HISTORY  01/09/2017    Laparoscopic Diverting Colostomy    OTHER SURGICAL HISTORY  01/09/2017    Excisional Debridment Sacral Decubitus Ulcer    OTHER SURGICAL HISTORY  04/17/2017    Placement of suprapubic catheter    AK OFFICE/OUTPT VISIT,PROCEDURE ONLY Right 10/5/2018    RIGHT HALLUX AMPUTATION performed by Steve Hoyt DPM at [address] [place] [address] TOE AMPUTATION Right     great toe     TONSILLECTOMY         Family History   Problem Relation Age of Onset    Heart Disease Mother     Heart Disease Father         Prior to Visit Medications    Medication Sig Taking? Authorizing Provider   gabapentin (NEURONTIN) 600 MG tablet Take 1 tablet by mouth 3 times daily for 30 days. Yes Ho Cochran MD   methylPREDNISolone (MEDROL DOSEPACK) 4 MG tablet Take by mouth.  Yes Ho Cochran MD   ibuprofen (ADVIL;MOTRIN) 800 MG tablet Take 1 tablet by mouth every 8 hours as needed for Pain (restart after medrol) Yes Ho Cochran MD   venlafaxine (EFFEXOR XR) 150 MG extended release capsule TAKE 1 CAPSULE BY MOUTH DAILY  Jaret Ventura DO   baclofen (LIORESAL) 20 MG tablet Take 1 tablet by mouth 4 times daily  Katherine Cortes MD   vitamin D (ERGOCALCIFEROL) 1.25 MG (91935 UT) CAPS capsule TAKE 1 CAPSULE BY MOUTH EVERY 7 DAYS  Jaret BALBIR Ventura DO   traZODone (DESYREL) 100 MG tablet TAKE 2 TABLETS BY MOUTH AT BEDTIME AS NEEDED FOR SLEEP  Jaret BALBIR Ventura DO   cefdinir (OMNICEF) 300 MG capsule Take 300 mg by mouth 2 times daily  Historical Provider, MD   cyclobenzaprine (FLEXERIL) 10 MG tablet TAKE 1 TABLET BY MOUTH 3 TIMES DAILY  Patient taking differently: Take 10 mg by mouth 4 times daily as needed   Pauline Hines DO   amantadine (SYMMETREL) 100 MG capsule Take 1 capsule by mouth daily  Jaret BALBIR Ventura DO   venlafaxine (EFFEXOR XR) 75 MG extended release capsule Take 1 capsule by mouth daily Take with 150mg q daily  Jaret BALBIR Ventura DO   pantoprazole (PROTONIX) 40 MG tablet Take 1 tablet by mouth every morning (before breakfast)  Kelsie Ventura DO   hydrOXYzine (ATARAX) 50 MG tablet TAKE 1 TABLET BY MOUTH THREE TIMES A DAY AS NEEDED FOR ITCHING  Jaret BALBIR Ventura DO   busPIRone (BUSPAR) 10 MG tablet TAKE 1 TABLET BY MOUTH THREE TIMES A DAY  Jaret BALBIR Ventura DO   ibuprofen (ADVIL;MOTRIN) 800 MG tablet TAKE 1 TABLET BY MOUTH 3 TIMES DAILY AS NEEDED FOR PAIN  Pauline Hines DO   Misc. Devices (BATH/SHOWER SEAT) MISC Shower Chair on wheels  Jaret BALBIR Ventura DO   ondansetron (ZOFRAN-ODT) 4 MG disintegrating tablet Take 1 tablet by mouth 3 times daily as needed for Nausea or Vomiting  Kelsie Ventura DO   ferrous sulfate 325 (65 Fe) MG tablet TAKE 1 TABLET BY MOUTH TWICE A DAY WITH MEALS  Jaret BALBIR Ventura DO   ondansetron (ZOFRAN) 4 MG tablet Take 1 tablet by mouth every 8 hours as needed for Nausea or Vomiting  Pauline Hines DO   Misc. Devices MISC Protinex X shots, use TID. Jaret Ventura DO   fluocinonide (LIDEX) 0.05 % cream Apply topically 2 times daily.   Pauline Hines DO   docusate sodium (DOCQLACE) 100 MG capsule Take 1 capsule by mouth 2 times daily  Jaret D Audrey, DO   albuterol sulfate HFA (VENTOLIN HFA) 108 (90 Base) MCG/ACT inhaler Does not apply route Use as directed  Roxana Siddiqi,        Review of Systems : All systems reviewed, all unremarkable other than HPI/subjective. No change since last visit. Denies  fever, chills, infection. Open wound       Physical Exam  HENT:      Head: Normocephalic. Pulmonary:      Effort: Pulmonary effort is normal.   Neurological:      Mental Status: He is alert and oriented to person, place, and time. Psychiatric:      Comments: berman       Assessment and Plan:      Diagnosis Orders   1. Spinal cord injury at T1-T6 level without injury of spinal bone (HCC)          Discontinue nabumetone     Trial of Medrol dose jonatan and after medrol dose jonatan, re start Ibuprofen. Continue baclofen for now. Advised not to take both Ibuprofen and medrol dose jonatan        All questions were answered and imaging studies reviewed with the patient. I have reviewed the chief complaint and HPI including the [facility] Chadron AYOUB and Vital documentation by my staff and I agree with their documentation and have added where applicable. Time spent with patient was 25  minutes. More than 50% was spent counseling/coordinating the patient's care.          Yasir Mckinney MD   Spine Medicine/PM&R

## 2020-04-01 ENCOUNTER — TELEPHONE (OUTPATIENT)
Dept: UROLOGY | Age: 30
End: 2020-04-01

## 2020-04-08 ENCOUNTER — TELEPHONE (OUTPATIENT)
Dept: WOUND CARE | Age: 30
End: 2020-04-08

## 2020-04-08 ENCOUNTER — OFFICE VISIT (OUTPATIENT)
Dept: NEUROSURGERY | Age: 30
End: 2020-04-08
Payer: MEDICAID

## 2020-04-08 VITALS
HEIGHT: 70 IN | BODY MASS INDEX: 23.34 KG/M2 | TEMPERATURE: 98.8 F | HEART RATE: 130 BPM | DIASTOLIC BLOOD PRESSURE: 60 MMHG | WEIGHT: 163 LBS | SYSTOLIC BLOOD PRESSURE: 91 MMHG

## 2020-04-08 PROCEDURE — 99204 OFFICE O/P NEW MOD 45 MIN: CPT | Performed by: NEUROLOGICAL SURGERY

## 2020-04-08 ASSESSMENT — ENCOUNTER SYMPTOMS
TROUBLE SWALLOWING: 0
CHEST TIGHTNESS: 0
BACK PAIN: 1
ABDOMINAL PAIN: 0

## 2020-04-08 NOTE — TELEPHONE ENCOUNTER
Cancelled appt for virtual visit with Dr. Corinne Oden tomorrow. Dr. Corinne Oden recommends patient follow up with Micah Renteria CNP in [facility] for possible virtual visit.
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
## 2020-04-09 ENCOUNTER — TELEPHONE (OUTPATIENT)
Dept: WOUND CARE | Age: 30
End: 2020-04-09

## 2020-04-09 ENCOUNTER — TELEPHONE (OUTPATIENT)
Dept: FAMILY MEDICINE CLINIC | Age: 30
End: 2020-04-09

## 2020-04-09 NOTE — TELEPHONE ENCOUNTER
Jose David Swanson spoke with [facility], she will fax order order/supply sheet with names, frequency etc to the office for provider review. Spoke with mom Dasia Parmar on HIPAA, she states that patients provider Peg Christiansen CNP left the wound clinic she moved to [facility] she has her own practice now and Dr. Lechuga Begun unable to treat patient (see message below).

## 2020-04-10 ENCOUNTER — TELEPHONE (OUTPATIENT)
Dept: FAMILY MEDICINE CLINIC | Age: 30
End: 2020-04-10

## 2020-04-10 NOTE — TELEPHONE ENCOUNTER
Jaspal Verma from the wound clinic faxed over wound care progress notes to the office. She's just making sure the office received them.  No need to call back if they were received

## 2020-04-16 ENCOUNTER — TELEPHONE (OUTPATIENT)
Dept: PHYSICAL MEDICINE AND REHAB | Age: 30
End: 2020-04-16

## 2020-04-16 NOTE — TELEPHONE ENCOUNTER
Pt. Contacted to schedule f/u appt. Per last office note. States that they will call back to schedule at a later date.

## 2020-05-06 ENCOUNTER — TELEPHONE (OUTPATIENT)
Dept: FAMILY MEDICINE CLINIC | Age: 30
End: 2020-05-06

## 2020-05-06 NOTE — TELEPHONE ENCOUNTER
Favian Mehta calling in again asking if Dr Dominic Maddox can put in orders for [facility] to go to the house a couple of times every week to check the wound, and for baths. Please advise.

## 2020-05-06 NOTE — TELEPHONE ENCOUNTER
Patients father Yue Yaas (on hippa) calling in for patient. He is asking for Dr Papa Oliveira to renew all of his wound supplies? He said the doctor he was seening left [facility] and that clinic was supposed to have faxed over a list of all supplies to Dr Maurisio Horta office. He said they need to go to [facility], and then the call was lost. Please advise.

## 2020-05-12 ENCOUNTER — TELEPHONE (OUTPATIENT)
Dept: FAMILY MEDICINE CLINIC | Age: 30
End: 2020-05-12

## 2020-05-12 NOTE — TELEPHONE ENCOUNTER
I'm not sure what else to tell them other than to keep checking with medical supply stores. Unfortunately, we don't have any increased access to them.

## 2020-05-12 NOTE — TELEPHONE ENCOUNTER
Patient is out of colostomy bags and shipment hasn't arrived yet. His current bag is not sealing and is popping off. They have called several medical supply places and no one has any.     Please contact patient at [phone]

## 2020-05-26 ENCOUNTER — VIRTUAL VISIT (OUTPATIENT)
Dept: PHYSICAL MEDICINE AND REHAB | Age: 30
End: 2020-05-26
Payer: MEDICAID

## 2020-05-26 PROCEDURE — 99213 OFFICE O/P EST LOW 20 MIN: CPT | Performed by: PHYSICAL MEDICINE & REHABILITATION

## 2020-05-26 NOTE — PROGRESS NOTES
(DEPEND UNDERWEAR SM/MED) MISC Use depends prn for incontinence care  RANJIT Neville CNP   Incontinence Supply Disposable (PREVAIL WET WIPES) MISC Use wet wipes prn for personal care  RANJIT Neville CNP   Respiratory Therapy Supplies (NEBULIZER COMPRESSOR) KIT 1 kit by Does not apply route once for 1 dose  Jose Elias Torres DO   Elastic Bandages & Supports (T.E.D. KNEE LENGTH/M-REGULAR) MISC Use as directed  Jose Elias Torres DO   Elastic Bandages & Supports (JOBST ACTIVE 20-30MMHG MEDIUM) MISC Apply q daily  Marcy Median Audrey,    sodium hypochlorite (DAKINS) 0.125 % SOLN external solution Apply Dakin's moistened gauze to coccyx. Cover with dry gauze. Change twice a day. RANJIT Wheat CNP   Elastic Bandages & Supports (JOBST KNEE HIGH COMPRESSION SM) MISC 1 each by Does not apply route daily  Jose Elias Torres DO   Incontinence Supplies (BEDSIDE DRAINAGE BAG) [address] [facility] Large 2000 cc bedside drainage bag  Natalia Grimes MD   Incontinence Supplies (URINARY LEG BAG)  cc Aleida Urinary catheter leg bag  Natalia Grimse MD   Incontinence Supplies (URINARY LEG BAG STRAPS) MISC Leg bag straps to go with urinary catheter leg bag  Natalia Grimes MD   Catheters (FOLY CATHETER LUBRICIOUS) MISC 16 Fr 10 cc rodrigues  Natalia Grimes MD   EPINEPHrine (EPIPEN) 0.3 MG/0.3ML SOAJ injection Use as directed for allergic reaction  Jose Elias Torres DO   Lift Chair MISC by Does not apply route Use as directed  Jos eElias Torres DO         Review of Systems : All systems reviewed, all unremarkable other than HPI/subjective. No change since last visit. Denies  fever, chills, infection or non healing wound. Physical Exam  Constitutional:       Appearance: Normal appearance. HENT:      Head: Atraumatic. Pulmonary:      Effort: Pulmonary effort is normal.   Neurological:      Mental Status: He is alert and oriented to person, place, and time. Assessment and Plan:      Diagnosis Orders   1.

## 2020-05-28 RX ORDER — GABAPENTIN 600 MG/1
600 TABLET ORAL 4 TIMES DAILY
Qty: 120 TABLET | Refills: 3 | Status: SHIPPED | OUTPATIENT
Start: 2020-05-28 | End: 2020-09-08

## 2020-06-03 RX ORDER — BACLOFEN 20 MG/1
20 TABLET ORAL 4 TIMES DAILY
Qty: 120 TABLET | Refills: 1 | Status: SHIPPED | OUTPATIENT
Start: 2020-06-03 | End: 2020-08-07 | Stop reason: SDUPTHER

## 2020-06-12 ENCOUNTER — OFFICE VISIT (OUTPATIENT)
Dept: PHYSICAL MEDICINE AND REHAB | Age: 30
End: 2020-06-12
Payer: MEDICAID

## 2020-06-12 VITALS
SYSTOLIC BLOOD PRESSURE: 116 MMHG | HEIGHT: 70 IN | TEMPERATURE: 97.4 F | WEIGHT: 163 LBS | BODY MASS INDEX: 23.34 KG/M2 | DIASTOLIC BLOOD PRESSURE: 68 MMHG

## 2020-06-12 PROCEDURE — 99214 OFFICE O/P EST MOD 30 MIN: CPT | Performed by: NURSE PRACTITIONER

## 2020-06-12 RX ORDER — IBUPROFEN 800 MG/1
800 TABLET ORAL EVERY 8 HOURS PRN
Qty: 90 TABLET | Refills: 3 | Status: SHIPPED | OUTPATIENT
Start: 2020-06-12 | End: 2021-04-12 | Stop reason: SDUPTHER

## 2020-06-17 ENCOUNTER — VIRTUAL VISIT (OUTPATIENT)
Dept: UROLOGY | Age: 30
End: 2020-06-17
Payer: MEDICAID

## 2020-06-17 PROCEDURE — 99204 OFFICE O/P NEW MOD 45 MIN: CPT | Performed by: UROLOGY

## 2020-06-17 ASSESSMENT — ENCOUNTER SYMPTOMS
CHEST TIGHTNESS: 0
SHORTNESS OF BREATH: 0
COLOR CHANGE: 0
BACK PAIN: 1
NAUSEA: 0
EYE REDNESS: 0
FACIAL SWELLING: 0
EYE PAIN: 0
ABDOMINAL PAIN: 0

## 2020-06-17 NOTE — PATIENT INSTRUCTIONS
You may receive a survey regarding the care you received during your visit. Your input is valuable to us. We encourage you to complete and return your survey. We hope you will choose us in the future for your healthcare needs. Hypertension
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
## 2020-06-17 NOTE — PROGRESS NOTES
Patient:  Katy Parra    Review of Systems    Review of Systems   Constitutional: Negative for chills and fever. HENT: Negative for ear pain and facial swelling. Eyes: Negative for pain and redness. Respiratory: Negative for chest tightness and shortness of breath. Cardiovascular: Negative for chest pain and leg swelling. Gastrointestinal: Negative for abdominal pain and nausea. Endocrine: Negative for cold intolerance and heat intolerance. Genitourinary: Positive for hematuria. Negative for dysuria. Musculoskeletal: Positive for back pain. Negative for joint swelling. Skin: Negative for color change and rash. Allergic/Immunologic: Positive for food allergies. Negative for environmental allergies. Neurological: Negative for dizziness and headaches. Hematological: Does not bruise/bleed easily.

## 2020-06-18 ENCOUNTER — TELEPHONE (OUTPATIENT)
Dept: PHYSICAL MEDICINE AND REHAB | Age: 30
End: 2020-06-18

## 2020-06-19 ENCOUNTER — TELEPHONE (OUTPATIENT)
Dept: FAMILY MEDICINE CLINIC | Age: 30
End: 2020-06-19

## 2020-06-23 ENCOUNTER — TELEPHONE (OUTPATIENT)
Dept: PHYSICAL MEDICINE AND REHAB | Age: 30
End: 2020-06-23

## 2020-06-29 ENCOUNTER — TELEPHONE (OUTPATIENT)
Dept: FAMILY MEDICINE CLINIC | Age: 30
End: 2020-06-29

## 2020-06-29 NOTE — TELEPHONE ENCOUNTER
Did he get established with the new wound care center? I was only supposed to be writing for his supplies until he got in with them. Please contact [facility] and advised them to continue the care that they had been doing previously.

## 2020-06-29 NOTE — TELEPHONE ENCOUNTER
A nurse, [facility] w/[facility] ([phone]) called stating she needed clarification on wound care for the pt. [facility] states they've received orders but she states they're different than what they had been doing. [facility] is asking if they're to continue to pack the pt's wound or if they're just to be cleansing the wound? Please advise.

## 2020-06-29 NOTE — TELEPHONE ENCOUNTER
Spoke to Nader Marcial w/[facility] & gave her Dr Bernarda Wiley response. Nader Marcial states their ofc just started working w/the pt last wk. There had been another nurse there previously who is no longer working w/the pt. Pt's mother has been packing the pt's wound. Nader Marcial is going to contact the pt & see if he has been seen at the wound center or if he has any upcoming appts. Nader Marcial will call our ofc back w/any info she gets.

## 2020-07-01 ENCOUNTER — TELEPHONE (OUTPATIENT)
Dept: FAMILY MEDICINE CLINIC | Age: 30
End: 2020-07-01

## 2020-07-01 NOTE — TELEPHONE ENCOUNTER
Pt's mom asking if a referral can be made to a surgeon. Pt's mom is very concerned the wound on Srikanth's tail bone will get infected. Pt's mom stated the wound clinic doctor was going to schedule a flap, but this was not done due to the doctor leaving. Please advise.

## 2020-07-01 NOTE — TELEPHONE ENCOUNTER
Attempted to contact the pt's mother to verify whether or not the pt has seen or is scheduled to see a wound care provider. No answer & VM has not been set up so no msg could be left.

## 2020-07-01 NOTE — TELEPHONE ENCOUNTER
Pt stopped in regarding Rheumatology referral, she is looking for one in the [place] area, I did a look up and Dr Jose Angel Lujan came up but address is [address], I gave pt number I also told her when she calls to schedule she can ask for what locations they have Referral placed

## 2020-07-01 NOTE — TELEPHONE ENCOUNTER
[facility] wound clinic is closed. Spoke with pt's mom, she stated she is willing to go to [facility] wound clinic for Cirilo. Spoke with wound clinic they are open every other Thursday. We need to fax the referral and the wound clinic will contact the pt.     Fax# [phone]     Please advise

## 2020-07-02 RX ORDER — BACLOFEN 20 MG/1
TABLET ORAL
Qty: 112 TABLET | OUTPATIENT
Start: 2020-07-02

## 2020-07-08 ENCOUNTER — TELEPHONE (OUTPATIENT)
Dept: FAMILY MEDICINE CLINIC | Age: 30
End: 2020-07-08

## 2020-07-08 NOTE — TELEPHONE ENCOUNTER
Vasquez Soria from [address] in [facility] is calling stating patient canceled appt with them and is going back to previous doctor

## 2020-07-22 ENCOUNTER — TELEPHONE (OUTPATIENT)
Dept: FAMILY MEDICINE CLINIC | Age: 30
End: 2020-07-22

## 2020-07-22 NOTE — TELEPHONE ENCOUNTER
Called arin Cadet, she said the pt's legs didn't feel warm to the touch or cold. Did not notice any redness. States the pitting edema is bad on the left side. It takes 12 secs for the skin to go back to normal. Spoke with mom, Migel Barrera and she states that pt will refuse to go to ER for this. Please advise.     Future Appointments   Date Time Provider Sheri Metzger   7/24/2020  2:45 PM Trena Romero, [address] [place] [address]

## 2020-07-22 NOTE — TELEPHONE ENCOUNTER
Pt's home health nurse. Pt is having swelling in the left foot, this is warm to touch. Pt is asking for a diuretic to help with swelling. ANDERS Lentz.        Future Appointments   Date Time Provider Sheri Metzger   7/24/2020  2:45 PM Monica Phillips, [facility]

## 2020-07-22 NOTE — TELEPHONE ENCOUNTER
Spoke with pt's mom, she stated she will continue to monitor pt's foot.    Future Appointments   Date Time Provider Sheri Metzger   7/24/2020  2:45 PM Giat Jones, [address] [place] [address]

## 2020-07-22 NOTE — TELEPHONE ENCOUNTER
I'm not sure what to offer if he's not willing to follow recommendations. He is not a good candidate for a diuretic.

## 2020-07-24 ENCOUNTER — OFFICE VISIT (OUTPATIENT)
Dept: FAMILY MEDICINE CLINIC | Age: 30
End: 2020-07-24
Payer: MEDICAID

## 2020-07-24 VITALS
RESPIRATION RATE: 18 BRPM | TEMPERATURE: 97 F | SYSTOLIC BLOOD PRESSURE: 112 MMHG | DIASTOLIC BLOOD PRESSURE: 73 MMHG | OXYGEN SATURATION: 97 % | HEART RATE: 117 BPM

## 2020-07-24 PROCEDURE — 99214 OFFICE O/P EST MOD 30 MIN: CPT | Performed by: FAMILY MEDICINE

## 2020-07-24 RX ORDER — HYDROXYZINE 50 MG/1
TABLET, FILM COATED ORAL
Qty: 270 TABLET | Refills: 3 | Status: ON HOLD | OUTPATIENT
Start: 2020-07-24 | End: 2021-02-19 | Stop reason: ALTCHOICE

## 2020-07-24 RX ORDER — ALBUTEROL SULFATE 90 UG/1
2 AEROSOL, METERED RESPIRATORY (INHALATION) EVERY 6 HOURS PRN
Qty: 1 INHALER | Refills: 3 | Status: SHIPPED | OUTPATIENT
Start: 2020-07-24

## 2020-07-24 RX ORDER — SILDENAFIL 50 MG/1
25 TABLET, FILM COATED ORAL PRN
Qty: 15 TABLET | Refills: 1 | Status: SHIPPED | OUTPATIENT
Start: 2020-07-24

## 2020-07-24 RX ORDER — ONDANSETRON 4 MG/1
4 TABLET, FILM COATED ORAL EVERY 8 HOURS PRN
Qty: 90 TABLET | Refills: 5 | Status: SHIPPED | OUTPATIENT
Start: 2020-07-24 | End: 2022-08-18 | Stop reason: SDUPTHER

## 2020-07-24 RX ORDER — RIZATRIPTAN BENZOATE 5 MG/1
5 TABLET ORAL
Qty: 15 TABLET | Refills: 3 | Status: SHIPPED | OUTPATIENT
Start: 2020-07-24 | End: 2020-07-24

## 2020-07-24 ASSESSMENT — ENCOUNTER SYMPTOMS
SHORTNESS OF BREATH: 0
CONSTIPATION: 0
COUGH: 0
ABDOMINAL PAIN: 0
NAUSEA: 0
BACK PAIN: 0
WHEEZING: 0
SORE THROAT: 0
DIARRHEA: 0
VOMITING: 0

## 2020-07-24 NOTE — PROGRESS NOTES
Geraldo Alvarez is a 31 y.o. male that presents for     Chief Complaint   Patient presents with    Orders     face to face for home health       /73   Pulse 117   Temp 97 °F (36.1 °C) (Temporal)   Resp 18   SpO2 97%       HPI:    Ulcerations have been improving recently. Has been getting wound care from [facility]. Getting set up with wound care at [address] [place] [address]. Has appt with surgery to look into a flap for the sacral wound. He has a GF currently and would like to look into a viagra prescription. States that he will get erections sporadically. MDD:  Mood has been 'not too bad'. Sleep is still off, he is not on a consistent schedule. The trazodone does help when he takes this.         Health Maintenance   Topic Date Due    Varicella vaccine (1 of 2 - 2-dose childhood series) 07/17/1993    HIV screen  07/17/2007    DTaP/Tdap/Td vaccine (1 - Tdap) 07/17/2011    Flu vaccine (1) 09/01/2020    Hepatitis A vaccine  Aged Out    Hepatitis B vaccine  Aged Out    Hib vaccine  Aged Out    Meningococcal (ACWY) vaccine  Aged Out    Pneumococcal 0-64 years Vaccine  Aged Out       Past Medical History:   Diagnosis Date    Depression     Fracture of lower leg     Hyperthyroidism 9/2014    MDRO (multiple drug resistant organisms) resistance     MRSA LUNGS    Pneumonia        Past Surgical History:   Procedure Laterality Date    APPENDECTOMY      BACK SURGERY  11/2016    BRAIN SURGERY  11/05/2016    CLOT REMOVED    CYSTOSCOPY  04/17/2017    FACIAL RECONSTRUCTION SURGERY  2012    left zygomatic arch and sinus    FRACTURE SURGERY Left 11/2016    HARDWARE REMOVAL  2012    left zygomatic arch    OTHER SURGICAL HISTORY  01/09/2017    Laparoscopic Diverting Colostomy    OTHER SURGICAL HISTORY  01/09/2017    Excisional Debridment Sacral Decubitus Ulcer    OTHER SURGICAL HISTORY  04/17/2017    Placement of suprapubic catheter    DE OFFICE/OUTPT VISIT,PROCEDURE ONLY Right 10/5/2018    RIGHT HALLUX AMPUTATION performed by Alayna Steiner DPM at [facility] Right     great toe     TONSILLECTOMY         Social History     Tobacco Use    Smoking status: Former Smoker     Packs/day: 0.25     Years: 10.00     Pack years: 2.50     Types: Cigarettes, Cigars    Smokeless tobacco: Never Used   Substance Use Topics    Alcohol use: No    Drug use: Yes     Types: Marijuana       Family History   Problem Relation Age of Onset    Heart Disease Mother     Heart Disease Father          I have reviewed the patient's past medical history, past surgical history, allergies, medications, social and family history and I have made updates where appropriate. Review of Systems   Constitutional: Negative for chills and fever. HENT: Negative for hearing loss and sore throat. Respiratory: Negative for cough, shortness of breath and wheezing. Cardiovascular: Negative for chest pain and palpitations. Gastrointestinal: Negative for abdominal pain, constipation, diarrhea, nausea and vomiting. Genitourinary: Negative for dysuria and hematuria. Musculoskeletal: Negative for back pain. Neurological: Negative for headaches.            PHYSICAL EXAM:  /73   Pulse 117   Temp 97 °F (36.1 °C) (Temporal)   Resp 18   SpO2 97%     General Appearance: well developed and well- nourished, in no acute distress  Head: normocephalic and atraumatic  ENT: external ear and ear canal normal bilaterally, nose without deformity, nasal mucosa and turbinates normal without polyps, oropharynx normal, dentition is normal for age, no lipor gum lesions noted  Neck: supple and non-tender without mass, no thyromegaly or thyroid nodules, no cervical lymphadenopathy  Pulmonary/Chest: clear to auscultation bilaterally- no wheezes, rales or rhonchi, normal air movement, no respiratory distress or retractions  Cardiovascular: normal rate, regular rhythm, normal S1 and S2, no murmurs, rubs, clicks, or gallops, distal pulses intact  Abdomen: soft, non-tender, non-distended, no rebound or guarding, no masses or hernias noted, no hepatospleenomegaly, colostomy in place  Extremities: no cyanosis, clubbing or edema of the lower extremities  Psych:  Normal affect without evidence of depression oranxiety, insight and judgement are appropriate, memory appears intact  Skin: warm and dry, no rash or erythema      ASSESSMENT & PLAN  Cortney Ha was seen today for orders. Diagnoses and all orders for this visit:    Erectile dysfunction, unspecified erectile dysfunction type  -     sildenafil (VIAGRA) 50 MG tablet; Take 0.5 tablets by mouth as needed for Erectile Dysfunction    Chronic migraine without aura without status migrainosus, not intractable  -     rizatriptan (MAXALT) 5 MG tablet; Take 1 tablet by mouth once as needed for Migraine May repeat in 2 hours if needed    Nausea  -     ondansetron (ZOFRAN) 4 MG tablet; Take 1 tablet by mouth every 8 hours as needed for Nausea or Vomiting    Dermatitis due to unknown cause  -     fluocinonide (LIDEX) 0.05 % cream; Apply topically 2 times daily. 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 We can try low dose viagra, advised on potential SEs, but he is aware, that this may not address his underlying cause.   --Other chronic issues are stable, continue current medications  -Advised to call if any issues      Return in about 6 months (around 1/24/2021). Controlled Substance Monitoring:    Acute and Chronic Pain Monitoring:   RX Monitoring 3/13/2019   Attestation The Prescription Monitoring Report for this patient was reviewed today. Periodic Controlled Substance Monitoring -                 Srikanth received counseling on the following healthy behaviors: medication adherence  Reviewed prior labs and health maintenance. Continue current medications, diet and exercise. Discussed use, benefit, and side effects of prescribed medications. Barriers to medication compliance addressed. Patient given educational materials - see patient instructions. All patient questions answered. Patient voiced understanding.

## 2020-07-24 NOTE — LETTER
[address] [facility] [address]  Phone: [phone]  Fax: [address], DO July 24, 2020     Patient: Tabby Castaneda   YOB: 1990   Date of Visit: 7/24/2020       To Whom It May Concern: It is my medical opinion that Yimi Hall requires care 24 hours per day due to his history of paraplegia. His mother is his primary care provider. .    If you have any questions or concerns, please don't hesitate to call.     Sincerely,          Adolph Nolasco, DO

## 2020-07-24 NOTE — PROGRESS NOTES
Tabby Castaneda is a 31 y.o. male that presents for     Chief Complaint   Patient presents with    Orders     face to face for home health       /73   Pulse 117   Temp 97 °F (36.1 °C) (Temporal)   Resp 18   SpO2 97%       HPI:        Health Maintenance   Topic Date Due    Varicella vaccine (1 of 2 - 2-dose childhood series) 07/17/1993    HIV screen  07/17/2007    DTaP/Tdap/Td vaccine (1 - Tdap) 07/17/2011    Flu vaccine (1) 09/01/2020    Hepatitis A vaccine  Aged Out    Hepatitis B vaccine  Aged Out    Hib vaccine  Aged Out    Meningococcal (ACWY) vaccine  Aged Out    Pneumococcal 0-64 years Vaccine  Aged Out       Past Medical History:   Diagnosis Date    Depression     Fracture of lower leg     Hyperthyroidism 9/2014    MDRO (multiple drug resistant organisms) resistance     MRSA LUNGS    Pneumonia        Past Surgical History:   Procedure Laterality Date    APPENDECTOMY      BACK SURGERY  11/2016    BRAIN SURGERY  11/05/2016    CLOT REMOVED    CYSTOSCOPY  04/17/2017    FACIAL RECONSTRUCTION SURGERY  2012    left zygomatic arch and sinus    FRACTURE SURGERY Left 11/2016    HARDWARE REMOVAL  2012    left zygomatic arch    OTHER SURGICAL HISTORY  01/09/2017    Laparoscopic Diverting Colostomy    OTHER SURGICAL HISTORY  01/09/2017    Excisional Debridment Sacral Decubitus Ulcer    OTHER SURGICAL HISTORY  04/17/2017    Placement of suprapubic catheter    AL OFFICE/OUTPT VISIT,PROCEDURE ONLY Right 10/5/2018    RIGHT HALLUX AMPUTATION performed by Cade Nguyen DPM at [address] [place] [address] Drive Right     great toe     TONSILLECTOMY         Social History     Tobacco Use    Smoking status: Former Smoker     Packs/day: 0.25     Years: 10.00     Pack years: 2.50     Types: Cigarettes, Cigars    Smokeless tobacco: Never Used   Substance Use Topics    Alcohol use: No    Drug use: Yes     Types: Marijuana       Family History   Problem Relation Age of Onset    Heart Disease Mother Controlled Substance Monitoring -                 Lazaro Diana received counseling on the following healthy behaviors: medication adherence  Reviewed prior labs and health maintenance. Continue current medications, diet and exercise. Discussed use, benefit, and side effects of prescribed medications. Barriers to medication compliance addressed. Patient given educational materials - see patient instructions. All patient questions answered. Patient voiced understanding.

## 2020-08-04 ENCOUNTER — TELEPHONE (OUTPATIENT)
Dept: FAMILY MEDICINE CLINIC | Age: 30
End: 2020-08-04

## 2020-08-04 NOTE — TELEPHONE ENCOUNTER
Sergei Dhillon, nurse w/[facility] ([phone]) called stating the pt has had a stuffy nose, cough & sore throat for the last few days. Sergei Dhillon is requesting something be sent in for the pt or if they can get a recommendation on what type of OTC cough med the pt could try? Please advise.

## 2020-08-05 NOTE — TELEPHONE ENCOUNTER
Katy Parra called requesting a refill on the following medications:  Requested Prescriptions     Pending Prescriptions Disp Refills    baclofen (LIORESAL) 20 MG tablet 120 tablet 1     Sig: Take 1 tablet by mouth 4 times daily     Pharmacy verified:  [address] [place] [address]      Date of last visit: 06-12-20  Date of next visit (if applicable): Visit date not found

## 2020-08-06 NOTE — TELEPHONE ENCOUNTER
Patient's mother Xavier Carmen, on [employer],  is calling to check on this prescription. She said that he has been without the medication for over a week and is wanting to know when this will be filled and requested a call back. I did advise her that the prescription could take up to 3 business days to be sent to the pharmacy but she still insisted on knowing more since she said he has been without the medication.   Please advise  [phone]

## 2020-08-07 RX ORDER — BACLOFEN 20 MG/1
20 TABLET ORAL 4 TIMES DAILY
Qty: 120 TABLET | Refills: 11 | Status: SHIPPED | OUTPATIENT
Start: 2020-08-07 | End: 2021-08-03

## 2020-08-07 NOTE — TELEPHONE ENCOUNTER
Last seen: 6/12/2020.    Follow-up:   Future Appointments   Date Time Provider Sheri Metzger   1/25/2021  2:30 PM DO TAHIR Alejo PCT P - [facility]

## 2020-08-12 ENCOUNTER — TELEPHONE (OUTPATIENT)
Dept: FAMILY MEDICINE CLINIC | Age: 30
End: 2020-08-12

## 2020-08-12 NOTE — TELEPHONE ENCOUNTER
Pt's mom called stating she was told HH was going to dismiss Arjun Lawton from [facility]. I called and spoke with [facility] office, they stated it may be time for a pre-cert. I have called and LM for Srikanth's primary [facility] nurse, Chapito Herman, to call the office to verify.

## 2020-08-13 NOTE — TELEPHONE ENCOUNTER
Mom called back and states HH is coming to talk to them around 12pm today about discharging pt. I told mom to call us back and let us know when she talks to [facility] to call us back and let us know what they need from us.

## 2020-08-26 ENCOUNTER — TELEPHONE (OUTPATIENT)
Dept: FAMILY MEDICINE CLINIC | Age: 30
End: 2020-08-26

## 2020-08-26 NOTE — TELEPHONE ENCOUNTER
Pt called stating he is having back pain that is radiating to his neck and arms. Pt stated he usually is unable to feel pain in his back but is wanting a possible x-ray to see if any of the hardware in his back has moved.     Please advise

## 2020-09-30 RX ORDER — ERGOCALCIFEROL 1.25 MG/1
CAPSULE ORAL
Qty: 4 CAPSULE | Refills: 3 | Status: SHIPPED | OUTPATIENT
Start: 2020-09-30 | End: 2021-01-19

## 2020-09-30 RX ORDER — FERROUS SULFATE 325(65) MG
TABLET ORAL
Qty: 60 TABLET | Refills: 1 | Status: SHIPPED | OUTPATIENT
Start: 2020-09-30 | End: 2020-11-30

## 2020-10-13 ENCOUNTER — TELEPHONE (OUTPATIENT)
Dept: FAMILY MEDICINE CLINIC | Age: 30
End: 2020-10-13

## 2020-10-13 NOTE — TELEPHONE ENCOUNTER
[facility] called asking for verbal order to recertify skilled nursing /w[facility]   [facility] )

## 2020-11-06 ENCOUNTER — TELEPHONE (OUTPATIENT)
Dept: FAMILY MEDICINE CLINIC | Age: 30
End: 2020-11-06

## 2020-11-06 NOTE — LETTER
[facility]  [address] [place] [id number]  Phone: [phone]  Fax: [phone]          November 6, 2020     Patient: Traci Liu   YOB: 1990   Date of Visit: 11/6/2020       To Whom It May Concern:    Santos Stark requires the use of a wheelchair for mobility and is in need of a wheelchair ramp. If you have any questions or concerns, please don't hesitate to call.     Sincerely,          Marilee Dietrich, DO

## 2020-11-19 ENCOUNTER — TELEPHONE (OUTPATIENT)
Dept: FAMILY MEDICINE CLINIC | Age: 30
End: 2020-11-19

## 2020-11-19 RX ORDER — DOXYCYCLINE HYCLATE 100 MG
100 TABLET ORAL 2 TIMES DAILY
Qty: 10 TABLET | Refills: 0 | Status: SHIPPED | OUTPATIENT
Start: 2020-11-19 | End: 2020-11-24

## 2020-11-19 NOTE — TELEPHONE ENCOUNTER
Doxy sent   If he can give a sample before starting it, that would be ideal  If not that's ok  If he begins to worsen, needs to go to the ER  Electronically signed by RANJIT Strong CNP on 11/19/2020 at 12:29 PM

## 2020-11-19 NOTE — TELEPHONE ENCOUNTER
Patient's mom,  Pato informed, verbally understood. Mom will  cup and order tomorrow to complete testing.

## 2020-11-19 NOTE — TELEPHONE ENCOUNTER
Pt states he has a UTI, he waited to long to change his super pubic cath  He is having fever and chills today  Pharmacy [address]

## 2020-11-24 ENCOUNTER — NURSE ONLY (OUTPATIENT)
Dept: LAB | Age: 30
End: 2020-11-24

## 2020-11-27 LAB
ORGANISM: ABNORMAL
ORGANISM: ABNORMAL
URINE CULTURE, ROUTINE: ABNORMAL
URINE CULTURE, ROUTINE: ABNORMAL

## 2020-11-30 RX ORDER — BUSPIRONE HYDROCHLORIDE 10 MG/1
TABLET ORAL
Qty: 270 TABLET | Refills: 3 | Status: SHIPPED | OUTPATIENT
Start: 2020-11-30 | End: 2021-11-22

## 2020-12-29 RX ORDER — AMANTADINE HYDROCHLORIDE 100 MG/1
100 CAPSULE, GELATIN COATED ORAL DAILY
Qty: 180 CAPSULE | Refills: 3 | Status: SHIPPED | OUTPATIENT
Start: 2020-12-29

## 2020-12-31 RX ORDER — GABAPENTIN 600 MG/1
600 TABLET ORAL 4 TIMES DAILY
Qty: 120 TABLET | Refills: 3 | Status: SHIPPED | OUTPATIENT
Start: 2020-12-31 | End: 2021-05-14

## 2021-01-13 DIAGNOSIS — K59.09 OTHER CONSTIPATION: ICD-10-CM

## 2021-01-13 RX ORDER — DOCUSATE SODIUM 100 MG/1
100 CAPSULE, LIQUID FILLED ORAL 2 TIMES DAILY
Qty: 180 CAPSULE | Refills: 3 | Status: SHIPPED | OUTPATIENT
Start: 2021-01-13

## 2021-01-13 NOTE — TELEPHONE ENCOUNTER
Patient calling because he was told by the pharmacy that he needed renewal of his stool softener medication. Patient cannot remember what the name of the medication is, just that the pills are orange gel caps. Please call the patient.  Ph: [phone]

## 2021-01-19 DIAGNOSIS — F32.2 SEVERE SINGLE CURRENT EPISODE OF MAJOR DEPRESSIVE DISORDER, WITHOUT PSYCHOTIC FEATURES (HCC): ICD-10-CM

## 2021-01-19 RX ORDER — FERROUS SULFATE 325(65) MG
TABLET ORAL
Qty: 56 TABLET | Refills: 1 | Status: SHIPPED | OUTPATIENT
Start: 2021-01-19 | End: 2021-03-15

## 2021-01-19 RX ORDER — ERGOCALCIFEROL 1.25 MG/1
CAPSULE ORAL
Qty: 4 CAPSULE | Refills: 3 | Status: SHIPPED | OUTPATIENT
Start: 2021-01-19 | End: 2021-05-10

## 2021-01-19 RX ORDER — TRAZODONE HYDROCHLORIDE 100 MG/1
TABLET ORAL
Qty: 180 TABLET | Refills: 3 | Status: SHIPPED | OUTPATIENT
Start: 2021-01-19

## 2021-01-19 NOTE — TELEPHONE ENCOUNTER
Recent Visits  Date Type Provider Dept   07/24/20 Office Visit Flavia Knowles, DO [facility]   02/18/20 Office Visit Flavia Knowles, DO [facility]   01/13/20 Office Visit Flavia Knowles, DO [facility]   10/10/19 Office Visit Flavia Knowles, DO [facility]   Showing recent visits within past 540 days with a meds authorizing provider and meeting all other requirements     Future Appointments  Date Type Provider Dept   01/25/21 Appointment DO Sena Millerx  [address] future appointments within next 150 days with a meds authorizing provider and meeting all other requirements

## 2021-01-19 NOTE — TELEPHONE ENCOUNTER
Recent Visits  Date Type Provider Dept   07/24/20 Office Visit Cheryl Burrows,  [facility]   02/18/20 Office Visit DO [facility]   01/13/20 Office Visit Cheryl Burrows DO [facility]   10/10/19 Office Visit Cheryl Burrows DO [facility]   Showing recent visits within past 540 days with a meds authorizing provider and meeting all other requirements     Future Appointments  Date Type Provider Dept   01/25/21 Appointment DO Sena Bauerx  [address] future appointments within next 150 days with a meds authorizing provider and meeting all other requirements

## 2021-01-26 ENCOUNTER — TELEPHONE (OUTPATIENT)
Dept: FAMILY MEDICINE CLINIC | Age: 31
End: 2021-01-26

## 2021-01-26 NOTE — TELEPHONE ENCOUNTER
Patient: Humble Westfall     Provider: Rocio Silverio     Patients dad would like to know if his son can come in for an earlier appointment. He prefers before 11:00am. Dr. Noemí Archuleta earliest  next appointment is not until 2/11/2021 and he would like for his son to be see before then for a follow up for a wound.  Patient screened green

## 2021-01-26 NOTE — TELEPHONE ENCOUNTER
Spoke with pt mother informed her of the walk in care open Monday thru Friday from 8 am to 4 pm and hey can just come in without schedualing an appt and just let the  know they are here for a 6 month f/u that was canceled on 01/25/21

## 2021-02-12 NOTE — PROGRESS NOTES
Attempted PAT call. Number was for patient's mother and she is working.  She said she would give patient our number and have him call back

## 2021-02-15 ENCOUNTER — HOSPITAL ENCOUNTER (OUTPATIENT)
Age: 31
Setting detail: SPECIMEN
Discharge: HOME | End: 2021-02-15
Payer: MEDICAID

## 2021-02-15 LAB — SOURCE: NORMAL

## 2021-02-15 PROCEDURE — U0003 INFECTIOUS AGENT DETECTION BY NUCLEIC ACID (DNA OR RNA); SEVERE ACUTE RESPIRATORY SYNDROME CORONAVIRUS 2 (SARS-COV-2) (CORONAVIRUS DISEASE [COVID-19]), AMPLIFIED PROBE TECHNIQUE, MAKING USE OF HIGH THROUGHPUT TECHNOLOGIES AS DESCRIBED BY CMS-2020-01-R: HCPCS

## 2021-02-15 NOTE — PROGRESS NOTES
I spoke with Ran Pereira and his Mother and explained that we would like him to come thru the drive thru for a covid test before surgery. Ran Pereira is in a wheelchair. Srikanth's Mother stated they should be able to do that today as soon as possible before the weather gets bad. Thank you.

## 2021-02-18 ENCOUNTER — HOSPITAL ENCOUNTER (OUTPATIENT)
Age: 31
Discharge: HOME | End: 2021-02-18
Payer: MEDICAID

## 2021-02-18 ENCOUNTER — HOSPITAL ENCOUNTER (OUTPATIENT)
Dept: GENERAL RADIOLOGY | Age: 31
Discharge: HOME | End: 2021-02-18
Payer: MEDICAID

## 2021-02-18 DIAGNOSIS — M20.41 HAMMERTOE OF RIGHT FOOT: ICD-10-CM

## 2021-02-18 DIAGNOSIS — B35.1 ONYCHOMYCOSIS: ICD-10-CM

## 2021-02-18 DIAGNOSIS — Z01.818 PREOP TESTING: ICD-10-CM

## 2021-02-18 DIAGNOSIS — M62.472 CONTRACTURE OF MUSCLE ANKLE AND FOOT, LEFT: ICD-10-CM

## 2021-02-18 DIAGNOSIS — M62.471 CONTRACTURE OF MUSCLE ANKLE AND FOOT, RIGHT: ICD-10-CM

## 2021-02-18 DIAGNOSIS — M20.42 HAMMERTOE OF LEFT FOOT: ICD-10-CM

## 2021-02-18 LAB
ANION GAP SERPL CALCULATED.3IONS-SCNC: 11 MEQ/L (ref 8–16)
BASOPHILS # BLD: 0.3 %
BASOPHILS ABSOLUTE: 0 THOU/MM3 (ref 0–0.1)
BUN BLDV-MCNC: 6 MG/DL (ref 7–22)
CALCIUM SERPL-MCNC: 9.4 MG/DL (ref 8.5–10.5)
CHLORIDE BLD-SCNC: 107 MEQ/L (ref 98–111)
CO2: 22 MEQ/L (ref 23–33)
CREAT SERPL-MCNC: 0.6 MG/DL (ref 0.4–1.2)
EKG ATRIAL RATE: 94 BPM
EKG P AXIS: 82 DEGREES
EKG P-R INTERVAL: 156 MS
EKG Q-T INTERVAL: 362 MS
EKG QRS DURATION: 88 MS
EKG QTC CALCULATION (BAZETT): 452 MS
EKG R AXIS: 73 DEGREES
EKG T AXIS: 82 DEGREES
EKG VENTRICULAR RATE: 94 BPM
EOSINOPHIL # BLD: 1 %
EOSINOPHILS ABSOLUTE: 0.1 THOU/MM3 (ref 0–0.4)
ERYTHROCYTE [DISTWIDTH] IN BLOOD BY AUTOMATED COUNT: 15.2 % (ref 11.5–14.5)
ERYTHROCYTE [DISTWIDTH] IN BLOOD BY AUTOMATED COUNT: 48.6 FL (ref 35–45)
GFR SERPL CREATININE-BSD FRML MDRD: > 90 ML/MIN/1.73M2
GLUCOSE BLD-MCNC: 93 MG/DL (ref 70–108)
HCT VFR BLD CALC: 45.3 % (ref 42–52)
HEMOGLOBIN: 15.2 GM/DL (ref 14–18)
IMMATURE GRANS (ABS): 0.03 THOU/MM3 (ref 0–0.07)
IMMATURE GRANULOCYTES: 0.3 %
LYMPHOCYTES # BLD: 14.7 %
LYMPHOCYTES ABSOLUTE: 1.7 THOU/MM3 (ref 1–4.8)
MCH RBC QN AUTO: 29.4 PG (ref 26–33)
MCHC RBC AUTO-ENTMCNC: 33.6 GM/DL (ref 32.2–35.5)
MCV RBC AUTO: 87.6 FL (ref 80–94)
MONOCYTES # BLD: 4.4 %
MONOCYTES ABSOLUTE: 0.5 THOU/MM3 (ref 0.4–1.3)
NUCLEATED RED BLOOD CELLS: 0 /100 WBC
PLATELET # BLD: 411 THOU/MM3 (ref 130–400)
PMV BLD AUTO: 8.8 FL (ref 9.4–12.4)
POTASSIUM SERPL-SCNC: 3.7 MEQ/L (ref 3.5–5.2)
RBC # BLD: 5.17 MILL/MM3 (ref 4.7–6.1)
SEG NEUTROPHILS: 79.3 %
SEGMENTED NEUTROPHILS ABSOLUTE COUNT: 9.1 THOU/MM3 (ref 1.8–7.7)
SODIUM BLD-SCNC: 140 MEQ/L (ref 135–145)
WBC # BLD: 11.5 THOU/MM3 (ref 4.8–10.8)

## 2021-02-18 PROCEDURE — 36415 COLL VENOUS BLD VENIPUNCTURE: CPT

## 2021-02-18 PROCEDURE — 71046 X-RAY EXAM CHEST 2 VIEWS: CPT

## 2021-02-18 PROCEDURE — 85025 COMPLETE CBC W/AUTO DIFF WBC: CPT

## 2021-02-18 PROCEDURE — 93005 ELECTROCARDIOGRAM TRACING: CPT | Performed by: PODIATRIST

## 2021-02-18 PROCEDURE — 80048 BASIC METABOLIC PNL TOTAL CA: CPT

## 2021-02-19 ENCOUNTER — ANESTHESIA (OUTPATIENT)
Dept: OPERATING ROOM | Age: 31
End: 2021-02-19
Payer: MEDICAID

## 2021-02-19 ENCOUNTER — ANESTHESIA EVENT (OUTPATIENT)
Dept: OPERATING ROOM | Age: 31
End: 2021-02-19
Payer: MEDICAID

## 2021-02-19 ENCOUNTER — HOSPITAL ENCOUNTER (OUTPATIENT)
Age: 31
Setting detail: OUTPATIENT SURGERY
Discharge: HOME | End: 2021-02-19
Attending: PODIATRIST | Admitting: PODIATRIST
Payer: MEDICAID

## 2021-02-19 VITALS — SYSTOLIC BLOOD PRESSURE: 120 MMHG | OXYGEN SATURATION: 100 % | DIASTOLIC BLOOD PRESSURE: 67 MMHG | TEMPERATURE: 96.8 F

## 2021-02-19 VITALS
SYSTOLIC BLOOD PRESSURE: 130 MMHG | HEIGHT: 70 IN | BODY MASS INDEX: 21.9 KG/M2 | OXYGEN SATURATION: 94 % | WEIGHT: 153 LBS | DIASTOLIC BLOOD PRESSURE: 78 MMHG | TEMPERATURE: 98.4 F | HEART RATE: 78 BPM | RESPIRATION RATE: 16 BRPM

## 2021-02-19 DIAGNOSIS — Z41.9 ELECTIVE SURGERY: ICD-10-CM

## 2021-02-19 DIAGNOSIS — G82.20 PARAPLEGIA (HCC): Primary | ICD-10-CM

## 2021-02-19 PROCEDURE — 7100000001 HC PACU RECOVERY - ADDTL 15 MIN: Performed by: PODIATRIST

## 2021-02-19 PROCEDURE — 6360000002 HC RX W HCPCS: Performed by: NURSE ANESTHETIST, CERTIFIED REGISTERED

## 2021-02-19 PROCEDURE — 3600000013 HC SURGERY LEVEL 3 ADDTL 15MIN: Performed by: PODIATRIST

## 2021-02-19 PROCEDURE — 2709999900 HC NON-CHARGEABLE SUPPLY: Performed by: PODIATRIST

## 2021-02-19 PROCEDURE — 3700000001 HC ADD 15 MINUTES (ANESTHESIA): Performed by: PODIATRIST

## 2021-02-19 PROCEDURE — 7100000011 HC PHASE II RECOVERY - ADDTL 15 MIN: Performed by: PODIATRIST

## 2021-02-19 PROCEDURE — 88307 TISSUE EXAM BY PATHOLOGIST: CPT

## 2021-02-19 PROCEDURE — 7100000000 HC PACU RECOVERY - FIRST 15 MIN: Performed by: PODIATRIST

## 2021-02-19 PROCEDURE — 7100000010 HC PHASE II RECOVERY - FIRST 15 MIN: Performed by: PODIATRIST

## 2021-02-19 PROCEDURE — 2500000003 HC RX 250 WO HCPCS: Performed by: NURSE ANESTHETIST, CERTIFIED REGISTERED

## 2021-02-19 PROCEDURE — 3700000000 HC ANESTHESIA ATTENDED CARE: Performed by: PODIATRIST

## 2021-02-19 PROCEDURE — 3600000003 HC SURGERY LEVEL 3 BASE: Performed by: PODIATRIST

## 2021-02-19 PROCEDURE — 6360000002 HC RX W HCPCS: Performed by: ANESTHESIOLOGY

## 2021-02-19 PROCEDURE — 93010 ELECTROCARDIOGRAM REPORT: CPT | Performed by: INTERNAL MEDICINE

## 2021-02-19 PROCEDURE — 2580000003 HC RX 258: Performed by: NURSE ANESTHETIST, CERTIFIED REGISTERED

## 2021-02-19 PROCEDURE — 88311 DECALCIFY TISSUE: CPT

## 2021-02-19 RX ORDER — CYCLOBENZAPRINE HCL 5 MG
5 TABLET ORAL 3 TIMES DAILY PRN
Qty: 30 TABLET | Refills: 0 | Status: SHIPPED | OUTPATIENT
Start: 2021-02-19 | End: 2021-03-01

## 2021-02-19 RX ORDER — FENTANYL CITRATE 50 UG/ML
INJECTION, SOLUTION INTRAMUSCULAR; INTRAVENOUS PRN
Status: DISCONTINUED | OUTPATIENT
Start: 2021-02-19 | End: 2021-02-19 | Stop reason: SDUPTHER

## 2021-02-19 RX ORDER — LIDOCAINE HCL/PF 100 MG/5ML
SYRINGE (ML) INJECTION PRN
Status: DISCONTINUED | OUTPATIENT
Start: 2021-02-19 | End: 2021-02-19 | Stop reason: SDUPTHER

## 2021-02-19 RX ORDER — MIDAZOLAM HYDROCHLORIDE 1 MG/ML
INJECTION INTRAMUSCULAR; INTRAVENOUS PRN
Status: DISCONTINUED | OUTPATIENT
Start: 2021-02-19 | End: 2021-02-19 | Stop reason: SDUPTHER

## 2021-02-19 RX ORDER — PROMETHAZINE HYDROCHLORIDE 25 MG/1
12.5 TABLET ORAL EVERY 6 HOURS PRN
Status: DISCONTINUED | OUTPATIENT
Start: 2021-02-19 | End: 2021-02-19 | Stop reason: HOSPADM

## 2021-02-19 RX ORDER — PROPOFOL 10 MG/ML
INJECTION, EMULSION INTRAVENOUS PRN
Status: DISCONTINUED | OUTPATIENT
Start: 2021-02-19 | End: 2021-02-19 | Stop reason: SDUPTHER

## 2021-02-19 RX ORDER — FENTANYL CITRATE 50 UG/ML
25 INJECTION, SOLUTION INTRAMUSCULAR; INTRAVENOUS EVERY 5 MIN PRN
Status: COMPLETED | OUTPATIENT
Start: 2021-02-19 | End: 2021-02-19

## 2021-02-19 RX ORDER — HYDROCODONE BITARTRATE AND ACETAMINOPHEN 5; 325 MG/1; MG/1
1 TABLET ORAL EVERY 4 HOURS PRN
Qty: 42 TABLET | Refills: 0 | Status: SHIPPED | OUTPATIENT
Start: 2021-02-19 | End: 2021-02-26

## 2021-02-19 RX ORDER — DOXYCYCLINE HYCLATE 100 MG
100 TABLET ORAL 2 TIMES DAILY
Qty: 20 TABLET | Refills: 0 | Status: SHIPPED | OUTPATIENT
Start: 2021-02-19 | End: 2021-03-01

## 2021-02-19 RX ORDER — SODIUM CHLORIDE 9 MG/ML
INJECTION, SOLUTION INTRAVENOUS CONTINUOUS
Status: DISCONTINUED | OUTPATIENT
Start: 2021-02-19 | End: 2021-02-19 | Stop reason: HOSPADM

## 2021-02-19 RX ORDER — SODIUM CHLORIDE 9 MG/ML
INJECTION, SOLUTION INTRAVENOUS CONTINUOUS PRN
Status: DISCONTINUED | OUTPATIENT
Start: 2021-02-19 | End: 2021-02-19 | Stop reason: SDUPTHER

## 2021-02-19 RX ORDER — SODIUM CHLORIDE 0.9 % (FLUSH) 0.9 %
10 SYRINGE (ML) INJECTION PRN
Status: DISCONTINUED | OUTPATIENT
Start: 2021-02-19 | End: 2021-02-19 | Stop reason: HOSPADM

## 2021-02-19 RX ORDER — SODIUM CHLORIDE 0.9 % (FLUSH) 0.9 %
10 SYRINGE (ML) INJECTION EVERY 12 HOURS SCHEDULED
Status: DISCONTINUED | OUTPATIENT
Start: 2021-02-19 | End: 2021-02-19 | Stop reason: HOSPADM

## 2021-02-19 RX ORDER — SODIUM CHLORIDE 0.9 % (FLUSH) 0.9 %
10 SYRINGE (ML) INJECTION EVERY 12 HOURS SCHEDULED
Status: DISCONTINUED | OUTPATIENT
Start: 2021-02-19 | End: 2021-02-19 | Stop reason: SDUPTHER

## 2021-02-19 RX ORDER — LABETALOL 20 MG/4 ML (5 MG/ML) INTRAVENOUS SYRINGE
10 EVERY 10 MIN PRN
Status: DISCONTINUED | OUTPATIENT
Start: 2021-02-19 | End: 2021-02-19 | Stop reason: HOSPADM

## 2021-02-19 RX ORDER — OXYCODONE HYDROCHLORIDE 5 MG/1
5 TABLET ORAL EVERY 4 HOURS PRN
Status: DISCONTINUED | OUTPATIENT
Start: 2021-02-19 | End: 2021-02-19 | Stop reason: HOSPADM

## 2021-02-19 RX ORDER — SODIUM CHLORIDE 0.9 % (FLUSH) 0.9 %
10 SYRINGE (ML) INJECTION PRN
Status: DISCONTINUED | OUTPATIENT
Start: 2021-02-19 | End: 2021-02-19 | Stop reason: SDUPTHER

## 2021-02-19 RX ORDER — OXYCODONE HYDROCHLORIDE 5 MG/1
10 TABLET ORAL EVERY 4 HOURS PRN
Status: DISCONTINUED | OUTPATIENT
Start: 2021-02-19 | End: 2021-02-19 | Stop reason: HOSPADM

## 2021-02-19 RX ORDER — ONDANSETRON 2 MG/ML
4 INJECTION INTRAMUSCULAR; INTRAVENOUS EVERY 6 HOURS PRN
Status: DISCONTINUED | OUTPATIENT
Start: 2021-02-19 | End: 2021-02-19 | Stop reason: HOSPADM

## 2021-02-19 RX ORDER — FENTANYL CITRATE 50 UG/ML
50 INJECTION, SOLUTION INTRAMUSCULAR; INTRAVENOUS EVERY 5 MIN PRN
Status: DISCONTINUED | OUTPATIENT
Start: 2021-02-19 | End: 2021-02-19 | Stop reason: HOSPADM

## 2021-02-19 RX ADMIN — FENTANYL CITRATE 25 MCG: 50 INJECTION, SOLUTION INTRAMUSCULAR; INTRAVENOUS at 13:50

## 2021-02-19 RX ADMIN — MIDAZOLAM HYDROCHLORIDE 2 MG: 1 INJECTION, SOLUTION INTRAMUSCULAR; INTRAVENOUS at 12:01

## 2021-02-19 RX ADMIN — PROPOFOL 150 MG: 10 INJECTION, EMULSION INTRAVENOUS at 12:10

## 2021-02-19 RX ADMIN — Medication 50 MG: at 12:10

## 2021-02-19 RX ADMIN — FENTANYL CITRATE 25 MCG: 50 INJECTION, SOLUTION INTRAMUSCULAR; INTRAVENOUS at 13:45

## 2021-02-19 RX ADMIN — FENTANYL CITRATE 50 MCG: 50 INJECTION, SOLUTION INTRAMUSCULAR; INTRAVENOUS at 12:10

## 2021-02-19 RX ADMIN — FENTANYL CITRATE 25 MCG: 50 INJECTION, SOLUTION INTRAMUSCULAR; INTRAVENOUS at 13:35

## 2021-02-19 RX ADMIN — FENTANYL CITRATE 25 MCG: 50 INJECTION, SOLUTION INTRAMUSCULAR; INTRAVENOUS at 13:40

## 2021-02-19 RX ADMIN — SODIUM CHLORIDE: 9 INJECTION, SOLUTION INTRAVENOUS at 12:03

## 2021-02-19 RX ADMIN — FENTANYL CITRATE 50 MCG: 50 INJECTION, SOLUTION INTRAMUSCULAR; INTRAVENOUS at 12:24

## 2021-02-19 ASSESSMENT — PAIN DESCRIPTION - ORIENTATION
ORIENTATION: RIGHT

## 2021-02-19 ASSESSMENT — PULMONARY FUNCTION TESTS
PIF_VALUE: 4
PIF_VALUE: 1
PIF_VALUE: 11
PIF_VALUE: 10
PIF_VALUE: 9
PIF_VALUE: 4
PIF_VALUE: 5
PIF_VALUE: 12
PIF_VALUE: 5
PIF_VALUE: 4
PIF_VALUE: 9
PIF_VALUE: 10
PIF_VALUE: 14
PIF_VALUE: 0
PIF_VALUE: 10
PIF_VALUE: 12
PIF_VALUE: 12
PIF_VALUE: 11
PIF_VALUE: 0
PIF_VALUE: 11
PIF_VALUE: 12
PIF_VALUE: 11
PIF_VALUE: 16
PIF_VALUE: 12
PIF_VALUE: 11
PIF_VALUE: 4
PIF_VALUE: 0
PIF_VALUE: 11
PIF_VALUE: 1
PIF_VALUE: 12
PIF_VALUE: 12
PIF_VALUE: 4
PIF_VALUE: 10
PIF_VALUE: 10
PIF_VALUE: 11

## 2021-02-19 ASSESSMENT — PAIN SCALES - GENERAL
PAINLEVEL_OUTOF10: 4
PAINLEVEL_OUTOF10: 4
PAINLEVEL_OUTOF10: 2

## 2021-02-19 ASSESSMENT — PAIN DESCRIPTION - DESCRIPTORS
DESCRIPTORS: ACHING
DESCRIPTORS: DULL;DISCOMFORT
DESCRIPTORS: ACHING

## 2021-02-19 ASSESSMENT — PAIN DESCRIPTION - PAIN TYPE
TYPE: SURGICAL PAIN

## 2021-02-19 ASSESSMENT — PAIN DESCRIPTION - LOCATION
LOCATION: FOOT
LOCATION: FOOT

## 2021-02-19 NOTE — H&P
Department of Surgery  H&P Interval Note  Outpatient History and Physical was reviewed pre-operatively, with no interval changes to note prior to scheduled surgical intervention. Patient was assessed, all questions/concerns regarding nani-operative management were addressed to patient satisfaction. Operative site was marked prior to transportation to the operative room. Ayanna Graham D.P.M.
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
## 2021-02-19 NOTE — PROGRESS NOTES
[address] [facility] [address] ARRIVED IN PACU FROM O R AFTER GENERAL ANESTHESIA. SEE FLOW SHEET . Gustavo Cornell [address]  FOR POST OP RIGHT FOOT PAIN . SEE MAR  1355 REST CALM AND COMFORTABLE. SAYS PAIN NOW . 2/10 AND TOLERABLE AND CONTROLLED  1420 TO SDS . MOTHER AND GRANDFATHER AT BEDSIDE.  REPORT TO [facility] RN

## 2021-02-19 NOTE — PROGRESS NOTES
Patient admitted to [facility] room 16 with family at bedside. Bed in low position side rails up call light in reach. Patient denies questions at this time.

## 2021-02-19 NOTE — PROGRESS NOTES
Discharge instructions discussed with the patient and mother. Verbalized understanding. Discharge complete. Off unit by w/c to car.

## 2021-02-19 NOTE — BRIEF OP NOTE
Brief Postoperative Note      Patient: Hung Rhoades  YOB: 1990  MRN: [id number]    Date of Procedure: 2/19/2021    Pre-Op Diagnosis: BILATERAL ANKLE CONTRACTURE, HAMMERTOES, CAVO VARUS, OSTEOMYELITIS ON THE RIGHT    Post-Op Diagnosis: Same       Procedure(s):  RIGHT TRANSMETATARSAL AMPUTATION  BILAT TENDO ACHILLES LENGTHENING, FLEXOR DIGITORUM LONGUS TENDON AND PERONEAL TENDON LENGTHENING    Surgeon(s):  Emmanuel Fritz DPM    Assistant:  Resident: Ericka Singh DPM    Anesthesia: General    Estimated Blood Loss (mL): less than 896     Complications: None    Hemostasis: none    Materials: 3-0 monocryl staples    Injectables: none    Specimens:   * No specimens in log *    Implants:  * No implants in log *      Drains:   Colostomy LUQ (Active)       Suprapubic Catheter 18 fr (Active)       Findings: consistent with diagnosis    Electronically signed by Ericka Singh DPM on 2/19/2021 at 1:10 PM

## 2021-02-19 NOTE — ANESTHESIA POSTPROCEDURE EVALUATION
Department of Anesthesiology  Postprocedure Note    Patient: Kvng Taylor  MRN: [id number]  YOB: 1990  Date of evaluation: 2/19/2021  Time:  2:15 PM     Procedure Summary     Date: 02/19/21 Room / Location: [address] HERACLIO [place]    Anesthesia Start: 1203 Anesthesia Stop: 0932    Procedures:       RIGHT TRANSMETATARSAL AMPUTATION (Right Toes)      BILAT TENDO ACHILLES LENGTHENING, FLEXOR DIGITORUM LONGUS TENDON AND PERONEAL TENDON LENGTHENING (Right ) Diagnosis: (BILATERAL ANKLE CONTRACTURE, HAMMERTOES, CAVO VARUS, OSTEOMYELITIS ON THE RIGHT)    Surgeons: Kendal Horton DPM Responsible Provider: Mt Huff MD    Anesthesia Type: general ASA Status: 4          Anesthesia Type: general    Ayanna Phase I: Ayanna Score: 10    Ayanna Phase II:      Last vitals: Reviewed and per EMR flowsheets.        Anesthesia Post Evaluation    Patient location during evaluation: PACU  Patient participation: complete - patient participated  Level of consciousness: awake  Airway patency: patent  Nausea & Vomiting: no vomiting and no nausea  Complications: no  Cardiovascular status: hemodynamically stable  Respiratory status: acceptable  Hydration status: stable

## 2021-04-12 RX ORDER — CYCLOBENZAPRINE HCL 10 MG
TABLET ORAL
Qty: 84 TABLET | OUTPATIENT
Start: 2021-04-12

## 2021-04-12 RX ORDER — IBUPROFEN 800 MG/1
800 TABLET ORAL EVERY 8 HOURS PRN
Qty: 90 TABLET | Refills: 3 | Status: SHIPPED | OUTPATIENT
Start: 2021-04-12 | End: 2022-02-22 | Stop reason: SDUPTHER

## 2021-04-12 NOTE — TELEPHONE ENCOUNTER
Recent Visits  Date Type Provider Dept   07/24/20 Office Visit Morgan Najera, DO [facility]   02/18/20 Office Visit Morgan Najera, DO [facility]   01/13/20 Office Visit Morgan Najera, DO [facility]   Showing recent visits within past 540 days with a meds authorizing provider and meeting all other requirements     Future Appointments  No visits were found meeting these conditions. Showing future appointments within next 150 days with a meds authorizing provider and meeting all other requirements     No future appointments.

## 2021-04-26 RX ORDER — CYCLOBENZAPRINE HCL 10 MG
TABLET ORAL
Qty: 90 TABLET | Refills: 5 | Status: SHIPPED | OUTPATIENT
Start: 2021-04-26 | End: 2021-10-26

## 2021-04-26 NOTE — TELEPHONE ENCOUNTER
----- Message from Mulu Flowers sent at 4/26/2021  3:02 PM EDT -----  Subject: Message to Provider    QUESTIONS  Information for Provider? The patient is requesting a temporary Rx for   pain medication to treat injured shoulder? The patient states that he fell   out of the bed on Sunday. Please call the patient to advise  ---------------------------------------------------------------------------  --------------  CALL BACK INFO  What is the best way for the office to contact you? OK to leave message on   voicemail  Preferred Call Back Phone Number? [phone]  ---------------------------------------------------------------------------  --------------  SCRIPT ANSWERS  Relationship to Patient?  Self

## 2021-04-26 NOTE — TELEPHONE ENCOUNTER
----- Message from Sary Christian sent at 4/26/2021  3:01 PM EDT -----  Subject: Refill Request    QUESTIONS  Name of Medication? cyclobenzaprine (FLEXERIL) 5 MG tablet  Patient-reported dosage and instructions? 10 mg, 1 tab 3x daily  How many days do you have left? 2  Preferred Pharmacy? [address] phone number (if available)? [phone]  Additional Information for Provider? Patient states that he receives his   medication pre packaged from [facility]  ---------------------------------------------------------------------------  --------------  [address]  What is the best way for the office to contact you?  OK to leave message on   voicemail  Preferred Call Back Phone Number? [phone]

## 2021-04-27 ENCOUNTER — TELEPHONE (OUTPATIENT)
Dept: FAMILY MEDICINE CLINIC | Age: 31
End: 2021-04-27

## 2021-04-27 NOTE — TELEPHONE ENCOUNTER
----- Message from Simone Cunningham sent at 4/27/2021  9:50 AM EDT -----  Subject: Message to Provider    QUESTIONS  Information for Provider? Robert Bearden is from [employer] is   calling on behalf of Patient's mother who need to have information for an   appointment and verification for handicap Jaymie Roa in regards to Brain Injury. [employer] professionals only sees patient for wound care. Please   fax to [phone]. Please contact mother at the [phone]   ---------------------------------------------------------------------------  --------------  CALL BACK INFO  What is the best way for the office to contact you? OK to leave message on   voicemail  Preferred Call Back Phone Number? [phone]  ---------------------------------------------------------------------------  --------------  SCRIPT ANSWERS  Relationship to Patient? Third Party  Representative Name?  Robert Bearden ([employer])

## 2021-05-10 RX ORDER — ERGOCALCIFEROL 1.25 MG/1
CAPSULE ORAL
Qty: 4 CAPSULE | Refills: 3 | Status: SHIPPED | OUTPATIENT
Start: 2021-05-10 | End: 2021-08-31

## 2021-05-14 RX ORDER — GABAPENTIN 600 MG/1
600 TABLET ORAL 4 TIMES DAILY
Qty: 120 TABLET | Refills: 3 | Status: SHIPPED | OUTPATIENT
Start: 2021-05-14 | End: 2021-09-15 | Stop reason: SDUPTHER

## 2021-06-08 ENCOUNTER — TELEPHONE (OUTPATIENT)
Dept: FAMILY MEDICINE CLINIC | Age: 31
End: 2021-06-08

## 2021-06-08 NOTE — TELEPHONE ENCOUNTER
Erlin [place] from [facility] called and said she did not receive the email from tom in regards to the generator for medical necessity        email- [email or web address]        Phone- [phone]

## 2021-06-09 NOTE — TELEPHONE ENCOUNTER
Letter was resent on 6/8/21 at 4:30 Pm called to confirm she received email and she did not will attempt to fax at this time

## 2021-06-10 ENCOUNTER — OFFICE VISIT (OUTPATIENT)
Dept: SURGERY | Age: 31
End: 2021-06-10
Payer: MEDICAID

## 2021-06-10 ENCOUNTER — HOSPITAL ENCOUNTER (OUTPATIENT)
Age: 31
Discharge: HOME | End: 2021-06-10
Payer: MEDICAID

## 2021-06-10 ENCOUNTER — PREP FOR PROCEDURE (OUTPATIENT)
Dept: SURGERY | Age: 31
End: 2021-06-10

## 2021-06-10 VITALS
RESPIRATION RATE: 18 BRPM | SYSTOLIC BLOOD PRESSURE: 120 MMHG | BODY MASS INDEX: 21.9 KG/M2 | HEART RATE: 109 BPM | OXYGEN SATURATION: 94 % | TEMPERATURE: 97.2 F | HEIGHT: 70 IN | WEIGHT: 153 LBS | DIASTOLIC BLOOD PRESSURE: 68 MMHG

## 2021-06-10 DIAGNOSIS — L89.324 DECUBITUS ULCER OF LEFT ISCHIUM, STAGE 4 (HCC): Primary | ICD-10-CM

## 2021-06-10 DIAGNOSIS — L89.156 PRESSURE INJURY OF DEEP TISSUE OF SACRAL REGION: ICD-10-CM

## 2021-06-10 DIAGNOSIS — Z01.818 PRE-OP TESTING: ICD-10-CM

## 2021-06-10 LAB
INFLUENZA A: NOT DETECTED
INFLUENZA B: NOT DETECTED
SARS-COV-2 RNA, RT PCR: NOT DETECTED

## 2021-06-10 PROCEDURE — 87636 SARSCOV2 & INF A&B AMP PRB: CPT

## 2021-06-10 PROCEDURE — 99203 OFFICE O/P NEW LOW 30 MIN: CPT | Performed by: SURGERY

## 2021-06-10 RX ORDER — SODIUM CHLORIDE 9 MG/ML
INJECTION, SOLUTION INTRAVENOUS CONTINUOUS
Status: CANCELLED | OUTPATIENT
Start: 2021-06-10

## 2021-06-10 ASSESSMENT — ENCOUNTER SYMPTOMS
ABDOMINAL DISTENTION: 0
FACIAL SWELLING: 0
COLOR CHANGE: 0
SORE THROAT: 0
NAUSEA: 0
BACK PAIN: 0
WHEEZING: 0
VOMITING: 0
RHINORRHEA: 0
VOICE CHANGE: 0
CHEST TIGHTNESS: 0
PHOTOPHOBIA: 0
ALLERGIC/IMMUNOLOGIC NEGATIVE: 1
EYE PAIN: 0
STRIDOR: 0
CHOKING: 0
SINUS PRESSURE: 0
EYE DISCHARGE: 0
EYE ITCHING: 0
EYE REDNESS: 0
ANAL BLEEDING: 0
CONSTIPATION: 0
RECTAL PAIN: 0
DIARRHEA: 0
ABDOMINAL PAIN: 0
TROUBLE SWALLOWING: 0
APNEA: 0
SHORTNESS OF BREATH: 0
BLOOD IN STOOL: 0
COUGH: 0
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
## 2021-06-15 ENCOUNTER — HOSPITAL ENCOUNTER (OUTPATIENT)
Age: 31
Setting detail: OUTPATIENT SURGERY
Discharge: HOME | End: 2021-06-15
Attending: SURGERY | Admitting: SURGERY
Payer: MEDICAID

## 2021-06-15 ENCOUNTER — ANESTHESIA EVENT (OUTPATIENT)
Dept: OPERATING ROOM | Age: 31
End: 2021-06-15
Payer: MEDICAID

## 2021-06-15 ENCOUNTER — ANESTHESIA (OUTPATIENT)
Dept: OPERATING ROOM | Age: 31
End: 2021-06-15
Payer: MEDICAID

## 2021-06-15 VITALS — OXYGEN SATURATION: 100 % | DIASTOLIC BLOOD PRESSURE: 71 MMHG | SYSTOLIC BLOOD PRESSURE: 109 MMHG | TEMPERATURE: 93.9 F

## 2021-06-15 VITALS
OXYGEN SATURATION: 95 % | WEIGHT: 158 LBS | HEIGHT: 70 IN | DIASTOLIC BLOOD PRESSURE: 87 MMHG | RESPIRATION RATE: 18 BRPM | BODY MASS INDEX: 22.62 KG/M2 | TEMPERATURE: 96.9 F | SYSTOLIC BLOOD PRESSURE: 140 MMHG | HEART RATE: 100 BPM

## 2021-06-15 DIAGNOSIS — M86.652 OSTEOMYELITIS, CHRONIC, PELVIC REGION, LEFT (HCC): ICD-10-CM

## 2021-06-15 DIAGNOSIS — L89.154 PRESSURE INJURY OF SACRAL REGION, STAGE 4 (HCC): Primary | ICD-10-CM

## 2021-06-15 DIAGNOSIS — Z79.2 LONG TERM (CURRENT) USE OF ANTIBIOTICS: ICD-10-CM

## 2021-06-15 PROCEDURE — 6360000002 HC RX W HCPCS: Performed by: NURSE ANESTHETIST, CERTIFIED REGISTERED

## 2021-06-15 PROCEDURE — 88304 TISSUE EXAM BY PATHOLOGIST: CPT

## 2021-06-15 PROCEDURE — 6360000002 HC RX W HCPCS

## 2021-06-15 PROCEDURE — 3600000012 HC SURGERY LEVEL 2 ADDTL 15MIN: Performed by: SURGERY

## 2021-06-15 PROCEDURE — 87081 CULTURE SCREEN ONLY: CPT

## 2021-06-15 PROCEDURE — 7100000000 HC PACU RECOVERY - FIRST 15 MIN: Performed by: SURGERY

## 2021-06-15 PROCEDURE — 87077 CULTURE AEROBIC IDENTIFY: CPT

## 2021-06-15 PROCEDURE — 7100000010 HC PHASE II RECOVERY - FIRST 15 MIN: Performed by: SURGERY

## 2021-06-15 PROCEDURE — 87147 CULTURE TYPE IMMUNOLOGIC: CPT

## 2021-06-15 PROCEDURE — 2580000003 HC RX 258

## 2021-06-15 PROCEDURE — 6370000000 HC RX 637 (ALT 250 FOR IP)

## 2021-06-15 PROCEDURE — 2500000003 HC RX 250 WO HCPCS: Performed by: NURSE ANESTHETIST, CERTIFIED REGISTERED

## 2021-06-15 PROCEDURE — 11047 DBRDMT BONE EACH ADDL: CPT | Performed by: SURGERY

## 2021-06-15 PROCEDURE — 87075 CULTR BACTERIA EXCEPT BLOOD: CPT

## 2021-06-15 PROCEDURE — 6360000002 HC RX W HCPCS: Performed by: ANESTHESIOLOGY

## 2021-06-15 PROCEDURE — 7100000011 HC PHASE II RECOVERY - ADDTL 15 MIN: Performed by: SURGERY

## 2021-06-15 PROCEDURE — 3600000002 HC SURGERY LEVEL 2 BASE: Performed by: SURGERY

## 2021-06-15 PROCEDURE — 3700000000 HC ANESTHESIA ATTENDED CARE: Performed by: SURGERY

## 2021-06-15 PROCEDURE — 3700000001 HC ADD 15 MINUTES (ANESTHESIA): Performed by: SURGERY

## 2021-06-15 PROCEDURE — 7100000001 HC PACU RECOVERY - ADDTL 15 MIN: Performed by: SURGERY

## 2021-06-15 PROCEDURE — 87070 CULTURE OTHR SPECIMN AEROBIC: CPT

## 2021-06-15 PROCEDURE — 87186 SC STD MICRODIL/AGAR DIL: CPT

## 2021-06-15 PROCEDURE — 2709999900 HC NON-CHARGEABLE SUPPLY: Performed by: SURGERY

## 2021-06-15 PROCEDURE — 87205 SMEAR GRAM STAIN: CPT

## 2021-06-15 PROCEDURE — 11044 DBRDMT BONE 1ST 20 SQ CM/<: CPT | Performed by: SURGERY

## 2021-06-15 RX ORDER — OXYCODONE HYDROCHLORIDE 5 MG/1
5 TABLET ORAL EVERY 4 HOURS PRN
Status: DISCONTINUED | OUTPATIENT
Start: 2021-06-15 | End: 2021-06-15

## 2021-06-15 RX ORDER — DEXAMETHASONE SODIUM PHOSPHATE 10 MG/ML
INJECTION, EMULSION INTRAMUSCULAR; INTRAVENOUS PRN
Status: DISCONTINUED | OUTPATIENT
Start: 2021-06-15 | End: 2021-06-15 | Stop reason: SDUPTHER

## 2021-06-15 RX ORDER — SODIUM CHLORIDE 0.9 % (FLUSH) 0.9 %
5-40 SYRINGE (ML) INJECTION EVERY 12 HOURS SCHEDULED
Status: DISCONTINUED | OUTPATIENT
Start: 2021-06-15 | End: 2021-06-15 | Stop reason: HOSPADM

## 2021-06-15 RX ORDER — ONDANSETRON 2 MG/ML
4 INJECTION INTRAMUSCULAR; INTRAVENOUS
Status: DISCONTINUED | OUTPATIENT
Start: 2021-06-15 | End: 2021-06-15 | Stop reason: HOSPADM

## 2021-06-15 RX ORDER — ONDANSETRON 2 MG/ML
4 INJECTION INTRAMUSCULAR; INTRAVENOUS EVERY 6 HOURS PRN
Status: DISCONTINUED | OUTPATIENT
Start: 2021-06-15 | End: 2021-06-15 | Stop reason: HOSPADM

## 2021-06-15 RX ORDER — OXYCODONE HYDROCHLORIDE 5 MG/1
10 TABLET ORAL EVERY 4 HOURS PRN
Status: DISCONTINUED | OUTPATIENT
Start: 2021-06-15 | End: 2021-06-15

## 2021-06-15 RX ORDER — MORPHINE SULFATE 2 MG/ML
4 INJECTION, SOLUTION INTRAMUSCULAR; INTRAVENOUS
Status: DISCONTINUED | OUTPATIENT
Start: 2021-06-15 | End: 2021-06-15 | Stop reason: HOSPADM

## 2021-06-15 RX ORDER — OXYCODONE HCL 10 MG/1
10 TABLET, FILM COATED, EXTENDED RELEASE ORAL EVERY 12 HOURS SCHEDULED
Status: DISCONTINUED | OUTPATIENT
Start: 2021-06-15 | End: 2021-06-15 | Stop reason: HOSPADM

## 2021-06-15 RX ORDER — EPHEDRINE SULFATE/0.9% NACL/PF 50 MG/5 ML
SYRINGE (ML) INTRAVENOUS PRN
Status: DISCONTINUED | OUTPATIENT
Start: 2021-06-15 | End: 2021-06-15 | Stop reason: SDUPTHER

## 2021-06-15 RX ORDER — SODIUM CHLORIDE 9 MG/ML
INJECTION, SOLUTION INTRAVENOUS CONTINUOUS
Status: DISCONTINUED | OUTPATIENT
Start: 2021-06-15 | End: 2021-06-15 | Stop reason: HOSPADM

## 2021-06-15 RX ORDER — ONDANSETRON 4 MG/1
4 TABLET, ORALLY DISINTEGRATING ORAL EVERY 8 HOURS PRN
Status: DISCONTINUED | OUTPATIENT
Start: 2021-06-15 | End: 2021-06-15 | Stop reason: HOSPADM

## 2021-06-15 RX ORDER — FENTANYL CITRATE 50 UG/ML
50 INJECTION, SOLUTION INTRAMUSCULAR; INTRAVENOUS EVERY 5 MIN PRN
Status: DISCONTINUED | OUTPATIENT
Start: 2021-06-15 | End: 2021-06-15 | Stop reason: HOSPADM

## 2021-06-15 RX ORDER — SODIUM CHLORIDE 0.9 % (FLUSH) 0.9 %
5-40 SYRINGE (ML) INJECTION PRN
Status: DISCONTINUED | OUTPATIENT
Start: 2021-06-15 | End: 2021-06-15 | Stop reason: HOSPADM

## 2021-06-15 RX ORDER — ONDANSETRON 2 MG/ML
INJECTION INTRAMUSCULAR; INTRAVENOUS PRN
Status: DISCONTINUED | OUTPATIENT
Start: 2021-06-15 | End: 2021-06-15 | Stop reason: SDUPTHER

## 2021-06-15 RX ORDER — FENTANYL CITRATE 50 UG/ML
INJECTION, SOLUTION INTRAMUSCULAR; INTRAVENOUS PRN
Status: DISCONTINUED | OUTPATIENT
Start: 2021-06-15 | End: 2021-06-15 | Stop reason: SDUPTHER

## 2021-06-15 RX ORDER — HYDROCODONE BITARTRATE AND ACETAMINOPHEN 5; 325 MG/1; MG/1
1-2 TABLET ORAL EVERY 6 HOURS PRN
Qty: 20 TABLET | Refills: 0 | Status: SHIPPED | OUTPATIENT
Start: 2021-06-15 | End: 2021-06-18

## 2021-06-15 RX ORDER — MORPHINE SULFATE 2 MG/ML
2 INJECTION, SOLUTION INTRAMUSCULAR; INTRAVENOUS
Status: DISCONTINUED | OUTPATIENT
Start: 2021-06-15 | End: 2021-06-15 | Stop reason: HOSPADM

## 2021-06-15 RX ORDER — OXYCODONE HCL 10 MG/1
TABLET, FILM COATED, EXTENDED RELEASE ORAL
Status: COMPLETED
Start: 2021-06-15 | End: 2021-06-15

## 2021-06-15 RX ORDER — LIDOCAINE HCL/PF 100 MG/5ML
SYRINGE (ML) INJECTION PRN
Status: DISCONTINUED | OUTPATIENT
Start: 2021-06-15 | End: 2021-06-15 | Stop reason: SDUPTHER

## 2021-06-15 RX ORDER — MIDAZOLAM HYDROCHLORIDE 1 MG/ML
INJECTION INTRAMUSCULAR; INTRAVENOUS PRN
Status: DISCONTINUED | OUTPATIENT
Start: 2021-06-15 | End: 2021-06-15 | Stop reason: SDUPTHER

## 2021-06-15 RX ORDER — MEPERIDINE HYDROCHLORIDE 25 MG/ML
12.5 INJECTION INTRAMUSCULAR; INTRAVENOUS; SUBCUTANEOUS EVERY 5 MIN PRN
Status: DISCONTINUED | OUTPATIENT
Start: 2021-06-15 | End: 2021-06-15 | Stop reason: HOSPADM

## 2021-06-15 RX ORDER — PROPOFOL 10 MG/ML
INJECTION, EMULSION INTRAVENOUS PRN
Status: DISCONTINUED | OUTPATIENT
Start: 2021-06-15 | End: 2021-06-15 | Stop reason: SDUPTHER

## 2021-06-15 RX ORDER — SODIUM CHLORIDE 9 MG/ML
25 INJECTION, SOLUTION INTRAVENOUS PRN
Status: DISCONTINUED | OUTPATIENT
Start: 2021-06-15 | End: 2021-06-15 | Stop reason: HOSPADM

## 2021-06-15 RX ADMIN — HYDROMORPHONE HYDROCHLORIDE 0.5 MG: 1 INJECTION, SOLUTION INTRAMUSCULAR; INTRAVENOUS; SUBCUTANEOUS at 13:40

## 2021-06-15 RX ADMIN — FENTANYL CITRATE 50 MCG: 50 INJECTION, SOLUTION INTRAMUSCULAR; INTRAVENOUS at 13:04

## 2021-06-15 RX ADMIN — CEFAZOLIN 2000 MG: 10 INJECTION, POWDER, FOR SOLUTION INTRAVENOUS at 12:49

## 2021-06-15 RX ADMIN — ONDANSETRON HYDROCHLORIDE 4 MG: 4 INJECTION, SOLUTION INTRAMUSCULAR; INTRAVENOUS at 12:51

## 2021-06-15 RX ADMIN — SODIUM CHLORIDE: 9 INJECTION, SOLUTION INTRAVENOUS at 11:17

## 2021-06-15 RX ADMIN — FENTANYL CITRATE 50 MCG: 50 INJECTION, SOLUTION INTRAMUSCULAR; INTRAVENOUS at 12:41

## 2021-06-15 RX ADMIN — MIDAZOLAM 2 MG: 1 INJECTION INTRAMUSCULAR; INTRAVENOUS at 12:33

## 2021-06-15 RX ADMIN — DEXAMETHASONE SODIUM PHOSPHATE 4 MG: 10 INJECTION, EMULSION INTRAMUSCULAR; INTRAVENOUS at 12:51

## 2021-06-15 RX ADMIN — PHENYLEPHRINE HYDROCHLORIDE 150 MCG: 10 INJECTION INTRAVENOUS at 12:48

## 2021-06-15 RX ADMIN — PROPOFOL 30 MG: 10 INJECTION, EMULSION INTRAVENOUS at 13:07

## 2021-06-15 RX ADMIN — HYDROMORPHONE HYDROCHLORIDE 0.5 MG: 1 INJECTION, SOLUTION INTRAMUSCULAR; INTRAVENOUS; SUBCUTANEOUS at 13:35

## 2021-06-15 RX ADMIN — PROPOFOL 140 MG: 10 INJECTION, EMULSION INTRAVENOUS at 12:41

## 2021-06-15 RX ADMIN — OXYCODONE HYDROCHLORIDE 10 MG: 10 TABLET, FILM COATED, EXTENDED RELEASE ORAL at 14:17

## 2021-06-15 RX ADMIN — PHENYLEPHRINE HYDROCHLORIDE 100 MCG: 10 INJECTION INTRAVENOUS at 12:53

## 2021-06-15 RX ADMIN — PROPOFOL 30 MG: 10 INJECTION, EMULSION INTRAVENOUS at 13:04

## 2021-06-15 RX ADMIN — Medication 10 MG: at 12:57

## 2021-06-15 RX ADMIN — PHENYLEPHRINE HYDROCHLORIDE 150 MCG: 10 INJECTION INTRAVENOUS at 12:51

## 2021-06-15 RX ADMIN — Medication 60 MG: at 12:41

## 2021-06-15 ASSESSMENT — PULMONARY FUNCTION TESTS
PIF_VALUE: 11
PIF_VALUE: 23
PIF_VALUE: 1
PIF_VALUE: 11
PIF_VALUE: 5
PIF_VALUE: 1
PIF_VALUE: 11
PIF_VALUE: 10
PIF_VALUE: 11
PIF_VALUE: 9
PIF_VALUE: 0
PIF_VALUE: 11
PIF_VALUE: 10
PIF_VALUE: 22
PIF_VALUE: 6
PIF_VALUE: 11
PIF_VALUE: 33
PIF_VALUE: 3
PIF_VALUE: 11
PIF_VALUE: 11
PIF_VALUE: 2
PIF_VALUE: 32
PIF_VALUE: 11
PIF_VALUE: 1
PIF_VALUE: 11
PIF_VALUE: 11
PIF_VALUE: 10
PIF_VALUE: 11
PIF_VALUE: 10
PIF_VALUE: 11
PIF_VALUE: 4
PIF_VALUE: 1
PIF_VALUE: 11
PIF_VALUE: 11
PIF_VALUE: 8
PIF_VALUE: 11
PIF_VALUE: 10
PIF_VALUE: 11
PIF_VALUE: 12
PIF_VALUE: 11
PIF_VALUE: 23
PIF_VALUE: 26
PIF_VALUE: 11
PIF_VALUE: 11

## 2021-06-15 ASSESSMENT — PAIN DESCRIPTION - DESCRIPTORS
DESCRIPTORS: ACHING
DESCRIPTORS: ACHING;NAGGING

## 2021-06-15 ASSESSMENT — PAIN DESCRIPTION - LOCATION: LOCATION: BUTTOCKS

## 2021-06-15 ASSESSMENT — PAIN SCALES - GENERAL
PAINLEVEL_OUTOF10: 7
PAINLEVEL_OUTOF10: 8
PAINLEVEL_OUTOF10: 6

## 2021-06-15 ASSESSMENT — PAIN - FUNCTIONAL ASSESSMENT: PAIN_FUNCTIONAL_ASSESSMENT: 0-10

## 2021-06-15 ASSESSMENT — PAIN DESCRIPTION - PAIN TYPE: TYPE: SURGICAL PAIN

## 2021-06-15 NOTE — PROGRESS NOTES
Patient is admitted to same day room 17. Patient oriented to room and call light. Family present at bedside. Consent form signed and verified.
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
## 2021-06-15 NOTE — PROGRESS NOTES
Spoke to Dr Austen Plasencia about substituting for OxyContin ER 10 mg for oxycodone IR he was ok with it.  Pt asking for pain medication and rios mist with cranberry juice

## 2021-06-15 NOTE — INTERVAL H&P NOTE
Update History & Physical    The patient's History and Physical was reviewed with the patient and I examined the patient. There was no change. The surgical site was confirmed by the patient and me. Plan: The risks, benefits, expected outcome, and alternative to the recommended procedure have been discussed with the patient. Patient understands and wants to proceed with the procedure. The patient was counseled at length about the risks of cole Covid-19 during their perioperative period and any recovery window from their procedure. The patient was made aware that cole Covid-19  may worsen their prognosis for recovering from their procedure  and lend to a higher morbidity and/or mortality risk. All material risks, benefits, and reasonable alternatives including postponing the procedure were discussed. The patient does wish to proceed with the procedure at this time.       Electronically signed by Octavio Dent MD on 6/15/2021 at 12:08 PM

## 2021-06-15 NOTE — PROGRESS NOTES
Discharge criteria was met. Patient stated understanding discharge instructions. prescription given to patient. Will leave via Wheelchair.  Assessment of Surgical site scant amount

## 2021-06-15 NOTE — BRIEF OP NOTE
Brief Postoperative Note      Patient: Krystal Whiting  YOB: 1990  MRN: [id number]    Date of Procedure: 6/15/2021    Pre-Op Diagnosis: LEFT NECROTIC NONHEALING ISCHIAL AND SACRAL WOUND    Post-Op Diagnosis: Same       Procedure(s):  EXCISIONAL DEBRIDEMENT LEFT ISCHIAL AND SACRUM    Surgeon(s):  Matias Alvarez MD    Assistant:  First Assistant: Naz Bruno RN    Anesthesia: General    Estimated Blood Loss (mL): 20 ml    Complications: None    Specimens:   ID Type Source Tests Collected by Time Destination   1 : left ischium bone a/a culture Tissue Buttock CULTURE, ANAEROBIC AND AEROBIC Matias Alvarez MD 6/15/2021 1310    A : ischial wound tissue Tissue Buttock SURGICAL PATHOLOGY, CULTURE, ANAEROBIC AND AEROBIC Matias Alvarez MD 6/15/2021 1259        Implants:  * No implants in log *      Drains:   Colostomy LUQ (Active)       Suprapubic Catheter 18 fr (Active)       Findings: as above - see op note for details    Electronically signed by Matias Alvarez MD on 6/15/2021 at 1:21 PM

## 2021-06-16 ENCOUNTER — TELEPHONE (OUTPATIENT)
Dept: SURGERY | Age: 31
End: 2021-06-16

## 2021-06-16 ENCOUNTER — TELEPHONE (OUTPATIENT)
Dept: FAMILY MEDICINE CLINIC | Age: 31
End: 2021-06-16

## 2021-06-16 ENCOUNTER — TELEPHONE (OUTPATIENT)
Dept: UROLOGY | Age: 31
End: 2021-06-16

## 2021-06-16 LAB — MRSA SCREEN: NORMAL

## 2021-06-16 NOTE — TELEPHONE ENCOUNTER
Patient was last seen 6/17/2020 by Dr Arie Fischer. He is calling in today to schedule another appt, he has been having some leaking around his catheter. Please call him mom [facility] back at [phone] to schedule an appt.

## 2021-06-16 NOTE — TELEPHONE ENCOUNTER
Supriya Rosario [facility] Trans[facility] Initial Follow Up Call    Outreach made within 2 business days of discharge: Yes    Patient: Megan Tristan Patient : 1990   MRN: [id number]  Reason for Admission: There are no discharge diagnoses documented for the most recent discharge.   Discharge Date: 6/15/21       Spoke with: [facility] to return call at  earliest convenience     Discharge department/facility: [facility]    TCM Interactive Patient Contact:      Scheduled appointment with PCP within 7-14 days    Follow Up  Future Appointments   Date Time Provider Sheri Metzger   2021  1:30 PM Jero Rubio LPN
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
## 2021-06-18 ENCOUNTER — TELEPHONE (OUTPATIENT)
Dept: FAMILY MEDICINE CLINIC | Age: 31
End: 2021-06-18

## 2021-06-18 LAB
AEROBIC CULTURE: ABNORMAL
ANAEROBIC CULTURE: ABNORMAL
GRAM STAIN RESULT: ABNORMAL
ORGANISM: ABNORMAL
ORGANISM: ABNORMAL

## 2021-06-19 ENCOUNTER — POST-OP TELEPHONE (OUTPATIENT)
Dept: FAMILY MEDICINE CLINIC | Age: 31
End: 2021-06-19

## 2021-06-19 DIAGNOSIS — G89.18 ACUTE POST-OPERATIVE PAIN: Primary | ICD-10-CM

## 2021-06-19 LAB — MRSA SCREEN: NORMAL

## 2021-06-19 RX ORDER — OXYCODONE HYDROCHLORIDE AND ACETAMINOPHEN 5; 325 MG/1; MG/1
1 TABLET ORAL EVERY 6 HOURS PRN
Qty: 12 TABLET | Refills: 0 | Status: SHIPPED | OUTPATIENT
Start: 2021-06-19 | End: 2021-06-22

## 2021-06-20 LAB
AEROBIC CULTURE: ABNORMAL
ANAEROBIC CULTURE: ABNORMAL
GRAM STAIN RESULT: ABNORMAL
ORGANISM: ABNORMAL

## 2021-06-24 ENCOUNTER — TELEPHONE (OUTPATIENT)
Dept: FAMILY MEDICINE CLINIC | Age: 31
End: 2021-06-24

## 2021-06-24 NOTE — TELEPHONE ENCOUNTER
[facility] to continue home health for wound care

## 2021-07-15 ENCOUNTER — TELEPHONE (OUTPATIENT)
Dept: FAMILY MEDICINE CLINIC | Age: 31
End: 2021-07-15

## 2021-07-15 NOTE — TELEPHONE ENCOUNTER
Spoke with patient and his mom suggested  he will come [facility] to be [facility] for area on arm

## 2021-07-15 NOTE — TELEPHONE ENCOUNTER
----- Message from Xiomara Francisco sent at 7/15/2021 11:35 AM EDT -----  Subject: Appointment Request    Reason for Call: Urgent Skin Problem    QUESTIONS  Type of Appointment? Established Patient  Reason for appointment request? Available appointments did not meet   patient need  Additional Information for Provider? Pt needs to be seen for pus filled   boil on arm that comes and goes. [employer] marked as an urgent appt need   and will only allow same day scheduling. Pt is in [place] and will not be   home until late this evening. Please call to schedule for a later date.   ---------------------------------------------------------------------------  --------------  CALL BACK INFO  What is the best way for the office to contact you? OK to leave message on   voicemail  Preferred Call Back Phone Number? [phone]  ---------------------------------------------------------------------------  --------------  SCRIPT ANSWERS  Relationship to Patient? Parent  Representative Name? Mom  Additional information verified (besides Name and Date of Birth)? Phone   Number  Appointment reason? Symptomatic  Select script based on patient symptoms? Adult Skin Problems [Rash, Hives,   Blisters, Lumps, Bumps, Sores, Bite]  Are you having swelling in your throat or face? No  Are you having difficulty breathing? No  Have the symptoms worsened or spread in the last day? No  Are you having fevers (100.4), chills or sweats? No  Have you previously seen a provider for this? No  Have you been diagnosed with, awaiting test results for, or told that you   are suspected of having COVID-19 (Coronavirus)? (If patient has tested   negative or was tested as a requirement for work, school, or travel and   not based on symptoms, answer no)? No  Do you currently have flu-like symptoms including fever or chills, cough,   shortness of breath, difficulty breathing, or new loss of taste or smell?    No  Have you had close contact with someone with COVID-19 in the last 14 days? No  (Service Expert  click yes below to proceed with [employer] As Usual   Scheduling)?  Yes

## 2021-07-23 ENCOUNTER — TELEPHONE (OUTPATIENT)
Dept: SURGERY | Age: 31
End: 2021-07-23

## 2021-07-23 NOTE — TELEPHONE ENCOUNTER
I think the provider that cleansed the wound and initiated the discomfort probably should be the one giving stronger pain medication. See if that will work for him.

## 2021-07-23 NOTE — TELEPHONE ENCOUNTER
Pt calling office requesting pain medication. He states that he had an appt with Yulia Shankar today and she cleansed his ischial-coccyx wounds and caused him pain. Pt had surgery 6/15 but has never followed up for post op visit with us. Pt states that he is taking 800 mg Ibuprofen and Tylenol but it doesn't seem to help. Please advise, thank you.

## 2021-08-02 ENCOUNTER — TELEPHONE (OUTPATIENT)
Dept: FAMILY MEDICINE CLINIC | Age: 31
End: 2021-08-02

## 2021-08-03 RX ORDER — BACLOFEN 20 MG/1
TABLET ORAL
Qty: 90 TABLET | Refills: 5 | Status: SHIPPED | OUTPATIENT
Start: 2021-08-03

## 2021-08-03 NOTE — TELEPHONE ENCOUNTER
[place] from [address] [facility] calling for clarification on the dispense amount? ??    baclofen (LIORESAL) 20 MG tablet     Order Details  Dose, Route, Frequency: As Directed   Dispense Quantity: 90 tablet Refills: 5    Note to Pharmacy: Maximum Refills Reached         Sig: TAKE 1 TABLET BY MOUTH FOUR TIMES A DAY         Start Date: 08/03/21 End Date: --   Written Date: 08/03/21 Expiration Date: 08/03/22   Original Order:  baclofen (LIORESAL) 20 MG tablet [[id number]]       Please advise

## 2021-08-03 NOTE — TELEPHONE ENCOUNTER
Medication order clarified with pharmacy.  Baclofen 20 mg 4 times daily   quantity 120 with 5 refills

## 2021-08-09 ENCOUNTER — TELEPHONE (OUTPATIENT)
Dept: FAMILY MEDICINE CLINIC | Age: 31
End: 2021-08-09

## 2021-08-09 NOTE — TELEPHONE ENCOUNTER
----- Message from Michaela Cast sent at 8/6/2021  2:34 PM EDT -----  Subject: Message to Provider    QUESTIONS  Information for Provider? need verbal confirmation on meds. ---------------------------------------------------------------------------  --------------  Sylvia MENDES  What is the best way for the office to contact you? Do not leave any   message, patient will call back for answer  Preferred Call Back Phone Number? [phone]  ---------------------------------------------------------------------------  --------------  SCRIPT ANSWERS  Relationship to Patient? Third Party  Representative Name?  [employer]

## 2021-08-18 ENCOUNTER — TELEPHONE (OUTPATIENT)
Dept: FAMILY MEDICINE CLINIC | Age: 31
End: 2021-08-18

## 2021-08-18 NOTE — TELEPHONE ENCOUNTER
----- Message from Mesha Robbins sent at 8/18/2021  4:00 PM EDT -----  Subject: Message to Provider    QUESTIONS  Information for Provider? Pt would like X ray for back. Having severe back   pain  ---------------------------------------------------------------------------  --------------  CALL BACK INFO  What is the best way for the office to contact you? Do not leave any   message, patient will call back for answer  Preferred Call Back Phone Number? [phone]  ---------------------------------------------------------------------------  --------------  SCRIPT ANSWERS  Relationship to Patient?  Self

## 2021-08-19 NOTE — TELEPHONE ENCOUNTER
Can he do VV? Need to find out more info before we order imaging.    Electronically signed by RANJIT Osorio CNP on 8/19/2021 at 8:15 AM

## 2021-08-19 NOTE — TELEPHONE ENCOUNTER
Future Appointments   Date Time Provider Sheri Metzger   8/20/2021 11:20 AM DO [facility]

## 2021-08-20 ENCOUNTER — VIRTUAL VISIT (OUTPATIENT)
Dept: FAMILY MEDICINE CLINIC | Age: 31
End: 2021-08-20
Payer: MEDICAID

## 2021-08-20 DIAGNOSIS — F32.2 SEVERE SINGLE CURRENT EPISODE OF MAJOR DEPRESSIVE DISORDER, WITHOUT PSYCHOTIC FEATURES (HCC): ICD-10-CM

## 2021-08-20 DIAGNOSIS — S24.101A SPINAL CORD INJURY AT T1-T6 LEVEL WITHOUT INJURY OF SPINAL BONE (HCC): ICD-10-CM

## 2021-08-20 DIAGNOSIS — M54.6 ACUTE MIDLINE THORACIC BACK PAIN: Primary | ICD-10-CM

## 2021-08-20 DIAGNOSIS — M54.50 ACUTE MIDLINE LOW BACK PAIN WITHOUT SCIATICA: ICD-10-CM

## 2021-08-20 DIAGNOSIS — G82.20 PARAPLEGIA (HCC): ICD-10-CM

## 2021-08-20 PROCEDURE — 99213 OFFICE O/P EST LOW 20 MIN: CPT | Performed by: FAMILY MEDICINE

## 2021-08-20 NOTE — PROGRESS NOTES
2021    TELEHEALTH EVALUATION -- Audio/Visual (During JPUEE-86 public health emergency)    HPI:    Amanda Mckeon (:  1990) has requested an audio/video evaluation for   Chief Complaint   Patient presents with    Back Pain   :      Back Pain    HPI:  Pain is present in the lumbar, thoracic region. Symptoms have been present for 1 week(s). The pain is constant. The patient describes the pain as sharp / stabbing. Inciting injury or history of trauma? No  Pain is aggravated by - nothing  Pain is relieved by - nothing  Radiation of the pain? No  Paresthesias of the extremities? Yes - due to paraplegia, no recent changes  Saddle anesthesia? No  Bowel or bladder incontinence?  No      Review of Systems      Past Medical History:   Diagnosis Date    Depression     Fracture of lower leg     Hyperthyroidism 2014    MDRO (multiple drug resistant organisms) resistance     MRSA LUNGS    Paraplegic gait     Pneumonia        Past Surgical History:   Procedure Laterality Date    ANKLE SURGERY Right 2021    BILAT TENDO ACHILLES LENGTHENING, FLEXOR DIGITORUM LONGUS TENDON AND PERONEAL TENDON LENGTHENING performed by Adina Whelan DPM at [address] [place] [address],[address] Floor  2016    BRAIN SURGERY  2016    CLOT REMOVED    CYSTOSCOPY  2017    FACIAL RECONSTRUCTION SURGERY  2012    left zygomatic arch and sinus    FRACTURE SURGERY Left 2016    HARDWARE REMOVAL  2012    left zygomatic arch    OTHER SURGICAL HISTORY  2017    Laparoscopic Diverting Colostomy    OTHER SURGICAL HISTORY  2017    Excisional Debridment Sacral Decubitus Ulcer    OTHER SURGICAL HISTORY  2017    Placement of suprapubic catheter    GA OFFICE/OUTPT VISIT,PROCEDURE ONLY Right 10/5/2018    RIGHT HALLUX AMPUTATION performed by Adina Whelan DPM at [address] [place] [address] N/A 6/15/2021    EXCISIONAL DEBRIDEMENT LEFT ISCHIAL AND SACRUM performed by Crissy Fay Samantha Varma MD at [facility] Right     great toe     TOE AMPUTATION Right 2/19/2021    RIGHT TRANSMETATARSAL AMPUTATION performed by Sunil Stuart DPM at [address] [place] [address] History     Socioeconomic History    Marital status: Single     Spouse name: Not on file    Number of children: 1    Years of education: Not on file    Highest education level: Not on file   Occupational History    Not on file   Tobacco Use    Smoking status: Former Smoker     Packs/day: 0.25     Years: 10.00     Pack years: 2.50     Types: Cigarettes, Cigars    Smokeless tobacco: Never Used   Vaping Use    Vaping Use: Every day    Substances: Always   Substance and Sexual Activity    Alcohol use: No    Drug use: Not Currently     Types: Marijuana     Comment: licensed to take marijuana     Sexual activity: Never   Other Topics Concern    Not on file   Social History Narrative    Not on file     Social Determinants of Health     Financial Resource Strain:     Difficulty of Paying Living Expenses:    Food Insecurity:     Worried About Running Out of Food in the Last Year:     Ran Out of Food in the Last Year:    Transportation Needs:     Lack of Transportation (Medical):  Lack of Transportation (Non-Medical):    Physical Activity:     Days of Exercise per Week:     Minutes of Exercise per Session:    Stress:     Feeling of Stress :    Social Connections:     Frequency of Communication with Friends and Family:     Frequency of Social Gatherings with Friends and Family:     Attends Hinduism Services:     Active Member of Clubs or Organizations:     Attends Club or Organization Meetings:     Marital Status:    Intimate Partner Violence:     Fear of Current or Ex-Partner:     Emotionally Abused:     Physically Abused:     Sexually Abused:           Allergies   Allergen Reactions    Bee Venom Shortness Of Breath    Tramadol Hives       Current Outpatient Medications on File Prior to Visit   Medication Sig Dispense Refill    baclofen (LIORESAL) 20 MG tablet TAKE 1 TABLET BY MOUTH FOUR TIMES A DAY 90 tablet 5    hydrOXYzine (ATARAX) 50 MG tablet TAKE 1 TABLET BY MOUTH THREE TIMES A DAY AS NEEDED 90 tablet 5    pantoprazole (PROTONIX) 40 MG tablet TAKE 1 TABLET BY MOUTH IN THE MORNING BEFORE BREAKFAST 90 tablet 1    venlafaxine (EFFEXOR XR) 75 MG extended release capsule TAKE 1 CAPSULE BY MOUTH DAILY WITH 150MG 90 capsule 1    gabapentin (NEURONTIN) 600 MG tablet Take 1 tablet by mouth 4 times daily for 30 days.  120 tablet 3    vitamin D (ERGOCALCIFEROL) 1.25 MG (32843 UT) CAPS capsule TAKE 1 CAPSULE BY MOUTH EVERY 7 DAYS 4 capsule 3    cyclobenzaprine (FLEXERIL) 10 MG tablet TAKE 1 TABLET BY MOUTH THREE TIMES A DAY AS NEEDED FOR MUSCLE SPASM 90 tablet 5    ibuprofen (ADVIL;MOTRIN) 800 MG tablet Take 1 tablet by mouth every 8 hours as needed for Pain 90 tablet 3    venlafaxine (EFFEXOR XR) 150 MG extended release capsule TAKE 1 CAPSULE BY MOUTH DAILY 90 capsule 3    ferrous sulfate (IRON 325) 325 (65 Fe) MG tablet TAKE 1 TABLET BY MOUTH TWICE A DAY WITH MEALS 60 tablet 5    ondansetron (ZOFRAN-ODT) 4 MG disintegrating tablet DISSOLVE 1 TABLET ON TONGUE 3 TIMES A DAY AS NEEDED FOR NAUSEA OR VOMITING 90 tablet 1    traZODone (DESYREL) 100 MG tablet TAKE 2 TABLETS BY MOUTH AT BEDTIME AS NEEDED FOR SLEEP 180 tablet 3    docusate sodium (DOCQLACE) 100 MG capsule Take 1 capsule by mouth 2 times daily 180 capsule 3    amantadine (SYMMETREL) 100 MG capsule Take 1 capsule by mouth daily 180 capsule 3    busPIRone (BUSPAR) 10 MG tablet TAKE 1 TABLET BY MOUTH THREE TIMES A  tablet 3    rizatriptan (MAXALT) 5 MG tablet Take 1 tablet by mouth once as needed for Migraine May repeat in 2 hours if needed 15 tablet 3    ondansetron (ZOFRAN) 4 MG tablet Take 1 tablet by mouth every 8 hours as needed for Nausea or Vomiting 90 tablet 5    fluocinonide (LIDEX) 0.05 % cream Apply topically 2 times daily. 60 g 5    albuterol sulfate HFA (VENTOLIN HFA) 108 (90 Base) MCG/ACT inhaler Inhale 2 puffs into the lungs every 6 hours as needed for Wheezing 1 Inhaler 3    sildenafil (VIAGRA) 50 MG tablet Take 0.5 tablets by mouth as needed for Erectile Dysfunction 15 tablet 1    sodium hypochlorite (DAKINS) 0.125 % SOLN external solution Apply Dakin's moistened gauze to coccyx. Cover with dry gauze. Change twice a day. 1 Bottle 2    EPINEPHrine (EPIPEN) 0.3 MG/0.3ML SOAJ injection Use as directed for allergic reaction 2 each 1     No current facility-administered medications on file prior to visit. PHYSICAL EXAMINATION:  Physical Exam  Nursing note reviewed. Constitutional:       Appearance: He is well-developed. Pulmonary:      Effort: Pulmonary effort is normal. No respiratory distress. Neurological:      Mental Status: He is alert. Psychiatric:         Behavior: Behavior normal.         Thought Content: Thought content normal.         Judgment: Judgment normal.           ASSESSMENT & PLAN  Kayley Ignacio was seen today for back pain. Diagnoses and all orders for this visit:    Acute midline thoracic back pain  -     XR THORACIC SPINE (2 VIEWS); Future  -     XR LUMBAR SPINE (2-3 VIEWS); Future    Spinal cord injury at T1-T6 level without injury of spinal bone (HCC)    Paraplegia (HCC)    Severe single current episode of major depressive disorder, without psychotic features ([address]    Acute midline low back pain without sciatica  -     XR LUMBAR SPINE (2-3 VIEWS); Future    -Will start with XRays for his pain, further recs pending results  -Other chronic issues are stable, continue current medications  -Advised to call if any issues      No follow-ups on file. An  electronic signature was used to authenticate this note.     --Cain Cortez DO on 8/20/2021 at 1:01 PM    {    Pursuant to the emergency declaration under the [facility], 1040 waiver authority and the [employer] and [employer] Act, this Virtual  Visit was conducted, with patient's consent, to reduce the patient's risk of exposure to COVID-19 and provide continuity of care for an established patient. Services were provided through a video synchronous discussion virtually to substitute for in-person clinic visit.

## 2021-08-27 ENCOUNTER — HOSPITAL ENCOUNTER (OUTPATIENT)
Dept: GENERAL RADIOLOGY | Age: 31
Discharge: HOME | End: 2021-08-27
Payer: MEDICAID

## 2021-08-27 ENCOUNTER — HOSPITAL ENCOUNTER (OUTPATIENT)
Age: 31
Discharge: HOME | End: 2021-08-27
Payer: MEDICAID

## 2021-08-27 DIAGNOSIS — M54.50 ACUTE MIDLINE LOW BACK PAIN WITHOUT SCIATICA: ICD-10-CM

## 2021-08-27 DIAGNOSIS — M54.6 ACUTE MIDLINE THORACIC BACK PAIN: ICD-10-CM

## 2021-08-27 DIAGNOSIS — S41.102D: ICD-10-CM

## 2021-08-27 LAB
ANION GAP SERPL CALCULATED.3IONS-SCNC: 12 MEQ/L (ref 8–16)
BUN BLDV-MCNC: 5 MG/DL (ref 7–22)
C-REACTIVE PROTEIN: 10.03 MG/DL (ref 0–1)
CALCIUM SERPL-MCNC: 9.1 MG/DL (ref 8.5–10.5)
CHLORIDE BLD-SCNC: 100 MEQ/L (ref 98–111)
CO2: 24 MEQ/L (ref 23–33)
CREAT SERPL-MCNC: 0.6 MG/DL (ref 0.4–1.2)
ERYTHROCYTE [DISTWIDTH] IN BLOOD BY AUTOMATED COUNT: 16 % (ref 11.5–14.5)
ERYTHROCYTE [DISTWIDTH] IN BLOOD BY AUTOMATED COUNT: 50 FL (ref 35–45)
GFR SERPL CREATININE-BSD FRML MDRD: > 90 ML/MIN/1.73M2
GLUCOSE BLD-MCNC: 102 MG/DL (ref 70–108)
HCT VFR BLD CALC: 43.6 % (ref 42–52)
HEMOGLOBIN: 14.2 GM/DL (ref 14–18)
MCH RBC QN AUTO: 28.1 PG (ref 26–33)
MCHC RBC AUTO-ENTMCNC: 32.6 GM/DL (ref 32.2–35.5)
MCV RBC AUTO: 86.2 FL (ref 80–94)
PLATELET # BLD: 535 THOU/MM3 (ref 130–400)
PMV BLD AUTO: 8.7 FL (ref 9.4–12.4)
POTASSIUM SERPL-SCNC: 3.9 MEQ/L (ref 3.5–5.2)
RBC # BLD: 5.06 MILL/MM3 (ref 4.7–6.1)
SODIUM BLD-SCNC: 136 MEQ/L (ref 135–145)
WBC # BLD: 14.2 THOU/MM3 (ref 4.8–10.8)

## 2021-08-27 PROCEDURE — 85027 COMPLETE CBC AUTOMATED: CPT

## 2021-08-27 PROCEDURE — 72100 X-RAY EXAM L-S SPINE 2/3 VWS: CPT

## 2021-08-27 PROCEDURE — 36415 COLL VENOUS BLD VENIPUNCTURE: CPT

## 2021-08-27 PROCEDURE — 80048 BASIC METABOLIC PNL TOTAL CA: CPT

## 2021-08-27 PROCEDURE — 86140 C-REACTIVE PROTEIN: CPT

## 2021-08-27 PROCEDURE — 72072 X-RAY EXAM THORAC SPINE 3VWS: CPT

## 2021-08-27 PROCEDURE — 73090 X-RAY EXAM OF FOREARM: CPT

## 2021-08-31 RX ORDER — ERGOCALCIFEROL 1.25 MG/1
CAPSULE ORAL
Qty: 4 CAPSULE | Refills: 3 | Status: SHIPPED | OUTPATIENT
Start: 2021-08-31 | End: 2021-12-20

## 2021-08-31 RX ORDER — FERROUS SULFATE 325(65) MG
TABLET ORAL
Qty: 56 TABLET | Refills: 3 | Status: SHIPPED | OUTPATIENT
Start: 2021-08-31 | End: 2021-12-20

## 2021-09-02 ENCOUNTER — TELEPHONE (OUTPATIENT)
Dept: FAMILY MEDICINE CLINIC | Age: 31
End: 2021-09-02

## 2021-09-02 DIAGNOSIS — M54.6 ACUTE MIDLINE THORACIC BACK PAIN: Primary | ICD-10-CM

## 2021-09-02 DIAGNOSIS — M54.50 ACUTE MIDLINE LOW BACK PAIN WITHOUT SCIATICA: ICD-10-CM

## 2021-09-02 DIAGNOSIS — S24.101A SPINAL CORD INJURY AT T1-T6 LEVEL WITHOUT INJURY OF SPINAL BONE (HCC): ICD-10-CM

## 2021-09-02 DIAGNOSIS — G82.20 PARAPLEGIA (HCC): ICD-10-CM

## 2021-09-02 NOTE — TELEPHONE ENCOUNTER
----- Message from Michaela Carlson DO sent at 8/30/2021 11:02 AM EDT -----  The XRays returned normal.  Would he be willing to see the the back doctor at this point to reevaluate his symptoms?

## 2021-09-02 NOTE — TELEPHONE ENCOUNTER
Spoke with mother over the phone  They are wanting to see Ortho  Will place referral to Dr. Andra Juarez  Electronically signed by RANJIT Molina CNP on 9/2/2021 at 11:02 AM

## 2021-09-14 ENCOUNTER — NURSE TRIAGE (OUTPATIENT)
Dept: OTHER | Facility: CLINIC | Age: 31
End: 2021-09-14

## 2021-09-14 ENCOUNTER — TELEPHONE (OUTPATIENT)
Dept: FAMILY MEDICINE CLINIC | Age: 31
End: 2021-09-14

## 2021-09-14 DIAGNOSIS — R30.0 DYSURIA: Primary | ICD-10-CM

## 2021-09-14 RX ORDER — CEPHALEXIN 500 MG/1
CAPSULE ORAL
COMMUNITY
Start: 2020-08-08

## 2021-09-14 RX ORDER — SULFAMETHOXAZOLE AND TRIMETHOPRIM 800; 160 MG/1; MG/1
1 TABLET ORAL 2 TIMES DAILY
Qty: 20 TABLET | Refills: 0 | Status: SHIPPED | OUTPATIENT
Start: 2021-09-14 | End: 2021-09-24

## 2021-09-14 NOTE — TELEPHONE ENCOUNTER
Received call from Miki Aponte at [facility] with [employer]. Brief description of triage: hematuria and decreased urine output, on antibiotic and asking for pain med to be called in. Uses [address] [place]    Triage indicates for patient to be seen today in office, patient asking for Rx to be called in. Contacted office. Care advice provided, patient verbalizes understanding; denies any other questions or concerns; instructed to call back for any new or worsening symptoms. Writer provided warm transfer to Amara at [facility] for appointment scheduling. Attention Provider: Thank you for allowing me to participate in the care of your patient. The patient was connected to triage in response to information provided to the [facility]. Please do not respond through this encounter as the response is not directed to a shared pool. Reason for Disposition   Side (flank) or back pain present    Answer Assessment - Initial Assessment Questions  1. COLOR of URINE: \"Describe the color of the urine. \"  (e.g., tea-colored, pink, red, blood clots, bloody)      Reddish orange    2. ONSET: \"When did the bleeding start? \"       3 days    3. EPISODES: \"How many times has there been blood in the urine? \" or \"How many times today? \"      All day    4. PAIN with URINATION: \"Is there any pain with passing your urine? \" If so, ask: \"How bad is the pain? \"  (Scale 1-10; or mild, moderate, severe)     - MILD - complains slightly about urination hurting     - MODERATE - interferes with normal activities       - SEVERE - excruciating, unwilling or unable to urinate because of the pain       lower back pain- 6/10    5. FEVER: \"Do you have a fever? \" If so, ask: \"What is your temperature, how was it measured, and when did it start? \"      No     6. ASSOCIATED SYMPTOMS: Sabrina Ripple you passing urine more frequently than usual?\"      250 mL less than normal    7. OTHER SYMPTOMS: \"Do you have any other symptoms? \" (e.g., back/flank pain, abdominal pain, vomiting)      Wants something called in for pain until he has appt Thursday with specialist    8. PREGNANCY: \"Is there any chance you are pregnant? \" \"When was your last menstrual period? \"      N/a    Protocols used: URINE - BLOOD IN-ADULT-[place]

## 2021-09-14 NOTE — TELEPHONE ENCOUNTER
Hernando Arvizu RN     SG    9/14/21 3:43 PM  Note     Received call from Goyo Joseph at [facility] with [employer].     Brief description of triage: hematuria and decreased urine output, on antibiotic and asking for pain med to be called in. Uses [address] [place]     Triage indicates for patient to be seen today in office, patient asking for Rx to be called in. Contacted office.      Care advice provided, patient verbalizes understanding; denies any other questions or concerns; instructed to call back for any new or worsening symptoms.     Writer provided warm transfer to Amara at [facility] for appointment scheduling.     Attention Provider: Thank you for allowing me to participate in the care of your patient. The patient was connected to triage in response to information provided to the [facility]. Please do not respond through this encounter as the response is not directed to a shared pool.           Reason for Disposition   Side (flank) or back pain present    Answer Assessment - Initial Assessment Questions  1. COLOR of URINE: \"Describe the color of the urine. \"  (e.g., tea-colored, pink, red, blood clots, bloody)      Reddish orange    2. ONSET: \"When did the bleeding start? \"       3 days    3. EPISODES: \"How many times has there been blood in the urine? \" or \"How many times today? \"      All day    4. PAIN with URINATION: \"Is there any pain with passing your urine? \" If so, ask: \"How bad is the pain? \"  (Scale 1-10; or mild, moderate, severe)     - MILD - complains slightly about urination hurting     - MODERATE - interferes with normal activities       - SEVERE - excruciating, unwilling or unable to urinate because of the pain       lower back pain- 6/10    5. FEVER: \"Do you have a fever? \" If so, ask: \"What is your temperature, how was it measured, and when did it start? \"      No     6.  ASSOCIATED SYMPTOMS: Janeal Breslow you passing urine more frequently than usual?\"      250 mL less than normal    7. OTHER SYMPTOMS: \"Do you have any other symptoms? \" (e.g., back/flank pain, abdominal pain, vomiting)      Wants something called in for pain until he has appt Thursday with specialist    8. PREGNANCY: \"Is there any chance you are pregnant? \" \"When was your last menstrual period? \"      N/a    Protocols used: URINE - BLOOD IN-ADULT-[place]

## 2021-09-14 NOTE — TELEPHONE ENCOUNTER
Patient was triaged by nurse   Please see the blow encounter.   Patient stated that he would like an antibiotic for possible UTI and also something for pain sent to [facility]

## 2021-09-15 ENCOUNTER — OFFICE VISIT (OUTPATIENT)
Dept: FAMILY MEDICINE CLINIC | Age: 31
End: 2021-09-15
Payer: MEDICAID

## 2021-09-15 VITALS
DIASTOLIC BLOOD PRESSURE: 70 MMHG | BODY MASS INDEX: 22.67 KG/M2 | RESPIRATION RATE: 16 BRPM | HEIGHT: 70 IN | TEMPERATURE: 97.3 F | SYSTOLIC BLOOD PRESSURE: 102 MMHG | OXYGEN SATURATION: 98 % | HEART RATE: 114 BPM

## 2021-09-15 DIAGNOSIS — M54.9 UPPER BACK PAIN: ICD-10-CM

## 2021-09-15 DIAGNOSIS — R52 PAIN ASSOCIATED WITH WOUND: ICD-10-CM

## 2021-09-15 DIAGNOSIS — M54.50 LOW BACK PAIN, UNSPECIFIED BACK PAIN LATERALITY, UNSPECIFIED CHRONICITY, UNSPECIFIED WHETHER SCIATICA PRESENT: Primary | ICD-10-CM

## 2021-09-15 DIAGNOSIS — T14.8XXA PAIN ASSOCIATED WITH WOUND: ICD-10-CM

## 2021-09-15 PROCEDURE — 99214 OFFICE O/P EST MOD 30 MIN: CPT | Performed by: NURSE PRACTITIONER

## 2021-09-15 RX ORDER — GABAPENTIN 600 MG/1
600 TABLET ORAL 4 TIMES DAILY
Qty: 120 TABLET | Refills: 1 | Status: SHIPPED | OUTPATIENT
Start: 2021-09-15 | End: 2021-10-26

## 2021-09-15 RX ORDER — OXYCODONE HYDROCHLORIDE AND ACETAMINOPHEN 5; 325 MG/1; MG/1
1 TABLET ORAL EVERY 6 HOURS PRN
Qty: 12 TABLET | Refills: 0 | Status: SHIPPED | OUTPATIENT
Start: 2021-09-15 | End: 2021-09-18

## 2021-09-15 SDOH — ECONOMIC STABILITY: FOOD INSECURITY: WITHIN THE PAST 12 MONTHS, YOU WORRIED THAT YOUR FOOD WOULD RUN OUT BEFORE YOU GOT MONEY TO BUY MORE.: NEVER TRUE

## 2021-09-15 SDOH — ECONOMIC STABILITY: FOOD INSECURITY: WITHIN THE PAST 12 MONTHS, THE FOOD YOU BOUGHT JUST DIDN'T LAST AND YOU DIDN'T HAVE MONEY TO GET MORE.: NEVER TRUE

## 2021-09-15 ASSESSMENT — SOCIAL DETERMINANTS OF HEALTH (SDOH): HOW HARD IS IT FOR YOU TO PAY FOR THE VERY BASICS LIKE FOOD, HOUSING, MEDICAL CARE, AND HEATING?: NOT VERY HARD

## 2021-09-15 NOTE — PROGRESS NOTES
SUBJECTIVE:  Shama Rios is a 36 y.o. male for   Chief Complaint   Patient presents with    Urinary Tract Infection     dark urine with some blood in rodrigues bag    Lower Back Pain       HPI:    Symptoms present for 4 days. Symptoms are worse since they initially started. Dysuria? No  Hematuria? Yes  Increased urinary frequency? No  Abdominal discomfort? No  CVA pain? No  Hx of UTIs?   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    Peristomal skin complication    Pressure ulcer of coccygeal region, stage 4 (HCC)    Wound of back    E coli bacteremia    Right nephrolithiasis    Hypokalemia    Right ureteral stone    Decubitus ulcer of left ischium, stage 4 (HCC)    Decubitus ulcer of left heel, stage 3 (HCC)    Spasticity    Self-inflicted injury    Compulsive skin picking    Wound of abdomen    Colostomy care ([address]    Chest pain    Depression    Bacteria in urine    Suicidal thoughts    Decubitus ulcer of right ankle, stage 3 (HCC)    Pressure injury of right ischium, stage 3 (HCC)    Fungal dermatitis    Pressure ulcer of toe of left foot, stage 2 (HCC)    Dermatitis due to unknown cause    Pressure injury of left buttock, unstageable (HCC)    Pressure injury of right heel, stage 3 (HCC)    Pressure injury, stage 4, with infection (HCC)    Cellulitis    Excoriation of abdomen    Pressure injury of sacral region, stage 4 (HCC)    Moderate malnutrition (HCC)    Chronic osteomyelitis of coccyx (HCC)    Osteomyelitis, chronic, ischium, left ([address]    Long term (current) use of antibiotics    Skin ulcer of second toe of right foot with fat layer exposed ([facility]    Unilateral inguinal testis    Spinal cord injury at T1-T6 level without injury of spinal bone (HCC)           OBJECTIVE:  /70   Pulse 114   Temp 97.3 °F (36.3 °C) (Infrared)   Resp 16   Ht 5' 10\" (1.778 m)   SpO2 98%   BMI 22.67 kg/m²   He appears well; non-toxic and in no apparent distress. Physical Examination: Chest - clear to auscultation, no wheezes, rales or rhonchi, symmetric air entry  Abdomen - soft, nontender, nondistended, no masses or organomegaly, no CVA tenderness  Extremities - peripheral pulses normal, no pedal edema, no clubbing or cyanosis  Psych -  Affect appropriate. Thought process is normal without evidence of depression or psychosis. Good insight and appropriae interaction. Cognition and memory appear to be intact. ASSESSMENT & PLAN  Rick Love was seen today for urinary tract infection and lower back pain. Diagnoses and all orders for this visit:    Low back pain, unspecified back pain laterality, unspecified chronicity, unspecified whether sciatica present  -     oxyCODONE-acetaminophen (PERCOCET) 5-325 MG per tablet; Take 1 tablet by mouth every 6 hours as needed for Pain for up to 3 days. Upper back pain    Pain associated with wound  -     Culture, Urine    Other orders  -     gabapentin (NEURONTIN) 600 MG tablet; Take 1 tablet by mouth 4 times daily for 60 days. Requesting meds for pain d/t wound debridements  He has some sensation of pain in these areas    No follow-ups on file.      -Start above treatments  -Urine culture was sent today  -Patient advised to contact our office immediately if symptoms worsen or persist  -Patient counseled on conservative care including fluids, rest and OTC meds      All patient questions answered. Patient voiced understanding.

## 2021-09-16 ENCOUNTER — TELEPHONE (OUTPATIENT)
Dept: FAMILY MEDICINE CLINIC | Age: 31
End: 2021-09-16

## 2021-09-16 DIAGNOSIS — F42.4 SKIN PICKING HABIT: Primary | ICD-10-CM

## 2021-09-16 NOTE — TELEPHONE ENCOUNTER
Discussed his skin picking with Dr. Marcia Fox  Reviewed his meds  Please let Mother or Ashleyberg know that we recommend referral to psych for further evaluation and recommendations.   Referral placed  Electronically signed by RANJIT Hooks CNP on 9/16/2021 at 6:09 PM

## 2021-09-18 LAB
ORGANISM: ABNORMAL
URINE CULTURE, ROUTINE: ABNORMAL
URINE CULTURE, ROUTINE: ABNORMAL

## 2021-09-27 ENCOUNTER — TELEPHONE (OUTPATIENT)
Dept: FAMILY MEDICINE CLINIC | Age: 31
End: 2021-09-27

## 2021-09-27 DIAGNOSIS — R50.9 FEVER, UNSPECIFIED FEVER CAUSE: ICD-10-CM

## 2021-09-27 DIAGNOSIS — R31.0 GROSS HEMATURIA: ICD-10-CM

## 2021-09-27 DIAGNOSIS — R30.0 DYSURIA: Primary | ICD-10-CM

## 2021-09-27 RX ORDER — CIPROFLOXACIN 500 MG/1
500 TABLET, FILM COATED ORAL 2 TIMES DAILY
Qty: 10 TABLET | Refills: 0 | Status: SHIPPED | OUTPATIENT
Start: 2021-09-27 | End: 2021-10-02

## 2021-09-27 NOTE — TELEPHONE ENCOUNTER
Sent in [facility]  Let me know if no better  Electronically signed by RANJIT Hernandez CNP on 9/27/2021 at 4:12 PM

## 2021-09-27 NOTE — TELEPHONE ENCOUNTER
----- Message from [facility], [address] sent at 9/27/2021  2:07 PM EDT -----  Subject: Message to Provider    QUESTIONS  Information for Provider? Linnette Martínez Began called to report pt still has fever   and dark urine she would like to know if antibiotic should be changed or   if Dwayne Wells would like her drop off another urine sample she states She   was provided with urine cups and told to call for update.   ---------------------------------------------------------------------------  --------------  CALL BACK INFO  What is the best way for the office to contact you? OK to leave message on   voicemail  Preferred Call Back Phone Number? [phone]  ---------------------------------------------------------------------------  --------------  SCRIPT ANSWERS  Relationship to Patient? Parent  Representative Name? Mom  Patient is under 25 and the Parent has custody? No  Is the Representative on the appropriate HIPAA document in Epic?  Yes

## 2021-10-01 ENCOUNTER — TELEPHONE (OUTPATIENT)
Dept: FAMILY MEDICINE CLINIC | Age: 31
End: 2021-10-01

## 2021-10-01 NOTE — TELEPHONE ENCOUNTER
Pt's mom, on hipaa, called stating that the pt saw Dr. Yinka Mejia and was prescribed mobic. She states when she went to  the RX they were told it would possibly interfere with his current meds and to check with his provider. Please advise.

## 2021-10-01 NOTE — TELEPHONE ENCOUNTER
Unable to reach patient's mother, spoke with [address] [employer] [facility] delivered to patient

## 2021-10-04 ENCOUNTER — TELEPHONE (OUTPATIENT)
Dept: FAMILY MEDICINE CLINIC | Age: 31
End: 2021-10-04

## 2021-10-04 NOTE — TELEPHONE ENCOUNTER
We were not the one that prescribed this and I'm not sure who did. I don't see an indication to continue this long term.

## 2021-10-04 NOTE — TELEPHONE ENCOUNTER
----- Message from Ramy Rainey sent at 10/4/2021 11:14 AM EDT -----  Subject: Medication Problem    QUESTIONS  Name of Medication? cephALEXin (KEFLEX) 500 MG capsule  Patient-reported dosage and instructions? Cephalexin Cephalexin Active 500   MG ORAL Q6H 28 August 8th, 2020 7:41pm 08- Ze 5 ([id number])  What question or problem do you have with the medication? Patient has 2   capsules left and would like to know if he is to continue to take this   medication and if so he would need a refill. Preferred Pharmacy? [facility] [place], [facility] - [address] - [place], [address] [facility]  Pharmacy phone number (if available)? [phone]  Additional Information for Provider?   ---------------------------------------------------------------------------  --------------  [address]  What is the best way for the office to contact you? OK to leave message on   voicemail  Preferred Call Back Phone Number? [phone]  ---------------------------------------------------------------------------  --------------  SCRIPT ANSWERS  Relationship to Patient?  Self

## 2021-10-11 ENCOUNTER — TELEPHONE (OUTPATIENT)
Dept: FAMILY MEDICINE CLINIC | Age: 31
End: 2021-10-11

## 2021-10-14 NOTE — TELEPHONE ENCOUNTER
Is he having any other symptoms? I'd also recommend that they contact his urologist as well if he is having persistent sx.

## 2021-10-14 NOTE — TELEPHONE ENCOUNTER
Spoke with pt's mother at phone # [phone]. Informed her of Dr Ari Swartz previous message that Mobic is fine to take with his other medication. Mother state pt's urine is still very dark, has a foul odor, and floaters in it. She state pt was thinking about going to the [facility] to have IV antibiotics administered. Please advise. Informed mother that the phone number [phone] state the VM is full. She would like us to call back on this number with Dr Ari Swartz response however.

## 2021-11-11 ENCOUNTER — TELEPHONE (OUTPATIENT)
Dept: FAMILY MEDICINE CLINIC | Age: 31
End: 2021-11-11

## 2021-11-11 NOTE — TELEPHONE ENCOUNTER
[facility] called stating they need to re-certify for home health aide for the pt. She states they are able to take a verbal if necessary.

## 2021-11-22 DIAGNOSIS — M54.50 LOW BACK PAIN, UNSPECIFIED BACK PAIN LATERALITY, UNSPECIFIED CHRONICITY, UNSPECIFIED WHETHER SCIATICA PRESENT: ICD-10-CM

## 2021-11-22 RX ORDER — OXYCODONE HYDROCHLORIDE AND ACETAMINOPHEN 5; 325 MG/1; MG/1
1 TABLET ORAL EVERY 6 HOURS PRN
Qty: 12 TABLET | Refills: 0 | OUTPATIENT
Start: 2021-11-22 | End: 2021-11-25

## 2021-12-20 RX ORDER — ERGOCALCIFEROL 1.25 MG/1
CAPSULE ORAL
Qty: 4 CAPSULE | Refills: 3 | Status: SHIPPED | OUTPATIENT
Start: 2021-12-20 | End: 2022-04-11

## 2021-12-20 RX ORDER — FERROUS SULFATE 325(65) MG
TABLET ORAL
Qty: 56 TABLET | Refills: 3 | Status: SHIPPED | OUTPATIENT
Start: 2021-12-20 | End: 2022-04-11

## 2022-02-09 ENCOUNTER — TELEPHONE (OUTPATIENT)
Dept: FAMILY MEDICINE CLINIC | Age: 32
End: 2022-02-09

## 2022-02-09 DIAGNOSIS — G82.20 PARAPLEGIA (HCC): Primary | ICD-10-CM

## 2022-02-09 NOTE — TELEPHONE ENCOUNTER
Joanne from [address] requesting a recert order for [facility] aid only to be faxed to

## 2022-02-18 ENCOUNTER — TELEPHONE (OUTPATIENT)
Dept: FAMILY MEDICINE CLINIC | Age: 32
End: 2022-02-18

## 2022-02-18 NOTE — TELEPHONE ENCOUNTER
Spoke with patient mom patient has been anxious, hallucinating, and seems to be confused. Home health checked his urine and it clear yellow, BP 90/60 temp was 97.4. Mom is concerned because he doesn't usually act like this. Wants to know what you suggest.   I informed her Dr. Heather De La Vega was gone for the day and if this persist or worsens to take him to urgent care or ER.

## 2022-02-18 NOTE — TELEPHONE ENCOUNTER
----- Message from [employer] sent at 2/17/2022 10:46 AM EST -----  Subject: Message to Provider    QUESTIONS  Information for Provider? Pt mom is calling to see if pt mom boyfriend can   get a slip saying that he is one of pt care givers. Pt mom is on   restrictions right now. Pt mom is having trouble with her back and hip. Mom is having surgery on it soon. Mom is not able to care for son 100   percent at the moment so her boyfriend comes over to assist. Please call   pt back about this note.   ---------------------------------------------------------------------------  --------------  CALL BACK INFO  What is the best way for the office to contact you? OK to leave message on   voicemail  Preferred Call Back Phone Number? [phone]  ---------------------------------------------------------------------------  --------------  SCRIPT ANSWERS  Relationship to Patient? Parent  Representative Name? Brandy-mother  Patient is under 25 and the Parent has custody? Yes  Additional information verified (besides Name and Date of Birth)?  Phone   Number

## 2022-02-21 NOTE — TELEPHONE ENCOUNTER
Patient requesting the following and to see if provider would order tylenol 500 mg as well  Please approve or refuse Rx request:  Requested Prescriptions     Pending Prescriptions Disp Refills    ibuprofen (ADVIL;MOTRIN) 800 MG tablet 90 tablet 3     Sig: Take 1 tablet by mouth every 8 hours as needed for Pain       Next appointment:  Visit date not found

## 2022-02-22 RX ORDER — IBUPROFEN 800 MG/1
800 TABLET ORAL EVERY 8 HOURS PRN
Qty: 90 TABLET | Refills: 3 | Status: SHIPPED | OUTPATIENT
Start: 2022-02-22 | End: 2022-08-18 | Stop reason: SDUPTHER

## 2022-04-11 RX ORDER — ERGOCALCIFEROL 1.25 MG/1
CAPSULE ORAL
Qty: 4 CAPSULE | Refills: 3 | Status: SHIPPED | OUTPATIENT
Start: 2022-04-11 | End: 2022-08-01

## 2022-04-11 RX ORDER — FERROUS SULFATE 325(65) MG
TABLET ORAL
Qty: 60 TABLET | Refills: 5 | Status: SHIPPED | OUTPATIENT
Start: 2022-04-11

## 2022-04-11 RX ORDER — CYCLOBENZAPRINE HCL 10 MG
TABLET ORAL
Qty: 90 TABLET | Refills: 5 | Status: SHIPPED | OUTPATIENT
Start: 2022-04-11

## 2022-04-11 RX ORDER — GABAPENTIN 600 MG/1
TABLET ORAL
Qty: 120 TABLET | Refills: 5 | Status: SHIPPED | OUTPATIENT
Start: 2022-04-11 | End: 2022-10-08

## 2022-05-03 ENCOUNTER — TELEPHONE (OUTPATIENT)
Dept: FAMILY MEDICINE CLINIC | Age: 32
End: 2022-05-03

## 2022-05-03 NOTE — TELEPHONE ENCOUNTER
Home Health Agency called, mom is trying to get a generator for patient. Letter from last year was reprinted. They are also asking a medical  order for the generator and order for PT evaluation.
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
## 2022-05-09 DIAGNOSIS — F06.31 MOOD DISORDER DUE TO KNOWN PHYSIOLOGICAL CONDITION WITH DEPRESSIVE FEATURES: ICD-10-CM

## 2022-05-09 DIAGNOSIS — F42.4 SKIN-PICKING DISORDER: ICD-10-CM

## 2022-05-10 RX ORDER — HYDROXYZINE 50 MG/1
TABLET, FILM COATED ORAL
Qty: 90 TABLET | Refills: 2 | Status: SHIPPED | OUTPATIENT
Start: 2022-05-10 | End: 2022-08-01

## 2022-06-09 ENCOUNTER — TELEPHONE (OUTPATIENT)
Dept: FAMILY MEDICINE CLINIC | Age: 32
End: 2022-06-09

## 2022-06-09 DIAGNOSIS — G82.20 PARAPLEGIA (HCC): ICD-10-CM

## 2022-06-09 DIAGNOSIS — G89.21 CHRONIC PAIN DUE TO TRAUMA: Primary | ICD-10-CM

## 2022-06-09 NOTE — TELEPHONE ENCOUNTER
----- Message from Samantha Watson sent at 6/9/2022  1:55 PM EDT -----  Subject: Referral Request    QUESTIONS   Reason for referral request? Pt needs a referral at [facility]   for his legs. Pt is start PT with them at the [facility]. Has the physician seen you for this condition before? No   Preferred Specialist (if applicable)? Do you already have an appointment scheduled? No  Additional Information for Provider?   ---------------------------------------------------------------------------  --------------  CALL BACK INFO  What is the best way for the office to contact you? OK to leave message on   voicemail  Preferred Call Back Phone Number? [phone]  ---------------------------------------------------------------------------  --------------  SCRIPT ANSWERS  Relationship to Patient?  Self

## 2022-07-25 ENCOUNTER — TELEPHONE (OUTPATIENT)
Dept: FAMILY MEDICINE CLINIC | Age: 32
End: 2022-07-25

## 2022-07-25 NOTE — TELEPHONE ENCOUNTER
Heriberto Pham from [employer] called and stated that she faxed over a plan of care for this pt and needs it signed and faxed back

## 2022-08-01 DIAGNOSIS — F06.31 MOOD DISORDER DUE TO KNOWN PHYSIOLOGICAL CONDITION WITH DEPRESSIVE FEATURES: ICD-10-CM

## 2022-08-01 DIAGNOSIS — F42.4 SKIN-PICKING DISORDER: ICD-10-CM

## 2022-08-01 RX ORDER — HYDROXYZINE 50 MG/1
TABLET, FILM COATED ORAL
Qty: 90 TABLET | Refills: 5 | Status: SHIPPED | OUTPATIENT
Start: 2022-08-01

## 2022-08-01 RX ORDER — ERGOCALCIFEROL 1.25 MG/1
CAPSULE ORAL
Qty: 4 CAPSULE | Refills: 3 | Status: SHIPPED | OUTPATIENT
Start: 2022-08-01

## 2022-08-18 ENCOUNTER — TELEPHONE (OUTPATIENT)
Dept: FAMILY MEDICINE CLINIC | Age: 32
End: 2022-08-18

## 2022-08-18 DIAGNOSIS — R11.0 NAUSEA: ICD-10-CM

## 2022-08-18 RX ORDER — ONDANSETRON 4 MG/1
4 TABLET, FILM COATED ORAL EVERY 8 HOURS PRN
Qty: 90 TABLET | Refills: 5 | Status: SHIPPED | OUTPATIENT
Start: 2022-08-18

## 2022-08-18 RX ORDER — IBUPROFEN 800 MG/1
800 TABLET ORAL EVERY 8 HOURS PRN
Qty: 90 TABLET | Refills: 3 | Status: SHIPPED | OUTPATIENT
Start: 2022-08-18 | End: 2023-02-14

## 2022-08-18 NOTE — TELEPHONE ENCOUNTER
----- Message from Luis Felipe Hooker sent at 8/18/2022 12:43 PM EDT -----  Subject: Refill Request    QUESTIONS  Name of Medication? ibuprofen (ADVIL;MOTRIN) 800 MG tablet  Patient-reported dosage and instructions? as needed  How many days do you have left? 4  Preferred Pharmacy? [address]  [place] phone number (if available)? [phone]  ---------------------------------------------------------------------------  --------------  CALL BACK INFO  What is the best way for the office to contact you? OK to leave message on   voicemail  Preferred Call Back Phone Number? [phone]  ---------------------------------------------------------------------------  --------------  SCRIPT ANSWERS  Relationship to Patient?  Self

## 2022-08-18 NOTE — TELEPHONE ENCOUNTER
----- Message from Veronica Springer sent at 8/18/2022 12:42 PM EDT -----  Subject: Appointment Request    Reason for Call: Established Patient Appointment needed: Routine Existing   Condition Follow Up    QUESTIONS    Reason for appointment request? Available appointments did not meet   patient need     Additional Information for Provider? Patient needs referral for pt and   some medication changes.  Can only really do Tues or Thurs before 1:30 pm.   will see another provider  ---------------------------------------------------------------------------  --------------  Debra Gonzalez WVKQ  [phone]; OK to leave message on voicemail  ---------------------------------------------------------------------------  --------------  SCRIPT ANSWERS  COVID Screen: Deepthi Ferguson

## 2022-09-26 RX ORDER — FERROUS SULFATE 325(65) MG
TABLET ORAL
Qty: 56 TABLET | OUTPATIENT
Start: 2022-09-26

## 2022-09-26 RX ORDER — CYCLOBENZAPRINE HCL 10 MG
TABLET ORAL
Qty: 84 TABLET | OUTPATIENT
Start: 2022-09-26

## 2022-09-26 RX ORDER — GABAPENTIN 600 MG/1
TABLET ORAL
Qty: 112 TABLET | OUTPATIENT
Start: 2022-09-26

## 2022-09-29 NOTE — TELEPHONE ENCOUNTER
Faxed referral to number provided. Patient tolerated infusion well. And one ol and one hour observation period. Patient alert and oriented x3. No distress noted. Vital signs stable. Patient denies any new or worsening pain. Patient educated on signs and symptoms of reaction to medication. Educated patient on possible side effects and treatment of medication and possibility of delayed reactions. Patient verbalized understanding. Offered patient education an/or discharge material.  Patient received. Patient denies any needs. All questions answered.

## 2022-10-10 ENCOUNTER — TELEPHONE (OUTPATIENT)
Dept: FAMILY MEDICINE CLINIC | Age: 32
End: 2022-10-10

## 2022-10-10 NOTE — TELEPHONE ENCOUNTER
----- Message from Gayle Isaiah sent at 10/10/2022  8:49 AM EDT -----  Subject: Appointment Request    Reason for Call: New Patient/New to Provider Appointment needed: New   Patient Request Appointment    QUESTIONS    Reason for appointment request? No appointments available during search     Additional Information for Provider? Patient needs to reschedule new   patient appt with Vaughn Edwards. Could not reschedule due to previous PCP   still being listed on record.  Any Tues would work.  ---------------------------------------------------------------------------  --------------  Martha Heath VVOB  [phone]; OK to leave message on voicemail  ---------------------------------------------------------------------------  --------------  SCRIPT ANSWERS  COVID Screen: Guillermina Moctezuma

## 2022-10-24 DIAGNOSIS — R11.2 NAUSEA AND VOMITING: ICD-10-CM

## 2022-10-24 DIAGNOSIS — F32.2 SEVERE SINGLE CURRENT EPISODE OF MAJOR DEPRESSIVE DISORDER, WITHOUT PSYCHOTIC FEATURES (HCC): ICD-10-CM

## 2022-10-24 DIAGNOSIS — G89.21 CHRONIC PAIN DUE TO TRAUMA: ICD-10-CM

## 2022-10-24 DIAGNOSIS — F41.9 ANXIETY: ICD-10-CM

## 2022-10-24 RX ORDER — BUSPIRONE HYDROCHLORIDE 10 MG/1
TABLET ORAL
Qty: 84 TABLET | OUTPATIENT
Start: 2022-10-24

## 2022-10-24 RX ORDER — FERROUS SULFATE 325(65) MG
TABLET ORAL
Qty: 56 TABLET | OUTPATIENT
Start: 2022-10-24

## 2022-10-24 RX ORDER — PANTOPRAZOLE SODIUM 40 MG/1
TABLET, DELAYED RELEASE ORAL
Qty: 28 TABLET | OUTPATIENT
Start: 2022-10-24

## 2022-10-24 RX ORDER — VENLAFAXINE HYDROCHLORIDE 75 MG/1
CAPSULE, EXTENDED RELEASE ORAL
Qty: 28 CAPSULE | OUTPATIENT
Start: 2022-10-24

## 2022-12-14 DIAGNOSIS — M54.50 LOW BACK PAIN, UNSPECIFIED BACK PAIN LATERALITY, UNSPECIFIED CHRONICITY, UNSPECIFIED WHETHER SCIATICA PRESENT: ICD-10-CM

## 2022-12-14 RX ORDER — GABAPENTIN 600 MG/1
TABLET ORAL
Qty: 120 TABLET | Refills: 5 | Status: CANCELLED | OUTPATIENT
Start: 2022-12-14 | End: 2023-06-12

## 2022-12-14 NOTE — TELEPHONE ENCOUNTER
Jeanne Phillips called requesting a refill on the following medications:  Requested Prescriptions     Pending Prescriptions Disp Refills    gabapentin (NEURONTIN) 600 MG tablet 120 tablet 5    oxyCODONE-acetaminophen (PERCOCET) 5-325 MG per tablet 12 tablet 0     Sig: Take 1 tablet by mouth every 6 hours as needed for Pain for up to 3 days. Pharmacy verified:[facility]      Date of last visit:   Date of next visit (if applicable): Visit date not found

## 2022-12-15 RX ORDER — OXYCODONE HYDROCHLORIDE AND ACETAMINOPHEN 5; 325 MG/1; MG/1
1 TABLET ORAL EVERY 6 HOURS PRN
Qty: 12 TABLET | Refills: 0 | OUTPATIENT
Start: 2022-12-15 | End: 2022-12-18

## 2023-01-20 DIAGNOSIS — F32.2 SEVERE SINGLE CURRENT EPISODE OF MAJOR DEPRESSIVE DISORDER, WITHOUT PSYCHOTIC FEATURES (HCC): ICD-10-CM

## 2023-01-20 DIAGNOSIS — F42.4 SKIN-PICKING DISORDER: ICD-10-CM

## 2023-01-20 DIAGNOSIS — G89.21 CHRONIC PAIN DUE TO TRAUMA: ICD-10-CM

## 2023-01-20 DIAGNOSIS — F06.31 MOOD DISORDER DUE TO KNOWN PHYSIOLOGICAL CONDITION WITH DEPRESSIVE FEATURES: ICD-10-CM

## 2023-01-20 RX ORDER — HYDROXYZINE 50 MG/1
TABLET, FILM COATED ORAL
Qty: 84 TABLET | OUTPATIENT
Start: 2023-01-20

## 2023-01-20 RX ORDER — VENLAFAXINE HYDROCHLORIDE 150 MG/1
150 CAPSULE, EXTENDED RELEASE ORAL DAILY
Qty: 28 CAPSULE | OUTPATIENT
Start: 2023-01-20

## 2023-02-03 ENCOUNTER — TELEPHONE (OUTPATIENT)
Dept: UROLOGY | Age: 33
End: 2023-02-03

## 2023-02-03 ENCOUNTER — OFFICE VISIT (OUTPATIENT)
Dept: UROLOGY | Age: 33
End: 2023-02-03
Payer: MEDICAID

## 2023-02-03 VITALS — BODY MASS INDEX: 22.9 KG/M2 | RESPIRATION RATE: 20 BRPM | WEIGHT: 160 LBS | HEIGHT: 70 IN

## 2023-02-03 DIAGNOSIS — Q55.22 RETRACTILE TESTIS: Primary | ICD-10-CM

## 2023-02-03 PROCEDURE — 99204 OFFICE O/P NEW MOD 45 MIN: CPT

## 2023-02-03 RX ORDER — OXYBUTYNIN CHLORIDE 10 MG/1
10 TABLET, EXTENDED RELEASE ORAL DAILY
Qty: 30 TABLET | Refills: 0 | Status: SHIPPED | OUTPATIENT
Start: 2023-02-03

## 2023-02-03 NOTE — TELEPHONE ENCOUNTER
Patient scheduled for  [address]  at [facility] on 2/10/23 ARRIVAL OF 145PM FOR A 2PM SCAN.     Order mailed with instructions  to the patient
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
## 2023-02-10 ENCOUNTER — HOSPITAL ENCOUNTER (OUTPATIENT)
Dept: ULTRASOUND IMAGING | Age: 33
Discharge: HOME | End: 2023-02-10
Payer: MEDICAID

## 2023-02-10 DIAGNOSIS — Q55.22 RETRACTILE TESTIS: ICD-10-CM

## 2023-02-10 PROCEDURE — 76870 US EXAM SCROTUM: CPT

## 2023-04-13 ENCOUNTER — TELEPHONE (OUTPATIENT)
Dept: UROLOGY | Age: 33
End: 2023-04-13

## 2023-04-25 ENCOUNTER — HOSPITAL ENCOUNTER (OUTPATIENT)
Dept: MRI IMAGING | Age: 33
Discharge: HOME | End: 2023-04-25
Payer: MEDICAID

## 2023-04-25 DIAGNOSIS — Z98.1 ARTHRODESIS STATUS: ICD-10-CM

## 2023-04-25 DIAGNOSIS — G82.20 PARAPLEGIA FOLLOWING SPINAL CORD INJURY (HCC): ICD-10-CM

## 2023-04-25 PROCEDURE — 72146 MRI CHEST SPINE W/O DYE: CPT

## 2023-04-25 PROCEDURE — 72148 MRI LUMBAR SPINE W/O DYE: CPT

## 2023-06-30 ENCOUNTER — NURSE ONLY (OUTPATIENT)
Dept: LAB | Age: 33
End: 2023-06-30

## 2023-06-30 LAB
ANION GAP SERPL CALC-SCNC: 15 MEQ/L (ref 8–16)
BASOPHILS ABSOLUTE: 0.1 THOU/MM3 (ref 0–0.1)
BASOPHILS NFR BLD AUTO: 0.5 %
BUN SERPL-MCNC: 4 MG/DL (ref 7–22)
CALCIUM SERPL-MCNC: 8.8 MG/DL (ref 8.5–10.5)
CHLORIDE SERPL-SCNC: 104 MEQ/L (ref 98–111)
CO2 SERPL-SCNC: 24 MEQ/L (ref 23–33)
CREAT SERPL-MCNC: 0.5 MG/DL (ref 0.4–1.2)
CRP SERPL-MCNC: 3.49 MG/DL (ref 0–1)
DEPRECATED RDW RBC AUTO: 46.1 FL (ref 35–45)
EOSINOPHIL NFR BLD AUTO: 1.5 %
EOSINOPHILS ABSOLUTE: 0.2 THOU/MM3 (ref 0–0.4)
ERYTHROCYTE [DISTWIDTH] IN BLOOD BY AUTOMATED COUNT: 14.3 % (ref 11.5–14.5)
GFR SERPL CREATININE-BSD FRML MDRD: > 60 ML/MIN/1.73M2
GLUCOSE SERPL-MCNC: 92 MG/DL (ref 70–108)
HCT VFR BLD AUTO: 41.7 % (ref 42–52)
HGB BLD-MCNC: 13.2 GM/DL (ref 14–18)
IMM GRANULOCYTES # BLD AUTO: 0.08 THOU/MM3 (ref 0–0.07)
IMM GRANULOCYTES NFR BLD AUTO: 0.6 %
LYMPHOCYTES ABSOLUTE: 2.4 THOU/MM3 (ref 1–4.8)
LYMPHOCYTES NFR BLD AUTO: 18.8 %
MCH RBC QN AUTO: 28 PG (ref 26–33)
MCHC RBC AUTO-ENTMCNC: 31.7 GM/DL (ref 32.2–35.5)
MCV RBC AUTO: 88.3 FL (ref 80–94)
MONOCYTES ABSOLUTE: 0.6 THOU/MM3 (ref 0.4–1.3)
MONOCYTES NFR BLD AUTO: 4.5 %
NEUTROPHILS NFR BLD AUTO: 74.1 %
NRBC BLD AUTO-RTO: 0 /100 WBC
PLATELET # BLD AUTO: 483 THOU/MM3 (ref 130–400)
PMV BLD AUTO: 8.8 FL (ref 9.4–12.4)
POTASSIUM SERPL-SCNC: 3.7 MEQ/L (ref 3.5–5.2)
RBC # BLD AUTO: 4.72 MILL/MM3 (ref 4.7–6.1)
SEGMENTED NEUTROPHILS ABSOLUTE COUNT: 9.6 THOU/MM3 (ref 1.8–7.7)
SODIUM SERPL-SCNC: 143 MEQ/L (ref 135–145)
WBC # BLD AUTO: 12.9 THOU/MM3 (ref 4.8–10.8)

## 2023-10-19 ENCOUNTER — TELEPHONE (OUTPATIENT)
Dept: SURGERY | Age: 33
End: 2023-10-19

## 2023-10-19 NOTE — TELEPHONE ENCOUNTER
Mom called crying stating she cannot get pt's ostomy supplies. She states the ins will not pay for them. She has tried to contact [facility] w/no success. She spoke w/ Jeronimo Gatica and Enrique Yoo. I called there and left msg to see what is going on.

## 2023-10-20 NOTE — TELEPHONE ENCOUNTER
Received call from [facility]. They state all the ins needed was a signed script which is what they have done. They will call mother and let her know what is going on.

## 2024-04-10 ENCOUNTER — OFFICE VISIT (OUTPATIENT)
Dept: SURGERY | Age: 34
End: 2024-04-10
Payer: MEDICAID

## 2024-04-10 VITALS
TEMPERATURE: 97.5 F | SYSTOLIC BLOOD PRESSURE: 98 MMHG | HEART RATE: 109 BPM | BODY MASS INDEX: 22.24 KG/M2 | OXYGEN SATURATION: 98 % | DIASTOLIC BLOOD PRESSURE: 60 MMHG | HEIGHT: 70 IN

## 2024-04-10 DIAGNOSIS — K94.19 INTESTINAL STOMA PROLAPSE (HCC): ICD-10-CM

## 2024-04-10 DIAGNOSIS — T14.8XXA CHRONIC WOUND: Primary | ICD-10-CM

## 2024-04-10 PROCEDURE — 99214 OFFICE O/P EST MOD 30 MIN: CPT | Performed by: SURGERY

## 2024-04-10 RX ORDER — TRAZODONE HYDROCHLORIDE 300 MG/1
300 TABLET ORAL NIGHTLY
COMMUNITY
Start: 2024-03-21

## 2024-04-12 ASSESSMENT — ENCOUNTER SYMPTOMS
SHORTNESS OF BREATH: 0
RHINORRHEA: 0
EYE DISCHARGE: 0
NAUSEA: 0
STRIDOR: 0
EYE REDNESS: 0
CONSTIPATION: 0
TROUBLE SWALLOWING: 0
VOMITING: 0
CHOKING: 0
EYE ITCHING: 0
BLOOD IN STOOL: 0
VOICE CHANGE: 0
BACK PAIN: 0
ABDOMINAL DISTENTION: 1
EYE PAIN: 0
WHEEZING: 0
SINUS PRESSURE: 1
ALLERGIC/IMMUNOLOGIC NEGATIVE: 1
COLOR CHANGE: 0
APNEA: 0
SORE THROAT: 0
ABDOMINAL PAIN: 1
DIARRHEA: 0
COUGH: 0
PHOTOPHOBIA: 0
FACIAL SWELLING: 0
RECTAL PAIN: 0
ANAL BLEEDING: 0
CHEST TIGHTNESS: 0
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
## 2024-04-23 NOTE — PROGRESS NOTES
PAT VISIT  Appointment reminder call given  Date: 5/16  Arrival time: 10:30 and location; [facility]   Bring Drivers license and insurance  Bring Medications in original bottles  Please be ready to give Urine Sample  If possible bring caregiver for appointment  Take am medications with water unless you are holding any for surgery  Appointment may last 2 hours

## 2024-04-24 ENCOUNTER — OFFICE VISIT (OUTPATIENT)
Dept: UROLOGY | Age: 34
End: 2024-04-24
Payer: MEDICAID

## 2024-04-24 VITALS — RESPIRATION RATE: 18 BRPM | HEIGHT: 70 IN | WEIGHT: 150 LBS | BODY MASS INDEX: 21.47 KG/M2

## 2024-04-24 DIAGNOSIS — Q55.22 RETRACTILE TESTIS: Primary | ICD-10-CM

## 2024-04-24 DIAGNOSIS — R33.9 URINARY RETENTION: ICD-10-CM

## 2024-04-24 PROCEDURE — 99214 OFFICE O/P EST MOD 30 MIN: CPT

## 2024-04-24 NOTE — PROGRESS NOTES
WITH MEALS 60 tablet 5    venlafaxine (EFFEXOR XR) 150 MG extended release capsule TAKE 1 CAPSULE BY MOUTH DAILY 90 capsule 3    busPIRone (BUSPAR) 10 MG tablet TAKE 1 TABLET BY MOUTH THREE TIMES A  tablet 3    venlafaxine (EFFEXOR XR) 75 MG extended release capsule TAKE 1 CAPSULE BY MOUTH DAILY WITH 150MG 90 capsule 3    pantoprazole (PROTONIX) 40 MG tablet TAKE 1 TABLET BY MOUTH IN THE MORNING BEFORE BREAKFAST 90 tablet 3    albuterol sulfate HFA (VENTOLIN HFA) 108 (90 Base) MCG/ACT inhaler Inhale 2 puffs into the lungs every 6 hours as needed for Wheezing 1 Inhaler 3    sodium hypochlorite (DAKINS) 0.125 % SOLN external solution Apply Dakin's moistened gauze to coccyx. Cover with dry gauze. Change twice a day. 1 Bottle 2    EPINEPHrine (EPIPEN) 0.3 MG/0.3ML SOAJ injection Use as directed for allergic reaction 2 each 1     No current facility-administered medications for this visit.       Past Medical History  Srikanth  has a past medical history of Depression, Fracture of lower leg, Hyperthyroidism, MDRO (multiple drug resistant organisms) resistance, Paraplegic gait, and Pneumonia.    Past Surgical History  The patient  has a past surgical history that includes Appendectomy; Facial reconstruction surgery (2012); Tonsillectomy; Hardware Removal (2012); other surgical history (01/09/2017); other surgical history (01/09/2017); brain surgery (11/05/2016); back surgery (11/2016); fracture surgery (Left, 11/2016); Cystocopy (04/17/2017); other surgical history (04/17/2017); pr office/outpt visit,procedure only (Right, 10/5/2018); Toe amputation (Right); Toe amputation (Right, 2/19/2021); Ankle surgery (Right, 2/19/2021); and Pressure ulcer debridement (N/A, 6/15/2021).    Family History  This patient's family history includes Heart Disease in his father and mother.    Social History  Srikanth  reports that he has quit smoking. His smoking use included cigarettes and cigars. He has a 2.5 pack-year smoking history.

## 2024-04-24 NOTE — PATIENT INSTRUCTIONS
Start Myrbetriq   Follow-up with Dr.Khan Suh with questions, comments, or concerns. I recommend going to the ED for further evaluation if you develop fever, chills, nausea, vomiting, chest pain, SOB, or calf pain.

## 2024-04-26 ENCOUNTER — TELEPHONE (OUTPATIENT)
Dept: UROLOGY | Age: 34
End: 2024-04-26

## 2024-04-26 NOTE — TELEPHONE ENCOUNTER
Patient mother states the sp catheter is draining but he is also incontinent and soaked the bed. The tubing is not kinked. He is urinating a large amount from the penis.    He is on maybe. He is paralyzed from the nipples down and can not feel spasm.    She does not know if he is having spasm.

## 2024-04-29 NOTE — TELEPHONE ENCOUNTER
If rodrigues catheter fails to drain, recommend ER or office follow-up.    Myrbetriq (mirabegron) is a medication used to treat overactive bladder (OAB) symptoms such as urgency, frequency, and incontinence. It can also help with bladder spasms. It works by relaxing the bladder muscles, which can help reduce these symptoms.    The onset of action of Myrbetriq can vary from person to person. However, in clinical studies, some individuals may start to experience improvements in their OAB symptoms within a few weeks of starting treatment. It's essential to continue taking the medication as prescribed by your healthcare provider, even if you don't notice immediate results, as it may take some time for the full benefits to be realized.    Can discontinue medication if unable to tolerate      The patient should go to the ED should they develop fever, chills, nausea, vomiting, chest pain, SOB, calf pain, feelings of incomplete emptying, or should they otherwise feel they need evaluated

## 2024-05-15 NOTE — PROGRESS NOTES
PAT Visit;  I checked with Joe Palmer's PA to see if a bowel prep is necessary for possible robotic revision Ostomy coming up- she stated- No Bowel Prep Needed as they were not getting into the actual bowel per say. Thank you.

## 2024-05-16 ENCOUNTER — HOSPITAL ENCOUNTER (OUTPATIENT)
Dept: PREADMISSION TESTING | Age: 34
Discharge: HOME | End: 2024-05-16
Payer: MEDICAID

## 2024-05-16 ENCOUNTER — TELEPHONE (OUTPATIENT)
Dept: SURGERY | Age: 34
End: 2024-05-16

## 2024-05-16 VITALS — WEIGHT: 165 LBS | BODY MASS INDEX: 23.62 KG/M2 | HEIGHT: 70 IN

## 2024-05-16 DIAGNOSIS — L89.324 DECUBITUS ULCER OF LEFT ISCHIUM, STAGE 4 (HCC): Primary | ICD-10-CM

## 2024-05-16 LAB
ANION GAP SERPL CALC-SCNC: 14 MEQ/L (ref 8–16)
BUN SERPL-MCNC: 7 MG/DL (ref 7–22)
CALCIUM SERPL-MCNC: 9.8 MG/DL (ref 8.5–10.5)
CHLORIDE SERPL-SCNC: 105 MEQ/L (ref 98–111)
CO2 SERPL-SCNC: 24 MEQ/L (ref 23–33)
CREAT SERPL-MCNC: 0.7 MG/DL (ref 0.4–1.2)
DEPRECATED RDW RBC AUTO: 46.1 FL (ref 35–45)
ERYTHROCYTE [DISTWIDTH] IN BLOOD BY AUTOMATED COUNT: 13.7 % (ref 11.5–14.5)
GFR SERPL CREATININE-BSD FRML MDRD: > 90 ML/MIN/1.73M2
GLUCOSE SERPL-MCNC: 87 MG/DL (ref 70–108)
HCT VFR BLD AUTO: 45.9 % (ref 42–52)
HGB BLD-MCNC: 14.9 GM/DL (ref 14–18)
MCH RBC QN AUTO: 30 PG (ref 26–33)
MCHC RBC AUTO-ENTMCNC: 32.5 GM/DL (ref 32.2–35.5)
MCV RBC AUTO: 92.5 FL (ref 80–94)
PLATELET # BLD AUTO: 361 THOU/MM3 (ref 130–400)
PMV BLD AUTO: 9.2 FL (ref 9.4–12.4)
POTASSIUM SERPL-SCNC: 4.6 MEQ/L (ref 3.5–5.2)
RBC # BLD AUTO: 4.96 MILL/MM3 (ref 4.7–6.1)
SODIUM SERPL-SCNC: 143 MEQ/L (ref 135–145)
WBC # BLD AUTO: 8.5 THOU/MM3 (ref 4.8–10.8)

## 2024-05-16 PROCEDURE — 85027 COMPLETE CBC AUTOMATED: CPT

## 2024-05-16 PROCEDURE — 36415 COLL VENOUS BLD VENIPUNCTURE: CPT

## 2024-05-16 PROCEDURE — 80048 BASIC METABOLIC PNL TOTAL CA: CPT

## 2024-05-16 RX ORDER — POTASSIUM CHLORIDE 750 MG/1
10 CAPSULE, EXTENDED RELEASE ORAL DAILY
COMMUNITY
Start: 2023-10-21

## 2024-05-16 RX ORDER — BUPRENORPHINE HYDROCHLORIDE, NALOXONE HYDROCHLORIDE 8; 2 MG/1; MG/1
1 FILM, SOLUBLE BUCCAL; SUBLINGUAL 3 TIMES DAILY
COMMUNITY
Start: 2024-04-26

## 2024-05-16 RX ORDER — TIZANIDINE 4 MG/1
4 TABLET ORAL 4 TIMES DAILY PRN
COMMUNITY
Start: 2024-05-13

## 2024-05-16 RX ORDER — GABAPENTIN 800 MG/1
800 TABLET ORAL 4 TIMES DAILY
COMMUNITY
Start: 2024-05-13

## 2024-05-16 NOTE — TELEPHONE ENCOUNTER
Per VO Dr Palmer stated since PCP prescribed the medication they would need to contact their office. Brandy notified to contact pt PCP for Suboxone dosage.

## 2024-05-16 NOTE — PROGRESS NOTES
Nothing to Eat or Drink after midnight except you may have sips of water with medications instructed to take am of surgery.  This includes no mints, gum and ice chips.   Bring insurance info and 's license  Wear comfortable clean clothing  Do not wear jewelry,piercings,nail polish or contact lenses  Shower as instructed prior to surgery  Bring medications in original bottles  Bring CPAP machine if you have one  Follow all instructions given by your physician   needed at discharge  Routine preop instructions given for showering with no lotions,creams or powders.    Do not shave the surgical area! If needed, your nurse will use clippers to remove any excess hair at the surgical site when you come in for surgery. Do not use a razor on the site of your surgery for at least 2 days prior to surgery because shaving can leave tiny nicks in the skin which may allow germs to enter and cause infection.    Information regarding hand hygiene, MRSA, general anesthesia, fall precautions, coughing and deep breathing, infection prevention and signs & symptoms of infection and blood transfusions reviewed and handouts given to patient. Questions answered.    Call [facility] [phone] for any questions  Report to [facility] on 2nd floor- written directions given  If you would become ill prior to surgery, please call the surgeon right away  Masks not required at this time, still recommended and we do have them at front information desk if you would like to have one.  We like to keep visitors in surgical waiting area to 2 people if possible.

## 2024-05-16 NOTE — TELEPHONE ENCOUNTER
Pt is to have surgery on 5/23/24 for Possible Tommy Ostomy Revision. Pt is on Suboxone 8-2 film strips.  Mother called asking if pt is to continue on taking the strips prior to surgery or hold this med? Mother was told to contact our office and his PCP. Mother stated she had not contacted PCP.    Pre admission is calling and asking about this medication.

## 2024-05-16 NOTE — PROGRESS NOTES
Preliminary Discharge Planning Questionnaire  Date of Surgery 5/23/24   Surgeon Estela      Having the proper help and care after surgery is very important to your recovery. Who will be able to help you at home when you are discharged from the hospital?    mother    Name(s) Brandy Coy    How many steps to enter your home?  0 (ramp access)    Bathroom on first floor?  Yes    Bedroom on the first floor?  Yes    Do you have an elevated toilet seat to use at home?  Yes    Do you have a walker to use at home?    Total Joints - with wheels N/A   Spine - with wheels  N/A   Pt has wheelchair, hospital bed, trapeze lift, power chair, lift chair  Have you been doing home exercises?NA    *You will go home with some outpatient physical therapy, where do you prefer to go? NA    This typically will not start until after your post visit to your Dr. (3-4 weeks after surgery)    * What home health agency would you like to use?NA  Patient's mother is sole caregiver.  Pt sees Ines from [facility]  ([phone])

## 2024-05-17 ENCOUNTER — PREP FOR PROCEDURE (OUTPATIENT)
Dept: SURGERY | Age: 34
End: 2024-05-17

## 2024-05-17 RX ORDER — SODIUM CHLORIDE 9 MG/ML
INJECTION, SOLUTION INTRAVENOUS CONTINUOUS
Status: CANCELLED | OUTPATIENT
Start: 2024-05-17

## 2024-05-17 RX ORDER — SODIUM CHLORIDE 0.9 % (FLUSH) 0.9 %
5-40 SYRINGE (ML) INJECTION EVERY 12 HOURS SCHEDULED
Status: CANCELLED | OUTPATIENT
Start: 2024-05-17

## 2024-05-17 RX ORDER — SODIUM CHLORIDE 9 MG/ML
INJECTION, SOLUTION INTRAVENOUS PRN
Status: CANCELLED | OUTPATIENT
Start: 2024-05-17

## 2024-05-17 RX ORDER — SODIUM CHLORIDE 0.9 % (FLUSH) 0.9 %
5-40 SYRINGE (ML) INJECTION PRN
Status: CANCELLED | OUTPATIENT
Start: 2024-05-17
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
## 2024-05-23 ENCOUNTER — ANESTHESIA EVENT (OUTPATIENT)
Dept: OPERATING ROOM | Age: 34
End: 2024-05-23
Payer: MEDICAID

## 2024-05-23 ENCOUNTER — HOSPITAL ENCOUNTER (INPATIENT)
Age: 34
LOS: 2 days | Discharge: HOME | DRG: 231 | End: 2024-05-25
Attending: SURGERY | Admitting: SURGERY
Payer: MEDICAID

## 2024-05-23 ENCOUNTER — ANESTHESIA (OUTPATIENT)
Dept: OPERATING ROOM | Age: 34
End: 2024-05-23
Payer: MEDICAID

## 2024-05-23 DIAGNOSIS — T14.8XXA NON-HEALING NON-SURGICAL WOUND: ICD-10-CM

## 2024-05-23 PROBLEM — K94.09 COLOSTOMY PROLAPSE (HCC): Status: ACTIVE | Noted: 2024-05-23

## 2024-05-23 PROCEDURE — 8E0W4CZ ROBOTIC ASSISTED PROCEDURE OF TRUNK REGION, PERCUTANEOUS ENDOSCOPIC APPROACH: ICD-10-PCS | Performed by: SURGERY

## 2024-05-23 PROCEDURE — 0DBN0ZZ EXCISION OF SIGMOID COLON, OPEN APPROACH: ICD-10-PCS | Performed by: SURGERY

## 2024-05-23 PROCEDURE — 6370000000 HC RX 637 (ALT 250 FOR IP): Performed by: SURGERY

## 2024-05-23 PROCEDURE — 3700000001 HC ADD 15 MINUTES (ANESTHESIA): Performed by: SURGERY

## 2024-05-23 PROCEDURE — 7100000001 HC PACU RECOVERY - ADDTL 15 MIN: Performed by: SURGERY

## 2024-05-23 PROCEDURE — 0DJD4ZZ INSPECTION OF LOWER INTESTINAL TRACT, PERCUTANEOUS ENDOSCOPIC APPROACH: ICD-10-PCS | Performed by: SURGERY

## 2024-05-23 PROCEDURE — 0KBP0ZZ EXCISION OF LEFT HIP MUSCLE, OPEN APPROACH: ICD-10-PCS | Performed by: SURGERY

## 2024-05-23 PROCEDURE — 7100000011 HC PHASE II RECOVERY - ADDTL 15 MIN: Performed by: SURGERY

## 2024-05-23 PROCEDURE — 7100000000 HC PACU RECOVERY - FIRST 15 MIN: Performed by: SURGERY

## 2024-05-23 PROCEDURE — 1200000000 HC SEMI PRIVATE

## 2024-05-23 PROCEDURE — 3600000009 HC SURGERY ROBOT BASE: Performed by: SURGERY

## 2024-05-23 PROCEDURE — 6360000002 HC RX W HCPCS: Performed by: ANESTHESIOLOGY

## 2024-05-23 PROCEDURE — S2900 ROBOTIC SURGICAL SYSTEM: HCPCS | Performed by: SURGERY

## 2024-05-23 PROCEDURE — 6360000002 HC RX W HCPCS: Performed by: NURSE PRACTITIONER

## 2024-05-23 PROCEDURE — 88307 TISSUE EXAM BY PATHOLOGIST: CPT

## 2024-05-23 PROCEDURE — 2709999900 HC NON-CHARGEABLE SUPPLY: Performed by: SURGERY

## 2024-05-23 PROCEDURE — 3700000000 HC ANESTHESIA ATTENDED CARE: Performed by: SURGERY

## 2024-05-23 PROCEDURE — 7100000010 HC PHASE II RECOVERY - FIRST 15 MIN: Performed by: SURGERY

## 2024-05-23 PROCEDURE — 6360000002 HC RX W HCPCS

## 2024-05-23 PROCEDURE — 3600000019 HC SURGERY ROBOT ADDTL 15MIN: Performed by: SURGERY

## 2024-05-23 PROCEDURE — 2580000003 HC RX 258: Performed by: NURSE PRACTITIONER

## 2024-05-23 PROCEDURE — 44345 REVISION OF COLOSTOMY: CPT | Performed by: SURGERY

## 2024-05-23 PROCEDURE — 88304 TISSUE EXAM BY PATHOLOGIST: CPT

## 2024-05-23 PROCEDURE — 11043 DBRDMT MUSC&/FSCA 1ST 20/<: CPT | Performed by: SURGERY

## 2024-05-23 PROCEDURE — 2500000003 HC RX 250 WO HCPCS

## 2024-05-23 RX ORDER — CYCLOBENZAPRINE HCL 10 MG
10 TABLET ORAL 3 TIMES DAILY PRN
Status: DISCONTINUED | OUTPATIENT
Start: 2024-05-23 | End: 2024-05-25 | Stop reason: HOSPADM

## 2024-05-23 RX ORDER — SODIUM CHLORIDE 0.9 % (FLUSH) 0.9 %
5-40 SYRINGE (ML) INJECTION PRN
Status: DISCONTINUED | OUTPATIENT
Start: 2024-05-23 | End: 2024-05-23 | Stop reason: HOSPADM

## 2024-05-23 RX ORDER — ROCURONIUM BROMIDE 10 MG/ML
INJECTION, SOLUTION INTRAVENOUS PRN
Status: DISCONTINUED | OUTPATIENT
Start: 2024-05-23 | End: 2024-05-23 | Stop reason: SDUPTHER

## 2024-05-23 RX ORDER — LIDOCAINE HCL/PF 100 MG/5ML
SYRINGE (ML) INJECTION PRN
Status: DISCONTINUED | OUTPATIENT
Start: 2024-05-23 | End: 2024-05-23 | Stop reason: SDUPTHER

## 2024-05-23 RX ORDER — PROPOFOL 10 MG/ML
INJECTION, EMULSION INTRAVENOUS PRN
Status: DISCONTINUED | OUTPATIENT
Start: 2024-05-23 | End: 2024-05-23 | Stop reason: SDUPTHER

## 2024-05-23 RX ORDER — HYDROMORPHONE HYDROCHLORIDE 2 MG/ML
INJECTION, SOLUTION INTRAMUSCULAR; INTRAVENOUS; SUBCUTANEOUS PRN
Status: DISCONTINUED | OUTPATIENT
Start: 2024-05-23 | End: 2024-05-23 | Stop reason: SDUPTHER

## 2024-05-23 RX ORDER — FENTANYL CITRATE 50 UG/ML
INJECTION, SOLUTION INTRAMUSCULAR; INTRAVENOUS PRN
Status: DISCONTINUED | OUTPATIENT
Start: 2024-05-23 | End: 2024-05-23 | Stop reason: SDUPTHER

## 2024-05-23 RX ORDER — ONDANSETRON 2 MG/ML
INJECTION INTRAMUSCULAR; INTRAVENOUS PRN
Status: DISCONTINUED | OUTPATIENT
Start: 2024-05-23 | End: 2024-05-23 | Stop reason: SDUPTHER

## 2024-05-23 RX ORDER — ACETAMINOPHEN 325 MG/1
650 TABLET ORAL EVERY 4 HOURS PRN
Status: DISCONTINUED | OUTPATIENT
Start: 2024-05-23 | End: 2024-05-25 | Stop reason: HOSPADM

## 2024-05-23 RX ORDER — TRAZODONE HYDROCHLORIDE 100 MG/1
300 TABLET ORAL NIGHTLY
Status: DISCONTINUED | OUTPATIENT
Start: 2024-05-23 | End: 2024-05-25 | Stop reason: HOSPADM

## 2024-05-23 RX ORDER — MIDAZOLAM HYDROCHLORIDE 1 MG/ML
INJECTION INTRAMUSCULAR; INTRAVENOUS PRN
Status: DISCONTINUED | OUTPATIENT
Start: 2024-05-23 | End: 2024-05-23 | Stop reason: SDUPTHER

## 2024-05-23 RX ORDER — SODIUM CHLORIDE 9 MG/ML
INJECTION, SOLUTION INTRAVENOUS PRN
Status: DISCONTINUED | OUTPATIENT
Start: 2024-05-23 | End: 2024-05-23 | Stop reason: HOSPADM

## 2024-05-23 RX ORDER — ONDANSETRON 2 MG/ML
4 INJECTION INTRAMUSCULAR; INTRAVENOUS EVERY 6 HOURS PRN
Status: DISCONTINUED | OUTPATIENT
Start: 2024-05-23 | End: 2024-05-25 | Stop reason: HOSPADM

## 2024-05-23 RX ORDER — VENLAFAXINE HYDROCHLORIDE 75 MG/1
225 CAPSULE, EXTENDED RELEASE ORAL
Status: DISCONTINUED | OUTPATIENT
Start: 2024-05-24 | End: 2024-05-25 | Stop reason: HOSPADM

## 2024-05-23 RX ORDER — DROPERIDOL 2.5 MG/ML
INJECTION, SOLUTION INTRAMUSCULAR; INTRAVENOUS
Status: COMPLETED
Start: 2024-05-23 | End: 2024-05-23

## 2024-05-23 RX ORDER — VENLAFAXINE HYDROCHLORIDE 150 MG/1
150 CAPSULE, EXTENDED RELEASE ORAL
Status: DISCONTINUED | OUTPATIENT
Start: 2024-05-24 | End: 2024-05-23 | Stop reason: SDUPTHER

## 2024-05-23 RX ORDER — OXYCODONE HYDROCHLORIDE 5 MG/1
5 TABLET ORAL EVERY 4 HOURS PRN
Status: DISCONTINUED | OUTPATIENT
Start: 2024-05-23 | End: 2024-05-25 | Stop reason: HOSPADM

## 2024-05-23 RX ORDER — DROPERIDOL 2.5 MG/ML
0.62 INJECTION, SOLUTION INTRAMUSCULAR; INTRAVENOUS ONCE
Status: COMPLETED | OUTPATIENT
Start: 2024-05-23 | End: 2024-05-23

## 2024-05-23 RX ORDER — SODIUM CHLORIDE 0.9 % (FLUSH) 0.9 %
5-40 SYRINGE (ML) INJECTION EVERY 12 HOURS SCHEDULED
Status: DISCONTINUED | OUTPATIENT
Start: 2024-05-23 | End: 2024-05-23 | Stop reason: HOSPADM

## 2024-05-23 RX ORDER — ONDANSETRON 4 MG/1
4 TABLET, ORALLY DISINTEGRATING ORAL EVERY 8 HOURS PRN
Status: DISCONTINUED | OUTPATIENT
Start: 2024-05-23 | End: 2024-05-25 | Stop reason: HOSPADM

## 2024-05-23 RX ORDER — SODIUM CHLORIDE 9 MG/ML
INJECTION, SOLUTION INTRAVENOUS CONTINUOUS
Status: DISCONTINUED | OUTPATIENT
Start: 2024-05-23 | End: 2024-05-23 | Stop reason: HOSPADM

## 2024-05-23 RX ORDER — DEXAMETHASONE SODIUM PHOSPHATE 10 MG/ML
INJECTION, EMULSION INTRAMUSCULAR; INTRAVENOUS PRN
Status: DISCONTINUED | OUTPATIENT
Start: 2024-05-23 | End: 2024-05-23 | Stop reason: SDUPTHER

## 2024-05-23 RX ORDER — SODIUM HYPOCHLORITE 5 MG/ML
SOLUTION TOPICAL PRN
Status: DISCONTINUED | OUTPATIENT
Start: 2024-05-23 | End: 2024-05-23 | Stop reason: ALTCHOICE

## 2024-05-23 RX ORDER — OXYCODONE HYDROCHLORIDE 5 MG/1
10 TABLET ORAL EVERY 4 HOURS PRN
Status: DISCONTINUED | OUTPATIENT
Start: 2024-05-23 | End: 2024-05-25 | Stop reason: HOSPADM

## 2024-05-23 RX ADMIN — HYDROMORPHONE HYDROCHLORIDE 2 MG: 2 INJECTION INTRAMUSCULAR; INTRAVENOUS; SUBCUTANEOUS at 14:22

## 2024-05-23 RX ADMIN — ROCURONIUM BROMIDE 30 MG: 10 INJECTION INTRAVENOUS at 14:03

## 2024-05-23 RX ADMIN — OXYCODONE HYDROCHLORIDE 10 MG: 5 TABLET ORAL at 22:05

## 2024-05-23 RX ADMIN — FENTANYL CITRATE 100 MCG: 50 INJECTION, SOLUTION INTRAMUSCULAR; INTRAVENOUS at 13:55

## 2024-05-23 RX ADMIN — Medication 80 MG: at 13:55

## 2024-05-23 RX ADMIN — ONDANSETRON 4 MG: 2 INJECTION INTRAMUSCULAR; INTRAVENOUS at 15:15

## 2024-05-23 RX ADMIN — HYDROMORPHONE HYDROCHLORIDE 0.5 MG: 1 INJECTION, SOLUTION INTRAMUSCULAR; INTRAVENOUS; SUBCUTANEOUS at 15:40

## 2024-05-23 RX ADMIN — CEFOXITIN 2000 MG: 2 INJECTION, POWDER, FOR SOLUTION INTRAVENOUS at 14:06

## 2024-05-23 RX ADMIN — SUGAMMADEX 200 MG: 100 INJECTION, SOLUTION INTRAVENOUS at 15:24

## 2024-05-23 RX ADMIN — OXYCODONE HYDROCHLORIDE 10 MG: 5 TABLET ORAL at 17:16

## 2024-05-23 RX ADMIN — ACETAMINOPHEN 650 MG: 325 TABLET ORAL at 17:16

## 2024-05-23 RX ADMIN — ROCURONIUM BROMIDE 50 MG: 10 INJECTION INTRAVENOUS at 13:55

## 2024-05-23 RX ADMIN — SODIUM CHLORIDE: 9 INJECTION, SOLUTION INTRAVENOUS at 13:54

## 2024-05-23 RX ADMIN — ACETAMINOPHEN 650 MG: 325 TABLET ORAL at 22:05

## 2024-05-23 RX ADMIN — PROPOFOL 200 MG: 10 INJECTION, EMULSION INTRAVENOUS at 13:55

## 2024-05-23 RX ADMIN — ROCURONIUM BROMIDE 20 MG: 10 INJECTION INTRAVENOUS at 14:40

## 2024-05-23 RX ADMIN — DROPERIDOL 0.62 MG: 2.5 INJECTION, SOLUTION INTRAMUSCULAR; INTRAVENOUS at 16:00

## 2024-05-23 RX ADMIN — MIDAZOLAM 2 MG: 1 INJECTION INTRAMUSCULAR; INTRAVENOUS at 13:54

## 2024-05-23 RX ADMIN — DEXAMETHASONE SODIUM PHOSPHATE 10 MG: 10 INJECTION, EMULSION INTRAMUSCULAR; INTRAVENOUS at 14:04

## 2024-05-23 RX ADMIN — CYCLOBENZAPRINE 10 MG: 10 TABLET, FILM COATED ORAL at 22:05

## 2024-05-23 RX ADMIN — PROPOFOL 100 MG: 10 INJECTION, EMULSION INTRAVENOUS at 15:10

## 2024-05-23 RX ADMIN — HYDROMORPHONE HYDROCHLORIDE 0.5 MG: 1 INJECTION, SOLUTION INTRAMUSCULAR; INTRAVENOUS; SUBCUTANEOUS at 15:50

## 2024-05-23 ASSESSMENT — PAIN DESCRIPTION - ORIENTATION
ORIENTATION: MID

## 2024-05-23 ASSESSMENT — LIFESTYLE VARIABLES
HOW OFTEN DO YOU HAVE A DRINK CONTAINING ALCOHOL: NEVER
HOW MANY STANDARD DRINKS CONTAINING ALCOHOL DO YOU HAVE ON A TYPICAL DAY: PATIENT DOES NOT DRINK

## 2024-05-23 ASSESSMENT — PAIN DESCRIPTION - LOCATION
LOCATION: ABDOMEN;COCCYX
LOCATION: COCCYX
LOCATION: ABDOMEN
LOCATION: ABDOMEN;COCCYX
LOCATION: BUTTOCKS

## 2024-05-23 ASSESSMENT — PAIN SCALES - GENERAL
PAINLEVEL_OUTOF10: 5
PAINLEVEL_OUTOF10: 3
PAINLEVEL_OUTOF10: 7

## 2024-05-23 ASSESSMENT — PAIN DESCRIPTION - PAIN TYPE: TYPE: SURGICAL PAIN

## 2024-05-23 ASSESSMENT — PAIN DESCRIPTION - DESCRIPTORS
DESCRIPTORS: ACHING
DESCRIPTORS: SHARP;ACHING;DISCOMFORT
DESCRIPTORS: ACHING
DESCRIPTORS: ACHING;DISCOMFORT

## 2024-05-23 ASSESSMENT — PAIN SCALES - WONG BAKER
WONGBAKER_NUMERICALRESPONSE: HURTS A LITTLE BIT
WONGBAKER_NUMERICALRESPONSE: NO HURT

## 2024-05-23 ASSESSMENT — PAIN - FUNCTIONAL ASSESSMENT
PAIN_FUNCTIONAL_ASSESSMENT: NONE - DENIES PAIN
PAIN_FUNCTIONAL_ASSESSMENT: PREVENTS OR INTERFERES SOME ACTIVE ACTIVITIES AND ADLS
PAIN_FUNCTIONAL_ASSESSMENT: ACTIVITIES ARE NOT PREVENTED
PAIN_FUNCTIONAL_ASSESSMENT: 0-10

## 2024-05-23 ASSESSMENT — PAIN DESCRIPTION - FREQUENCY
FREQUENCY: CONTINUOUS
FREQUENCY: CONTINUOUS

## 2024-05-23 NOTE — ANESTHESIA PRE PROCEDURE
Department of Anesthesiology  Preprocedure Note       Name:  Srikanth Coy   Age:  33 y.o.  :  1990                                          MRN:  [id number]         Date:  2024      Surgeon: Surgeon(s):  Edi Palmer MD    Procedure: Procedure(s):  Possible Robotic Ostomy Revision  Debridement and Primary Closure Left Ischial Non Healing Wound    Medications prior to admission:   Prior to Admission medications    Medication Sig Start Date End Date Taking? Authorizing Provider   potassium chloride (MICRO-K) 10 MEQ extended release capsule Take 1 capsule by mouth daily 10/21/23   Valentine Lyn MD   SUBOXONE 8-2 MG FILM SL film Place 1 Film under the tongue in the morning, at noon, and at bedtime. 24   Valentine Lyn MD   tiZANidine (ZANAFLEX) 4 MG tablet Take 1 tablet by mouth 4 times daily as needed 24   Valentine Lyn MD   gabapentin (NEURONTIN) 800 MG tablet Take 1 tablet by mouth 4 times daily. 24   Valentine Lyn MD   mirabegron (MYRBETRIQ) 50 MG TB24 Take 50 mg by mouth daily 4/24/24 10/21/24  Ghada Roca PA-C   traZODone (DESYREL) 300 MG tablet Take 1 tablet by mouth nightly 3/21/24   Valentine Lyn MD   ondansetron (ZOFRAN) 4 MG tablet Take 1 tablet by mouth every 8 hours as needed for Nausea or Vomiting 22   Jaret Ventura,    vitamin D (ERGOCALCIFEROL) 1.25 MG (05669 UT) CAPS capsule TAKE 1 CAPSULE BY MOUTH EVERY 7 DAYS  Patient taking differently: Take 1 capsule by mouth once a week 22   Jaret Ventura,    cyclobenzaprine (FLEXERIL) 10 MG tablet TAKE 1 TABLET BY MOUTH THREE TIMES A DAY AS NEEDED FOR MUSCLE SPASM 22   Jaret Ventura,    FEROSUL 325 (65 Fe) MG tablet TAKE 1 TABLET BY MOUTH TWICE A DAY WITH MEALS 22   Jaret Ventura,    venlafaxine (EFFEXOR XR) 150 MG extended release capsule TAKE 1 CAPSULE BY MOUTH DAILY 22   Jaret Ventura,    busPIRone (BUSPAR) 10 MG tablet TAKE 1 TABLET BY MOUTH

## 2024-05-23 NOTE — PROGRESS NOTES
Report called to Leonila ENAMORADO on 7k. Patient transported in bed by transport with chart and belongings. Nutrition called to place diet order and send to 5k.

## 2024-05-23 NOTE — ANESTHESIA POSTPROCEDURE EVALUATION
Department of Anesthesiology  Postprocedure Note    Patient: Srikanth Coy  MRN: [id number]  YOB: 1990  Date of evaluation: 5/23/2024    Procedure Summary       Date: 05/23/24 Room / Location: [facility] OR 08 / [facility] OR    Anesthesia Start: 1354 Anesthesia Stop: 1539    Procedures:       Robotic Colostomy Revision and Partial Colectomy (Abdomen)      Debridement of Left Ischial Non Healing Wound (Left: Coccyx) Diagnosis:       Non-healing non-surgical wound      (Non-healing non-surgical wound [T14.8XXA])    Surgeons: Edi Palmer MD Responsible Provider: Guanaco Borges MD    Anesthesia Type: General ASA Status: 3            Anesthesia Type: General    Ayanna Phase I: Ayanna Score: 10    Ayanna Phase II:      Anesthesia Post Evaluation    Patient location during evaluation: PACU  Patient participation: complete - patient participated  Level of consciousness: awake and alert  Airway patency: patent  Nausea & Vomiting: no nausea  Cardiovascular status: blood pressure returned to baseline and hemodynamically stable  Respiratory status: acceptable and spontaneous ventilation  Hydration status: euvolemic  Pain management: adequate    No notable events documented.

## 2024-05-23 NOTE — PROGRESS NOTES
Patient oriented to Same Day department and admitted to Same Day Surgery room 16.   Patient verbalized approval for first name, last initial with physician name on unit whiteboard.     Plan of care reviewed with patient.   Patient room whiteboard filled out and discussed with patient and responsible adult.   Patient and responsible adult offered Same Day Welcome Packet to review.    Call light in reach.   Bed in lowest position, locked, with one bed rail up.   SCDs and warming blanket in place.  Appropriate arm bands on patient.   Bathroom offered.   All questions and concerns of patient addressed.        Meds to Beds:   Patient informed of [facility] program during admission. Patient is agreeable to program.   Contact information for the pharmacy and the Meds to Beds program:     Brandy Coy (Parent)   [phone] (Mobile)

## 2024-05-23 NOTE — PROGRESS NOTES
Pt returned to [facility] room 16. Vitals and assessment as charted. 0.9 infusing, @50ml to count from PACU. Pt has crackers and water. Family at the bedside. Pt and family verbalized understanding of discharge criteria and call light use. Call light in reach.

## 2024-05-23 NOTE — BRIEF OP NOTE
Brief Postoperative Note      Patient: Srikanth Coy  YOB: 1990  MRN: [id number]    Date of Procedure: 5/23/2024    Preoperative diagnosis:  1.  Colostomy prolapse  2.  Nonhealing tunneled left ischial chronic wound  3.  Coccyx wound  4.  Tobacco abuse    Post-Op Diagnosis: Same       Procedure:  1.  Excisional debridement skin, subcutaneous tissue, muscle/fascia left ischial nonhealing wound (3 x 3 cm in diameter)  2.  Robotic colostomy revision with partial colectomy    Surgeon(s):  Edi Palmer MD    Assistant:  First Assistant: Eugene Guerrero RN    Anesthesia: General/local    Estimated Blood Loss (mL): 20ml    Complications: None    Specimens:   * No specimens in log *    Implants:  * No implants in log *      Drains:   Colostomy LUQ (Active)   Stomal Appliance 2 piece 05/23/24 1235   Stoma  Assessment Red 05/23/24 1235   Peristomal Assessment Clean, dry & intact 05/23/24 1235   Mucocutaneous Junction Intact 05/23/24 1235       Findings:  Infection Present At Time Of Surgery (PATOS) (choose all levels that have infection present):  No infection present    Other Findings: see op note    This procedure was not performed to treat colon cancer through resection    Electronically signed by Edi Palmer MD on 5/23/2024 at 2:25 PM

## 2024-05-23 NOTE — INTERVAL H&P NOTE
Update History & Physical    The patient's History and Physical was reviewed with the patient and I examined the patient. There was no change. The surgical site was confirmed by the patient and me.     Plan: The risks, benefits, expected outcome, and alternative to the recommended procedure have been discussed with the patient. Patient understands and wants to proceed with the procedure.     Electronically signed by Edi Palmer MD on 5/23/2024 at 11:35 AM

## 2024-05-23 NOTE — PROGRESS NOTES
1536  - pt arrives to pacu, respirations unlabored on room air, pt denies pain, VSS    1540 - pt given 0.5 mg of dilaudid    1550 - pt given 0.5 mg of dilaudid    1600-  pt given 0.625 mg of droperidol    1610 -  pt resting comfortably, pt states pain tolerable    1620 - pt meets criteria for discharge from pacu at this time, pt transported to [facility] in stable condition

## 2024-05-24 LAB
ANION GAP SERPL CALC-SCNC: 11 MEQ/L (ref 8–16)
BUN SERPL-MCNC: 9 MG/DL (ref 7–22)
CALCIUM SERPL-MCNC: 9.8 MG/DL (ref 8.5–10.5)
CHLORIDE SERPL-SCNC: 104 MEQ/L (ref 98–111)
CO2 SERPL-SCNC: 25 MEQ/L (ref 23–33)
CREAT SERPL-MCNC: 0.7 MG/DL (ref 0.4–1.2)
GFR SERPL CREATININE-BSD FRML MDRD: > 90 ML/MIN/1.73M2
GLUCOSE SERPL-MCNC: 120 MG/DL (ref 70–108)
HCT VFR BLD AUTO: 45.8 % (ref 42–52)
HGB BLD-MCNC: 15.2 GM/DL (ref 14–18)
POTASSIUM SERPL-SCNC: 4.8 MEQ/L (ref 3.5–5.2)
SODIUM SERPL-SCNC: 140 MEQ/L (ref 135–145)

## 2024-05-24 PROCEDURE — 2580000003 HC RX 258: Performed by: SURGERY

## 2024-05-24 PROCEDURE — 80048 BASIC METABOLIC PNL TOTAL CA: CPT

## 2024-05-24 PROCEDURE — 85018 HEMOGLOBIN: CPT

## 2024-05-24 PROCEDURE — 1200000000 HC SEMI PRIVATE

## 2024-05-24 PROCEDURE — 36415 COLL VENOUS BLD VENIPUNCTURE: CPT

## 2024-05-24 PROCEDURE — 6370000000 HC RX 637 (ALT 250 FOR IP): Performed by: SURGERY

## 2024-05-24 PROCEDURE — 99024 POSTOP FOLLOW-UP VISIT: CPT | Performed by: SURGERY

## 2024-05-24 PROCEDURE — 6360000002 HC RX W HCPCS: Performed by: SURGERY

## 2024-05-24 PROCEDURE — 85014 HEMATOCRIT: CPT

## 2024-05-24 RX ORDER — ENOXAPARIN SODIUM 100 MG/ML
40 INJECTION SUBCUTANEOUS EVERY 24 HOURS
Status: DISCONTINUED | OUTPATIENT
Start: 2024-05-24 | End: 2024-05-25 | Stop reason: HOSPADM

## 2024-05-24 RX ORDER — SODIUM CHLORIDE 9 MG/ML
INJECTION, SOLUTION INTRAVENOUS PRN
Status: DISCONTINUED | OUTPATIENT
Start: 2024-05-24 | End: 2024-05-25 | Stop reason: HOSPADM

## 2024-05-24 RX ORDER — FERROUS SULFATE 325(65) MG
325 TABLET ORAL 2 TIMES DAILY WITH MEALS
Status: DISCONTINUED | OUTPATIENT
Start: 2024-05-24 | End: 2024-05-25 | Stop reason: HOSPADM

## 2024-05-24 RX ORDER — ALBUTEROL SULFATE 90 UG/1
2 AEROSOL, METERED RESPIRATORY (INHALATION) EVERY 6 HOURS PRN
Status: DISCONTINUED | OUTPATIENT
Start: 2024-05-24 | End: 2024-05-25 | Stop reason: HOSPADM

## 2024-05-24 RX ORDER — BUSPIRONE HYDROCHLORIDE 10 MG/1
10 TABLET ORAL 3 TIMES DAILY
Status: DISCONTINUED | OUTPATIENT
Start: 2024-05-24 | End: 2024-05-25 | Stop reason: HOSPADM

## 2024-05-24 RX ORDER — PANTOPRAZOLE SODIUM 40 MG/1
40 TABLET, DELAYED RELEASE ORAL
Status: DISCONTINUED | OUTPATIENT
Start: 2024-05-24 | End: 2024-05-25 | Stop reason: HOSPADM

## 2024-05-24 RX ORDER — SODIUM HYPOCHLORITE 1.25 MG/ML
SOLUTION TOPICAL DAILY
Status: DISCONTINUED | OUTPATIENT
Start: 2024-05-24 | End: 2024-05-25 | Stop reason: HOSPADM

## 2024-05-24 RX ORDER — TIZANIDINE 4 MG/1
4 TABLET ORAL 4 TIMES DAILY PRN
Status: DISCONTINUED | OUTPATIENT
Start: 2024-05-24 | End: 2024-05-25 | Stop reason: HOSPADM

## 2024-05-24 RX ORDER — FAMOTIDINE 20 MG/1
20 TABLET, FILM COATED ORAL 2 TIMES DAILY
Status: DISCONTINUED | OUTPATIENT
Start: 2024-05-24 | End: 2024-05-25 | Stop reason: HOSPADM

## 2024-05-24 RX ORDER — ONDANSETRON 4 MG/1
4 TABLET, FILM COATED ORAL EVERY 8 HOURS PRN
Status: DISCONTINUED | OUTPATIENT
Start: 2024-05-24 | End: 2024-05-24 | Stop reason: SDUPTHER

## 2024-05-24 RX ORDER — SODIUM CHLORIDE 0.9 % (FLUSH) 0.9 %
5-40 SYRINGE (ML) INJECTION EVERY 12 HOURS SCHEDULED
Status: DISCONTINUED | OUTPATIENT
Start: 2024-05-24 | End: 2024-05-25 | Stop reason: HOSPADM

## 2024-05-24 RX ORDER — GABAPENTIN 400 MG/1
800 CAPSULE ORAL 4 TIMES DAILY
Status: DISCONTINUED | OUTPATIENT
Start: 2024-05-24 | End: 2024-05-25 | Stop reason: HOSPADM

## 2024-05-24 RX ORDER — SODIUM CHLORIDE 9 MG/ML
INJECTION, SOLUTION INTRAVENOUS CONTINUOUS
Status: DISCONTINUED | OUTPATIENT
Start: 2024-05-24 | End: 2024-05-25 | Stop reason: HOSPADM

## 2024-05-24 RX ORDER — SODIUM CHLORIDE 0.9 % (FLUSH) 0.9 %
5-40 SYRINGE (ML) INJECTION PRN
Status: DISCONTINUED | OUTPATIENT
Start: 2024-05-24 | End: 2024-05-25 | Stop reason: HOSPADM

## 2024-05-24 RX ORDER — POTASSIUM CHLORIDE 750 MG/1
10 TABLET, FILM COATED, EXTENDED RELEASE ORAL DAILY
Status: DISCONTINUED | OUTPATIENT
Start: 2024-05-24 | End: 2024-05-25 | Stop reason: HOSPADM

## 2024-05-24 RX ADMIN — SODIUM CHLORIDE: 9 INJECTION, SOLUTION INTRAVENOUS at 11:24

## 2024-05-24 RX ADMIN — GABAPENTIN 800 MG: 400 CAPSULE ORAL at 10:39

## 2024-05-24 RX ADMIN — VENLAFAXINE HYDROCHLORIDE 225 MG: 75 CAPSULE, EXTENDED RELEASE ORAL at 11:20

## 2024-05-24 RX ADMIN — FERROUS SULFATE TAB 325 MG (65 MG ELEMENTAL FE) 325 MG: 325 (65 FE) TAB at 10:36

## 2024-05-24 RX ADMIN — ENOXAPARIN SODIUM 40 MG: 100 INJECTION SUBCUTANEOUS at 18:30

## 2024-05-24 RX ADMIN — GABAPENTIN 800 MG: 400 CAPSULE ORAL at 14:27

## 2024-05-24 RX ADMIN — BUSPIRONE HYDROCHLORIDE 10 MG: 10 TABLET ORAL at 21:19

## 2024-05-24 RX ADMIN — SODIUM CHLORIDE: 9 INJECTION, SOLUTION INTRAVENOUS at 22:53

## 2024-05-24 RX ADMIN — OXYCODONE HYDROCHLORIDE 5 MG: 5 TABLET ORAL at 21:18

## 2024-05-24 RX ADMIN — FAMOTIDINE 20 MG: 20 TABLET, FILM COATED ORAL at 21:18

## 2024-05-24 RX ADMIN — OXYCODONE HYDROCHLORIDE 10 MG: 5 TABLET ORAL at 08:04

## 2024-05-24 RX ADMIN — HYDROMORPHONE HYDROCHLORIDE 0.5 MG: 1 INJECTION, SOLUTION INTRAMUSCULAR; INTRAVENOUS; SUBCUTANEOUS at 14:30

## 2024-05-24 RX ADMIN — POTASSIUM CHLORIDE 10 MEQ: 750 TABLET, EXTENDED RELEASE ORAL at 10:37

## 2024-05-24 RX ADMIN — HYDROMORPHONE HYDROCHLORIDE 0.5 MG: 1 INJECTION, SOLUTION INTRAMUSCULAR; INTRAVENOUS; SUBCUTANEOUS at 09:43

## 2024-05-24 RX ADMIN — BUSPIRONE HYDROCHLORIDE 10 MG: 10 TABLET ORAL at 14:27

## 2024-05-24 RX ADMIN — OXYCODONE HYDROCHLORIDE 10 MG: 5 TABLET ORAL at 16:44

## 2024-05-24 RX ADMIN — BUSPIRONE HYDROCHLORIDE 10 MG: 10 TABLET ORAL at 10:36

## 2024-05-24 RX ADMIN — ACETAMINOPHEN 650 MG: 325 TABLET ORAL at 16:46

## 2024-05-24 RX ADMIN — DAKIN'S SOLUTION 0.125% (QUARTER STRENGTH): 0.12 SOLUTION at 14:31

## 2024-05-24 RX ADMIN — FERROUS SULFATE TAB 325 MG (65 MG ELEMENTAL FE) 325 MG: 325 (65 FE) TAB at 18:30

## 2024-05-24 RX ADMIN — FAMOTIDINE 20 MG: 20 TABLET, FILM COATED ORAL at 10:36

## 2024-05-24 RX ADMIN — ACETAMINOPHEN 650 MG: 325 TABLET ORAL at 08:04

## 2024-05-24 RX ADMIN — PANTOPRAZOLE SODIUM 40 MG: 40 TABLET, DELAYED RELEASE ORAL at 10:36

## 2024-05-24 RX ADMIN — GABAPENTIN 800 MG: 400 CAPSULE ORAL at 16:45

## 2024-05-24 RX ADMIN — SODIUM CHLORIDE, PRESERVATIVE FREE 20 ML: 5 INJECTION INTRAVENOUS at 10:37

## 2024-05-24 ASSESSMENT — PAIN DESCRIPTION - DESCRIPTORS
DESCRIPTORS: SHARP
DESCRIPTORS: ACHING;SORE;DISCOMFORT
DESCRIPTORS: SHARP
DESCRIPTORS: SHARP
DESCRIPTORS: ACHING
DESCRIPTORS: SHARP

## 2024-05-24 ASSESSMENT — PAIN SCALES - GENERAL
PAINLEVEL_OUTOF10: 0
PAINLEVEL_OUTOF10: 5
PAINLEVEL_OUTOF10: 7

## 2024-05-24 ASSESSMENT — PAIN DESCRIPTION - ORIENTATION
ORIENTATION: MID
ORIENTATION: MID
ORIENTATION: RIGHT
ORIENTATION: MID
ORIENTATION: MID

## 2024-05-24 ASSESSMENT — PAIN DESCRIPTION - LOCATION
LOCATION: ABDOMEN
LOCATION: BUTTOCKS

## 2024-05-24 ASSESSMENT — PAIN - FUNCTIONAL ASSESSMENT
PAIN_FUNCTIONAL_ASSESSMENT: ACTIVITIES ARE NOT PREVENTED
PAIN_FUNCTIONAL_ASSESSMENT: ACTIVITIES ARE NOT PREVENTED
PAIN_FUNCTIONAL_ASSESSMENT: PREVENTS OR INTERFERES SOME ACTIVE ACTIVITIES AND ADLS
PAIN_FUNCTIONAL_ASSESSMENT: ACTIVITIES ARE NOT PREVENTED
PAIN_FUNCTIONAL_ASSESSMENT: PREVENTS OR INTERFERES SOME ACTIVE ACTIVITIES AND ADLS
PAIN_FUNCTIONAL_ASSESSMENT: ACTIVITIES ARE NOT PREVENTED

## 2024-05-24 ASSESSMENT — PAIN DESCRIPTION - PAIN TYPE: TYPE: SURGICAL PAIN

## 2024-05-24 ASSESSMENT — PAIN DESCRIPTION - FREQUENCY: FREQUENCY: INTERMITTENT
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
## 2024-05-24 NOTE — PROGRESS NOTES
[facility] Wound Ostomy Continence Nurse  Ostomy Referral Progress Note      NAME:  Srikanth Coy  MEDICAL RECORD NUMBER:  [id number]  AGE: 33 y.o.   GENDER:  male  :  1990  TODAY'S DATE:  2024    Subjective   Reason for [facility] Evaluation and Assessment:     Colostomy revision        Srikanth Coy is a 33 y.o. male referred by:   [] Physician/ PA/ APRN-CNP  [x] Nursing  [] Other:       Surgeon: Edi Palmer MD   Date of surgery: 24    Objective     Mono Risk Score Mono Scale Score: 15    Assessment     Encounter: Present to patient room for colostomy revision. Patient in bed. Assessment and photo to follow. One post-op piece pouching system removed. Cleansed stoma with warm wash cloth, pat dry. Skin prep applied to nani-stomal skin. Coloplast 2 piece red/flat flange cut to fit to size, protective ring applied to inner edge of flange, centered over stoma, and applied. Pouch applied. Barrier extenders placed on outer edge of flange. Applied hands lightly over system to activate hydrocolloid. Patient is familiar with process of changing pouch as he has been doing it for a long time. Answered questions and concerns. Will plan to change pouching system and educate early next week if patient is still admitted. Additional pouching systems in room. Will continue to follow. Bed in low, call light in reach.       OSTOMY ASSESSMENT:      Colostomy LUQ (Active)   Stomal Appliance 2 piece 24 0800   Stoma  Assessment [place] 24 0943   Peristomal Assessment Pink;Red 24 0943   Mucocutaneous Junction Intact 24 0943   Stool Appearance Loose;Soft 24 0943   Stool Color Brown 24 0943   Stool Amount Medium 24 0943   Output (mL) 300 ml 24 0943   Number of days:                  Plan   Plan for Ostomy Care:    Ostomy Plan of Care  [x] Supplies/Instructions left in room  [] Patient using home supplies  [x] Current pouching system: Coloplast two piece red  kit    Current Diet: ADULT DIET; Easy to Chew  Dietician consulted:      Discharge Plan:  Placement for patient upon discharge:   [x] Home  [] Inpatient Rehab  [] SNF  [] ECF  [] [place]/ LTAC    [] Follow-up referral placed   [] Follow-up appointment entered  [] Discharge instructions in AVS  [] New patient folder/education given  [] Home Health care or facility care established  [x] Discharge supply pack given    Referrals:  [] / Case Management   [] Home Health Care  [] Outpatient Ostomy Clinic

## 2024-05-24 NOTE — PLAN OF CARE
Problem: Discharge Planning  Goal: Discharge to home or other facility with appropriate resources  Outcome: Progressing  Flowsheets (Taken 5/23/2024 2037 by Bernadette Moore, RN)  Discharge to home or other facility with appropriate resources:   Identify barriers to discharge with patient and caregiver   Identify discharge learning needs (meds, wound care, etc)   Refer to discharge planning if patient needs post-hospital services based on physician order or complex needs related to functional status, cognitive ability or social support system     Problem: Safety - Adult  Goal: Free from fall injury  Outcome: Progressing  Flowsheets (Taken 5/24/2024 0523)  Free From Fall Injury: Instruct family/caregiver on patient safety     Problem: ABCDS Injury Assessment  Goal: Absence of physical injury  Outcome: Progressing  Flowsheets (Taken 5/24/2024 0523)  Absence of Physical Injury: Implement safety measures based on patient assessment     Problem: Skin/Tissue Integrity  Goal: Absence of new skin breakdown  Description: 1.  Monitor for areas of redness and/or skin breakdown  2.  Assess vascular access sites hourly  3.  Every 4-6 hours minimum:  Change oxygen saturation probe site  4.  Every 4-6 hours:  If on nasal continuous positive airway pressure, respiratory therapy assess nares and determine need for appliance change or resting period.  Outcome: Progressing  Note: No evidence of new skin breakdown     Problem: Pain  Goal: Verbalizes/displays adequate comfort level or baseline comfort level  Outcome: Progressing  Flowsheets (Taken 5/24/2024 0523)  Verbalizes/displays adequate comfort level or baseline comfort level:   Encourage patient to monitor pain and request assistance   Assess pain using appropriate pain scale   Administer analgesics based on type and severity of pain and evaluate response   Implement non-pharmacological measures as appropriate and evaluate response

## 2024-05-24 NOTE — PROGRESS NOTES
Patient educated on use of daily CHG bath to prevent surgical site infection, s/s of infection, use of incentive spirometer and early ambulation. Patient verbalized understanding. CHG bath complete this shift.

## 2024-05-24 NOTE — CARE COORDINATION
05/24/24 1132   Service Assessment   Patient Orientation Alert and Oriented;Person;Place;Situation;Self   Cognition Alert   History Provided By Patient;Medical Record   Primary Caregiver Family   Accompanied By/Relationship Mother   Support Systems Parent;Family Members   Patient's Healthcare Decision Maker is: Legal Next of Kin   PCP Verified by CM Yes   Last Visit to PCP Within last 3 months   Prior Functional Level Assistance with the following:;Bathing;Dressing;Toileting;Cooking;Housework;Shopping;Mobility   Current Functional Level Assistance with the following:;Bathing;Dressing;Toileting;Cooking;Housework;Shopping;Mobility   Can patient return to prior living arrangement Yes   Ability to make needs known: Good   Family able to assist with home care needs: Yes   Would you like for me to discuss the discharge plan with any other family members/significant others, and if so, who? No   Financial Resources Medicaid   Community Resources None   CM/ALPESH Referral Other (see comment)  (N/A)   Social/Functional History   Active  No   Patient's  Info Mother or grandparents   Occupation On disability   Discharge Planning   Type of Residence Trailer/Mobile Home   Living Arrangements Family Members   Current Services Prior To Admission Durable Medical Equipment   Current DME Prior to Arrival Wheelchair;Hospital Bed   Potential Assistance Needed Durable Medical Equipment   Potential DME Needed Hospital Bed   DME Ordered? No   Potential Assistance Purchasing Medications No   Type of Home Care Services None   Patient expects to be discharged to: Trailer/mobile home   History of falls? 0   Services At/After Discharge   Transition of Care Consult (CM Consult) N/A   Services At/After Discharge None   Confirm Follow Up Transport Family   Condition of Participation: Discharge Planning   The Plan for Transition of Care is related to the following treatment goals: wounds to heal   Freedom of Choice list was provided with

## 2024-05-24 NOTE — PLAN OF CARE
Problem: Discharge Planning  Goal: Discharge to home or other facility with appropriate resources  5/24/2024 1652 by Lisy Garcia RN  Outcome: Progressing  Discharge plan is in process. Plan discharge home with family.     Problem: Safety - Adult  Goal: Free from fall injury  5/24/2024 1652 by Lisy Garcia RN  Outcome: Progressing  Fall assessment completed. Patient using call light appropriately to call for assistance .  Personal items within reach. Patient is also compliant with use of non-skid slippers.      Problem: ABCDS Injury Assessment  Goal: Absence of physical injury  5/24/2024 1652 by Lisy Garcia RN  Outcome: Progressing  No falls noted this shift. Family member at bedside, spent the day. Bed kept in low position. Safe environment maintained. Bedside table & call light in reach. Uses call light appropriately when needing assistance.      Problem: Skin/Tissue Integrity  Goal: Absence of new skin breakdown  Description: 1.  Monitor for areas of redness and/or skin breakdown  2.  Assess vascular access sites hourly  3.  Every 4-6 hours minimum:  Change oxygen saturation probe site  4.  Every 4-6 hours:  If on nasal continuous positive airway pressure, respiratory therapy assess nares and determine need for appliance change or resting period.  5/24/2024 1652 by Lisy Garcia RN  Outcome: Progressing  No new skin breakdown this shift. Patient being assisted with turning. Patients states understanding of repositioning every two hours.  Dressing change with Dakin's solution per order.     Problem: Pain  Goal: Verbalizes/displays adequate comfort level or baseline comfort level  5/24/2024 1652 by Lisy Garcia, RN  Outcome: Progressing  Patient states pain relief from PRN pain medications. Pain reassessed one hour post PRN pain medication given.  Patient rates pain 6-7 on IKER 0-10 scale.     Problem: Gastrointestinal - Adult  Goal: Maintains or returns to baseline  bowel function  Outcome: Progressing  Patient bowel sounds active.  Passing  flatus.  Pt taking prescribed medication to assist with BM.     Problem: Gastrointestinal - Adult  Goal: Establish and maintain optimal ostomy function  Outcome: Progressing  Patient maintain optimal ostomy function. BM soft, loose.    Care plan reviewed with patient.  Patient verbalizes understanding of the plan of care and contribute to goal setting.

## 2024-05-24 NOTE — OP NOTE
[address] [address]                            OPERATIVE REPORT      PATIENT NAME: SHASHI MILLER              : 1990  MED REC NO: [id number]                       ROOM: [facility]  ACCOUNT NO: [id number]                       ADMIT DATE: 2024  PROVIDER: Edi Palmer MD      DATE OF PROCEDURE:  2024    SURGEON:  Edi Palmer MD    PREOPERATIVE DIAGNOSES:    1. Colostomy prolapse.  2. Nonhealing tunneled left ischial chronic wound.  3. Coccyx wound.  4. Tobacco abuse.    POSTOPERATIVE DIAGNOSES:    1. Colostomy prolapse.  2. Nonhealing tunneled left ischial chronic wound.  3. Coccyx wound.  4. Tobacco abuse.    PROCEDURES:    1. Robotic colostomy revision with partial colectomy.  2. Excisional debridement of skin, subcutaneous tissue, and muscle/fascia of left nonhealing ischial wound (3 x 3 cm in diameter).    ASSISTANT:  Yakov Guerrero.    ANESTHESIA:  General/local.    ESTIMATED BLOOD LOSS:  20 mL.    DRAINS:  none.    DISPOSITION:  Stable to the recovery room.    INDICATIONS:  The patient is a 33-year-old male who is a paraplegic and has a chronic nonhealing left ischial wound that is in need of debridement.  He also has had worsening prolapse of his colostomy and is in need of revision.  Both operative and nonoperative intervention plans were discussed in detail with the patient and family.  All in agreement and wished to proceed with surgery.    DESCRIPTION OF PROCEDURE:  After informed consent was signed and placed in the chart, the patient was taken back to the operating room and placed supine on the operating room table.  General anesthesia was induced.  He tolerated this well throughout the case.  He was then placed prone.  All pressure points padded.  He was on preoperative antibiotics.  Bilateral lower extremity sequential compression devices were placed.  His left ischial and buttock region were all  prepped and draped in usual sterile standard fashion.  A time-out occurred prior to the operation which not only identified the patient, but also the planned procedure to be performed.  At the end of the time-out, there were no questions or concerns.  I began the operation by excisionally debriding the nonhealing necrotic-like skin, subcutaneous tissue, and muscle/fascia.  This was somewhat of a limited debridement as I was trying to keep the wound as small as possible.  After the excisional debridement with a 15 blade scalpel and electrocautery, the rest of the remaining tissues appeared to be viable.  We did elevate the skin somewhat because of the amount of indentation that it had been.  Irrigation with Irrisept.  Hemostasis with electrocautery.  No other abnormalities were identified.  Overall debridement was 3 x 3 cm in diameter.  This wound was then packed with Dakin-soaked gauze.  We then repositioned the patient into a supine position.  All pressure points were padded.  Another time-out occurred.  His abdomen and pelvis were prepped and draped in usual sterile standard fashion.  All questions answered.  No changes from the time-out.  I then began this portion of the operation by making a skin nick at Madden point.  Veress needle inserted.  Intraabdominal cavity was insufflated to a pressure of approximately 15 mmHg with carbon dioxide gas.  Patient tolerated insufflation well.  8 mm trocar placed over on the right side of the abdomen.  Laparoscope inserted.  Upon initial evaluation, there was no hollow viscus, solid organ, or major vascular injury with the Veress needle insertion or the first trocar placement.  Three other 8 mm trocars were placed in their standard location under direct vision.  Patient was placed with the left side elevated.  Robot brought in and docked.  Instruments placed under direct vision.  Once everything was aligned in an order, I then unscrubbed and went back to the console.  I

## 2024-05-25 VITALS
SYSTOLIC BLOOD PRESSURE: 137 MMHG | BODY MASS INDEX: 24.57 KG/M2 | WEIGHT: 171.6 LBS | HEIGHT: 70 IN | OXYGEN SATURATION: 100 % | DIASTOLIC BLOOD PRESSURE: 91 MMHG | HEART RATE: 70 BPM | TEMPERATURE: 97.9 F | RESPIRATION RATE: 16 BRPM

## 2024-05-25 LAB
ANION GAP SERPL CALC-SCNC: 10 MEQ/L (ref 8–16)
BUN SERPL-MCNC: 7 MG/DL (ref 7–22)
CALCIUM SERPL-MCNC: 9.1 MG/DL (ref 8.5–10.5)
CHLORIDE SERPL-SCNC: 108 MEQ/L (ref 98–111)
CO2 SERPL-SCNC: 25 MEQ/L (ref 23–33)
CREAT SERPL-MCNC: 0.7 MG/DL (ref 0.4–1.2)
GFR SERPL CREATININE-BSD FRML MDRD: > 90 ML/MIN/1.73M2
GLUCOSE SERPL-MCNC: 92 MG/DL (ref 70–108)
HCT VFR BLD AUTO: 44.2 % (ref 42–52)
HGB BLD-MCNC: 13.6 GM/DL (ref 14–18)
POTASSIUM SERPL-SCNC: 4 MEQ/L (ref 3.5–5.2)
SODIUM SERPL-SCNC: 143 MEQ/L (ref 135–145)

## 2024-05-25 PROCEDURE — 99024 POSTOP FOLLOW-UP VISIT: CPT | Performed by: SURGERY

## 2024-05-25 PROCEDURE — 36415 COLL VENOUS BLD VENIPUNCTURE: CPT

## 2024-05-25 PROCEDURE — 85018 HEMOGLOBIN: CPT

## 2024-05-25 PROCEDURE — 80048 BASIC METABOLIC PNL TOTAL CA: CPT

## 2024-05-25 PROCEDURE — 6370000000 HC RX 637 (ALT 250 FOR IP): Performed by: SURGERY

## 2024-05-25 PROCEDURE — 85014 HEMATOCRIT: CPT

## 2024-05-25 RX ORDER — OXYCODONE HYDROCHLORIDE 5 MG/1
5 TABLET ORAL EVERY 4 HOURS PRN
Qty: 12 TABLET | Refills: 0 | Status: SHIPPED | OUTPATIENT
Start: 2024-05-25 | End: 2024-05-28

## 2024-05-25 RX ADMIN — OXYCODONE HYDROCHLORIDE 5 MG: 5 TABLET ORAL at 06:58

## 2024-05-25 RX ADMIN — FERROUS SULFATE TAB 325 MG (65 MG ELEMENTAL FE) 325 MG: 325 (65 FE) TAB at 08:18

## 2024-05-25 RX ADMIN — VENLAFAXINE HYDROCHLORIDE 225 MG: 75 CAPSULE, EXTENDED RELEASE ORAL at 08:18

## 2024-05-25 RX ADMIN — FAMOTIDINE 20 MG: 20 TABLET, FILM COATED ORAL at 08:17

## 2024-05-25 RX ADMIN — PANTOPRAZOLE SODIUM 40 MG: 40 TABLET, DELAYED RELEASE ORAL at 06:48

## 2024-05-25 RX ADMIN — BUSPIRONE HYDROCHLORIDE 10 MG: 10 TABLET ORAL at 08:17

## 2024-05-25 RX ADMIN — DAKIN'S SOLUTION 0.125% (QUARTER STRENGTH): 0.12 SOLUTION at 08:21

## 2024-05-25 RX ADMIN — POTASSIUM CHLORIDE 10 MEQ: 750 TABLET, EXTENDED RELEASE ORAL at 08:17

## 2024-05-25 RX ADMIN — GABAPENTIN 800 MG: 400 CAPSULE ORAL at 08:18

## 2024-05-25 ASSESSMENT — PAIN SCALES - GENERAL: PAINLEVEL_OUTOF10: 6

## 2024-05-25 ASSESSMENT — PAIN DESCRIPTION - DESCRIPTORS: DESCRIPTORS: ACHING

## 2024-05-25 ASSESSMENT — PAIN DESCRIPTION - LOCATION: LOCATION: HIP

## 2024-05-25 ASSESSMENT — PAIN DESCRIPTION - ORIENTATION: ORIENTATION: LEFT
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
## 2024-05-25 NOTE — DISCHARGE INSTRUCTIONS
Continue Dakin's dressing changes at home just like you were doing prior.  Can remove Band-Aids over laparoscopic sites on Sunday

## 2024-05-25 NOTE — PROGRESS NOTES
[facility]   PROGRESS NOTE      Patient: Srikanth Coy  Room #: 5K-10/010-A            YOB: 1990  Age: 33 y.o.  Gender: male            Admit Date & Time: 5/23/2024 10:43 AM    Situation:    This is a spiritual health encounter in response to a notification of a rapid response called for patient's family member as patient was in the process of discharging. Multiple family members present at bedside.     Assessment:  Patient shared about his hospital stay and the unexpected health crisis for his family member. He expressed hope about her wellbeing and his desire to be able to go home soon. Grandmother of the family tearfully shared about trying to get in touch with family members and appeared overwhelmed with the medical situation unfolding.    Intervention:   provided empathic listening and supportive presence and escorted family down to the Emergency Department.  provided information regarding  services and availability.    Plan:   team will remain available to support patient/family, PRN.         Electronically signed by Chaplain Brittni Lomeli M.Div, on 5/25/2024 at 9:29 AM.     [place] [address]  [phone]

## 2024-05-25 NOTE — PLAN OF CARE
Problem: Discharge Planning  Goal: Discharge to home or other facility with appropriate resources  5/25/2024 0229 by Bernadette Kuhn, RN  Outcome: Progressing     Problem: Safety - Adult  Goal: Free from fall injury  5/25/2024 0229 by Bernadette Kuhn, RN  Outcome: Progressing  Safety measures maintained, no injury or fall this shift.      Problem: Skin/Tissue Integrity  Goal: Absence of new skin breakdown        5/25/2024 0229 by Bernadette Kuhn, RN  Outcome: Progressing  No new skin breakdown.     Problem: Pain  Goal: Verbalizes/displays adequate comfort level or baseline comfort level  5/25/2024 0229 by Bernadette Kuhn, RN  Outcome: Progressing  Comfort measures maintained, given Oxycodone x1 with good pain control.

## 2024-05-25 NOTE — CARE COORDINATION
Srikanth Coy was evaluated today and a DME order was entered for a semi-electric hospital bed because he requires assistance for positioning needs not possible in an ordinary bed due to paraplegia with sacral decubitus ulcer s/p wound debridement.  Patient requires frequent and immediate positioning changes for relief of pain and care needs related to medical diagnoses.  Patient needs variability of bed height requirements to perform patient transfers and for eating, bathing, personal cares, grooming, and taking own medications.  Current body Weight - Scale: 77.8 kg (171 lb 9.6 oz).  The need for this equipment was discussed with the patient and he understands and is in agreement.

## 2024-05-25 NOTE — PLAN OF CARE
Problem: Safety - Adult  Goal: Free from fall injury  5/25/2024 0833 by Merle Tamayo RN  Outcome: Adequate for Discharge  5/25/2024 0229 by Bernadette Kuhn RN  Outcome: Progressing     Problem: Discharge Planning  Goal: Discharge to home or other facility with appropriate resources  5/25/2024 0833 by Merle Tamayo RN  Outcome: Adequate for Discharge  Flowsheets (Taken 5/25/2024 0813)  Discharge to home or other facility with appropriate resources: Identify barriers to discharge with patient and caregiver  5/25/2024 0229 by Bernadette Kuhn RN  Outcome: Progressing     Problem: ABCDS Injury Assessment  Goal: Absence of physical injury  5/25/2024 0833 by Merle Tamayo RN  Outcome: Adequate for Discharge  5/25/2024 0229 by Bernadette Kuhn RN  Outcome: Progressing     Problem: Skin/Tissue Integrity  Goal: Absence of new skin breakdown  Description: 1.  Monitor for areas of redness and/or skin breakdown  2.  Assess vascular access sites hourly  3.  Every 4-6 hours minimum:  Change oxygen saturation probe site  4.  Every 4-6 hours:  If on nasal continuous positive airway pressure, respiratory therapy assess nares and determine need for appliance change or resting period.  5/25/2024 0833 by Merle Tamayo RN  Outcome: Adequate for Discharge  5/25/2024 0229 by Bernadette Kuhn RN  Outcome: Progressing     Problem: Pain  Goal: Verbalizes/displays adequate comfort level or baseline comfort level  5/25/2024 0833 by Merle Tamayo RN  Outcome: Adequate for Discharge  5/25/2024 0229 by Bernadette Kuhn RN  Outcome: Progressing  Flowsheets (Taken 5/24/2024 2117)  Verbalizes/displays adequate comfort level or baseline comfort level:   Encourage patient to monitor pain and request assistance   Assess pain using appropriate pain scale   Administer analgesics based on type and severity of pain and evaluate response     Problem: Gastrointestinal - Adult  Goal: Maintains or  returns to baseline bowel function  Outcome: Adequate for Discharge  Flowsheets  Taken 5/25/2024 0813 by Merle Tamayo RN  Maintains or returns to baseline bowel function: Assess bowel function  Taken 5/24/2024 2117 by Bernadette Kuhn RN  Maintains or returns to baseline bowel function:   Assess bowel function   Encourage oral fluids to ensure adequate hydration   Administer ordered medications as needed   Administer IV fluids as ordered to ensure adequate hydration  Goal: Establish and maintain optimal ostomy function  Outcome: Adequate for Discharge  Flowsheets  Taken 5/25/2024 0813 by Merle Tamayo RN  Establish and maintain optimal ostomy function: Monitor output from ostomies  Taken 5/24/2024 2117 by Bernadette Kuhn RN  Establish and maintain optimal ostomy function: Monitor output from ostomies

## 2024-06-03 ENCOUNTER — TELEPHONE (OUTPATIENT)
Dept: SURGERY | Age: 34
End: 2024-06-03

## 2024-06-03 NOTE — TELEPHONE ENCOUNTER
Pt mother called stating pt sutures came out on the left side. Mom also stated when he wipes away the stool from his colostomy it is yellowish.  His stools are soft to loose, no odor.  Pt bag is a 1/4 way full that is for 2 days.  Mother wondering if that is normal?

## 2024-06-04 NOTE — DISCHARGE SUMMARY
has had worsening prolapse of his colostomy and is in need of revision.  Both operative and nonoperative intervention plans were discussed in detail with the patient and family.  All in agreement and wished to proceed with surgery.     DESCRIPTION OF PROCEDURE:  After informed consent was signed and placed in the chart, the patient was taken back to the operating room and placed supine on the operating room table.  General anesthesia was induced.  He tolerated this well throughout the case.  He was then placed prone.  All pressure points padded.  He was on preoperative antibiotics.  Bilateral lower extremity sequential compression devices were placed.  His left ischial and buttock region were all prepped and draped in usual sterile standard fashion.  A time-out occurred prior to the operation which not only identified the patient, but also the planned procedure to be performed.  At the end of the time-out, there were no questions or concerns.  I began the operation by excisionally debriding the nonhealing necrotic-like skin, subcutaneous tissue, and muscle/fascia.  This was somewhat of a limited debridement as I was trying to keep the wound as small as possible.  After the excisional debridement with a 15 blade scalpel and electrocautery, the rest of the remaining tissues appeared to be viable.  We did elevate the skin somewhat because of the amount of indentation that it had been.  Irrigation with Irrisept.  Hemostasis with electrocautery.  No other abnormalities were identified.  Overall debridement was 3 x 3 cm in diameter.  This wound was then packed with Dakin-soaked gauze.  We then repositioned the patient into a supine position.  All pressure points were padded.  Another time-out occurred.  His abdomen and pelvis were prepped and draped in usual sterile standard fashion.  All questions answered.  No changes from the time-out.  I then began this portion of the operation by making a skin nick at Madden point.

## 2024-06-06 ENCOUNTER — OFFICE VISIT (OUTPATIENT)
Dept: SURGERY | Age: 34
End: 2024-06-06

## 2024-06-06 VITALS
SYSTOLIC BLOOD PRESSURE: 124 MMHG | DIASTOLIC BLOOD PRESSURE: 86 MMHG | HEART RATE: 90 BPM | OXYGEN SATURATION: 93 % | RESPIRATION RATE: 18 BRPM | TEMPERATURE: 97.2 F

## 2024-06-06 DIAGNOSIS — Z98.890 S/P EXCISIONAL DEBRIDEMENT: Primary | ICD-10-CM

## 2024-06-06 DIAGNOSIS — Z93.3 S/P COLOSTOMY (HCC): ICD-10-CM

## 2024-06-06 PROCEDURE — 99024 POSTOP FOLLOW-UP VISIT: CPT | Performed by: NURSE PRACTITIONER

## 2024-06-06 RX ORDER — POLYETHYLENE GLYCOL 3350 17 G/17G
17 POWDER, FOR SOLUTION ORAL 2 TIMES DAILY
Qty: 530 G | Refills: 5 | Status: SHIPPED | OUTPATIENT
Start: 2024-06-06 | End: 2024-09-08

## 2024-06-06 RX ORDER — OXYCODONE HYDROCHLORIDE AND ACETAMINOPHEN 5; 325 MG/1; MG/1
1 TABLET ORAL EVERY 6 HOURS PRN
Qty: 15 TABLET | Refills: 0 | Status: SHIPPED | OUTPATIENT
Start: 2024-06-06 | End: 2024-06-13

## 2024-06-06 RX ORDER — OXYCODONE HYDROCHLORIDE 5 MG/1
TABLET ORAL
COMMUNITY
Start: 2024-05-28

## 2024-06-06 ASSESSMENT — ENCOUNTER SYMPTOMS
SHORTNESS OF BREATH: 0
WHEEZING: 0
ABDOMINAL DISTENTION: 0
NAUSEA: 0
EYE PAIN: 0
ABDOMINAL PAIN: 0
DIARRHEA: 0
CHOKING: 0
COLOR CHANGE: 0
CONSTIPATION: 1
APNEA: 0
EYE DISCHARGE: 0
PHOTOPHOBIA: 0
ALLERGIC/IMMUNOLOGIC NEGATIVE: 1
SORE THROAT: 0
VOMITING: 0
EYE ITCHING: 0
RHINORRHEA: 0
SINUS PRESSURE: 0
CHEST TIGHTNESS: 0
COUGH: 0
ANAL BLEEDING: 0
BLOOD IN STOOL: 0
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
## 2024-06-17 ENCOUNTER — HOSPITAL ENCOUNTER (OUTPATIENT)
Dept: ULTRASOUND IMAGING | Age: 34
Discharge: HOME | End: 2024-06-17
Payer: MEDICAID

## 2024-06-17 ENCOUNTER — TELEPHONE (OUTPATIENT)
Dept: UROLOGY | Age: 34
End: 2024-06-17

## 2024-06-17 DIAGNOSIS — N50.82 SCROTAL PAIN: ICD-10-CM

## 2024-06-17 DIAGNOSIS — N50.82 SCROTAL PAIN: Primary | ICD-10-CM

## 2024-06-17 PROCEDURE — 76870 US EXAM SCROTUM: CPT

## 2024-06-17 NOTE — TELEPHONE ENCOUNTER
Brandy stafford [facility] confirmed the appointment. He refuses to go to the ER.    The rodrigues is patent and has a good output. He is pushing fluids.    He is doing better but still has scrotal pain. She does not know if its from sitting and his legs tightening up. She denies any edema or redness.

## 2024-06-17 NOTE — TELEPHONE ENCOUNTER
Again, recommend ED evaluation.     I can otherwise order a stat scrotal US if the patient is amendable    The patient should go to the ED should they develop fever, chills, nausea, vomiting, chest pain, SOB, calf pain, feelings of incomplete emptying, or should they otherwise feel they need evaluated

## 2024-06-17 NOTE — TELEPHONE ENCOUNTER
I have personally verified, reviewed, and approved these actions.      The patient should go to the ED should they develop fever, chills, nausea, vomiting, chest pain, SOB, calf pain, feelings of incomplete emptying, or should they otherwise feel they need evaluated

## 2024-06-17 NOTE — TELEPHONE ENCOUNTER
Patient called over the weekend with concerns that \"his bladder was going to explode\".     Recommended ED follow-up    Please call to see how he is doing. I also would advise the patient to f/u tomorrow as scheduled with Dr. Eder Diaz    The patient should go to the ED should they develop fever, chills, nausea, vomiting, chest pain, SOB, calf pain, feelings of incomplete emptying, or should they otherwise feel they need evaluated

## 2024-06-18 ENCOUNTER — OFFICE VISIT (OUTPATIENT)
Dept: UROLOGY | Age: 34
End: 2024-06-18
Payer: MEDICAID

## 2024-06-18 VITALS — BODY MASS INDEX: 24.34 KG/M2 | RESPIRATION RATE: 20 BRPM | WEIGHT: 170 LBS | HEIGHT: 70 IN

## 2024-06-18 DIAGNOSIS — N50.82 SCROTAL PAIN: ICD-10-CM

## 2024-06-18 DIAGNOSIS — N39.0 RECURRENT UTI: ICD-10-CM

## 2024-06-18 DIAGNOSIS — Q55.22 RETRACTILE TESTIS: Primary | ICD-10-CM

## 2024-06-18 PROCEDURE — 99214 OFFICE O/P EST MOD 30 MIN: CPT | Performed by: UROLOGY
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
## 2024-08-06 NOTE — BRIEF OP NOTE
Brief Postoperative Note      Patient: Maria M Hu  YOB: 1990  MRN: [id number]    Date of Procedure: 2/19/2021    Pre-Op Diagnosis: BILATERAL ANKLE CONTRACTURE, HAMMERTOES, CAVO VARUS, OSTEOMYELITIS ON THE RIGHT    Post-Op Diagnosis: Same       Procedure(s):  RIGHT TRANSMETATARSAL AMPUTATION  BILAT TENDO ACHILLES LENGTHENING, FLEXOR DIGITORUM LONGUS TENDON AND PERONEAL TENDON LENGTHENING    Surgeon(s):  Roxana Ho DPM    Assistant:  Resident: Santino Goldman DPM    Anesthesia: General    Estimated Blood Loss (mL): less than 50     Complications: None    Specimens:   * No specimens in log *    Implants:  * No implants in log *      Drains:   Colostomy LUQ (Active)       Suprapubic Catheter 18 fr (Active)       Findings: osteomyelitis, right 2nd toe bilateral forefoot contractures    Electronically signed by Roxana Ho DPM on 2/19/2021 at 12:59 PM [facility]

## 2024-08-26 ENCOUNTER — TELEPHONE (OUTPATIENT)
Dept: UROLOGY | Age: 34
End: 2024-08-26

## 2024-08-26 ENCOUNTER — LAB (OUTPATIENT)
Dept: LAB | Age: 34
End: 2024-08-26

## 2024-08-26 DIAGNOSIS — N39.0 RECURRENT UTI: Primary | ICD-10-CM

## 2024-08-26 DIAGNOSIS — N39.0 RECURRENT UTI: ICD-10-CM

## 2024-08-26 LAB
BACTERIA: ABNORMAL
BILIRUB UR QL STRIP: NEGATIVE
CASTS #/AREA URNS LPF: ABNORMAL /LPF
CASTS #/AREA URNS LPF: ABNORMAL /LPF
CHARACTER UR: ABNORMAL
CHARCOAL URNS QL MICRO: ABNORMAL
COLOR UR: YELLOW
CRYSTALS URNS QL MICRO: ABNORMAL
EPITHELIAL CELLS, UA: ABNORMAL /HPF
GLUCOSE UR QL STRIP.AUTO: NEGATIVE MG/DL
HGB UR QL STRIP.AUTO: ABNORMAL
KETONES UR QL STRIP.AUTO: NEGATIVE
LEUKOCYTE ESTERASE UR QL STRIP.AUTO: ABNORMAL
NITRITE UR QL STRIP.AUTO: POSITIVE
PH UR STRIP.AUTO: 6.5 [PH] (ref 5–9)
PROT UR STRIP.AUTO-MCNC: ABNORMAL MG/DL
RBC #/AREA URNS HPF: ABNORMAL /HPF
RENAL EPI CELLS #/AREA URNS HPF: ABNORMAL /[HPF]
SPECIFIC GRAVITY UA: 1.01 (ref 1–1.03)
UROBILINOGEN, URINE: 0.2 EU/DL (ref 0–1)
WBC #/AREA URNS HPF: > 200 /HPF
YEAST LIKE FUNGI URNS QL MICRO: ABNORMAL

## 2024-08-26 NOTE — TELEPHONE ENCOUNTER
Patients mother called on HIPAA stating that sania is voiding out of his penis vs his sp tube.  Questioning if he has a uti?   Para from nipples down.  No fever or chills c/o  headache.  Was put on prednisone and antibiotic? Unsure of what name was.  He states he fells like his penis is broke.      Asking if he needs a spt change and urine?  Due to be changed.      20 fr catheter

## 2024-08-26 NOTE — TELEPHONE ENCOUNTER
Change catheter.    Collect urine from urethra or mid stream.    Orders given.   Pharmacy Called. Pt needs Toprol 50 mg for 90 days refilled. Send to [facility] in [place].

## 2024-08-28 DIAGNOSIS — A49.9 UTI (URINARY TRACT INFECTION), BACTERIAL: Primary | ICD-10-CM

## 2024-08-28 DIAGNOSIS — N39.0 UTI (URINARY TRACT INFECTION), BACTERIAL: Primary | ICD-10-CM

## 2024-08-28 LAB
BACTERIA UR CULT: ABNORMAL
ORGANISM: ABNORMAL

## 2024-08-28 RX ORDER — SULFAMETHOXAZOLE/TRIMETHOPRIM 800-160 MG
1 TABLET ORAL 2 TIMES DAILY
Qty: 14 TABLET | Refills: 0 | Status: SHIPPED | OUTPATIENT
Start: 2024-08-28 | End: 2024-09-04

## 2024-09-23 DIAGNOSIS — N31.8 SPASTIC NEUROGENIC BLADDER: Primary | ICD-10-CM

## 2024-09-23 RX ORDER — MIRABEGRON 50 MG/1
50 TABLET, FILM COATED, EXTENDED RELEASE ORAL DAILY
Qty: 30 TABLET | Refills: 5 | Status: SHIPPED | OUTPATIENT
Start: 2024-09-23

## 2024-09-23 NOTE — TELEPHONE ENCOUNTER
Srikanth Coy called requesting a refill on the following medications:  Requested Prescriptions     Pending Prescriptions Disp Refills    MYRBETRIQ 50 MG TB24 [Pharmacy Med Name: Myrbetriq 50MG TB24] 28 tablet      Sig: TAKE 1 TABLET BY MOUTH DAILY     Pharmacy verified:  [email or web address]      Date of last visit: 06/18/2024  Date of next visit (if applicable): 06/25/2025

## 2024-10-01 ENCOUNTER — TELEPHONE (OUTPATIENT)
Dept: UROLOGY | Age: 34
End: 2024-10-01

## 2024-10-17 ENCOUNTER — TELEPHONE (OUTPATIENT)
Dept: UROLOGY | Age: 34
End: 2024-10-17

## 2024-10-17 NOTE — TELEPHONE ENCOUNTER
Patient mother called stating the catheter was plugged up. It was last plugged up on Monday. They will discuss possible irrigations at home at the appointment to keep the catheter patent.    Catheter is patent since exchanging it and draining. Advised to push fluids to keep the urine clear

## 2024-10-17 NOTE — TELEPHONE ENCOUNTER
Agree with plan.    Keep upcoming appointment.   If fevers, hills, uncontrolled pain, or catheter not draining call office or go to ER.    Make sure taking myrbetriq.

## 2024-10-17 NOTE — TELEPHONE ENCOUNTER
Patient mother called stating the sp catheter is not draining any longer and does not have one at home for the exchange. 20 Fr placed at the  so she can do the exchange.    He does have any appointment on 10/23/2024 to discuss. She feels he may need upsided due to the urine has been leaking around the insertion site even when the rodrigues is patent and he has been having incontinence from the penis.

## 2024-10-23 ENCOUNTER — TELEPHONE (OUTPATIENT)
Dept: UROLOGY | Age: 34
End: 2024-10-23

## 2024-10-23 ENCOUNTER — OFFICE VISIT (OUTPATIENT)
Dept: UROLOGY | Age: 34
End: 2024-10-23
Payer: MEDICAID

## 2024-10-23 VITALS — RESPIRATION RATE: 14 BRPM | HEIGHT: 70 IN | WEIGHT: 175 LBS | BODY MASS INDEX: 25.05 KG/M2

## 2024-10-23 DIAGNOSIS — R33.9 URINARY RETENTION: Primary | ICD-10-CM

## 2024-10-23 PROCEDURE — 99213 OFFICE O/P EST LOW 20 MIN: CPT

## 2024-10-23 RX ORDER — ACETIC ACID 0.25 G/100ML
IRRIGANT IRRIGATION
Qty: 1000 ML | Refills: 2 | Status: SHIPPED | OUTPATIENT
Start: 2024-10-23 | End: 2024-10-23

## 2024-10-23 RX ORDER — ACETIC ACID 0.25 G/100ML
IRRIGANT IRRIGATION
Qty: 1000 ML | Refills: 2 | Status: SHIPPED | OUTPATIENT
Start: 2024-10-23

## 2024-10-23 NOTE — TELEPHONE ENCOUNTER
Acetic acid sent.    Patient to irrigate once daily with 60 mL    The patient should go to the ED should they develop fever, chills, nausea, vomiting, chest pain, SOB, calf pain, feelings of incomplete emptying, or should they otherwise feel they need evaluated

## 2024-10-23 NOTE — PATIENT INSTRUCTIONS
Continue Myrbetriq 50 mg daily  Irrigate with 50 mL of sterile fluid BID to prevent occlusion  Follow-up in 3 months or sooner if needed  Contact the office or present to the ED if your catheter becomes occluded and you are unable to regain patency  Call with questions, comments, or concerns. I recommend going to the ED for further evaluation if you develop fever, chills, nausea, vomiting, chest pain, SOB, or calf pain.
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
## 2024-10-23 NOTE — TELEPHONE ENCOUNTER
Could please send this to the [facility] on [address]     informed  jovanny lui's mother on HIPAA regarding acetic acid and directions.    Verbalized understanding.

## 2024-10-23 NOTE — PROGRESS NOTES
Diagnosis: Neurogenic bladder    Patient was shown how to hand irrigate catheter with 50 cc of sterile water.

## 2024-11-08 ENCOUNTER — TELEPHONE (OUTPATIENT)
Dept: UROLOGY | Age: 34
End: 2024-11-08

## 2024-11-08 NOTE — TELEPHONE ENCOUNTER
Patient mother states the patient is having spasms and waking up wet. The catheter is patent. She was going to exchange the sp catheter. The spasms happen more at night.    Explained if the catheter is patent he may still have spasms even if its exchanged.    They are irrigates daily with acetic acid without problems and saline every other day. He is taking myrbetriq 50 mg daily.    Previously taking Ditropan for management of bladder spasms but c/o aggressive behavior.     Is there anything else they can do for the spasm?

## 2024-11-14 ENCOUNTER — APPOINTMENT (OUTPATIENT)
Dept: GENERAL RADIOLOGY | Age: 34
DRG: 720 | End: 2024-11-14
Payer: MEDICAID

## 2024-11-14 ENCOUNTER — HOSPITAL ENCOUNTER (INPATIENT)
Age: 34
LOS: 4 days | Discharge: HOME | DRG: 720 | End: 2024-11-18
Attending: FAMILY MEDICINE | Admitting: STUDENT IN AN ORGANIZED HEALTH CARE EDUCATION/TRAINING PROGRAM
Payer: MEDICAID

## 2024-11-14 ENCOUNTER — TELEPHONE (OUTPATIENT)
Dept: UROLOGY | Age: 34
End: 2024-11-14

## 2024-11-14 DIAGNOSIS — N17.9 AKI (ACUTE KIDNEY INJURY) (HCC): ICD-10-CM

## 2024-11-14 DIAGNOSIS — R19.7 NAUSEA VOMITING AND DIARRHEA: ICD-10-CM

## 2024-11-14 DIAGNOSIS — L89.153 PRESSURE INJURY OF SACRAL REGION, STAGE 3 (HCC): ICD-10-CM

## 2024-11-14 DIAGNOSIS — R11.2 NAUSEA VOMITING AND DIARRHEA: ICD-10-CM

## 2024-11-14 DIAGNOSIS — N30.00 ACUTE CYSTITIS WITHOUT HEMATURIA: ICD-10-CM

## 2024-11-14 DIAGNOSIS — E86.0 DEHYDRATION: Primary | ICD-10-CM

## 2024-11-14 DIAGNOSIS — N32.89 BLADDER SPASM: ICD-10-CM

## 2024-11-14 PROBLEM — N39.0 UTI (URINARY TRACT INFECTION): Status: ACTIVE | Noted: 2024-11-14

## 2024-11-14 LAB
ALBUMIN SERPL BCG-MCNC: 3.4 G/DL (ref 3.5–5.1)
ALP SERPL-CCNC: 173 U/L (ref 38–126)
ALT SERPL W/O P-5'-P-CCNC: 11 U/L (ref 11–66)
AMORPH SED URNS QL MICRO: ABNORMAL
ANION GAP SERPL CALC-SCNC: 16 MEQ/L (ref 8–16)
AST SERPL-CCNC: 14 U/L (ref 5–40)
BACTERIA URNS QL MICRO: ABNORMAL /HPF
BASOPHILS ABSOLUTE: 0 THOU/MM3 (ref 0–0.1)
BASOPHILS NFR BLD AUTO: 0.1 %
BILIRUB SERPL-MCNC: 0.5 MG/DL (ref 0.3–1.2)
BILIRUB UR QL STRIP.AUTO: NEGATIVE
BUN SERPL-MCNC: 14 MG/DL (ref 7–22)
CALCIUM SERPL-MCNC: 9.2 MG/DL (ref 8.5–10.5)
CASTS #/AREA URNS LPF: ABNORMAL /LPF
CASTS 2: ABNORMAL /LPF
CHARACTER UR: ABNORMAL
CHLORIDE SERPL-SCNC: 92 MEQ/L (ref 98–111)
CO2 SERPL-SCNC: 26 MEQ/L (ref 23–33)
COLOR, UA: YELLOW
CREAT SERPL-MCNC: 1.6 MG/DL (ref 0.4–1.2)
CREAT UR-MCNC: 131.2 MG/DL
CRYSTALS URNS MICRO: ABNORMAL
DEPRECATED RDW RBC AUTO: 42.3 FL (ref 35–45)
EOSINOPHIL NFR BLD AUTO: 0 %
EOSINOPHIL SMEAR, URINE: NORMAL
EOSINOPHILS ABSOLUTE: 0 THOU/MM3 (ref 0–0.4)
EPITHELIAL CELLS, UA: ABNORMAL /HPF
ERYTHROCYTE [DISTWIDTH] IN BLOOD BY AUTOMATED COUNT: 13.6 % (ref 11.5–14.5)
GFR SERPL CREATININE-BSD FRML MDRD: 58 ML/MIN/1.73M2
GLUCOSE SERPL-MCNC: 105 MG/DL (ref 70–108)
GLUCOSE UR QL STRIP.AUTO: NEGATIVE MG/DL
HCT VFR BLD AUTO: 37.2 % (ref 42–52)
HGB BLD-MCNC: 12.3 GM/DL (ref 14–18)
HGB UR QL STRIP.AUTO: ABNORMAL
HYPOCHROMIA BLD QL SMEAR: PRESENT
IMM GRANULOCYTES # BLD AUTO: 0.3 THOU/MM3 (ref 0–0.07)
IMM GRANULOCYTES NFR BLD AUTO: 1.2 %
KETONES UR QL STRIP.AUTO: NEGATIVE
LACTIC ACID, SEPSIS: 1.4 MMOL/L (ref 0.5–1.9)
LACTIC ACID, SEPSIS: 2 MMOL/L (ref 0.5–1.9)
LYMPHOCYTES ABSOLUTE: 1.6 THOU/MM3 (ref 1–4.8)
LYMPHOCYTES NFR BLD AUTO: 6.5 %
MAGNESIUM SERPL-MCNC: 1.7 MG/DL (ref 1.6–2.4)
MCH RBC QN AUTO: 28.1 PG (ref 26–33)
MCHC RBC AUTO-ENTMCNC: 33.1 GM/DL (ref 32.2–35.5)
MCV RBC AUTO: 85.1 FL (ref 80–94)
MISCELLANEOUS 2: ABNORMAL
MONOCYTES ABSOLUTE: 1 THOU/MM3 (ref 0.4–1.3)
MONOCYTES NFR BLD AUTO: 4.2 %
MUCOUS THREADS URNS QL MICRO: ABNORMAL
NEUTROPHILS ABSOLUTE: 21.2 THOU/MM3 (ref 1.8–7.7)
NEUTROPHILS NFR BLD AUTO: 88 %
NITRITE UR QL STRIP: NEGATIVE
NRBC BLD AUTO-RTO: 0 /100 WBC
OSMOLALITY SERPL CALC.SUM OF ELEC: 269.1 MOSMOL/KG (ref 275–300)
PH UR STRIP.AUTO: 5.5 [PH] (ref 5–9)
PLATELET # BLD AUTO: 395 THOU/MM3 (ref 130–400)
PMV BLD AUTO: 9.1 FL (ref 9.4–12.4)
POLYCHROMASIA BLD QL SMEAR: ABNORMAL
POTASSIUM SERPL-SCNC: 3.7 MEQ/L (ref 3.5–5.2)
PROT SERPL-MCNC: 8.3 G/DL (ref 6.1–8)
PROT UR STRIP.AUTO-MCNC: 100 MG/DL
RBC # BLD AUTO: 4.37 MILL/MM3 (ref 4.7–6.1)
RBC URINE: ABNORMAL /HPF
RENAL EPI CELLS #/AREA URNS HPF: ABNORMAL /[HPF]
SCAN OF BLOOD SMEAR: NORMAL
SODIUM SERPL-SCNC: 134 MEQ/L (ref 135–145)
SODIUM UR-SCNC: 27 MEQ/L
SP GR UR REFRACT.AUTO: 1.02 (ref 1–1.03)
UROBILINOGEN, URINE: 1 EU/DL (ref 0–1)
UUN 24H UR-MCNC: 103 MG/DL
WBC # BLD AUTO: 24.1 THOU/MM3 (ref 4.8–10.8)
WBC #/AREA URNS HPF: > 200 /HPF
WBC #/AREA URNS HPF: ABNORMAL /[HPF]
YEAST LIKE FUNGI URNS QL MICRO: ABNORMAL

## 2024-11-14 PROCEDURE — 2060000000 HC ICU INTERMEDIATE R&B

## 2024-11-14 PROCEDURE — 80053 COMPREHEN METABOLIC PANEL: CPT

## 2024-11-14 PROCEDURE — 87086 URINE CULTURE/COLONY COUNT: CPT

## 2024-11-14 PROCEDURE — 87077 CULTURE AEROBIC IDENTIFY: CPT

## 2024-11-14 PROCEDURE — 6360000002 HC RX W HCPCS

## 2024-11-14 PROCEDURE — 83735 ASSAY OF MAGNESIUM: CPT

## 2024-11-14 PROCEDURE — 96360 HYDRATION IV INFUSION INIT: CPT

## 2024-11-14 PROCEDURE — 87040 BLOOD CULTURE FOR BACTERIA: CPT

## 2024-11-14 PROCEDURE — 82570 ASSAY OF URINE CREATININE: CPT

## 2024-11-14 PROCEDURE — 96374 THER/PROPH/DIAG INJ IV PUSH: CPT

## 2024-11-14 PROCEDURE — 81001 URINALYSIS AUTO W/SCOPE: CPT

## 2024-11-14 PROCEDURE — 99223 1ST HOSP IP/OBS HIGH 75: CPT | Performed by: STUDENT IN AN ORGANIZED HEALTH CARE EDUCATION/TRAINING PROGRAM

## 2024-11-14 PROCEDURE — 2580000003 HC RX 258: Performed by: FAMILY MEDICINE

## 2024-11-14 PROCEDURE — 96361 HYDRATE IV INFUSION ADD-ON: CPT

## 2024-11-14 PROCEDURE — 2580000003 HC RX 258

## 2024-11-14 PROCEDURE — 83605 ASSAY OF LACTIC ACID: CPT

## 2024-11-14 PROCEDURE — 89190 NASAL SMEAR FOR EOSINOPHILS: CPT

## 2024-11-14 PROCEDURE — 36415 COLL VENOUS BLD VENIPUNCTURE: CPT

## 2024-11-14 PROCEDURE — 96375 TX/PRO/DX INJ NEW DRUG ADDON: CPT

## 2024-11-14 PROCEDURE — 87186 SC STD MICRODIL/AGAR DIL: CPT

## 2024-11-14 PROCEDURE — 6360000002 HC RX W HCPCS: Performed by: FAMILY MEDICINE

## 2024-11-14 PROCEDURE — 84300 ASSAY OF URINE SODIUM: CPT

## 2024-11-14 PROCEDURE — 85025 COMPLETE CBC W/AUTO DIFF WBC: CPT

## 2024-11-14 PROCEDURE — 71045 X-RAY EXAM CHEST 1 VIEW: CPT

## 2024-11-14 PROCEDURE — 84540 ASSAY OF URINE/UREA-N: CPT

## 2024-11-14 PROCEDURE — 87801 DETECT AGNT MULT DNA AMPLI: CPT

## 2024-11-14 PROCEDURE — 82550 ASSAY OF CK (CPK): CPT

## 2024-11-14 PROCEDURE — 99285 EMERGENCY DEPT VISIT HI MDM: CPT

## 2024-11-14 RX ORDER — PANTOPRAZOLE SODIUM 40 MG/1
40 TABLET, DELAYED RELEASE ORAL
Status: DISCONTINUED | OUTPATIENT
Start: 2024-11-15 | End: 2024-11-18 | Stop reason: HOSPADM

## 2024-11-14 RX ORDER — GABAPENTIN 400 MG/1
800 CAPSULE ORAL 4 TIMES DAILY
Status: DISCONTINUED | OUTPATIENT
Start: 2024-11-14 | End: 2024-11-18 | Stop reason: HOSPADM

## 2024-11-14 RX ORDER — 0.9 % SODIUM CHLORIDE 0.9 %
30 INTRAVENOUS SOLUTION INTRAVENOUS ONCE
Status: COMPLETED | OUTPATIENT
Start: 2024-11-14 | End: 2024-11-14

## 2024-11-14 RX ORDER — SODIUM CHLORIDE 9 MG/ML
INJECTION, SOLUTION INTRAVENOUS PRN
Status: DISCONTINUED | OUTPATIENT
Start: 2024-11-14 | End: 2024-11-18 | Stop reason: HOSPADM

## 2024-11-14 RX ORDER — POLYETHYLENE GLYCOL 3350 17 G/17G
17 POWDER, FOR SOLUTION ORAL DAILY PRN
Status: DISCONTINUED | OUTPATIENT
Start: 2024-11-14 | End: 2024-11-18 | Stop reason: HOSPADM

## 2024-11-14 RX ORDER — VENLAFAXINE HYDROCHLORIDE 150 MG/1
150 CAPSULE, EXTENDED RELEASE ORAL
Status: DISCONTINUED | OUTPATIENT
Start: 2024-11-15 | End: 2024-11-18 | Stop reason: HOSPADM

## 2024-11-14 RX ORDER — TRAZODONE HYDROCHLORIDE 100 MG/1
300 TABLET ORAL NIGHTLY
Status: DISCONTINUED | OUTPATIENT
Start: 2024-11-14 | End: 2024-11-15

## 2024-11-14 RX ORDER — ONDANSETRON 4 MG/1
4 TABLET, ORALLY DISINTEGRATING ORAL EVERY 8 HOURS PRN
Status: DISCONTINUED | OUTPATIENT
Start: 2024-11-14 | End: 2024-11-18 | Stop reason: HOSPADM

## 2024-11-14 RX ORDER — 0.9 % SODIUM CHLORIDE 0.9 %
1000 INTRAVENOUS SOLUTION INTRAVENOUS ONCE
Status: COMPLETED | OUTPATIENT
Start: 2024-11-14 | End: 2024-11-14

## 2024-11-14 RX ORDER — POTASSIUM CHLORIDE 7.45 MG/ML
10 INJECTION INTRAVENOUS PRN
Status: DISCONTINUED | OUTPATIENT
Start: 2024-11-14 | End: 2024-11-18 | Stop reason: HOSPADM

## 2024-11-14 RX ORDER — ONDANSETRON 2 MG/ML
4 INJECTION INTRAMUSCULAR; INTRAVENOUS ONCE
Status: COMPLETED | OUTPATIENT
Start: 2024-11-14 | End: 2024-11-14

## 2024-11-14 RX ORDER — ONDANSETRON 2 MG/ML
4 INJECTION INTRAMUSCULAR; INTRAVENOUS EVERY 6 HOURS PRN
Status: DISCONTINUED | OUTPATIENT
Start: 2024-11-14 | End: 2024-11-18 | Stop reason: HOSPADM

## 2024-11-14 RX ORDER — ONDANSETRON 2 MG/ML
INJECTION INTRAMUSCULAR; INTRAVENOUS
Status: COMPLETED
Start: 2024-11-14 | End: 2024-11-14

## 2024-11-14 RX ORDER — VENLAFAXINE HYDROCHLORIDE 75 MG/1
75 CAPSULE, EXTENDED RELEASE ORAL
Status: DISCONTINUED | OUTPATIENT
Start: 2024-11-15 | End: 2024-11-18 | Stop reason: HOSPADM

## 2024-11-14 RX ORDER — BUSPIRONE HYDROCHLORIDE 10 MG/1
10 TABLET ORAL 3 TIMES DAILY
Status: DISCONTINUED | OUTPATIENT
Start: 2024-11-14 | End: 2024-11-18 | Stop reason: HOSPADM

## 2024-11-14 RX ORDER — CYCLOBENZAPRINE HCL 10 MG
10 TABLET ORAL 3 TIMES DAILY PRN
Status: DISCONTINUED | OUTPATIENT
Start: 2024-11-14 | End: 2024-11-18 | Stop reason: HOSPADM

## 2024-11-14 RX ORDER — SODIUM CHLORIDE 0.9 % (FLUSH) 0.9 %
5-40 SYRINGE (ML) INJECTION EVERY 12 HOURS SCHEDULED
Status: DISCONTINUED | OUTPATIENT
Start: 2024-11-14 | End: 2024-11-18 | Stop reason: HOSPADM

## 2024-11-14 RX ORDER — ACETAMINOPHEN 325 MG/1
650 TABLET ORAL EVERY 6 HOURS PRN
Status: DISCONTINUED | OUTPATIENT
Start: 2024-11-14 | End: 2024-11-15

## 2024-11-14 RX ORDER — POTASSIUM CHLORIDE 750 MG/1
10 TABLET, EXTENDED RELEASE ORAL DAILY
Status: DISCONTINUED | OUTPATIENT
Start: 2024-11-15 | End: 2024-11-18 | Stop reason: HOSPADM

## 2024-11-14 RX ORDER — TROSPIUM CHLORIDE 20 MG/1
20 TABLET, FILM COATED ORAL
Status: DISCONTINUED | OUTPATIENT
Start: 2024-11-15 | End: 2024-11-18 | Stop reason: HOSPADM

## 2024-11-14 RX ORDER — SODIUM CHLORIDE 0.9 % (FLUSH) 0.9 %
5-40 SYRINGE (ML) INJECTION PRN
Status: DISCONTINUED | OUTPATIENT
Start: 2024-11-14 | End: 2024-11-18 | Stop reason: HOSPADM

## 2024-11-14 RX ORDER — MAGNESIUM SULFATE IN WATER 40 MG/ML
2000 INJECTION, SOLUTION INTRAVENOUS PRN
Status: DISCONTINUED | OUTPATIENT
Start: 2024-11-14 | End: 2024-11-18 | Stop reason: HOSPADM

## 2024-11-14 RX ORDER — ENOXAPARIN SODIUM 100 MG/ML
40 INJECTION SUBCUTANEOUS DAILY
Status: DISCONTINUED | OUTPATIENT
Start: 2024-11-15 | End: 2024-11-15

## 2024-11-14 RX ORDER — ACETAMINOPHEN 650 MG/1
650 SUPPOSITORY RECTAL EVERY 6 HOURS PRN
Status: DISCONTINUED | OUTPATIENT
Start: 2024-11-14 | End: 2024-11-15

## 2024-11-14 RX ORDER — POTASSIUM CHLORIDE 1500 MG/1
40 TABLET, EXTENDED RELEASE ORAL PRN
Status: DISCONTINUED | OUTPATIENT
Start: 2024-11-14 | End: 2024-11-18 | Stop reason: HOSPADM

## 2024-11-14 RX ADMIN — WATER 1000 MG: 1 INJECTION INTRAMUSCULAR; INTRAVENOUS; SUBCUTANEOUS at 16:40

## 2024-11-14 RX ADMIN — SODIUM CHLORIDE 1000 ML: 9 INJECTION, SOLUTION INTRAVENOUS at 15:27

## 2024-11-14 RX ADMIN — SODIUM CHLORIDE 2250 ML: 9 INJECTION, SOLUTION INTRAVENOUS at 19:58

## 2024-11-14 RX ADMIN — ONDANSETRON 4 MG: 2 INJECTION INTRAMUSCULAR; INTRAVENOUS at 17:24

## 2024-11-14 RX ADMIN — ONDANSETRON 4 MG: 2 INJECTION INTRAMUSCULAR; INTRAVENOUS at 23:43

## 2024-11-14 ASSESSMENT — PAIN - FUNCTIONAL ASSESSMENT
PAIN_FUNCTIONAL_ASSESSMENT: NONE - DENIES PAIN

## 2024-11-14 NOTE — ED NOTES
Patient IV in left AC had blown. Pt expressed frustration with being \"poked\" multiple times. Patient agreed to have a nurse try an ultrasound IV. Pt states that he would rather drink fluids for hydration.

## 2024-11-14 NOTE — ED TRIAGE NOTES
Pt presents to ED with c/o vomiting, diarrhea, and bladder spasms. Pt is visibly sweating. Pt legs cool and pale. Pt is a paraplegic. Mom at bedside states that pt has been sick for a week. But increasingly in the past 2-3 days. Mom states that pt passed large \"cottage cheese chunk\" from penis, though he has a catheter. Pt noted to be hypotensive. Pt afebrile.
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
## 2024-11-14 NOTE — ED NOTES
Pt resting on cot at the moment. Pt voiced no needs at this time. Family at bedside. Call light in reach.

## 2024-11-14 NOTE — TELEPHONE ENCOUNTER
Patient is going to the ER for evaluation. He has been running, fever, not feeling well, and vomiting.    The catheter is leaking at the port where the saline is inserted for the balloon. Advised to inform the ER the issues regarding the catheter and if it needs exchanged the ER can exchange it. She voiced understanding.

## 2024-11-15 ENCOUNTER — APPOINTMENT (OUTPATIENT)
Dept: ULTRASOUND IMAGING | Age: 34
DRG: 720 | End: 2024-11-15
Payer: MEDICAID

## 2024-11-15 PROBLEM — R65.20 SEPSIS WITH ACUTE RENAL FAILURE WITHOUT SEPTIC SHOCK (HCC): Status: ACTIVE | Noted: 2024-11-15

## 2024-11-15 PROBLEM — R78.81 BACTEREMIA: Status: ACTIVE | Noted: 2024-11-15

## 2024-11-15 PROBLEM — N17.9 SEPSIS WITH ACUTE RENAL FAILURE WITHOUT SEPTIC SHOCK (HCC): Status: ACTIVE | Noted: 2024-11-15

## 2024-11-15 PROBLEM — N17.9 AKI (ACUTE KIDNEY INJURY) (HCC): Status: ACTIVE | Noted: 2024-11-15

## 2024-11-15 PROBLEM — E86.0 DEHYDRATION: Status: ACTIVE | Noted: 2024-11-15

## 2024-11-15 PROBLEM — A41.9 SEPSIS WITH ACUTE RENAL FAILURE WITHOUT SEPTIC SHOCK (HCC): Status: ACTIVE | Noted: 2024-11-15

## 2024-11-15 LAB
ACB COMPLEX DNA BLD POS QL NAA+NON-PROBE: NOT DETECTED
ANION GAP SERPL CALC-SCNC: 15 MEQ/L (ref 8–16)
B FRAGILIS DNA BLD POS QL NAA+NON-PROBE: NOT DETECTED
BLACTX-M ISLT/SPM QL: NOT DETECTED
BLAIMP ISLT/SPM QL: NOT DETECTED
BLAKPC ISLT/SPM QL: NOT DETECTED
BLAOXA-48-LIKE ISLT/SPM QL: NOT DETECTED
BLAVIM ISLT/SPM QL: NOT DETECTED
BOTTLE TYPE: ABNORMAL
BUN SERPL-MCNC: 13 MG/DL (ref 7–22)
C ALBICANS DNA BLD POS QL NAA+NON-PROBE: NOT DETECTED
C AURIS DNA BLD POS QL NAA+NON-PROBE: NOT DETECTED
C GATTII+NEOFOR DNA BLD POS QL NAA+N-PRB: NOT DETECTED
C GLABRATA DNA BLD POS QL NAA+NON-PROBE: NOT DETECTED
C KRUSEI DNA BLD POS QL NAA+NON-PROBE: NOT DETECTED
C PARAP DNA BLD POS QL NAA+NON-PROBE: NOT DETECTED
C TROPICLS DNA BLD POS QL NAA+NON-PROBE: NOT DETECTED
CALCIUM SERPL-MCNC: 8.6 MG/DL (ref 8.5–10.5)
CHLORIDE SERPL-SCNC: 102 MEQ/L (ref 98–111)
CK SERPL-CCNC: 23 U/L (ref 55–170)
CO2 SERPL-SCNC: 21 MEQ/L (ref 23–33)
COAG NEG STAPH DNA BLD QL NAA+PROBE: NOT DETECTED
COLISTIN RES MCR-1 ISLT/SPM QL: NOT DETECTED
CREAT SERPL-MCNC: 1.5 MG/DL (ref 0.4–1.2)
DEPRECATED RDW RBC AUTO: 43.2 FL (ref 35–45)
E CLOAC COMP DNA BLD POS NAA+NON-PROBE: NOT DETECTED
E COLI DNA BLD POS QL NAA+NON-PROBE: DETECTED
E FAECALIS DNA BLD POS QL NAA+NON-PROBE: NOT DETECTED
E FAECIUM DNA BLD POS QL NAA+NON-PROBE: NOT DETECTED
ENTEROBACTERALES DNA BLD POS NAA+N-PRB: DETECTED
ERYTHROCYTE [DISTWIDTH] IN BLOOD BY AUTOMATED COUNT: 13.9 % (ref 11.5–14.5)
GFR SERPL CREATININE-BSD FRML MDRD: 63 ML/MIN/1.73M2
GLUCOSE SERPL-MCNC: 100 MG/DL (ref 70–108)
GP B STREP DNA SPEC QL NAA+PROBE: NOT DETECTED
GP B STREP DNA SPEC QL NAA+PROBE: NOT DETECTED
HAEM INFLU DNA BLD POS QL NAA+NON-PROBE: NOT DETECTED
HCT VFR BLD AUTO: 31.2 % (ref 42–52)
HGB BLD-MCNC: 10.2 GM/DL (ref 14–18)
K OXYTOCA DNA BLD POS QL NAA+NON-PROBE: NOT DETECTED
K OXYTOCA DNA BLD POS QL NAA+NON-PROBE: NOT DETECTED
KLEBSIELLA SP DNA BLD POS QL NAA+NON-PRB: NOT DETECTED
L MONOCYTOG DNA BLD POS QL NAA+NON-PROBE: NOT DETECTED
LACTATE SERPL-SCNC: 1.7 MMOL/L (ref 0.5–2)
LACTATE SERPL-SCNC: 1.8 MMOL/L (ref 0.5–2)
LACTATE SERPL-SCNC: 4.8 MMOL/L (ref 0.5–2)
MAGNESIUM SERPL-MCNC: 1.8 MG/DL (ref 1.6–2.4)
MCH RBC QN AUTO: 28.3 PG (ref 26–33)
MCHC RBC AUTO-ENTMCNC: 32.7 GM/DL (ref 32.2–35.5)
MCV RBC AUTO: 86.4 FL (ref 80–94)
MECA ISLT/SPM QL: ABNORMAL
MECA+MECC+MREJ ISLT/SPM QL: ABNORMAL
N MEN DNA BLD POS QL NAA+NON-PROBE: NOT DETECTED
NDM: NOT DETECTED
OSMOLALITY SERPL CALC.SUM OF ELEC: 275.9 MOSMOL/KG (ref 275–300)
P AERUGINOSA DNA BLD POS NAA+NON-PROBE: NOT DETECTED
PLATELET # BLD AUTO: 336 THOU/MM3 (ref 130–400)
PMV BLD AUTO: 8.9 FL (ref 9.4–12.4)
POTASSIUM SERPL-SCNC: 2.9 MEQ/L (ref 3.5–5.2)
POTASSIUM SERPL-SCNC: 3.1 MEQ/L (ref 3.5–5.2)
POTASSIUM SERPL-SCNC: 3.4 MEQ/L (ref 3.5–5.2)
PROTEUS SPP: NOT DETECTED
RBC # BLD AUTO: 3.61 MILL/MM3 (ref 4.7–6.1)
S AUREUS DNA BLD POS QL NAA+NON-PROBE: NOT DETECTED
S EPIDERMIDIS DNA BLD POS QL NAA+NON-PRB: NOT DETECTED
S LUGDUNENSIS DNA BLD POS QL NAA+NON-PRB: NOT DETECTED
S MALTOPHILIA DNA BLD POS QL NAA+NON-PRB: NOT DETECTED
S MARCESCENS DNA BLD POS NAA+NON-PROBE: NOT DETECTED
S PYO DNA THROAT QL NAA+PROBE: NOT DETECTED
SALMONELLA DNA BLD POS QL NAA+NON-PROBE: NOT DETECTED
SODIUM SERPL-SCNC: 138 MEQ/L (ref 135–145)
SOURCE OF BLOOD CULTURE: ABNORMAL
STREPTOCOCCUS DNA BLD QL NAA+PROBE: NOT DETECTED
VANA+VANB ISLT/SPM QL: ABNORMAL
WBC # BLD AUTO: 22.1 THOU/MM3 (ref 4.8–10.8)

## 2024-11-15 PROCEDURE — 6360000002 HC RX W HCPCS

## 2024-11-15 PROCEDURE — 6370000000 HC RX 637 (ALT 250 FOR IP): Performed by: STUDENT IN AN ORGANIZED HEALTH CARE EDUCATION/TRAINING PROGRAM

## 2024-11-15 PROCEDURE — 84132 ASSAY OF SERUM POTASSIUM: CPT

## 2024-11-15 PROCEDURE — 87641 MR-STAPH DNA AMP PROBE: CPT

## 2024-11-15 PROCEDURE — 6370000000 HC RX 637 (ALT 250 FOR IP)

## 2024-11-15 PROCEDURE — 36415 COLL VENOUS BLD VENIPUNCTURE: CPT

## 2024-11-15 PROCEDURE — 85027 COMPLETE CBC AUTOMATED: CPT

## 2024-11-15 PROCEDURE — 2580000003 HC RX 258

## 2024-11-15 PROCEDURE — 83605 ASSAY OF LACTIC ACID: CPT

## 2024-11-15 PROCEDURE — 2060000000 HC ICU INTERMEDIATE R&B

## 2024-11-15 PROCEDURE — 99232 SBSQ HOSP IP/OBS MODERATE 35: CPT | Performed by: STUDENT IN AN ORGANIZED HEALTH CARE EDUCATION/TRAINING PROGRAM

## 2024-11-15 PROCEDURE — 83735 ASSAY OF MAGNESIUM: CPT

## 2024-11-15 PROCEDURE — 80048 BASIC METABOLIC PNL TOTAL CA: CPT

## 2024-11-15 PROCEDURE — 76770 US EXAM ABDO BACK WALL COMP: CPT

## 2024-11-15 RX ORDER — POTASSIUM CHLORIDE 1500 MG/1
40 TABLET, EXTENDED RELEASE ORAL ONCE
Status: COMPLETED | OUTPATIENT
Start: 2024-11-15 | End: 2024-11-15

## 2024-11-15 RX ORDER — FONDAPARINUX SODIUM 2.5 MG/.5ML
2.5 INJECTION SUBCUTANEOUS EVERY 24 HOURS
Status: DISCONTINUED | OUTPATIENT
Start: 2024-11-15 | End: 2024-11-18 | Stop reason: HOSPADM

## 2024-11-15 RX ORDER — SODIUM CHLORIDE 9 MG/ML
INJECTION, SOLUTION INTRAVENOUS CONTINUOUS
Status: DISCONTINUED | OUTPATIENT
Start: 2024-11-15 | End: 2024-11-17

## 2024-11-15 RX ORDER — FONDAPARINUX SODIUM 10 MG/.8ML
10 INJECTION SUBCUTANEOUS DAILY
Status: DISCONTINUED | OUTPATIENT
Start: 2024-11-15 | End: 2024-11-15 | Stop reason: SDUPTHER

## 2024-11-15 RX ORDER — SODIUM HYPOCHLORITE 1.25 MG/ML
SOLUTION TOPICAL DAILY
Status: DISCONTINUED | OUTPATIENT
Start: 2024-11-15 | End: 2024-11-18 | Stop reason: HOSPADM

## 2024-11-15 RX ORDER — POTASSIUM CHLORIDE 1500 MG/1
20 TABLET, EXTENDED RELEASE ORAL 2 TIMES DAILY WITH MEALS
Status: DISCONTINUED | OUTPATIENT
Start: 2024-11-15 | End: 2024-11-15

## 2024-11-15 RX ORDER — MAGNESIUM SULFATE IN WATER 40 MG/ML
2000 INJECTION, SOLUTION INTRAVENOUS ONCE
Status: COMPLETED | OUTPATIENT
Start: 2024-11-15 | End: 2024-11-15

## 2024-11-15 RX ORDER — POTASSIUM CHLORIDE 7.45 MG/ML
10 INJECTION INTRAVENOUS
Status: DISCONTINUED | OUTPATIENT
Start: 2024-11-15 | End: 2024-11-15 | Stop reason: RX

## 2024-11-15 RX ORDER — POTASSIUM CHLORIDE 1500 MG/1
40 TABLET, EXTENDED RELEASE ORAL 2 TIMES DAILY WITH MEALS
Status: DISCONTINUED | OUTPATIENT
Start: 2024-11-15 | End: 2024-11-15

## 2024-11-15 RX ORDER — SODIUM HYPOCHLORITE 1.25 MG/ML
SOLUTION TOPICAL ONCE
Status: DISCONTINUED | OUTPATIENT
Start: 2024-11-15 | End: 2024-11-15 | Stop reason: SDUPTHER

## 2024-11-15 RX ORDER — SODIUM HYPOCHLORITE 1.25 MG/ML
SOLUTION TOPICAL PRN
Status: DISCONTINUED | OUTPATIENT
Start: 2024-11-15 | End: 2024-11-18 | Stop reason: HOSPADM

## 2024-11-15 RX ORDER — OXYMETAZOLINE HYDROCHLORIDE 0.05 G/100ML
2 SPRAY NASAL 2 TIMES DAILY
Status: DISPENSED | OUTPATIENT
Start: 2024-11-15 | End: 2024-11-17

## 2024-11-15 RX ORDER — ACETAMINOPHEN 650 MG/1
650 SUPPOSITORY RECTAL EVERY 4 HOURS PRN
Status: DISCONTINUED | OUTPATIENT
Start: 2024-11-15 | End: 2024-11-18 | Stop reason: HOSPADM

## 2024-11-15 RX ORDER — POTASSIUM CHLORIDE 1500 MG/1
20 TABLET, EXTENDED RELEASE ORAL 2 TIMES DAILY WITH MEALS
Status: COMPLETED | OUTPATIENT
Start: 2024-11-15 | End: 2024-11-17

## 2024-11-15 RX ORDER — SODIUM HYPOCHLORITE 1.25 MG/ML
SOLUTION TOPICAL DAILY
Status: DISCONTINUED | OUTPATIENT
Start: 2024-11-15 | End: 2024-11-15 | Stop reason: SDUPTHER

## 2024-11-15 RX ORDER — SODIUM CHLORIDE 9 MG/ML
INJECTION, SOLUTION INTRAVENOUS CONTINUOUS
Status: ACTIVE | OUTPATIENT
Start: 2024-11-15 | End: 2024-11-15

## 2024-11-15 RX ORDER — ACETAMINOPHEN 325 MG/1
650 TABLET ORAL EVERY 4 HOURS PRN
Status: DISCONTINUED | OUTPATIENT
Start: 2024-11-15 | End: 2024-11-18 | Stop reason: HOSPADM

## 2024-11-15 RX ORDER — SODIUM CHLORIDE 9 MG/ML
INJECTION, SOLUTION INTRAVENOUS CONTINUOUS
Status: DISCONTINUED | OUTPATIENT
Start: 2024-11-15 | End: 2024-11-15 | Stop reason: SDUPTHER

## 2024-11-15 RX ADMIN — CYCLOBENZAPRINE 10 MG: 10 TABLET, FILM COATED ORAL at 00:01

## 2024-11-15 RX ADMIN — WATER 1000 MG: 1 INJECTION INTRAMUSCULAR; INTRAVENOUS; SUBCUTANEOUS at 05:40

## 2024-11-15 RX ADMIN — VENLAFAXINE HYDROCHLORIDE 150 MG: 150 CAPSULE, EXTENDED RELEASE ORAL at 09:16

## 2024-11-15 RX ADMIN — TIZANIDINE 4 MG: 4 TABLET ORAL at 09:14

## 2024-11-15 RX ADMIN — BUSPIRONE HYDROCHLORIDE 10 MG: 10 TABLET ORAL at 09:15

## 2024-11-15 RX ADMIN — ONDANSETRON 4 MG: 2 INJECTION INTRAMUSCULAR; INTRAVENOUS at 18:26

## 2024-11-15 RX ADMIN — Medication 2 SPRAY: at 22:28

## 2024-11-15 RX ADMIN — SODIUM CHLORIDE, PRESERVATIVE FREE 10 ML: 5 INJECTION INTRAVENOUS at 17:30

## 2024-11-15 RX ADMIN — TIZANIDINE 4 MG: 4 TABLET ORAL at 00:01

## 2024-11-15 RX ADMIN — POTASSIUM CHLORIDE 40 MEQ: 1500 TABLET, EXTENDED RELEASE ORAL at 06:22

## 2024-11-15 RX ADMIN — POTASSIUM CHLORIDE 10 MEQ: 750 TABLET, EXTENDED RELEASE ORAL at 09:12

## 2024-11-15 RX ADMIN — FONDAPARINUX SODIUM 2.5 MG: 2.5 INJECTION, SOLUTION SUBCUTANEOUS at 17:15

## 2024-11-15 RX ADMIN — POTASSIUM CHLORIDE 40 MEQ: 1500 TABLET, EXTENDED RELEASE ORAL at 17:22

## 2024-11-15 RX ADMIN — SODIUM CHLORIDE: 9 INJECTION, SOLUTION INTRAVENOUS at 12:39

## 2024-11-15 RX ADMIN — GABAPENTIN 800 MG: 400 CAPSULE ORAL at 13:48

## 2024-11-15 RX ADMIN — GABAPENTIN 800 MG: 400 CAPSULE ORAL at 20:20

## 2024-11-15 RX ADMIN — PANTOPRAZOLE SODIUM 40 MG: 40 TABLET, DELAYED RELEASE ORAL at 05:40

## 2024-11-15 RX ADMIN — CYCLOBENZAPRINE 10 MG: 10 TABLET, FILM COATED ORAL at 09:12

## 2024-11-15 RX ADMIN — ACETAMINOPHEN 650 MG: 650 SUPPOSITORY RECTAL at 00:25

## 2024-11-15 RX ADMIN — ACETAMINOPHEN 650 MG: 325 TABLET ORAL at 09:19

## 2024-11-15 RX ADMIN — POTASSIUM CHLORIDE 20 MEQ: 1500 TABLET, EXTENDED RELEASE ORAL at 17:22

## 2024-11-15 RX ADMIN — GABAPENTIN 800 MG: 400 CAPSULE ORAL at 00:01

## 2024-11-15 RX ADMIN — ACETAMINOPHEN 650 MG: 325 TABLET ORAL at 04:28

## 2024-11-15 RX ADMIN — MAGNESIUM SULFATE HEPTAHYDRATE 2000 MG: 40 INJECTION, SOLUTION INTRAVENOUS at 09:31

## 2024-11-15 RX ADMIN — GABAPENTIN 800 MG: 400 CAPSULE ORAL at 17:14

## 2024-11-15 RX ADMIN — VENLAFAXINE HYDROCHLORIDE 75 MG: 75 CAPSULE, EXTENDED RELEASE ORAL at 09:17

## 2024-11-15 RX ADMIN — BUSPIRONE HYDROCHLORIDE 10 MG: 10 TABLET ORAL at 13:48

## 2024-11-15 RX ADMIN — TROSPIUM CHLORIDE 20 MG: 20 TABLET, FILM COATED ORAL at 17:15

## 2024-11-15 RX ADMIN — SODIUM CHLORIDE: 9 INJECTION, SOLUTION INTRAVENOUS at 02:00

## 2024-11-15 RX ADMIN — SODIUM CHLORIDE: 9 INJECTION, SOLUTION INTRAVENOUS at 16:36

## 2024-11-15 RX ADMIN — BUSPIRONE HYDROCHLORIDE 10 MG: 10 TABLET ORAL at 00:01

## 2024-11-15 RX ADMIN — SODIUM CHLORIDE, PRESERVATIVE FREE 10 ML: 5 INJECTION INTRAVENOUS at 00:01

## 2024-11-15 RX ADMIN — WATER 2000 MG: 1 INJECTION INTRAMUSCULAR; INTRAVENOUS; SUBCUTANEOUS at 17:24

## 2024-11-15 RX ADMIN — BUSPIRONE HYDROCHLORIDE 10 MG: 10 TABLET ORAL at 20:20

## 2024-11-15 RX ADMIN — TROSPIUM CHLORIDE 20 MG: 20 TABLET, FILM COATED ORAL at 05:40

## 2024-11-15 RX ADMIN — SODIUM CHLORIDE, PRESERVATIVE FREE 10 ML: 5 INJECTION INTRAVENOUS at 09:14

## 2024-11-15 RX ADMIN — GABAPENTIN 800 MG: 400 CAPSULE ORAL at 09:14
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
## 2024-11-15 NOTE — PROGRESS NOTES
[facility] Nurse  Progress Note       Srikanth Coy  AGE: 34 y.o.   GENDER: male  : 1990  UNIT: 4K-18/018-A  TODAY'S DATE:  11/15/2024  ADMISSION DATE: 2024  2:05 PM    Subjective   Reason for [facility] Evaluation and Assessment: ischial and sacral wounds      Srikanth Coy is a 34 y.o. male referred by:   [] Physician/ Resident/ PA/ RANJIT-CNP  [x] Nursing  [] Other:     Wound Identification:  Wound Type:pressure  Wound Location: left ischium, sacrum    Objective     Mono Risk Score: Mono Scale Score: 15      Assessment     Encounter: Present to pt room. Pt in bed, family at side. Wound photo, assessment and treatment recommendations below. New dressings applied. Pillow placed under right side. Pouching system leaking. Chagned and placed 2 pc yellow pouching system. Bed in low, call light in reach.      Wound 24 Ischium Left (Active)   Wound Image   11/15/24 1139   Wound Etiology Pressure Stage 4 11/15/24 1139   Dressing Status New dressing applied 11/15/24 1139   Wound Cleansed Cleansed with saline 11/15/24 1139   Dressing/Treatment Silicone border;Moist to dry 11/15/24 1139   Wound Length (cm) 0.5 cm 11/15/24 1139   Wound Width (cm) 0.5 cm 11/15/24 1139   Wound Depth (cm) 2 cm 11/15/24 1139   Wound Surface Area (cm^2) 0.25 cm^2 11/15/24 1139   Wound Volume (cm^3) 0.5 cm^3 11/15/24 1139   Wound Assessment Pink/red 11/15/24 1139   Drainage Amount Small (< 25%) 11/15/24 1139   Drainage Description Serosanguinous 11/15/24 1139   Odor None 11/15/24 1139   Kiana-wound Assessment Intact 11/15/24 1139   Margins Attached edges 11/15/24 1139   Wound Thickness Description not for Pressure Injury Full thickness 11/15/24 1139   Number of days: 0       Wound 24 Buttocks Right (Active)   Wound Image   11/15/24 1139   Wound Etiology Pressure Stage 4 11/15/24 1139   Dressing Status New dressing applied 11/15/24 1139   Wound Cleansed Cleansed with saline 11/15/24 1134

## 2024-11-15 NOTE — ED NOTES
This RN stopped 250mL NS bag due to infusion being complete. Pt currently has 2L of NS infusing to complete 2,244mL NS infusion.
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
## 2024-11-15 NOTE — ED NOTES
Pt is laying in bed watching tv, no requests at this time. Pt is breathing regular and unlabored on room air. Pt is hypotensive with BP 79/49 MAP 59 in left upper arm, BP in right upper arm 73/50 MAP (58), pt is A&O x4 at this time. Other vitals are stable. Provider made aware of BP, call light within reach.
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
## 2024-11-15 NOTE — ED NOTES
Inpatient provider at bedside at this time. Fluids started on pt at this time. Pt is laying in bed with no requests. Pt is breathing regular and unlabored on room air. Call light within reach.

## 2024-11-15 NOTE — PLAN OF CARE
Problem: Discharge Planning  Goal: Discharge to home or other facility with appropriate resources  Outcome: Progressing  Flowsheets (Taken 11/15/2024 0305)  Discharge to home or other facility with appropriate resources:   Identify barriers to discharge with patient and caregiver   Identify discharge learning needs (meds, wound care, etc)   Refer to discharge planning if patient needs post-hospital services based on physician order or complex needs related to functional status, cognitive ability or social support system   Arrange for needed discharge resources and transportation as appropriate     Problem: Skin/Tissue Integrity  Goal: Absence of new skin breakdown  Description: 1.  Monitor for areas of redness and/or skin breakdown  2.  Assess vascular access sites hourly  3.  Every 4-6 hours minimum:  Change oxygen saturation probe site  4.  Every 4-6 hours:  If on nasal continuous positive airway pressure, respiratory therapy assess nares and determine need for appliance change or resting period.  Outcome: Progressing  Note: No new skin breakdown noted     Problem: Safety - Adult  Goal: Free from fall injury  Outcome: Progressing  Flowsheets (Taken 11/15/2024 0305)  Free From Fall Injury: Instruct family/caregiver on patient safety     Problem: ABCDS Injury Assessment  Goal: Absence of physical injury  Outcome: Progressing  Flowsheets (Taken 11/15/2024 0305)  Absence of Physical Injury: Implement safety measures based on patient assessment     Problem: Chronic Conditions and Co-morbidities  Goal: Patient's chronic conditions and co-morbidity symptoms are monitored and maintained or improved  Outcome: Progressing  Flowsheets (Taken 11/15/2024 0305)  Care Plan - Patient's Chronic Conditions and Co-Morbidity Symptoms are Monitored and Maintained or Improved:   Monitor and assess patient's chronic conditions and comorbid symptoms for stability, deterioration, or improvement   Collaborate with multidisciplinary team

## 2024-11-15 NOTE — H&P
Platelets 395 130 - 400 thou/mm3    MPV 9.1 (L) 9.4 - 12.4 fL    Seg Neutrophils 88.0 %    Lymphocytes 6.5 %    Monocytes % 4.2 %    Eosinophils 0.0 %    Basophils 0.1 %    Immature Granulocytes % 1.2 %    Neutrophils Absolute 21.2 (H) 1.8 - 7.7 thou/mm3    Lymphocytes Absolute 1.6 1.0 - 4.8 thou/mm3    Monocytes Absolute 1.0 0.4 - 1.3 thou/mm3    Eosinophils Absolute 0.0 0.0 - 0.4 thou/mm3    Basophils Absolute 0.0 0.0 - 0.1 thou/mm3    Immature Grans (Abs) 0.30 (H) 0.00 - 0.07 thou/mm3    nRBC 0 /100 wbc    BASOPHILIA 1+ Absent    Hypochromia Present Absent   CMP   Result Value Ref Range    Glucose 105 70 - 108 mg/dL    Creatinine 1.6 (H) 0.4 - 1.2 mg/dL    BUN 14 7 - 22 mg/dL    Sodium 134 (L) 135 - 145 meq/L    Potassium 3.7 3.5 - 5.2 meq/L    Chloride 92 (L) 98 - 111 meq/L    CO2 26 23 - 33 meq/L    Calcium 9.2 8.5 - 10.5 mg/dL    AST 14 5 - 40 U/L    Alkaline Phosphatase 173 (H) 38 - 126 U/L    Total Protein 8.3 (H) 6.1 - 8.0 g/dL    Albumin 3.4 (L) 3.5 - 5.1 g/dL    Total Bilirubin 0.5 0.3 - 1.2 mg/dL    ALT 11 11 - 66 U/L   Lactate, Sepsis   Result Value Ref Range    Lactic Acid, Sepsis 2.0 (H) 0.5 - 1.9 mmol/L   Magnesium   Result Value Ref Range    Magnesium 1.7 1.6 - 2.4 mg/dL   Anion Gap   Result Value Ref Range    Anion Gap 16.0 8.0 - 16.0 meq/L   Glomerular Filtration Rate, Estimated   Result Value Ref Range    Est, Glom Filt Rate 58 (A) >60 ml/min/1.73m2   Osmolality   Result Value Ref Range    Osmolality Calc 269.1 (L) 275.0 - 300.0 mOsmol/kg   Lactate, Sepsis   Result Value Ref Range    Lactic Acid, Sepsis 1.4 0.5 - 1.9 mmol/L   Scan of Blood Smear   Result Value Ref Range    SCAN OF BLOOD SMEAR see below    Urine with Reflexed Micro   Result Value Ref Range    Glucose, Ur NEGATIVE NEGATIVE mg/dl    Bilirubin, Urine NEGATIVE NEGATIVE    Ketones, Urine NEGATIVE NEGATIVE    Specific Gravity, UA 1.016 1.002 - 1.030    Blood, Urine MODERATE (A) NEGATIVE    pH, Urine 5.5 5.0 - 9.0    Protein,   (A) NEGATIVE    Urobilinogen, Urine 1.0 0.0 - 1.0 eu/dl    Nitrite, Urine NEGATIVE NEGATIVE    Leukocyte Esterase, Urine LARGE (A) NEGATIVE    Color, UA YELLOW STRAW-YELLOW    Character, Urine TURBID (A) CLEAR-SL CLOUD    RBC, UA 10-15 0-2/hpf /hpf    WBC, UA > 200 0-4/hpf /hpf    Epithelial Cells, UA 5-10 3-5/hpf /hpf    Amorphous, UA URATES NONE SEEN    Mucus, UA THREADS NONE SEEN/THREA    Bacteria, UA MANY FEW/NONE SEEN /hpf    Casts UA >15 HYALINE NONE SEEN /lpf    Crystals, UA NONE SEEN NONE SEEN    Renal Epithelial, UA NONE SEEN NONE SEEN    Yeast, UA NONE SEEN NONE SEEN    CASTS 2 NONE SEEN NONE SEEN /lpf    MISCELLANEOUS 2 NONE SEEN    Sodium, urine, random   Result Value Ref Range    Sodium, Ur 27 meq/l   Creatinine, Random Urine   Result Value Ref Range    Creatinine, Ur 131.2 mg/dl       Diagnostics:  XR CHEST PORTABLE    Result Date: 11/14/2024  PROCEDURE: XR CHEST PORTABLE CLINICAL INFORMATION: 34-year-old male with shortness of breath. COMPARISON: Radiographs 2/18/2021 TECHNIQUE: AP semiupright view of the chest was obtained. FINDINGS: There is extensive fusion hardware in the thoracic spine. The lungs are clear. The cardiac silhouette and pulmonary vasculature are within normal limits. There is no significant pleural effusion or pneumothorax. Visualized portions of the upper abdomen are within normal limits. The osseous structures are intact. No acute fractures or suspicious osseous lesions.     There is no acute intrathoracic process. **This report has been created using voice recognition software. It may contain minor errors which are inherent in voice recognition technology.** Electronically signed by Dr Gerhard Richter      EKG:   As above    Micro:  VRE    Electronically Signed by  Parker Quintanilla DO, MBA  PGY-2 Internal Medicine Resident  [facility]  On 11/14/2024 at 8:23 PM    Staff: Dr Eula Wisdom    This report has been created using voice recognition

## 2024-11-15 NOTE — PROGRESS NOTES
Physician Progress Note      PATIENT:               SHASHI MILLER  CSN #:                  [id number]  :                       1990  ADMIT DATE:       2024 2:05 PM  DISCH DATE:  RESPONDING  PROVIDER #:        Alyx Forde MD          QUERY TEXT:    Pt admitted with sepsis. Pt noted to have LA 4.8 & documented hypotension. If   possible, please document in the progress notes and discharge summary if you   are evaluating and/or treating any of the following:    The medical record reflects the following:  Risk Factors: 34 y.o. male with a PMH of chronic paraplegia w/ suprapubic   catheter who presented with fever, chills, fatigue, and vomiting x 1 week.    Found to have sepsis and UTI  Clinical Indicators: Sepsis, LA 4.8 & documented hypotension  Blood culture: gram negative bacilli  Treatment: Rocephin, IVF's, Serial labs  Options provided:  -- Septic Shock  -- Hypotension without Shock  -- Other - I will add my own diagnosis  -- Disagree - Not applicable / Not valid  -- Disagree - Clinically unable to determine / Unknown  -- Refer to Clinical Documentation Reviewer    PROVIDER RESPONSE TEXT:    This patient has hypotension without shock.    Query created by: Angella Logan on 11/15/2024 11:41 AM      Electronically signed by:  Alyx Forde MD 11/15/2024 2:58 PM

## 2024-11-16 LAB
ANION GAP SERPL CALC-SCNC: 9 MEQ/L (ref 8–16)
BACTERIA UR CULT: ABNORMAL
BASOPHILS ABSOLUTE: 0 THOU/MM3 (ref 0–0.1)
BASOPHILS NFR BLD AUTO: 0.2 %
BUN SERPL-MCNC: 9 MG/DL (ref 7–22)
CALCIUM SERPL-MCNC: 8.7 MG/DL (ref 8.5–10.5)
CHLORIDE SERPL-SCNC: 103 MEQ/L (ref 98–111)
CO2 SERPL-SCNC: 24 MEQ/L (ref 23–33)
CREAT SERPL-MCNC: 1.2 MG/DL (ref 0.4–1.2)
DEPRECATED RDW RBC AUTO: 43.8 FL (ref 35–45)
EOSINOPHIL NFR BLD AUTO: 0.4 %
EOSINOPHILS ABSOLUTE: 0.1 THOU/MM3 (ref 0–0.4)
ERYTHROCYTE [DISTWIDTH] IN BLOOD BY AUTOMATED COUNT: 14.1 % (ref 11.5–14.5)
GFR SERPL CREATININE-BSD FRML MDRD: 82 ML/MIN/1.73M2
GLUCOSE SERPL-MCNC: 126 MG/DL (ref 70–108)
HCT VFR BLD AUTO: 33.9 % (ref 42–52)
HGB BLD-MCNC: 11.2 GM/DL (ref 14–18)
IMM GRANULOCYTES # BLD AUTO: 0.16 THOU/MM3 (ref 0–0.07)
IMM GRANULOCYTES NFR BLD AUTO: 0.8 %
LYMPHOCYTES ABSOLUTE: 1.4 THOU/MM3 (ref 1–4.8)
LYMPHOCYTES NFR BLD AUTO: 7.4 %
MCH RBC QN AUTO: 28.1 PG (ref 26–33)
MCHC RBC AUTO-ENTMCNC: 33 GM/DL (ref 32.2–35.5)
MCV RBC AUTO: 85 FL (ref 80–94)
MONOCYTES ABSOLUTE: 1 THOU/MM3 (ref 0.4–1.3)
MONOCYTES NFR BLD AUTO: 5.3 %
MRSA DNA SPEC QL NAA+PROBE: POSITIVE
NEUTROPHILS ABSOLUTE: 16.5 THOU/MM3 (ref 1.8–7.7)
NEUTROPHILS NFR BLD AUTO: 85.9 %
NRBC BLD AUTO-RTO: 0 /100 WBC
ORGANISM: ABNORMAL
PLATELET # BLD AUTO: 371 THOU/MM3 (ref 130–400)
PMV BLD AUTO: 9 FL (ref 9.4–12.4)
POTASSIUM SERPL-SCNC: 3.5 MEQ/L (ref 3.5–5.2)
RBC # BLD AUTO: 3.99 MILL/MM3 (ref 4.7–6.1)
SODIUM SERPL-SCNC: 136 MEQ/L (ref 135–145)
WBC # BLD AUTO: 19.2 THOU/MM3 (ref 4.8–10.8)

## 2024-11-16 PROCEDURE — 2060000000 HC ICU INTERMEDIATE R&B

## 2024-11-16 PROCEDURE — 36415 COLL VENOUS BLD VENIPUNCTURE: CPT

## 2024-11-16 PROCEDURE — 87040 BLOOD CULTURE FOR BACTERIA: CPT

## 2024-11-16 PROCEDURE — 80048 BASIC METABOLIC PNL TOTAL CA: CPT

## 2024-11-16 PROCEDURE — 99231 SBSQ HOSP IP/OBS SF/LOW 25: CPT | Performed by: STUDENT IN AN ORGANIZED HEALTH CARE EDUCATION/TRAINING PROGRAM

## 2024-11-16 PROCEDURE — 6370000000 HC RX 637 (ALT 250 FOR IP)

## 2024-11-16 PROCEDURE — 85025 COMPLETE CBC W/AUTO DIFF WBC: CPT

## 2024-11-16 PROCEDURE — 6360000002 HC RX W HCPCS

## 2024-11-16 PROCEDURE — 6370000000 HC RX 637 (ALT 250 FOR IP): Performed by: STUDENT IN AN ORGANIZED HEALTH CARE EDUCATION/TRAINING PROGRAM

## 2024-11-16 PROCEDURE — 2580000003 HC RX 258

## 2024-11-16 RX ADMIN — TROSPIUM CHLORIDE 20 MG: 20 TABLET, FILM COATED ORAL at 17:02

## 2024-11-16 RX ADMIN — TROSPIUM CHLORIDE 20 MG: 20 TABLET, FILM COATED ORAL at 05:05

## 2024-11-16 RX ADMIN — ACETAMINOPHEN 650 MG: 325 TABLET ORAL at 19:38

## 2024-11-16 RX ADMIN — FONDAPARINUX SODIUM 2.5 MG: 2.5 INJECTION, SOLUTION SUBCUTANEOUS at 16:55

## 2024-11-16 RX ADMIN — BUSPIRONE HYDROCHLORIDE 10 MG: 10 TABLET ORAL at 13:30

## 2024-11-16 RX ADMIN — GABAPENTIN 800 MG: 400 CAPSULE ORAL at 17:01

## 2024-11-16 RX ADMIN — SODIUM CHLORIDE: 9 INJECTION, SOLUTION INTRAVENOUS at 01:45

## 2024-11-16 RX ADMIN — POTASSIUM CHLORIDE 20 MEQ: 1500 TABLET, EXTENDED RELEASE ORAL at 17:01

## 2024-11-16 RX ADMIN — PANTOPRAZOLE SODIUM 40 MG: 40 TABLET, DELAYED RELEASE ORAL at 05:05

## 2024-11-16 RX ADMIN — Medication 2 SPRAY: at 08:41

## 2024-11-16 RX ADMIN — BUSPIRONE HYDROCHLORIDE 10 MG: 10 TABLET ORAL at 08:41

## 2024-11-16 RX ADMIN — VENLAFAXINE HYDROCHLORIDE 75 MG: 75 CAPSULE, EXTENDED RELEASE ORAL at 08:42

## 2024-11-16 RX ADMIN — SODIUM HYPOCHLORITE: 1.25 SOLUTION TOPICAL at 08:42

## 2024-11-16 RX ADMIN — SODIUM CHLORIDE: 9 INJECTION, SOLUTION INTRAVENOUS at 13:26

## 2024-11-16 RX ADMIN — TIZANIDINE 4 MG: 4 TABLET ORAL at 13:30

## 2024-11-16 RX ADMIN — WATER 2000 MG: 1 INJECTION INTRAMUSCULAR; INTRAVENOUS; SUBCUTANEOUS at 17:03

## 2024-11-16 RX ADMIN — POTASSIUM CHLORIDE 20 MEQ: 1500 TABLET, EXTENDED RELEASE ORAL at 08:41

## 2024-11-16 RX ADMIN — CYCLOBENZAPRINE 10 MG: 10 TABLET, FILM COATED ORAL at 19:38

## 2024-11-16 RX ADMIN — TIZANIDINE 4 MG: 4 TABLET ORAL at 19:38

## 2024-11-16 RX ADMIN — GABAPENTIN 800 MG: 400 CAPSULE ORAL at 19:38

## 2024-11-16 RX ADMIN — VENLAFAXINE HYDROCHLORIDE 150 MG: 150 CAPSULE, EXTENDED RELEASE ORAL at 08:42

## 2024-11-16 RX ADMIN — GABAPENTIN 800 MG: 400 CAPSULE ORAL at 08:41

## 2024-11-16 RX ADMIN — BUSPIRONE HYDROCHLORIDE 10 MG: 10 TABLET ORAL at 19:38

## 2024-11-16 RX ADMIN — ACETAMINOPHEN 650 MG: 325 TABLET ORAL at 03:44

## 2024-11-16 RX ADMIN — GABAPENTIN 800 MG: 400 CAPSULE ORAL at 13:30

## 2024-11-16 NOTE — PLAN OF CARE
Problem: Discharge Planning  Goal: Discharge to home or other facility with appropriate resources  Outcome: Progressing  Flowsheets (Taken 11/15/2024 0305 by Yoko Claire, RN)  Discharge to home or other facility with appropriate resources:   Identify barriers to discharge with patient and caregiver   Identify discharge learning needs (meds, wound care, etc)   Refer to discharge planning if patient needs post-hospital services based on physician order or complex needs related to functional status, cognitive ability or social support system   Arrange for needed discharge resources and transportation as appropriate     Problem: Skin/Tissue Integrity  Goal: Absence of new skin breakdown  Description: 1.  Monitor for areas of redness and/or skin breakdown  2.  Assess vascular access sites hourly  3.  Every 4-6 hours minimum:  Change oxygen saturation probe site  4.  Every 4-6 hours:  If on nasal continuous positive airway pressure, respiratory therapy assess nares and determine need for appliance change or resting period.  Outcome: Progressing  Note: No new skin breakdown noted       Problem: Safety - Adult  Goal: Free from fall injury  Outcome: Progressing  Flowsheets (Taken 11/15/2024 0305 by Yoko Claire, RN)  Free From Fall Injury: Instruct family/caregiver on patient safety     Problem: ABCDS Injury Assessment  Goal: Absence of physical injury  Outcome: Progressing  Flowsheets (Taken 11/15/2024 0305 by Yoko Claire, RN)  Absence of Physical Injury: Implement safety measures based on patient assessment     Problem: Chronic Conditions and Co-morbidities  Goal: Patient's chronic conditions and co-morbidity symptoms are monitored and maintained or improved  Outcome: Progressing  Flowsheets (Taken 11/15/2024 0305 by Yoko Claire, RN)  Care Plan - Patient's Chronic Conditions and Co-Morbidity Symptoms are Monitored and Maintained or Improved:   Monitor and assess patient's chronic conditions and comorbid  2259)  Electrolytes maintained within normal limits:   Monitor labs and assess patient for signs and symptoms of electrolyte imbalances   Monitor response to electrolyte replacements, including repeat lab results as appropriate   Administer electrolyte replacement as ordered   Fluid restriction as ordered     Problem: Metabolic/Fluid and Electrolytes - Adult  Goal: Hemodynamic stability and optimal renal function maintained  Outcome: Progressing  Flowsheets (Taken 11/15/2024 2259)  Hemodynamic stability and optimal renal function maintained:   Monitor labs and assess for signs and symptoms of volume excess or deficit   Monitor intake, output and patient weight     Problem: Metabolic/Fluid and Electrolytes - Adult  Goal: Glucose maintained within prescribed range  Outcome: Progressing  Flowsheets (Taken 11/15/2024 2259)  Glucose maintained within prescribed range: Monitor blood glucose as ordered     Problem: Nutrition Deficit:  Goal: Optimize nutritional status  Outcome: Progressing  Flowsheets (Taken 11/15/2024 2259)  Nutrient intake appropriate for improving, restoring, or maintaining nutritional needs:   Assess nutritional status and recommend course of action   Monitor oral intake, labs, and treatment plans
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
## 2024-11-17 LAB
ANION GAP SERPL CALC-SCNC: 10 MEQ/L (ref 8–16)
BASOPHILS ABSOLUTE: 0 THOU/MM3 (ref 0–0.1)
BASOPHILS NFR BLD AUTO: 0.2 %
BUN SERPL-MCNC: 9 MG/DL (ref 7–22)
CALCIUM SERPL-MCNC: 8.8 MG/DL (ref 8.5–10.5)
CHLORIDE SERPL-SCNC: 103 MEQ/L (ref 98–111)
CO2 SERPL-SCNC: 24 MEQ/L (ref 23–33)
CREAT SERPL-MCNC: 1 MG/DL (ref 0.4–1.2)
DEPRECATED RDW RBC AUTO: 45.1 FL (ref 35–45)
EOSINOPHIL NFR BLD AUTO: 1.5 %
EOSINOPHILS ABSOLUTE: 0.2 THOU/MM3 (ref 0–0.4)
ERYTHROCYTE [DISTWIDTH] IN BLOOD BY AUTOMATED COUNT: 14.5 % (ref 11.5–14.5)
GFR SERPL CREATININE-BSD FRML MDRD: > 90 ML/MIN/1.73M2
GLUCOSE SERPL-MCNC: 97 MG/DL (ref 70–108)
HCT VFR BLD AUTO: 32.4 % (ref 42–52)
HGB BLD-MCNC: 10.5 GM/DL (ref 14–18)
IMM GRANULOCYTES # BLD AUTO: 0.13 THOU/MM3 (ref 0–0.07)
IMM GRANULOCYTES NFR BLD AUTO: 0.9 %
LYMPHOCYTES ABSOLUTE: 1.8 THOU/MM3 (ref 1–4.8)
LYMPHOCYTES NFR BLD AUTO: 11.9 %
MCH RBC QN AUTO: 27.8 PG (ref 26–33)
MCHC RBC AUTO-ENTMCNC: 32.4 GM/DL (ref 32.2–35.5)
MCV RBC AUTO: 85.7 FL (ref 80–94)
MONOCYTES ABSOLUTE: 1.1 THOU/MM3 (ref 0.4–1.3)
MONOCYTES NFR BLD AUTO: 7.1 %
NEUTROPHILS ABSOLUTE: 11.9 THOU/MM3 (ref 1.8–7.7)
NEUTROPHILS NFR BLD AUTO: 78.4 %
NRBC BLD AUTO-RTO: 0 /100 WBC
ORGANISM: ABNORMAL
PLATELET # BLD AUTO: 353 THOU/MM3 (ref 130–400)
PMV BLD AUTO: 9.1 FL (ref 9.4–12.4)
POTASSIUM SERPL-SCNC: 4.1 MEQ/L (ref 3.5–5.2)
RBC # BLD AUTO: 3.78 MILL/MM3 (ref 4.7–6.1)
SODIUM SERPL-SCNC: 137 MEQ/L (ref 135–145)
WBC # BLD AUTO: 15.2 THOU/MM3 (ref 4.8–10.8)

## 2024-11-17 PROCEDURE — 6370000000 HC RX 637 (ALT 250 FOR IP)

## 2024-11-17 PROCEDURE — 2580000003 HC RX 258

## 2024-11-17 PROCEDURE — 6360000002 HC RX W HCPCS

## 2024-11-17 PROCEDURE — 99231 SBSQ HOSP IP/OBS SF/LOW 25: CPT | Performed by: STUDENT IN AN ORGANIZED HEALTH CARE EDUCATION/TRAINING PROGRAM

## 2024-11-17 PROCEDURE — 80048 BASIC METABOLIC PNL TOTAL CA: CPT

## 2024-11-17 PROCEDURE — 85025 COMPLETE CBC W/AUTO DIFF WBC: CPT

## 2024-11-17 PROCEDURE — 36415 COLL VENOUS BLD VENIPUNCTURE: CPT

## 2024-11-17 PROCEDURE — 2060000000 HC ICU INTERMEDIATE R&B

## 2024-11-17 PROCEDURE — 6370000000 HC RX 637 (ALT 250 FOR IP): Performed by: STUDENT IN AN ORGANIZED HEALTH CARE EDUCATION/TRAINING PROGRAM

## 2024-11-17 RX ADMIN — TROSPIUM CHLORIDE 20 MG: 20 TABLET, FILM COATED ORAL at 16:05

## 2024-11-17 RX ADMIN — GABAPENTIN 800 MG: 400 CAPSULE ORAL at 13:09

## 2024-11-17 RX ADMIN — POTASSIUM CHLORIDE 20 MEQ: 1500 TABLET, EXTENDED RELEASE ORAL at 09:01

## 2024-11-17 RX ADMIN — TIZANIDINE 4 MG: 4 TABLET ORAL at 09:03

## 2024-11-17 RX ADMIN — SODIUM CHLORIDE: 9 INJECTION, SOLUTION INTRAVENOUS at 03:08

## 2024-11-17 RX ADMIN — VENLAFAXINE HYDROCHLORIDE 150 MG: 150 CAPSULE, EXTENDED RELEASE ORAL at 09:02

## 2024-11-17 RX ADMIN — PANTOPRAZOLE SODIUM 40 MG: 40 TABLET, DELAYED RELEASE ORAL at 05:20

## 2024-11-17 RX ADMIN — TROSPIUM CHLORIDE 20 MG: 20 TABLET, FILM COATED ORAL at 05:20

## 2024-11-17 RX ADMIN — WATER 2000 MG: 1 INJECTION INTRAMUSCULAR; INTRAVENOUS; SUBCUTANEOUS at 16:05

## 2024-11-17 RX ADMIN — SODIUM HYPOCHLORITE: 1.25 SOLUTION TOPICAL at 09:02

## 2024-11-17 RX ADMIN — BUSPIRONE HYDROCHLORIDE 10 MG: 10 TABLET ORAL at 21:03

## 2024-11-17 RX ADMIN — GABAPENTIN 800 MG: 400 CAPSULE ORAL at 21:02

## 2024-11-17 RX ADMIN — SODIUM CHLORIDE, PRESERVATIVE FREE 10 ML: 5 INJECTION INTRAVENOUS at 21:00

## 2024-11-17 RX ADMIN — BUSPIRONE HYDROCHLORIDE 10 MG: 10 TABLET ORAL at 09:01

## 2024-11-17 RX ADMIN — GABAPENTIN 800 MG: 400 CAPSULE ORAL at 16:05

## 2024-11-17 RX ADMIN — FONDAPARINUX SODIUM 2.5 MG: 2.5 INJECTION, SOLUTION SUBCUTANEOUS at 16:05

## 2024-11-17 RX ADMIN — VENLAFAXINE HYDROCHLORIDE 75 MG: 75 CAPSULE, EXTENDED RELEASE ORAL at 09:01

## 2024-11-17 RX ADMIN — ACETAMINOPHEN 650 MG: 325 TABLET ORAL at 21:11

## 2024-11-17 RX ADMIN — BUSPIRONE HYDROCHLORIDE 10 MG: 10 TABLET ORAL at 13:09

## 2024-11-17 RX ADMIN — GABAPENTIN 800 MG: 400 CAPSULE ORAL at 09:01

## 2024-11-17 RX ADMIN — CYCLOBENZAPRINE 10 MG: 10 TABLET, FILM COATED ORAL at 09:05

## 2024-11-17 RX ADMIN — CYCLOBENZAPRINE 10 MG: 10 TABLET, FILM COATED ORAL at 16:06

## 2024-11-17 NOTE — PLAN OF CARE
Problem: Discharge Planning  Goal: Discharge to home or other facility with appropriate resources  Outcome: Progressing  Flowsheets (Taken 11/15/2024 0305 by Yoko Claire, RN)  Discharge to home or other facility with appropriate resources:   Identify barriers to discharge with patient and caregiver   Identify discharge learning needs (meds, wound care, etc)   Refer to discharge planning if patient needs post-hospital services based on physician order or complex needs related to functional status, cognitive ability or social support system   Arrange for needed discharge resources and transportation as appropriate     Problem: Skin/Tissue Integrity  Goal: Absence of new skin breakdown  Description: 1.  Monitor for areas of redness and/or skin breakdown  2.  Assess vascular access sites hourly  3.  Every 4-6 hours minimum:  Change oxygen saturation probe site  4.  Every 4-6 hours:  If on nasal continuous positive airway pressure, respiratory therapy assess nares and determine need for appliance change or resting period.  Outcome: Progressing     Problem: Safety - Adult  Goal: Free from fall injury  Outcome: Progressing  Flowsheets (Taken 11/15/2024 0305 by Yoko Claire, RN)  Free From Fall Injury: Instruct family/caregiver on patient safety     Problem: ABCDS Injury Assessment  Goal: Absence of physical injury  Outcome: Progressing  Flowsheets (Taken 11/15/2024 0305 by Yoko Claire, RN)  Absence of Physical Injury: Implement safety measures based on patient assessment     Problem: Chronic Conditions and Co-morbidities  Goal: Patient's chronic conditions and co-morbidity symptoms are monitored and maintained or improved  Outcome: Progressing  Flowsheets (Taken 11/15/2024 0305 by Yoko Claire, RN)  Care Plan - Patient's Chronic Conditions and Co-Morbidity Symptoms are Monitored and Maintained or Improved:   Monitor and assess patient's chronic conditions and comorbid symptoms for stability, deterioration,

## 2024-11-17 NOTE — PLAN OF CARE
Problem: Safety - Adult  Goal: Free from fall injury  Outcome: Progressing  Flowsheets (Taken 11/17/2024 1819)  Free From Fall Injury: Instruct family/caregiver on patient safety  Note: Patient instructed to call for assistance when getting out of bed. Bed is locked, lowered.  Bed alarm is engaged and call light is within reach.       Problem: ABCDS Injury Assessment  Goal: Absence of physical injury  Outcome: Progressing  Flowsheets (Taken 11/17/2024 1819)  Absence of Physical Injury: Implement safety measures based on patient assessment  Note: Safety measures were implemented based on patient assessment.       Problem: Chronic Conditions and Co-morbidities  Goal: Patient's chronic conditions and co-morbidity symptoms are monitored and maintained or improved  Outcome: Progressing  Flowsheets (Taken 11/17/2024 1819)  Care Plan - Patient's Chronic Conditions and Co-Morbidity Symptoms are Monitored and Maintained or Improved:   Collaborate with multidisciplinary team to address chronic and comorbid conditions and prevent exacerbation or deterioration   Monitor and assess patient's chronic conditions and comorbid symptoms for stability, deterioration, or improvement   Update acute care plan with appropriate goals if chronic or comorbid symptoms are exacerbated and prevent overall improvement and discharge  Note: Patients chronic conditions and comorbid symptoms are assessed and monitored.     Care plan reviewed with patient.  Patient expresses understanding of goals of care and states willingness to work toward the objectives.

## 2024-11-18 VITALS
RESPIRATION RATE: 20 BRPM | DIASTOLIC BLOOD PRESSURE: 56 MMHG | TEMPERATURE: 99 F | OXYGEN SATURATION: 94 % | SYSTOLIC BLOOD PRESSURE: 95 MMHG | BODY MASS INDEX: 25.49 KG/M2 | HEART RATE: 125 BPM | HEIGHT: 71 IN | WEIGHT: 182.1 LBS

## 2024-11-18 LAB
ANION GAP SERPL CALC-SCNC: 12 MEQ/L (ref 8–16)
BASOPHILS ABSOLUTE: 0.1 THOU/MM3 (ref 0–0.1)
BASOPHILS NFR BLD AUTO: 0.5 %
BUN SERPL-MCNC: 10 MG/DL (ref 7–22)
CALCIUM SERPL-MCNC: 9.1 MG/DL (ref 8.5–10.5)
CHLORIDE SERPL-SCNC: 104 MEQ/L (ref 98–111)
CO2 SERPL-SCNC: 23 MEQ/L (ref 23–33)
CREAT SERPL-MCNC: 1 MG/DL (ref 0.4–1.2)
DEPRECATED RDW RBC AUTO: 47.8 FL (ref 35–45)
EOSINOPHIL NFR BLD AUTO: 1.5 %
EOSINOPHILS ABSOLUTE: 0.2 THOU/MM3 (ref 0–0.4)
ERYTHROCYTE [DISTWIDTH] IN BLOOD BY AUTOMATED COUNT: 14.9 % (ref 11.5–14.5)
GFR SERPL CREATININE-BSD FRML MDRD: > 90 ML/MIN/1.73M2
GLUCOSE SERPL-MCNC: 91 MG/DL (ref 70–108)
HCT VFR BLD AUTO: 35.2 % (ref 42–52)
HGB BLD-MCNC: 11.3 GM/DL (ref 14–18)
IMM GRANULOCYTES # BLD AUTO: 0.18 THOU/MM3 (ref 0–0.07)
IMM GRANULOCYTES NFR BLD AUTO: 1.5 %
LYMPHOCYTES ABSOLUTE: 2 THOU/MM3 (ref 1–4.8)
LYMPHOCYTES NFR BLD AUTO: 16.9 %
MCH RBC QN AUTO: 28 PG (ref 26–33)
MCHC RBC AUTO-ENTMCNC: 32.1 GM/DL (ref 32.2–35.5)
MCV RBC AUTO: 87.3 FL (ref 80–94)
MONOCYTES ABSOLUTE: 0.8 THOU/MM3 (ref 0.4–1.3)
MONOCYTES NFR BLD AUTO: 6.9 %
NEUTROPHILS ABSOLUTE: 8.5 THOU/MM3 (ref 1.8–7.7)
NEUTROPHILS NFR BLD AUTO: 72.7 %
NRBC BLD AUTO-RTO: 0 /100 WBC
PLATELET # BLD AUTO: 344 THOU/MM3 (ref 130–400)
PLATELET BLD QL SMEAR: ADEQUATE
PMV BLD AUTO: 8.8 FL (ref 9.4–12.4)
POTASSIUM SERPL-SCNC: 3.7 MEQ/L (ref 3.5–5.2)
RBC # BLD AUTO: 4.03 MILL/MM3 (ref 4.7–6.1)
SCAN OF BLOOD SMEAR: NORMAL
SODIUM SERPL-SCNC: 139 MEQ/L (ref 135–145)
WBC # BLD AUTO: 11.7 THOU/MM3 (ref 4.8–10.8)

## 2024-11-18 PROCEDURE — 2580000003 HC RX 258

## 2024-11-18 PROCEDURE — 85025 COMPLETE CBC W/AUTO DIFF WBC: CPT

## 2024-11-18 PROCEDURE — 6370000000 HC RX 637 (ALT 250 FOR IP)

## 2024-11-18 PROCEDURE — 99238 HOSP IP/OBS DSCHRG MGMT 30/<: CPT | Performed by: STUDENT IN AN ORGANIZED HEALTH CARE EDUCATION/TRAINING PROGRAM

## 2024-11-18 PROCEDURE — 80048 BASIC METABOLIC PNL TOTAL CA: CPT

## 2024-11-18 PROCEDURE — 36415 COLL VENOUS BLD VENIPUNCTURE: CPT

## 2024-11-18 RX ORDER — VENLAFAXINE HYDROCHLORIDE 150 MG/1
150 CAPSULE, EXTENDED RELEASE ORAL DAILY
COMMUNITY

## 2024-11-18 RX ORDER — CEFDINIR 300 MG/1
300 CAPSULE ORAL 2 TIMES DAILY
Qty: 12 CAPSULE | Refills: 0 | Status: SHIPPED | OUTPATIENT
Start: 2024-11-18 | End: 2024-11-24

## 2024-11-18 RX ADMIN — GABAPENTIN 800 MG: 400 CAPSULE ORAL at 09:33

## 2024-11-18 RX ADMIN — PANTOPRAZOLE SODIUM 40 MG: 40 TABLET, DELAYED RELEASE ORAL at 05:52

## 2024-11-18 RX ADMIN — VENLAFAXINE HYDROCHLORIDE 150 MG: 150 CAPSULE, EXTENDED RELEASE ORAL at 09:33

## 2024-11-18 RX ADMIN — VENLAFAXINE HYDROCHLORIDE 75 MG: 75 CAPSULE, EXTENDED RELEASE ORAL at 09:34

## 2024-11-18 RX ADMIN — SODIUM CHLORIDE, PRESERVATIVE FREE 10 ML: 5 INJECTION INTRAVENOUS at 09:35

## 2024-11-18 RX ADMIN — TROSPIUM CHLORIDE 20 MG: 20 TABLET, FILM COATED ORAL at 05:52

## 2024-11-18 RX ADMIN — BUSPIRONE HYDROCHLORIDE 10 MG: 10 TABLET ORAL at 09:33

## 2024-11-18 ASSESSMENT — PAIN SCALES - GENERAL
PAINLEVEL_OUTOF10: 0
PAINLEVEL_OUTOF10: 0
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
## 2024-11-18 NOTE — PLAN OF CARE
Problem: Discharge Planning  Goal: Discharge to home or other facility with appropriate resources  Outcome: Progressing  Flowsheets (Taken 11/18/2024 0228)  Discharge to home or other facility with appropriate resources:   Identify barriers to discharge with patient and caregiver   Arrange for needed discharge resources and transportation as appropriate   Identify discharge learning needs (meds, wound care, etc)   Refer to discharge planning if patient needs post-hospital services based on physician order or complex needs related to functional status, cognitive ability or social support system     Problem: Skin/Tissue Integrity  Goal: Absence of new skin breakdown  Description: 1.  Monitor for areas of redness and/or skin breakdown  2.  Assess vascular access sites hourly  3.  Every 4-6 hours minimum:  Change oxygen saturation probe site  4.  Every 4-6 hours:  If on nasal continuous positive airway pressure, respiratory therapy assess nares and determine need for appliance change or resting period.  Outcome: Progressing  Note: No new skin breakdown noted     Problem: Safety - Adult  Goal: Free from fall injury  11/18/2024 0228 by Yoko Claire RN  Outcome: Progressing  Flowsheets (Taken 11/18/2024 0228)  Free From Fall Injury: Instruct family/caregiver on patient safety     Problem: ABCDS Injury Assessment  Goal: Absence of physical injury  11/18/2024 0228 by Yoko Claire RN  Outcome: Progressing  Flowsheets (Taken 11/18/2024 0228)  Absence of Physical Injury: Implement safety measures based on patient assessment     Problem: Chronic Conditions and Co-morbidities  Goal: Patient's chronic conditions and co-morbidity symptoms are monitored and maintained or improved  11/18/2024 0228 by Yoko Claire, RN  Outcome: Progressing  Flowsheets (Taken 11/18/2024 0228)  Care Plan - Patient's Chronic Conditions and Co-Morbidity Symptoms are Monitored and Maintained or Improved:   Monitor and assess patient's chronic

## 2024-11-18 NOTE — DISCHARGE INSTRUCTIONS
Take cefdinir 300 mg twice daily for 6 days.  Do not miss any dosages.  Please follow-up with PCP and urology.

## 2024-11-18 NOTE — PROGRESS NOTES
CLINICAL PHARMACY: DISCHARGE MED RECONCILIATION/REVIEW    [facility] Select Patient?: No  Total # of Interventions Recommended: 1 -   - Updated Order #: 1   -   Total # Interventions Accepted: 1  Intervention Severity:   - Level 1 Intervention Present?: No   - Level 2 #: 0   - Level 3 #: 1   Time Spent (min): 30    Hilda Moreno, PharmD 11/18/2024 12:36 PM

## 2024-11-18 NOTE — CARE COORDINATION
11/18/24, 12:48 PM EST    Patient goals/plan/ treatment preferences discussed by  and .  Patient goals/plan/ treatment preferences reviewed with patient/ family.  Patient/ family verbalize understanding of discharge plan and are in agreement with goal/plan/treatment preferences.  Understanding was demonstrated using the teach back method.  AVS provided by RN at time of discharge, which includes all necessary medical information pertaining to the patients current course of illness, treatment, post-discharge goals of care, and treatment preferences.     Services At/After Discharge: Outpatient    Srikanth plans to return home w/ his mother. Has needed DME. Current [facility] wound clinic.

## 2024-11-19 LAB
BACTERIA BLD AEROBE CULT: ABNORMAL
BACTERIA BLD AEROBE CULT: ABNORMAL
ORGANISM: ABNORMAL

## 2024-11-21 LAB
BACTERIA BLD AEROBE CULT: NORMAL
BACTERIA BLD AEROBE CULT: NORMAL

## 2024-11-29 ENCOUNTER — HOSPITAL ENCOUNTER (INPATIENT)
Age: 34
LOS: 4 days | Discharge: HOME | DRG: 466 | End: 2024-12-04
Attending: STUDENT IN AN ORGANIZED HEALTH CARE EDUCATION/TRAINING PROGRAM | Admitting: STUDENT IN AN ORGANIZED HEALTH CARE EDUCATION/TRAINING PROGRAM
Payer: MEDICAID

## 2024-11-29 ENCOUNTER — APPOINTMENT (OUTPATIENT)
Dept: CT IMAGING | Age: 34
DRG: 466 | End: 2024-11-29
Payer: MEDICAID

## 2024-11-29 DIAGNOSIS — D72.829 LEUKOCYTOSIS, UNSPECIFIED TYPE: ICD-10-CM

## 2024-11-29 DIAGNOSIS — N39.0 URINARY TRACT INFECTION WITHOUT HEMATURIA, SITE UNSPECIFIED: Primary | ICD-10-CM

## 2024-11-29 PROBLEM — K21.9 GASTROESOPHAGEAL REFLUX DISEASE: Status: ACTIVE | Noted: 2024-11-29

## 2024-11-29 PROBLEM — E87.1 HYPONATREMIA: Status: ACTIVE | Noted: 2024-11-29

## 2024-11-29 PROBLEM — D64.9 NORMOCYTIC ANEMIA: Status: ACTIVE | Noted: 2024-11-29

## 2024-11-29 PROBLEM — G89.29 OTHER CHRONIC PAIN: Status: ACTIVE | Noted: 2017-03-09

## 2024-11-29 PROBLEM — R79.89 ELEVATED TROPONIN: Status: ACTIVE | Noted: 2024-11-29

## 2024-11-29 PROBLEM — N13.30 HYDRONEPHROSIS: Status: ACTIVE | Noted: 2024-11-29

## 2024-11-29 PROBLEM — S31.000A SACRAL WOUND: Status: ACTIVE | Noted: 2024-11-29

## 2024-11-29 PROBLEM — E88.09 HYPOALBUMINEMIA: Status: ACTIVE | Noted: 2024-11-29

## 2024-11-29 PROBLEM — M62.838 MUSCLE SPASM: Status: ACTIVE | Noted: 2024-11-29

## 2024-11-29 LAB
AMORPH SED URNS QL MICRO: ABNORMAL
ANION GAP SERPL CALC-SCNC: 12 MEQ/L (ref 8–16)
BACTERIA URNS QL MICRO: ABNORMAL /HPF
BASOPHILS ABSOLUTE: 0.1 THOU/MM3 (ref 0–0.1)
BASOPHILS NFR BLD AUTO: 0.3 %
BILIRUB UR QL STRIP.AUTO: NEGATIVE
BUN SERPL-MCNC: 9 MG/DL (ref 7–22)
CALCIUM SERPL-MCNC: 8.7 MG/DL (ref 8.5–10.5)
CASTS #/AREA URNS LPF: ABNORMAL /LPF
CASTS 2: ABNORMAL /LPF
CHARACTER UR: CLEAR
CHLORIDE SERPL-SCNC: 96 MEQ/L (ref 98–111)
CO2 SERPL-SCNC: 24 MEQ/L (ref 23–33)
COLOR, UA: YELLOW
CREAT SERPL-MCNC: 1.1 MG/DL (ref 0.4–1.2)
CRP SERPL-MCNC: 30.12 MG/DL (ref 0–1)
CRYSTALS URNS MICRO: ABNORMAL
DEPRECATED RDW RBC AUTO: 46.9 FL (ref 35–45)
EKG ATRIAL RATE: 102 BPM
EKG P AXIS: 73 DEGREES
EKG P-R INTERVAL: 152 MS
EKG Q-T INTERVAL: 308 MS
EKG QRS DURATION: 68 MS
EKG QTC CALCULATION (BAZETT): 401 MS
EKG R AXIS: 67 DEGREES
EKG T AXIS: 72 DEGREES
EKG VENTRICULAR RATE: 102 BPM
EOSINOPHIL NFR BLD AUTO: 0 %
EOSINOPHILS ABSOLUTE: 0 THOU/MM3 (ref 0–0.4)
EPITHELIAL CELLS, UA: ABNORMAL /HPF
ERYTHROCYTE [DISTWIDTH] IN BLOOD BY AUTOMATED COUNT: 14.9 % (ref 11.5–14.5)
GFR SERPL CREATININE-BSD FRML MDRD: > 90 ML/MIN/1.73M2
GLUCOSE SERPL-MCNC: 93 MG/DL (ref 70–108)
GLUCOSE UR QL STRIP.AUTO: NEGATIVE MG/DL
HCT VFR BLD AUTO: 36.3 % (ref 42–52)
HGB BLD-MCNC: 11.9 GM/DL (ref 14–18)
HGB UR QL STRIP.AUTO: ABNORMAL
HYPOCHROMIA BLD QL SMEAR: PRESENT
IMM GRANULOCYTES # BLD AUTO: 0.13 THOU/MM3 (ref 0–0.07)
IMM GRANULOCYTES NFR BLD AUTO: 0.5 %
KETONES UR QL STRIP.AUTO: NEGATIVE
LACTIC ACID, SEPSIS: 1 MMOL/L (ref 0.5–1.9)
LACTIC ACID, SEPSIS: 1.6 MMOL/L (ref 0.5–1.9)
LYMPHOCYTES ABSOLUTE: 1.8 THOU/MM3 (ref 1–4.8)
LYMPHOCYTES NFR BLD AUTO: 7.5 %
MCH RBC QN AUTO: 28.3 PG (ref 26–33)
MCHC RBC AUTO-ENTMCNC: 32.8 GM/DL (ref 32.2–35.5)
MCV RBC AUTO: 86.2 FL (ref 80–94)
MISCELLANEOUS 2: ABNORMAL
MONOCYTES ABSOLUTE: 1.4 THOU/MM3 (ref 0.4–1.3)
MONOCYTES NFR BLD AUTO: 5.9 %
MUCOUS THREADS URNS QL MICRO: ABNORMAL
NEUTROPHILS ABSOLUTE: 20.4 THOU/MM3 (ref 1.8–7.7)
NEUTROPHILS NFR BLD AUTO: 85.8 %
NITRITE UR QL STRIP: POSITIVE
NRBC BLD AUTO-RTO: 0 /100 WBC
OSMOLALITY SERPL CALC.SUM OF ELEC: 262.9 MOSMOL/KG (ref 275–300)
PH UR STRIP.AUTO: 7 [PH] (ref 5–9)
PLATELET # BLD AUTO: 386 THOU/MM3 (ref 130–400)
PMV BLD AUTO: 9.1 FL (ref 9.4–12.4)
POTASSIUM SERPL-SCNC: 4.3 MEQ/L (ref 3.5–5.2)
PROCALCITONIN SERPL IA-MCNC: 0.84 NG/ML (ref 0.01–0.09)
PROT UR STRIP.AUTO-MCNC: 30 MG/DL
RBC # BLD AUTO: 4.21 MILL/MM3 (ref 4.7–6.1)
RBC URINE: ABNORMAL /HPF
RENAL EPI CELLS #/AREA URNS HPF: ABNORMAL /[HPF]
SCAN OF BLOOD SMEAR: NORMAL
SODIUM SERPL-SCNC: 132 MEQ/L (ref 135–145)
SP GR UR REFRACT.AUTO: 1.01 (ref 1–1.03)
STOMATOCYTES: ABNORMAL
TROPONIN, HIGH SENSITIVITY: 15 NG/L (ref 0–12)
TROPONIN, HIGH SENSITIVITY: 19 NG/L (ref 0–12)
TROPONIN, HIGH SENSITIVITY: 21 NG/L (ref 0–12)
UROBILINOGEN, URINE: 0.2 EU/DL (ref 0–1)
WBC # BLD AUTO: 23.8 THOU/MM3 (ref 4.8–10.8)
WBC #/AREA URNS HPF: > 200 /HPF
WBC #/AREA URNS HPF: ABNORMAL /[HPF]
YEAST LIKE FUNGI URNS QL MICRO: ABNORMAL

## 2024-11-29 PROCEDURE — 84145 PROCALCITONIN (PCT): CPT

## 2024-11-29 PROCEDURE — 87086 URINE CULTURE/COLONY COUNT: CPT

## 2024-11-29 PROCEDURE — 2580000003 HC RX 258: Performed by: STUDENT IN AN ORGANIZED HEALTH CARE EDUCATION/TRAINING PROGRAM

## 2024-11-29 PROCEDURE — 86140 C-REACTIVE PROTEIN: CPT

## 2024-11-29 PROCEDURE — 6370000000 HC RX 637 (ALT 250 FOR IP): Performed by: STUDENT IN AN ORGANIZED HEALTH CARE EDUCATION/TRAINING PROGRAM

## 2024-11-29 PROCEDURE — 83605 ASSAY OF LACTIC ACID: CPT

## 2024-11-29 PROCEDURE — 51705 CHANGE OF BLADDER TUBE: CPT

## 2024-11-29 PROCEDURE — 85025 COMPLETE CBC W/AUTO DIFF WBC: CPT

## 2024-11-29 PROCEDURE — 81001 URINALYSIS AUTO W/SCOPE: CPT

## 2024-11-29 PROCEDURE — 84484 ASSAY OF TROPONIN QUANT: CPT

## 2024-11-29 PROCEDURE — 93005 ELECTROCARDIOGRAM TRACING: CPT | Performed by: PHYSICIAN ASSISTANT

## 2024-11-29 PROCEDURE — 6360000002 HC RX W HCPCS: Performed by: STUDENT IN AN ORGANIZED HEALTH CARE EDUCATION/TRAINING PROGRAM

## 2024-11-29 PROCEDURE — 96374 THER/PROPH/DIAG INJ IV PUSH: CPT

## 2024-11-29 PROCEDURE — 2580000003 HC RX 258: Performed by: PHYSICIAN ASSISTANT

## 2024-11-29 PROCEDURE — 80048 BASIC METABOLIC PNL TOTAL CA: CPT

## 2024-11-29 PROCEDURE — 87040 BLOOD CULTURE FOR BACTERIA: CPT

## 2024-11-29 PROCEDURE — 99285 EMERGENCY DEPT VISIT HI MDM: CPT

## 2024-11-29 PROCEDURE — G0378 HOSPITAL OBSERVATION PER HR: HCPCS

## 2024-11-29 PROCEDURE — 74176 CT ABD & PELVIS W/O CONTRAST: CPT

## 2024-11-29 PROCEDURE — 6360000002 HC RX W HCPCS: Performed by: PHYSICIAN ASSISTANT

## 2024-11-29 PROCEDURE — 99223 1ST HOSP IP/OBS HIGH 75: CPT | Performed by: STUDENT IN AN ORGANIZED HEALTH CARE EDUCATION/TRAINING PROGRAM

## 2024-11-29 PROCEDURE — 36415 COLL VENOUS BLD VENIPUNCTURE: CPT

## 2024-11-29 PROCEDURE — 93010 ELECTROCARDIOGRAM REPORT: CPT | Performed by: INTERNAL MEDICINE

## 2024-11-29 RX ORDER — ALBUTEROL SULFATE 90 UG/1
2 INHALANT RESPIRATORY (INHALATION) EVERY 6 HOURS PRN
Status: DISCONTINUED | OUTPATIENT
Start: 2024-11-29 | End: 2024-11-30

## 2024-11-29 RX ORDER — SODIUM CHLORIDE 0.9 % (FLUSH) 0.9 %
5-40 SYRINGE (ML) INJECTION EVERY 12 HOURS SCHEDULED
Status: DISCONTINUED | OUTPATIENT
Start: 2024-11-29 | End: 2024-12-04 | Stop reason: HOSPADM

## 2024-11-29 RX ORDER — PANTOPRAZOLE SODIUM 40 MG/1
40 TABLET, DELAYED RELEASE ORAL
Status: DISCONTINUED | OUTPATIENT
Start: 2024-11-30 | End: 2024-12-04 | Stop reason: HOSPADM

## 2024-11-29 RX ORDER — VENLAFAXINE HYDROCHLORIDE 150 MG/1
150 CAPSULE, EXTENDED RELEASE ORAL DAILY
Status: DISCONTINUED | OUTPATIENT
Start: 2024-11-30 | End: 2024-12-04 | Stop reason: HOSPADM

## 2024-11-29 RX ORDER — BUPRENORPHINE AND NALOXONE 8; 2 MG/1; MG/1
1 FILM, SOLUBLE BUCCAL; SUBLINGUAL 3 TIMES DAILY
Status: DISCONTINUED | OUTPATIENT
Start: 2024-11-29 | End: 2024-12-04 | Stop reason: HOSPADM

## 2024-11-29 RX ORDER — BUSPIRONE HYDROCHLORIDE 10 MG/1
10 TABLET ORAL 3 TIMES DAILY
Status: DISCONTINUED | OUTPATIENT
Start: 2024-11-29 | End: 2024-12-04 | Stop reason: HOSPADM

## 2024-11-29 RX ORDER — POTASSIUM CHLORIDE 750 MG/1
10 TABLET, EXTENDED RELEASE ORAL DAILY
Status: DISCONTINUED | OUTPATIENT
Start: 2024-11-30 | End: 2024-12-04 | Stop reason: HOSPADM

## 2024-11-29 RX ORDER — POTASSIUM CHLORIDE 7.45 MG/ML
10 INJECTION INTRAVENOUS PRN
Status: DISCONTINUED | OUTPATIENT
Start: 2024-11-29 | End: 2024-12-04 | Stop reason: HOSPADM

## 2024-11-29 RX ORDER — ENOXAPARIN SODIUM 100 MG/ML
40 INJECTION SUBCUTANEOUS DAILY
Status: DISCONTINUED | OUTPATIENT
Start: 2024-11-29 | End: 2024-12-04 | Stop reason: HOSPADM

## 2024-11-29 RX ORDER — SODIUM HYPOCHLORITE 1.25 MG/ML
SOLUTION TOPICAL DAILY
Status: DISCONTINUED | OUTPATIENT
Start: 2024-11-30 | End: 2024-12-04 | Stop reason: HOSPADM

## 2024-11-29 RX ORDER — ONDANSETRON 2 MG/ML
4 INJECTION INTRAMUSCULAR; INTRAVENOUS ONCE
Status: COMPLETED | OUTPATIENT
Start: 2024-11-29 | End: 2024-11-29

## 2024-11-29 RX ORDER — SODIUM CHLORIDE 9 MG/ML
INJECTION, SOLUTION INTRAVENOUS PRN
Status: DISCONTINUED | OUTPATIENT
Start: 2024-11-29 | End: 2024-12-04 | Stop reason: HOSPADM

## 2024-11-29 RX ORDER — MAGNESIUM SULFATE IN WATER 40 MG/ML
2000 INJECTION, SOLUTION INTRAVENOUS PRN
Status: DISCONTINUED | OUTPATIENT
Start: 2024-11-29 | End: 2024-12-04 | Stop reason: HOSPADM

## 2024-11-29 RX ORDER — SODIUM CHLORIDE 9 MG/ML
INJECTION, SOLUTION INTRAVENOUS CONTINUOUS
Status: ACTIVE | OUTPATIENT
Start: 2024-11-29 | End: 2024-11-30

## 2024-11-29 RX ORDER — VENLAFAXINE HYDROCHLORIDE 75 MG/1
75 CAPSULE, EXTENDED RELEASE ORAL
Status: DISCONTINUED | OUTPATIENT
Start: 2024-11-30 | End: 2024-12-04 | Stop reason: HOSPADM

## 2024-11-29 RX ORDER — GABAPENTIN 400 MG/1
800 CAPSULE ORAL 4 TIMES DAILY
Status: DISCONTINUED | OUTPATIENT
Start: 2024-11-29 | End: 2024-12-04 | Stop reason: HOSPADM

## 2024-11-29 RX ORDER — POLYETHYLENE GLYCOL 3350 17 G/17G
17 POWDER, FOR SOLUTION ORAL DAILY PRN
Status: DISCONTINUED | OUTPATIENT
Start: 2024-11-29 | End: 2024-12-02

## 2024-11-29 RX ORDER — ACETIC ACID 0.25 G/100ML
60 IRRIGANT IRRIGATION DAILY
Status: DISCONTINUED | OUTPATIENT
Start: 2024-11-30 | End: 2024-12-04 | Stop reason: HOSPADM

## 2024-11-29 RX ORDER — ONDANSETRON 2 MG/ML
4 INJECTION INTRAMUSCULAR; INTRAVENOUS EVERY 6 HOURS PRN
Status: DISCONTINUED | OUTPATIENT
Start: 2024-11-29 | End: 2024-12-04 | Stop reason: HOSPADM

## 2024-11-29 RX ORDER — CYCLOBENZAPRINE HCL 10 MG
10 TABLET ORAL 3 TIMES DAILY PRN
Status: DISCONTINUED | OUTPATIENT
Start: 2024-11-29 | End: 2024-12-04 | Stop reason: HOSPADM

## 2024-11-29 RX ORDER — TROSPIUM CHLORIDE 20 MG/1
20 TABLET, FILM COATED ORAL 2 TIMES DAILY
Status: DISCONTINUED | OUTPATIENT
Start: 2024-11-29 | End: 2024-12-04 | Stop reason: HOSPADM

## 2024-11-29 RX ORDER — ERGOCALCIFEROL 1.25 MG/1
50000 CAPSULE, LIQUID FILLED ORAL
Status: DISCONTINUED | OUTPATIENT
Start: 2024-12-01 | End: 2024-12-04 | Stop reason: HOSPADM

## 2024-11-29 RX ORDER — FERROUS SULFATE 325(65) MG
325 TABLET ORAL 2 TIMES DAILY WITH MEALS
Status: DISCONTINUED | OUTPATIENT
Start: 2024-11-29 | End: 2024-12-04 | Stop reason: HOSPADM

## 2024-11-29 RX ORDER — FENTANYL CITRATE 50 UG/ML
50 INJECTION, SOLUTION INTRAMUSCULAR; INTRAVENOUS ONCE
Status: DISCONTINUED | OUTPATIENT
Start: 2024-11-29 | End: 2024-12-04 | Stop reason: HOSPADM

## 2024-11-29 RX ORDER — ACETAMINOPHEN 325 MG/1
650 TABLET ORAL EVERY 6 HOURS PRN
Status: DISCONTINUED | OUTPATIENT
Start: 2024-11-29 | End: 2024-12-04 | Stop reason: HOSPADM

## 2024-11-29 RX ORDER — 0.9 % SODIUM CHLORIDE 0.9 %
1000 INTRAVENOUS SOLUTION INTRAVENOUS ONCE
Status: COMPLETED | OUTPATIENT
Start: 2024-11-29 | End: 2024-11-29

## 2024-11-29 RX ORDER — POTASSIUM CHLORIDE 1500 MG/1
40 TABLET, EXTENDED RELEASE ORAL PRN
Status: DISCONTINUED | OUTPATIENT
Start: 2024-11-29 | End: 2024-12-04 | Stop reason: HOSPADM

## 2024-11-29 RX ORDER — SODIUM CHLORIDE 0.9 % (FLUSH) 0.9 %
5-40 SYRINGE (ML) INJECTION PRN
Status: DISCONTINUED | OUTPATIENT
Start: 2024-11-29 | End: 2024-12-04 | Stop reason: HOSPADM

## 2024-11-29 RX ORDER — ACETAMINOPHEN 650 MG/1
650 SUPPOSITORY RECTAL EVERY 6 HOURS PRN
Status: DISCONTINUED | OUTPATIENT
Start: 2024-11-29 | End: 2024-12-04 | Stop reason: HOSPADM

## 2024-11-29 RX ORDER — ONDANSETRON 4 MG/1
4 TABLET, ORALLY DISINTEGRATING ORAL EVERY 8 HOURS PRN
Status: DISCONTINUED | OUTPATIENT
Start: 2024-11-29 | End: 2024-12-04 | Stop reason: HOSPADM

## 2024-11-29 RX ADMIN — CYCLOBENZAPRINE 10 MG: 10 TABLET, FILM COATED ORAL at 22:00

## 2024-11-29 RX ADMIN — GABAPENTIN 800 MG: 400 CAPSULE ORAL at 21:56

## 2024-11-29 RX ADMIN — BUPRENORPHINE AND NALOXONE 1 FILM: 8; 2 FILM BUCCAL; SUBLINGUAL at 21:55

## 2024-11-29 RX ADMIN — CEFTRIAXONE SODIUM 1000 MG: 1 INJECTION, POWDER, FOR SOLUTION INTRAMUSCULAR; INTRAVENOUS at 14:22

## 2024-11-29 RX ADMIN — BUSPIRONE HYDROCHLORIDE 10 MG: 10 TABLET ORAL at 21:55

## 2024-11-29 RX ADMIN — TIZANIDINE 4 MG: 4 TABLET ORAL at 21:56

## 2024-11-29 RX ADMIN — VANCOMYCIN HYDROCHLORIDE 2000 MG: 1 INJECTION, POWDER, LYOPHILIZED, FOR SOLUTION INTRAVENOUS at 15:33

## 2024-11-29 RX ADMIN — TROSPIUM CHLORIDE 20 MG: 20 TABLET, FILM COATED ORAL at 21:55

## 2024-11-29 RX ADMIN — SODIUM CHLORIDE: 9 INJECTION, SOLUTION INTRAVENOUS at 17:11

## 2024-11-29 RX ADMIN — SODIUM CHLORIDE 1000 ML: 9 INJECTION, SOLUTION INTRAVENOUS at 14:25

## 2024-11-29 RX ADMIN — ENOXAPARIN SODIUM 40 MG: 100 INJECTION SUBCUTANEOUS at 18:41

## 2024-11-29 RX ADMIN — ONDANSETRON 4 MG: 2 INJECTION INTRAMUSCULAR; INTRAVENOUS at 14:40

## 2024-11-29 ASSESSMENT — PAIN - FUNCTIONAL ASSESSMENT
PAIN_FUNCTIONAL_ASSESSMENT: NONE - DENIES PAIN

## 2024-11-29 NOTE — ED NOTES
Pt transported to [facility] by cart in stable condition.   Called [facility] and informed Veronica that the patient was on their way to the unit.
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
## 2024-11-29 NOTE — ED TRIAGE NOTES
Pt presents to the ED through triage with c/c worsening UTI. Pt reports that he was diagnosed with UTI last week. Reports taking antibiotics as prescribed with no improvement. Pt reports low grade fever, vomiting, chills. Vitals stable. Pt afebrile.

## 2024-11-29 NOTE — ED NOTES
Vancomycin initiated to RAC; pt continues to bend arm and set off pump. 2nd line initiated to wrist. Pt tolerated well.

## 2024-11-29 NOTE — ED NOTES
ED to inpatient nurses report      Chief Complaint:  Chief Complaint   Patient presents with    Urinary Tract Infection    Vomiting    Fever     Present to ED from: HOME    MOA:     LOC: alert and orientated to name, place, date  Mobility: Requires assistance * 2  Oxygen Baseline: ROOM AIR     Current needs required: NONE     Code Status:   Prior    Mental Status:  Level of Consciousness: Alert (0)    Psych Assessment:        Vitals:  Patient Vitals for the past 24 hrs:   BP Temp Temp src Pulse Resp SpO2 Height Weight   11/29/24 1425 123/88 -- -- (!) 109 18 99 % -- --   11/29/24 1404 98/80 -- -- (!) 103 18 98 % -- --   11/29/24 1259 117/78 -- -- (!) 111 18 99 % -- --   11/29/24 1234 (!) 112/94 -- -- (!) 116 18 99 % -- --   11/29/24 1209 104/82 99.1 °F (37.3 °C) Oral (!) 112 18 100 % 1.778 m (5' 10\") 81.6 kg (180 lb)        LDAs:   Peripheral IV 11/29/24 Proximal;Right Forearm (Active)   Site Assessment Clean, dry & intact 11/29/24 1404   Phlebitis Assessment No symptoms 11/29/24 1404   Infiltration Assessment 0 11/29/24 1404       Ambulatory Status:  No data recorded    Diagnosis:  DISPOSITION Admitted 11/29/2024 02:39:45 PM   Final diagnoses:   Urinary tract infection without hematuria, site unspecified   Leukocytosis, unspecified type        Consults:  None     Pain Score:  Pain Assessment  Pain Assessment: None - Denies Pain    C-SSRS:   Risk of Suicide: No Risk    Sepsis Screening:       [place] Fall Risk:       Swallow Screening        Preferred Language:   English      ALLERGIES     Bee venom, Pork-derived products, Tramadol, Doxycycline, and Compazine [prochlorperazine]    SURGICAL HISTORY       Past Surgical History:   Procedure Laterality Date    ANKLE SURGERY Right 2/19/2021    BILAT TENDO ACHILLES LENGTHENING, FLEXOR DIGITORUM LONGUS TENDON AND PERONEAL TENDON LENGTHENING performed by Johnathan Cohen DPM at [facility]    APPENDECTOMY      BACK SURGERY  11/2016    BRAIN SURGERY  11/05/2016    CLOT REMOVED

## 2024-11-29 NOTE — H&P
Hospitalist - History & Physical      Patient: Srikanth Coy    Unit/Bed:18/018A  YOB: 1990  MRN: [id number]   Acct: [id number]   PCP: Johnathan Flores MD    Date of Service: Pt seen/examined on 11/29/24  and Admitted to Observation with expected LOS less than two midnights due to medical therapy.     Chief Complaint: Fever, chills, nausea and vomiting.    Assessment and Plan:-  Sepsis versus SIRS response secondary to UTI, recent E. coli bacteremia secondary to UTI: He was recently admitted from 11/14/2024 to 11/19/2024 for E. coli bacteremia (sensitive to ceftriaxone) secondary to complicated/E coli UTI.  Suprapubic catheter was exchanged during that admission.  He was treated with ceftriaxone while inpatient, discharged on cefdinir.  SARS 2 out of 4 criteria met (leukocytosis and tachycardia).  Lactic acid of 1.0.  UA with moderate blood, positive nitrite, large leukocyte Estrace, few bacteria, more than 200 WBC.  Procalcitonin of 0.84.  CRP of 30.12.  CT abdomen/pelvis on 11/29 with right-sided hydronephrosis and increased density in the right perinephric fat consistent with acute obstruction.  Area of increased attenuation in the right renal pelvis which could represent a catheter fragment or less likely stones.  Suprapubic catheter within the bladder.  On ceftriaxone, follow blood cultures and urine cultures and de-escalate antibiotics accordingly.  Urology consulted for possible suprapubic catheter exchange and finding of hydronephrosis with obstruction noted on CT.  Right-sided hydronephrosis: CT abdomen/pelvis on 11/29 with right-sided hydronephrosis and increased density in the right perinephric fat consistent with acute obstruction.  Area of increased attenuation in the right renal pelvis which could represent a catheter fragment or less likely stones.  Urology consulted, recommended to make patient n.p.o. after midnight in case.  Elevated troponin: Troponin of 19.  Likely demand

## 2024-11-30 PROBLEM — N39.0 COMPLICATED UTI (URINARY TRACT INFECTION): Status: ACTIVE | Noted: 2024-11-30

## 2024-11-30 LAB
ANION GAP SERPL CALC-SCNC: 10 MEQ/L (ref 8–16)
BACTERIA UR CULT: ABNORMAL
BASOPHILS ABSOLUTE: 0.1 THOU/MM3 (ref 0–0.1)
BASOPHILS NFR BLD AUTO: 0.5 %
BUN SERPL-MCNC: 9 MG/DL (ref 7–22)
CALCIUM SERPL-MCNC: 8.7 MG/DL (ref 8.5–10.5)
CHLORIDE SERPL-SCNC: 102 MEQ/L (ref 98–111)
CO2 SERPL-SCNC: 24 MEQ/L (ref 23–33)
CREAT SERPL-MCNC: 1 MG/DL (ref 0.4–1.2)
DEPRECATED RDW RBC AUTO: 48.8 FL (ref 35–45)
EOSINOPHIL NFR BLD AUTO: 0.7 %
EOSINOPHILS ABSOLUTE: 0.1 THOU/MM3 (ref 0–0.4)
ERYTHROCYTE [DISTWIDTH] IN BLOOD BY AUTOMATED COUNT: 14.9 % (ref 11.5–14.5)
GFR SERPL CREATININE-BSD FRML MDRD: > 90 ML/MIN/1.73M2
GLUCOSE SERPL-MCNC: 97 MG/DL (ref 70–108)
HCT VFR BLD AUTO: 33 % (ref 42–52)
HGB BLD-MCNC: 10.5 GM/DL (ref 14–18)
IMM GRANULOCYTES # BLD AUTO: 0.09 THOU/MM3 (ref 0–0.07)
IMM GRANULOCYTES NFR BLD AUTO: 0.7 %
LYMPHOCYTES ABSOLUTE: 1.7 THOU/MM3 (ref 1–4.8)
LYMPHOCYTES NFR BLD AUTO: 12.3 %
MCH RBC QN AUTO: 28.3 PG (ref 26–33)
MCHC RBC AUTO-ENTMCNC: 31.8 GM/DL (ref 32.2–35.5)
MCV RBC AUTO: 88.9 FL (ref 80–94)
MONOCYTES ABSOLUTE: 1.2 THOU/MM3 (ref 0.4–1.3)
MONOCYTES NFR BLD AUTO: 8.4 %
NEUTROPHILS ABSOLUTE: 10.6 THOU/MM3 (ref 1.8–7.7)
NEUTROPHILS NFR BLD AUTO: 77.4 %
NRBC BLD AUTO-RTO: 0 /100 WBC
ORGANISM: ABNORMAL
PLATELET # BLD AUTO: 296 THOU/MM3 (ref 130–400)
PMV BLD AUTO: 9.2 FL (ref 9.4–12.4)
POTASSIUM SERPL-SCNC: 4.4 MEQ/L (ref 3.5–5.2)
PROCALCITONIN SERPL IA-MCNC: 0.49 NG/ML (ref 0.01–0.09)
RBC # BLD AUTO: 3.71 MILL/MM3 (ref 4.7–6.1)
SODIUM SERPL-SCNC: 136 MEQ/L (ref 135–145)
WBC # BLD AUTO: 13.7 THOU/MM3 (ref 4.8–10.8)

## 2024-11-30 PROCEDURE — 85025 COMPLETE CBC W/AUTO DIFF WBC: CPT

## 2024-11-30 PROCEDURE — 6370000000 HC RX 637 (ALT 250 FOR IP): Performed by: STUDENT IN AN ORGANIZED HEALTH CARE EDUCATION/TRAINING PROGRAM

## 2024-11-30 PROCEDURE — 1200000003 HC TELEMETRY R&B

## 2024-11-30 PROCEDURE — 2580000003 HC RX 258: Performed by: STUDENT IN AN ORGANIZED HEALTH CARE EDUCATION/TRAINING PROGRAM

## 2024-11-30 PROCEDURE — 2500000003 HC RX 250 WO HCPCS: Performed by: STUDENT IN AN ORGANIZED HEALTH CARE EDUCATION/TRAINING PROGRAM

## 2024-11-30 PROCEDURE — 99252 IP/OBS CONSLTJ NEW/EST SF 35: CPT | Performed by: NURSE PRACTITIONER

## 2024-11-30 PROCEDURE — 99233 SBSQ HOSP IP/OBS HIGH 50: CPT | Performed by: STUDENT IN AN ORGANIZED HEALTH CARE EDUCATION/TRAINING PROGRAM

## 2024-11-30 PROCEDURE — 2580000003 HC RX 258

## 2024-11-30 PROCEDURE — 80048 BASIC METABOLIC PNL TOTAL CA: CPT

## 2024-11-30 PROCEDURE — 84145 PROCALCITONIN (PCT): CPT

## 2024-11-30 PROCEDURE — 6360000002 HC RX W HCPCS: Performed by: STUDENT IN AN ORGANIZED HEALTH CARE EDUCATION/TRAINING PROGRAM

## 2024-11-30 PROCEDURE — 36415 COLL VENOUS BLD VENIPUNCTURE: CPT

## 2024-11-30 RX ORDER — ALBUTEROL SULFATE 90 UG/1
2 INHALANT RESPIRATORY (INHALATION) EVERY 4 HOURS PRN
Status: DISCONTINUED | OUTPATIENT
Start: 2024-11-30 | End: 2024-12-04 | Stop reason: HOSPADM

## 2024-11-30 RX ORDER — SODIUM CHLORIDE 9 MG/ML
INJECTION, SOLUTION INTRAVENOUS CONTINUOUS
Status: ACTIVE | OUTPATIENT
Start: 2024-11-30 | End: 2024-11-30

## 2024-11-30 RX ADMIN — POTASSIUM CHLORIDE 10 MEQ: 750 TABLET, EXTENDED RELEASE ORAL at 08:59

## 2024-11-30 RX ADMIN — BUPRENORPHINE AND NALOXONE 1 FILM: 8; 2 FILM BUCCAL; SUBLINGUAL at 15:03

## 2024-11-30 RX ADMIN — BUSPIRONE HYDROCHLORIDE 10 MG: 10 TABLET ORAL at 12:30

## 2024-11-30 RX ADMIN — TIZANIDINE 4 MG: 4 TABLET ORAL at 20:26

## 2024-11-30 RX ADMIN — SODIUM CHLORIDE, PRESERVATIVE FREE 10 ML: 5 INJECTION INTRAVENOUS at 20:26

## 2024-11-30 RX ADMIN — ENOXAPARIN SODIUM 40 MG: 100 INJECTION SUBCUTANEOUS at 09:07

## 2024-11-30 RX ADMIN — SODIUM CHLORIDE: 9 INJECTION, SOLUTION INTRAVENOUS at 08:55

## 2024-11-30 RX ADMIN — TROSPIUM CHLORIDE 20 MG: 20 TABLET, FILM COATED ORAL at 20:25

## 2024-11-30 RX ADMIN — TROSPIUM CHLORIDE 20 MG: 20 TABLET, FILM COATED ORAL at 09:01

## 2024-11-30 RX ADMIN — GABAPENTIN 800 MG: 400 CAPSULE ORAL at 17:52

## 2024-11-30 RX ADMIN — SODIUM CHLORIDE, PRESERVATIVE FREE 10 ML: 5 INJECTION INTRAVENOUS at 08:55

## 2024-11-30 RX ADMIN — WATER 2000 MG: 1 INJECTION INTRAMUSCULAR; INTRAVENOUS; SUBCUTANEOUS at 15:05

## 2024-11-30 RX ADMIN — BUSPIRONE HYDROCHLORIDE 10 MG: 10 TABLET ORAL at 20:25

## 2024-11-30 RX ADMIN — VENLAFAXINE HYDROCHLORIDE 150 MG: 150 CAPSULE, EXTENDED RELEASE ORAL at 09:01

## 2024-11-30 RX ADMIN — BUPRENORPHINE AND NALOXONE 1 FILM: 8; 2 FILM BUCCAL; SUBLINGUAL at 20:25

## 2024-11-30 RX ADMIN — CYCLOBENZAPRINE 10 MG: 10 TABLET, FILM COATED ORAL at 21:28

## 2024-11-30 RX ADMIN — CYCLOBENZAPRINE 10 MG: 10 TABLET, FILM COATED ORAL at 08:59

## 2024-11-30 RX ADMIN — DAKIN'S SOLUTION 0.125% (QUARTER STRENGTH): 0.12 SOLUTION at 08:58

## 2024-11-30 RX ADMIN — BUSPIRONE HYDROCHLORIDE 10 MG: 10 TABLET ORAL at 09:01

## 2024-11-30 RX ADMIN — GABAPENTIN 800 MG: 400 CAPSULE ORAL at 20:26

## 2024-11-30 RX ADMIN — ACETIC ACID 60 ML: 250 IRRIGANT IRRIGATION at 08:58

## 2024-11-30 RX ADMIN — GABAPENTIN 800 MG: 400 CAPSULE ORAL at 09:01

## 2024-11-30 RX ADMIN — BUPRENORPHINE AND NALOXONE 1 FILM: 8; 2 FILM BUCCAL; SUBLINGUAL at 08:59

## 2024-11-30 RX ADMIN — VENLAFAXINE HYDROCHLORIDE 75 MG: 75 CAPSULE, EXTENDED RELEASE ORAL at 09:01

## 2024-11-30 RX ADMIN — GABAPENTIN 800 MG: 400 CAPSULE ORAL at 12:29

## 2024-11-30 RX ADMIN — TIZANIDINE 4 MG: 4 TABLET ORAL at 12:29

## 2024-11-30 NOTE — CONSULTS
years ago.  Has colostomy and suprapubic catheter       Thank you for including us in the care of Srikanth Coy    Case discussed with Dr. Braden Ventura, APRN - CNP, APRN  11/30/24 7:55 AM  Urology

## 2024-11-30 NOTE — CARE COORDINATION
11/30/24 0920   Service Assessment   Patient Orientation Alert and Oriented   Cognition Alert   History Provided By Patient   Primary Caregiver Self   Accompanied By/Relationship Mother   Support Systems Parent;Family Members   Patient's Healthcare Decision Maker is: Patient Declined (Legal Next of Kin Remains as Decision Maker)   PCP Verified by CM Yes   Last Visit to PCP Within last 6 months   Prior Functional Level Assistance with the following:;Bathing;Dressing;Cooking;Housework;Shopping;Mobility   Current Functional Level Bathing;Dressing;Cooking;Housework;Shopping;Mobility;Assistance with the following:   Can patient return to prior living arrangement Yes   Ability to make needs known: Good   Family able to assist with home care needs: Yes   Would you like for me to discuss the discharge plan with any other family members/significant others, and if so, who? Yes  (Mother at bedside)   Financial Resources Medicaid   Community Resources Other (Comment)  ([facility])   CM/SW Referral ADLs/IADLs;DME   Social/Functional History   Lives With Parent;Family   Type of Home Mobile home   Active  No   Patient's  Info Mother via Holographic Projection for [employer]   Discharge Planning   Type of Residence Trailer/Mobile Home   Living Arrangements Parent;Family Members   Current Services Prior To Admission Durable Medical Equipment   Current DME Prior to Arrival Wheelchair;Hospital Bed;Other (Comment)  (Trapeze bar; Slide board; Platform lift)   Potential Assistance Needed N/A   DME Ordered? No   Potential Assistance Purchasing Medications No   Type of Home Care Services None   Patient expects to be discharged to: Trailer/mobile home   History of falls? 0   Services At/After Discharge   Transition of Care Consult (CM Consult) Discharge Planning   Mode of Transport at Discharge Holographic Projection for [employer]   Confirm Follow Up Transport Family   Condition of Participation: Discharge Planning   The Plan for Transition of Care is related to the

## 2024-11-30 NOTE — CARE COORDINATION
11/30/24 0920   Readmission Assessment   Number of Days since last admission? 8-30 days   Previous Disposition Home with Family   Who is being Interviewed Patient   What was the patient's/caregiver's perception as to why they think they needed to return back to the hospital? Other (Comment)  (\"I was septic. I was throwing up, had fevers and had diarrhea bad\")   Did you visit your Primary Care Physician after you left the hospital, before you returned this time? No   Why weren't you able to visit your PCP? Did not want to go (Comment)  (Mother cancelled appt r/t patient fevering at home. Rescheduled for 12/5.)   Did you see a specialist, such as Cardiac, Pulmonary, Orthopedic Physician, etc. after you left the hospital? No   Who advised the patient to return to the hospital? Self-referral   Does the patient report anything that got in the way of taking their medications? No   In our efforts to provide the best possible care to you and others like you, can you think of anything that we could have done to help you after you left the hospital the first time, so that you might not have needed to return so soon? Other (Comment)  (\"Make sure the antibiotics work before you send someone home\")

## 2024-12-01 LAB
ALBUMIN SERPL BCG-MCNC: 2.8 G/DL (ref 3.5–5.1)
ALP SERPL-CCNC: 133 U/L (ref 38–126)
ALT SERPL W/O P-5'-P-CCNC: 7 U/L (ref 11–66)
ANION GAP SERPL CALC-SCNC: 10 MEQ/L (ref 8–16)
AST SERPL-CCNC: 9 U/L (ref 5–40)
BACTERIA UR CULT: ABNORMAL
BASOPHILS ABSOLUTE: 0.1 THOU/MM3 (ref 0–0.1)
BASOPHILS NFR BLD AUTO: 0.7 %
BILIRUB CONJ SERPL-MCNC: < 0.1 MG/DL (ref 0.1–13.8)
BILIRUB SERPL-MCNC: 0.2 MG/DL (ref 0.3–1.2)
BUN SERPL-MCNC: 8 MG/DL (ref 7–22)
CA-I BLD ISE-SCNC: 1.28 MMOL/L (ref 1.12–1.32)
CALCIUM SERPL-MCNC: 9.1 MG/DL (ref 8.5–10.5)
CHLORIDE SERPL-SCNC: 105 MEQ/L (ref 98–111)
CO2 SERPL-SCNC: 26 MEQ/L (ref 23–33)
CREAT SERPL-MCNC: 1 MG/DL (ref 0.4–1.2)
DEPRECATED RDW RBC AUTO: 47.8 FL (ref 35–45)
EOSINOPHIL NFR BLD AUTO: 1.2 %
EOSINOPHILS ABSOLUTE: 0.1 THOU/MM3 (ref 0–0.4)
ERYTHROCYTE [DISTWIDTH] IN BLOOD BY AUTOMATED COUNT: 14.8 % (ref 11.5–14.5)
GFR SERPL CREATININE-BSD FRML MDRD: > 90 ML/MIN/1.73M2
GLUCOSE SERPL-MCNC: 101 MG/DL (ref 70–108)
HCT VFR BLD AUTO: 31.9 % (ref 42–52)
HGB BLD-MCNC: 10.2 GM/DL (ref 14–18)
IMM GRANULOCYTES # BLD AUTO: 0.04 THOU/MM3 (ref 0–0.07)
IMM GRANULOCYTES NFR BLD AUTO: 0.4 %
LYMPHOCYTES ABSOLUTE: 1.3 THOU/MM3 (ref 1–4.8)
LYMPHOCYTES NFR BLD AUTO: 13.2 %
MAGNESIUM SERPL-MCNC: 2 MG/DL (ref 1.6–2.4)
MCH RBC QN AUTO: 27.9 PG (ref 26–33)
MCHC RBC AUTO-ENTMCNC: 32 GM/DL (ref 32.2–35.5)
MCV RBC AUTO: 87.4 FL (ref 80–94)
MONOCYTES ABSOLUTE: 0.7 THOU/MM3 (ref 0.4–1.3)
MONOCYTES NFR BLD AUTO: 7.1 %
NEUTROPHILS ABSOLUTE: 7.9 THOU/MM3 (ref 1.8–7.7)
NEUTROPHILS NFR BLD AUTO: 77.4 %
NRBC BLD AUTO-RTO: 0 /100 WBC
ORGANISM: ABNORMAL
PLATELET # BLD AUTO: 304 THOU/MM3 (ref 130–400)
PMV BLD AUTO: 9 FL (ref 9.4–12.4)
POTASSIUM SERPL-SCNC: 4.7 MEQ/L (ref 3.5–5.2)
PROT SERPL-MCNC: 7.1 G/DL (ref 6.1–8)
RBC # BLD AUTO: 3.65 MILL/MM3 (ref 4.7–6.1)
SODIUM SERPL-SCNC: 141 MEQ/L (ref 135–145)
WBC # BLD AUTO: 10.2 THOU/MM3 (ref 4.8–10.8)

## 2024-12-01 PROCEDURE — 99232 SBSQ HOSP IP/OBS MODERATE 35: CPT | Performed by: STUDENT IN AN ORGANIZED HEALTH CARE EDUCATION/TRAINING PROGRAM

## 2024-12-01 PROCEDURE — 36415 COLL VENOUS BLD VENIPUNCTURE: CPT

## 2024-12-01 PROCEDURE — 2580000003 HC RX 258: Performed by: STUDENT IN AN ORGANIZED HEALTH CARE EDUCATION/TRAINING PROGRAM

## 2024-12-01 PROCEDURE — 82248 BILIRUBIN DIRECT: CPT

## 2024-12-01 PROCEDURE — 6370000000 HC RX 637 (ALT 250 FOR IP): Performed by: STUDENT IN AN ORGANIZED HEALTH CARE EDUCATION/TRAINING PROGRAM

## 2024-12-01 PROCEDURE — 83735 ASSAY OF MAGNESIUM: CPT

## 2024-12-01 PROCEDURE — 82330 ASSAY OF CALCIUM: CPT

## 2024-12-01 PROCEDURE — 99231 SBSQ HOSP IP/OBS SF/LOW 25: CPT | Performed by: NURSE PRACTITIONER

## 2024-12-01 PROCEDURE — 6360000002 HC RX W HCPCS: Performed by: STUDENT IN AN ORGANIZED HEALTH CARE EDUCATION/TRAINING PROGRAM

## 2024-12-01 PROCEDURE — 1200000003 HC TELEMETRY R&B

## 2024-12-01 PROCEDURE — 80053 COMPREHEN METABOLIC PANEL: CPT

## 2024-12-01 PROCEDURE — 85025 COMPLETE CBC W/AUTO DIFF WBC: CPT

## 2024-12-01 PROCEDURE — 2500000003 HC RX 250 WO HCPCS: Performed by: STUDENT IN AN ORGANIZED HEALTH CARE EDUCATION/TRAINING PROGRAM

## 2024-12-01 RX ADMIN — CYCLOBENZAPRINE 10 MG: 10 TABLET, FILM COATED ORAL at 17:25

## 2024-12-01 RX ADMIN — SODIUM CHLORIDE, PRESERVATIVE FREE 10 ML: 5 INJECTION INTRAVENOUS at 20:27

## 2024-12-01 RX ADMIN — DAKIN'S SOLUTION 0.125% (QUARTER STRENGTH): 0.12 SOLUTION at 09:31

## 2024-12-01 RX ADMIN — GABAPENTIN 800 MG: 400 CAPSULE ORAL at 13:30

## 2024-12-01 RX ADMIN — VENLAFAXINE HYDROCHLORIDE 75 MG: 75 CAPSULE, EXTENDED RELEASE ORAL at 09:33

## 2024-12-01 RX ADMIN — GABAPENTIN 800 MG: 400 CAPSULE ORAL at 20:26

## 2024-12-01 RX ADMIN — PANTOPRAZOLE SODIUM 40 MG: 40 TABLET, DELAYED RELEASE ORAL at 06:21

## 2024-12-01 RX ADMIN — ENOXAPARIN SODIUM 40 MG: 100 INJECTION SUBCUTANEOUS at 09:31

## 2024-12-01 RX ADMIN — WATER 2000 MG: 1 INJECTION INTRAMUSCULAR; INTRAVENOUS; SUBCUTANEOUS at 14:51

## 2024-12-01 RX ADMIN — ACETIC ACID 60 ML: 250 IRRIGANT IRRIGATION at 09:31

## 2024-12-01 RX ADMIN — VENLAFAXINE HYDROCHLORIDE 150 MG: 150 CAPSULE, EXTENDED RELEASE ORAL at 09:33

## 2024-12-01 RX ADMIN — GABAPENTIN 800 MG: 400 CAPSULE ORAL at 17:21

## 2024-12-01 RX ADMIN — POTASSIUM CHLORIDE 10 MEQ: 750 TABLET, EXTENDED RELEASE ORAL at 09:31

## 2024-12-01 RX ADMIN — BUPRENORPHINE AND NALOXONE 1 FILM: 8; 2 FILM BUCCAL; SUBLINGUAL at 09:31

## 2024-12-01 RX ADMIN — BUPRENORPHINE AND NALOXONE 1 FILM: 8; 2 FILM BUCCAL; SUBLINGUAL at 20:27

## 2024-12-01 RX ADMIN — BUSPIRONE HYDROCHLORIDE 10 MG: 10 TABLET ORAL at 09:32

## 2024-12-01 RX ADMIN — SODIUM CHLORIDE, PRESERVATIVE FREE 10 ML: 5 INJECTION INTRAVENOUS at 09:36

## 2024-12-01 RX ADMIN — GABAPENTIN 800 MG: 400 CAPSULE ORAL at 09:32

## 2024-12-01 RX ADMIN — BUSPIRONE HYDROCHLORIDE 10 MG: 10 TABLET ORAL at 20:27

## 2024-12-01 RX ADMIN — TROSPIUM CHLORIDE 20 MG: 20 TABLET, FILM COATED ORAL at 20:27

## 2024-12-01 RX ADMIN — BUPRENORPHINE AND NALOXONE 1 FILM: 8; 2 FILM BUCCAL; SUBLINGUAL at 14:51

## 2024-12-01 RX ADMIN — TROSPIUM CHLORIDE 20 MG: 20 TABLET, FILM COATED ORAL at 09:33

## 2024-12-01 RX ADMIN — BUSPIRONE HYDROCHLORIDE 10 MG: 10 TABLET ORAL at 13:31

## 2024-12-01 RX ADMIN — CYCLOBENZAPRINE 10 MG: 10 TABLET, FILM COATED ORAL at 20:27

## 2024-12-01 RX ADMIN — ERGOCALCIFEROL 50000 UNITS: 1.25 CAPSULE ORAL at 09:34

## 2024-12-02 LAB
ANION GAP SERPL CALC-SCNC: 12 MEQ/L (ref 8–16)
BASOPHILS ABSOLUTE: 0.1 THOU/MM3 (ref 0–0.1)
BASOPHILS NFR BLD AUTO: 0.8 %
BUN SERPL-MCNC: 7 MG/DL (ref 7–22)
CALCIUM SERPL-MCNC: 9.4 MG/DL (ref 8.5–10.5)
CHLORIDE SERPL-SCNC: 102 MEQ/L (ref 98–111)
CO2 SERPL-SCNC: 25 MEQ/L (ref 23–33)
CREAT SERPL-MCNC: 1 MG/DL (ref 0.4–1.2)
DEPRECATED RDW RBC AUTO: 47 FL (ref 35–45)
EOSINOPHIL NFR BLD AUTO: 1.3 %
EOSINOPHILS ABSOLUTE: 0.1 THOU/MM3 (ref 0–0.4)
ERYTHROCYTE [DISTWIDTH] IN BLOOD BY AUTOMATED COUNT: 14.6 % (ref 11.5–14.5)
GFR SERPL CREATININE-BSD FRML MDRD: > 90 ML/MIN/1.73M2
GLUCOSE BLD STRIP.AUTO-MCNC: 109 MG/DL (ref 70–108)
GLUCOSE SERPL-MCNC: 100 MG/DL (ref 70–108)
HCT VFR BLD AUTO: 34.4 % (ref 42–52)
HGB BLD-MCNC: 10.8 GM/DL (ref 14–18)
IMM GRANULOCYTES # BLD AUTO: 0.04 THOU/MM3 (ref 0–0.07)
IMM GRANULOCYTES NFR BLD AUTO: 0.4 %
LYMPHOCYTES ABSOLUTE: 1.5 THOU/MM3 (ref 1–4.8)
LYMPHOCYTES NFR BLD AUTO: 15.4 %
MCH RBC QN AUTO: 27.7 PG (ref 26–33)
MCHC RBC AUTO-ENTMCNC: 31.4 GM/DL (ref 32.2–35.5)
MCV RBC AUTO: 88.2 FL (ref 80–94)
MONOCYTES ABSOLUTE: 0.8 THOU/MM3 (ref 0.4–1.3)
MONOCYTES NFR BLD AUTO: 8.2 %
NEUTROPHILS ABSOLUTE: 7 THOU/MM3 (ref 1.8–7.7)
NEUTROPHILS NFR BLD AUTO: 73.9 %
NRBC BLD AUTO-RTO: 0 /100 WBC
PLATELET # BLD AUTO: 361 THOU/MM3 (ref 130–400)
PMV BLD AUTO: 9 FL (ref 9.4–12.4)
POTASSIUM SERPL-SCNC: 4.4 MEQ/L (ref 3.5–5.2)
RBC # BLD AUTO: 3.9 MILL/MM3 (ref 4.7–6.1)
SODIUM SERPL-SCNC: 139 MEQ/L (ref 135–145)
WBC # BLD AUTO: 9.5 THOU/MM3 (ref 4.8–10.8)

## 2024-12-02 PROCEDURE — 1200000003 HC TELEMETRY R&B

## 2024-12-02 PROCEDURE — 99231 SBSQ HOSP IP/OBS SF/LOW 25: CPT | Performed by: UROLOGY

## 2024-12-02 PROCEDURE — 99233 SBSQ HOSP IP/OBS HIGH 50: CPT | Performed by: STUDENT IN AN ORGANIZED HEALTH CARE EDUCATION/TRAINING PROGRAM

## 2024-12-02 PROCEDURE — 6360000002 HC RX W HCPCS: Performed by: STUDENT IN AN ORGANIZED HEALTH CARE EDUCATION/TRAINING PROGRAM

## 2024-12-02 PROCEDURE — 80048 BASIC METABOLIC PNL TOTAL CA: CPT

## 2024-12-02 PROCEDURE — 36415 COLL VENOUS BLD VENIPUNCTURE: CPT

## 2024-12-02 PROCEDURE — 6370000000 HC RX 637 (ALT 250 FOR IP): Performed by: STUDENT IN AN ORGANIZED HEALTH CARE EDUCATION/TRAINING PROGRAM

## 2024-12-02 PROCEDURE — 2580000003 HC RX 258: Performed by: STUDENT IN AN ORGANIZED HEALTH CARE EDUCATION/TRAINING PROGRAM

## 2024-12-02 PROCEDURE — 82948 REAGENT STRIP/BLOOD GLUCOSE: CPT

## 2024-12-02 PROCEDURE — 6370000000 HC RX 637 (ALT 250 FOR IP)

## 2024-12-02 PROCEDURE — 85025 COMPLETE CBC W/AUTO DIFF WBC: CPT

## 2024-12-02 RX ORDER — POLYETHYLENE GLYCOL 3350 17 G/17G
17 POWDER, FOR SOLUTION ORAL DAILY
Status: DISCONTINUED | OUTPATIENT
Start: 2024-12-02 | End: 2024-12-04 | Stop reason: HOSPADM

## 2024-12-02 RX ORDER — CEFDINIR 300 MG/1
300 CAPSULE ORAL EVERY 12 HOURS SCHEDULED
Status: DISCONTINUED | OUTPATIENT
Start: 2024-12-02 | End: 2024-12-04 | Stop reason: HOSPADM

## 2024-12-02 RX ADMIN — TROSPIUM CHLORIDE 20 MG: 20 TABLET, FILM COATED ORAL at 09:25

## 2024-12-02 RX ADMIN — POTASSIUM CHLORIDE 10 MEQ: 750 TABLET, EXTENDED RELEASE ORAL at 09:25

## 2024-12-02 RX ADMIN — ENOXAPARIN SODIUM 40 MG: 100 INJECTION SUBCUTANEOUS at 09:25

## 2024-12-02 RX ADMIN — BUSPIRONE HYDROCHLORIDE 10 MG: 10 TABLET ORAL at 13:31

## 2024-12-02 RX ADMIN — ACETIC ACID 60 ML: 250 IRRIGANT IRRIGATION at 10:40

## 2024-12-02 RX ADMIN — SODIUM CHLORIDE, PRESERVATIVE FREE 10 ML: 5 INJECTION INTRAVENOUS at 21:11

## 2024-12-02 RX ADMIN — CYCLOBENZAPRINE 10 MG: 10 TABLET, FILM COATED ORAL at 21:11

## 2024-12-02 RX ADMIN — BUPRENORPHINE AND NALOXONE 1 FILM: 8; 2 FILM BUCCAL; SUBLINGUAL at 21:11

## 2024-12-02 RX ADMIN — GABAPENTIN 800 MG: 400 CAPSULE ORAL at 09:25

## 2024-12-02 RX ADMIN — DAKIN'S SOLUTION 0.125% (QUARTER STRENGTH): 0.12 SOLUTION at 11:40

## 2024-12-02 RX ADMIN — TROSPIUM CHLORIDE 20 MG: 20 TABLET, FILM COATED ORAL at 21:11

## 2024-12-02 RX ADMIN — PANTOPRAZOLE SODIUM 40 MG: 40 TABLET, DELAYED RELEASE ORAL at 07:34

## 2024-12-02 RX ADMIN — BUPRENORPHINE AND NALOXONE 1 FILM: 8; 2 FILM BUCCAL; SUBLINGUAL at 14:40

## 2024-12-02 RX ADMIN — CEFDINIR 300 MG: 300 CAPSULE ORAL at 17:08

## 2024-12-02 RX ADMIN — BUSPIRONE HYDROCHLORIDE 10 MG: 10 TABLET ORAL at 21:12

## 2024-12-02 RX ADMIN — SODIUM CHLORIDE, PRESERVATIVE FREE 10 ML: 5 INJECTION INTRAVENOUS at 09:29

## 2024-12-02 RX ADMIN — GABAPENTIN 800 MG: 400 CAPSULE ORAL at 13:31

## 2024-12-02 RX ADMIN — GABAPENTIN 800 MG: 400 CAPSULE ORAL at 21:09

## 2024-12-02 RX ADMIN — VENLAFAXINE HYDROCHLORIDE 75 MG: 75 CAPSULE, EXTENDED RELEASE ORAL at 09:27

## 2024-12-02 RX ADMIN — VENLAFAXINE HYDROCHLORIDE 150 MG: 150 CAPSULE, EXTENDED RELEASE ORAL at 09:25

## 2024-12-02 RX ADMIN — BUSPIRONE HYDROCHLORIDE 10 MG: 10 TABLET ORAL at 09:25

## 2024-12-02 RX ADMIN — WATER 2000 MG: 1 INJECTION INTRAMUSCULAR; INTRAVENOUS; SUBCUTANEOUS at 14:41

## 2024-12-02 RX ADMIN — BUPRENORPHINE AND NALOXONE 1 FILM: 8; 2 FILM BUCCAL; SUBLINGUAL at 09:25

## 2024-12-02 RX ADMIN — GABAPENTIN 800 MG: 400 CAPSULE ORAL at 17:08

## 2024-12-02 ASSESSMENT — ENCOUNTER SYMPTOMS
ABDOMINAL DISTENTION: 0
CONSTIPATION: 0
COLOR CHANGE: 0
COUGH: 0
WHEEZING: 0
DIARRHEA: 0
CHOKING: 0
EYE PAIN: 0
CHEST TIGHTNESS: 0
SORE THROAT: 0
SHORTNESS OF BREATH: 0

## 2024-12-02 NOTE — CARE COORDINATION
12/2/24, 8:41 AM EST    DISCHARGE ON GOING EVALUATION    Srikanth MOREL [facility] day: 2  Location: -09/009-A Reason for admit: UTI (urinary tract infection) [N39.0]  Urinary tract infection without hematuria, site unspecified [N39.0]  Leukocytosis, unspecified type [D72.829]  Complicated UTI (urinary tract infection) [N39.0]     Procedures: none     Imaging since last note: 11/29 CT abd: right-sided hydronephrosis and increased density consistent with obstruction    Barriers to Discharge: IV rocephin, acetic acid irrig. Urology planning stent placement Tuesday.     PCP: Johnathan Flores MD  Readmission Risk Score: 22.4    Patient Goals/Plan/Treatment Preferences: Home with mother who is full-time caregiver. Follows with [facility].

## 2024-12-03 ENCOUNTER — ANESTHESIA (OUTPATIENT)
Dept: OPERATING ROOM | Age: 34
DRG: 466 | End: 2024-12-03
Payer: MEDICAID

## 2024-12-03 ENCOUNTER — ANESTHESIA EVENT (OUTPATIENT)
Dept: OPERATING ROOM | Age: 34
DRG: 466 | End: 2024-12-03
Payer: MEDICAID

## 2024-12-03 LAB
ANION GAP SERPL CALC-SCNC: 14 MEQ/L (ref 8–16)
BUN SERPL-MCNC: 6 MG/DL (ref 7–22)
CALCIUM SERPL-MCNC: 9.8 MG/DL (ref 8.5–10.5)
CHLORIDE SERPL-SCNC: 99 MEQ/L (ref 98–111)
CO2 SERPL-SCNC: 27 MEQ/L (ref 23–33)
CREAT SERPL-MCNC: 1 MG/DL (ref 0.4–1.2)
DEPRECATED RDW RBC AUTO: 47.7 FL (ref 35–45)
ERYTHROCYTE [DISTWIDTH] IN BLOOD BY AUTOMATED COUNT: 14.7 % (ref 11.5–14.5)
GFR SERPL CREATININE-BSD FRML MDRD: > 90 ML/MIN/1.73M2
GLUCOSE SERPL-MCNC: 97 MG/DL (ref 70–108)
HCT VFR BLD AUTO: 35.7 % (ref 42–52)
HGB BLD-MCNC: 11.2 GM/DL (ref 14–18)
MCH RBC QN AUTO: 27.7 PG (ref 26–33)
MCHC RBC AUTO-ENTMCNC: 31.4 GM/DL (ref 32.2–35.5)
MCV RBC AUTO: 88.4 FL (ref 80–94)
PLATELET # BLD AUTO: 424 THOU/MM3 (ref 130–400)
PMV BLD AUTO: 9 FL (ref 9.4–12.4)
POTASSIUM SERPL-SCNC: 5.1 MEQ/L (ref 3.5–5.2)
RBC # BLD AUTO: 4.04 MILL/MM3 (ref 4.7–6.1)
SODIUM SERPL-SCNC: 140 MEQ/L (ref 135–145)
WBC # BLD AUTO: 9.3 THOU/MM3 (ref 4.8–10.8)

## 2024-12-03 PROCEDURE — 0T768DZ DILATION OF RIGHT URETER WITH INTRALUMINAL DEVICE, VIA NATURAL OR ARTIFICIAL OPENING ENDOSCOPIC: ICD-10-PCS | Performed by: UROLOGY

## 2024-12-03 PROCEDURE — C2617 STENT, NON-COR, TEM W/O DEL: HCPCS | Performed by: UROLOGY

## 2024-12-03 PROCEDURE — 6370000000 HC RX 637 (ALT 250 FOR IP)

## 2024-12-03 PROCEDURE — 85027 COMPLETE CBC AUTOMATED: CPT

## 2024-12-03 PROCEDURE — 3600000002 HC SURGERY LEVEL 2 BASE: Performed by: UROLOGY

## 2024-12-03 PROCEDURE — 2580000003 HC RX 258: Performed by: STUDENT IN AN ORGANIZED HEALTH CARE EDUCATION/TRAINING PROGRAM

## 2024-12-03 PROCEDURE — 80048 BASIC METABOLIC PNL TOTAL CA: CPT

## 2024-12-03 PROCEDURE — 3700000001 HC ADD 15 MINUTES (ANESTHESIA): Performed by: UROLOGY

## 2024-12-03 PROCEDURE — 6370000000 HC RX 637 (ALT 250 FOR IP): Performed by: STUDENT IN AN ORGANIZED HEALTH CARE EDUCATION/TRAINING PROGRAM

## 2024-12-03 PROCEDURE — 0T2BX0Z CHANGE DRAINAGE DEVICE IN BLADDER, EXTERNAL APPROACH: ICD-10-PCS | Performed by: UROLOGY

## 2024-12-03 PROCEDURE — 3700000000 HC ANESTHESIA ATTENDED CARE: Performed by: UROLOGY

## 2024-12-03 PROCEDURE — 99233 SBSQ HOSP IP/OBS HIGH 50: CPT | Performed by: STUDENT IN AN ORGANIZED HEALTH CARE EDUCATION/TRAINING PROGRAM

## 2024-12-03 PROCEDURE — 1200000003 HC TELEMETRY R&B

## 2024-12-03 PROCEDURE — 2709999900 HC NON-CHARGEABLE SUPPLY: Performed by: UROLOGY

## 2024-12-03 PROCEDURE — 6360000002 HC RX W HCPCS: Performed by: STUDENT IN AN ORGANIZED HEALTH CARE EDUCATION/TRAINING PROGRAM

## 2024-12-03 PROCEDURE — C1769 GUIDE WIRE: HCPCS | Performed by: UROLOGY

## 2024-12-03 PROCEDURE — 3600000012 HC SURGERY LEVEL 2 ADDTL 15MIN: Performed by: UROLOGY

## 2024-12-03 PROCEDURE — 36415 COLL VENOUS BLD VENIPUNCTURE: CPT

## 2024-12-03 PROCEDURE — 6360000002 HC RX W HCPCS: Performed by: NURSE ANESTHETIST, CERTIFIED REGISTERED

## 2024-12-03 DEVICE — URETERAL STENT
Type: IMPLANTABLE DEVICE | Status: FUNCTIONAL
Brand: PERCUFLEX™ PLUS

## 2024-12-03 RX ORDER — ONDANSETRON 2 MG/ML
INJECTION INTRAMUSCULAR; INTRAVENOUS
Status: DISCONTINUED | OUTPATIENT
Start: 2024-12-03 | End: 2024-12-03 | Stop reason: SDUPTHER

## 2024-12-03 RX ORDER — FENTANYL CITRATE 50 UG/ML
INJECTION, SOLUTION INTRAMUSCULAR; INTRAVENOUS
Status: DISCONTINUED | OUTPATIENT
Start: 2024-12-03 | End: 2024-12-03 | Stop reason: SDUPTHER

## 2024-12-03 RX ORDER — MIDAZOLAM HYDROCHLORIDE 1 MG/ML
INJECTION, SOLUTION INTRAMUSCULAR; INTRAVENOUS
Status: DISCONTINUED | OUTPATIENT
Start: 2024-12-03 | End: 2024-12-03 | Stop reason: SDUPTHER

## 2024-12-03 RX ORDER — POLYETHYLENE GLYCOL 3350 17 G/17G
17 POWDER, FOR SOLUTION ORAL DAILY
Status: DISCONTINUED | OUTPATIENT
Start: 2024-12-03 | End: 2024-12-04 | Stop reason: HOSPADM

## 2024-12-03 RX ORDER — PROPOFOL 10 MG/ML
INJECTION, EMULSION INTRAVENOUS
Status: DISCONTINUED | OUTPATIENT
Start: 2024-12-03 | End: 2024-12-03 | Stop reason: SDUPTHER

## 2024-12-03 RX ADMIN — CEFDINIR 300 MG: 300 CAPSULE ORAL at 08:54

## 2024-12-03 RX ADMIN — PROPOFOL 20 MG: 10 INJECTION, EMULSION INTRAVENOUS at 13:58

## 2024-12-03 RX ADMIN — TROSPIUM CHLORIDE 20 MG: 20 TABLET, FILM COATED ORAL at 20:58

## 2024-12-03 RX ADMIN — POTASSIUM CHLORIDE 10 MEQ: 750 TABLET, EXTENDED RELEASE ORAL at 08:54

## 2024-12-03 RX ADMIN — BUPRENORPHINE AND NALOXONE 1 FILM: 8; 2 FILM BUCCAL; SUBLINGUAL at 08:54

## 2024-12-03 RX ADMIN — SODIUM CHLORIDE, PRESERVATIVE FREE 10 ML: 5 INJECTION INTRAVENOUS at 08:56

## 2024-12-03 RX ADMIN — BUPRENORPHINE AND NALOXONE 1 FILM: 8; 2 FILM BUCCAL; SUBLINGUAL at 20:57

## 2024-12-03 RX ADMIN — BUSPIRONE HYDROCHLORIDE 10 MG: 10 TABLET ORAL at 08:54

## 2024-12-03 RX ADMIN — PROPOFOL 20 MG: 10 INJECTION, EMULSION INTRAVENOUS at 13:55

## 2024-12-03 RX ADMIN — BUSPIRONE HYDROCHLORIDE 10 MG: 10 TABLET ORAL at 20:59

## 2024-12-03 RX ADMIN — PROPOFOL 20 MG: 10 INJECTION, EMULSION INTRAVENOUS at 14:04

## 2024-12-03 RX ADMIN — CYCLOBENZAPRINE 10 MG: 10 TABLET, FILM COATED ORAL at 20:57

## 2024-12-03 RX ADMIN — PROPOFOL 30 MG: 10 INJECTION, EMULSION INTRAVENOUS at 13:53

## 2024-12-03 RX ADMIN — POLYETHYLENE GLYCOL 3350 17 G: 17 POWDER, FOR SOLUTION ORAL at 10:10

## 2024-12-03 RX ADMIN — MIDAZOLAM 2 MG: 1 INJECTION INTRAMUSCULAR; INTRAVENOUS at 13:47

## 2024-12-03 RX ADMIN — FENTANYL CITRATE 50 MCG: 50 INJECTION, SOLUTION INTRAMUSCULAR; INTRAVENOUS at 13:58

## 2024-12-03 RX ADMIN — TROSPIUM CHLORIDE 20 MG: 20 TABLET, FILM COATED ORAL at 08:54

## 2024-12-03 RX ADMIN — VENLAFAXINE HYDROCHLORIDE 150 MG: 150 CAPSULE, EXTENDED RELEASE ORAL at 08:54

## 2024-12-03 RX ADMIN — PROPOFOL 20 MG: 10 INJECTION, EMULSION INTRAVENOUS at 14:06

## 2024-12-03 RX ADMIN — GABAPENTIN 800 MG: 400 CAPSULE ORAL at 14:52

## 2024-12-03 RX ADMIN — VENLAFAXINE HYDROCHLORIDE 75 MG: 75 CAPSULE, EXTENDED RELEASE ORAL at 08:54

## 2024-12-03 RX ADMIN — TIZANIDINE 4 MG: 4 TABLET ORAL at 09:09

## 2024-12-03 RX ADMIN — GABAPENTIN 800 MG: 400 CAPSULE ORAL at 08:54

## 2024-12-03 RX ADMIN — PROPOFOL 20 MG: 10 INJECTION, EMULSION INTRAVENOUS at 14:01

## 2024-12-03 RX ADMIN — BUSPIRONE HYDROCHLORIDE 10 MG: 10 TABLET ORAL at 14:52

## 2024-12-03 RX ADMIN — CEFDINIR 300 MG: 300 CAPSULE ORAL at 20:58

## 2024-12-03 RX ADMIN — FENTANYL CITRATE 50 MCG: 50 INJECTION, SOLUTION INTRAMUSCULAR; INTRAVENOUS at 13:51

## 2024-12-03 RX ADMIN — GABAPENTIN 800 MG: 400 CAPSULE ORAL at 20:58

## 2024-12-03 RX ADMIN — SODIUM CHLORIDE: 9 INJECTION, SOLUTION INTRAVENOUS at 13:45

## 2024-12-03 RX ADMIN — CYCLOBENZAPRINE 10 MG: 10 TABLET, FILM COATED ORAL at 09:09

## 2024-12-03 RX ADMIN — PROPOFOL 20 MG: 10 INJECTION, EMULSION INTRAVENOUS at 13:57

## 2024-12-03 RX ADMIN — GABAPENTIN 800 MG: 400 CAPSULE ORAL at 17:48

## 2024-12-03 RX ADMIN — ENOXAPARIN SODIUM 40 MG: 100 INJECTION SUBCUTANEOUS at 08:55

## 2024-12-03 RX ADMIN — SODIUM CHLORIDE, PRESERVATIVE FREE 10 ML: 5 INJECTION INTRAVENOUS at 20:58

## 2024-12-03 RX ADMIN — ONDANSETRON 4 MG: 2 INJECTION INTRAMUSCULAR; INTRAVENOUS at 13:47

## 2024-12-03 RX ADMIN — BUPRENORPHINE AND NALOXONE 1 FILM: 8; 2 FILM BUCCAL; SUBLINGUAL at 14:52

## 2024-12-03 RX ADMIN — TIZANIDINE 4 MG: 4 TABLET ORAL at 20:57

## 2024-12-03 ASSESSMENT — ENCOUNTER SYMPTOMS
COUGH: 0
EYE PAIN: 0
SORE THROAT: 0
CHEST TIGHTNESS: 0
CONSTIPATION: 0
SHORTNESS OF BREATH: 0
DIARRHEA: 0
ABDOMINAL DISTENTION: 0
CHOKING: 0
COLOR CHANGE: 0
WHEEZING: 0

## 2024-12-03 NOTE — ANESTHESIA PRE PROCEDURE
Temp: 98.4 °F (36.9 °C) 98.4 °F (36.9 °C) 98.3 °F (36.8 °C) 98 °F (36.7 °C)   TempSrc: Oral Oral Oral Oral   SpO2: 96% 99% 97% 96%   Weight:       Height:                                                  BP Readings from Last 3 Encounters:   12/03/24 102/63   11/18/24 (!) 95/56   06/06/24 124/86       NPO Status:                                                                                 BMI:   Wt Readings from Last 3 Encounters:   12/02/24 76.9 kg (169 lb 8 oz)   11/18/24 82.6 kg (182 lb 1.6 oz)   10/23/24 79.4 kg (175 lb)     Body mass index is 24.32 kg/m².    CBC:   Lab Results   Component Value Date/Time    WBC 9.3 12/03/2024 09:44 AM    RBC 4.04 12/03/2024 09:44 AM    HGB 11.2 12/03/2024 09:44 AM    HCT 35.7 12/03/2024 09:44 AM    MCV 88.4 12/03/2024 09:44 AM    RDW 14.4 04/12/2023 12:00 AM     12/03/2024 09:44 AM       CMP:   Lab Results   Component Value Date/Time     12/03/2024 09:44 AM    K 5.1 12/03/2024 09:44 AM    CL 99 12/03/2024 09:44 AM    CO2 27 12/03/2024 09:44 AM    BUN 6 12/03/2024 09:44 AM    CREATININE 1.0 12/03/2024 09:44 AM    AGRATIO 1.1 06/01/2022 09:46 AM    LABGLOM > 90 12/03/2024 09:44 AM    LABGLOM >60 06/30/2023 03:24 PM    LABGLOM 60 04/12/2023 12:00 AM    GLUCOSE 97 12/03/2024 09:44 AM    GLUCOSE 62 09/16/2022 09:52 AM    CALCIUM 9.8 12/03/2024 09:44 AM    BILITOT 0.2 12/01/2024 05:58 AM    ALKPHOS 133 12/01/2024 05:58 AM    AST 9 12/01/2024 05:58 AM    ALT 7 12/01/2024 05:58 AM       POC Tests:   Recent Labs     12/02/24  0649   POCGLU 109*       Coags:   Lab Results   Component Value Date/Time    PROTIME 10.3 03/26/2022 06:35 PM    INR 0.90 03/26/2022 06:35 PM       HCG (If Applicable): No results found for: \"PREGTESTUR\", \"PREGSERUM\", \"HCG\", \"HCGQUANT\"     ABGs: No results found for: \"PHART\", \"PO2ART\", \"TMG6DAS\", \"UXG6IQD\", \"BEART\", \"F4LPFRPO\"     Type & Screen (If Applicable):  No results found for: \"ABORH\", \"LABANTI\"    Drug/Infectious Status (If

## 2024-12-03 NOTE — ANESTHESIA POSTPROCEDURE EVALUATION
Department of Anesthesiology  Postprocedure Note    Patient: Srikanth Coy  MRN: [id number]  YOB: 1990  Date of evaluation: 12/3/2024    Procedure Summary       Date: 12/03/24 [facility]    Anesthesia Start: 1345 Anesthesia Stop: 1415    Procedure: CYSTOSCOPY RIGHT URETERAL STENT INSERTION and exchange of supra pubic catheter (Right) Diagnosis:       Calculus of kidney      (Calculus of kidney [N20.0])    Surgeons: Eder iDaz MD Responsible Provider: Guanaco Borges MD    Anesthesia Type: general ASA Status: 3            Anesthesia Type: No value filed.    Ayanna Phase I:      Ayanna Phase II:      Anesthesia Post Evaluation    Patient location during evaluation: PACU  Patient participation: complete - patient participated  Level of consciousness: awake and alert  Airway patency: patent  Nausea & Vomiting: no nausea  Cardiovascular status: blood pressure returned to baseline and hemodynamically stable  Respiratory status: acceptable and spontaneous ventilation  Hydration status: euvolemic  Pain management: adequate    No notable events documented.

## 2024-12-03 NOTE — BRIEF OP NOTE
Brief Postoperative Note      Patient: Srikanth Coy  YOB: 1990  MRN: [id number]    Date of Procedure: 12/3/2024    Pre-Op Diagnosis Codes:      * Calculus of kidney [N20.0]    Post-Op Diagnosis: Same       Procedure(s):  CYSTOSCOPY RIGHT URETERAL STENT INSERTION and exchange of supra pubic catheter    Surgeon(s):  Eder Diaz MD    Assistant:  * No surgical staff found *    Anesthesia: General    Estimated Blood Loss (mL): Minimal    Complications: None    Specimens:   * No specimens in log *    Implants:  Implant Name Type Inv. Item Serial No.  Lot No. LRB No. Used Action   STENT URET 6FR L26CM HYDR+ PGTL TAPR TIP GRAD BLDR MRK LO - [id number]  STENT URET 6FR L26CM HYDR+ PGTL TAPR TIP GRAD BLDR MRK LO  [employer] UROLOGY- [id number] Right 1 Implanted         Drains:   Colostomy LUQ (Active)   Stomal Appliance 2 piece 12/01/24 2026   Flange Size (inches) 38 Inches 11/15/24 1139   Stoma  Assessment Red 12/01/24 2026   Peristomal Assessment Clean, dry & intact 12/01/24 2026   Mucocutaneous Junction Intact 12/01/24 2026   Treatment Site care 11/16/24 0920   Stool Appearance Loose 11/17/24 2333   Stool Color Green 11/17/24 2333   Stool Amount Small 11/17/24 2333   Output (mL) 5 ml 11/16/24 1338       Suprapubic Catheter (Active)   $ Cystostomy/Suprapubic tube change $ Yes 11/29/24 2041   Site Assessment Red;Pink;No urethral drainage 12/02/24 2350   Urine Color Yellow 12/03/24 0659   Urine Appearance Cloudy 12/03/24 0659   Urine Odor Malodorous 12/02/24 2350   Collection Container Standard 12/03/24 0659   Securement Method Securing device (Describe) 12/03/24 0659   Catheter Care  Perineal wipes 12/02/24 2350   Catheter Best Practices  Drainage tube clipped to bed;Bag below bladder;Bag not on floor 12/02/24 2350   Status Draining;Patent 12/02/24 2350   Output (mL) 1000 mL 12/03/24 0659       [REMOVED] Suprapubic Catheter (Removed)   Site Assessment [place] 11/17/24 6075   Urine Color Yellow

## 2024-12-04 ENCOUNTER — TELEPHONE (OUTPATIENT)
Dept: UROLOGY | Age: 34
End: 2024-12-04

## 2024-12-04 VITALS
SYSTOLIC BLOOD PRESSURE: 121 MMHG | OXYGEN SATURATION: 98 % | WEIGHT: 169.5 LBS | DIASTOLIC BLOOD PRESSURE: 89 MMHG | TEMPERATURE: 98.5 F | HEART RATE: 107 BPM | HEIGHT: 70 IN | BODY MASS INDEX: 24.26 KG/M2 | RESPIRATION RATE: 18 BRPM

## 2024-12-04 LAB
ANION GAP SERPL CALC-SCNC: 13 MEQ/L (ref 8–16)
BACTERIA BLD AEROBE CULT: NORMAL
BACTERIA BLD AEROBE CULT: NORMAL
BUN SERPL-MCNC: 6 MG/DL (ref 7–22)
CALCIUM SERPL-MCNC: 9.5 MG/DL (ref 8.5–10.5)
CHLORIDE SERPL-SCNC: 102 MEQ/L (ref 98–111)
CO2 SERPL-SCNC: 25 MEQ/L (ref 23–33)
CREAT SERPL-MCNC: 1 MG/DL (ref 0.4–1.2)
DEPRECATED RDW RBC AUTO: 46.5 FL (ref 35–45)
ERYTHROCYTE [DISTWIDTH] IN BLOOD BY AUTOMATED COUNT: 14.6 % (ref 11.5–14.5)
GFR SERPL CREATININE-BSD FRML MDRD: > 90 ML/MIN/1.73M2
GLUCOSE SERPL-MCNC: 95 MG/DL (ref 70–108)
HCT VFR BLD AUTO: 34.9 % (ref 42–52)
HGB BLD-MCNC: 11 GM/DL (ref 14–18)
MCH RBC QN AUTO: 27.6 PG (ref 26–33)
MCHC RBC AUTO-ENTMCNC: 31.5 GM/DL (ref 32.2–35.5)
MCV RBC AUTO: 87.5 FL (ref 80–94)
PLATELET # BLD AUTO: 395 THOU/MM3 (ref 130–400)
PMV BLD AUTO: 8.8 FL (ref 9.4–12.4)
POTASSIUM SERPL-SCNC: 4.6 MEQ/L (ref 3.5–5.2)
RBC # BLD AUTO: 3.99 MILL/MM3 (ref 4.7–6.1)
SODIUM SERPL-SCNC: 140 MEQ/L (ref 135–145)
WBC # BLD AUTO: 7.8 THOU/MM3 (ref 4.8–10.8)

## 2024-12-04 PROCEDURE — 36415 COLL VENOUS BLD VENIPUNCTURE: CPT

## 2024-12-04 PROCEDURE — 2580000003 HC RX 258: Performed by: STUDENT IN AN ORGANIZED HEALTH CARE EDUCATION/TRAINING PROGRAM

## 2024-12-04 PROCEDURE — 85027 COMPLETE CBC AUTOMATED: CPT

## 2024-12-04 PROCEDURE — 99239 HOSP IP/OBS DSCHRG MGMT >30: CPT | Performed by: STUDENT IN AN ORGANIZED HEALTH CARE EDUCATION/TRAINING PROGRAM

## 2024-12-04 PROCEDURE — 80048 BASIC METABOLIC PNL TOTAL CA: CPT

## 2024-12-04 PROCEDURE — 6370000000 HC RX 637 (ALT 250 FOR IP): Performed by: STUDENT IN AN ORGANIZED HEALTH CARE EDUCATION/TRAINING PROGRAM

## 2024-12-04 PROCEDURE — 6360000002 HC RX W HCPCS: Performed by: STUDENT IN AN ORGANIZED HEALTH CARE EDUCATION/TRAINING PROGRAM

## 2024-12-04 PROCEDURE — 6370000000 HC RX 637 (ALT 250 FOR IP)

## 2024-12-04 RX ORDER — CEFDINIR 300 MG/1
300 CAPSULE ORAL EVERY 12 HOURS SCHEDULED
Qty: 25 CAPSULE | Refills: 0 | Status: SHIPPED | OUTPATIENT
Start: 2024-12-04 | End: 2024-12-17

## 2024-12-04 RX ORDER — POLYETHYLENE GLYCOL 3350 17 G/17G
17 POWDER, FOR SOLUTION ORAL DAILY
Qty: 30 PACKET | Refills: 0 | Status: SHIPPED | OUTPATIENT
Start: 2024-12-04 | End: 2025-01-03

## 2024-12-04 RX ADMIN — BUPRENORPHINE AND NALOXONE 1 FILM: 8; 2 FILM BUCCAL; SUBLINGUAL at 09:39

## 2024-12-04 RX ADMIN — ENOXAPARIN SODIUM 40 MG: 100 INJECTION SUBCUTANEOUS at 09:39

## 2024-12-04 RX ADMIN — BUSPIRONE HYDROCHLORIDE 10 MG: 10 TABLET ORAL at 09:39

## 2024-12-04 RX ADMIN — TROSPIUM CHLORIDE 20 MG: 20 TABLET, FILM COATED ORAL at 09:40

## 2024-12-04 RX ADMIN — ACETIC ACID 60 ML: 250 IRRIGANT IRRIGATION at 09:39

## 2024-12-04 RX ADMIN — CEFDINIR 300 MG: 300 CAPSULE ORAL at 09:39

## 2024-12-04 RX ADMIN — VENLAFAXINE HYDROCHLORIDE 150 MG: 150 CAPSULE, EXTENDED RELEASE ORAL at 09:40

## 2024-12-04 RX ADMIN — POLYETHYLENE GLYCOL 3350 17 G: 17 POWDER, FOR SOLUTION ORAL at 09:40

## 2024-12-04 RX ADMIN — SODIUM CHLORIDE, PRESERVATIVE FREE 10 ML: 5 INJECTION INTRAVENOUS at 09:40

## 2024-12-04 RX ADMIN — PANTOPRAZOLE SODIUM 40 MG: 40 TABLET, DELAYED RELEASE ORAL at 09:44

## 2024-12-04 RX ADMIN — TIZANIDINE 4 MG: 4 TABLET ORAL at 11:28

## 2024-12-04 RX ADMIN — POTASSIUM CHLORIDE 10 MEQ: 750 TABLET, EXTENDED RELEASE ORAL at 09:40

## 2024-12-04 RX ADMIN — VENLAFAXINE HYDROCHLORIDE 75 MG: 75 CAPSULE, EXTENDED RELEASE ORAL at 09:40

## 2024-12-04 RX ADMIN — DAKIN'S SOLUTION 0.125% (QUARTER STRENGTH): 0.12 SOLUTION at 09:39

## 2024-12-04 RX ADMIN — GABAPENTIN 800 MG: 400 CAPSULE ORAL at 09:40

## 2024-12-04 NOTE — DISCHARGE SUMMARY
- 108 mg/dL    BUN 6 (L) 7 - 22 mg/dL    Creatinine 1.0 0.4 - 1.2 mg/dL    Calcium 9.8 8.5 - 10.5 mg/dL   CBC    Collection Time: 12/03/24  9:44 AM   Result Value Ref Range    WBC 9.3 4.8 - 10.8 thou/mm3    RBC 4.04 (L) 4.70 - 6.10 mill/mm3    Hemoglobin 11.2 (L) 14.0 - 18.0 gm/dl    Hematocrit 35.7 (L) 42.0 - 52.0 %    MCV 88.4 80.0 - 94.0 fL    MCH 27.7 26.0 - 33.0 pg    MCHC 31.4 (L) 32.2 - 35.5 gm/dl    RDW-CV 14.7 (H) 11.5 - 14.5 %    RDW-SD 47.7 (H) 35.0 - 45.0 fL    Platelets 424 (H) 130 - 400 thou/mm3    MPV 9.0 (L) 9.4 - 12.4 fL   Anion Gap    Collection Time: 12/03/24  9:44 AM   Result Value Ref Range    Anion Gap 14.0 8.0 - 16.0 meq/L   Glomerular Filtration Rate, Estimated    Collection Time: 12/03/24  9:44 AM   Result Value Ref Range    Est, Glom Filt Rate > 90 >60 ml/min/1.73m2   Basic Metabolic Panel w/ Reflex to MG    Collection Time: 12/04/24  5:52 AM   Result Value Ref Range    Sodium 140 135 - 145 meq/L    Potassium reflex Magnesium 4.6 3.5 - 5.2 meq/L    Chloride 102 98 - 111 meq/L    CO2 25 23 - 33 meq/L    Glucose 95 70 - 108 mg/dL    BUN 6 (L) 7 - 22 mg/dL    Creatinine 1.0 0.4 - 1.2 mg/dL    Calcium 9.5 8.5 - 10.5 mg/dL   CBC    Collection Time: 12/04/24  5:52 AM   Result Value Ref Range    WBC 7.8 4.8 - 10.8 thou/mm3    RBC 3.99 (L) 4.70 - 6.10 mill/mm3    Hemoglobin 11.0 (L) 14.0 - 18.0 gm/dl    Hematocrit 34.9 (L) 42.0 - 52.0 %    MCV 87.5 80.0 - 94.0 fL    MCH 27.6 26.0 - 33.0 pg    MCHC 31.5 (L) 32.2 - 35.5 gm/dl    RDW-CV 14.6 (H) 11.5 - 14.5 %    RDW-SD 46.5 (H) 35.0 - 45.0 fL    Platelets 395 130 - 400 thou/mm3    MPV 8.8 (L) 9.4 - 12.4 fL   Anion Gap    Collection Time: 12/04/24  5:52 AM   Result Value Ref Range    Anion Gap 13.0 8.0 - 16.0 meq/L   Glomerular Filtration Rate, Estimated    Collection Time: 12/04/24  5:52 AM   Result Value Ref Range    Est, Glom Filt Rate > 90 >60 ml/min/1.73m2       Discharge condition: fair  Disposition: Home  Time spent on discharge:

## 2024-12-04 NOTE — CARE COORDINATION
12/4/24, 11:24 AM EST    Discharge ordered. Met with Srikanth and his mother, they will return home together as PTA. Both deny discharge needs.   Patient goals/plan/ treatment preferences discussed by  and .  Patient goals/plan/ treatment preferences reviewed with patient/ family.  Patient/ family verbalize understanding of discharge plan and are in agreement with goal/plan/treatment preferences.  Understanding was demonstrated using the teach back method.  AVS provided by RN at time of discharge, which includes all necessary medical information pertaining to the patients current course of illness, treatment, post-discharge goals of care, and treatment preferences.     Services At/After Discharge: None

## 2024-12-04 NOTE — PLAN OF CARE
Problem: Discharge Planning  Goal: Discharge to home or other facility with appropriate resources  12/1/2024 2122 by Sasha Garza RN  Outcome: Progressing  Flowsheets (Taken 12/1/2024 2122)  Discharge to home or other facility with appropriate resources:   Identify barriers to discharge with patient and caregiver   Arrange for needed discharge resources and transportation as appropriate   Refer to discharge planning if patient needs post-hospital services based on physician order or complex needs related to functional status, cognitive ability or social support system   Arrange for interpreters to assist at discharge as needed   Identify discharge learning needs (meds, wound care, etc)     Problem: Safety - Adult  Goal: Free from fall injury  12/1/2024 2122 by Sasha Garza RN  Outcome: Progressing  Flowsheets (Taken 12/1/2024 2122)  Free From Fall Injury:   Based on caregiver fall risk screen, instruct family/caregiver to ask for assistance with transferring infant if caregiver noted to have fall risk factors   Instruct family/caregiver on patient safety     Problem: Skin/Tissue Integrity  Goal: Absence of new skin breakdown  Description: 1.  Monitor for areas of redness and/or skin breakdown  2.  Assess vascular access sites hourly  3.  Every 4-6 hours minimum:  Change oxygen saturation probe site  4.  Every 4-6 hours:  If on nasal continuous positive airway pressure, respiratory therapy assess nares and determine need for appliance change or resting period.  12/1/2024 2122 by Sasha Garza RN  Outcome: Progressing    Encouraging repositioning q2hr with help of staff  Problem: Pain  Goal: Verbalizes/displays adequate comfort level or baseline comfort level  12/1/2024 2122 by Sasha Garza RN  Outcome: Progressing  Flowsheets (Taken 12/1/2024 2122)  Verbalizes/displays adequate comfort level or baseline comfort level:   Notify Licensed Independent Practitioner if interventions unsuccessful or patient 
  Problem: Discharge Planning  Goal: Discharge to home or other facility with appropriate resources  12/2/2024 2256 by Sasha Garza RN  Outcome: Progressing  Flowsheets (Taken 12/2/2024 2256)  Discharge to home or other facility with appropriate resources:   Identify barriers to discharge with patient and caregiver   Arrange for needed discharge resources and transportation as appropriate   Identify discharge learning needs (meds, wound care, etc)   Arrange for interpreters to assist at discharge as needed   Refer to discharge planning if patient needs post-hospital services based on physician order or complex needs related to functional status, cognitive ability or social support system     Problem: Safety - Adult  Goal: Free from fall injury  12/2/2024 2256 by Sasha Garza RN  Outcome: Progressing  Flowsheets (Taken 12/2/2024 2256)  Free From Fall Injury:   Instruct family/caregiver on patient safety   Based on caregiver fall risk screen, instruct family/caregiver to ask for assistance with transferring infant if caregiver noted to have fall risk factors     Problem: Skin/Tissue Integrity  Goal: Absence of new skin breakdown  Description: 1.  Monitor for areas of redness and/or skin breakdown  2.  Assess vascular access sites hourly  3.  Every 4-6 hours minimum:  Change oxygen saturation probe site  4.  Every 4-6 hours:  If on nasal continuous positive airway pressure, respiratory therapy assess nares and determine need for appliance change or resting period.  12/2/2024 2256 by Sasha Garza RN  Outcome: Progressing     Problem: Pain  Goal: Verbalizes/displays adequate comfort level or baseline comfort level  12/2/2024 2256 by Sasha Garza RN  Outcome: Progressing  Flowsheets (Taken 12/2/2024 2256)  Verbalizes/displays adequate comfort level or baseline comfort level:   Encourage patient to monitor pain and request assistance   Assess pain using appropriate pain scale   Administer analgesics based on 
  Problem: Discharge Planning  Goal: Discharge to home or other facility with appropriate resources  12/3/2024 2340 by Cary Day RN  Outcome: Progressing  Flowsheets (Taken 12/3/2024 2340)  Discharge to home or other facility with appropriate resources:   Identify barriers to discharge with patient and caregiver   Identify discharge learning needs (meds, wound care, etc)     Problem: Safety - Adult  Goal: Free from fall injury  12/3/2024 2340 by Cary Day RN  Outcome: Progressing  Note: No falls noted this shift. Continue falling star program. Bed alarm on, bed in low position. Call light and personal belongings in reach.  Patient uses call light appropriately.      Problem: Skin/Tissue Integrity  Goal: Absence of new skin breakdown  Description: 1.  Monitor for areas of redness and/or skin breakdown  2.  Assess vascular access sites hourly  3.  Every 4-6 hours minimum:  Change oxygen saturation probe site  4.  Every 4-6 hours:  If on nasal continuous positive airway pressure, respiratory therapy assess nares and determine need for appliance change or resting period.  12/3/2024 2340 by Cary Day RN  Outcome: Progressing  Note: No new skin breakdown noted.  Encouraged/assisted patient to reposition in bed.        Problem: Pain  Goal: Verbalizes/displays adequate comfort level or baseline comfort level  12/3/2024 2340 by Cary Day RN  Outcome: Progressing  Flowsheets (Taken 12/3/2024 2340)  Verbalizes/displays adequate comfort level or baseline comfort level:   Encourage patient to monitor pain and request assistance   Assess pain using appropriate pain scale   Administer analgesics based on type and severity of pain and evaluate response   Implement non-pharmacological measures as appropriate and evaluate response     Problem: Nutrition Deficit:  Goal: Optimize nutritional status  12/3/2024 2340 by Cary Day, RN  Outcome: Progressing  Flowsheets (Taken 12/3/2024 2340)  Nutrient intake 
  Problem: Discharge Planning  Goal: Discharge to home or other facility with appropriate resources  Outcome: Progressing  Flowsheets (Taken 11/30/2024 0239 by Sasha Garza RN)  Discharge to home or other facility with appropriate resources:   Arrange for needed discharge resources and transportation as appropriate   Identify barriers to discharge with patient and caregiver   Identify discharge learning needs (meds, wound care, etc)   Arrange for interpreters to assist at discharge as needed   Refer to discharge planning if patient needs post-hospital services based on physician order or complex needs related to functional status, cognitive ability or social support system     Problem: Safety - Adult  Goal: Free from fall injury  Outcome: Progressing  Flowsheets (Taken 12/1/2024 0526)  Free From Fall Injury:   Instruct family/caregiver on patient safety   Based on caregiver fall risk screen, instruct family/caregiver to ask for assistance with transferring infant if caregiver noted to have fall risk factors     Problem: Skin/Tissue Integrity  Goal: Absence of new skin breakdown  Description: 1.  Monitor for areas of redness and/or skin breakdown  2.  Assess vascular access sites hourly  3.  Every 4-6 hours minimum:  Change oxygen saturation probe site  4.  Every 4-6 hours:  If on nasal continuous positive airway pressure, respiratory therapy assess nares and determine need for appliance change or resting period.  Outcome: Progressing     Problem: Pain  Goal: Verbalizes/displays adequate comfort level or baseline comfort level  Outcome: Progressing  Flowsheets (Taken 12/1/2024 0526)  Verbalizes/displays adequate comfort level or baseline comfort level:   Consider cultural and social influences on pain and pain management   Administer analgesics based on type and severity of pain and evaluate response   Encourage patient to monitor pain and request assistance     
  Problem: Discharge Planning  Goal: Discharge to home or other facility with appropriate resources  Outcome: Progressing  Flowsheets (Taken 11/30/2024 0239)  Discharge to home or other facility with appropriate resources:   Arrange for needed discharge resources and transportation as appropriate   Identify barriers to discharge with patient and caregiver   Identify discharge learning needs (meds, wound care, etc)   Arrange for interpreters to assist at discharge as needed   Refer to discharge planning if patient needs post-hospital services based on physician order or complex needs related to functional status, cognitive ability or social support system     Problem: Safety - Adult  Goal: Free from fall injury  Outcome: Progressing  Flowsheets (Taken 11/30/2024 0239)  Free From Fall Injury:   Based on caregiver fall risk screen, instruct family/caregiver to ask for assistance with transferring infant if caregiver noted to have fall risk factors   Instruct family/caregiver on patient safety     Problem: Skin/Tissue Integrity  Goal: Absence of new skin breakdown  Description: 1.  Monitor for areas of redness and/or skin breakdown  2.  Assess vascular access sites hourly  3.  Every 4-6 hours minimum:  Change oxygen saturation probe site  4.  Every 4-6 hours:  If on nasal continuous positive airway pressure, respiratory therapy assess nares and determine need for appliance change or resting period.  Outcome: Progressing     Problem: Pain  Goal: Verbalizes/displays adequate comfort level or baseline comfort level  Outcome: Progressing  Flowsheets (Taken 11/30/2024 0239)  Verbalizes/displays adequate comfort level or baseline comfort level:   Notify Licensed Independent Practitioner if interventions unsuccessful or patient reports new pain   Consider cultural and social influences on pain and pain management   Implement non-pharmacological measures as appropriate and evaluate response   Administer analgesics based on type 
  Problem: Discharge Planning  Goal: Discharge to home or other facility with appropriate resources  Outcome: Progressing  Flowsheets (Taken 12/2/2024 1553)  Discharge to home or other facility with appropriate resources:   Identify barriers to discharge with patient and caregiver   Identify discharge learning needs (meds, wound care, etc)     Problem: Safety - Adult  Goal: Free from fall injury  Outcome: Progressing  Flowsheets (Taken 12/2/2024 1553)  Free From Fall Injury:   Instruct family/caregiver on patient safety   Based on caregiver fall risk screen, instruct family/caregiver to ask for assistance with transferring infant if caregiver noted to have fall risk factors  Note: No falls noted this shift. Continue falling star program. Bed alarm on, bed in low position. Call light and personal belongings in reach.  Patient uses call light appropriately.      Problem: Skin/Tissue Integrity  Goal: Absence of new skin breakdown  Description: 1.  Monitor for areas of redness and/or skin breakdown  2.  Assess vascular access sites hourly  3.  Every 4-6 hours minimum:  Change oxygen saturation probe site  4.  Every 4-6 hours:  If on nasal continuous positive airway pressure, respiratory therapy assess nares and determine need for appliance change or resting period.  Outcome: Progressing  Note: No new signs or symptoms of skin breakdown noted this shift, encouraging patient to turn and reposition self in bed q2h      Problem: Pain  Goal: Verbalizes/displays adequate comfort level or baseline comfort level  Outcome: Progressing  Flowsheets (Taken 12/2/2024 1553)  Verbalizes/displays adequate comfort level or baseline comfort level:   Encourage patient to monitor pain and request assistance   Administer analgesics based on type and severity of pain and evaluate response  Note: No complaint of pain voiced at this time.  Continue to monitor. PRN medications available if needed.      Problem: Nutrition Deficit:  Goal: 
  Problem: Discharge Planning  Goal: Discharge to home or other facility with appropriate resources  Outcome: Progressing  Flowsheets (Taken 12/3/2024 1341)  Discharge to home or other facility with appropriate resources:   Identify barriers to discharge with patient and caregiver   Identify discharge learning needs (meds, wound care, etc)     Problem: Safety - Adult  Goal: Free from fall injury  Outcome: Progressing  Flowsheets (Taken 12/3/2024 1341)  Free From Fall Injury:   Instruct family/caregiver on patient safety   Based on caregiver fall risk screen, instruct family/caregiver to ask for assistance with transferring infant if caregiver noted to have fall risk factors  Note: No falls noted this shift. Continue falling star program. Bed alarm on, bed in low position. Call light and personal belongings in reach.  Patient uses call light appropriately.      Problem: Skin/Tissue Integrity  Goal: Absence of new skin breakdown  Description: 1.  Monitor for areas of redness and/or skin breakdown  2.  Assess vascular access sites hourly  3.  Every 4-6 hours minimum:  Change oxygen saturation probe site  4.  Every 4-6 hours:  If on nasal continuous positive airway pressure, respiratory therapy assess nares and determine need for appliance change or resting period.  Outcome: Progressing  Note: No new signs or symptoms of skin breakdown noted this shift, encouraging patient to turn and reposition self in bed q2h      Problem: Pain  Goal: Verbalizes/displays adequate comfort level or baseline comfort level  Outcome: Progressing  Flowsheets (Taken 12/3/2024 1341)  Verbalizes/displays adequate comfort level or baseline comfort level:   Encourage patient to monitor pain and request assistance   Administer analgesics based on type and severity of pain and evaluate response  Note: No complaint of pain voiced at this time.  Continue to monitor. PRN medications available if needed.      Problem: Nutrition Deficit:  Goal: 
or symptoms of skin breakdown noted this shift, encouraging patient to turn and reposition self in bed q2h, pillows for support  12/1/2024 0526 by Gwen Riggins, RN  Outcome: Progressing     Problem: Pain  Goal: Verbalizes/displays adequate comfort level or baseline comfort level  12/1/2024 1728 by Ajith Ibrahim  Outcome: Progressing  Note: Flexeril for muscle spasms q8 prn, rest and respostion encouraged  12/1/2024 0526 by Gwen Riggins, RN  Outcome: Progressing  Flowsheets (Taken 12/1/2024 0526)  Verbalizes/displays adequate comfort level or baseline comfort level:   Consider cultural and social influences on pain and pain management   Administer analgesics based on type and severity of pain and evaluate response   Encourage patient to monitor pain and request assistance
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
## 2024-12-06 ENCOUNTER — TELEPHONE (OUTPATIENT)
Dept: UROLOGY | Age: 34
End: 2024-12-06

## 2024-12-06 ENCOUNTER — PREP FOR PROCEDURE (OUTPATIENT)
Dept: UROLOGY | Age: 34
End: 2024-12-06

## 2024-12-06 DIAGNOSIS — N20.0 STONE, KIDNEY: ICD-10-CM

## 2024-12-06 NOTE — TELEPHONE ENCOUNTER
Patient is scheduled for surgery with  on 12/18/2024. Surgery consent to be done on arrival. NO PRE OP TESTING NEEDED. Surgery instructions  mailed to the patient.     Patient informed an adult over the age of 18 must be with them at the time of surgery and upon discharge

## 2024-12-06 NOTE — TELEPHONE ENCOUNTER
DO NOT TAKE  FISH OIL, MOBIC, IBUPROFEN, MOTRIN-LIKE DRUGS AND ANY MULTIVITAMINS OR OVER THE COUNTER SUPPLEMENTS 14 DAYS PRIOR TO SURGERY.    MUST HAVE AN ADULT OVER THE AGE OF 18 WITH YOU AT THE TIME OF THE DISCHARGE         Srikanth Coy 1990     Surgical Physician: Dr. Diaz      You have been scheduled for the procedure marked below:      Surgery: Cystoscopy, right ureteroscopy, laser lithotripsy, basket retrieval of stone fragments, and right ureteral stent exchange         Date: 12/18/2024     Anesthesia:  General     Place of Service: [facility] --Second Floor Same Day Surgery         Arrive to same day surgery at:  CALL SURGERY DESK [phone], THE DAY PRIOR TO SURGERY BETWEEN 8AM-4PM, FOR ARRIVAL TIME        INSTRUCTIONS AS MARKED BELOW:    1.  DO NOT eat or drink anything after midnight before surgery.   2.  We prefer you shower or bathe with an antibacterial soap (Dial) the morning of surgery.  3  Please bring a current medication list, photo ID and insurance card(s) with you  4. Okay to take Tylenol  5. Take blood pressure or heart medication as directed, if taken in the morning take with a small sip of water  6.The office will call you in 1-2 days after your procedure to schedule a follow up.          Date: 12/6/2024

## 2024-12-08 RX ORDER — SODIUM CHLORIDE 0.9 % (FLUSH) 0.9 %
5-40 SYRINGE (ML) INJECTION EVERY 12 HOURS SCHEDULED
Status: CANCELLED | OUTPATIENT
Start: 2024-12-08

## 2024-12-08 RX ORDER — SODIUM CHLORIDE 9 MG/ML
INJECTION, SOLUTION INTRAVENOUS PRN
Status: CANCELLED | OUTPATIENT
Start: 2024-12-08

## 2024-12-08 RX ORDER — SODIUM CHLORIDE 0.9 % (FLUSH) 0.9 %
5-40 SYRINGE (ML) INJECTION PRN
Status: CANCELLED | OUTPATIENT
Start: 2024-12-08

## 2024-12-08 RX ORDER — IPRATROPIUM BROMIDE AND ALBUTEROL SULFATE 2.5; .5 MG/3ML; MG/3ML
1 SOLUTION RESPIRATORY (INHALATION) EVERY 4 HOURS PRN
Status: CANCELLED | OUTPATIENT
Start: 2024-12-18

## 2024-12-15 PROBLEM — E86.0 DEHYDRATION: Status: RESOLVED | Noted: 2024-11-15 | Resolved: 2024-12-15

## 2024-12-17 ENCOUNTER — APPOINTMENT (OUTPATIENT)
Dept: GENERAL RADIOLOGY | Age: 34
End: 2024-12-17
Payer: MEDICAID

## 2024-12-17 ENCOUNTER — HOSPITAL ENCOUNTER (EMERGENCY)
Age: 34
Discharge: HOME | End: 2024-12-17
Attending: EMERGENCY MEDICINE
Payer: MEDICAID

## 2024-12-17 ENCOUNTER — APPOINTMENT (OUTPATIENT)
Dept: CT IMAGING | Age: 34
End: 2024-12-17
Payer: MEDICAID

## 2024-12-17 VITALS
HEIGHT: 70 IN | TEMPERATURE: 98.7 F | OXYGEN SATURATION: 100 % | HEART RATE: 96 BPM | SYSTOLIC BLOOD PRESSURE: 136 MMHG | RESPIRATION RATE: 38 BRPM | WEIGHT: 175 LBS | BODY MASS INDEX: 25.05 KG/M2 | DIASTOLIC BLOOD PRESSURE: 89 MMHG

## 2024-12-17 DIAGNOSIS — N30.00 ACUTE CYSTITIS WITHOUT HEMATURIA: Primary | ICD-10-CM

## 2024-12-17 LAB
ALBUMIN SERPL BCG-MCNC: 3.4 G/DL (ref 3.5–5.1)
ALP SERPL-CCNC: 171 U/L (ref 38–126)
ALT SERPL W/O P-5'-P-CCNC: 14 U/L (ref 11–66)
ANION GAP SERPL CALC-SCNC: 13 MEQ/L (ref 8–16)
AST SERPL-CCNC: 21 U/L (ref 5–40)
BACTERIA URNS QL MICRO: ABNORMAL /HPF
BASOPHILS ABSOLUTE: 0.1 THOU/MM3 (ref 0–0.1)
BASOPHILS NFR BLD AUTO: 0.4 %
BILIRUB CONJ SERPL-MCNC: < 0.1 MG/DL (ref 0.1–13.8)
BILIRUB SERPL-MCNC: 0.4 MG/DL (ref 0.3–1.2)
BILIRUB UR QL STRIP.AUTO: NEGATIVE
BUN SERPL-MCNC: 7 MG/DL (ref 7–22)
CALCIUM SERPL-MCNC: 9.3 MG/DL (ref 8.5–10.5)
CASTS #/AREA URNS LPF: ABNORMAL /LPF
CASTS 2: ABNORMAL /LPF
CHARACTER UR: ABNORMAL
CHLORIDE SERPL-SCNC: 98 MEQ/L (ref 98–111)
CO2 SERPL-SCNC: 24 MEQ/L (ref 23–33)
COLOR, UA: YELLOW
CREAT SERPL-MCNC: 0.9 MG/DL (ref 0.4–1.2)
CRYSTALS URNS MICRO: ABNORMAL
D DIMER PPP IA.FEU-MCNC: 490 NG/ML FEU (ref 0–500)
DEPRECATED RDW RBC AUTO: 48.3 FL (ref 35–45)
EKG ATRIAL RATE: 109 BPM
EKG P AXIS: 59 DEGREES
EKG P-R INTERVAL: 140 MS
EKG Q-T INTERVAL: 306 MS
EKG QRS DURATION: 80 MS
EKG QTC CALCULATION (BAZETT): 412 MS
EKG R AXIS: 62 DEGREES
EKG T AXIS: 72 DEGREES
EKG VENTRICULAR RATE: 109 BPM
EOSINOPHIL NFR BLD AUTO: 0.4 %
EOSINOPHILS ABSOLUTE: 0.1 THOU/MM3 (ref 0–0.4)
EPITHELIAL CELLS, UA: ABNORMAL /HPF
ERYTHROCYTE [DISTWIDTH] IN BLOOD BY AUTOMATED COUNT: 15 % (ref 11.5–14.5)
GFR SERPL CREATININE-BSD FRML MDRD: > 90 ML/MIN/1.73M2
GLUCOSE SERPL-MCNC: 102 MG/DL (ref 70–108)
GLUCOSE UR QL STRIP.AUTO: NEGATIVE MG/DL
HCT VFR BLD AUTO: 39.7 % (ref 42–52)
HGB BLD-MCNC: 12.4 GM/DL (ref 14–18)
HGB UR QL STRIP.AUTO: ABNORMAL
IMM GRANULOCYTES # BLD AUTO: 0.11 THOU/MM3 (ref 0–0.07)
IMM GRANULOCYTES NFR BLD AUTO: 0.6 %
KETONES UR QL STRIP.AUTO: NEGATIVE
LACTIC ACID, SEPSIS: 1 MMOL/L (ref 0.5–1.9)
LACTIC ACID, SEPSIS: 1.5 MMOL/L (ref 0.5–1.9)
LYMPHOCYTES ABSOLUTE: 1.8 THOU/MM3 (ref 1–4.8)
LYMPHOCYTES NFR BLD AUTO: 9.2 %
MCH RBC QN AUTO: 27.9 PG (ref 26–33)
MCHC RBC AUTO-ENTMCNC: 31.2 GM/DL (ref 32.2–35.5)
MCV RBC AUTO: 89.4 FL (ref 80–94)
MISCELLANEOUS 2: ABNORMAL
MONOCYTES ABSOLUTE: 1.6 THOU/MM3 (ref 0.4–1.3)
MONOCYTES NFR BLD AUTO: 7.8 %
NEUTROPHILS ABSOLUTE: 16.2 THOU/MM3 (ref 1.8–7.7)
NEUTROPHILS NFR BLD AUTO: 81.6 %
NITRITE UR QL STRIP: NEGATIVE
NRBC BLD AUTO-RTO: 0 /100 WBC
NT-PROBNP SERPL IA-MCNC: 197.8 PG/ML (ref 0–124)
OSMOLALITY SERPL CALC.SUM OF ELEC: 268.3 MOSMOL/KG (ref 275–300)
PH UR STRIP.AUTO: 7 [PH] (ref 5–9)
PLATELET # BLD AUTO: 384 THOU/MM3 (ref 130–400)
PMV BLD AUTO: 8.8 FL (ref 9.4–12.4)
POTASSIUM SERPL-SCNC: 4.9 MEQ/L (ref 3.5–5.2)
PROT SERPL-MCNC: 8.4 G/DL (ref 6.1–8)
PROT UR STRIP.AUTO-MCNC: NEGATIVE MG/DL
RBC # BLD AUTO: 4.44 MILL/MM3 (ref 4.7–6.1)
RBC URINE: ABNORMAL /HPF
RENAL EPI CELLS #/AREA URNS HPF: ABNORMAL /[HPF]
SODIUM SERPL-SCNC: 135 MEQ/L (ref 135–145)
SP GR UR REFRACT.AUTO: < 1.005 (ref 1–1.03)
TROPONIN, HIGH SENSITIVITY: 18 NG/L (ref 0–12)
UROBILINOGEN, URINE: 0.2 EU/DL (ref 0–1)
WBC # BLD AUTO: 19.9 THOU/MM3 (ref 4.8–10.8)
WBC #/AREA URNS HPF: > 100 /HPF
WBC #/AREA URNS HPF: ABNORMAL /[HPF]
YEAST LIKE FUNGI URNS QL MICRO: ABNORMAL

## 2024-12-17 PROCEDURE — 80048 BASIC METABOLIC PNL TOTAL CA: CPT

## 2024-12-17 PROCEDURE — 6360000004 HC RX CONTRAST MEDICATION: Performed by: EMERGENCY MEDICINE

## 2024-12-17 PROCEDURE — 74177 CT ABD & PELVIS W/CONTRAST: CPT

## 2024-12-17 PROCEDURE — 83880 ASSAY OF NATRIURETIC PEPTIDE: CPT

## 2024-12-17 PROCEDURE — 36415 COLL VENOUS BLD VENIPUNCTURE: CPT

## 2024-12-17 PROCEDURE — 87077 CULTURE AEROBIC IDENTIFY: CPT

## 2024-12-17 PROCEDURE — 99285 EMERGENCY DEPT VISIT HI MDM: CPT

## 2024-12-17 PROCEDURE — 87040 BLOOD CULTURE FOR BACTERIA: CPT

## 2024-12-17 PROCEDURE — 93005 ELECTROCARDIOGRAM TRACING: CPT | Performed by: EMERGENCY MEDICINE

## 2024-12-17 PROCEDURE — 83605 ASSAY OF LACTIC ACID: CPT

## 2024-12-17 PROCEDURE — 87086 URINE CULTURE/COLONY COUNT: CPT

## 2024-12-17 PROCEDURE — 85025 COMPLETE CBC W/AUTO DIFF WBC: CPT

## 2024-12-17 PROCEDURE — 6370000000 HC RX 637 (ALT 250 FOR IP): Performed by: EMERGENCY MEDICINE

## 2024-12-17 PROCEDURE — 71045 X-RAY EXAM CHEST 1 VIEW: CPT

## 2024-12-17 PROCEDURE — 84484 ASSAY OF TROPONIN QUANT: CPT

## 2024-12-17 PROCEDURE — 85379 FIBRIN DEGRADATION QUANT: CPT

## 2024-12-17 PROCEDURE — 80076 HEPATIC FUNCTION PANEL: CPT

## 2024-12-17 PROCEDURE — 81001 URINALYSIS AUTO W/SCOPE: CPT

## 2024-12-17 PROCEDURE — 87186 SC STD MICRODIL/AGAR DIL: CPT

## 2024-12-17 PROCEDURE — 93010 ELECTROCARDIOGRAM REPORT: CPT | Performed by: NUCLEAR MEDICINE

## 2024-12-17 RX ORDER — SULFAMETHOXAZOLE AND TRIMETHOPRIM 800; 160 MG/1; MG/1
1 TABLET ORAL ONCE
Status: COMPLETED | OUTPATIENT
Start: 2024-12-17 | End: 2024-12-17

## 2024-12-17 RX ORDER — IOPAMIDOL 755 MG/ML
80 INJECTION, SOLUTION INTRAVASCULAR
Status: COMPLETED | OUTPATIENT
Start: 2024-12-17 | End: 2024-12-17

## 2024-12-17 RX ADMIN — SULFAMETHOXAZOLE AND TRIMETHOPRIM 1 TABLET: 800; 160 TABLET ORAL at 17:09

## 2024-12-17 RX ADMIN — IOPAMIDOL 80 ML: 755 INJECTION, SOLUTION INTRAVENOUS at 16:04

## 2024-12-17 NOTE — ED NOTES
Patient to ED for intermittent fevers over the past 3 days. Patient states he thinks he is getting another UTI. Patient has a chronic suprapubic catheter. Catheter was changed 12/12/24. Patient currently on an ATB. Patient states last night he developed chest that lasted a few hours. Patient denies chest pain on arrival. Patient is alert and oriented. Patient came to ER in his own  electric wheelchair due to being paraplegic
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
## 2024-12-18 ENCOUNTER — HOSPITAL ENCOUNTER (OUTPATIENT)
Age: 34
Setting detail: OUTPATIENT SURGERY
Discharge: HOME | End: 2024-12-18
Attending: UROLOGY | Admitting: UROLOGY
Payer: MEDICAID

## 2024-12-18 ENCOUNTER — ANESTHESIA EVENT (OUTPATIENT)
Dept: OPERATING ROOM | Age: 34
End: 2024-12-18
Payer: MEDICAID

## 2024-12-18 ENCOUNTER — ANESTHESIA (OUTPATIENT)
Dept: OPERATING ROOM | Age: 34
End: 2024-12-18
Payer: MEDICAID

## 2024-12-18 ENCOUNTER — TELEPHONE (OUTPATIENT)
Dept: UROLOGY | Age: 34
End: 2024-12-18

## 2024-12-18 VITALS
HEIGHT: 70 IN | SYSTOLIC BLOOD PRESSURE: 154 MMHG | HEART RATE: 77 BPM | WEIGHT: 175 LBS | BODY MASS INDEX: 25.05 KG/M2 | TEMPERATURE: 97.2 F | DIASTOLIC BLOOD PRESSURE: 94 MMHG | RESPIRATION RATE: 16 BRPM | OXYGEN SATURATION: 96 %

## 2024-12-18 PROCEDURE — C2617 STENT, NON-COR, TEM W/O DEL: HCPCS | Performed by: UROLOGY

## 2024-12-18 PROCEDURE — 2720000010 HC SURG SUPPLY STERILE: Performed by: UROLOGY

## 2024-12-18 PROCEDURE — 3600000003 HC SURGERY LEVEL 3 BASE: Performed by: UROLOGY

## 2024-12-18 PROCEDURE — 6360000002 HC RX W HCPCS: Performed by: UROLOGY

## 2024-12-18 PROCEDURE — 2580000003 HC RX 258: Performed by: UROLOGY

## 2024-12-18 PROCEDURE — C1769 GUIDE WIRE: HCPCS | Performed by: UROLOGY

## 2024-12-18 PROCEDURE — 6360000002 HC RX W HCPCS: Performed by: REGISTERED NURSE

## 2024-12-18 PROCEDURE — 6360000002 HC RX W HCPCS

## 2024-12-18 PROCEDURE — 2709999900 HC NON-CHARGEABLE SUPPLY: Performed by: UROLOGY

## 2024-12-18 PROCEDURE — C1758 CATHETER, URETERAL: HCPCS | Performed by: UROLOGY

## 2024-12-18 PROCEDURE — 7100000011 HC PHASE II RECOVERY - ADDTL 15 MIN: Performed by: UROLOGY

## 2024-12-18 PROCEDURE — 7100000001 HC PACU RECOVERY - ADDTL 15 MIN: Performed by: UROLOGY

## 2024-12-18 PROCEDURE — 2500000003 HC RX 250 WO HCPCS: Performed by: UROLOGY

## 2024-12-18 PROCEDURE — 3700000001 HC ADD 15 MINUTES (ANESTHESIA): Performed by: UROLOGY

## 2024-12-18 PROCEDURE — 7100000010 HC PHASE II RECOVERY - FIRST 15 MIN: Performed by: UROLOGY

## 2024-12-18 PROCEDURE — 3700000000 HC ANESTHESIA ATTENDED CARE: Performed by: UROLOGY

## 2024-12-18 PROCEDURE — 3600000013 HC SURGERY LEVEL 3 ADDTL 15MIN: Performed by: UROLOGY

## 2024-12-18 PROCEDURE — 7100000000 HC PACU RECOVERY - FIRST 15 MIN: Performed by: UROLOGY

## 2024-12-18 PROCEDURE — C1747 HC ENDOSCOPE, SINGLE, URINARY TRACT: HCPCS | Performed by: UROLOGY

## 2024-12-18 DEVICE — URETERAL STENT
Type: IMPLANTABLE DEVICE | Status: FUNCTIONAL
Brand: PERCUFLEX™ PLUS

## 2024-12-18 RX ORDER — ONDANSETRON 2 MG/ML
INJECTION INTRAMUSCULAR; INTRAVENOUS
Status: COMPLETED
Start: 2024-12-18 | End: 2024-12-18

## 2024-12-18 RX ORDER — DEXAMETHASONE SODIUM PHOSPHATE 10 MG/ML
INJECTION, EMULSION INTRAMUSCULAR; INTRAVENOUS
Status: DISCONTINUED | OUTPATIENT
Start: 2024-12-18 | End: 2024-12-18 | Stop reason: SDUPTHER

## 2024-12-18 RX ORDER — SODIUM CHLORIDE 0.9 % (FLUSH) 0.9 %
5-40 SYRINGE (ML) INJECTION PRN
Status: DISCONTINUED | OUTPATIENT
Start: 2024-12-18 | End: 2024-12-18 | Stop reason: HOSPADM

## 2024-12-18 RX ORDER — PROPOFOL 10 MG/ML
INJECTION, EMULSION INTRAVENOUS
Status: DISCONTINUED | OUTPATIENT
Start: 2024-12-18 | End: 2024-12-18 | Stop reason: SDUPTHER

## 2024-12-18 RX ORDER — OXYBUTYNIN CHLORIDE 5 MG/1
5 TABLET ORAL 3 TIMES DAILY PRN
Qty: 30 TABLET | Refills: 0 | Status: SHIPPED | OUTPATIENT
Start: 2024-12-18

## 2024-12-18 RX ORDER — ONDANSETRON 2 MG/ML
4 INJECTION INTRAMUSCULAR; INTRAVENOUS ONCE
Status: COMPLETED | OUTPATIENT
Start: 2024-12-18 | End: 2024-12-18

## 2024-12-18 RX ORDER — KETOROLAC TROMETHAMINE 30 MG/ML
30 INJECTION, SOLUTION INTRAMUSCULAR; INTRAVENOUS ONCE
Status: COMPLETED | OUTPATIENT
Start: 2024-12-18 | End: 2024-12-18

## 2024-12-18 RX ORDER — KETOROLAC TROMETHAMINE 30 MG/ML
INJECTION, SOLUTION INTRAMUSCULAR; INTRAVENOUS
Status: DISCONTINUED | OUTPATIENT
Start: 2024-12-18 | End: 2024-12-18 | Stop reason: SDUPTHER

## 2024-12-18 RX ORDER — SODIUM CHLORIDE 9 MG/ML
INJECTION, SOLUTION INTRAVENOUS PRN
Status: DISCONTINUED | OUTPATIENT
Start: 2024-12-18 | End: 2024-12-18 | Stop reason: HOSPADM

## 2024-12-18 RX ORDER — SULFAMETHOXAZOLE AND TRIMETHOPRIM 800; 160 MG/1; MG/1
1 TABLET ORAL 2 TIMES DAILY
Qty: 20 TABLET | Refills: 0 | Status: SHIPPED | OUTPATIENT
Start: 2024-12-18 | End: 2024-12-28

## 2024-12-18 RX ORDER — KETOROLAC TROMETHAMINE 10 MG/1
10 TABLET, FILM COATED ORAL EVERY 6 HOURS PRN
Qty: 20 TABLET | Refills: 0 | Status: SHIPPED | OUTPATIENT
Start: 2024-12-18

## 2024-12-18 RX ORDER — IPRATROPIUM BROMIDE AND ALBUTEROL SULFATE 2.5; .5 MG/3ML; MG/3ML
1 SOLUTION RESPIRATORY (INHALATION) EVERY 4 HOURS PRN
Status: DISCONTINUED | OUTPATIENT
Start: 2024-12-18 | End: 2024-12-18 | Stop reason: HOSPADM

## 2024-12-18 RX ORDER — TAMSULOSIN HYDROCHLORIDE 0.4 MG/1
0.4 CAPSULE ORAL DAILY
Qty: 30 CAPSULE | Refills: 0 | Status: SHIPPED | OUTPATIENT
Start: 2024-12-18

## 2024-12-18 RX ORDER — SODIUM CHLORIDE 0.9 % (FLUSH) 0.9 %
5-40 SYRINGE (ML) INJECTION EVERY 12 HOURS SCHEDULED
Status: DISCONTINUED | OUTPATIENT
Start: 2024-12-18 | End: 2024-12-18 | Stop reason: HOSPADM

## 2024-12-18 RX ORDER — LIDOCAINE HCL/PF 100 MG/5ML
SYRINGE (ML) INJECTION
Status: DISCONTINUED | OUTPATIENT
Start: 2024-12-18 | End: 2024-12-18 | Stop reason: SDUPTHER

## 2024-12-18 RX ORDER — ONDANSETRON 2 MG/ML
INJECTION INTRAMUSCULAR; INTRAVENOUS
Status: DISCONTINUED | OUTPATIENT
Start: 2024-12-18 | End: 2024-12-18 | Stop reason: SDUPTHER

## 2024-12-18 RX ADMIN — Medication 60 MG: at 15:54

## 2024-12-18 RX ADMIN — SODIUM CHLORIDE: 9 INJECTION, SOLUTION INTRAVENOUS at 12:15

## 2024-12-18 RX ADMIN — ONDANSETRON 4 MG: 2 INJECTION INTRAMUSCULAR; INTRAVENOUS at 14:33

## 2024-12-18 RX ADMIN — PROPOFOL 200 MG: 10 INJECTION, EMULSION INTRAVENOUS at 15:54

## 2024-12-18 RX ADMIN — WATER 2000 MG: 1 INJECTION INTRAMUSCULAR; INTRAVENOUS; SUBCUTANEOUS at 15:58

## 2024-12-18 RX ADMIN — ONDANSETRON 4 MG: 2 INJECTION INTRAMUSCULAR; INTRAVENOUS at 15:58

## 2024-12-18 RX ADMIN — DEXAMETHASONE SODIUM PHOSPHATE 8 MG: 10 INJECTION, EMULSION INTRAMUSCULAR; INTRAVENOUS at 15:58

## 2024-12-18 RX ADMIN — KETOROLAC TROMETHAMINE 30 MG: 30 INJECTION, SOLUTION INTRAMUSCULAR at 19:03

## 2024-12-18 RX ADMIN — KETOROLAC TROMETHAMINE 30 MG: 30 INJECTION, SOLUTION INTRAMUSCULAR at 16:18

## 2024-12-18 ASSESSMENT — PAIN SCALES - GENERAL: PAINLEVEL_OUTOF10: 6

## 2024-12-18 ASSESSMENT — PAIN - FUNCTIONAL ASSESSMENT
PAIN_FUNCTIONAL_ASSESSMENT: 0-10
PAIN_FUNCTIONAL_ASSESSMENT: NONE - DENIES PAIN

## 2024-12-18 ASSESSMENT — PAIN DESCRIPTION - DESCRIPTORS: DESCRIPTORS: ACHING

## 2024-12-18 ASSESSMENT — COPD QUESTIONNAIRES: CAT_SEVERITY: MODERATE

## 2024-12-18 ASSESSMENT — PAIN DESCRIPTION - PAIN TYPE: TYPE: SURGICAL PAIN

## 2024-12-18 ASSESSMENT — PAIN DESCRIPTION - LOCATION: LOCATION: ABDOMEN

## 2024-12-18 NOTE — PROGRESS NOTES
1627: Pt arrives to pacu. Awake and alert x4. Respirations easy and unlabored. Pt on room air. Pt denies any pain. Urethral strings in place with steri-strips. Suprapubic catheter in place and draining pink tinged urine. VSS.    1635: Pt resting in bed awake. Denies any pain. VSS.    1645: Pt awake and resting in bed comfortably. Denies any pain. VSS.    1657: Pt meets criteria for discharge from pacu, transported back to [facility] in stable condition. VSS

## 2024-12-18 NOTE — TELEPHONE ENCOUNTER
Please facilitate appropriate surgical follow up per Dr. Diaz's recs:   Findings:  Stone treated. Pull stent five days  Spt was changed today  Oziel 2-4 weeks postop check

## 2024-12-18 NOTE — ANESTHESIA POSTPROCEDURE EVALUATION
Department of Anesthesiology  Postprocedure Note    Patient: Srikanth Coy  MRN: [id number]  YOB: 1990  Date of evaluation: 12/18/2024    Procedure Summary       Date: 12/18/24 [facility]    Anesthesia Start: 1550 Anesthesia Stop: 1629    Procedure: Cystoscopy, right ureteroscopy, laser lithotripsy, and right ureteral stent exchange, and rodrigues exchange (Right) Diagnosis:       Stone, kidney      (Stone, kidney [N20.0])    Surgeons: Edre Diaz MD Responsible Provider: Eladio Whatley DO    Anesthesia Type: general ASA Status: 3            Anesthesia Type: No value filed.    Ayanna Phase I: Ayanna Score: 10    Ayanna Phase II:      Anesthesia Post Evaluation    Patient location during evaluation: PACU  Patient participation: complete - patient participated  Level of consciousness: awake and alert  Pain score: 4  Airway patency: patent  Nausea & Vomiting: no nausea and no vomiting  Cardiovascular status: hemodynamically stable and blood pressure returned to baseline  Respiratory status: spontaneous ventilation, room air and acceptable  Hydration status: stable  Pain management: adequate and satisfactory to patient    No notable events documented.

## 2024-12-18 NOTE — PROGRESS NOTES
Pt admitted to [facility] and oriented to unit. SCD sleeves applied. Nares swabbed. Pt verbalized permission for first name, last initial and physicians name on white board. SDS board and discharge criteria explained, pt and family verbalized understanding. Pt denies thoughts of harming self or others. Call light in reach. Family at the bedside.  Brandy [phone]

## 2024-12-18 NOTE — DISCHARGE INSTRUCTIONS
Pt should Pull stent in 5 days. There may be some pain associated with the stent removal, which is usually self-limiting.  We suggest using the pain medication prescribed for you and a nonsteroidal anti-inflammatory such as Ibuprofen, if you are able to take this medication, to control symptoms.  Please stay hydrated. Please call with questions.        Pt ok to discharge home in good condition  No heavy lifting, >10 lbs for today  Pt should avoid strenuous activity for today  Pt should walk moderately at home  Pt ok to shower   Pt may resume diet as tolerated  Pt should take Rx as directed  No driving while on narcotics  Please call attending physician or hospital  with questions  Call or Present to ED if fever (> 101F), intractable nausea vomiting or pain.  Rx in chart    Office will call patient to schedule f/u appt.
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
## 2024-12-18 NOTE — PROGRESS NOTES
Pt returned to [facility] room 16. Vitals and assessment as charted. 0.9 infusing, to count from PACU. Pt has crackers and water. Family at the bedside. Pt and family verbalized understanding of discharge criteria and call light use. Call light in reach.

## 2024-12-18 NOTE — ANESTHESIA PRE PROCEDURE
Department of Anesthesiology  Preprocedure Note       Name:  Srikanth Coy   Age:  34 y.o.  :  1990                                          MRN:  [id number]         Date:  2024      Surgeon: Surgeon(s):  Eder Diaz MD    Procedure: Procedure(s):  Cystoscopy, right ureteroscopy, laser lithotripsy, basket retrieval of stone fragments, and right ureteral stent exchange    Medications prior to admission:   Prior to Admission medications    Medication Sig Start Date End Date Taking? Authorizing Provider   baclofen (LIORESAL) 10 MG tablet Take 1 tablet by mouth 2 times daily Unsure on dose   Yes Provider, MD Valentine   ferrous sulfate (IRON 325) 325 (65 Fe) MG tablet Take 1 tablet by mouth daily (with breakfast)   Yes ProviderValentine MD   hydrOXYzine HCl (ATARAX) 50 MG tablet Take 1 tablet by mouth every 4 hours as needed for Itching 12/10/24  Yes Provider, MD Valentine   polyethylene glycol (GLYCOLAX) 17 g packet Take 1 packet by mouth daily 12/4/24 1/3/25 Yes Marcus Cardenas DO   mirabegron (MYRBETRIQ) 50 MG TB24 TAKE 1 TABLET BY MOUTH DAILY 24  Yes Franck Maciel PA-C   potassium chloride (MICRO-K) 10 MEQ extended release capsule Take 1 capsule by mouth daily 10/21/23  Yes ProviderValentine MD   SUBOXONE 8-2 MG FILM SL film Place 1 Film under the tongue in the morning, at noon, and at bedtime. 24  Yes ProviderValentine MD   tiZANidine (ZANAFLEX) 4 MG tablet Take 1 tablet by mouth 4 times daily as needed 24  Yes ProviderValentine MD   gabapentin (NEURONTIN) 800 MG tablet Take 1 tablet by mouth 4 times daily. 24  Yes ProviderValentine MD   cyclobenzaprine (FLEXERIL) 10 MG tablet TAKE 1 TABLET BY MOUTH THREE TIMES A DAY AS NEEDED FOR MUSCLE SPASM 22  Yes Jaret Ventura DO   busPIRone (BUSPAR) 10 MG tablet TAKE 1 TABLET BY MOUTH THREE TIMES A DAY 21  Yes Jaret Ventura,    venlafaxine (EFFEXOR XR) 75 MG extended release capsule TAKE 1 CAPSULE

## 2024-12-18 NOTE — BRIEF OP NOTE
Brief Postoperative Note      Patient: Srikanth Coy  YOB: 1990  MRN: [id number]    Date of Procedure: 12/18/2024    Pre-Op Diagnosis Codes:      * Stone, kidney [N20.0]    Post-Op Diagnosis: Same       Procedure(s):  Cystoscopy, right ureteroscopy, laser lithotripsy, and right ureteral stent exchange, and rodrigues exchange    Surgeon(s):  Eder Diaz MD    Assistant:  * No surgical staff found *    Anesthesia: General    Estimated Blood Loss (mL): Minimal    Complications: None    Specimens:   * No specimens in log *    Implants:  Implant Name Type Inv. Item Serial No.  Lot No. LRB No. Used Action   STENT URET 6FR L26CM HYDR+ PGTL TAPR TIP GRAD BLDR MRK LO - [id number]  STENT URET 6FR L26CM HYDR+ PGTL TAPR TIP GRAD BLDR MRK LO  [facility]- [id number] Right 1 Implanted         Drains:   Colostomy LUQ (Active)   Stomal Appliance 2 piece 12/04/24 0946   Flange Size (inches) 38 Inches 11/15/24 1139   Stoma  Assessment Red 12/04/24 0946   Peristomal Assessment Clean, dry & intact 12/04/24 0946   Mucocutaneous Junction Intact 12/04/24 0946   Treatment Site care 11/16/24 0920   Stool Appearance Loose 11/17/24 2333   Stool Color Green 11/17/24 2333   Stool Amount Small 11/17/24 2333   Output (mL) 0 ml 12/03/24 2045       Urinary Catheter 12/18/24 (Active)   Urine Color Pink 12/18/24 1657   Urine Appearance Other (Comment) 12/18/24 1657   Collection Container Standard 12/18/24 1657   Securement Method Securing device (Describe) 12/18/24 1657   Catheter Best Practices  Drainage tube clipped to bed;Catheter secured to thigh;Bag below bladder;Bag not on floor;Lack of dependent loop in tubing;Drainage bag less than half full 12/18/24 1657   Status Draining 12/18/24 1657       [REMOVED] Suprapubic Catheter (Removed)   Site Assessment [place] 11/17/24 2333   Urine Color Yellow 11/17/24 2333   Urine Appearance Clear 11/17/24 2333   Urine Odor Malodorous 11/17/24 2333   Collection Container Standard  11/17/24 2333   Securement Method Securing device (Describe) 11/17/24 2333   Catheter Care  Perineal wipes 11/18/24 0516   Catheter Best Practices  Drainage tube clipped to bed;Catheter secured to thigh;Tamper seal intact;Bag below bladder;Bag not on floor;Lack of dependent loop in tubing;Drainage bag less than half full 11/18/24 0516   Status Draining;Patent 11/17/24 2333   Manual Irrigation Volume Input (mL) 50 mL 11/17/24 1728   Output (mL) 1075 mL 11/17/24 2104       [REMOVED] Suprapubic Catheter (Removed)   $ Cystostomy/Suprapubic tube change $ Yes 11/29/24 2041   Site Assessment Red;Pink;No urethral drainage 12/02/24 2350   Urine Color Yellow 12/04/24 0323   Urine Appearance Cloudy 12/04/24 0323   Urine Odor Malodorous 12/04/24 0323   Collection Container Standard 12/04/24 0323   Securement Method Securing device (Describe) 12/04/24 0323   Catheter Care  Other (comment) 12/04/24 0323   Catheter Best Practices  Drainage tube clipped to bed;Bag below bladder;Bag not on floor 12/04/24 0323   Status Draining;Patent 12/04/24 0323   Output (mL) 1100 mL 12/04/24 0323       Findings:  Stone treated. Pull stent five days  Spt was changed today  Oziel 2-4 weeks postop check     Electronically signed by TD SANDY MD on 12/18/2024 at 6:32 PM

## 2024-12-19 ENCOUNTER — TELEPHONE (OUTPATIENT)
Dept: UROLOGY | Age: 34
End: 2024-12-19

## 2024-12-19 LAB
BACTERIA UR CULT: ABNORMAL
BACTERIA UR CULT: ABNORMAL
ORGANISM: ABNORMAL
ORGANISM: ABNORMAL

## 2024-12-19 NOTE — PROGRESS NOTES
Pt has met discharge criteria and states he is ready for discharge to home. IV removed, gauze and tape applied. Dressed in own clothes and personal belongings gathered. Discharge instructions (with opioid medication education information) given to pt and family; pt and family verbalized understanding of discharge instructions, prescriptions and follow up appointments. Pt transported to discharge lobby by [facility] staff.

## 2024-12-21 NOTE — OP NOTE
Eder Diaz MD.  Urologic Surgery      [facility]    DATE: 12/18/2024  Patient:  Srikanth Coy  MRN: [id number]  YOB: 1990    SURGEON: Eder Diaz MD.    ASSISTANT: none    PREOPERATIVE DIAGNOSIS: right ureteral stone      POSTOPERATIVE DIAGNOSIS:  right ureteral stone      PROCEDURE PERFORMED: cystoscopy, right ureteroscopy right holmium laser lithotripsy  right stent placement    ANESTHESIA: General    COMPLICATIONS: none    OR BLOOD LOSS:  Minimal    FLUIDS: Cystalloids per Anesthesia    SPECIMENS:  * No specimens in log *      DRAINS: rodrigues and suprapubic catheter    INDICATIONS FOR PROCEDURE:  The patient is a 34 y.o. male who presents today with Stone, kidney [N20.0] here for Cystoscopy, right ureteroscopy, laser lithotripsy, and right ureteral stent exchange, and rodrigues exchange. After risks, benefits and alternatives of the procedure were discussed with the patient, the patient elected to proceed.     DETAILS OF PROCEDURE:  After informed consent was obtained in the preoperative area, the patient was taken back to the operating room and transferred to the operating table in supine position.  Anesthesia was induced and antibiotics were given.  The patient was placed in modified dorsal lithotomy position and sterilely prepped and draped in a standard fashion.  A timeout occurred.  Two patient identifiers were used.     We entered the urethra with a 22 Telugu scope.     We focused our attention on the right ureteral orifice.     The stent was seen emanating from the ureteral orifice. It was grasped using a foreign body grasper and brought to the ureteral meatus. A wire was placed through the stent into the kidney under fluoroscopic guidance. The stent was removed, and a dual lumen catheter was used to place a second wire into the kidney under fluoroscopic guidance..    The flexible ureteroscope was assembled, place over one Glidewire, and advanced into the ureter carefully

## 2024-12-22 LAB
BACTERIA BLD AEROBE CULT: NORMAL
BACTERIA BLD AEROBE CULT: NORMAL

## 2024-12-29 PROBLEM — R79.89 ELEVATED TROPONIN: Status: RESOLVED | Noted: 2024-11-29 | Resolved: 2024-12-29

## 2024-12-29 PROBLEM — N39.0 UTI (URINARY TRACT INFECTION): Status: RESOLVED | Noted: 2024-11-29 | Resolved: 2024-12-29

## 2025-01-08 ENCOUNTER — APPOINTMENT (OUTPATIENT)
Dept: GENERAL RADIOLOGY | Age: 35
End: 2025-01-08
Payer: MEDICAID

## 2025-01-08 ENCOUNTER — HOSPITAL ENCOUNTER (INPATIENT)
Age: 35
LOS: 8 days | Discharge: HOME | End: 2025-01-16
Attending: INTERNAL MEDICINE
Payer: MEDICAID

## 2025-01-08 ENCOUNTER — APPOINTMENT (OUTPATIENT)
Dept: MRI IMAGING | Age: 35
End: 2025-01-08
Payer: MEDICAID

## 2025-01-08 ENCOUNTER — APPOINTMENT (OUTPATIENT)
Dept: CT IMAGING | Age: 35
End: 2025-01-08
Payer: MEDICAID

## 2025-01-08 DIAGNOSIS — N15.1 PERINEPHRIC ABSCESS: ICD-10-CM

## 2025-01-08 DIAGNOSIS — R55 SYNCOPE AND COLLAPSE: Primary | ICD-10-CM

## 2025-01-08 DIAGNOSIS — R06.2 WHEEZING: ICD-10-CM

## 2025-01-08 LAB
ALBUMIN SERPL BCG-MCNC: 2.9 G/DL (ref 3.5–5.1)
ALP SERPL-CCNC: 272 U/L (ref 38–126)
ALT SERPL W/O P-5'-P-CCNC: 68 U/L (ref 11–66)
AMORPH SED URNS QL MICRO: ABNORMAL
ANION GAP SERPL CALC-SCNC: 12 MEQ/L (ref 8–16)
AST SERPL-CCNC: 52 U/L (ref 5–40)
BACTERIA URNS QL MICRO: ABNORMAL /HPF
BASOPHILS ABSOLUTE: 0.1 THOU/MM3 (ref 0–0.1)
BASOPHILS NFR BLD AUTO: 0.5 %
BILIRUB SERPL-MCNC: 0.3 MG/DL (ref 0.3–1.2)
BILIRUB UR QL STRIP.AUTO: NEGATIVE
BUN SERPL-MCNC: 6 MG/DL (ref 7–22)
CALCIUM SERPL-MCNC: 9.1 MG/DL (ref 8.5–10.5)
CASTS #/AREA URNS LPF: ABNORMAL /LPF
CASTS 2: ABNORMAL /LPF
CHARACTER UR: ABNORMAL
CHLORIDE SERPL-SCNC: 91 MEQ/L (ref 98–111)
CO2 SERPL-SCNC: 28 MEQ/L (ref 23–33)
COLOR, UA: YELLOW
CREAT SERPL-MCNC: 1 MG/DL (ref 0.4–1.2)
CRYSTALS URNS MICRO: ABNORMAL
DEPRECATED RDW RBC AUTO: 44.2 FL (ref 35–45)
EKG ATRIAL RATE: 89 BPM
EKG P AXIS: 51 DEGREES
EKG P-R INTERVAL: 136 MS
EKG Q-T INTERVAL: 360 MS
EKG QRS DURATION: 88 MS
EKG QTC CALCULATION (BAZETT): 438 MS
EKG R AXIS: 67 DEGREES
EKG T AXIS: 71 DEGREES
EKG VENTRICULAR RATE: 89 BPM
EOSINOPHIL NFR BLD AUTO: 0.5 %
EOSINOPHILS ABSOLUTE: 0.1 THOU/MM3 (ref 0–0.4)
EPITHELIAL CELLS, UA: ABNORMAL /HPF
ERYTHROCYTE [DISTWIDTH] IN BLOOD BY AUTOMATED COUNT: 14.9 % (ref 11.5–14.5)
GFR SERPL CREATININE-BSD FRML MDRD: > 90 ML/MIN/1.73M2
GGT SERPL-CCNC: 138 U/L (ref 8–69)
GLUCOSE SERPL-MCNC: 109 MG/DL (ref 70–108)
GLUCOSE UR QL STRIP.AUTO: NEGATIVE MG/DL
HCT VFR BLD AUTO: 30.6 % (ref 42–52)
HGB BLD-MCNC: 9.8 GM/DL (ref 14–18)
HGB UR QL STRIP.AUTO: ABNORMAL
IMM GRANULOCYTES # BLD AUTO: 0.2 THOU/MM3 (ref 0–0.07)
IMM GRANULOCYTES NFR BLD AUTO: 1.1 %
KETONES UR QL STRIP.AUTO: NEGATIVE
LACTIC ACID, SEPSIS: 1 MMOL/L (ref 0.5–1.9)
LACTIC ACID, SEPSIS: 1.1 MMOL/L (ref 0.5–1.9)
LYMPHOCYTES ABSOLUTE: 1.7 THOU/MM3 (ref 1–4.8)
LYMPHOCYTES NFR BLD AUTO: 9.8 %
MAGNESIUM SERPL-MCNC: 1.7 MG/DL (ref 1.6–2.4)
MCH RBC QN AUTO: 26.3 PG (ref 26–33)
MCHC RBC AUTO-ENTMCNC: 32 GM/DL (ref 32.2–35.5)
MCV RBC AUTO: 82 FL (ref 80–94)
MISCELLANEOUS 2: ABNORMAL
MONOCYTES ABSOLUTE: 1.5 THOU/MM3 (ref 0.4–1.3)
MONOCYTES NFR BLD AUTO: 8.5 %
MUCOUS THREADS URNS QL MICRO: ABNORMAL
NEUTROPHILS ABSOLUTE: 14.1 THOU/MM3 (ref 1.8–7.7)
NEUTROPHILS NFR BLD AUTO: 79.6 %
NITRITE UR QL STRIP: POSITIVE
NRBC BLD AUTO-RTO: 0 /100 WBC
OSMOLALITY SERPL CALC.SUM OF ELEC: 260.9 MOSMOL/KG (ref 275–300)
OSMOLALITY SERPL: 281 MOSMOL/KG (ref 275–295)
PH UR STRIP.AUTO: 6.5 [PH] (ref 5–9)
PLATELET # BLD AUTO: 446 THOU/MM3 (ref 130–400)
PMV BLD AUTO: 8.4 FL (ref 9.4–12.4)
POTASSIUM SERPL-SCNC: 3.2 MEQ/L (ref 3.5–5.2)
PROT SERPL-MCNC: 7.8 G/DL (ref 6.1–8)
PROT UR STRIP.AUTO-MCNC: 30 MG/DL
RBC # BLD AUTO: 3.73 MILL/MM3 (ref 4.7–6.1)
RBC URINE: ABNORMAL /HPF
RENAL EPI CELLS #/AREA URNS HPF: ABNORMAL /[HPF]
SODIUM SERPL-SCNC: 131 MEQ/L (ref 135–145)
SP GR UR REFRACT.AUTO: < 1.005 (ref 1–1.03)
TROPONIN, HIGH SENSITIVITY: 30 NG/L (ref 0–12)
TROPONIN, HIGH SENSITIVITY: 33 NG/L (ref 0–12)
TSH SERPL DL<=0.005 MIU/L-ACNC: 0.64 UIU/ML (ref 0.4–4.2)
UROBILINOGEN, URINE: 0.2 EU/DL (ref 0–1)
WBC # BLD AUTO: 17.7 THOU/MM3 (ref 4.8–10.8)
WBC #/AREA URNS HPF: > 100 /HPF
WBC #/AREA URNS HPF: ABNORMAL /[HPF]
YEAST LIKE FUNGI URNS QL MICRO: ABNORMAL

## 2025-01-08 PROCEDURE — 81001 URINALYSIS AUTO W/SCOPE: CPT

## 2025-01-08 PROCEDURE — 74177 CT ABD & PELVIS W/CONTRAST: CPT

## 2025-01-08 PROCEDURE — 6360000002 HC RX W HCPCS

## 2025-01-08 PROCEDURE — 87086 URINE CULTURE/COLONY COUNT: CPT

## 2025-01-08 PROCEDURE — 2500000003 HC RX 250 WO HCPCS

## 2025-01-08 PROCEDURE — 83930 ASSAY OF BLOOD OSMOLALITY: CPT

## 2025-01-08 PROCEDURE — 2060000000 HC ICU INTERMEDIATE R&B

## 2025-01-08 PROCEDURE — 2580000003 HC RX 258

## 2025-01-08 PROCEDURE — 87040 BLOOD CULTURE FOR BACTERIA: CPT

## 2025-01-08 PROCEDURE — 82977 ASSAY OF GGT: CPT

## 2025-01-08 PROCEDURE — 87077 CULTURE AEROBIC IDENTIFY: CPT

## 2025-01-08 PROCEDURE — 99223 1ST HOSP IP/OBS HIGH 75: CPT | Performed by: INTERNAL MEDICINE

## 2025-01-08 PROCEDURE — 87205 SMEAR GRAM STAIN: CPT

## 2025-01-08 PROCEDURE — 93005 ELECTROCARDIOGRAM TRACING: CPT | Performed by: INTERNAL MEDICINE

## 2025-01-08 PROCEDURE — A9579 GAD-BASE MR CONTRAST NOS,1ML: HCPCS

## 2025-01-08 PROCEDURE — 99285 EMERGENCY DEPT VISIT HI MDM: CPT

## 2025-01-08 PROCEDURE — 83605 ASSAY OF LACTIC ACID: CPT

## 2025-01-08 PROCEDURE — 36415 COLL VENOUS BLD VENIPUNCTURE: CPT

## 2025-01-08 PROCEDURE — 87186 SC STD MICRODIL/AGAR DIL: CPT

## 2025-01-08 PROCEDURE — 72197 MRI PELVIS W/O & W/DYE: CPT

## 2025-01-08 PROCEDURE — 6360000004 HC RX CONTRAST MEDICATION

## 2025-01-08 PROCEDURE — 93010 ELECTROCARDIOGRAM REPORT: CPT | Performed by: INTERNAL MEDICINE

## 2025-01-08 PROCEDURE — 82728 ASSAY OF FERRITIN: CPT

## 2025-01-08 PROCEDURE — 6370000000 HC RX 637 (ALT 250 FOR IP)

## 2025-01-08 PROCEDURE — 84300 ASSAY OF URINE SODIUM: CPT

## 2025-01-08 PROCEDURE — 84443 ASSAY THYROID STIM HORMONE: CPT

## 2025-01-08 PROCEDURE — 84484 ASSAY OF TROPONIN QUANT: CPT

## 2025-01-08 PROCEDURE — 80053 COMPREHEN METABOLIC PANEL: CPT

## 2025-01-08 PROCEDURE — 85025 COMPLETE CBC W/AUTO DIFF WBC: CPT

## 2025-01-08 PROCEDURE — 83550 IRON BINDING TEST: CPT

## 2025-01-08 PROCEDURE — 83735 ASSAY OF MAGNESIUM: CPT

## 2025-01-08 PROCEDURE — 71046 X-RAY EXAM CHEST 2 VIEWS: CPT

## 2025-01-08 PROCEDURE — 83540 ASSAY OF IRON: CPT

## 2025-01-08 RX ORDER — POLYETHYLENE GLYCOL 3350 17 G/17G
17 POWDER, FOR SOLUTION ORAL DAILY PRN
Status: DISCONTINUED | OUTPATIENT
Start: 2025-01-08 | End: 2025-01-16 | Stop reason: HOSPADM

## 2025-01-08 RX ORDER — ACETAMINOPHEN 650 MG/1
650 SUPPOSITORY RECTAL EVERY 6 HOURS PRN
Status: DISCONTINUED | OUTPATIENT
Start: 2025-01-08 | End: 2025-01-16 | Stop reason: HOSPADM

## 2025-01-08 RX ORDER — MAGNESIUM SULFATE IN WATER 40 MG/ML
2000 INJECTION, SOLUTION INTRAVENOUS PRN
Status: DISCONTINUED | OUTPATIENT
Start: 2025-01-08 | End: 2025-01-16 | Stop reason: HOSPADM

## 2025-01-08 RX ORDER — SODIUM CHLORIDE 9 MG/ML
INJECTION, SOLUTION INTRAVENOUS CONTINUOUS
Status: ACTIVE | OUTPATIENT
Start: 2025-01-08 | End: 2025-01-09

## 2025-01-08 RX ORDER — WATER 10 ML/10ML
INJECTION INTRAMUSCULAR; INTRAVENOUS; SUBCUTANEOUS
Status: DISPENSED
Start: 2025-01-08 | End: 2025-01-09

## 2025-01-08 RX ORDER — ONDANSETRON 4 MG/1
4 TABLET, ORALLY DISINTEGRATING ORAL EVERY 8 HOURS PRN
Status: DISCONTINUED | OUTPATIENT
Start: 2025-01-08 | End: 2025-01-16 | Stop reason: HOSPADM

## 2025-01-08 RX ORDER — IOPAMIDOL 755 MG/ML
80 INJECTION, SOLUTION INTRAVASCULAR
Status: COMPLETED | OUTPATIENT
Start: 2025-01-08 | End: 2025-01-08

## 2025-01-08 RX ORDER — POTASSIUM CHLORIDE 7.45 MG/ML
10 INJECTION INTRAVENOUS PRN
Status: DISCONTINUED | OUTPATIENT
Start: 2025-01-08 | End: 2025-01-16 | Stop reason: HOSPADM

## 2025-01-08 RX ORDER — POTASSIUM CHLORIDE 1500 MG/1
40 TABLET, EXTENDED RELEASE ORAL PRN
Status: DISCONTINUED | OUTPATIENT
Start: 2025-01-08 | End: 2025-01-16 | Stop reason: HOSPADM

## 2025-01-08 RX ORDER — ONDANSETRON 2 MG/ML
4 INJECTION INTRAMUSCULAR; INTRAVENOUS EVERY 6 HOURS PRN
Status: DISCONTINUED | OUTPATIENT
Start: 2025-01-08 | End: 2025-01-16 | Stop reason: HOSPADM

## 2025-01-08 RX ORDER — ACETAMINOPHEN 325 MG/1
650 TABLET ORAL EVERY 6 HOURS PRN
Status: DISCONTINUED | OUTPATIENT
Start: 2025-01-08 | End: 2025-01-16 | Stop reason: HOSPADM

## 2025-01-08 RX ORDER — 0.9 % SODIUM CHLORIDE 0.9 %
500 INTRAVENOUS SOLUTION INTRAVENOUS ONCE
Status: COMPLETED | OUTPATIENT
Start: 2025-01-08 | End: 2025-01-08

## 2025-01-08 RX ORDER — SODIUM CHLORIDE 0.9 % (FLUSH) 0.9 %
5-40 SYRINGE (ML) INJECTION EVERY 12 HOURS SCHEDULED
Status: DISCONTINUED | OUTPATIENT
Start: 2025-01-08 | End: 2025-01-16 | Stop reason: HOSPADM

## 2025-01-08 RX ORDER — SODIUM CHLORIDE 0.9 % (FLUSH) 0.9 %
5-40 SYRINGE (ML) INJECTION PRN
Status: DISCONTINUED | OUTPATIENT
Start: 2025-01-08 | End: 2025-01-16 | Stop reason: HOSPADM

## 2025-01-08 RX ORDER — SODIUM CHLORIDE 9 MG/ML
INJECTION, SOLUTION INTRAVENOUS PRN
Status: DISCONTINUED | OUTPATIENT
Start: 2025-01-08 | End: 2025-01-16 | Stop reason: HOSPADM

## 2025-01-08 RX ADMIN — SODIUM CHLORIDE: 9 INJECTION, SOLUTION INTRAVENOUS at 19:30

## 2025-01-08 RX ADMIN — GADOTERIDOL 15 ML: 279.3 INJECTION, SOLUTION INTRAVENOUS at 20:38

## 2025-01-08 RX ADMIN — POTASSIUM BICARBONATE 40 MEQ: 782 TABLET, EFFERVESCENT ORAL at 21:08

## 2025-01-08 RX ADMIN — SODIUM CHLORIDE, PRESERVATIVE FREE 5 ML: 5 INJECTION INTRAVENOUS at 21:00

## 2025-01-08 RX ADMIN — IOPAMIDOL 80 ML: 755 INJECTION, SOLUTION INTRAVENOUS at 15:03

## 2025-01-08 RX ADMIN — Medication 1250 MG: at 20:28

## 2025-01-08 RX ADMIN — SODIUM CHLORIDE 500 ML: 9 INJECTION, SOLUTION INTRAVENOUS at 13:39

## 2025-01-08 RX ADMIN — ONDANSETRON 4 MG: 4 TABLET, ORALLY DISINTEGRATING ORAL at 17:45

## 2025-01-08 RX ADMIN — CEFEPIME 2000 MG: 2 INJECTION, POWDER, FOR SOLUTION INTRAVENOUS at 17:22

## 2025-01-08 ASSESSMENT — PAIN - FUNCTIONAL ASSESSMENT
PAIN_FUNCTIONAL_ASSESSMENT: NONE - DENIES PAIN

## 2025-01-08 NOTE — ED NOTES
Pt arrives to the ED after a syncopal episode that occurred at the pt home. Pt mother states she was helping the pt transfer to his wheelchair when the pt stated he felt dizzy. Pt mother states she had the pt sit for minute before transferring however when the pt got to his chair, he went unconscious. Mother states the pt was unresponsive for about 8 minute and was breathing for periods of the episode. Mother states pt lips were blue. Pt on arrival of the squad was alert and oriented. Pt blood pressure was in the 80s systolic. IV was started and pt was administered fluids en route. Pt on arrival to the Ed has a bp of 99/67 and is a&o x4. Pt states he has a hx of vaso vagal syncope. Pt denies any pain. Respirations are unlabored.
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
## 2025-01-08 NOTE — PROGRESS NOTES
[facility]   Pharmacy Pharmacokinetic Monitoring Service - Vancomycin     Srikanth Coy is a 34 y.o. male starting on vancomycin therapy for perinephric abscess. Pharmacy consulted by Dr. Nolasco for monitoring and adjustment.    Target Concentration: Goal AUC/AUGIE 400-600 mg*hr/L    Additional Antimicrobials: none    Pertinent Laboratory Values:   Wt Readings from Last 1 Encounters:   01/08/25 77.1 kg (170 lb)     Temp Readings from Last 1 Encounters:   01/08/25 97.8 °F (36.6 °C) (Oral)     Estimated Creatinine Clearance: 110 mL/min (based on SCr of 1 mg/dL).  Recent Labs     01/08/25  1203   CREATININE 1.0   BUN 6*   WBC 17.7*     Pertinent Cultures:  Date Source Results   1/8/25 BCx2 ---   1/8/25 UC ---   MRSA Nasal Swab: N/A. Non-respiratory infection.    Plan:  Dosing recommendations based on Bayesian software  Start vancomycin 1250 mg every 18 hours  Anticipated AUC of 489 and trough concentration of 9.6 at steady state  Renal labs as indicated   Pharmacy will continue to monitor patient and adjust therapy as indicated    Thank you for the consult,  Fela Coronado [facility]  1/8/2025  6:51 PM

## 2025-01-08 NOTE — ED NOTES
Pt. Resting in bed with even and unlabored respirations. Pt. Denies any pain. Pt. Updated about plan for antibiotics. Family at bedside. Pt. Has no further concerns, questions or needs at this time. Call light within reach.

## 2025-01-08 NOTE — H&P
Attending supervising physicians attestation statement:       I Discussed the findings and plans with the Resident physician  personally   and agree with the  note as outlined below . I spoke with the staff yesterday ,   Regarding care plans and recommendations.  Patient was seen and examined by this provider.         Electronically signed by Gerald Edwards MD on 2025 at 5:35 AM             History & Physical  Internal Medicine Resident         Patient: Srikanth Coy 34 y.o. male      : 1990  Date of Admission: 2025  Date of Service: Pt seen/examined on 25 and Admitted to Inpatient with expected LOS greater than two midnights due to medical therapy.       ASSESSMENT AND PLAN    TLOC secondary to syncope versus seizure  Patient brought to the ED initially for concern for syncope. Has remote history of syncope in 2017. Patient became lightheaded during ambulatory transfer. This appears to be positional from laying down to sitting up. Denied palpitations, N/V, chest pain beforehand. No witnessed shaking. Reportedly, became unresponsive for a few minutes. Afterwards he was slightly confused. Patient has had poor oral intake the past few days. Just ate a handful of popcorn yesterday. Normal blood glucose on arrival. Suspect orthostatic syncope given SBP in 80s and syncope lasted about 3 mins with no extended period of confusion afterwards. Will order orthostats. Pt also given fluid bolus and placed on fluids at 75ml/hr. Differential also includes cardiac syncope vs neuropathic syncope secondary to high spinal cord injury and possible autonomic dysfunction. Ordered echo for further workup. Continue to monitor with telemetry for 24 hours. Less likely seizure but pt has history of TBI s/p craniotomy. If recurrent episodes occur, will place cerebel and treat with benzo if evidence of true seizure.    Right-sided perinephric abscess  Complicated UTI  History of recent right sided

## 2025-01-08 NOTE — ED NOTES
Pt. Resting in bed with even and unlabored respirations. Pt. Denies any pain. Provided ginger ale  Pt. Updated about plan of care and treatment. Family at bedside. Pt. Has no further concerns, questions or needs at this time. Call light within reach.

## 2025-01-08 NOTE — ED NOTES
Pt. Resting in bed with even and unlabored respirations. Pt. Denies any pain. Urine sent.  Pt. Updated about plan of care and treatment. Family at bedside. Pt. Has no further concerns, questions or needs at this time. Call light within reach.

## 2025-01-09 ENCOUNTER — APPOINTMENT (OUTPATIENT)
Dept: CT IMAGING | Age: 35
End: 2025-01-09
Payer: MEDICAID

## 2025-01-09 ENCOUNTER — APPOINTMENT (OUTPATIENT)
Age: 35
End: 2025-01-09
Payer: MEDICAID

## 2025-01-09 PROBLEM — R55 SYNCOPE AND COLLAPSE: Status: ACTIVE | Noted: 2025-01-09

## 2025-01-09 LAB
ALBUMIN SERPL BCG-MCNC: 2.9 G/DL (ref 3.5–5.1)
ALP SERPL-CCNC: 277 U/L (ref 38–126)
ALT SERPL W/O P-5'-P-CCNC: 67 U/L (ref 11–66)
ANION GAP SERPL CALC-SCNC: 16 MEQ/L (ref 8–16)
AST SERPL-CCNC: 63 U/L (ref 5–40)
BASOPHILS ABSOLUTE: 0.1 THOU/MM3 (ref 0–0.1)
BASOPHILS NFR BLD AUTO: 0.4 %
BILIRUB CONJ SERPL-MCNC: < 0.1 MG/DL (ref 0.1–13.8)
BILIRUB SERPL-MCNC: 0.3 MG/DL (ref 0.3–1.2)
BUN SERPL-MCNC: 5 MG/DL (ref 7–22)
CALCIUM SERPL-MCNC: 9.5 MG/DL (ref 8.5–10.5)
CHARACTER, BODY FLUID: ABNORMAL
CHLORIDE SERPL-SCNC: 96 MEQ/L (ref 98–111)
CO2 SERPL-SCNC: 26 MEQ/L (ref 23–33)
COLOR FLD: ABNORMAL
CREAT SERPL-MCNC: 0.9 MG/DL (ref 0.4–1.2)
DEPRECATED RDW RBC AUTO: 45.6 FL (ref 35–45)
EKG ATRIAL RATE: 128 BPM
EKG P AXIS: 63 DEGREES
EKG P-R INTERVAL: 150 MS
EKG Q-T INTERVAL: 306 MS
EKG QRS DURATION: 68 MS
EKG QTC CALCULATION (BAZETT): 446 MS
EKG R AXIS: 63 DEGREES
EKG T AXIS: 66 DEGREES
EKG VENTRICULAR RATE: 128 BPM
EOSINOPHIL NFR BLD AUTO: 0.4 %
EOSINOPHILS ABSOLUTE: 0.1 THOU/MM3 (ref 0–0.4)
ERYTHROCYTE [DISTWIDTH] IN BLOOD BY AUTOMATED COUNT: 15 % (ref 11.5–14.5)
FERRITIN SERPL IA-MCNC: 810 NG/ML (ref 22–322)
GFR SERPL CREATININE-BSD FRML MDRD: > 90 ML/MIN/1.73M2
GLUCOSE SERPL-MCNC: 111 MG/DL (ref 70–108)
GRANULOCYTES NFR FLD AUTO: 80 %
HCT VFR BLD AUTO: 30.7 % (ref 42–52)
HGB BLD-MCNC: 9.9 GM/DL (ref 14–18)
IMM GRANULOCYTES # BLD AUTO: 0.21 THOU/MM3 (ref 0–0.07)
IMM GRANULOCYTES NFR BLD AUTO: 1.2 %
INR PPP: 1.39 (ref 0.85–1.13)
IRON SATN MFR SERPL: 27 % (ref 20–50)
IRON SERPL-MCNC: 35 UG/DL (ref 65–195)
LACTATE SERPL-SCNC: 0.9 MMOL/L (ref 0.5–2)
LYMPHOCYTES ABSOLUTE: 1.8 THOU/MM3 (ref 1–4.8)
LYMPHOCYTES NFR BLD AUTO: 10.2 %
MAGNESIUM SERPL-MCNC: 1.6 MG/DL (ref 1.6–2.4)
MCH RBC QN AUTO: 26.6 PG (ref 26–33)
MCHC RBC AUTO-ENTMCNC: 32.2 GM/DL (ref 32.2–35.5)
MCV RBC AUTO: 82.5 FL (ref 80–94)
MONOCYTES ABSOLUTE: 1.4 THOU/MM3 (ref 0.4–1.3)
MONOCYTES NFR BLD AUTO: 7.8 %
MONONUC CELLS NFR FLD AUTO: 20 %
NEUTROPHILS ABSOLUTE: 14.5 THOU/MM3 (ref 1.8–7.7)
NEUTROPHILS NFR BLD AUTO: 80 %
NRBC BLD AUTO-RTO: 0 /100 WBC
NUC CELL # FLD AUTO: ABNORMAL /CUMM (ref 0–500)
OSMOLALITY SERPL CALC.SUM OF ELEC: 273.6 MOSMOL/KG (ref 275–300)
PATHOLOGIST REVIEW: ABNORMAL
PHOSPHATE SERPL-MCNC: 2.6 MG/DL (ref 2.4–4.7)
PLATELET # BLD AUTO: 485 THOU/MM3 (ref 130–400)
PMV BLD AUTO: 8.5 FL (ref 9.4–12.4)
POTASSIUM SERPL-SCNC: 3.6 MEQ/L (ref 3.5–5.2)
PROT SERPL-MCNC: 7.9 G/DL (ref 6.1–8)
RBC # BLD AUTO: 3.72 MILL/MM3 (ref 4.7–6.1)
RBC # FLD AUTO: ABNORMAL /CUMM
SODIUM SERPL-SCNC: 138 MEQ/L (ref 135–145)
SODIUM UR-SCNC: < 20 MEQ/L
SPECIMEN: ABNORMAL
TIBC SERPL-MCNC: 132 UG/DL (ref 171–450)
TOTAL VOLUME RECEIVED BODY FLUID: 6 ML
WBC # BLD AUTO: 18.1 THOU/MM3 (ref 4.8–10.8)

## 2025-01-09 PROCEDURE — 6370000000 HC RX 637 (ALT 250 FOR IP)

## 2025-01-09 PROCEDURE — 87070 CULTURE OTHR SPECIMN AEROBIC: CPT

## 2025-01-09 PROCEDURE — 87186 SC STD MICRODIL/AGAR DIL: CPT

## 2025-01-09 PROCEDURE — 2709999900 CT ABSCESS DRAINAGE W CATH PLACEMENT S&I

## 2025-01-09 PROCEDURE — 93005 ELECTROCARDIOGRAM TRACING: CPT

## 2025-01-09 PROCEDURE — 6360000002 HC RX W HCPCS

## 2025-01-09 PROCEDURE — 93010 ELECTROCARDIOGRAM REPORT: CPT | Performed by: INTERNAL MEDICINE

## 2025-01-09 PROCEDURE — 77012 CT SCAN FOR NEEDLE BIOPSY: CPT

## 2025-01-09 PROCEDURE — 83605 ASSAY OF LACTIC ACID: CPT

## 2025-01-09 PROCEDURE — 2580000003 HC RX 258: Performed by: PHARMACIST

## 2025-01-09 PROCEDURE — 6360000002 HC RX W HCPCS: Performed by: PHARMACIST

## 2025-01-09 PROCEDURE — 83735 ASSAY OF MAGNESIUM: CPT

## 2025-01-09 PROCEDURE — 88108 CYTOPATH CONCENTRATE TECH: CPT

## 2025-01-09 PROCEDURE — 2060000000 HC ICU INTERMEDIATE R&B

## 2025-01-09 PROCEDURE — 99232 SBSQ HOSP IP/OBS MODERATE 35: CPT | Performed by: INTERNAL MEDICINE

## 2025-01-09 PROCEDURE — 80076 HEPATIC FUNCTION PANEL: CPT

## 2025-01-09 PROCEDURE — 36415 COLL VENOUS BLD VENIPUNCTURE: CPT

## 2025-01-09 PROCEDURE — APPNB30 APP NON BILLABLE TIME 0-30 MINS

## 2025-01-09 PROCEDURE — 0T9030Z DRAINAGE OF RIGHT KIDNEY WITH DRAINAGE DEVICE, PERCUTANEOUS APPROACH: ICD-10-PCS

## 2025-01-09 PROCEDURE — 51702 INSERT TEMP BLADDER CATH: CPT

## 2025-01-09 PROCEDURE — 87077 CULTURE AEROBIC IDENTIFY: CPT

## 2025-01-09 PROCEDURE — 2580000003 HC RX 258

## 2025-01-09 PROCEDURE — 87075 CULTR BACTERIA EXCEPT BLOOD: CPT

## 2025-01-09 PROCEDURE — 85610 PROTHROMBIN TIME: CPT

## 2025-01-09 PROCEDURE — 89051 BODY FLUID CELL COUNT: CPT

## 2025-01-09 PROCEDURE — 85025 COMPLETE CBC W/AUTO DIFF WBC: CPT

## 2025-01-09 PROCEDURE — 84100 ASSAY OF PHOSPHORUS: CPT

## 2025-01-09 PROCEDURE — 80048 BASIC METABOLIC PNL TOTAL CA: CPT

## 2025-01-09 RX ORDER — VENLAFAXINE HYDROCHLORIDE 150 MG/1
150 CAPSULE, EXTENDED RELEASE ORAL
Status: DISCONTINUED | OUTPATIENT
Start: 2025-01-10 | End: 2025-01-16 | Stop reason: HOSPADM

## 2025-01-09 RX ORDER — FERROUS SULFATE 325(65) MG
325 TABLET ORAL
Status: DISCONTINUED | OUTPATIENT
Start: 2025-01-10 | End: 2025-01-16 | Stop reason: HOSPADM

## 2025-01-09 RX ORDER — BACLOFEN 10 MG/1
10 TABLET ORAL 2 TIMES DAILY
Status: DISCONTINUED | OUTPATIENT
Start: 2025-01-09 | End: 2025-01-16 | Stop reason: HOSPADM

## 2025-01-09 RX ORDER — VENLAFAXINE HYDROCHLORIDE 75 MG/1
75 CAPSULE, EXTENDED RELEASE ORAL
Status: DISCONTINUED | OUTPATIENT
Start: 2025-01-10 | End: 2025-01-09

## 2025-01-09 RX ORDER — ALBUTEROL SULFATE 90 UG/1
2 INHALANT RESPIRATORY (INHALATION) EVERY 6 HOURS PRN
Status: DISCONTINUED | OUTPATIENT
Start: 2025-01-09 | End: 2025-01-16 | Stop reason: HOSPADM

## 2025-01-09 RX ORDER — HYDROXYZINE HYDROCHLORIDE 25 MG/1
50 TABLET, FILM COATED ORAL EVERY 4 HOURS PRN
Status: DISCONTINUED | OUTPATIENT
Start: 2025-01-09 | End: 2025-01-16 | Stop reason: HOSPADM

## 2025-01-09 RX ORDER — PANTOPRAZOLE SODIUM 40 MG/1
40 TABLET, DELAYED RELEASE ORAL
Status: DISCONTINUED | OUTPATIENT
Start: 2025-01-10 | End: 2025-01-16 | Stop reason: HOSPADM

## 2025-01-09 RX ORDER — IBUPROFEN 200 MG
400 TABLET ORAL EVERY 6 HOURS PRN
Status: DISCONTINUED | OUTPATIENT
Start: 2025-01-09 | End: 2025-01-16 | Stop reason: HOSPADM

## 2025-01-09 RX ORDER — TROSPIUM CHLORIDE 20 MG/1
20 TABLET, FILM COATED ORAL 2 TIMES DAILY
Status: DISCONTINUED | OUTPATIENT
Start: 2025-01-09 | End: 2025-01-16 | Stop reason: HOSPADM

## 2025-01-09 RX ORDER — VENLAFAXINE HYDROCHLORIDE 75 MG/1
75 CAPSULE, EXTENDED RELEASE ORAL
Status: DISCONTINUED | OUTPATIENT
Start: 2025-01-10 | End: 2025-01-16 | Stop reason: HOSPADM

## 2025-01-09 RX ORDER — TAMSULOSIN HYDROCHLORIDE 0.4 MG/1
0.4 CAPSULE ORAL DAILY
Status: DISCONTINUED | OUTPATIENT
Start: 2025-01-09 | End: 2025-01-16 | Stop reason: HOSPADM

## 2025-01-09 RX ORDER — BUSPIRONE HYDROCHLORIDE 10 MG/1
10 TABLET ORAL 3 TIMES DAILY
Status: DISCONTINUED | OUTPATIENT
Start: 2025-01-09 | End: 2025-01-16 | Stop reason: HOSPADM

## 2025-01-09 RX ORDER — BUPRENORPHINE AND NALOXONE 8; 2 MG/1; MG/1
1 FILM, SOLUBLE BUCCAL; SUBLINGUAL 3 TIMES DAILY
Status: DISCONTINUED | OUTPATIENT
Start: 2025-01-09 | End: 2025-01-16 | Stop reason: HOSPADM

## 2025-01-09 RX ORDER — 0.9 % SODIUM CHLORIDE 0.9 %
500 INTRAVENOUS SOLUTION INTRAVENOUS ONCE
Status: COMPLETED | OUTPATIENT
Start: 2025-01-09 | End: 2025-01-09

## 2025-01-09 RX ORDER — CYCLOBENZAPRINE HCL 10 MG
10 TABLET ORAL 2 TIMES DAILY
Status: DISCONTINUED | OUTPATIENT
Start: 2025-01-09 | End: 2025-01-16 | Stop reason: HOSPADM

## 2025-01-09 RX ORDER — GABAPENTIN 400 MG/1
800 CAPSULE ORAL 4 TIMES DAILY
Status: DISCONTINUED | OUTPATIENT
Start: 2025-01-09 | End: 2025-01-16 | Stop reason: HOSPADM

## 2025-01-09 RX ORDER — OXYBUTYNIN CHLORIDE 5 MG/1
5 TABLET ORAL 3 TIMES DAILY PRN
Status: DISCONTINUED | OUTPATIENT
Start: 2025-01-09 | End: 2025-01-16 | Stop reason: HOSPADM

## 2025-01-09 RX ADMIN — SODIUM CHLORIDE 500 ML: 9 INJECTION, SOLUTION INTRAVENOUS at 19:33

## 2025-01-09 RX ADMIN — BUPRENORPHINE AND NALOXONE 1 FILM: 8; 2 FILM BUCCAL; SUBLINGUAL at 20:24

## 2025-01-09 RX ADMIN — BACLOFEN 10 MG: 10 TABLET ORAL at 16:20

## 2025-01-09 RX ADMIN — BUSPIRONE HYDROCHLORIDE 10 MG: 10 TABLET ORAL at 20:24

## 2025-01-09 RX ADMIN — BUPRENORPHINE AND NALOXONE 1 FILM: 8; 2 FILM BUCCAL; SUBLINGUAL at 16:30

## 2025-01-09 RX ADMIN — BACLOFEN 10 MG: 10 TABLET ORAL at 20:24

## 2025-01-09 RX ADMIN — Medication 3 MG: at 20:24

## 2025-01-09 RX ADMIN — CYCLOBENZAPRINE 10 MG: 10 TABLET, FILM COATED ORAL at 16:30

## 2025-01-09 RX ADMIN — CYCLOBENZAPRINE 10 MG: 10 TABLET, FILM COATED ORAL at 20:24

## 2025-01-09 RX ADMIN — BUSPIRONE HYDROCHLORIDE 10 MG: 10 TABLET ORAL at 16:20

## 2025-01-09 RX ADMIN — IBUPROFEN 400 MG: 200 TABLET, FILM COATED ORAL at 20:24

## 2025-01-09 RX ADMIN — GABAPENTIN 800 MG: 400 CAPSULE ORAL at 20:23

## 2025-01-09 RX ADMIN — OXYBUTYNIN CHLORIDE 5 MG: 5 TABLET ORAL at 20:24

## 2025-01-09 RX ADMIN — HYDROXYZINE HYDROCHLORIDE 50 MG: 25 TABLET, FILM COATED ORAL at 16:20

## 2025-01-09 RX ADMIN — GABAPENTIN 800 MG: 400 CAPSULE ORAL at 16:30

## 2025-01-09 RX ADMIN — TAMSULOSIN HYDROCHLORIDE 0.4 MG: 0.4 CAPSULE ORAL at 16:20

## 2025-01-09 RX ADMIN — TROSPIUM CHLORIDE 20 MG: 20 TABLET, FILM COATED ORAL at 20:24

## 2025-01-09 RX ADMIN — VANCOMYCIN HYDROCHLORIDE 1000 MG: 1 INJECTION, POWDER, LYOPHILIZED, FOR SOLUTION INTRAVENOUS at 14:40

## 2025-01-09 RX ADMIN — ONDANSETRON 4 MG: 2 INJECTION INTRAMUSCULAR; INTRAVENOUS at 08:57

## 2025-01-09 ASSESSMENT — PAIN DESCRIPTION - FREQUENCY: FREQUENCY: INTERMITTENT

## 2025-01-09 ASSESSMENT — PAIN - FUNCTIONAL ASSESSMENT
PAIN_FUNCTIONAL_ASSESSMENT: NONE - DENIES PAIN
PAIN_FUNCTIONAL_ASSESSMENT: ACTIVITIES ARE NOT PREVENTED

## 2025-01-09 ASSESSMENT — PAIN DESCRIPTION - ORIENTATION: ORIENTATION: MID

## 2025-01-09 ASSESSMENT — PAIN SCALES - GENERAL
PAINLEVEL_OUTOF10: 3
PAINLEVEL_OUTOF10: 0

## 2025-01-09 ASSESSMENT — PAIN DESCRIPTION - DESCRIPTORS: DESCRIPTORS: ACHING

## 2025-01-09 ASSESSMENT — PAIN DESCRIPTION - ONSET: ONSET: ON-GOING

## 2025-01-09 ASSESSMENT — PAIN DESCRIPTION - LOCATION: LOCATION: ABDOMEN

## 2025-01-09 ASSESSMENT — PAIN DESCRIPTION - PAIN TYPE: TYPE: ACUTE PAIN

## 2025-01-09 NOTE — ED NOTES
Pt resting on cot. Mother brought in the patient food. This RN informed pt and mother that he is to be npo. Call light in reach.

## 2025-01-09 NOTE — ED NOTES
Pt resting in bed. Orthos laying and sitting complete. Vitals assessed. RR easy and unlabored. Call light in reach. Pt denies needs at this time.

## 2025-01-09 NOTE — ED NOTES
Pt resting in bed. Provided water upon request. Vitals assessed. RR easy and unlabored. Call light in reach.

## 2025-01-09 NOTE — PROGRESS NOTES
Hospitalist Progress Note  Internal Medicine Resident      Patient: Srikanth Coy 34 y.o. male      Unit/Bed: K-28/028-A    Admit Date: 1/8/2025      ASSESSMENT AND PLAN  Active Problems    Orthostatic syncope  Patient brought to the ED initially for concern for syncope. Has remote history of syncope in 2017. Patient became lightheaded during ambulatory transfer. Positional. Denied palpitations, N/V, chest pain beforehand. No witnessed shaking. LOC for a few minutes. Poor oral intake the past few days. Suspect orthostatic syncope given SBP in 80s and syncope lasted about 3 mins with no extended period of confusion afterwards. Positive orthostats. Pt given fluids on arrival. Echo pending. Continue to monitor with telemetry for 24 hours.Repeat orthostats to check for resolution 1/10.     Right-sided perinephric abscess s/p I&D 1/9  Complicated UTI  History of recent right sided nephrolithiasis 5.5 mm s/p stent placement 12/3 and lithotripsy with stent exchange 12/18  No sepsis as sofa score 1. Afebrile. LA 0.9.  Does have leukocytosis 19.7. 11 cm perinephric abscess on CTAP 1/8. Patient with history of prior complicated UTI secondary to obstructive nephrolithiasis with urine culture growing Enterobacter Cloacea and stenotrophomonas maltophilia. Kidney stone blasted recently. Patient obviously still has infection given positive UA with concern for UTI and abscess on CTAP. Underwent I&D by IR 1/9. Follow labs. Wait for urine culture and blood cultures. cefepime day 2 and vancomycin day 2.      Chronic nonhealing sacral decubitus ulcer s/p excisional debridement 2021  Secondary to patient being bedbound. CTAP revealed sacral decubitus ulcer extending into the left ischium associated with erosion. Pt already on cefepime and vancomycin. MRI did not show osteomyelitis but showed a small abscess.  GS was consulted for further management. Order to wound care.     Transaminitis with cholestatic pattern  Secondary to

## 2025-01-09 NOTE — ED NOTES
Pt resting in bed. Vitals assessed. RR easy and unlabored. Pt denies needs at this time. Call light in reach.

## 2025-01-09 NOTE — ED NOTES
Pt medicated per MAR. Pt resting on cot with call light within reach. Will monitor. Respirations even and unlabored. No needs voiced.

## 2025-01-09 NOTE — CARE COORDINATION
[facility]  [phone] called for update.   please keep him updated on discharge time lines and any new needs at time of discharge.

## 2025-01-09 NOTE — OP NOTE
Department of Radiology  Post Procedure Progress Note      Pre-Procedure Diagnosis:  Right nani-nephric fluid collection      Procedure Performed:  CT guided drain placement    Anesthesia: local    Findings: successful    Immediate Complications:  None    Estimated Blood Loss: minimal    SEE DICTATED PROCEDURE NOTE FOR COMPLETE DETAILS.    Electronically signed by Gerhard Richter MD on 1/9/2025 at 10:45 AM

## 2025-01-09 NOTE — ED NOTES
Pt off the floor for imaging at this time. Report from Samira ENAMORADO. Assumed care at this time.

## 2025-01-09 NOTE — ED NOTES
Pt resting in bed. Medicated per MAR. IV abx continue to run. Pt denies needs. Vitals assessed. RR easy and unlabored.

## 2025-01-09 NOTE — ED NOTES
Pt resting in bed. Abx complete. Vitals assessed. RR easy and unlabored. Pt denies needs at this time.

## 2025-01-09 NOTE — H&P
[facility]  Sedation/Analgesia History & Physical    Pt Name: Srikanth Coy  MRN: [id number]  YOB: 1990  Provider Performing Procedure: Gerhard Richter MD, MD  Primary Care Physician: Johnathan Flores MD    Formulation and discussion of sedation / procedure plans, risks, benefits, side effects and alternatives with patient and/or responsible adult completed.    PRE-PROCEDURE   DNR-CCA/DNR-CC []Yes [x]No  Brief History/Pre-Procedure Diagnosis: Right nani-renal fluid collection          MEDICAL HISTORY  []CAD/Valve  []Liver Disease  []Lung Disease []Diabetes  []Hypertension []Renal Disease  [x]Additional information:       has a past medical history of COPD (chronic obstructive pulmonary disease) (HCC), Depression, Fracture of lower leg, Gastroesophageal reflux disease, Hyperthyroidism, Hypoalbuminemia, Kidney stone, MDRO (multiple drug resistant organisms) resistance, Paraplegia (HCC), Paraplegic gait, Pneumonia, Prolonged emergence from general anesthesia, and Suprapubic catheter ([facility]).    SURGICAL HISTORY   has a past surgical history that includes Appendectomy; Facial reconstruction surgery (2012); Tonsillectomy; Hardware Removal (2012); other surgical history (01/09/2017); other surgical history (01/09/2017); brain surgery (11/05/2016); back surgery (11/2016); fracture surgery (Left, 11/2016); Cystocopy (04/17/2017); other surgical history (04/17/2017); pr office/outpt visit,procedure only (Right, 10/5/2018); Toe amputation (Right); Toe amputation (Right, 2/19/2021); Ankle surgery (Right, 2/19/2021); Pressure ulcer debridement (N/A, 6/15/2021); Small intestine surgery (N/A, 5/23/2024); Pressure ulcer debridement (Left, 5/23/2024); Cystoscopy (Right, 12/3/2024); and Ureter surgery (Right, 12/18/2024).  Additional information:       ALLERGIES   Allergies as of 01/08/2025 - Fully Reviewed 01/08/2025   Allergen Reaction Noted    Bee venom Shortness Of Breath 11/13/2014

## 2025-01-09 NOTE — CONSULTS
[facility]  General Surgery Consult Note  José Antonio Mehta PA-C for Dr. Edi Palmer MD FACS    Pt Name: Srikanth Coy  MRN: [id number]  YOB: 1990  Date of evaluation: 1/9/2025  Primary Care Physician: Johnathan Flores MD  Patient evaluated at the request of  Ghada Leung DO   Reason for evaluation: Sacral abscess/wounds  IMPRESSIONS:   Chronic sacral and ischial wounds/pressure ulcers  Perinephric abscess  Paraplegia  Colostomy  Neurogenic bladder  Tobacco abuse  H/o osteomyelitis  does not have any pertinent problems on file.  PLANS:   MRI showed Small 0.8 x 1.5 x 1 cm abscess seen at the deep margin of the left pelvic decubitus ulcer abutting the left ischial tuberosity  Antibiotics per primary  IR for CT-guided drain placement of Sylvester nephric fluid collection  Okay for diet as tolerated from surgical standpoint after IR drain placement  Continue wound care.  Wound care consulted.  Hospitalist for medical management  Conservative management at this time. Continue wound care. Wounds without signs of infection. No surgical intervention at this time.  SUBJECTIVE:   Chief complaint: Syncope     History of present illness:      Srikatnh is a 34-year-old male who presents to the emergency department with his mom after having a syncopal episode yesterday.  He was going to an appointment with an oral surgeon when he felt lightheaded dizzy and passed out. He became unaware after he passed out. EMS came and took him to the hospital. Patient states he has had the 2 wounds on his sacrum and ischium for a very long time approximately 8 years. He is following with a wound care clinic at [place]. He has a follow-up appointment next Wednesday. He used to follow with [facility] wound care. [Dr. Palmer originally did a laparoscopic diverting colostomy on 1/9/2017 with excisional debridement of skin, subcutaneous tissue, muscle/fascia of necrotic sacral ulcer. Dr. Palmer also     APPENDECTOMY      BACK SURGERY  11/2016    BRAIN SURGERY  11/05/2016    CLOT REMOVED    CYSTOSCOPY  04/17/2017    CYSTOSCOPY Right 12/3/2024    CYSTOSCOPY RIGHT URETERAL STENT INSERTION and exchange of supra pubic catheter performed by Eder Diaz MD at [facility] OR    FACIAL RECONSTRUCTION SURGERY  2012    left zygomatic arch and sinus    FRACTURE SURGERY Left 11/2016    HARDWARE REMOVAL  2012    left zygomatic arch    OTHER SURGICAL HISTORY  01/09/2017    Laparoscopic Diverting Colostomy    OTHER SURGICAL HISTORY  01/09/2017    Excisional Debridment Sacral Decubitus Ulcer    OTHER SURGICAL HISTORY  04/17/2017    Placement of suprapubic catheter    [place] OFFICE/OUTPT VISIT,PROCEDURE ONLY Right 10/5/2018    RIGHT HALLUX AMPUTATION performed by Johnathan Cohen DPM at [facility] OR    PRESSURE ULCER DEBRIDEMENT N/A 6/15/2021    EXCISIONAL DEBRIDEMENT LEFT ISCHIAL AND SACRUM performed by Edi Palmer MD at [facility] OR    PRESSURE ULCER DEBRIDEMENT Left 5/23/2024    Debridement of Left Ischial Non Healing Wound performed by Edi Palmer MD at [facility] OR    SMALL INTESTINE SURGERY N/A 5/23/2024    Robotic Colostomy Revision and Partial Colectomy performed by Edi Palmer MD at [facility] OR    TOE AMPUTATION Right     great toe     TOE AMPUTATION Right 2/19/2021    RIGHT TRANSMETATARSAL AMPUTATION performed by Johnathan Cohen DPM at [facility] OR    TONSILLECTOMY      URETER SURGERY Right 12/18/2024    Cystoscopy, right ureteroscopy, laser lithotripsy, and right ureteral stent exchange, and rodrigues exchange performed by Eder Diaz MD at [facility] OR     Medications:  Prior to Admission medications    Medication Sig Start Date End Date Taking? Authorizing Provider   ketorolac (TORADOL) 10 MG tablet Take 1 tablet by mouth every 6 hours as needed for Pain 12/18/24  Yes Ana Stevenson, APRN - CNP   oxyBUTYnin (DITROPAN) 5 MG tablet Take 1 tablet by mouth 3 times daily as needed (for stent pain) 12/18/24  Yes  Priority   Pennie Langley MD  [phone] 1/8/2025      Narrative & Impression  PROCEDURE: CT ABDOMEN PELVIS W IV CONTRAST     CLINICAL INFORMATION: elevated white count, wound to ischium     COMPARISON: CT abdomen and pelvis 12/17/2024.     TECHNIQUE: Axial 5 mm CT images were obtained through the abdomen and pelvis  after the administration of 80 cc Isovue 370 intravenous contrast. Coronal and  sagittal reconstructions were obtained.     All CT scans at this facility use dose modulation, iterative reconstruction,  and/or weight-based dosing when appropriate to reduce radiation dose to as low  as reasonably achievable.     FINDINGS:  Lung bases: Dependent atelectasis is visualized.     Liver/gallbladder/bilary tree: No radiopaque gallstones or biliary ductal  dilatation is identified. Hepatomegaly and hepatic steatosis are unchanged.     Pancreas: Mildly atrophic.  Spleen : Normal.  Adrenal glands: Normal.     Kidneys/ ureters/ bladder: A large subcapsular fluid collection in the right  kidney measures at least 8.1 x 5.6 x 11.5 cm (series 601, image 39) with a  significant amount of mass effect on the right kidney. No hydronephrosis or  hydroureter is observed. The urinary bladder wall is thickened. A suprapubic  catheter is observed. A large amount of right perinephric fat stranding and  inflammation has progressed.     Gastrointestinal: The left lower quadrant ostomy is stable. No bowel obstruction  is observed. No free air is visualized.     Retroperitoneum / lymph nodes: The aorta is not dilated. No lymphadenopathy is  visualized.     Pelvis: Subcutaneous thickening and soft tissue wound overlying the sacrum  (series 301, image 78) as well as a wound extending to the left ischium (series  301, images 93 through 95) appears to result in erosive changes. Fixation  hardware in the right hip appears intact. No discrete fluid collection is  identified.     Musculoskeletal: Multilevel degenerative disc disease is

## 2025-01-09 NOTE — CARE COORDINATION
01/09/25 1618   Service Assessment   Patient Orientation Unable to Assess  (off unit, spoke with mom)   Cognition Alert   History Provided By Child/Family;Medical Record   Primary Caregiver Other (Comment)  (family assist throughout day)   Support Systems Parent;Family Members   Patient's Healthcare Decision Maker is: Legal Next of Kin   PCP Verified by CM Yes   Last Visit to PCP Within last 6 months   Prior Functional Level Assistance with the following:;Bathing;Cooking;Housework;Shopping;Mobility;Dressing   Current Functional Level Bathing;Assistance with the following:;Dressing;Cooking;Housework;Shopping;Mobility   Can patient return to prior living arrangement Yes   Ability to make needs known: Good   Family able to assist with home care needs: Yes   Would you like for me to discuss the discharge plan with any other family members/significant others, and if so, who? Yes  (mom/emergency contact present)   Financial Resources Medicaid   Community Resources Other (Comment)  ([place] home care waiver)   Social/Functional History   Active  No   Patient's  Info mom via [employer] van   Discharge Planning   Type of Residence Trailer/Mobile Home   Living Arrangements Spouse/Significant Other   Current Services Prior To Admission Durable Medical Equipment;Home Care   Current DME Prior to Arrival Wheelchair;Hospital Bed;Other (Comment)  (trapeze, slide board, platform lift)   Potential Assistance Needed N/A   DME Ordered? No   Potential Assistance Purchasing Medications No   Type of Home Care Services Aide Services  (qualifies for aides but they have not had enough through the waiver. Mom does assist.)   Patient expects to be discharged to: Trailer/mobile home   Services At/After Discharge   Transition of Care Consult (CM Consult) Discharge Planning   Services At/After Discharge Home Health;DME   Mode of Transport at Discharge Other (see comment)  (family)   Confirm Follow Up Transport Family   Condition of

## 2025-01-09 NOTE — ED NOTES
Pt appears to be sleeping in bed. RR easy and unlabored. Chest rising and falling symmetrically. Vitals assessed. Pt call light in reach.

## 2025-01-09 NOTE — PROGRESS NOTES
1008 Pt in CT for CT guided right renal abscess drain insertion. Discussed procedure with pt and pt verbalizes understanding. Mother with pt.  1015 Dr Richter to speak to pt.  1025 Pt attached to monitor and positioned on left side on table. Pre scans taken.  1041 8 fr APD inserted in right renal abscess via right lateral flank area. 6 ml's dark red fluid obtained and sent to lab.  1044 Post image taken. Drain secured with stay-fix and op-site dressing. Site without redness, swelling or hematoma.  1046 Drain connected to Liu-close drain and draining bloody fluid.  1051 Pt positioned on cart for comfort. Transferred to ED per cart and report to RN.

## 2025-01-09 NOTE — PLAN OF CARE
Spoke with patient and his mother. Patient does not wish to be resuscitated if he were to go into cardiac arrest. Does not want intubation, compressions, resuscitative meds, shocks. Refused even oxygen. He has documentation for DNR which he showed me. Patient was made DNR CCA per his wish.

## 2025-01-09 NOTE — ED NOTES
ED to inpatient nurses report      Chief Complaint:  Chief Complaint   Patient presents with    Dizziness     Present to ED from: home    MOA:     LOC: alert and orientated to name, place, date  Mobility: Requires assistance * 2  Oxygen Baseline: 96%    Current needs required: RA     Code Status:   DNR-CCA    What abnormal results were found and what did you give/do to treat them? See imaging  Any procedures or intervention occur? Ct guided abscess drain    Mental Status:  Level of Consciousness: Alert (0)    Psych Assessment:        Vitals:  Patient Vitals for the past 24 hrs:   BP Pulse Resp SpO2   01/09/25 1149 (!) 146/96 85 16 97 %   01/09/25 1045 (!) 137/91 96 13 99 %   01/09/25 1028 133/86 94 12 97 %   01/09/25 0900 (!) 144/96 94 14 96 %   01/09/25 0632 (!) 147/95 76 14 97 %   01/09/25 0512 (!) 158/96 76 14 96 %   01/09/25 0448 (!) 151/96 92 16 96 %   01/09/25 0340 -- 75 12 97 %   01/09/25 0242 (!) 166/106 99 25 95 %   01/09/25 0149 (!) 162/99 79 21 97 %   01/09/25 0047 (!) 161/99 80 10 96 %   01/09/25 0002 (!) 125/92 87 16 97 %   01/08/25 2302 (!) 134/91 86 20 94 %   01/08/25 2207 131/86 85 13 94 %   01/08/25 2111 121/77 (!) 108 12 95 %   01/08/25 2027 116/75 (!) 115 16 95 %   01/08/25 1959 134/86 97 11 99 %   01/08/25 1845 131/82 (!) 106 20 97 %   01/08/25 1842 131/82 (!) 101 11 --   01/08/25 1745 (!) 101/90 91 15 98 %   01/08/25 1630 122/71 89 11 98 %   01/08/25 1517 99/68 92 11 98 %   01/08/25 1353 119/85 (!) 103 15 98 %   01/08/25 1250 95/72 86 20 97 %        LDAs:   Peripheral IV 01/09/25 Posterior;Right Hand (Active)   Site Assessment Clean, dry & intact 01/09/25 0646   Line Status Normal saline locked 01/09/25 0646   Phlebitis Assessment No symptoms 01/09/25 0646   Infiltration Assessment 0 01/09/25 0646   Alcohol Cap Used Yes 01/09/25 0646   Dressing Status Clean, dry & intact 01/09/25 0646   Dressing Type Transparent 01/09/25 0646       Ambulatory Status:  Presents to emergency department  because of  PM

## 2025-01-09 NOTE — ED NOTES
Pt resting in bed. Vitals assessed. Pt call light in reach. RR easy and unlabored. IV saline locked at this time.

## 2025-01-09 NOTE — ED NOTES
ED to inpatient nurses report      Chief Complaint:  Chief Complaint   Patient presents with    Dizziness     Present to ED from: Home    MOA:     LOC: alert and orientated to name, place, date  Mobility: Requires assistance * 2  Oxygen Baseline: Room Air    Current needs required: Room Air     Code Status:   Full Code    What abnormal results were found and what did you give/do to treat them? Syncope and Collapse  Any procedures or intervention occur? Labs, MRI pelvis, Potassium, Vancocin, 1 L NS, CT abdomen, Xray Chest, TTE    Mental Status:  Level of Consciousness: Alert (0)    Psych Assessment:        Vitals:  Patient Vitals for the past 24 hrs:   BP Temp Temp src Pulse Resp SpO2 Height Weight   01/08/25 2111 121/77 -- -- (!) 108 12 95 % -- --   01/08/25 2027 116/75 -- -- (!) 115 16 95 % -- --   01/08/25 1959 134/86 -- -- 97 11 99 % -- --   01/08/25 1845 131/82 -- -- (!) 106 20 97 % -- --   01/08/25 1842 131/82 -- -- (!) 101 11 -- -- --   01/08/25 1745 (!) 101/90 -- -- 91 15 98 % -- --   01/08/25 1630 122/71 -- -- 89 11 98 % -- --   01/08/25 1517 99/68 -- -- 92 11 98 % -- --   01/08/25 1353 119/85 -- -- (!) 103 15 98 % -- --   01/08/25 1250 95/72 -- -- 86 20 97 % -- --   01/08/25 1121 99/67 97.8 °F (36.6 °C) Oral 89 18 98 % 1.778 m (5' 10\") 77.1 kg (170 lb)        LDAs:   Peripheral IV 01/08/25 Distal;Left Cephalic (Active)   Site Assessment Clean, dry & intact 01/08/25 1630   Line Status Infusing 01/08/25 1630   Phlebitis Assessment No symptoms 01/08/25 1630   Infiltration Assessment 0 01/08/25 1630   Dressing Status Clean, dry & intact 01/08/25 1630       Ambulatory Status:  Presents to emergency department  because of falls (Syncope, seizure, or loss of consciousness): Yes, Age > 70: No, Altered Mental Status, Intoxication with alcohol or substance confusion (Disorientation, impaired judgment, poor safety awaremess, or inability to follow instructions): No, Impaired Mobility: Ambulates or transfers with  assistive devices or assistance; Unable to ambulate or transer.: Yes, Nursing Judgement: Yes    Diagnosis:  DISPOSITION Admitted 01/08/2025 05:19:13 PM   Final diagnoses:   Syncope and collapse        Consults:  PHARMACY TO DOSE VANCOMYCIN  IP CONSULT TO WOUND CARE PROVIDER     Pain Score:  Pain Assessment  Pain Assessment: None - Denies Pain    C-SSRS:        Sepsis Screening:       [place] Fall Risk:  [place] 1 Fall Risk  Presents to emergency department  because of falls (Syncope, seizure, or loss of consciousness): Yes  Age > 70: No  Altered Mental Status, Intoxication with alcohol or substance confusion (Disorientation, impaired judgment, poor safety awaremess, or inability to follow instructions): No  Impaired Mobility: Ambulates or transfers with assistive devices or assistance; Unable to ambulate or transer.: Yes  Nursing Judgement: Yes    Swallow Screening        Preferred Language:   English      ALLERGIES     Bee venom, Pork-derived products, Doxycycline, Tramadol, and Compazine [prochlorperazine]    SURGICAL HISTORY       Past Surgical History:   Procedure Laterality Date    ANKLE SURGERY Right 2/19/2021    BILAT TENDO ACHILLES LENGTHENING, FLEXOR DIGITORUM LONGUS TENDON AND PERONEAL TENDON LENGTHENING performed by Johnathan Cohen DPM at [facility] OR    APPENDECTOMY      BACK SURGERY  11/2016    BRAIN SURGERY  11/05/2016    CLOT REMOVED    CYSTOSCOPY  04/17/2017    CYSTOSCOPY Right 12/3/2024    CYSTOSCOPY RIGHT URETERAL STENT INSERTION and exchange of supra pubic catheter performed by Eder Diaz MD at [facility] OR    FACIAL RECONSTRUCTION SURGERY  2012    left zygomatic arch and sinus    FRACTURE SURGERY Left 11/2016    HARDWARE REMOVAL  2012    left zygomatic arch    OTHER SURGICAL HISTORY  01/09/2017    Laparoscopic Diverting Colostomy    OTHER SURGICAL HISTORY  01/09/2017    Excisional Debridment Sacral Decubitus Ulcer    OTHER SURGICAL HISTORY  04/17/2017    Placement of suprapubic catheter    [place] OFFICE/OUTPT

## 2025-01-09 NOTE — ED NOTES
FEI Islas was unable to flush the uresil as well. This RN informed to not try and flush the drain anymore. Janel states that she messaged the surgeon.

## 2025-01-09 NOTE — ED NOTES
This RN in room and gave pt glass of water. Pt fan connected per pt request. Respirations even and unlabored. No needs voiced. Will monitor.

## 2025-01-09 NOTE — ED NOTES
Pt resting in bed. Denies any needs. Vitals assessed. RR easy and unlabored. Call light in reach.

## 2025-01-09 NOTE — ED NOTES
Pt appears to be sleeping in bed. Vitals assessed. RR easy and unlabored. Chest rising and falling symmetrically. Call light in reach.

## 2025-01-09 NOTE — ED NOTES
Uresil not flushing. This RN called and spoke with EFI de los santos who states she will come down and look at it.

## 2025-01-09 NOTE — PROGRESS NOTES
Vancomycin Day: 2  Current Regimen: 1250 mg IV every 18 hours    Patient's labs, cultures, vitals, and vancomycin regimen reviewed.   SCr decreased significantly from 1.0 to 0.9  U/O not measured/well documented  [employer] updated    Plan:   Empirically adjust dose to 1000 mg Q12H, projected , trough 12 . Random vancomycin level 01/10/25 0600    Bonnie ThakurD 1/9/2025 11:52 AM

## 2025-01-09 NOTE — ED NOTES
Spoke with Damaris on 4K to approve the transport of patient to [facility]. Patient in stable condition at this time

## 2025-01-10 ENCOUNTER — APPOINTMENT (OUTPATIENT)
Age: 35
End: 2025-01-10
Payer: MEDICAID

## 2025-01-10 LAB
ALBUMIN SERPL BCG-MCNC: 2.6 G/DL (ref 3.5–5.1)
ALP SERPL-CCNC: 222 U/L (ref 38–126)
ALT SERPL W/O P-5'-P-CCNC: 47 U/L (ref 11–66)
ANION GAP SERPL CALC-SCNC: 15 MEQ/L (ref 8–16)
AST SERPL-CCNC: 37 U/L (ref 5–40)
BILIRUB CONJ SERPL-MCNC: < 0.1 MG/DL (ref 0.1–13.8)
BILIRUB SERPL-MCNC: 0.2 MG/DL (ref 0.3–1.2)
BUN SERPL-MCNC: 6 MG/DL (ref 7–22)
CALCIUM SERPL-MCNC: 8.8 MG/DL (ref 8.5–10.5)
CHLORIDE SERPL-SCNC: 102 MEQ/L (ref 98–111)
CO2 SERPL-SCNC: 25 MEQ/L (ref 23–33)
CREAT SERPL-MCNC: 1.1 MG/DL (ref 0.4–1.2)
DEPRECATED RDW RBC AUTO: 47.4 FL (ref 35–45)
ECHO AO ASC DIAM: 3.1 CM
ECHO AO ASCENDING AORTA INDEX: 1.59 CM/M2
ECHO AR MAX VEL PISA: 2.1 M/S
ECHO AV CUSP MM: 1.8 CM
ECHO AV PEAK GRADIENT: 7 MMHG
ECHO AV PEAK VELOCITY: 1.3 M/S
ECHO AV REGURGITANT PHT: 584 MS
ECHO AV VELOCITY RATIO: 0.92
ECHO BSA: 1.95 M2
ECHO EST RA PRESSURE: 5 MMHG
ECHO LA AREA 2C: 15.4 CM2
ECHO LA AREA 4C: 15.3 CM2
ECHO LA DIAMETER INDEX: 1.44 CM/M2
ECHO LA DIAMETER: 2.8 CM
ECHO LA MAJOR AXIS: 5.2 CM
ECHO LA MINOR AXIS: 4.9 CM
ECHO LA VOL BP: 38 ML (ref 18–58)
ECHO LA VOL MOD A2C: 38 ML (ref 18–58)
ECHO LA VOL MOD A4C: 35 ML (ref 18–58)
ECHO LA VOL/BSA BIPLANE: 19 ML/M2 (ref 16–34)
ECHO LA VOLUME INDEX MOD A2C: 19 ML/M2 (ref 16–34)
ECHO LA VOLUME INDEX MOD A4C: 18 ML/M2 (ref 16–34)
ECHO LV E' LATERAL VELOCITY: 8.7 CM/S
ECHO LV E' SEPTAL VELOCITY: 10.1 CM/S
ECHO LV EF PHYSICIAN: 60 %
ECHO LV FRACTIONAL SHORTENING: 33 % (ref 28–44)
ECHO LV INTERNAL DIMENSION DIASTOLE INDEX: 2.31 CM/M2
ECHO LV INTERNAL DIMENSION DIASTOLIC: 4.5 CM (ref 4.2–5.9)
ECHO LV INTERNAL DIMENSION SYSTOLIC INDEX: 1.54 CM/M2
ECHO LV INTERNAL DIMENSION SYSTOLIC: 3 CM
ECHO LV ISOVOLUMETRIC RELAXATION TIME (IVRT): 74 MS
ECHO LV IVSD: 0.7 CM (ref 0.6–1)
ECHO LV MASS 2D: 123.1 G (ref 88–224)
ECHO LV MASS INDEX 2D: 63.1 G/M2 (ref 49–115)
ECHO LV POSTERIOR WALL DIASTOLIC: 1 CM (ref 0.6–1)
ECHO LV RELATIVE WALL THICKNESS RATIO: 0.44
ECHO LVOT PEAK GRADIENT: 6 MMHG
ECHO LVOT PEAK VELOCITY: 1.2 M/S
ECHO MV A VELOCITY: 1.27 M/S
ECHO MV E DECELERATION TIME (DT): 215 MS
ECHO MV E VELOCITY: 0.89 M/S
ECHO MV E/A RATIO: 0.7
ECHO MV E/E' LATERAL: 10.23
ECHO MV E/E' RATIO (AVERAGED): 9.52
ECHO MV E/E' SEPTAL: 8.81
ECHO PV MAX VELOCITY: 0.6 M/S
ECHO PV PEAK GRADIENT: 2 MMHG
ECHO RIGHT VENTRICULAR SYSTOLIC PRESSURE (RVSP): 28 MMHG
ECHO RV INTERNAL DIMENSION: 2.3 CM
ECHO TV E WAVE: 0.6 M/S
ECHO TV REGURGITANT MAX VELOCITY: 2.42 M/S
ECHO TV REGURGITANT PEAK GRADIENT: 23 MMHG
ERYTHROCYTE [DISTWIDTH] IN BLOOD BY AUTOMATED COUNT: 15.2 % (ref 11.5–14.5)
GFR SERPL CREATININE-BSD FRML MDRD: > 90 ML/MIN/1.73M2
GLUCOSE SERPL-MCNC: 108 MG/DL (ref 70–108)
HCT VFR BLD AUTO: 27.5 % (ref 42–52)
HGB BLD-MCNC: 8.5 GM/DL (ref 14–18)
MCH RBC QN AUTO: 26.3 PG (ref 26–33)
MCHC RBC AUTO-ENTMCNC: 30.9 GM/DL (ref 32.2–35.5)
MCV RBC AUTO: 85.1 FL (ref 80–94)
PLATELET # BLD AUTO: 397 THOU/MM3 (ref 130–400)
PMV BLD AUTO: 8.5 FL (ref 9.4–12.4)
POTASSIUM SERPL-SCNC: 4.7 MEQ/L (ref 3.5–5.2)
PROT SERPL-MCNC: 6.2 G/DL (ref 6.1–8)
RBC # BLD AUTO: 3.23 MILL/MM3 (ref 4.7–6.1)
SODIUM SERPL-SCNC: 142 MEQ/L (ref 135–145)
VANCOMYCIN SERPL-MCNC: 32 UG/ML (ref 10–39.9)
WBC # BLD AUTO: 12.9 THOU/MM3 (ref 4.8–10.8)

## 2025-01-10 PROCEDURE — 6360000002 HC RX W HCPCS: Performed by: INTERNAL MEDICINE

## 2025-01-10 PROCEDURE — 2060000000 HC ICU INTERMEDIATE R&B

## 2025-01-10 PROCEDURE — 85027 COMPLETE CBC AUTOMATED: CPT

## 2025-01-10 PROCEDURE — 80202 ASSAY OF VANCOMYCIN: CPT

## 2025-01-10 PROCEDURE — 82248 BILIRUBIN DIRECT: CPT

## 2025-01-10 PROCEDURE — 36415 COLL VENOUS BLD VENIPUNCTURE: CPT

## 2025-01-10 PROCEDURE — 93306 TTE W/DOPPLER COMPLETE: CPT

## 2025-01-10 PROCEDURE — 80053 COMPREHEN METABOLIC PANEL: CPT

## 2025-01-10 PROCEDURE — 6370000000 HC RX 637 (ALT 250 FOR IP)

## 2025-01-10 PROCEDURE — APPNB30 APP NON BILLABLE TIME 0-30 MINS

## 2025-01-10 PROCEDURE — 6360000002 HC RX W HCPCS: Performed by: PHARMACIST

## 2025-01-10 PROCEDURE — 93306 TTE W/DOPPLER COMPLETE: CPT | Performed by: NUCLEAR MEDICINE

## 2025-01-10 PROCEDURE — 2500000003 HC RX 250 WO HCPCS: Performed by: INTERNAL MEDICINE

## 2025-01-10 PROCEDURE — 2580000003 HC RX 258: Performed by: PHARMACIST

## 2025-01-10 PROCEDURE — 99232 SBSQ HOSP IP/OBS MODERATE 35: CPT | Performed by: SURGERY

## 2025-01-10 PROCEDURE — 2500000003 HC RX 250 WO HCPCS

## 2025-01-10 RX ADMIN — BACLOFEN 10 MG: 10 TABLET ORAL at 09:40

## 2025-01-10 RX ADMIN — GABAPENTIN 800 MG: 400 CAPSULE ORAL at 08:26

## 2025-01-10 RX ADMIN — PANTOPRAZOLE SODIUM 40 MG: 40 TABLET, DELAYED RELEASE ORAL at 05:15

## 2025-01-10 RX ADMIN — VENLAFAXINE HYDROCHLORIDE 75 MG: 150 CAPSULE, EXTENDED RELEASE ORAL at 08:27

## 2025-01-10 RX ADMIN — SODIUM CHLORIDE, PRESERVATIVE FREE 10 ML: 5 INJECTION INTRAVENOUS at 21:04

## 2025-01-10 RX ADMIN — GABAPENTIN 800 MG: 400 CAPSULE ORAL at 13:30

## 2025-01-10 RX ADMIN — CYCLOBENZAPRINE 10 MG: 10 TABLET, FILM COATED ORAL at 21:02

## 2025-01-10 RX ADMIN — BUPRENORPHINE AND NALOXONE 1 FILM: 8; 2 FILM BUCCAL; SUBLINGUAL at 08:25

## 2025-01-10 RX ADMIN — CYCLOBENZAPRINE 10 MG: 10 TABLET, FILM COATED ORAL at 08:26

## 2025-01-10 RX ADMIN — BUSPIRONE HYDROCHLORIDE 10 MG: 10 TABLET ORAL at 08:26

## 2025-01-10 RX ADMIN — FERROUS SULFATE TAB 325 MG (65 MG ELEMENTAL FE) 325 MG: 325 (65 FE) TAB at 08:26

## 2025-01-10 RX ADMIN — TAMSULOSIN HYDROCHLORIDE 0.4 MG: 0.4 CAPSULE ORAL at 08:26

## 2025-01-10 RX ADMIN — GABAPENTIN 800 MG: 400 CAPSULE ORAL at 21:02

## 2025-01-10 RX ADMIN — BUPRENORPHINE AND NALOXONE 1 FILM: 8; 2 FILM BUCCAL; SUBLINGUAL at 21:02

## 2025-01-10 RX ADMIN — BUSPIRONE HYDROCHLORIDE 10 MG: 10 TABLET ORAL at 21:02

## 2025-01-10 RX ADMIN — VENLAFAXINE HYDROCHLORIDE 150 MG: 150 CAPSULE, EXTENDED RELEASE ORAL at 08:26

## 2025-01-10 RX ADMIN — BUPRENORPHINE AND NALOXONE 1 FILM: 8; 2 FILM BUCCAL; SUBLINGUAL at 13:32

## 2025-01-10 RX ADMIN — BACLOFEN 10 MG: 10 TABLET ORAL at 21:02

## 2025-01-10 RX ADMIN — TROSPIUM CHLORIDE 20 MG: 20 TABLET, FILM COATED ORAL at 08:26

## 2025-01-10 RX ADMIN — VANCOMYCIN HYDROCHLORIDE 1000 MG: 1 INJECTION, POWDER, LYOPHILIZED, FOR SOLUTION INTRAVENOUS at 00:12

## 2025-01-10 RX ADMIN — Medication 3 MG: at 21:02

## 2025-01-10 RX ADMIN — GABAPENTIN 800 MG: 400 CAPSULE ORAL at 17:08

## 2025-01-10 RX ADMIN — BUSPIRONE HYDROCHLORIDE 10 MG: 10 TABLET ORAL at 13:30

## 2025-01-10 RX ADMIN — CEFEPIME 2000 MG: 2 INJECTION, POWDER, FOR SOLUTION INTRAVENOUS at 14:46

## 2025-01-10 ASSESSMENT — PAIN SCALES - GENERAL
PAINLEVEL_OUTOF10: 3
PAINLEVEL_OUTOF10: 3

## 2025-01-10 ASSESSMENT — PAIN DESCRIPTION - LOCATION
LOCATION: ABDOMEN
LOCATION: ABDOMEN
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
## 2025-01-10 NOTE — PROGRESS NOTES
[facility]  [address]  General Surgery Daily Progress Note    Pt Name: Srikanth Coy  Medical Record Number: [id number]  Date of Birth 1990   Today's Date: 1/10/2025    Hospital day # 2     ASSESSMENT   Chronic sacral and ischial wounds/pressure ulcers  Perinephric abscess s/p IR drain placement 1/9/2025  Paraplegia  Colostomy  Neurogenic bladder  Tobacco abuse  H/o osteomyelitis   has a past medical history of COPD (chronic obstructive pulmonary disease) ([facility]), Depression, Fracture of lower leg, Gastroesophageal reflux disease, Hyperthyroidism, Hypoalbuminemia, Kidney stone, MDRO (multiple drug resistant organisms) resistance, Paraplegia ([facility]), Paraplegic gait, Pneumonia, Prolonged emergence from general anesthesia, and Suprapubic catheter ([facility]).  PLAN   MRI showed Small 0.8 x 1.5 x 1 cm abscess seen at the deep margin of the left pelvic decubitus ulcer abutting the left ischial tuberosity  Antibiotics per primary  IR for CT-guided drain placement of Sylvester nephric fluid collection.  Drain with approximately 100 of sanguinous fluid.  Okay for diet as tolerated from surgical standpoint.   Dietitian for further dietary management.  Wound healing oral supplement added.  Continue wound care.  Wound care consulted.  Hospitalist for medical management  Conservative management at this time. Continue wound care. Wounds without signs of infection. No surgical intervention at this time.  Surgery signing off.  Reconsult as needed.  SUBJECTIVE   Chief complaint: Syncope / Wounds    Patient was stable overnight. Chart reviewed. Denies chest discomfort or dyspnea.  Patient lying in bed.  Mom at bedside.  No N/V; (+) belching, flatus. (-) BM. Patient not eating much, states he is not hungry. Tolerating ADULT DIET; Regular; Pescatarian  ADULT ORAL NUTRITION SUPPLEMENT; Breakfast; Other Oral Supplement; HB eggs  ADULT ORAL NUTRITION SUPPLEMENT; Lunch, Dinner; Other Oral  %  Pulse Ox Range (24h):  SpO2  Av.7 %  Min: 94 %  Max: 100 %  Oxygen Amount and Delivery:    Incentive Spirometry Tx:            GENERAL: alert, cooperative, no distress  SKIN: See previous images.  Not much change from yesterday.  HEENT: Head is normocephalic, atraumatic.   NECK: Supple, symmetrical, trachea midline, and no tenderness, skin normal.  LUNGS: clear to ausculation, without wheezes, rales or rhonci  HEART: Regular rate and regular rhythm  ABDOMEN: soft, non-tender, non-distended, bowel sounds present, and no guarding or peritoneal signs. Ostomy functioning. Plaques and scabs from picking noted.   NEUROLOGIC: Paraplegic  EXTREMITIES: no cyanosis, no clubbing, and no edema.  In: 240 [P.O.:240]  Out: 1600 [Urine:1600]  Date 01/10/25 0000 - 01/10/25 2359   Shift 9350-3397 1907-9432 1439-1055 24 Hour Total   INTAKE   Shift Total(mL/kg)       OUTPUT   Urine(mL/kg/hr) 900(1.5) 100  1000   Shift Total(mL/kg) 900(11.7) 100(1.3)  1000(13)   Weight (kg) 77.1 77.1 77.1 77.1     LABS     Recent Labs     25  1203 25  0645 25  0739 01/10/25  0425   WBC 17.7* 18.1*  --  12.9*   HGB 9.8* 9.9*  --  8.5*   HCT 30.6* 30.7*  --  27.5*   * 485*  --  397   *  --  138  --    K 3.2*  --  3.6  --    CL 91*  --  96*  --    CO2 28  --  26  --    BUN 6*  --  5*  --    CREATININE 1.0  --  0.9  --    MG 1.7  --  1.6  --    PHOS  --   --  2.6  --    CALCIUM 9.1  --  9.5  --       Recent Labs     25  0710   INR 1.39*     Recent Labs     25  1203 25  0645 25  0739   AST 52*  --  63*   ALT 68*  --  67*   BILITOT 0.3  --  0.3   BILIDIR  --   --  <0.1   LACTA  --  0.9  --      No results for input(s): \"TROPONINT\" in the last 72 hours.  CBC:   Lab Results   Component Value Date/Time    WBC 12.9 01/10/2025 04:25 AM    RBC 3.23 01/10/2025 04:25 AM    HGB 8.5 01/10/2025 04:25 AM    HCT 27.5 01/10/2025 04:25 AM    MCV 85.1 01/10/2025 04:25 AM    MCH 26.3 01/10/2025 04:25 AM    MCHC
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
## 2025-01-10 NOTE — PLAN OF CARE
Problem: Discharge Planning  Goal: Discharge to home or other facility with appropriate resources  Outcome: Progressing  Flowsheets  Taken 1/9/2025 1927 by Edmar Chew RN  Discharge to home or other facility with appropriate resources:   Identify barriers to discharge with patient and caregiver   Arrange for needed discharge resources and transportation as appropriate  Taken 1/9/2025 1600 by Lalita Easton RN  Discharge to home or other facility with appropriate resources:   Identify barriers to discharge with patient and caregiver   Arrange for needed discharge resources and transportation as appropriate   Identify discharge learning needs (meds, wound care, etc)     Problem: Safety - Adult  Goal: Free from fall injury  Outcome: Progressing  Flowsheets (Taken 1/9/2025 1927)  Free From Fall Injury: Instruct family/caregiver on patient safety     Problem: ABCDS Injury Assessment  Goal: Absence of physical injury  Outcome: Progressing  Flowsheets (Taken 1/9/2025 1927)  Absence of Physical Injury: Implement safety measures based on patient assessment     Problem: Skin/Tissue Integrity  Goal: Absence of new skin breakdown  Description: 1.  Monitor for areas of redness and/or skin breakdown  2.  Assess vascular access sites hourly  3.  Every 4-6 hours minimum:  Change oxygen saturation probe site  4.  Every 4-6 hours:  If on nasal continuous positive airway pressure, respiratory therapy assess nares and determine need for appliance change or resting period.  Outcome: Progressing     Problem: Neurosensory - Adult  Goal: Achieves stable or improved neurological status  Outcome: Progressing  Flowsheets  Taken 1/9/2025 1927 by Edmar Chew RN  Achieves stable or improved neurological status:   Assess for and report changes in neurological status   Initiate measures to prevent increased intracranial pressure  Taken 1/9/2025 1600 by Lalita Easton RN  Achieves stable or improved neurological status:    with transfers and ambulation using safe patient handling equipment as needed   Apply continuous passive motion per provider or physical therapy orders to increase flexion toward goal   Obtain physical therapy/occupational therapy consults as needed   Instruct patient/family in ordered activity level     Problem: Musculoskeletal - Adult  Goal: Maintain proper alignment of affected body part  Outcome: Progressing  Flowsheets  Taken 1/9/2025 1927 by Edmar Chew RN  Maintain proper alignment of affected body part: Support and protect limb and body alignment per provider's orders  Taken 1/9/2025 1600 by Lalita Easton RN  Maintain proper alignment of affected body part:   Support and protect limb and body alignment per provider's orders   Instruct and reinforce with patient and family use of appropriate assistive device and precautions (e.g. spinal or hip dislocation precautions)     Problem: Musculoskeletal - Adult  Goal: Return ADL status to a safe level of function  Outcome: Progressing  Flowsheets  Taken 1/9/2025 1927 by Edmar Chew RN  Return ADL Status to a Safe Level of Function:   Administer medication as ordered   Assess activities of daily living deficits and provide assistive devices as needed  Taken 1/9/2025 1600 by Lalita Easton RN  Return ADL Status to a Safe Level of Function:   Administer medication as ordered   Assess activities of daily living deficits and provide assistive devices as needed   Assist and instruct patient to increase activity and self care as tolerated   Obtain physical therapy/occupational therapy consults as needed     Problem: Gastrointestinal - Adult  Goal: Minimal or absence of nausea and vomiting  Outcome: Progressing  Flowsheets  Taken 1/9/2025 1927 by Edmar Chew RN  Minimal or absence of nausea and vomiting:   Administer IV fluids as ordered to ensure adequate hydration   Administer ordered antiemetic medications as needed  Taken 1/9/2025 1600 by  during anticipated neutropenic period:   Administer growth factors as ordered   Monitor white blood cell count     Problem: Metabolic/Fluid and Electrolytes - Adult  Goal: Electrolytes maintained within normal limits  Outcome: Progressing  Flowsheets (Taken 1/9/2025 1600 by Lalita Easton, RN)  Electrolytes maintained within normal limits:   Monitor labs and assess patient for signs and symptoms of electrolyte imbalances   Monitor response to electrolyte replacements, including repeat lab results as appropriate   Administer electrolyte replacement as ordered   Fluid restriction as ordered   Instruct patient on fluid and nutrition restrictions as appropriate     Problem: Hematologic - Adult  Goal: Maintains hematologic stability  Outcome: Progressing  Flowsheets (Taken 1/9/2025 1600 by Lalita Easton, RN)  Maintains hematologic stability:   Assess for signs and symptoms of bleeding or hemorrhage   Monitor labs for bleeding or clotting disorders

## 2025-01-10 NOTE — PROCEDURES
PROCEDURE NOTE  Date: 1/9/2025   Name: Srikanth Coy  YOB: 1990    Procedures  12 lead EKG completed. Results handed to Lalita ENAMORADO. Hilda GRUBER

## 2025-01-10 NOTE — PLAN OF CARE
Problem: Discharge Planning  Goal: Discharge to home or other facility with appropriate resources  1/10/2025 0834 by Mayda Gonzalez RN  Outcome: Progressing  Flowsheets (Taken 1/10/2025 0815)  Discharge to home or other facility with appropriate resources: Identify barriers to discharge with patient and caregiver  1/9/2025 1927 by Edmar Chew RN  Outcome: Progressing  Flowsheets  Taken 1/9/2025 1927 by Edmar Chew RN  Discharge to home or other facility with appropriate resources:   Identify barriers to discharge with patient and caregiver   Arrange for needed discharge resources and transportation as appropriate  Taken 1/9/2025 1600 by Lalita Easton RN  Discharge to home or other facility with appropriate resources:   Identify barriers to discharge with patient and caregiver   Arrange for needed discharge resources and transportation as appropriate   Identify discharge learning needs (meds, wound care, etc)     Problem: Safety - Adult  Goal: Free from fall injury  1/10/2025 0834 by Mayda Gonzalez RN  Outcome: Progressing  1/9/2025 1927 by Edmar Chew RN  Outcome: Progressing  Flowsheets (Taken 1/9/2025 1927)  Free From Fall Injury: Instruct family/caregiver on patient safety     Problem: ABCDS Injury Assessment  Goal: Absence of physical injury  1/10/2025 0834 by Mayda Gonzalez RN  Outcome: Progressing  1/9/2025 1927 by Edmar Chew RN  Outcome: Progressing  Flowsheets (Taken 1/9/2025 1927)  Absence of Physical Injury: Implement safety measures based on patient assessment     Problem: Skin/Tissue Integrity  Goal: Absence of new skin breakdown  Description: 1.  Monitor for areas of redness and/or skin breakdown  2.  Assess vascular access sites hourly  3.  Every 4-6 hours minimum:  Change oxygen saturation probe site  4.  Every 4-6 hours:  If on nasal continuous positive airway pressure, respiratory therapy assess nares and determine need for appliance change or resting  period.  1/10/2025 0834 by Mayda Gonzalez RN  Outcome: Progressing  1/9/2025 1927 by Edmar Chew RN  Outcome: Progressing     Problem: Neurosensory - Adult  Goal: Achieves stable or improved neurological status  1/10/2025 0834 by Mayda Gonzalez RN  Outcome: Progressing  Flowsheets (Taken 1/10/2025 0815)  Achieves stable or improved neurological status: Assess for and report changes in neurological status  1/9/2025 1927 by Edmar Chew RN  Outcome: Progressing  Flowsheets  Taken 1/9/2025 1927 by Edmar Chew RN  Achieves stable or improved neurological status:   Assess for and report changes in neurological status   Initiate measures to prevent increased intracranial pressure  Taken 1/9/2025 1600 by Lalita Easton RN  Achieves stable or improved neurological status:   Assess for and report changes in neurological status   Initiate measures to prevent increased intracranial pressure   Maintain blood pressure and fluid volume within ordered parameters to optimize cerebral perfusion and minimize risk of hemorrhage   Monitor temperature, glucose, and sodium. Initiate appropriate interventions as ordered  Goal: Achieves maximal functionality and self care  1/10/2025 0834 by Mayda Gonzalez RN  Outcome: Progressing  Flowsheets (Taken 1/10/2025 0815)  Achieves maximal functionality and self care: Monitor swallowing and airway patency with patient fatigue and changes in neurological status  1/9/2025 1927 by Edmar Chew RN  Outcome: Progressing  Flowsheets  Taken 1/9/2025 1927 by Edmar Chew RN  Achieves maximal functionality and self care:   Monitor swallowing and airway patency with patient fatigue and changes in neurological status   Encourage and assist patient to increase activity and self care with guidance from physical therapy/occupational therapy  Taken 1/9/2025 1600 by Lalita Easton RN  Achieves maximal functionality and self care:   Monitor swallowing and airway patency with
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
## 2025-01-10 NOTE — PROGRESS NOTES
Pharmacy Note - Extended Infusion Beta-Lactam Dose Adjustment    Cefepime 2000 mg q12h intermittent infusion for treatment of Intra-abdominal Infection. Per [facility] Extended Infusion Beta-Lactam Policy, cefepime will be changed to 2000 mg loading dose followed by 2000 mg q12h extended infusion    Estimated Creatinine Clearance: Estimated Creatinine Clearance: 100 mL/min (based on SCr of 1.1 mg/dL).    Dialysis Status, EARLINE, CKD: Normal    BMI: Body mass index is 24.39 kg/m².    Rationale for Adjustment: Dose adjusted per [facility] Extended Infusion Policy based on renal function and indication. The above medication is renally eliminated and demonstrates time-dependent effects on bacterial eradication. Extended-infusion dosing strategy aims to enhance microbiologic and clinical efficacy.     Pharmacy will monitor renal function daily and adjust dose as necessary.      Please call with any questions.    Thank you,  Lilian Pemberton, PharmD, BCPS   1/10/2025  1:18 PM
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
## 2025-01-11 LAB
ANION GAP SERPL CALC-SCNC: 14 MEQ/L (ref 8–16)
BUN SERPL-MCNC: 7 MG/DL (ref 7–22)
CALCIUM SERPL-MCNC: 9.1 MG/DL (ref 8.5–10.5)
CHLORIDE SERPL-SCNC: 102 MEQ/L (ref 98–111)
CO2 SERPL-SCNC: 25 MEQ/L (ref 23–33)
CREAT SERPL-MCNC: 1 MG/DL (ref 0.4–1.2)
DEPRECATED RDW RBC AUTO: 47.1 FL (ref 35–45)
ERYTHROCYTE [DISTWIDTH] IN BLOOD BY AUTOMATED COUNT: 15.5 % (ref 11.5–14.5)
GFR SERPL CREATININE-BSD FRML MDRD: > 90 ML/MIN/1.73M2
GLUCOSE SERPL-MCNC: 108 MG/DL (ref 70–108)
HCT VFR BLD AUTO: 29.8 % (ref 42–52)
HGB BLD-MCNC: 9.3 GM/DL (ref 14–18)
MCH RBC QN AUTO: 26.1 PG (ref 26–33)
MCHC RBC AUTO-ENTMCNC: 31.2 GM/DL (ref 32.2–35.5)
MCV RBC AUTO: 83.5 FL (ref 80–94)
PLATELET # BLD AUTO: 450 THOU/MM3 (ref 130–400)
PMV BLD AUTO: 8.3 FL (ref 9.4–12.4)
POTASSIUM SERPL-SCNC: 3.7 MEQ/L (ref 3.5–5.2)
RBC # BLD AUTO: 3.57 MILL/MM3 (ref 4.7–6.1)
SODIUM SERPL-SCNC: 141 MEQ/L (ref 135–145)
WBC # BLD AUTO: 11.7 THOU/MM3 (ref 4.8–10.8)

## 2025-01-11 PROCEDURE — 85027 COMPLETE CBC AUTOMATED: CPT

## 2025-01-11 PROCEDURE — 6360000002 HC RX W HCPCS

## 2025-01-11 PROCEDURE — 2580000003 HC RX 258: Performed by: INTERNAL MEDICINE

## 2025-01-11 PROCEDURE — 80048 BASIC METABOLIC PNL TOTAL CA: CPT

## 2025-01-11 PROCEDURE — 87086 URINE CULTURE/COLONY COUNT: CPT

## 2025-01-11 PROCEDURE — 87077 CULTURE AEROBIC IDENTIFY: CPT

## 2025-01-11 PROCEDURE — 6370000000 HC RX 637 (ALT 250 FOR IP)

## 2025-01-11 PROCEDURE — 36415 COLL VENOUS BLD VENIPUNCTURE: CPT

## 2025-01-11 PROCEDURE — 2580000003 HC RX 258

## 2025-01-11 PROCEDURE — 6360000002 HC RX W HCPCS: Performed by: INTERNAL MEDICINE

## 2025-01-11 PROCEDURE — 99232 SBSQ HOSP IP/OBS MODERATE 35: CPT | Performed by: INTERNAL MEDICINE

## 2025-01-11 PROCEDURE — 2060000000 HC ICU INTERMEDIATE R&B

## 2025-01-11 PROCEDURE — 2500000003 HC RX 250 WO HCPCS

## 2025-01-11 PROCEDURE — 87186 SC STD MICRODIL/AGAR DIL: CPT

## 2025-01-11 RX ORDER — ACETIC ACID 0.25 G/100ML
60 IRRIGANT IRRIGATION DAILY
Status: CANCELLED | OUTPATIENT
Start: 2025-01-11

## 2025-01-11 RX ADMIN — GABAPENTIN 800 MG: 400 CAPSULE ORAL at 09:46

## 2025-01-11 RX ADMIN — CEFEPIME 2000 MG: 2 INJECTION, POWDER, FOR SOLUTION INTRAVENOUS at 02:35

## 2025-01-11 RX ADMIN — BACLOFEN 10 MG: 10 TABLET ORAL at 21:54

## 2025-01-11 RX ADMIN — TROSPIUM CHLORIDE 20 MG: 20 TABLET, FILM COATED ORAL at 21:53

## 2025-01-11 RX ADMIN — VANCOMYCIN HYDROCHLORIDE 1000 MG: 1 INJECTION, POWDER, LYOPHILIZED, FOR SOLUTION INTRAVENOUS at 13:33

## 2025-01-11 RX ADMIN — GABAPENTIN 800 MG: 400 CAPSULE ORAL at 13:34

## 2025-01-11 RX ADMIN — FERROUS SULFATE TAB 325 MG (65 MG ELEMENTAL FE) 325 MG: 325 (65 FE) TAB at 09:46

## 2025-01-11 RX ADMIN — BUPRENORPHINE AND NALOXONE 1 FILM: 8; 2 FILM BUCCAL; SUBLINGUAL at 09:45

## 2025-01-11 RX ADMIN — TROSPIUM CHLORIDE 20 MG: 20 TABLET, FILM COATED ORAL at 09:45

## 2025-01-11 RX ADMIN — SODIUM CHLORIDE, PRESERVATIVE FREE 10 ML: 5 INJECTION INTRAVENOUS at 09:33

## 2025-01-11 RX ADMIN — TAMSULOSIN HYDROCHLORIDE 0.4 MG: 0.4 CAPSULE ORAL at 09:46

## 2025-01-11 RX ADMIN — CYCLOBENZAPRINE 10 MG: 10 TABLET, FILM COATED ORAL at 09:46

## 2025-01-11 RX ADMIN — BUPRENORPHINE AND NALOXONE 1 FILM: 8; 2 FILM BUCCAL; SUBLINGUAL at 21:53

## 2025-01-11 RX ADMIN — BUSPIRONE HYDROCHLORIDE 10 MG: 10 TABLET ORAL at 13:34

## 2025-01-11 RX ADMIN — BACLOFEN 10 MG: 10 TABLET ORAL at 09:45

## 2025-01-11 RX ADMIN — VENLAFAXINE HYDROCHLORIDE 150 MG: 150 CAPSULE, EXTENDED RELEASE ORAL at 09:46

## 2025-01-11 RX ADMIN — CEFEPIME 2000 MG: 2 INJECTION, POWDER, FOR SOLUTION INTRAVENOUS at 16:39

## 2025-01-11 RX ADMIN — VENLAFAXINE HYDROCHLORIDE 75 MG: 150 CAPSULE, EXTENDED RELEASE ORAL at 09:45

## 2025-01-11 RX ADMIN — BUSPIRONE HYDROCHLORIDE 10 MG: 10 TABLET ORAL at 21:54

## 2025-01-11 RX ADMIN — PANTOPRAZOLE SODIUM 40 MG: 40 TABLET, DELAYED RELEASE ORAL at 06:15

## 2025-01-11 RX ADMIN — GABAPENTIN 800 MG: 400 CAPSULE ORAL at 21:54

## 2025-01-11 RX ADMIN — CYCLOBENZAPRINE 10 MG: 10 TABLET, FILM COATED ORAL at 21:53

## 2025-01-11 RX ADMIN — SODIUM CHLORIDE, PRESERVATIVE FREE 10 ML: 5 INJECTION INTRAVENOUS at 21:54

## 2025-01-11 RX ADMIN — BUSPIRONE HYDROCHLORIDE 10 MG: 10 TABLET ORAL at 09:46

## 2025-01-11 RX ADMIN — BUPRENORPHINE AND NALOXONE 1 FILM: 8; 2 FILM BUCCAL; SUBLINGUAL at 13:34

## 2025-01-11 RX ADMIN — VANCOMYCIN HYDROCHLORIDE 1000 MG: 1 INJECTION, POWDER, LYOPHILIZED, FOR SOLUTION INTRAVENOUS at 21:57

## 2025-01-11 ASSESSMENT — PAIN SCALES - GENERAL: PAINLEVEL_OUTOF10: 0

## 2025-01-11 NOTE — PROGRESS NOTES
Attending supervising physicians attestation statement:       I Discussed the findings and plans with the Resident physician  personally   and agree with the  note as outlined below  . spoke with the staff  Regarding care plans and recommendations.  Patient was seen and examined by this provider.         Electronically signed by Gerald Edwards MD on 1/11/2025 at 2:16 PM                   Hospitalist Progress Note  Internal Medicine Resident      Patient: Srikanth Coy 34 y.o. male      Unit/Bed: -28/028-A    Admit Date: 1/8/2025      ASSESSMENT AND PLAN  Active Problems    Right-sided perinephric abscess s/p I&D with drain placement 1/9  Complicated UTI  History of recent right sided nephrolithiasis 5.5 mm s/p stent placement 12/3 and lithotripsy with stent exchange 12/18  11 cm perinephric abscess on CTAP 1/8. Patient with history of prior complicated UTI secondary to obstructive nephrolithiasis with urine culture growing Enterobacter Cloacea and stenotrophomonas maltophilia. Kidney stone blasted recently and he has evidence of subsequent infection. Underwent I&D by IR 1/9. Patient still has drain in place. Urine culture and abscess positive for E. Coli. Has been on cefepime and was on vancomycin which was stopped by ID 1/9. Vancomycin restarted due to urine culture revealing MRSA. Continue to follow abscess culture. Cefepime day 4 and vancomycin day 3. His suprapubic catheter was exchanged 1/12. Ordered new cultures from [facility]. Leukocytosis improved with antibiotics 19.7 1/9 => 12.9 1/10. Blood culture NG 2 day.     Orthostatic syncope  Patient brought to the ED initially for concern for syncope. Has remote history of syncope in 2017. Patient became lightheaded during ambulatory transfer. Positional. Denied palpitations, N/V, chest pain beforehand. No witnessed shaking. LOC for a few minutes. Poor oral intake the past few days. Suspect orthostatic syncope given SBP in 80s, poor oral intake, and  vancomycin (VANCOCIN) intermittent dosing (placeholder)   Other RX Placeholder    cefepime  2,000 mg IntraVENous Q12H    baclofen  10 mg Oral BID    busPIRone  10 mg Oral TID    ferrous sulfate  325 mg Oral Daily with breakfast    gabapentin  800 mg Oral 4x Daily    trospium  20 mg Oral BID    pantoprazole  40 mg Oral QAM AC    buprenorphine-naloxone  1 Film SubLINGual TID    tamsulosin  0.4 mg Oral Daily    cyclobenzaprine  10 mg Oral BID    venlafaxine  75 mg Oral Daily with breakfast    And    venlafaxine  150 mg Oral Daily with breakfast    sodium chloride flush  5-40 mL IntraVENous 2 times per day    PRN Meds: albuterol sulfate HFA, hydrOXYzine HCl, ibuprofen, oxyBUTYnin, tiZANidine, perflutren lipid microspheres, sodium chloride flush, sodium chloride, potassium chloride **OR** potassium alternative oral replacement **OR** potassium chloride, magnesium sulfate, ondansetron **OR** ondansetron, polyethylene glycol, acetaminophen **OR** acetaminophen, potassium chloride, melatonin    Exam:  BP (!) 129/92   Pulse (!) 113   Temp 98.6 °F (37 °C) (Oral)   Resp 18   Ht 1.778 m (5' 10\")   Wt 77.1 kg (170 lb)   SpO2 97%   BMI 24.39 kg/m²   General appearance: No apparent distress, appears stated age.  Eyes:  Pupils equal, round, and reactive to light. Conjunctivae/corneas clear.  HENT: Head normal in appearance. External nares normal. Oral mucosa moist without lesions. Hearing grossly intact.   Neck: Supple, with full range of motion. Trachea midline. No gross JVD appreciated.  Respiratory:  Normal respiratory effort. Clear to auscultation, bilaterally without rales or wheezes or rhonchi.  Cardiovascular: Normal rate, regular rhythm with normal S1/S2 without murmurs. No lower extremity edema.   Abdomen: Soft, non-tender, non-distended with normal bowel sounds.  Musculoskeletal: No joint swelling or tenderness. Normal tone. No abnormal movements. No CVA tenderness.  Skin: Warm and dry. No rashes or  lesions.  Psychiatric: Alert and oriented, normal insight and thought content.   Capillary Refill: Brisk,< 3 seconds.  Peripheral Pulses: +2 palpable, equal bilaterally.       Labs/Radiology: See chart or assessment above.     Electronically signed by Ghada Richards DO on 1/11/2025 at 12:31 PM  Case was discussed with Attending, Dr. Edwards.

## 2025-01-11 NOTE — PLAN OF CARE
Problem: Discharge Planning  Goal: Discharge to home or other facility with appropriate resources  1/10/2025 2110 by Edmar Chew RN  Outcome: Progressing  Flowsheets (Taken 1/10/2025 2110)  Discharge to home or other facility with appropriate resources:   Identify barriers to discharge with patient and caregiver   Arrange for needed discharge resources and transportation as appropriate     Problem: Safety - Adult  Goal: Free from fall injury  1/10/2025 2110 by Edmar Chew RN  Outcome: Progressing  Flowsheets (Taken 1/10/2025 2110)  Free From Fall Injury: Instruct family/caregiver on patient safety     Problem: ABCDS Injury Assessment  Goal: Absence of physical injury  1/10/2025 2110 by Edmar Chew RN  Outcome: Progressing  Flowsheets (Taken 1/10/2025 2110)  Absence of Physical Injury: Implement safety measures based on patient assessment     Problem: Skin/Tissue Integrity  Goal: Absence of new skin breakdown  Description: 1.  Monitor for areas of redness and/or skin breakdown  2.  Assess vascular access sites hourly  3.  Every 4-6 hours minimum:  Change oxygen saturation probe site  4.  Every 4-6 hours:  If on nasal continuous positive airway pressure, respiratory therapy assess nares and determine need for appliance change or resting period.  1/10/2025 2110 by Edmar Chew RN  Outcome: Progressing     Problem: Neurosensory - Adult  Goal: Achieves stable or improved neurological status  1/10/2025 2110 by Edmar Chew RN  Outcome: Progressing  Flowsheets (Taken 1/10/2025 2110)  Achieves stable or improved neurological status:   Assess for and report changes in neurological status   Initiate measures to prevent increased intracranial pressure     Problem: Neurosensory - Adult  Goal: Achieves maximal functionality and self care  1/10/2025 2110 by Edmar Chew, RN  Outcome: Progressing  Flowsheets (Taken 1/10/2025 2110)  Achieves maximal functionality and self care:   Monitor swallowing and airway

## 2025-01-11 NOTE — CARE COORDINATION
DISCHARGE PLANNING EVALUATION  1/11/25, 11:31 AM EST    Reason for Referral: discharge needs   Decision Maker:  makes own decisions, mother is next of kin  Current Services: has waiver services, no aids available at this time  New Services Requested:  possible home health  Family/ Social/ Home environment: Srikanth lives at home alone, his parents visit several times daily to assist with care, including personal care, transportation and housekeeping   Payment Source: medicaid  Transportation at Discharge: family  Post-acute (PAC) provider list was provided to patient. Patient was informed of their freedom to choose PAC provider. Discussed and offered to show the patient the relevant PAC Providers quality and resource use measures on Medicare Compare web site via computer based on patient's goals of care and treatment preferences. Questions regarding selection process were answered.      Teach Back Method used with patient's mother, Brandy regarding care plan and discharge plan  Patient's mother Brandy verbalized understanding of the plan of care and contribute to goal setting.       Patient preferences and discharge plan:  spoke with patient's mother, Brandy.  She confirms that patient does have waiver services, but there are no aids currently available.  She and patient's father assist at home daily, provide transportation, housekeeping, laundry.  Patient lives in his own home.   She feels that patient would benefit from home health nursing if he would agree.   Discharge planning is ongoing     Electronically signed by PASCUAL Cervantes on 1/11/2025 at 11:31 AM

## 2025-01-11 NOTE — PROGRESS NOTES
Spoke with Ghaad Richards DO. Okay to replace suprapubic catheter at bedside due to incorrect size catheter used previously and leaking around site. Family also requesting change due to leakage.

## 2025-01-11 NOTE — PLAN OF CARE
Problem: Discharge Planning  Goal: Discharge to home or other facility with appropriate resources  1/10/2025 2048 by Edmar Chew RN  Outcome: Progressing  Flowsheets (Taken 1/10/2025 2048)  Discharge to home or other facility with appropriate resources:   Identify barriers to discharge with patient and caregiver   Arrange for needed discharge resources and transportation as appropriate     Problem: Safety - Adult  Goal: Free from fall injury  1/10/2025 2048 by Edmar Chew RN  Outcome: Progressing  Flowsheets (Taken 1/10/2025 2048)  Free From Fall Injury: Instruct family/caregiver on patient safety     Problem: ABCDS Injury Assessment  Goal: Absence of physical injury  1/10/2025 2048 by Edmar Chew RN  Outcome: Progressing  Flowsheets (Taken 1/10/2025 2048)  Absence of Physical Injury: Implement safety measures based on patient assessment     Problem: Skin/Tissue Integrity  Goal: Absence of new skin breakdown  Description: 1.  Monitor for areas of redness and/or skin breakdown  2.  Assess vascular access sites hourly  3.  Every 4-6 hours minimum:  Change oxygen saturation probe site  4.  Every 4-6 hours:  If on nasal continuous positive airway pressure, respiratory therapy assess nares and determine need for appliance change or resting period.  1/10/2025 2048 by Edmar Chew RN  Outcome: Progressing     Problem: Neurosensory - Adult  Goal: Achieves stable or improved neurological status  1/10/2025 2048 by Edmar Chew RN  Outcome: Progressing  Flowsheets (Taken 1/10/2025 2048)  Achieves stable or improved neurological status:   Assess for and report changes in neurological status   Initiate measures to prevent increased intracranial pressure     Problem: Neurosensory - Adult  Goal: Achieves maximal functionality and self care  1/10/2025 2048 by Edmar Chew, RN  Outcome: Progressing  Flowsheets (Taken 1/10/2025 2048)  Achieves maximal functionality and self care:   Monitor swallowing and airway

## 2025-01-11 NOTE — FLOWSHEET NOTE
01/11/25 0822   Treatment Team Notification   Reason for Communication Critical results   Type of Critical Result Laboratory   Critical Lab Information Urine culture growing MRSA   Person Result Received From Lalita Easton RN   Critical Lab Result Type Other (comment)  (Urine culture growing MRSA)   Name of Team Member Notified    Treatment Team Role Resident   Method of Communication Secure Message   Response Waiting for response   Notification Time 0822

## 2025-01-11 NOTE — PROGRESS NOTES
Physician Progress Note      PATIENT:               SHASHI MILLER  CSN #:                  [id number]  :                       1990  ADMIT DATE:       2025 11:15 AM  DISCH DATE:  RESPONDING  PROVIDER #:        Gerald Edwards MD          QUERY TEXT:    Patient admitted with syncope. If possible, please document in the progress   notes and discharge summary if you are evaluating and/or treating any of the   following:    The medical record reflects the following:  Risk Factors: bedbound, paraplegia, COPD  Clinical Indicators: Patient with troponin values of 33 then 30. Patient had   troponin of 18 on 24 and 19---15----21 on 24. Patient has been   admitted with the following problems: orthos positive, ischial wound, sacral   wound, hypotension, perinephric abscess, UTI, transaminitis, hyponatremia. EKG   on =EKG: NSR, normal ECG when compared with ECG of 17-Dec-2024. EKG on   = Sinus tachycardia, Cannot rule out Anterior infarct , age undetermined,   Nonspecific T wave abnormality, Abnormal ECG, When compared with ECG of   2025 11:18, Nonspecific T wave abnormalit  Treatment: labs, imaging, IV fluids, EKG, telemetry  Options provided:  -- Type 2 MI  -- Demand Ischemia only, no MI  -- Other - I will add my own diagnosis  -- Disagree - Not applicable / Not valid  -- Disagree - Clinically unable to determine / Unknown  -- Refer to Clinical Documentation Reviewer    PROVIDER RESPONSE TEXT:    This patient has demand ischemia only, no MI.    Query created by: Damaris Lopez on 1/10/2025 10:45 AM      QUERY TEXT:    Pt admitted with UTI.  Pt noted to have suprapubic catheter.  If possible,   please document in the progress notes and discharge summary if you are   evaluating and/or treating any of the following:    The medical record reflects the following:  Risk Factors: paraplegic, neurogenic bladder, perinephric abscess, recent   renal stent placement and lithotripsy  Clinical

## 2025-01-11 NOTE — PROGRESS NOTES
Progress note: Infectious diseases    Patient - Srikanth Coy,  Age - 34 y.o.    - 1990      Room Number - 4K-28/028-A   MRN -  [id number]   [facility] # - [id number]  Date of Admission -  2025 11:15 AM    SUBJECTIVE:   He has no new complaints.  Abscess +ve for gram negative and now has gram positive cocci  Urine +ve for MRSA  OBJECTIVE   VITALS    height is 1.778 m (5' 10\") and weight is 77.1 kg (170 lb). His oral temperature is 98 °F (36.7 °C). His blood pressure is 117/80 and his pulse is 96. His respiration is 18 and oxygen saturation is 98%.       Wt Readings from Last 3 Encounters:   25 77.1 kg (170 lb)   24 79.4 kg (175 lb)   24 79.4 kg (175 lb)       I/O (24 Hours)    Intake/Output Summary (Last 24 hours) at 2025 0920  Last data filed at 2025 0354  Gross per 24 hour   Intake 780.1 ml   Output 3350 ml   Net -2569.9 ml       General Appearance  Awake, alert, oriented,  not  In acute distress  HEENT - normocephalic, atraumatic, pink conjunctiva,  anicteric sclera dentlal caries  Neck - Supple, no mass  Lungs -  Bilateral   air entry, no rhonchi, no wheeze  Cardiovascular - Heart sounds are normal.    Abdomen - soft, not distended, nontender, colostomy and suprapubic catheter in place  Neurologic -paraplegic  Skin - No bruising or bleeding  Extremities -  open wound on the coccyx and ischial   Drains: right side perinephric drain, bloody fluid    MEDICATIONS:      cefepime  2,000 mg IntraVENous Q12H    baclofen  10 mg Oral BID    busPIRone  10 mg Oral TID    ferrous sulfate  325 mg Oral Daily with breakfast    gabapentin  800 mg Oral 4x Daily    trospium  20 mg Oral BID    pantoprazole  40 mg Oral QAM AC    buprenorphine-naloxone  1 Film SubLINGual TID    tamsulosin  0.4 mg Oral Daily    cyclobenzaprine  10 mg Oral BID    venlafaxine  75 mg Oral Daily with breakfast    And     Osteomyelitis, chronic, ischium, left ([facility]) M86.652    Long term (current) use of antibiotics Z79.2    Skin ulcer of second toe of right foot with fat layer exposed ([facility]) L97.512    Unilateral inguinal testis Q53.112    Spinal cord injury at T1-T6 level without injury of spinal bone ([facility]) S24.101A    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    Non-healing non-surgical wound T14.8XXA    Recurrent UTI N39.0    EARLINE (acute kidney injury) ([facility]) N17.9    Bacteremia R78.81    Sepsis without acute organ dysfunction ([facility]) A41.9    Hydronephrosis N13.30    Hyponatremia E87.1    Sacral wound, initial encounter S31.000A    Hypoalbuminemia E88.09    Muscle spasm M62.838    Normocytic anemia D64.9    Gastroesophageal reflux disease K21.9    Complicated UTI (urinary tract infection) N39.0    Stone, kidney N20.0    Perinephric abscess N15.1    Syncope and collapse R55         ASSESSMENT/PLAN   Perinephric infected hematoma  UTI on chronic suprapubic catheter  Paraplegic  Continue iv cefepime, vancomycin added until MRSA is ruled out on the abscess        Alex Multani MD, 1/11/2025 9:20 AM

## 2025-01-12 LAB
ALBUMIN SERPL BCG-MCNC: 2.5 G/DL (ref 3.5–5.1)
ALP SERPL-CCNC: 165 U/L (ref 38–126)
ALT SERPL W/O P-5'-P-CCNC: 45 U/L (ref 11–66)
ANION GAP SERPL CALC-SCNC: 11 MEQ/L (ref 8–16)
AST SERPL-CCNC: 52 U/L (ref 5–40)
BACTERIA UR CULT: ABNORMAL
BILIRUB CONJ SERPL-MCNC: < 0.1 MG/DL (ref 0.1–13.8)
BILIRUB SERPL-MCNC: 0.2 MG/DL (ref 0.3–1.2)
BUN SERPL-MCNC: 7 MG/DL (ref 7–22)
CALCIUM SERPL-MCNC: 8.9 MG/DL (ref 8.5–10.5)
CHLORIDE SERPL-SCNC: 102 MEQ/L (ref 98–111)
CO2 SERPL-SCNC: 26 MEQ/L (ref 23–33)
CREAT SERPL-MCNC: 1 MG/DL (ref 0.4–1.2)
DEPRECATED RDW RBC AUTO: 49.1 FL (ref 35–45)
ERYTHROCYTE [DISTWIDTH] IN BLOOD BY AUTOMATED COUNT: 15.7 % (ref 11.5–14.5)
GFR SERPL CREATININE-BSD FRML MDRD: > 90 ML/MIN/1.73M2
GLUCOSE SERPL-MCNC: 97 MG/DL (ref 70–108)
HCT VFR BLD AUTO: 29.8 % (ref 42–52)
HGB BLD-MCNC: 9.1 GM/DL (ref 14–18)
MCH RBC QN AUTO: 26.3 PG (ref 26–33)
MCHC RBC AUTO-ENTMCNC: 30.5 GM/DL (ref 32.2–35.5)
MCV RBC AUTO: 86.1 FL (ref 80–94)
ORGANISM: ABNORMAL
PLATELET # BLD AUTO: 413 THOU/MM3 (ref 130–400)
PMV BLD AUTO: 8.6 FL (ref 9.4–12.4)
POTASSIUM SERPL-SCNC: 4.4 MEQ/L (ref 3.5–5.2)
PROT SERPL-MCNC: 7.4 G/DL (ref 6.1–8)
RBC # BLD AUTO: 3.46 MILL/MM3 (ref 4.7–6.1)
SODIUM SERPL-SCNC: 139 MEQ/L (ref 135–145)
VANCOMYCIN SERPL-MCNC: 35 UG/ML (ref 10–39.9)
WBC # BLD AUTO: 10.5 THOU/MM3 (ref 4.8–10.8)

## 2025-01-12 PROCEDURE — 36415 COLL VENOUS BLD VENIPUNCTURE: CPT

## 2025-01-12 PROCEDURE — 99233 SBSQ HOSP IP/OBS HIGH 50: CPT | Performed by: INTERNAL MEDICINE

## 2025-01-12 PROCEDURE — 6370000000 HC RX 637 (ALT 250 FOR IP)

## 2025-01-12 PROCEDURE — 2060000000 HC ICU INTERMEDIATE R&B

## 2025-01-12 PROCEDURE — 82248 BILIRUBIN DIRECT: CPT

## 2025-01-12 PROCEDURE — 6360000002 HC RX W HCPCS: Performed by: INTERNAL MEDICINE

## 2025-01-12 PROCEDURE — 2500000003 HC RX 250 WO HCPCS

## 2025-01-12 PROCEDURE — 2580000003 HC RX 258: Performed by: INTERNAL MEDICINE

## 2025-01-12 PROCEDURE — 80053 COMPREHEN METABOLIC PANEL: CPT

## 2025-01-12 PROCEDURE — 85027 COMPLETE CBC AUTOMATED: CPT

## 2025-01-12 PROCEDURE — 80202 ASSAY OF VANCOMYCIN: CPT

## 2025-01-12 RX ADMIN — CEFEPIME 2000 MG: 2 INJECTION, POWDER, FOR SOLUTION INTRAVENOUS at 15:06

## 2025-01-12 RX ADMIN — BACLOFEN 10 MG: 10 TABLET ORAL at 09:49

## 2025-01-12 RX ADMIN — GABAPENTIN 800 MG: 400 CAPSULE ORAL at 13:10

## 2025-01-12 RX ADMIN — PANTOPRAZOLE SODIUM 40 MG: 40 TABLET, DELAYED RELEASE ORAL at 06:09

## 2025-01-12 RX ADMIN — CYCLOBENZAPRINE 10 MG: 10 TABLET, FILM COATED ORAL at 09:48

## 2025-01-12 RX ADMIN — BACLOFEN 10 MG: 10 TABLET ORAL at 20:56

## 2025-01-12 RX ADMIN — CEFEPIME 2000 MG: 2 INJECTION, POWDER, FOR SOLUTION INTRAVENOUS at 02:14

## 2025-01-12 RX ADMIN — BUPRENORPHINE AND NALOXONE 1 FILM: 8; 2 FILM BUCCAL; SUBLINGUAL at 09:47

## 2025-01-12 RX ADMIN — BUSPIRONE HYDROCHLORIDE 10 MG: 10 TABLET ORAL at 20:56

## 2025-01-12 RX ADMIN — TIZANIDINE 4 MG: 4 TABLET ORAL at 15:11

## 2025-01-12 RX ADMIN — TROSPIUM CHLORIDE 20 MG: 20 TABLET, FILM COATED ORAL at 09:47

## 2025-01-12 RX ADMIN — VENLAFAXINE HYDROCHLORIDE 75 MG: 150 CAPSULE, EXTENDED RELEASE ORAL at 09:47

## 2025-01-12 RX ADMIN — TROSPIUM CHLORIDE 20 MG: 20 TABLET, FILM COATED ORAL at 20:55

## 2025-01-12 RX ADMIN — TAMSULOSIN HYDROCHLORIDE 0.4 MG: 0.4 CAPSULE ORAL at 09:47

## 2025-01-12 RX ADMIN — SODIUM CHLORIDE, PRESERVATIVE FREE 10 ML: 5 INJECTION INTRAVENOUS at 09:49

## 2025-01-12 RX ADMIN — BUSPIRONE HYDROCHLORIDE 10 MG: 10 TABLET ORAL at 15:10

## 2025-01-12 RX ADMIN — TIZANIDINE 4 MG: 4 TABLET ORAL at 20:56

## 2025-01-12 RX ADMIN — BUPRENORPHINE AND NALOXONE 1 FILM: 8; 2 FILM BUCCAL; SUBLINGUAL at 20:57

## 2025-01-12 RX ADMIN — BUPRENORPHINE AND NALOXONE 1 FILM: 8; 2 FILM BUCCAL; SUBLINGUAL at 15:10

## 2025-01-12 RX ADMIN — GABAPENTIN 800 MG: 400 CAPSULE ORAL at 09:48

## 2025-01-12 RX ADMIN — FERROUS SULFATE TAB 325 MG (65 MG ELEMENTAL FE) 325 MG: 325 (65 FE) TAB at 09:48

## 2025-01-12 RX ADMIN — VANCOMYCIN HYDROCHLORIDE 1000 MG: 1 INJECTION, POWDER, LYOPHILIZED, FOR SOLUTION INTRAVENOUS at 21:10

## 2025-01-12 RX ADMIN — GABAPENTIN 800 MG: 400 CAPSULE ORAL at 20:56

## 2025-01-12 RX ADMIN — BUSPIRONE HYDROCHLORIDE 10 MG: 10 TABLET ORAL at 09:47

## 2025-01-12 RX ADMIN — CYCLOBENZAPRINE 10 MG: 10 TABLET, FILM COATED ORAL at 20:56

## 2025-01-12 RX ADMIN — VENLAFAXINE HYDROCHLORIDE 150 MG: 150 CAPSULE, EXTENDED RELEASE ORAL at 09:50
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
## 2025-01-12 NOTE — PROGRESS NOTES
[facility]   Pharmacy Pharmacokinetic Monitoring Service - Vancomycin    Day of Therapy: 2  Additional Antimicrobials: 1000 mg q12h    Pertinent Laboratory Values:   Wt Readings from Last 1 Encounters:   01/08/25 77.1 kg (170 lb)     Temp Readings from Last 1 Encounters:   01/12/25 98.3 °F (36.8 °C) (Oral)     Estimated Creatinine Clearance: 110 mL/min (based on SCr of 1 mg/dL).  Recent Labs     01/11/25  0841 01/12/25  0355   CREATININE 1.0 1.0   BUN 7 7   WBC 11.7* 10.5     Procalcitonin: not available    Pertinent Cultures:  Culture Date Source Results   1/9/25 Abscess from kidney enteric gram negative bacilli; [facility]   1/8/25 Urine  MRSA   MRSA Nasal Swab: N/A. Non-respiratory infection.    Recent vancomycin administrations                     vancomycin (VANCOCIN) 1,000 mg in sodium chloride 0.9 % 250 mL IVPB ([id number]) (mg) 1,000 mg New Bag 01/11/25 2157     1,000 mg New Bag  1333    vancomycin (VANCOCIN) 1,000 mg in sodium chloride 0.9 % 250 mL IVPB ([id number]) (mg) 1,000 mg New Bag 01/10/25 0012     1,000 mg New Bag 01/09/25 1440                    Assessment:  Date/Time Current Dose Concentration Timing of Concentration (h) AUC   1/12/25, 0355 1000 mg q12h 35 ~6h 937   Note: Serum concentrations collected for AUC dosing may appear elevated if collected in close proximity to the dose administered, this is not necessarily an indication of toxicity    Plan:  Current dosing regimen is supra-therapeutic  \"Decrease dose to 1000 mg q24h  Repeat vancomycin concentration: not ordered   Pharmacy will continue to monitor patient and adjust therapy as indicated    Thank you for the consult,  Ishmael Bueno [facility]  1/12/2025 9:14 AM

## 2025-01-12 NOTE — PROGRESS NOTES
Progress note: Infectious diseases    Patient - Srikanth Coy,  Age - 34 y.o.    - 1990      Room Number - 4K-28/028-A   MRN -  [id number]   [facility] # - [id number]  Date of Admission -  2025 11:15 AM    SUBJECTIVE:   He has no new complaints.  Abscess +ve for gram negative and now has gram positive cocci  Awaiting identification of the organisms.  OBJECTIVE   VITALS    height is 1.778 m (5' 10\") and weight is 77.1 kg (170 lb). His oral temperature is 98.3 °F (36.8 °C). His blood pressure is 109/73 and his pulse is 98. His respiration is 16 and oxygen saturation is 96%.       Wt Readings from Last 3 Encounters:   25 77.1 kg (170 lb)   24 79.4 kg (175 lb)   24 79.4 kg (175 lb)       I/O (24 Hours)    Intake/Output Summary (Last 24 hours) at 2025 1257  Last data filed at 2025 0609  Gross per 24 hour   Intake 1557.51 ml   Output 3990 ml   Net -2432.49 ml       General Appearance  Awake, alert, oriented,  not  In acute distress  HEENT - normocephalic, atraumatic, pink conjunctiva,  anicteric sclera dentlal caries  Neck - Supple, no mass  Lungs -  Bilateral   air entry, no rhonchi, no wheeze  Cardiovascular - Heart sounds are normal.    Abdomen - soft, not distended, nontender, colostomy and suprapubic catheter in place  Neurologic -paraplegic  Skin - No bruising or bleeding  Extremities -  open wound on the coccyx and ischial   Drains: right side perinephric drain, bloody fluid    MEDICATIONS:      vancomycin  1,000 mg IntraVENous Q24H    vancomycin (VANCOCIN) intermittent dosing (placeholder)   Other RX Placeholder    cefepime  2,000 mg IntraVENous Q12H    baclofen  10 mg Oral BID    busPIRone  10 mg Oral TID    ferrous sulfate  325 mg Oral Daily with breakfast    gabapentin  800 mg Oral 4x Daily    trospium  20 mg Oral BID    pantoprazole  40 mg Oral QAM AC    buprenorphine-naloxone  1 Film

## 2025-01-12 NOTE — PLAN OF CARE
consults as needed   Assist and instruct patient to increase activity and self care as tolerated     Problem: Gastrointestinal - Adult  Goal: Minimal or absence of nausea and vomiting  Outcome: Completed     Problem: Gastrointestinal - Adult  Goal: Maintains or returns to baseline bowel function  Outcome: Progressing  Flowsheets (Taken 1/12/2025 0313)  Maintains or returns to baseline bowel function:   Assess bowel function   Encourage oral fluids to ensure adequate hydration   Administer IV fluids as ordered to ensure adequate hydration   Administer ordered medications as needed     Problem: Gastrointestinal - Adult  Goal: Maintains adequate nutritional intake  Outcome: Progressing  Flowsheets (Taken 1/12/2025 0313)  Maintains adequate nutritional intake:   Monitor percentage of each meal consumed   Monitor intake and output, weight and lab values   Assist with meals as needed     Problem: Gastrointestinal - Adult  Goal: Establish and maintain optimal ostomy function  Outcome: Progressing  Flowsheets (Taken 1/12/2025 0313)  Establish and maintain optimal ostomy function: Monitor output from ostomies     Problem: Genitourinary - Adult  Goal: Absence of urinary retention  Outcome: Progressing  Flowsheets (Taken 1/12/2025 0313)  Absence of urinary retention:   Assess patient’s ability to void and empty bladder   Monitor intake/output and perform bladder scan as needed   Discuss catheterization for long term situations as appropriate     Problem: Genitourinary - Adult  Goal: Urinary catheter remains patent  Outcome: Progressing  Flowsheets (Taken 1/12/2025 0313)  Urinary catheter remains patent: Assess patency of urinary catheter     Problem: Infection - Adult  Goal: Absence of infection at discharge  Outcome: Progressing  Flowsheets (Taken 1/12/2025 0313)  Absence of infection at discharge:   Assess and monitor for signs and symptoms of infection   Monitor lab/diagnostic results   Monitor all insertion sites i.e.,  indwelling lines, tubes and drains   Administer medications as ordered   Instruct and encourage patient and family to use good hand hygiene technique   Identify and instruct in appropriate isolation precautions for identified infection/condition     Problem: Infection - Adult  Goal: Absence of infection during hospitalization  Outcome: Progressing  Flowsheets (Taken 1/12/2025 0313)  Absence of infection during hospitalization:   Assess and monitor for signs and symptoms of infection   Monitor lab/diagnostic results   Monitor all insertion sites i.e., indwelling lines, tubes and drains   Administer medications as ordered   Instruct and encourage patient and family to use good hand hygiene technique     Problem: Infection - Adult  Goal: Absence of fever/infection during anticipated neutropenic period  Outcome: Progressing  Flowsheets (Taken 1/12/2025 0313)  Absence of fever/infection during anticipated neutropenic period: Monitor white blood cell count     Problem: Metabolic/Fluid and Electrolytes - Adult  Goal: Electrolytes maintained within normal limits  Outcome: Progressing  Flowsheets (Taken 1/12/2025 0313)  Electrolytes maintained within normal limits: Monitor labs and assess patient for signs and symptoms of electrolyte imbalances     Problem: Hematologic - Adult  Goal: Maintains hematologic stability  Outcome: Progressing  Flowsheets (Taken 1/12/2025 0313)  Maintains hematologic stability:   Assess for signs and symptoms of bleeding or hemorrhage   Monitor labs for bleeding or clotting disorders     Problem: Pain  Goal: Verbalizes/displays adequate comfort level or baseline comfort level  Outcome: Progressing  Flowsheets (Taken 1/12/2025 0313)  Verbalizes/displays adequate comfort level or baseline comfort level:   Encourage patient to monitor pain and request assistance   Assess pain using appropriate pain scale   Administer analgesics based on type and severity of pain and evaluate response   Implement  non-pharmacological measures as appropriate and evaluate response     Problem: Nutrition Deficit:  Goal: Optimize nutritional status  Outcome: Progressing  Flowsheets (Taken 1/12/2025 0313)  Nutrient intake appropriate for improving, restoring, or maintaining nutritional needs:   Assess nutritional status and recommend course of action   Monitor oral intake, labs, and treatment plans  Care plan reviewed with patient.  Patient verbalizes understanding of the plan of care and contribute to goal setting.

## 2025-01-13 LAB
ANION GAP SERPL CALC-SCNC: 11 MEQ/L (ref 8–16)
BACTERIA BLD AEROBE CULT: NORMAL
BACTERIA BLD AEROBE CULT: NORMAL
BUN SERPL-MCNC: 8 MG/DL (ref 7–22)
CALCIUM SERPL-MCNC: 9.5 MG/DL (ref 8.5–10.5)
CHLORIDE SERPL-SCNC: 102 MEQ/L (ref 98–111)
CO2 SERPL-SCNC: 28 MEQ/L (ref 23–33)
CREAT SERPL-MCNC: 1.1 MG/DL (ref 0.4–1.2)
DEPRECATED RDW RBC AUTO: 47.4 FL (ref 35–45)
ERYTHROCYTE [DISTWIDTH] IN BLOOD BY AUTOMATED COUNT: 15.6 % (ref 11.5–14.5)
GFR SERPL CREATININE-BSD FRML MDRD: > 90 ML/MIN/1.73M2
GLUCOSE SERPL-MCNC: 97 MG/DL (ref 70–108)
HCT VFR BLD AUTO: 29 % (ref 42–52)
HGB BLD-MCNC: 9 GM/DL (ref 14–18)
MCH RBC QN AUTO: 26.1 PG (ref 26–33)
MCHC RBC AUTO-ENTMCNC: 31 GM/DL (ref 32.2–35.5)
MCV RBC AUTO: 84.1 FL (ref 80–94)
PLATELET # BLD AUTO: 441 THOU/MM3 (ref 130–400)
PMV BLD AUTO: 8.2 FL (ref 9.4–12.4)
POTASSIUM SERPL-SCNC: 5 MEQ/L (ref 3.5–5.2)
RBC # BLD AUTO: 3.45 MILL/MM3 (ref 4.7–6.1)
SODIUM SERPL-SCNC: 141 MEQ/L (ref 135–145)
WBC # BLD AUTO: 9.5 THOU/MM3 (ref 4.8–10.8)

## 2025-01-13 PROCEDURE — 2500000003 HC RX 250 WO HCPCS: Performed by: INTERNAL MEDICINE

## 2025-01-13 PROCEDURE — 2580000003 HC RX 258: Performed by: INTERNAL MEDICINE

## 2025-01-13 PROCEDURE — 2580000003 HC RX 258

## 2025-01-13 PROCEDURE — 87077 CULTURE AEROBIC IDENTIFY: CPT

## 2025-01-13 PROCEDURE — 99233 SBSQ HOSP IP/OBS HIGH 50: CPT | Performed by: INTERNAL MEDICINE

## 2025-01-13 PROCEDURE — 80048 BASIC METABOLIC PNL TOTAL CA: CPT

## 2025-01-13 PROCEDURE — 2500000003 HC RX 250 WO HCPCS

## 2025-01-13 PROCEDURE — 87186 SC STD MICRODIL/AGAR DIL: CPT

## 2025-01-13 PROCEDURE — 87205 SMEAR GRAM STAIN: CPT

## 2025-01-13 PROCEDURE — 85027 COMPLETE CBC AUTOMATED: CPT

## 2025-01-13 PROCEDURE — 6370000000 HC RX 637 (ALT 250 FOR IP)

## 2025-01-13 PROCEDURE — 87070 CULTURE OTHR SPECIMN AEROBIC: CPT

## 2025-01-13 PROCEDURE — 36415 COLL VENOUS BLD VENIPUNCTURE: CPT

## 2025-01-13 PROCEDURE — 6360000002 HC RX W HCPCS: Performed by: INTERNAL MEDICINE

## 2025-01-13 PROCEDURE — 6360000002 HC RX W HCPCS

## 2025-01-13 PROCEDURE — 2060000000 HC ICU INTERMEDIATE R&B

## 2025-01-13 RX ORDER — 0.9 % SODIUM CHLORIDE 0.9 %
1000 INTRAVENOUS SOLUTION INTRAVENOUS ONCE
Status: COMPLETED | OUTPATIENT
Start: 2025-01-13 | End: 2025-01-13

## 2025-01-13 RX ADMIN — BACLOFEN 10 MG: 10 TABLET ORAL at 09:50

## 2025-01-13 RX ADMIN — GABAPENTIN 800 MG: 400 CAPSULE ORAL at 09:50

## 2025-01-13 RX ADMIN — TIZANIDINE 4 MG: 4 TABLET ORAL at 09:49

## 2025-01-13 RX ADMIN — CYCLOBENZAPRINE 10 MG: 10 TABLET, FILM COATED ORAL at 19:37

## 2025-01-13 RX ADMIN — CYCLOBENZAPRINE 10 MG: 10 TABLET, FILM COATED ORAL at 09:52

## 2025-01-13 RX ADMIN — GABAPENTIN 800 MG: 400 CAPSULE ORAL at 19:37

## 2025-01-13 RX ADMIN — BUPRENORPHINE AND NALOXONE 1 FILM: 8; 2 FILM BUCCAL; SUBLINGUAL at 09:52

## 2025-01-13 RX ADMIN — ONDANSETRON 4 MG: 2 INJECTION INTRAMUSCULAR; INTRAVENOUS at 07:05

## 2025-01-13 RX ADMIN — SODIUM CHLORIDE 1000 ML: 9 INJECTION, SOLUTION INTRAVENOUS at 13:35

## 2025-01-13 RX ADMIN — Medication 3 MG: at 19:37

## 2025-01-13 RX ADMIN — SODIUM CHLORIDE, PRESERVATIVE FREE 10 ML: 5 INJECTION INTRAVENOUS at 19:37

## 2025-01-13 RX ADMIN — VENLAFAXINE HYDROCHLORIDE 75 MG: 150 CAPSULE, EXTENDED RELEASE ORAL at 09:51

## 2025-01-13 RX ADMIN — SODIUM CHLORIDE, PRESERVATIVE FREE 10 ML: 5 INJECTION INTRAVENOUS at 09:51

## 2025-01-13 RX ADMIN — DAPTOMYCIN 450 MG: 500 INJECTION, POWDER, LYOPHILIZED, FOR SOLUTION INTRAVENOUS at 12:40

## 2025-01-13 RX ADMIN — FERROUS SULFATE TAB 325 MG (65 MG ELEMENTAL FE) 325 MG: 325 (65 FE) TAB at 09:52

## 2025-01-13 RX ADMIN — BUSPIRONE HYDROCHLORIDE 10 MG: 10 TABLET ORAL at 09:52

## 2025-01-13 RX ADMIN — BUPRENORPHINE AND NALOXONE 1 FILM: 8; 2 FILM BUCCAL; SUBLINGUAL at 19:37

## 2025-01-13 RX ADMIN — TROSPIUM CHLORIDE 20 MG: 20 TABLET, FILM COATED ORAL at 09:50

## 2025-01-13 RX ADMIN — CEFEPIME 2000 MG: 2 INJECTION, POWDER, FOR SOLUTION INTRAVENOUS at 02:22

## 2025-01-13 RX ADMIN — PANTOPRAZOLE SODIUM 40 MG: 40 TABLET, DELAYED RELEASE ORAL at 09:53

## 2025-01-13 RX ADMIN — GABAPENTIN 800 MG: 400 CAPSULE ORAL at 12:39

## 2025-01-13 RX ADMIN — BUSPIRONE HYDROCHLORIDE 10 MG: 10 TABLET ORAL at 12:40

## 2025-01-13 RX ADMIN — TROSPIUM CHLORIDE 20 MG: 20 TABLET, FILM COATED ORAL at 19:37

## 2025-01-13 RX ADMIN — BACLOFEN 10 MG: 10 TABLET ORAL at 19:37

## 2025-01-13 RX ADMIN — CEFTRIAXONE SODIUM 2000 MG: 2 INJECTION, POWDER, FOR SOLUTION INTRAMUSCULAR; INTRAVENOUS at 12:39

## 2025-01-13 RX ADMIN — BUSPIRONE HYDROCHLORIDE 10 MG: 10 TABLET ORAL at 19:37

## 2025-01-13 RX ADMIN — TAMSULOSIN HYDROCHLORIDE 0.4 MG: 0.4 CAPSULE ORAL at 09:51

## 2025-01-13 RX ADMIN — GABAPENTIN 800 MG: 400 CAPSULE ORAL at 17:08

## 2025-01-13 RX ADMIN — VENLAFAXINE HYDROCHLORIDE 150 MG: 150 CAPSULE, EXTENDED RELEASE ORAL at 09:50

## 2025-01-13 ASSESSMENT — PAIN SCALES - GENERAL
PAINLEVEL_OUTOF10: 0

## 2025-01-13 ASSESSMENT — PAIN SCALES - WONG BAKER: WONGBAKER_NUMERICALRESPONSE: NO HURT

## 2025-01-13 NOTE — CARE COORDINATION
1/13/25, 8:25 AM EST    DISCHARGE ON GOING EVALUATION    Srikanth M [facility] day: 5  Location: -28/028-A Reason for admit: Syncope and collapse [R55]  Perinephric abscess [N15.1]     Procedures:   1/9 Perinephric Abscess Drain  1/10 ECHO EF 55-60%    Imaging since last note: none    Barriers to Discharge: Gen Surg has signed off, ID following, fluid from kidney is (+) Enterococcus faecalis, Enterococcus faecium, E. Coli.  Suboxone, IV Maxipime, Zofran prn, IV Vancomycin.     PCP: Johnathan Flores MD  Readmission Risk Score: 29.1    Patient Goals/Plan/Treatment Preferences: Plans home alone, support from Mother. Has FABY, [facility] [place] waiver [facility] [phone]. Will follow.

## 2025-01-13 NOTE — PROGRESS NOTES
Hospitalist Progress Note  Internal Medicine Resident      Patient: Srikanth Coy 34 y.o. male      Unit/Bed: -28/028-A    Admit Date: 1/8/2025      ASSESSMENT AND PLAN  Active Problems    Right-sided perinephric abscess/hematoma s/p I&D with drain placement 1/9  Complicated UTI  History of recent right sided nephrolithiasis 5.5 mm s/p stent placement 12/3 and lithotripsy with stent exchange 12/18  11 cm perinephric abscess on CTAP 1/8. Patient with history of prior complicated UTI secondary to obstructive nephrolithiasis with urine culture growing Enterobacter Cloacea and stenotrophomonas maltophilia. Kidney stone blasted recently and he has evidence of subsequent infection. Underwent I&D by IR 1/9. Patient still has drain in place which is still draining. ID planning reimaging when drainage slows. Urine culture and abscess positive for E. Coli and Enterococcus faecalis. Has been on cefepime and was on vancomycin which was stopped by ID 1/9. Vancomycin restarted due to urine culture revealing MRSA. Blood culture NG 2 day. Continue to follow cultures.  His suprapubic catheter was exchanged 1/11.    -fluid culture growing e.coli and e. Faecalis  -urine culture growing MRSA, e.coli, e. Faecalis  -blood culture NGTD  -ID switched abx to daptomycin and ceftriaxone based on sensitivities.   -continue to monitor drain output.     Orthostatic syncope  Patient brought to the ED initially for concern for syncope. Has remote history of syncope in 2017. Patient became lightheaded during ambulatory transfer. Positional. Denied palpitations, N/V, chest pain beforehand. No witnessed shaking. LOC for a few minutes. Poor oral intake the past few days. Suspect orthostatic syncope given SBP in 80s, poor oral intake, and syncope lasted about 3 mins with no extended period of confusion afterwards. Positive orthostats. Pt given fluids on arrival. Echo EF 55-60%. Normal diastolic function.  No valvular dysfunction.    Chronic     ferrous sulfate  325 mg Oral Daily with breakfast    gabapentin  800 mg Oral 4x Daily    trospium  20 mg Oral BID    pantoprazole  40 mg Oral QAM AC    buprenorphine-naloxone  1 Film SubLINGual TID    tamsulosin  0.4 mg Oral Daily    cyclobenzaprine  10 mg Oral BID    venlafaxine  75 mg Oral Daily with breakfast    And    venlafaxine  150 mg Oral Daily with breakfast    sodium chloride flush  5-40 mL IntraVENous 2 times per day    PRN Meds: albuterol sulfate HFA, hydrOXYzine HCl, ibuprofen, oxyBUTYnin, tiZANidine, perflutren lipid microspheres, sodium chloride flush, sodium chloride, potassium chloride **OR** potassium alternative oral replacement **OR** potassium chloride, magnesium sulfate, ondansetron **OR** ondansetron, polyethylene glycol, acetaminophen **OR** acetaminophen, potassium chloride, melatonin    Exam:  BP (!) 88/55   Pulse (!) 118   Temp 97.8 °F (36.6 °C) (Oral)   Resp 16   Ht 1.778 m (5' 10\")   Wt 77.1 kg (170 lb)   SpO2 98%   BMI 24.39 kg/m²   General appearance: No apparent distress, appears stated age.  Eyes:  Pupils equal, round, and reactive to light. Conjunctivae/corneas clear.  HENT: Head normal in appearance. External nares normal. Oral mucosa moist without lesions. Hearing grossly intact.   Neck: Supple, with full range of motion. Trachea midline. No gross JVD appreciated.  Respiratory:  Normal respiratory effort. Clear to auscultation, bilaterally without rales or wheezes or rhonchi.  Cardiovascular: Normal rate, regular rhythm with normal S1/S2 without murmurs. No lower extremity edema.   Abdomen: Soft, non-tender, non-distended with normal bowel sounds. Colostomy present. Right posterior drain in place with sanguinous output.   Musculoskeletal: No joint swelling or tenderness.No abnormal movements. No CVA tenderness.  Neurologic: paraplegia of BLE.   Skin: Warm and dry. Tunneling sacral decubitus ulcer without outward evidence of infection.   Psychiatric: Alert and oriented,

## 2025-01-13 NOTE — PLAN OF CARE
Problem: Discharge Planning  Goal: Discharge to home or other facility with appropriate resources  Outcome: Progressing  Flowsheets (Taken 1/12/2025 2040)  Discharge to home or other facility with appropriate resources:   Identify barriers to discharge with patient and caregiver   Arrange for needed discharge resources and transportation as appropriate     Problem: Safety - Adult  Goal: Free from fall injury  Outcome: Progressing  Flowsheets (Taken 1/12/2025 2040)  Free From Fall Injury: Instruct family/caregiver on patient safety     Problem: ABCDS Injury Assessment  Goal: Absence of physical injury  Outcome: Progressing  Flowsheets (Taken 1/12/2025 2040)  Absence of Physical Injury: Implement safety measures based on patient assessment     Problem: Skin/Tissue Integrity  Goal: Absence of new skin breakdown  Description: 1.  Monitor for areas of redness and/or skin breakdown  2.  Assess vascular access sites hourly  3.  Every 4-6 hours minimum:  Change oxygen saturation probe site  4.  Every 4-6 hours:  If on nasal continuous positive airway pressure, respiratory therapy assess nares and determine need for appliance change or resting period.  Outcome: Progressing     Problem: Neurosensory - Adult  Goal: Achieves stable or improved neurological status  Outcome: Progressing  Flowsheets (Taken 1/12/2025 2040)  Achieves stable or improved neurological status:   Initiate measures to prevent increased intracranial pressure   Assess for and report changes in neurological status     Problem: Cardiovascular - Adult  Goal: Maintains optimal cardiac output and hemodynamic stability  Outcome: Progressing  Flowsheets (Taken 1/12/2025 2040)  Maintains optimal cardiac output and hemodynamic stability:   Monitor blood pressure and heart rate   Monitor urine output and notify Licensed Independent Practitioner for values outside of normal range     Problem: Neurosensory - Adult  Goal: Achieves maximal functionality and self  care  Outcome: Progressing  Flowsheets (Taken 1/12/2025 2040)  Achieves maximal functionality and self care:   Monitor swallowing and airway patency with patient fatigue and changes in neurological status   Encourage and assist patient to increase activity and self care with guidance from physical therapy/occupational therapy     Problem: Cardiovascular - Adult  Goal: Absence of cardiac dysrhythmias or at baseline  Outcome: Progressing  Flowsheets (Taken 1/12/2025 2040)  Absence of cardiac dysrhythmias or at baseline:   Monitor cardiac rate and rhythm   Assess for signs of decreased cardiac output     Problem: Skin/Tissue Integrity - Adult  Goal: Skin integrity remains intact  Outcome: Progressing  Flowsheets (Taken 1/12/2025 2040)  Skin Integrity Remains Intact:   Monitor for areas of redness and/or skin breakdown   Assess vascular access sites hourly     Problem: Skin/Tissue Integrity - Adult  Goal: Incisions, wounds, or drain sites healing without S/S of infection  Outcome: Progressing     Problem: Musculoskeletal - Adult  Goal: Return mobility to safest level of function  Outcome: Progressing  Flowsheets (Taken 1/12/2025 2040)  Return Mobility to Safest Level of Function:   Assess patient stability and activity tolerance for standing, transferring and ambulating with or without assistive devices   Assist with transfers and ambulation using safe patient handling equipment as needed     Problem: Musculoskeletal - Adult  Goal: Maintain proper alignment of affected body part  Outcome: Progressing  Flowsheets (Taken 1/12/2025 2040)  Maintain proper alignment of affected body part: Support and protect limb and body alignment per provider's orders     Problem: Musculoskeletal - Adult  Goal: Return ADL status to a safe level of function  Outcome: Progressing  Flowsheets (Taken 1/12/2025 2040)  Return ADL Status to a Safe Level of Function:   Administer medication as ordered   Assess activities of daily living deficits

## 2025-01-13 NOTE — PROGRESS NOTES
Progress note: Infectious diseases    Patient - Srikanth Coy,  Age - 34 y.o.    - 1990      Room Number - 4K-28/028-A   MRN -  [id number]   [facility] # - [id number]  Date of Admission -  2025 11:15 AM    SUBJECTIVE:   No new complaints  Culture report reviewed. He has enterococcus species (VRE  OBJECTIVE   VITALS    height is 1.778 m (5' 10\") and weight is 77.1 kg (170 lb). His oral temperature is 98.1 °F (36.7 °C). His blood pressure is 120/79 and his pulse is 102 (abnormal). His respiration is 16 and oxygen saturation is 98%.       Wt Readings from Last 3 Encounters:   25 77.1 kg (170 lb)   24 79.4 kg (175 lb)   24 79.4 kg (175 lb)       I/O (24 Hours)    Intake/Output Summary (Last 24 hours) at 2025 1057  Last data filed at 2025 0755  Gross per 24 hour   Intake 287.65 ml   Output 3700 ml   Net -3412.35 ml       General Appearance  Awake, alert, oriented,  not  In acute distress  HEENT - normocephalic, atraumatic, pink conjunctiva,  anicteric sclera dentlal caries  Neck - Supple, no mass  Lungs -  Bilateral   air entry, no rhonchi, no wheeze  Cardiovascular - Heart sounds are normal.    Abdomen - soft, not distended, nontender, colostomy and suprapubic catheter in place  Neurologic -paraplegic  Skin - No bruising or bleeding  Extremities -  open wound on the coccyx and ischial   Drains: right side perinephric drain, bloody fluid. The drainage has slowed down    MEDICATIONS:      cefTRIAXone (ROCEPHIN) IV  2,000 mg IntraVENous Q24H    DAPTOmycin (CUBICIN) 450 mg in sodium chloride 0.9 % 50 mL IVPB  6 mg/kg IntraVENous Q24H    baclofen  10 mg Oral BID    busPIRone  10 mg Oral TID    ferrous sulfate  325 mg Oral Daily with breakfast    gabapentin  800 mg Oral 4x Daily    trospium  20 mg Oral BID    pantoprazole  40 mg Oral QAM AC    buprenorphine-naloxone  1 Film SubLINGual TID     features F06.31    Peristomal skin complication [id number]    Pressure ulcer of coccygeal region, stage 4 ([facility]) L89.154    Wound of back S21.209A    E coli bacteremia R78.81, B96.20    Right nephrolithiasis N20.0    Hypokalemia E87.6    Right ureteral stone N20.1    Decubitus ulcer of left ischium, stage 4 ([facility]) L89.324    Decubitus ulcer of left heel, stage 3 ([facility]) L89.623    Spasticity R25.2    Self-inflicted injury [id number]    Compulsive skin picking F42.4    Wound of abdomen S31.109A    Colostomy care ([facility]) Z43.3    Chest pain R07.9    Depression F32.A    Bacteria in urine R82.71    Suicidal thoughts R45.851    Decubitus ulcer of right ankle, stage 3 ([facility]) L89.513    Pressure injury of right ischium, stage 3 ([facility]) L89.313    Fungal dermatitis B36.9    Pressure ulcer of toe of left foot, stage 2 ([facility]) L89.892    Dermatitis due to unknown cause L30.9    Pressure injury of left buttock, unstageable ([facility]) L89.320    Pressure injury of right heel, stage 3 ([facility]) L89.613    Pressure injury, stage 4, with infection ([facility]) L89.94, L08.9    Cellulitis L03.90    Excoriation of abdomen S30.811A    Pressure injury of sacral region, stage 4 ([facility]) L89.154    Moderate malnutrition ([facility]) E44.0    Chronic osteomyelitis of coccyx M46.28    Osteomyelitis, chronic, ischium, left ([facility]) M86.652    Long term (current) use of antibiotics Z79.2    Skin ulcer of second toe of right foot with fat layer exposed ([facility]) L97.512    Unilateral inguinal testis Q53.112    Spinal cord injury at T1-T6 level without injury of spinal bone ([facility]) S24.101A    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    Non-healing non-surgical wound T14.8XXA    Recurrent UTI N39.0    EARLINE (acute kidney injury) ([facility]) N17.9    Bacteremia R78.81    Sepsis without acute organ dysfunction ([facility]) A41.9    Hydronephrosis N13.30    Hyponatremia E87.1    Sacral wound, initial encounter S31.000A    Hypoalbuminemia E88.09    Muscle spasm M62.838    Normocytic anemia D64.9    Gastroesophageal  reflux disease K21.9    Complicated UTI (urinary tract infection) N39.0    Stone, kidney N20.0    Perinephric abscess N15.1    Syncope and collapse R55         ASSESSMENT/PLAN   Perinephric infected hematoma drain in place  UTI on chronic suprapubic catheter  Paraplegic  Antibiotic changed to rocephin and daptomycin due to multiple organsims.  Alex Multani MD, 1/13/2025 10:57 AM

## 2025-01-13 NOTE — CARE COORDINATION
1/13/25, 11:09 AM EST  DISCHARGE PLANNING EVALUATION    SW spoke with patient about discharge planning. He is agreeable to [facility] at discharge.     SW made referral to [facility].

## 2025-01-14 ENCOUNTER — APPOINTMENT (OUTPATIENT)
Dept: CT IMAGING | Age: 35
End: 2025-01-14
Payer: MEDICAID

## 2025-01-14 LAB
ANION GAP SERPL CALC-SCNC: 11 MEQ/L (ref 8–16)
BACTERIA SPEC ANAEROBE CULT: ABNORMAL
BACTERIA SPEC BFLD CULT: ABNORMAL
BUN SERPL-MCNC: 9 MG/DL (ref 7–22)
CALCIUM SERPL-MCNC: 9.2 MG/DL (ref 8.5–10.5)
CHLORIDE SERPL-SCNC: 102 MEQ/L (ref 98–111)
CO2 SERPL-SCNC: 26 MEQ/L (ref 23–33)
CREAT SERPL-MCNC: 1.2 MG/DL (ref 0.4–1.2)
DEPRECATED RDW RBC AUTO: 48.3 FL (ref 35–45)
ERYTHROCYTE [DISTWIDTH] IN BLOOD BY AUTOMATED COUNT: 15.7 % (ref 11.5–14.5)
GFR SERPL CREATININE-BSD FRML MDRD: 82 ML/MIN/1.73M2
GLUCOSE SERPL-MCNC: 99 MG/DL (ref 70–108)
GRAM STN SPEC: ABNORMAL
HCT VFR BLD AUTO: 29.3 % (ref 42–52)
HGB BLD-MCNC: 9 GM/DL (ref 14–18)
MCH RBC QN AUTO: 26.2 PG (ref 26–33)
MCHC RBC AUTO-ENTMCNC: 30.7 GM/DL (ref 32.2–35.5)
MCV RBC AUTO: 85.2 FL (ref 80–94)
ORGANISM: ABNORMAL
PLATELET # BLD AUTO: 450 THOU/MM3 (ref 130–400)
PMV BLD AUTO: 8.4 FL (ref 9.4–12.4)
POTASSIUM SERPL-SCNC: 4 MEQ/L (ref 3.5–5.2)
RBC # BLD AUTO: 3.44 MILL/MM3 (ref 4.7–6.1)
SODIUM SERPL-SCNC: 139 MEQ/L (ref 135–145)
WBC # BLD AUTO: 8.4 THOU/MM3 (ref 4.8–10.8)

## 2025-01-14 PROCEDURE — 74176 CT ABD & PELVIS W/O CONTRAST: CPT

## 2025-01-14 PROCEDURE — 36415 COLL VENOUS BLD VENIPUNCTURE: CPT

## 2025-01-14 PROCEDURE — 2500000003 HC RX 250 WO HCPCS

## 2025-01-14 PROCEDURE — 2060000000 HC ICU INTERMEDIATE R&B

## 2025-01-14 PROCEDURE — 99232 SBSQ HOSP IP/OBS MODERATE 35: CPT | Performed by: INTERNAL MEDICINE

## 2025-01-14 PROCEDURE — 6370000000 HC RX 637 (ALT 250 FOR IP)

## 2025-01-14 PROCEDURE — 2580000003 HC RX 258: Performed by: INTERNAL MEDICINE

## 2025-01-14 PROCEDURE — 85027 COMPLETE CBC AUTOMATED: CPT

## 2025-01-14 PROCEDURE — 6360000002 HC RX W HCPCS: Performed by: INTERNAL MEDICINE

## 2025-01-14 PROCEDURE — 2500000003 HC RX 250 WO HCPCS: Performed by: INTERNAL MEDICINE

## 2025-01-14 PROCEDURE — 80048 BASIC METABOLIC PNL TOTAL CA: CPT

## 2025-01-14 RX ORDER — MAGNESIUM HYDROXIDE 1200 MG/15ML
60 LIQUID ORAL
Status: DISCONTINUED | OUTPATIENT
Start: 2025-01-14 | End: 2025-01-14

## 2025-01-14 RX ORDER — MAGNESIUM HYDROXIDE 1200 MG/15ML
60 LIQUID ORAL EVERY OTHER DAY
Status: DISCONTINUED | OUTPATIENT
Start: 2025-01-15 | End: 2025-01-16 | Stop reason: HOSPADM

## 2025-01-14 RX ORDER — SODIUM CHLORIDE 9 MG/ML
5-250 INJECTION, SOLUTION INTRAVENOUS PRN
Status: CANCELLED | OUTPATIENT
Start: 2025-01-16

## 2025-01-14 RX ORDER — SODIUM CHLORIDE 9 MG/ML
INJECTION, SOLUTION INTRAVENOUS DAILY PRN
Status: DISCONTINUED | OUTPATIENT
Start: 2025-01-14 | End: 2025-01-14

## 2025-01-14 RX ORDER — ACETIC ACID 0.25 G/100ML
60 IRRIGANT IRRIGATION DAILY PRN
Status: DISCONTINUED | OUTPATIENT
Start: 2025-01-14 | End: 2025-01-14

## 2025-01-14 RX ORDER — ACETIC ACID 0.25 G/100ML
60 IRRIGANT IRRIGATION EVERY OTHER DAY
Status: DISCONTINUED | OUTPATIENT
Start: 2025-01-14 | End: 2025-01-16 | Stop reason: HOSPADM

## 2025-01-14 RX ORDER — VENLAFAXINE HYDROCHLORIDE 150 MG/1
150 CAPSULE, EXTENDED RELEASE ORAL DAILY
COMMUNITY

## 2025-01-14 RX ORDER — SODIUM CHLORIDE 0.9 % (FLUSH) 0.9 %
5-40 SYRINGE (ML) INJECTION PRN
Status: CANCELLED | OUTPATIENT
Start: 2025-01-16

## 2025-01-14 RX ADMIN — BUPRENORPHINE AND NALOXONE 1 FILM: 8; 2 FILM BUCCAL; SUBLINGUAL at 11:22

## 2025-01-14 RX ADMIN — SODIUM CHLORIDE, PRESERVATIVE FREE 10 ML: 5 INJECTION INTRAVENOUS at 19:56

## 2025-01-14 RX ADMIN — GABAPENTIN 800 MG: 400 CAPSULE ORAL at 11:26

## 2025-01-14 RX ADMIN — PANTOPRAZOLE SODIUM 40 MG: 40 TABLET, DELAYED RELEASE ORAL at 05:35

## 2025-01-14 RX ADMIN — BUSPIRONE HYDROCHLORIDE 10 MG: 10 TABLET ORAL at 19:56

## 2025-01-14 RX ADMIN — DAPTOMYCIN 450 MG: 500 INJECTION, POWDER, LYOPHILIZED, FOR SOLUTION INTRAVENOUS at 12:51

## 2025-01-14 RX ADMIN — ACETIC ACID 60 ML: 250 IRRIGANT IRRIGATION at 14:29

## 2025-01-14 RX ADMIN — BUSPIRONE HYDROCHLORIDE 10 MG: 10 TABLET ORAL at 14:29

## 2025-01-14 RX ADMIN — VENLAFAXINE HYDROCHLORIDE 150 MG: 150 CAPSULE, EXTENDED RELEASE ORAL at 11:27

## 2025-01-14 RX ADMIN — BUSPIRONE HYDROCHLORIDE 10 MG: 10 TABLET ORAL at 11:25

## 2025-01-14 RX ADMIN — CYCLOBENZAPRINE 10 MG: 10 TABLET, FILM COATED ORAL at 11:25

## 2025-01-14 RX ADMIN — Medication 3 MG: at 19:55

## 2025-01-14 RX ADMIN — TAMSULOSIN HYDROCHLORIDE 0.4 MG: 0.4 CAPSULE ORAL at 11:26

## 2025-01-14 RX ADMIN — GABAPENTIN 800 MG: 400 CAPSULE ORAL at 19:55

## 2025-01-14 RX ADMIN — VENLAFAXINE HYDROCHLORIDE 75 MG: 150 CAPSULE, EXTENDED RELEASE ORAL at 11:27

## 2025-01-14 RX ADMIN — GABAPENTIN 800 MG: 400 CAPSULE ORAL at 16:27

## 2025-01-14 RX ADMIN — FERROUS SULFATE TAB 325 MG (65 MG ELEMENTAL FE) 325 MG: 325 (65 FE) TAB at 11:25

## 2025-01-14 RX ADMIN — BACLOFEN 10 MG: 10 TABLET ORAL at 19:55

## 2025-01-14 RX ADMIN — CYCLOBENZAPRINE 10 MG: 10 TABLET, FILM COATED ORAL at 19:55

## 2025-01-14 RX ADMIN — CEFTRIAXONE SODIUM 2000 MG: 2 INJECTION, POWDER, FOR SOLUTION INTRAMUSCULAR; INTRAVENOUS at 11:32

## 2025-01-14 RX ADMIN — BACLOFEN 10 MG: 10 TABLET ORAL at 11:25

## 2025-01-14 RX ADMIN — TROSPIUM CHLORIDE 20 MG: 20 TABLET, FILM COATED ORAL at 11:27

## 2025-01-14 RX ADMIN — TROSPIUM CHLORIDE 20 MG: 20 TABLET, FILM COATED ORAL at 19:55

## 2025-01-14 RX ADMIN — BUPRENORPHINE AND NALOXONE 1 FILM: 8; 2 FILM BUCCAL; SUBLINGUAL at 19:55

## 2025-01-14 RX ADMIN — SODIUM CHLORIDE, PRESERVATIVE FREE 10 ML: 5 INJECTION INTRAVENOUS at 11:26

## 2025-01-14 ASSESSMENT — PAIN SCALES - GENERAL: PAINLEVEL_OUTOF10: 0

## 2025-01-14 NOTE — PLAN OF CARE
Problem: Discharge Planning  Goal: Discharge to home or other facility with appropriate resources  Outcome: Progressing  Flowsheets (Taken 1/13/2025 1920)  Discharge to home or other facility with appropriate resources:   Identify barriers to discharge with patient and caregiver   Arrange for needed discharge resources and transportation as appropriate     Problem: Safety - Adult  Goal: Free from fall injury  Outcome: Progressing  Flowsheets (Taken 1/13/2025 1920)  Free From Fall Injury: Instruct family/caregiver on patient safety     Problem: ABCDS Injury Assessment  Goal: Absence of physical injury  Outcome: Progressing  Flowsheets (Taken 1/13/2025 1920)  Absence of Physical Injury: Implement safety measures based on patient assessment     Problem: Skin/Tissue Integrity  Goal: Absence of new skin breakdown  Description: 1.  Monitor for areas of redness and/or skin breakdown  2.  Assess vascular access sites hourly  3.  Every 4-6 hours minimum:  Change oxygen saturation probe site  4.  Every 4-6 hours:  If on nasal continuous positive airway pressure, respiratory therapy assess nares and determine need for appliance change or resting period.  Outcome: Progressing     Problem: Neurosensory - Adult  Goal: Achieves stable or improved neurological status  Outcome: Progressing  Flowsheets (Taken 1/13/2025 1920)  Achieves stable or improved neurological status:   Assess for and report changes in neurological status   Initiate measures to prevent increased intracranial pressure     Problem: Neurosensory - Adult  Goal: Achieves maximal functionality and self care  Outcome: Progressing  Flowsheets (Taken 1/13/2025 1920)  Achieves maximal functionality and self care:   Monitor swallowing and airway patency with patient fatigue and changes in neurological status   Encourage and assist patient to increase activity and self care with guidance from physical therapy/occupational therapy     Problem: Cardiovascular -  Adult  Goal: Maintains optimal cardiac output and hemodynamic stability  Outcome: Progressing  Flowsheets (Taken 1/13/2025 1920)  Maintains optimal cardiac output and hemodynamic stability:   Monitor urine output and notify Licensed Independent Practitioner for values outside of normal range   Monitor blood pressure and heart rate     Problem: Cardiovascular - Adult  Goal: Absence of cardiac dysrhythmias or at baseline  Outcome: Progressing  Flowsheets (Taken 1/13/2025 1920)  Absence of cardiac dysrhythmias or at baseline: Monitor cardiac rate and rhythm     Problem: Skin/Tissue Integrity - Adult  Goal: Skin integrity remains intact  Outcome: Progressing  Flowsheets (Taken 1/13/2025 1920)  Skin Integrity Remains Intact:   Monitor for areas of redness and/or skin breakdown   Assess vascular access sites hourly     Problem: Skin/Tissue Integrity - Adult  Goal: Incisions, wounds, or drain sites healing without S/S of infection  Outcome: Progressing  Flowsheets (Taken 1/13/2025 1920)  Incisions, Wounds, or Drain Sites Healing Without Sign and Symptoms of Infection:   ADMISSION and DAILY: Assess and document risk factors for pressure ulcer development   TWICE DAILY: Assess and document skin integrity     Problem: Musculoskeletal - Adult  Goal: Return mobility to safest level of function  Outcome: Progressing

## 2025-01-14 NOTE — CARE COORDINATION
1/14/25, 11:17 AM EST    DISCHARGE ON GOING EVALUATION    Srikanth M [facility] day: 6  Location: -28/028-A Reason for admit: Syncope and collapse [R55]  Perinephric abscess [N15.1]     Procedures:   1/9 Perinephric Abscess Drain  1/10 ECHO EF 55-60%    Imaging since last note: none    Barriers to Discharge: ID following, Gen Surg has signed off, drain is out, urine with gram (+) Cocci. Suboxone, IV Rocephin, IV Daptomycin, Zofran prn, electrolyte replacement protocols.    PCP: Johnathan Flores MD  Readmission Risk Score: 29.3    Patient Goals/Plan/Treatment Preferences: Plans home alone, support from mother. Planning Outpatient nursing for IV antibiotic therapy. Appointment made to [facility] to begin Thursday 1/16 @ 1400. OP therapy plan and script faxed to [facility]. Spoke with Jessica.  Pt has DME. Mother will transport.

## 2025-01-14 NOTE — PROGRESS NOTES
Spiritual Health History and Assessment/Progress Note  [facility]    (P) Initial Encounter,  ,  ,      Name: Srikanth Coy MRN: [id number]    Age: 34 y.o.     Sex: male   Language: English   Hinduism: Mandaen   Perinephric abscess     Date: 1/14/2025            Total Time Calculated: (P) 8 min              Spiritual Assessment continued in [facility] ICU STEPDOWN TELEMETRY 4K        Referral/Consult From: (P) Rounding   Encounter Overview/Reason: (P) Initial Encounter  Service Provided For: (P) Patient and family together    Aga, Belief, Meaning:   Patient identifies as spiritual  Family/Friends identify as spiritual      Importance and Influence:  Patient has spiritual/personal beliefs that influence decisions regarding their health  Family/Friends have spiritual/personal beliefs that influence decisions regarding the patient's health    Community:  Patient feels well-supported. Support system includes: Spouse/Partner  Family/Friends feel well-supported. Support system includes: Extended family    Assessment and Plan of Care:     Patient Interventions include: Facilitated expression of thoughts and feelings  Family/Friends Interventions include: Facilitated expression of thoughts and feelings    Patient Plan of Care: Spiritual Care available upon further referral  Family/Friends Plan of Care: Spiritual Care available upon further referral    Electronically signed by SCOTT Armstrong on 1/14/2025 at 4:25 PM
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
## 2025-01-14 NOTE — PROGRESS NOTES
Pharmacy Medication History Note    List of current medications patient is taking is complete.    Source of information: [facility]    Changes made to medication list:  Medications removed (include reason, ex. therapy complete or physician discontinued):  Removed baclofen; no record of fill  Removed duplicate vitamin D  Removed     Medications added/doses adjusted:  Changed albuterol 2 puffs q6h prn  Changed ferrous sulfate to 325 mg PO BID  Changed hydroxyzine to 50 mg PO TID  Added venlafaxine 150 mg PO daily (in addition to 75 mg daily)    Other notes (ex. Recent course of antibiotics, Coumadin dosing):    Denies use of other OTC or herbal medications.    Allergies reviewed    Electronically signed by Ishmael Bueno RPH on 1/14/2025 at 4:33 PM

## 2025-01-14 NOTE — PLAN OF CARE
Internal medicine resident plan of care:    At approximately 8:30 PM, patient inadvertently had his accordion drain removed.  Minimal bleeding was observed.  The wound had coagulated gel-like purulence which was sent for culture.  Patient endorses no pain at the site, though he is a T4 paraplegic.  There is some surrounding induration and mild erythema.  Infectious Disease already consulted for this patient, and he is currently on an antibiotic regimen tailored to existing cultures and sensitivities.  Most recent vitals show /75, HR 82, SpO2 98% on room air with respiratory rate 18.  Per primary RN, drain had been having minimal output from his drain.  Will defer consideration of repeat imaging to primary daytime team considering his hemodynamic stability.    Electronically signed by Mir Medrano DO IM PGY-2 on 1/13/2025.

## 2025-01-14 NOTE — CARE COORDINATION
1/14/25, 9:58 AM EST  DISCHARGE PLANNING EVALUATION    SW spoke with patient about [facility]. He reported that he will not go to [facility]. He reported that they gave him his bed sore originally and he rather die then go back to [place].     [facility] updated infectious disease.    [facility]  denied patient.

## 2025-01-14 NOTE — PROGRESS NOTES
Progress note: Infectious diseases    Patient - Srikanth Coy,  Age - 34 y.o.    - 1990      Room Number - 4K-28/028-A   MRN -  [id number]   [facility] # - [id number]  Date of Admission -  2025 11:15 AM    SUBJECTIVE:   No new  issues  OBJECTIVE   VITALS    height is 1.778 m (5' 10\") and weight is 77.1 kg (170 lb). His oral temperature is 98 °F (36.7 °C). His blood pressure is 110/73 and his pulse is 78. His respiration is 18 and oxygen saturation is 99%.       Wt Readings from Last 3 Encounters:   25 77.1 kg (170 lb)   24 79.4 kg (175 lb)   24 79.4 kg (175 lb)       I/O (24 Hours)    Intake/Output Summary (Last 24 hours) at 2025 1028  Last data filed at 2025 0400  Gross per 24 hour   Intake 920 ml   Output 2500 ml   Net -1580 ml       General Appearance  Awake, alert, oriented,  not  In acute distress  HEENT - normocephalic, atraumatic, pink conjunctiva,  anicteric sclera dentlal caries  Neck - Supple, no mass.  Lungs -  Bilateral   air entry, no rhonchi, no wheeze  Cardiovascular - Heart sounds are normal.    Abdomen - soft, not distended,   Neurologic -paraplegic  Skin - No bruising or bleeding  Extremities -  open wound on the coccyx and ischial   Drains has come out spontaneously  MEDICATIONS:      cefTRIAXone (ROCEPHIN) IV  2,000 mg IntraVENous Q24H    DAPTOmycin (CUBICIN) 450 mg in sodium chloride 0.9 % 50 mL IVPB  6 mg/kg IntraVENous Q24H    baclofen  10 mg Oral BID    busPIRone  10 mg Oral TID    ferrous sulfate  325 mg Oral Daily with breakfast    gabapentin  800 mg Oral 4x Daily    trospium  20 mg Oral BID    pantoprazole  40 mg Oral QAM AC    buprenorphine-naloxone  1 Film SubLINGual TID    tamsulosin  0.4 mg Oral Daily    cyclobenzaprine  10 mg Oral BID    venlafaxine  75 mg Oral Daily with breakfast    And    venlafaxine  150 mg Oral Daily with breakfast    sodium chloride  flush  5-40 mL IntraVENous 2 times per day      sodium chloride       albuterol sulfate HFA, hydrOXYzine HCl, ibuprofen, oxyBUTYnin, tiZANidine, perflutren lipid microspheres, sodium chloride flush, sodium chloride, potassium chloride **OR** potassium alternative oral replacement **OR** potassium chloride, magnesium sulfate, ondansetron **OR** ondansetron, polyethylene glycol, acetaminophen **OR** acetaminophen, potassium chloride, melatonin      LABS:     CBC:   Recent Labs     01/12/25 0355 01/13/25 0449 01/14/25 0338   WBC 10.5 9.5 8.4   HGB 9.1* 9.0* 9.0*   * 441* 450*     BMP:    Recent Labs     01/12/25 0355 01/13/25 0449 01/14/25 0338    141 139   K 4.4 5.0 4.0    102 102   CO2 26 28 26   BUN 7 8 9   CREATININE 1.0 1.1 1.2   GLUCOSE 97 97 99     Calcium:  Recent Labs     01/14/25 0338   CALCIUM 9.2     Hepatic:   Recent Labs     01/12/25 0355   ALKPHOS 165*   ALT 45   AST 52*   BILITOT 0.2*   BILIDIR <0.1     CULTURES:   UA:   No results for input(s): \"SPECGRAV\", \"PHUR\", \"COLORU\", \"CLARITYU\", \"MUCUS\", \"PROTEINU\", \"BLOODU\", \"RBCUA\", \"WBCUA\", \"BACTERIA\", \"NITRU\", \"GLUCOSEU\", \"BILIRUBINUR\", \"UROBILINOGEN\", \"KETUA\", \"LABCAST\", \"LABCASTTY\", \"AMORPHOS\" in the last 72 hours.    Invalid input(s): \"CRYSTALS\"    Micro:   Lab Results   Component Value Date/Time    BC  01/08/2025 05:13 PM     No growth 24 hours. No growth 48 hours. No growth at 5 days          Problem list of patient:     Patient Active Problem List   Diagnosis Code    Severe single current episode of major depressive disorder, without psychotic features ([facility]) F32.2    Paraplegia ([facility]) G82.20    Other chronic pain G89.29    Sepsis secondary to UTI ([facility]) A41.9, N39.0    Neurogenic bladder N31.9    Mood disorder due to known physiological condition with depressive features F06.31    Peristomal skin complication [id number]    Pressure ulcer of coccygeal region, stage 4 ([facility]) L89.154    Wound of back S21.209A    E coli bacteremia  R78.81, B96.20    Right nephrolithiasis N20.0    Hypokalemia E87.6    Right ureteral stone N20.1    Decubitus ulcer of left ischium, stage 4 ([facility]) L89.324    Decubitus ulcer of left heel, stage 3 ([facility]) L89.623    Spasticity R25.2    Self-inflicted injury [id number]    Compulsive skin picking F42.4    Wound of abdomen S31.109A    Colostomy care ([facility]) Z43.3    Chest pain R07.9    Depression F32.A    Bacteria in urine R82.71    Suicidal thoughts R45.851    Decubitus ulcer of right ankle, stage 3 ([facility]) L89.513    Pressure injury of right ischium, stage 3 ([facility]) L89.313    Fungal dermatitis B36.9    Pressure ulcer of toe of left foot, stage 2 ([facility]) L89.892    Dermatitis due to unknown cause L30.9    Pressure injury of left buttock, unstageable ([facility]) L89.320    Pressure injury of right heel, stage 3 ([facility]) L89.613    Pressure injury, stage 4, with infection ([facility]) L89.94, L08.9    Cellulitis L03.90    Excoriation of abdomen S30.811A    Pressure injury of sacral region, stage 4 ([facility]) L89.154    Moderate malnutrition ([facility]) E44.0    Chronic osteomyelitis of coccyx M46.28    Osteomyelitis, chronic, ischium, left ([facility]) M86.652    Long term (current) use of antibiotics Z79.2    Skin ulcer of second toe of right foot with fat layer exposed ([facility]) L97.512    Unilateral inguinal testis Q53.112    Spinal cord injury at T1-T6 level without injury of spinal bone ([facility]) S24.101A    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

## 2025-01-15 ENCOUNTER — APPOINTMENT (OUTPATIENT)
Dept: CT IMAGING | Age: 35
End: 2025-01-15
Payer: MEDICAID

## 2025-01-15 LAB
ANION GAP SERPL CALC-SCNC: 11 MEQ/L (ref 8–16)
BUN SERPL-MCNC: 8 MG/DL (ref 7–22)
CALCIUM SERPL-MCNC: 9.7 MG/DL (ref 8.5–10.5)
CHLORIDE SERPL-SCNC: 101 MEQ/L (ref 98–111)
CK SERPL-CCNC: 11 U/L (ref 55–170)
CO2 SERPL-SCNC: 27 MEQ/L (ref 23–33)
CREAT SERPL-MCNC: 1.2 MG/DL (ref 0.4–1.2)
DEPRECATED RDW RBC AUTO: 48.9 FL (ref 35–45)
ERYTHROCYTE [DISTWIDTH] IN BLOOD BY AUTOMATED COUNT: 15.8 % (ref 11.5–14.5)
GFR SERPL CREATININE-BSD FRML MDRD: 82 ML/MIN/1.73M2
GLUCOSE SERPL-MCNC: 99 MG/DL (ref 70–108)
HCT VFR BLD AUTO: 31.3 % (ref 42–52)
HGB BLD-MCNC: 9.6 GM/DL (ref 14–18)
MCH RBC QN AUTO: 26.4 PG (ref 26–33)
MCHC RBC AUTO-ENTMCNC: 30.7 GM/DL (ref 32.2–35.5)
MCV RBC AUTO: 86 FL (ref 80–94)
PLATELET # BLD AUTO: 485 THOU/MM3 (ref 130–400)
PMV BLD AUTO: 8.6 FL (ref 9.4–12.4)
POTASSIUM SERPL-SCNC: 4.4 MEQ/L (ref 3.5–5.2)
RBC # BLD AUTO: 3.64 MILL/MM3 (ref 4.7–6.1)
SODIUM SERPL-SCNC: 139 MEQ/L (ref 135–145)
WBC # BLD AUTO: 9.4 THOU/MM3 (ref 4.8–10.8)

## 2025-01-15 PROCEDURE — 80048 BASIC METABOLIC PNL TOTAL CA: CPT

## 2025-01-15 PROCEDURE — 77012 CT SCAN FOR NEEDLE BIOPSY: CPT

## 2025-01-15 PROCEDURE — 36415 COLL VENOUS BLD VENIPUNCTURE: CPT

## 2025-01-15 PROCEDURE — 2500000003 HC RX 250 WO HCPCS: Performed by: INTERNAL MEDICINE

## 2025-01-15 PROCEDURE — 6370000000 HC RX 637 (ALT 250 FOR IP)

## 2025-01-15 PROCEDURE — 6360000002 HC RX W HCPCS: Performed by: INTERNAL MEDICINE

## 2025-01-15 PROCEDURE — 2580000003 HC RX 258: Performed by: INTERNAL MEDICINE

## 2025-01-15 PROCEDURE — 82550 ASSAY OF CK (CPK): CPT

## 2025-01-15 PROCEDURE — 2500000003 HC RX 250 WO HCPCS

## 2025-01-15 PROCEDURE — 2060000000 HC ICU INTERMEDIATE R&B

## 2025-01-15 PROCEDURE — 2709999900 CT ABSCESS DRAINAGE W CATH PLACEMENT S&I

## 2025-01-15 PROCEDURE — 85027 COMPLETE CBC AUTOMATED: CPT

## 2025-01-15 PROCEDURE — 0T9030Z DRAINAGE OF RIGHT KIDNEY WITH DRAINAGE DEVICE, PERCUTANEOUS APPROACH: ICD-10-PCS

## 2025-01-15 PROCEDURE — 99232 SBSQ HOSP IP/OBS MODERATE 35: CPT | Performed by: INTERNAL MEDICINE

## 2025-01-15 RX ORDER — SODIUM CHLORIDE 0.9 % (FLUSH) 0.9 %
10 SYRINGE (ML) INJECTION PRN
Status: DISCONTINUED | OUTPATIENT
Start: 2025-01-15 | End: 2025-01-16 | Stop reason: HOSPADM

## 2025-01-15 RX ORDER — LIDOCAINE HYDROCHLORIDE 10 MG/ML
5 INJECTION, SOLUTION EPIDURAL; INFILTRATION; INTRACAUDAL; PERINEURAL ONCE
Status: DISCONTINUED | OUTPATIENT
Start: 2025-01-15 | End: 2025-01-16 | Stop reason: HOSPADM

## 2025-01-15 RX ORDER — SODIUM CHLORIDE 9 MG/ML
25 INJECTION, SOLUTION INTRAVENOUS PRN
Status: DISCONTINUED | OUTPATIENT
Start: 2025-01-15 | End: 2025-01-16 | Stop reason: HOSPADM

## 2025-01-15 RX ORDER — SODIUM CHLORIDE 0.9 % (FLUSH) 0.9 %
10 SYRINGE (ML) INJECTION EVERY 12 HOURS SCHEDULED
Status: DISCONTINUED | OUTPATIENT
Start: 2025-01-15 | End: 2025-01-16 | Stop reason: HOSPADM

## 2025-01-15 RX ADMIN — CYCLOBENZAPRINE 10 MG: 10 TABLET, FILM COATED ORAL at 21:56

## 2025-01-15 RX ADMIN — TAMSULOSIN HYDROCHLORIDE 0.4 MG: 0.4 CAPSULE ORAL at 09:09

## 2025-01-15 RX ADMIN — SODIUM CHLORIDE, PRESERVATIVE FREE 10 ML: 5 INJECTION INTRAVENOUS at 09:07

## 2025-01-15 RX ADMIN — FERROUS SULFATE TAB 325 MG (65 MG ELEMENTAL FE) 325 MG: 325 (65 FE) TAB at 12:45

## 2025-01-15 RX ADMIN — SODIUM CHLORIDE 60 ML: 900 IRRIGANT IRRIGATION at 09:18

## 2025-01-15 RX ADMIN — DAPTOMYCIN 450 MG: 500 INJECTION, POWDER, LYOPHILIZED, FOR SOLUTION INTRAVENOUS at 12:47

## 2025-01-15 RX ADMIN — CYCLOBENZAPRINE 10 MG: 10 TABLET, FILM COATED ORAL at 09:06

## 2025-01-15 RX ADMIN — BUSPIRONE HYDROCHLORIDE 10 MG: 10 TABLET ORAL at 21:56

## 2025-01-15 RX ADMIN — PANTOPRAZOLE SODIUM 40 MG: 40 TABLET, DELAYED RELEASE ORAL at 05:43

## 2025-01-15 RX ADMIN — BUPRENORPHINE AND NALOXONE 1 FILM: 8; 2 FILM BUCCAL; SUBLINGUAL at 09:07

## 2025-01-15 RX ADMIN — BACLOFEN 10 MG: 10 TABLET ORAL at 21:56

## 2025-01-15 RX ADMIN — GABAPENTIN 800 MG: 400 CAPSULE ORAL at 14:16

## 2025-01-15 RX ADMIN — GABAPENTIN 800 MG: 400 CAPSULE ORAL at 21:56

## 2025-01-15 RX ADMIN — TROSPIUM CHLORIDE 20 MG: 20 TABLET, FILM COATED ORAL at 21:56

## 2025-01-15 RX ADMIN — GABAPENTIN 800 MG: 400 CAPSULE ORAL at 09:13

## 2025-01-15 RX ADMIN — SODIUM CHLORIDE, PRESERVATIVE FREE 10 ML: 5 INJECTION INTRAVENOUS at 22:45

## 2025-01-15 RX ADMIN — SODIUM CHLORIDE, PRESERVATIVE FREE 10 ML: 5 INJECTION INTRAVENOUS at 21:58

## 2025-01-15 RX ADMIN — SODIUM CHLORIDE, PRESERVATIVE FREE 10 ML: 5 INJECTION INTRAVENOUS at 12:36

## 2025-01-15 RX ADMIN — BUSPIRONE HYDROCHLORIDE 10 MG: 10 TABLET ORAL at 09:10

## 2025-01-15 RX ADMIN — BUPRENORPHINE AND NALOXONE 1 FILM: 8; 2 FILM BUCCAL; SUBLINGUAL at 14:15

## 2025-01-15 RX ADMIN — BUSPIRONE HYDROCHLORIDE 10 MG: 10 TABLET ORAL at 14:16

## 2025-01-15 RX ADMIN — VENLAFAXINE HYDROCHLORIDE 150 MG: 150 CAPSULE, EXTENDED RELEASE ORAL at 09:12

## 2025-01-15 RX ADMIN — TROSPIUM CHLORIDE 20 MG: 20 TABLET, FILM COATED ORAL at 09:09

## 2025-01-15 RX ADMIN — VENLAFAXINE HYDROCHLORIDE 75 MG: 150 CAPSULE, EXTENDED RELEASE ORAL at 09:12

## 2025-01-15 RX ADMIN — BACLOFEN 10 MG: 10 TABLET ORAL at 09:09

## 2025-01-15 RX ADMIN — CEFTRIAXONE SODIUM 2000 MG: 2 INJECTION, POWDER, FOR SOLUTION INTRAMUSCULAR; INTRAVENOUS at 12:36

## 2025-01-15 NOTE — PROGRESS NOTES
Progress note: Infectious diseases    Patient - Srikanth Coy,  Age - 34 y.o.    - 1990      Room Number - 4K-28/028-A   MRN -  [id number]   [facility] # - [id number]  Date of Admission -  2025 11:15 AM    SUBJECTIVE:   He has a new drain placed  OBJECTIVE   VITALS    height is 1.778 m (5' 10\") and weight is 77.1 kg (170 lb). His oral temperature is 98.6 °F (37 °C). His blood pressure is 110/72 and his pulse is 110 (abnormal). His respiration is 16 and oxygen saturation is 97%.       Wt Readings from Last 3 Encounters:   25 77.1 kg (170 lb)   24 79.4 kg (175 lb)   24 79.4 kg (175 lb)       I/O (24 Hours)    Intake/Output Summary (Last 24 hours) at 1/15/2025 1351  Last data filed at 1/15/2025 1326  Gross per 24 hour   Intake 2872.56 ml   Output 1350 ml   Net 1522.56 ml       General Appearance  Awake, alert, oriented,  not  In acute distress  HEENT - normocephalic, atraumatic,dental caries  Neck - Supple, no mass  Lungs -  Bilateral good air entry, no rhonchi, no wheeze  Cardiovascular - Heart sounds are normal.    Abdomen - soft, not distended, nontender,  a new drain over the right flank area  Neurologic -paraplegic  Skin - No bruising or bleeding  Extremities - No edema,open wound on the coccyx and ischial area  MEDICATIONS:      acetic acid  60 mL Irrigation Every Other Day    cefTRIAXone (ROCEPHIN) IV  2,000 mg IntraVENous Q24H    DAPTOmycin (CUBICIN) 450 mg in sodium chloride 0.9 % 50 mL IVPB  6 mg/kg IntraVENous Q24H    baclofen  10 mg Oral BID    busPIRone  10 mg Oral TID    ferrous sulfate  325 mg Oral Daily with breakfast    gabapentin  800 mg Oral 4x Daily    trospium  20 mg Oral BID    pantoprazole  40 mg Oral QAM AC    buprenorphine-naloxone  1 Film SubLINGual TID    tamsulosin  0.4 mg Oral Daily    cyclobenzaprine  10 mg Oral BID    venlafaxine  75 mg Oral Daily with breakfast    And

## 2025-01-15 NOTE — CARE COORDINATION
1/15/25, 1:39 PM EST    DISCHARGE PLANNING EVALUATION       Received a call that patient's mother wanted to speak with .  Patient stated that his mother had left.  Phone goes straight to voicemail and no voicemail set up.

## 2025-01-15 NOTE — CONSENT
Formulation and discussion of sedation / procedure plans, risks, benefits, side effects and alternatives with patient and/or responsible adult completed.    History and Physical reviewed and unchanged.    Electronically signed by Manjit Eldridge MD on 1/15/25 at 11:02 AM EST

## 2025-01-15 NOTE — PROGRESS NOTES
Hospitalist Progress Note  Internal Medicine Resident      Patient: Srikanth Coy 34 y.o. male      Unit/Bed: K-28/028-A    Admit Date: 1/8/2025      ASSESSMENT AND PLAN  Active Problems    Right-sided perinephric abscess s/p I&D with drain placement 1/9  Complicated UTI with suprapubic catheter  History of recent right sided nephrolithiasis 5.5 mm s/p stent placement 12/3 and lithotripsy with stent exchange 12/18  11 cm perinephric abscess on CTAP 1/8. Patient with history of prior complicated UTI secondary to obstructive nephrolithiasis with urine culture growing Enterobacter Cloacea and stenotrophomonas maltophilia. Kidney stone blasted recently and he has evidence of subsequent infection. Underwent I&D by IR 1/9. Patient had drain which was accidentally pulled overnight of 1/13. Having another drain placed by IR 1/15. Repeat CTAP 1/14 showed large abscess but smaller than last study. Urine culture and abscess positive for E. Coli and Enterococcus faecalis. Urine culture also growing MRSA. Has been on cefepime and vancomycin which were switched to daptomycin and ceftriaxone 1/13. Blood culture NG 3 day. His suprapubic catheter was exchanged 1/11. Culture from new rodrigues growing Enterococcus. Continue to follow cultures. Leukocytosis improved with antibiotics 19.7 1/9 => 9.4 1/15. Patient most likely will need long-term antibiotics. ID plans to eventually discharge patient with patient with 2 weeks IV daptomycin and oral Bactrim. Will need PICC line placed at some point.     Orthostatic syncope  Patient brought to the ED initially for concern for syncope. Has remote history of syncope in 2017. Patient became lightheaded during ambulatory transfer. Positional. Denied palpitations, N/V, chest pain beforehand. No witnessed shaking. LOC for a few minutes. Poor oral intake the past few days. Suspect orthostatic syncope given SBP in 80s, poor oral intake, and syncope lasted about 3 mins with no extended period

## 2025-01-15 NOTE — PROCEDURES
Department of Radiology  Post Procedure Progress Note      Pre-Procedure Diagnosis:  right renal abscess     Procedure Performed:  abscess drainage     Anesthesia: local     Findings: successful, 40 ml bloody pus aspirated     Immediate Complications:  None    Estimated Blood Loss: minimal    SEE DICTATED PROCEDURE NOTE FOR COMPLETE DETAILS.    Manjit Eldridge MD   1/15/2025 11:47 AM

## 2025-01-15 NOTE — PLAN OF CARE
Problem: Discharge Planning  Goal: Discharge to home or other facility with appropriate resources  Outcome: Progressing  Flowsheets (Taken 1/14/2025 1926)  Discharge to home or other facility with appropriate resources:   Identify barriers to discharge with patient and caregiver   Arrange for needed discharge resources and transportation as appropriate     Problem: Safety - Adult  Goal: Free from fall injury  Outcome: Progressing  Flowsheets (Taken 1/14/2025 1926)  Free From Fall Injury: Instruct family/caregiver on patient safety     Problem: ABCDS Injury Assessment  Goal: Absence of physical injury  Outcome: Progressing  Flowsheets (Taken 1/14/2025 1926)  Absence of Physical Injury: Implement safety measures based on patient assessment     Problem: Skin/Tissue Integrity  Goal: Absence of new skin breakdown  Description: 1.  Monitor for areas of redness and/or skin breakdown  2.  Assess vascular access sites hourly  3.  Every 4-6 hours minimum:  Change oxygen saturation probe site  4.  Every 4-6 hours:  If on nasal continuous positive airway pressure, respiratory therapy assess nares and determine need for appliance change or resting period.  Outcome: Progressing     Problem: Neurosensory - Adult  Goal: Achieves stable or improved neurological status  Outcome: Progressing  Flowsheets (Taken 1/14/2025 1926)  Achieves stable or improved neurological status:   Assess for and report changes in neurological status   Initiate measures to prevent increased intracranial pressure     Problem: Neurosensory - Adult  Goal: Achieves maximal functionality and self care  Outcome: Progressing  Flowsheets (Taken 1/14/2025 1926)  Achieves maximal functionality and self care:   Monitor swallowing and airway patency with patient fatigue and changes in neurological status   Encourage and assist patient to increase activity and self care with guidance from physical therapy/occupational therapy     Problem: Cardiovascular -

## 2025-01-15 NOTE — PROGRESS NOTES
1055 Pt arrived to CT holding. Skin natural for race, warm, dry. Respirations even and unlabored.   1058 Dr. Eldridge in to evaluate pt and explain procedure. 1100 1100 Consent obtained. Pt verbalizes agreement of site of procedure and procedure to be performed.   1110 Pt positioned  prone on CT table. Monitor applied.  1123 Pre scans complete.   1127 Procedure started with Dr. Eldridge  1139 Procedure complete. Right flank drain sutured in place, covered with gauze and opsite. To truclose bag. 40 ml of blood tinged fluid removed via drain.   1141 Post scans complete.   1146 Pt back to bed. Positioned for comfort.   1147 Report called to [facility].   1153 Pt awaiting transport to  via bed. Skin natural for race, warm, dry. Respirations even and unlabored. No complaints voiced at this time. Mother with pt.

## 2025-01-16 ENCOUNTER — HOSPITAL ENCOUNTER (OUTPATIENT)
Dept: NURSING | Age: 35
Discharge: HOME | End: 2025-01-16

## 2025-01-16 ENCOUNTER — APPOINTMENT (OUTPATIENT)
Dept: GENERAL RADIOLOGY | Age: 35
End: 2025-01-16
Payer: MEDICAID

## 2025-01-16 VITALS
DIASTOLIC BLOOD PRESSURE: 90 MMHG | HEART RATE: 111 BPM | BODY MASS INDEX: 24.34 KG/M2 | OXYGEN SATURATION: 99 % | SYSTOLIC BLOOD PRESSURE: 110 MMHG | TEMPERATURE: 98 F | RESPIRATION RATE: 18 BRPM | WEIGHT: 170 LBS | HEIGHT: 70 IN

## 2025-01-16 PROCEDURE — C1751 CATH, INF, PER/CENT/MIDLINE: HCPCS

## 2025-01-16 PROCEDURE — 71045 X-RAY EXAM CHEST 1 VIEW: CPT

## 2025-01-16 PROCEDURE — 6360000002 HC RX W HCPCS

## 2025-01-16 PROCEDURE — 36569 INSJ PICC 5 YR+ W/O IMAGING: CPT

## 2025-01-16 PROCEDURE — 6370000000 HC RX 637 (ALT 250 FOR IP)

## 2025-01-16 PROCEDURE — 2500000003 HC RX 250 WO HCPCS: Performed by: INTERNAL MEDICINE

## 2025-01-16 PROCEDURE — 2580000003 HC RX 258: Performed by: INTERNAL MEDICINE

## 2025-01-16 PROCEDURE — 99238 HOSP IP/OBS DSCHRG MGMT 30/<: CPT | Performed by: INTERNAL MEDICINE

## 2025-01-16 PROCEDURE — 02HV33Z INSERTION OF INFUSION DEVICE INTO SUPERIOR VENA CAVA, PERCUTANEOUS APPROACH: ICD-10-PCS | Performed by: INTERNAL MEDICINE

## 2025-01-16 PROCEDURE — 76937 US GUIDE VASCULAR ACCESS: CPT

## 2025-01-16 PROCEDURE — 6360000002 HC RX W HCPCS: Performed by: INTERNAL MEDICINE

## 2025-01-16 RX ORDER — SODIUM CHLORIDE 9 MG/ML
INJECTION, SOLUTION INTRAVENOUS PRN
Status: DISCONTINUED | OUTPATIENT
Start: 2025-01-16 | End: 2025-01-16 | Stop reason: HOSPADM

## 2025-01-16 RX ORDER — LIDOCAINE HYDROCHLORIDE 10 MG/ML
50 INJECTION, SOLUTION EPIDURAL; INFILTRATION; INTRACAUDAL; PERINEURAL ONCE
Status: DISCONTINUED | OUTPATIENT
Start: 2025-01-16 | End: 2025-01-16 | Stop reason: HOSPADM

## 2025-01-16 RX ORDER — SODIUM CHLORIDE 0.9 % (FLUSH) 0.9 %
5-40 SYRINGE (ML) INJECTION EVERY 12 HOURS SCHEDULED
Status: DISCONTINUED | OUTPATIENT
Start: 2025-01-16 | End: 2025-01-16 | Stop reason: HOSPADM

## 2025-01-16 RX ORDER — SULFAMETHOXAZOLE AND TRIMETHOPRIM 800; 160 MG/1; MG/1
1 TABLET ORAL 2 TIMES DAILY
Qty: 28 TABLET | Refills: 0 | Status: SHIPPED | OUTPATIENT
Start: 2025-01-16 | End: 2025-01-30

## 2025-01-16 RX ORDER — SODIUM CHLORIDE 0.9 % (FLUSH) 0.9 %
5-40 SYRINGE (ML) INJECTION PRN
Status: DISCONTINUED | OUTPATIENT
Start: 2025-01-16 | End: 2025-01-16 | Stop reason: HOSPADM

## 2025-01-16 RX ADMIN — CYCLOBENZAPRINE 10 MG: 10 TABLET, FILM COATED ORAL at 09:43

## 2025-01-16 RX ADMIN — GABAPENTIN 800 MG: 400 CAPSULE ORAL at 09:43

## 2025-01-16 RX ADMIN — PANTOPRAZOLE SODIUM 40 MG: 40 TABLET, DELAYED RELEASE ORAL at 09:43

## 2025-01-16 RX ADMIN — CEFTRIAXONE SODIUM 2000 MG: 2 INJECTION, POWDER, FOR SOLUTION INTRAMUSCULAR; INTRAVENOUS at 11:47

## 2025-01-16 RX ADMIN — BACLOFEN 10 MG: 10 TABLET ORAL at 09:43

## 2025-01-16 RX ADMIN — BUSPIRONE HYDROCHLORIDE 10 MG: 10 TABLET ORAL at 12:23

## 2025-01-16 RX ADMIN — DAPTOMYCIN 450 MG: 500 INJECTION, POWDER, LYOPHILIZED, FOR SOLUTION INTRAVENOUS at 12:08

## 2025-01-16 RX ADMIN — BUSPIRONE HYDROCHLORIDE 10 MG: 10 TABLET ORAL at 09:43

## 2025-01-16 RX ADMIN — SODIUM CHLORIDE, PRESERVATIVE FREE 10 ML: 5 INJECTION INTRAVENOUS at 09:39

## 2025-01-16 RX ADMIN — BUPRENORPHINE AND NALOXONE 1 FILM: 8; 2 FILM BUCCAL; SUBLINGUAL at 09:45

## 2025-01-16 RX ADMIN — TROSPIUM CHLORIDE 20 MG: 20 TABLET, FILM COATED ORAL at 09:43

## 2025-01-16 RX ADMIN — GABAPENTIN 800 MG: 400 CAPSULE ORAL at 12:23

## 2025-01-16 RX ADMIN — ACETIC ACID 60 ML: 250 IRRIGANT IRRIGATION at 11:58

## 2025-01-16 RX ADMIN — VENLAFAXINE HYDROCHLORIDE 75 MG: 150 CAPSULE, EXTENDED RELEASE ORAL at 09:44

## 2025-01-16 RX ADMIN — BUPRENORPHINE AND NALOXONE 1 FILM: 8; 2 FILM BUCCAL; SUBLINGUAL at 11:57

## 2025-01-16 RX ADMIN — FERROUS SULFATE TAB 325 MG (65 MG ELEMENTAL FE) 325 MG: 325 (65 FE) TAB at 09:43

## 2025-01-16 RX ADMIN — TAMSULOSIN HYDROCHLORIDE 0.4 MG: 0.4 CAPSULE ORAL at 09:43

## 2025-01-16 RX ADMIN — ONDANSETRON 4 MG: 2 INJECTION INTRAMUSCULAR; INTRAVENOUS at 09:32

## 2025-01-16 RX ADMIN — VENLAFAXINE HYDROCHLORIDE 150 MG: 150 CAPSULE, EXTENDED RELEASE ORAL at 09:43

## 2025-01-16 NOTE — DISCHARGE SUMMARY
abscess. UA with concern for UTI. Patient was started on cefepime and vancomycin.  CTAP on arrival also showed a sacral ulcer extending into the ischium. MRI was ordered for further evaluation and showed an abscess in that area. GS consulted with recommendation of conservative treatment.bIR performed I&D of perinephric abscess and drain placement 1/9. Urine culture and abscess returned positive for E. coli and Enterococcus faecalis. Urine culture was also positive for MRSA. His suprapubic catheter was exchanged 1/11 as it was due for exchange. Patient's antibiotics were transitioned to daptomycin and cefepime. Patient's drain was accidentally pulled and underwent repeat CT which revealed large abscess slightly reduced in size. He subsequently underwent repeat I&D with repeat drain placement. Leukocytosis improved with antibiotics 19.7 1/9 => 9.4 1/15. Patient underwent PICC line placement 1/16. He was deemed stable for discharge and was discharged with drain. Arrangements were made for antibiotic administration via outside nursing facility. He is to be treated with IV daptomycin and p.o. Bactrim for 14 days with treatment ending 1/29. Patient was instructed to call and arrange an appointment with  at the wound clinic in 2 weeks.    The patient was seen and examined on day of discharge and this discharge summary is in conjunction with any daily progress note from day of discharge.    The patient is discharged in stable condition.     Exam:   Vitals:  Vitals:    01/15/25 2343 01/16/25 0334 01/16/25 0930 01/16/25 1202   BP: 124/86 114/86 107/80 (!) 110/90   Pulse: 88 (!) 114 (!) 120 (!) 111   Resp: 18 18 16 18   Temp: 97.8 °F (36.6 °C) 98.3 °F (36.8 °C) 98.9 °F (37.2 °C) 98 °F (36.7 °C)   TempSrc: Axillary Oral Oral Oral   SpO2: 98% 95% 96% 99%   Weight:       Height:         Weight: Weight - Scale: 77.1 kg (170 lb)     General appearance: No apparent distress, appears stated age.  Eyes:  Pupils equal, round,  and reactive to light. Conjunctivae/corneas clear.  HENT: Head normal in appearance. External nares normal. Oral mucosa moist without lesions. Hearing grossly intact.   Neck: Supple, with full range of motion. Trachea midline. No gross JVD appreciated.  Respiratory:  Normal respiratory effort. Clear to auscultation, bilaterally without rales or wheezes or rhonchi.  Cardiovascular: Normal rate, regular rhythm with normal S1/S2 without murmurs. No lower extremity edema.   Abdomen: Soft, non-tender, non-distended with normal bowel sounds.  Musculoskeletal: No joint swelling or tenderness. Normal tone. No abnormal movements. No CVA tenderness. Small induration felt at site of drain.  Skin: Warm and dry. No rashes or lesions.  Psychiatric: Alert and oriented, normal insight and thought content.   Capillary Refill: Brisk,< 3 seconds.  Peripheral Pulses: +2 palpable, equal bilaterally.        Labs: For convenience the most recent labs are provided:  CBC:    Lab Results   Component Value Date/Time    WBC 9.4 01/15/2025 05:46 AM    HGB 9.6 01/15/2025 05:46 AM    HCT 31.3 01/15/2025 05:46 AM     01/15/2025 05:46 AM     Renal:    Lab Results   Component Value Date/Time     01/15/2025 05:46 AM    K 4.4 01/15/2025 05:46 AM    K 3.6 01/09/2025 07:39 AM     01/15/2025 05:46 AM    CO2 27 01/15/2025 05:46 AM    BUN 8 01/15/2025 05:46 AM    CREATININE 1.2 01/15/2025 05:46 AM    CALCIUM 9.7 01/15/2025 05:46 AM    PHOS 2.6 01/09/2025 07:39 AM     Liver:   Lab Results   Component Value Date/Time    AST 52 01/12/2025 03:55 AM    ALT 45 01/12/2025 03:55 AM       Significant Diagnostic Studies    Radiology:   XR CHEST PORTABLE   Final Result   1. Lungs are hypoinflated. Normal heart size. Prior thoracic fusion.   2. Mild subsegmental Ana right lung base and questionably retrocardiac region   left lung base. No alveolar infiltrates or effusions are seen.   3. Right arm PICC line, catheter tip in SVC.            **This  MG tablet  Commonly known as: ZOFRAN  Take 1 tablet by mouth every 8 hours as needed for Nausea or Vomiting     oxyBUTYnin 5 MG tablet  Commonly known as: DITROPAN  Take 1 tablet by mouth 3 times daily as needed (for stent pain)     pantoprazole 40 MG tablet  Commonly known as: PROTONIX  TAKE 1 TABLET BY MOUTH IN THE MORNING BEFORE BREAKFAST     potassium chloride 10 MEQ extended release capsule  Commonly known as: MICRO-K     Suboxone 8-2 MG Film SL film  Generic drug: buprenorphine-naloxone     tamsulosin 0.4 MG capsule  Commonly known as: FLOMAX  Take 1 capsule by mouth daily     tiZANidine 4 MG tablet  Commonly known as: ZANAFLEX     * venlafaxine 150 MG extended release capsule  Commonly known as: EFFEXOR XR     * venlafaxine 75 MG extended release capsule  Commonly known as: EFFEXOR XR  TAKE 1 CAPSULE BY MOUTH DAILY WITH 150MG           * This list has 2 medication(s) that are the same as other medications prescribed for you. Read the directions carefully, and ask your doctor or other care provider to review them with you.                   Where to Get Your Medications        These medications were sent to [facility] - [facility] [address] - P [phone] - F [phone]  [address] [address]      Phone: [phone]   sulfamethoxazole-trimethoprim 800-160 MG per tablet       You can get these medications from any pharmacy    Bring a paper prescription for each of these medications  DAPTOmycin infusion          Time Spent on discharge is 20 minutes in the examination, evaluation, counseling and review of medications and discharge plan.    Thank you Johnathan Flores MD for the opportunity to be involved in this patient's care.      Signed:    Electronically signed by Ghada Richards DO on 1/16/25 at 2:58 PM EST     Case was discussed with Attending, Dr. Good.

## 2025-01-16 NOTE — PROGRESS NOTES
PICC Procedure Note    Srikanth Coy   Admitted- 1/8/2025 11:15 AM  Admission diagnosis- Syncope and collapse [R55]  Perinephric abscess [N15.1]      Attending Physician- Callum Good MD  Ordering Physician-same  Indication for Insertion: Antibiotic Therapy    Catheter Insertion Date- 1/16/2025   Lot Number- [id number]  Cameroonian-4  Lumen-single    Insertion Site- TRI Basilic  Vein Circumference- 1.38 cm  Catheter Length- 38 cm  Internal Length- 38 cm  Exposed Catheter Length- 0cm   Upper Arm Circumference- 32cm  Tip Confirmation System Bundle met- Yes  If X-ray required, Tip Location- . Right arm PICC line, catheter tip in SVC.   Radiologist- Dr. Manjit Eldridge     PICC insertion successful- Yes  Ultrasound- yes    Okay To Use PICC- Yes    Electronically signed by Fabiola Echavarria RN, on 1/16/2025 at 12:46PM

## 2025-01-16 NOTE — DISCHARGE INSTRUCTIONS
You will be undergoing antibiotic treatment both orally and via your PICC line as outpatient. Bactrim will be oral and daptomycin is via PICC line. You are to receive antibiotics via outpatient nursing facility which you will be seeing daily. End date of antibiotics is 1/29. Please follow instructions provided by the discharge nurse in regards to caring for your drain. Please call to schedule an appointment with Dr. Multani at the wound clinic in 2 weeks for an appointment for removal of drain and PICC line, the phone number is [phone].    Please call to schedule appointment with your primary care physician in 1 week.

## 2025-01-16 NOTE — CARE COORDINATION
1/16/25, 12:29 PM EST    Patient goals/plan/ treatment preferences discussed by  and .  Patient goals/plan/ treatment preferences reviewed with patient/ family.  Patient/ family verbalize understanding of discharge plan and are in agreement with goal/plan/treatment preferences.  Understanding was demonstrated using the teach back method.  AVS provided by RN at time of discharge, which includes all necessary medical information pertaining to the patients current course of illness, treatment, post-discharge goals of care, and treatment preferences.     Services At/After Discharge: Outpatient and Nursing service       plans home alone, mother Brandy attentive to needs and will be trained on perinephric drain, plans  [facility] for IV Daptomycin 450 mg daily w PICC, 1st appointment Friday, 1/17 1300 (see AVS, they will schedule out from thereon);  Van transport, has SPC (changed), ostomy, bed, lift, air mattress, trapeze, slider board, platform lift; current [facility] left ischial/sacral wounds, [facility] in past, [facility] ANEL Olvera @ [phone]; collaborated w Central Scheduling, Flynn Rizo RN, family

## 2025-01-16 NOTE — PLAN OF CARE
Problem: Discharge Planning  Goal: Discharge to home or other facility with appropriate resources  Outcome: Progressing  Flowsheets (Taken 1/16/2025 0349)  Discharge to home or other facility with appropriate resources:   Identify barriers to discharge with patient and caregiver   Arrange for needed discharge resources and transportation as appropriate     Problem: Safety - Adult  Goal: Free from fall injury  Outcome: Progressing  Flowsheets (Taken 1/16/2025 0349)  Free From Fall Injury: Instruct family/caregiver on patient safety     Problem: ABCDS Injury Assessment  Goal: Absence of physical injury  Outcome: Progressing  Flowsheets (Taken 1/16/2025 0349)  Absence of Physical Injury: Implement safety measures based on patient assessment     Problem: Skin/Tissue Integrity  Goal: Absence of new skin breakdown  Description: 1.  Monitor for areas of redness and/or skin breakdown  2.  Assess vascular access sites hourly  3.  Every 4-6 hours minimum:  Change oxygen saturation probe site  4.  Every 4-6 hours:  If on nasal continuous positive airway pressure, respiratory therapy assess nares and determine need for appliance change or resting period.  Outcome: Progressing     Problem: Neurosensory - Adult  Goal: Achieves stable or improved neurological status  Outcome: Progressing  Flowsheets (Taken 1/16/2025 0349)  Achieves stable or improved neurological status: Assess for and report changes in neurological status     Problem: Neurosensory - Adult  Goal: Achieves maximal functionality and self care  Outcome: Progressing  Flowsheets (Taken 1/16/2025 0349)  Achieves maximal functionality and self care: Monitor swallowing and airway patency with patient fatigue and changes in neurological status     Problem: Cardiovascular - Adult  Goal: Maintains optimal cardiac output and hemodynamic stability  Outcome: Progressing  Flowsheets (Taken 1/16/2025 0349)  Maintains optimal cardiac output and hemodynamic stability:   Monitor
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
## 2025-01-16 NOTE — PROGRESS NOTES
Progress note: Infectious diseases    Patient - Srikanth Coy,  Age - 34 y.o.    - 1990      Room Number - 4K-28/028-A   MRN -  [id number]   [facility] # - [id number]  Date of Admission -  2025 11:15 AM    SUBJECTIVE:   No new issues  OBJECTIVE   VITALS    height is 1.778 m (5' 10\") and weight is 77.1 kg (170 lb). His oral temperature is 98.3 °F (36.8 °C). His blood pressure is 114/86 and his pulse is 114 (abnormal). His respiration is 18 and oxygen saturation is 95%.       Wt Readings from Last 3 Encounters:   25 77.1 kg (170 lb)   24 79.4 kg (175 lb)   24 79.4 kg (175 lb)       I/O (24 Hours)    Intake/Output Summary (Last 24 hours) at 2025 0904  Last data filed at 2025 0646  Gross per 24 hour   Intake 740.17 ml   Output 3150 ml   Net -2409.83 ml       General Appearance  Awake, alert, oriented,  not  In acute distress  HEENT - normocephalic, atraumatic,dental caries  Neck - Supple, no mass  Lungs -  Bilateral good air entry, no rhonchi, no wheeze  Cardiovascular - Heart sounds are normal.    Abdomen - soft, not distended, nontender,  a new drain over the right flank area  Neurologic -paraplegic  Skin - No bruising or bleeding  Extremities - No edema,open wound on the coccyx and ischial area  MEDICATIONS:      sodium chloride flush  5-40 mL IntraVENous 2 times per day    lidocaine 1 % injection  50 mg IntraDERmal Once    lidocaine 1 % injection  5 mL IntraDERmal Once    sodium chloride flush  10 mL IntraVENous 2 times per day    acetic acid  60 mL Irrigation Every Other Day    cefTRIAXone (ROCEPHIN) IV  2,000 mg IntraVENous Q24H    DAPTOmycin (CUBICIN) 450 mg in sodium chloride 0.9 % 50 mL IVPB  6 mg/kg IntraVENous Q24H    baclofen  10 mg Oral BID    busPIRone  10 mg Oral TID    ferrous sulfate  325 mg Oral Daily with breakfast    gabapentin  800 mg Oral 4x Daily    trospium  20 mg Oral

## 2025-01-16 NOTE — PROGRESS NOTES
CLINICAL PHARMACY: DISCHARGE MED RECONCILIATION/REVIEW    [facility] Select Patient?: No  Total # of Interventions Recommended: 0   -   Total # Interventions Accepted: 0  Intervention Severity:   - Level 1 Intervention Present?: No   - Level 2 #: 0   - Level 3 #: 0   Time Spent (min): Alexandra Lorenzana PharmD 1/16/2025 2:33 PM

## 2025-01-17 ENCOUNTER — HOSPITAL ENCOUNTER (OUTPATIENT)
Dept: NURSING | Age: 35
Discharge: HOME | End: 2025-01-17
Payer: MEDICAID

## 2025-01-17 VITALS
DIASTOLIC BLOOD PRESSURE: 73 MMHG | OXYGEN SATURATION: 99 % | RESPIRATION RATE: 18 BRPM | TEMPERATURE: 98 F | HEART RATE: 120 BPM | SYSTOLIC BLOOD PRESSURE: 94 MMHG

## 2025-01-17 DIAGNOSIS — N39.0 SEPSIS SECONDARY TO UTI (HCC): Primary | ICD-10-CM

## 2025-01-17 DIAGNOSIS — A41.9 SEPSIS SECONDARY TO UTI (HCC): Primary | ICD-10-CM

## 2025-01-17 LAB
BACTERIA SPEC AEROBE CULT: ABNORMAL
BACTERIA SPEC AEROBE CULT: ABNORMAL
BACTERIA UR CULT: ABNORMAL
BACTERIA UR CULT: ABNORMAL
GRAM STN SPEC: ABNORMAL
ORGANISM: ABNORMAL

## 2025-01-17 PROCEDURE — 2500000003 HC RX 250 WO HCPCS: Performed by: INTERNAL MEDICINE

## 2025-01-17 PROCEDURE — 6360000002 HC RX W HCPCS: Performed by: INTERNAL MEDICINE

## 2025-01-17 PROCEDURE — 2580000003 HC RX 258: Performed by: INTERNAL MEDICINE

## 2025-01-17 PROCEDURE — 99212 OFFICE O/P EST SF 10 MIN: CPT

## 2025-01-17 PROCEDURE — 96365 THER/PROPH/DIAG IV INF INIT: CPT

## 2025-01-17 RX ORDER — SODIUM CHLORIDE 9 MG/ML
5-250 INJECTION, SOLUTION INTRAVENOUS PRN
Status: CANCELLED | OUTPATIENT
Start: 2025-01-18

## 2025-01-17 RX ORDER — SODIUM CHLORIDE 0.9 % (FLUSH) 0.9 %
5-40 SYRINGE (ML) INJECTION PRN
Status: CANCELLED | OUTPATIENT
Start: 2025-01-18

## 2025-01-17 RX ORDER — SODIUM CHLORIDE 0.9 % (FLUSH) 0.9 %
5-40 SYRINGE (ML) INJECTION PRN
Status: DISCONTINUED | OUTPATIENT
Start: 2025-01-17 | End: 2025-01-18 | Stop reason: HOSPADM

## 2025-01-17 RX ADMIN — SODIUM CHLORIDE, PRESERVATIVE FREE 10 ML: 5 INJECTION INTRAVENOUS at 13:10

## 2025-01-17 RX ADMIN — DAPTOMYCIN 500 MG: 500 INJECTION, POWDER, LYOPHILIZED, FOR SOLUTION INTRAVENOUS at 13:09

## 2025-01-17 NOTE — DISCHARGE INSTRUCTIONS
Please call [phone] a few hours prior so we can get medication ready for you. Do no      [facility] Saturday 1/18 at 1:30 PM  [facility] Sunday 1/19 at 1:30 PM          Monday 1/20 at 1:30 PM                                         Tuesday 1/21 at 12:30 PM                                         Wednesday 1/22 at 10 AM                                         Thursday 1/23 at 1:30 PM                                         Friday 1/24 at 1:30 PM  [facility] Saturday 1/25 at 1:30 PM  [facility] Sunday 1/26 at 1:30 PM                                         Monday 1/27 at 1:30 PM                                         Tuesday 1/28 at 11 AM                                          Wednesday 1/29 at 1:30 PM                                          Thursday 1/30 at 1:30 PM

## 2025-01-17 NOTE — PROGRESS NOTES
Physician Progress Note      PATIENT:               SHASHI MILLER  CSN #:                  [id number]  :                       1990  ADMIT DATE:       2025 11:15 AM  DISCH DATE:        2025 2:49 PM  RESPONDING  PROVIDER #:        Callum Good MD          QUERY TEXT:    Pt admitted with UTI and culture was taken at admission which is now growing   MRSA per 1/15 IM note.  Per previous query response, \"UTI is a complication   from 12/3 lithotripsy and stent placement procedure.\" Patient had new catheter   placed and 1/15 IM note states \"Urine culture also growing MRSA. Has been on   cefepime and vancomycin which were switched to daptomycin and ceftriaxone   . Blood culture NG 3 day. His suprapubic catheter was exchanged .   Culture from [facility] growing Enterococcus. Continue to follow cultures.\" If   possible, please document in the progress note    The medical record reflects the following:  Risk Factors: chronic suprapubic catheter with recent exchange, perirenal   abscess, paraplegic, DM2  Clinical Indicators: admitted with UTI and culture was taken at admission   which is now growing MRSA per 1/15 IM note.  Per previous query response, \"UTI   is a complication from 12/3 lithotripsy and stent placement procedure.\"   Patient had new catheter placed and 1/15 IM note states \"Urine culture also   growing MRSA. Blood culture NG 3 day. His suprapubic catheter was exchanged   . Culture from [facility] growing Enterococcus. Continue to follow   cultures.\"  Treatment: labs, imaging, ID consult, cefepime, vancomycin, daptomycin, and   ceftriaxone  Options provided:  -- UTI due to suprapubic catheter placed   -- UTI not due to suprapubic catheter placed on   -- Other - I will add my own diagnosis  -- Disagree - Not applicable / Not valid  -- Disagree - Clinically unable to determine / Unknown  -- Refer to Clinical Documentation Reviewer    PROVIDER RESPONSE TEXT:    UTI is due to

## 2025-01-17 NOTE — PROGRESS NOTES
1300: Patient arrived in electric wheelchair for Daptomycin infusion. Patient has had before and tolerated well.   PICC line in place- patient states dressing was coming off last night and he reinforced it. Lots of extra tape on old dressing. Changed PICC dressing using sterile technique.   Daptomycin infusion started- patient tolerated well.   [facility] urgent care called and made aware of patient coming through weekend.   Order faxed.     1343: Infusion complete, patient tolerated well. No concerns voiced. AVS reviewed with patient, voiced understanding. Patient discharged in wheelchair.                 _m___ Safety:       (Environmental)  [place] to environment  Ensure ID band is correct and in place/ allergy band as needed  Assess for fall risk  Initiate fall precautions as applicable (fall band, side rails, etc.)  Call light within reach  Bed in low position/ wheels locked    _m___ Pain:       Assess pain level and characteristics  Administer analgesics as ordered  Assess effectiveness of pain management and report to MD as needed    _m___ Knowledge Deficit:  Assess baseline knowledge  Provide teaching at level of understanding  Provide teaching via preferred learning method  Evaluate teaching effectiveness    _m___ Hemodynamic/Respiratory Status:       (Pre and Post Procedure Monitoring)  Assess/Monitor vital signs and LOC  Assess Baseline SpO2 prior to any sedation  Obtain weight/height  Assess vital signs/ LOC until patient meets discharge criteria  Monitor procedure site and notify MD of any issues    _m___ Infection-Risk of Central Venous Catheter:  Monitor for infection signs and symptoms (catheter site redness, temperature elevation, etc)  Assess for infection risks  Educate regarding infection prevention  Manage central venous catheter (flushes/ dressing changes per protocol)

## 2025-01-18 ENCOUNTER — HOSPITAL ENCOUNTER (OUTPATIENT)
Dept: GENERAL RADIOLOGY | Age: 35
Discharge: HOME | End: 2025-01-18
Payer: MEDICAID

## 2025-01-18 VITALS
HEART RATE: 100 BPM | SYSTOLIC BLOOD PRESSURE: 97 MMHG | OXYGEN SATURATION: 98 % | TEMPERATURE: 98.5 F | DIASTOLIC BLOOD PRESSURE: 65 MMHG

## 2025-01-18 DIAGNOSIS — A41.9 SEPSIS SECONDARY TO UTI (HCC): Primary | ICD-10-CM

## 2025-01-18 DIAGNOSIS — N39.0 SEPSIS SECONDARY TO UTI (HCC): Primary | ICD-10-CM

## 2025-01-18 PROCEDURE — 6360000002 HC RX W HCPCS: Performed by: INTERNAL MEDICINE

## 2025-01-18 PROCEDURE — 96365 THER/PROPH/DIAG IV INF INIT: CPT

## 2025-01-18 PROCEDURE — 2580000003 HC RX 258: Performed by: INTERNAL MEDICINE

## 2025-01-18 RX ORDER — SODIUM CHLORIDE 0.9 % (FLUSH) 0.9 %
5-40 SYRINGE (ML) INJECTION PRN
Status: CANCELLED | OUTPATIENT
Start: 2025-01-19

## 2025-01-18 RX ORDER — SODIUM CHLORIDE 9 MG/ML
5-250 INJECTION, SOLUTION INTRAVENOUS PRN
Status: CANCELLED | OUTPATIENT
Start: 2025-01-19

## 2025-01-18 RX ADMIN — DAPTOMYCIN 500 MG: 500 INJECTION, POWDER, LYOPHILIZED, FOR SOLUTION INTRAVENOUS at 13:45

## 2025-01-18 NOTE — PROGRESS NOTES
Baseline SpO2 prior to any sedation  Obtain weight/height  Assess vital signs/ LOC until patient meets discharge criteria  Monitor procedure site and notify MD of any issues    ____ Infection-Risk of Central Venous Catheter:  Monitor for infection signs and symptoms (catheter site redness, temperature elevation, etc)  Assess for infection risks  Educate regarding infection prevention  Manage central venous catheter (flushes/ dressing changes per protocol)  ____ Safety:       (Environmental)  [place] to environment  Ensure ID band is correct and in place/ allergy band as needed  Assess for fall risk  Initiate fall precautions as applicable (fall band, side rails, etc.)  Call light within reach  Bed in low position/ wheels locked    ____ Pain:       Assess pain level and characteristics  Administer analgesics as ordered  Assess effectiveness of pain management and report to MD as needed    ____ Knowledge Deficit:  Assess baseline knowledge  Provide teaching at level of understanding  Provide teaching via preferred learning method  Evaluate teaching effectiveness    ____ Hemodynamic/Respiratory Status:       (Pre and Post Procedure Monitoring)  Assess/Monitor vital signs and LOC  Assess Baseline SpO2 prior to any sedation  Obtain weight/height  Assess vital signs/ LOC until patient meets discharge criteria  Monitor procedure site and notify MD of any issues    ____ Infection-Risk of Central Venous Catheter:  Monitor for infection signs and symptoms (catheter site redness, temperature elevation, etc)  Assess for infection risks  Educate regarding infection prevention  Manage central venous catheter (flushes/ dressing changes per protocol)  ____ Safety:       (Environmental)  [place] to environment  Ensure ID band is correct and in place/ allergy band as needed  Assess for fall risk  Initiate fall precautions as applicable (fall band, side rails, etc.)  Call light within reach  Bed in low position/ wheels locked    ____  Pain:       Assess pain level and characteristics  Administer analgesics as ordered  Assess effectiveness of pain management and report to MD as needed    ____ Knowledge Deficit:  Assess baseline knowledge  Provide teaching at level of understanding  Provide teaching via preferred learning method  Evaluate teaching effectiveness    ____ Hemodynamic/Respiratory Status:       (Pre and Post Procedure Monitoring)  Assess/Monitor vital signs and LOC  Assess Baseline SpO2 prior to any sedation  Obtain weight/height  Assess vital signs/ LOC until patient meets discharge criteria  Monitor procedure site and notify MD of any issues    ____ Infection-Risk of Central Venous Catheter:  Monitor for infection signs and symptoms (catheter site redness, temperature elevation, etc)  Assess for infection risks  Educate regarding infection prevention  Manage central venous catheter (flushes/ dressing changes per protocol)  1/18/25

## 2025-01-18 NOTE — PROGRESS NOTES
Patient arrived for outpatient infusion with family members. Patient is in no acute distress and understands he is having his infusion, no questions or concerns at this time.

## 2025-01-19 ENCOUNTER — HOSPITAL ENCOUNTER (EMERGENCY)
Age: 35
Discharge: HOME | End: 2025-01-19
Attending: STUDENT IN AN ORGANIZED HEALTH CARE EDUCATION/TRAINING PROGRAM
Payer: MEDICAID

## 2025-01-19 ENCOUNTER — HOSPITAL ENCOUNTER (OUTPATIENT)
Dept: GENERAL RADIOLOGY | Age: 35
Discharge: HOME | End: 2025-01-19

## 2025-01-19 ENCOUNTER — APPOINTMENT (OUTPATIENT)
Dept: CT IMAGING | Age: 35
End: 2025-01-19
Payer: MEDICAID

## 2025-01-19 VITALS
SYSTOLIC BLOOD PRESSURE: 131 MMHG | HEIGHT: 71 IN | RESPIRATION RATE: 13 BRPM | OXYGEN SATURATION: 98 % | DIASTOLIC BLOOD PRESSURE: 90 MMHG | BODY MASS INDEX: 23.1 KG/M2 | WEIGHT: 165 LBS | HEART RATE: 85 BPM | TEMPERATURE: 98.1 F

## 2025-01-19 DIAGNOSIS — N15.1 PERINEPHRIC ABSCESS: ICD-10-CM

## 2025-01-19 DIAGNOSIS — R10.9 RIGHT SIDED ABDOMINAL PAIN: Primary | ICD-10-CM

## 2025-01-19 LAB
ALBUMIN SERPL BCG-MCNC: 3.2 G/DL (ref 3.5–5.1)
ALP SERPL-CCNC: 148 U/L (ref 38–126)
ALT SERPL W/O P-5'-P-CCNC: 17 U/L (ref 11–66)
ANION GAP SERPL CALC-SCNC: 13 MEQ/L (ref 8–16)
AST SERPL-CCNC: 17 U/L (ref 5–40)
BACTERIA URNS QL MICRO: ABNORMAL /HPF
BASOPHILS ABSOLUTE: 0.1 THOU/MM3 (ref 0–0.1)
BASOPHILS NFR BLD AUTO: 0.8 %
BILIRUB CONJ SERPL-MCNC: < 0.1 MG/DL (ref 0.1–13.8)
BILIRUB SERPL-MCNC: < 0.2 MG/DL (ref 0.3–1.2)
BILIRUB UR QL STRIP.AUTO: NEGATIVE
BUN SERPL-MCNC: 11 MG/DL (ref 7–22)
CALCIUM SERPL-MCNC: 9.2 MG/DL (ref 8.5–10.5)
CASTS #/AREA URNS LPF: ABNORMAL /LPF
CHARACTER UR: CLEAR
CHLORIDE SERPL-SCNC: 96 MEQ/L (ref 98–111)
CO2 SERPL-SCNC: 24 MEQ/L (ref 23–33)
COLOR, UA: YELLOW
CREAT SERPL-MCNC: 1.3 MG/DL (ref 0.4–1.2)
CRYSTALS URNS MICRO: ABNORMAL
DEPRECATED RDW RBC AUTO: 51.4 FL (ref 35–45)
EOSINOPHIL NFR BLD AUTO: 1.9 %
EOSINOPHILS ABSOLUTE: 0.2 THOU/MM3 (ref 0–0.4)
EPITHELIAL CELLS, UA: ABNORMAL /HPF
ERYTHROCYTE [DISTWIDTH] IN BLOOD BY AUTOMATED COUNT: 16.3 % (ref 11.5–14.5)
GFR SERPL CREATININE-BSD FRML MDRD: 75 ML/MIN/1.73M2
GLUCOSE SERPL-MCNC: 80 MG/DL (ref 70–108)
GLUCOSE UR QL STRIP.AUTO: NEGATIVE MG/DL
HCT VFR BLD AUTO: 31.6 % (ref 42–52)
HGB BLD-MCNC: 9.6 GM/DL (ref 14–18)
HGB UR QL STRIP.AUTO: ABNORMAL
IMM GRANULOCYTES # BLD AUTO: 0.05 THOU/MM3 (ref 0–0.07)
IMM GRANULOCYTES NFR BLD AUTO: 0.5 %
KETONES UR QL STRIP.AUTO: NEGATIVE
LACTATE SERPL-SCNC: 1 MMOL/L (ref 0.5–2)
LACTATE SERPL-SCNC: 2.3 MMOL/L (ref 0.5–2)
LYMPHOCYTES ABSOLUTE: 1.9 THOU/MM3 (ref 1–4.8)
LYMPHOCYTES NFR BLD AUTO: 20.3 %
MCH RBC QN AUTO: 26.3 PG (ref 26–33)
MCHC RBC AUTO-ENTMCNC: 30.4 GM/DL (ref 32.2–35.5)
MCV RBC AUTO: 86.6 FL (ref 80–94)
MONOCYTES ABSOLUTE: 0.6 THOU/MM3 (ref 0.4–1.3)
MONOCYTES NFR BLD AUTO: 5.8 %
NEUTROPHILS ABSOLUTE: 6.8 THOU/MM3 (ref 1.8–7.7)
NEUTROPHILS NFR BLD AUTO: 70.7 %
NITRITE UR QL STRIP: NEGATIVE
NRBC BLD AUTO-RTO: 0 /100 WBC
OSMOLALITY SERPL CALC.SUM OF ELEC: 264.8 MOSMOL/KG (ref 275–300)
PH UR STRIP.AUTO: 6.5 [PH] (ref 5–9)
PLATELET # BLD AUTO: 433 THOU/MM3 (ref 130–400)
PMV BLD AUTO: 8.5 FL (ref 9.4–12.4)
POTASSIUM SERPL-SCNC: 3.9 MEQ/L (ref 3.5–5.2)
PROT SERPL-MCNC: 7.8 G/DL (ref 6.1–8)
PROT UR STRIP.AUTO-MCNC: NEGATIVE MG/DL
RBC # BLD AUTO: 3.65 MILL/MM3 (ref 4.7–6.1)
RBC URINE: ABNORMAL /HPF
SODIUM SERPL-SCNC: 133 MEQ/L (ref 135–145)
SP GR UR REFRACT.AUTO: < 1.005 (ref 1–1.03)
UROBILINOGEN, URINE: 0.2 EU/DL (ref 0–1)
WBC # BLD AUTO: 9.6 THOU/MM3 (ref 4.8–10.8)
WBC #/AREA URNS HPF: ABNORMAL /HPF
WBC #/AREA URNS HPF: ABNORMAL /[HPF]
YEAST LIKE FUNGI URNS QL MICRO: ABNORMAL

## 2025-01-19 PROCEDURE — 6360000002 HC RX W HCPCS: Performed by: STUDENT IN AN ORGANIZED HEALTH CARE EDUCATION/TRAINING PROGRAM

## 2025-01-19 PROCEDURE — 85025 COMPLETE CBC W/AUTO DIFF WBC: CPT

## 2025-01-19 PROCEDURE — 6360000004 HC RX CONTRAST MEDICATION

## 2025-01-19 PROCEDURE — 81001 URINALYSIS AUTO W/SCOPE: CPT

## 2025-01-19 PROCEDURE — 2580000003 HC RX 258: Performed by: STUDENT IN AN ORGANIZED HEALTH CARE EDUCATION/TRAINING PROGRAM

## 2025-01-19 PROCEDURE — 96365 THER/PROPH/DIAG IV INF INIT: CPT

## 2025-01-19 PROCEDURE — 99285 EMERGENCY DEPT VISIT HI MDM: CPT

## 2025-01-19 PROCEDURE — 80053 COMPREHEN METABOLIC PANEL: CPT

## 2025-01-19 PROCEDURE — 96361 HYDRATE IV INFUSION ADD-ON: CPT

## 2025-01-19 PROCEDURE — 36415 COLL VENOUS BLD VENIPUNCTURE: CPT

## 2025-01-19 PROCEDURE — 87086 URINE CULTURE/COLONY COUNT: CPT

## 2025-01-19 PROCEDURE — 74177 CT ABD & PELVIS W/CONTRAST: CPT

## 2025-01-19 PROCEDURE — 83605 ASSAY OF LACTIC ACID: CPT

## 2025-01-19 PROCEDURE — 82248 BILIRUBIN DIRECT: CPT

## 2025-01-19 PROCEDURE — 87040 BLOOD CULTURE FOR BACTERIA: CPT

## 2025-01-19 RX ORDER — 0.9 % SODIUM CHLORIDE 0.9 %
500 INTRAVENOUS SOLUTION INTRAVENOUS ONCE
Status: COMPLETED | OUTPATIENT
Start: 2025-01-19 | End: 2025-01-19

## 2025-01-19 RX ORDER — IOPAMIDOL 755 MG/ML
80 INJECTION, SOLUTION INTRAVASCULAR
Status: COMPLETED | OUTPATIENT
Start: 2025-01-19 | End: 2025-01-19

## 2025-01-19 RX ADMIN — DAPTOMYCIN 500 MG: 500 INJECTION, POWDER, LYOPHILIZED, FOR SOLUTION INTRAVENOUS at 16:15

## 2025-01-19 RX ADMIN — IOPAMIDOL 80 ML: 755 INJECTION, SOLUTION INTRAVENOUS at 15:20

## 2025-01-19 RX ADMIN — SODIUM CHLORIDE 500 ML: 9 INJECTION, SOLUTION INTRAVENOUS at 15:15

## 2025-01-19 NOTE — ED NOTES
PT resting in bed. CT tech at bedside for transport to imaging. Call light in reach. Family at bedside. VS assessed.

## 2025-01-19 NOTE — ED PROVIDER NOTES
[facility] EMERGENCY DEPARTMENT  EMERGENCY DEPARTMENT ENCOUNTER          Pt Name: Srikanth Coy  MRN: [id number]  Birthdate 1990  Date of evaluation: 1/19/2025  Physician: Ron Sarmiento DO  Supervising Attending Physician: Dr. Henrique Garrison      CHIEF COMPLAINT       Chief Complaint   Patient presents with    Abdominal Pain     pressure         HISTORY OF PRESENT ILLNESS      Srikanth Coy is a 34 y.o. male who presents to the emergency department with reports of increasing abdominal pressure, patient has a history of paraplegic and generally has little to no feelings below the nipple line.  Pertinent past medical history of COPD, GERD, paraplegia.      Of note patient was just recently hospitalized from 1/8/2025 through 1/16/2025 secondary to right sided perinephric abscess status post incision and drainage with drain placement 1/9.  Developed complicated UTI with suprapubic catheter, history of recent right-sided nephrolithiasis 5.5 mm status post stent placement on 12/30 and lithotripsy with stent exchange on 12/18.  Infectious disease followed patient was treated with IV antibiotics.  Arrangements were made for antibiotic administration via outside nursing facility he was being treated with IV daptomycin and p.o. Bactrim for 14 days with treatment and on 1/29.    Patient is emergency department today secondary to having increasing right abdominal/right flank pressure.  He states that he started noticing this last evening and usually he does not feel much from his chest down secondary to paraplegia status.  He states that the drain that he has placed was draining a significant amount of fluid a couple days ago but now has since slowed down significantly.  He states he has not had any nausea or vomiting, no fevers or chills, no shortness of breath or cough.  He states he does not have any other symptoms other than increasing pressure to his right abdomen and right flank.  He states

## 2025-01-19 NOTE — ED TRIAGE NOTES
Pt to ED via private vehicle w/rprts of abdominal pressure. Pt has a hx of paraplegic and generally has little to no feeling below the nipple line.State calling provider and instructed for f/u care in ER. A&O x4. Breathing easy and unlabored on RA.

## 2025-01-19 NOTE — DISCHARGE INSTRUCTIONS
Please continue to take IV daptomycin and p.o. Bactrim as prescribed by infectious disease.  Please follow-up with Dr. Multani as scheduled.  If symptoms were to worsen or fail to improve such as uncontrolled pain, increasing fevers or chills please return to the emergency department at that time.

## 2025-01-20 ENCOUNTER — HOSPITAL ENCOUNTER (OUTPATIENT)
Dept: NURSING | Age: 35
Discharge: HOME | End: 2025-01-20
Payer: MEDICAID

## 2025-01-20 VITALS
SYSTOLIC BLOOD PRESSURE: 112 MMHG | TEMPERATURE: 97.9 F | RESPIRATION RATE: 16 BRPM | HEART RATE: 82 BPM | DIASTOLIC BLOOD PRESSURE: 74 MMHG | OXYGEN SATURATION: 99 %

## 2025-01-20 DIAGNOSIS — N39.0 SEPSIS SECONDARY TO UTI (HCC): Primary | ICD-10-CM

## 2025-01-20 DIAGNOSIS — A41.9 SEPSIS SECONDARY TO UTI (HCC): Primary | ICD-10-CM

## 2025-01-20 PROCEDURE — 6360000002 HC RX W HCPCS: Performed by: INTERNAL MEDICINE

## 2025-01-20 PROCEDURE — 2580000003 HC RX 258: Performed by: INTERNAL MEDICINE

## 2025-01-20 PROCEDURE — 96365 THER/PROPH/DIAG IV INF INIT: CPT

## 2025-01-20 RX ORDER — SODIUM CHLORIDE 9 MG/ML
5-250 INJECTION, SOLUTION INTRAVENOUS PRN
Status: CANCELLED | OUTPATIENT
Start: 2025-01-21

## 2025-01-20 RX ORDER — SODIUM CHLORIDE 0.9 % (FLUSH) 0.9 %
5-40 SYRINGE (ML) INJECTION PRN
Status: DISCONTINUED | OUTPATIENT
Start: 2025-01-20 | End: 2025-01-21 | Stop reason: HOSPADM

## 2025-01-20 RX ORDER — SODIUM CHLORIDE 0.9 % (FLUSH) 0.9 %
5-40 SYRINGE (ML) INJECTION PRN
Status: CANCELLED | OUTPATIENT
Start: 2025-01-21

## 2025-01-20 RX ADMIN — DAPTOMYCIN 500 MG: 500 INJECTION, POWDER, LYOPHILIZED, FOR SOLUTION INTRAVENOUS at 13:34

## 2025-01-20 NOTE — PROGRESS NOTES
1330  Srikanth wheeled himself into the room via electric wheelchair for daptomycin infusion. Pt rights and responsibilities offered to pt. Infusion discussed and questions answered. His picc line dressing is clean dry intact, no issues noted.   1334  infusion started and Srikanth has call light within reach. Mother at bedside.    1413   Infusion complete and Srikanth tolerated well. D/c instructions discussed and Srikanth verbalized understanding. Srikanth wheeled himself out via electric wheelchair with mother for d/c today.         _m___ Safety:       (Environmental)  [place] to environment  Ensure ID band is correct and in place/ allergy band as needed  Assess for fall risk  Initiate fall precautions as applicable (fall band, side rails, etc.)  Call light within reach  Bed in low position/ wheels locked    _m___ Pain:       Assess pain level and characteristics  Administer analgesics as ordered  Assess effectiveness of pain management and report to MD as needed    _m___ Knowledge Deficit:  Assess baseline knowledge  Provide teaching at level of understanding  Provide teaching via preferred learning method  Evaluate teaching effectiveness    _m___ Hemodynamic/Respiratory Status:       (Pre and Post Procedure Monitoring)  Assess/Monitor vital signs and LOC  Assess Baseline SpO2 prior to any sedation  Obtain weight/height  Assess vital signs/ LOC until patient meets discharge criteria  Monitor procedure site and notify MD of any issues    m____ Infection-Risk of Central Venous Catheter:  Monitor for infection signs and symptoms (catheter site redness, temperature elevation, etc)  Assess for infection risks  Educate regarding infection prevention  Manage central venous catheter (flushes/ dressing changes per protocol)

## 2025-01-21 ENCOUNTER — HOSPITAL ENCOUNTER (OUTPATIENT)
Dept: NURSING | Age: 35
Discharge: HOME | End: 2025-01-21
Payer: MEDICAID

## 2025-01-21 ENCOUNTER — OFFICE VISIT (OUTPATIENT)
Dept: UROLOGY | Age: 35
End: 2025-01-21
Payer: MEDICAID

## 2025-01-21 VITALS
HEART RATE: 103 BPM | SYSTOLIC BLOOD PRESSURE: 106 MMHG | OXYGEN SATURATION: 96 % | TEMPERATURE: 96.5 F | DIASTOLIC BLOOD PRESSURE: 66 MMHG

## 2025-01-21 VITALS — HEIGHT: 70 IN | RESPIRATION RATE: 18 BRPM | WEIGHT: 170 LBS | BODY MASS INDEX: 24.34 KG/M2

## 2025-01-21 DIAGNOSIS — N31.8 SPASTIC NEUROGENIC BLADDER: Primary | ICD-10-CM

## 2025-01-21 DIAGNOSIS — N20.0 KIDNEY STONE: ICD-10-CM

## 2025-01-21 DIAGNOSIS — N39.0 RECURRENT UTI: ICD-10-CM

## 2025-01-21 DIAGNOSIS — Q55.22 RETRACTILE TESTIS: ICD-10-CM

## 2025-01-21 DIAGNOSIS — A41.9 SEPSIS SECONDARY TO UTI (HCC): Primary | ICD-10-CM

## 2025-01-21 DIAGNOSIS — N39.0 SEPSIS SECONDARY TO UTI (HCC): Primary | ICD-10-CM

## 2025-01-21 LAB — BACTERIA UR CULT: NORMAL

## 2025-01-21 PROCEDURE — 99213 OFFICE O/P EST LOW 20 MIN: CPT

## 2025-01-21 PROCEDURE — 6360000002 HC RX W HCPCS: Performed by: INTERNAL MEDICINE

## 2025-01-21 PROCEDURE — 96365 THER/PROPH/DIAG IV INF INIT: CPT

## 2025-01-21 PROCEDURE — 2580000003 HC RX 258: Performed by: INTERNAL MEDICINE

## 2025-01-21 PROCEDURE — 2500000003 HC RX 250 WO HCPCS: Performed by: INTERNAL MEDICINE

## 2025-01-21 RX ORDER — SODIUM CHLORIDE 9 MG/ML
5-250 INJECTION, SOLUTION INTRAVENOUS PRN
Status: CANCELLED | OUTPATIENT
Start: 2025-01-22

## 2025-01-21 RX ORDER — SODIUM CHLORIDE 0.9 % (FLUSH) 0.9 %
5-40 SYRINGE (ML) INJECTION PRN
Status: CANCELLED | OUTPATIENT
Start: 2025-01-22

## 2025-01-21 RX ORDER — SODIUM CHLORIDE 0.9 % (FLUSH) 0.9 %
5-40 SYRINGE (ML) INJECTION PRN
Status: DISCONTINUED | OUTPATIENT
Start: 2025-01-21 | End: 2025-01-22 | Stop reason: HOSPADM

## 2025-01-21 RX ADMIN — SODIUM CHLORIDE, PRESERVATIVE FREE 10 ML: 5 INJECTION INTRAVENOUS at 13:18

## 2025-01-21 RX ADMIN — SODIUM CHLORIDE, PRESERVATIVE FREE 10 ML: 5 INJECTION INTRAVENOUS at 12:35

## 2025-01-21 RX ADMIN — DAPTOMYCIN 500 MG: 500 INJECTION, POWDER, LYOPHILIZED, FOR SOLUTION INTRAVENOUS at 12:35

## 2025-01-21 ASSESSMENT — ENCOUNTER SYMPTOMS: ABDOMINAL PAIN: 1

## 2025-01-21 NOTE — ASSESSMENT & PLAN NOTE
-3 positive cultures noted within past 2 months. Likely related to recent kidney stone with associated perinephric abscess.  -Discussed increasing fluid intake, only treating symptomatic infections, and proper hygiene of SPT.  -Consider introducing Hiprex BID if recurring infections continue.

## 2025-01-21 NOTE — PATIENT INSTRUCTIONS
Please call the office at [phone] if you have any questions or concerns following your visit. If you were prescribed a new medication and have questions, feel free to call. For any emergent issues, please go to the nearest emergency room for further evaluation.    Kidney Stone Prevention  -Drink at least 80 oz of water a day  -Add lemon juice with water (4 oz/day)  -Follow low salt and low oxalate diets  -Have a balanced intake of calcium  -If you are taking topiramate or Topamax, discuss alternative therapies with your PCP  -Avoid Vitamin C supplementation

## 2025-01-21 NOTE — PROGRESS NOTES
1230 Patient in wheelchair to [facility] for IV antibiotic. Patient verbalizes understanding of infusion. PT RIGHTS AND RESPONSIBILITIES OFFERED TO PT.      1235 Daptomycin infusion started.     1305 Infusion complete. Patient tolerated well. Patient declined AVS. Patient discharged in wheelchair to [facility].       _M___ Safety:       (Environmental)  [place] to environment  Ensure ID band is correct and in place/ allergy band as needed  Assess for fall risk  Initiate fall precautions as applicable (fall band, side rails, etc.)  Call light within reach  Bed in low position/ wheels locked    _M___ Pain:       Assess pain level and characteristics  Administer analgesics as ordered  Assess effectiveness of pain management and report to MD as needed    _M___ Knowledge Deficit:  Assess baseline knowledge  Provide teaching at level of understanding  Provide teaching via preferred learning method  Evaluate teaching effectiveness    _M___ Hemodynamic/Respiratory Status:       (Pre and Post Procedure Monitoring)  Assess/Monitor vital signs and LOC  Assess Baseline SpO2 prior to any sedation  Obtain weight/height  Assess vital signs/ LOC until patient meets discharge criteria  Monitor procedure site and notify MD of any issues    _M___ Infection-Risk of Central Venous Catheter:  Monitor for infection signs and symptoms (catheter site redness, temperature elevation, etc)  Assess for infection risks  Educate regarding infection prevention  Manage central venous catheter (flushes/ dressing changes per protocol)

## 2025-01-21 NOTE — PROGRESS NOTES
Comments: Resting in wheelchair   Skin:     General: Skin is warm and dry.   Neurological:      Mental Status: He is alert. Mental status is at baseline.   Psychiatric:         Mood and Affect: Mood normal.         Behavior: Behavior normal.         POC  No results found for this visit on 01/21/25.    Last 3 PSA Levels  No results found for: \"PSA\"     Recent BUN/Creatinine:  Lab Results   Component Value Date/Time    BUN 11 01/19/2025 01:44 PM    CREATININE 1.3 01/19/2025 01:44 PM       Radiology  The patient has had a CT With Contrast which I have independently reviewed along with its accompanying report.  The study demonstrates     FINDINGS:     Lower thorax: Visualized lung bases are clear. There is no pleural effusion.     Abdomen: There is no free intraperitoneal air. A left lower pelvic ostomy is  again noted. Bowel is normal in caliber without evidence of obstruction. There  is a catheter in a right-sided perinephric abscess which now measures 1.0 cm in  greatest width (image 37; prior measurement 3.6 cm. Perinephric stranding is  stable. The gallbladder is contracted. The liver, pancreas, spleen, adrenal  glands, left kidney and abdominal aorta are normal. There is no mesenteric or  retroperitoneal lymphadenopathy. Degenerative changes in the lumbar spine are  stable.     Pelvis: There is a catheter in a nondistended urinary bladder. Phleboliths are  present in the pelvis. There is no pelvic or inguinal lymphadenopathy. There is  stable osseous irregularity of the right ilium. There are incompletely  visualized surgical changes of the proximal right femur.     IMPRESSION:     Catheter in a right perinephric abscess which has decreased in size.           Assessment & Plan  Spastic neurogenic bladder  - Failed Ditropan and Myrbetriq. Controlled on daily irrigations with acetic acid and sterile fluid.  -SPT changes every 4 weeks, patient performs at home  -Consider bladder botox if symptoms worsen

## 2025-01-22 ENCOUNTER — HOSPITAL ENCOUNTER (OUTPATIENT)
Dept: WOUND CARE | Age: 35
Discharge: HOME | End: 2025-01-22
Attending: INTERNAL MEDICINE
Payer: MEDICAID

## 2025-01-22 ENCOUNTER — HOSPITAL ENCOUNTER (OUTPATIENT)
Dept: NURSING | Age: 35
Discharge: HOME | End: 2025-01-22
Payer: MEDICAID

## 2025-01-22 VITALS
RESPIRATION RATE: 18 BRPM | OXYGEN SATURATION: 95 % | HEART RATE: 129 BPM | SYSTOLIC BLOOD PRESSURE: 165 MMHG | TEMPERATURE: 98.1 F | DIASTOLIC BLOOD PRESSURE: 61 MMHG

## 2025-01-22 VITALS
TEMPERATURE: 97 F | SYSTOLIC BLOOD PRESSURE: 101 MMHG | HEART RATE: 116 BPM | OXYGEN SATURATION: 93 % | RESPIRATION RATE: 18 BRPM | DIASTOLIC BLOOD PRESSURE: 55 MMHG

## 2025-01-22 DIAGNOSIS — N39.0 SEPSIS SECONDARY TO UTI (HCC): Primary | ICD-10-CM

## 2025-01-22 DIAGNOSIS — A41.9 SEPSIS SECONDARY TO UTI (HCC): Primary | ICD-10-CM

## 2025-01-22 PROCEDURE — 2500000003 HC RX 250 WO HCPCS: Performed by: INTERNAL MEDICINE

## 2025-01-22 PROCEDURE — 6360000002 HC RX W HCPCS: Performed by: INTERNAL MEDICINE

## 2025-01-22 PROCEDURE — 99202 OFFICE O/P NEW SF 15 MIN: CPT

## 2025-01-22 PROCEDURE — 96365 THER/PROPH/DIAG IV INF INIT: CPT

## 2025-01-22 PROCEDURE — 2580000003 HC RX 258: Performed by: INTERNAL MEDICINE

## 2025-01-22 RX ORDER — SODIUM CHLORIDE 0.9 % (FLUSH) 0.9 %
5-40 SYRINGE (ML) INJECTION PRN
Status: CANCELLED | OUTPATIENT
Start: 2025-01-23

## 2025-01-22 RX ORDER — SODIUM CHLORIDE 0.9 % (FLUSH) 0.9 %
5-40 SYRINGE (ML) INJECTION PRN
Status: DISCONTINUED | OUTPATIENT
Start: 2025-01-22 | End: 2025-01-23 | Stop reason: HOSPADM

## 2025-01-22 RX ORDER — SODIUM CHLORIDE 9 MG/ML
5-250 INJECTION, SOLUTION INTRAVENOUS PRN
Status: CANCELLED | OUTPATIENT
Start: 2025-01-23

## 2025-01-22 RX ADMIN — DAPTOMYCIN 500 MG: 500 INJECTION, POWDER, LYOPHILIZED, FOR SOLUTION INTRAVENOUS at 09:56

## 2025-01-22 RX ADMIN — SODIUM CHLORIDE, PRESERVATIVE FREE 10 ML: 5 INJECTION INTRAVENOUS at 10:34

## 2025-01-22 ASSESSMENT — PAIN SCALES - GENERAL: PAINLEVEL_OUTOF10: 0

## 2025-01-22 NOTE — PLAN OF CARE
Problem: Cognitive:  Goal: Knowledge of wound care  Description: Knowledge of wound care  Outcome: Progressing  Goal: Understands risk factors for wounds  Description: Understands risk factors for wounds  Outcome: Progressing     Problem: Wound:  Goal: Will show signs of wound healing; wound closure and no evidence of infection  Description: Will show signs of wound healing; wound closure and no evidence of infection  Outcome: Progressing     Problem: Smoking cessation:  Goal: Ability to formulate a plan to maintain a tobacco-free life will be supported  Description: Ability to formulate a plan to maintain a tobacco-free life will be supported  Outcome: Progressing     Problem: Falls - Risk of:  Goal: Will remain free from falls  Description: Will remain free from falls  Outcome: Progressing    Patient seen in wound clinic today.  See AVS for discharge instructions.  Care plan reviewed with patient and mother.  Patient and mother verbalize understanding of the plan of care and contribute to goal setting.

## 2025-01-22 NOTE — PATIENT INSTRUCTIONS
Visit Discharge/Physician Orders:  - Drain flushed today.  - Possible drain and PICC line removal in one week.  - Continue drain care as directed  - Continue IV antibiotics until course completed      Keep all dressings clean & dry.    Follow up visit:   1 Weeks, January 29, 2025 at 10:30 am    Keep next scheduled appointment. Please give 24 hour notice if unable to keep appointment. [phone]    If you experience any of the following, please call the Wound Care Service during business hours: Monday through Friday 8:00 am - 4:30 pm  ([phone]).   *Increase in pain   *Temperature over 101   *Increase in drainage from your wound or a foul odor   *Uncontrolled swelling   *Need for compression bandage changes due to slippage, breakthrough drainage    If you need medical attention outside of business hours, please contact your Primary Care Doctor or go to the nearest emergency room.
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
## 2025-01-22 NOTE — PROGRESS NOTES
0945- Patient arrived to [facility] via wheelchair with mother for Daptomycin infusion. Patient denies changes since last appointment. Vitals stable. PICC line flushed without issue and blood return noted. Call light placed within reach. PT RIGHTS AND RESPONSIBILITIES OFFERED TO PT.    0956- Infusion started. Patient offered drink and snack. Denies further needs.     1026- Infusion complete. Patient tolerated well with no issues. PICC flushed and alcohol cap placed.     1030- Discharge instructions reviewed with patient and mother. Verbalized understanding with no further questions. Declined AVS. Discharged via wheelchair to [facility] in stable condition.         __M__ Safety:       (Environmental)  [place] to environment  Ensure ID band is correct and in place/ allergy band as needed  Assess for fall risk  Initiate fall precautions as applicable (fall band, side rails, etc.)  Call light within reach  Bed in low position/ wheels locked    __M__ Pain:       Assess pain level and characteristics  Administer analgesics as ordered  Assess effectiveness of pain management and report to MD as needed    __M__ Knowledge Deficit:  Assess baseline knowledge  Provide teaching at level of understanding  Provide teaching via preferred learning method  Evaluate teaching effectiveness    __M__ Hemodynamic/Respiratory Status:       (Pre and Post Procedure Monitoring)  Assess/Monitor vital signs and LOC  Assess Baseline SpO2 prior to any sedation  Obtain weight/height  Assess vital signs/ LOC until patient meets discharge criteria  Monitor procedure site and notify MD of any issues

## 2025-01-23 ENCOUNTER — HOSPITAL ENCOUNTER (OUTPATIENT)
Dept: NURSING | Age: 35
Discharge: HOME | End: 2025-01-23
Payer: MEDICAID

## 2025-01-23 VITALS
RESPIRATION RATE: 18 BRPM | DIASTOLIC BLOOD PRESSURE: 82 MMHG | SYSTOLIC BLOOD PRESSURE: 118 MMHG | HEART RATE: 86 BPM | TEMPERATURE: 97.3 F | OXYGEN SATURATION: 100 %

## 2025-01-23 DIAGNOSIS — A41.9 SEPSIS SECONDARY TO UTI (HCC): Primary | ICD-10-CM

## 2025-01-23 DIAGNOSIS — N39.0 SEPSIS SECONDARY TO UTI (HCC): Primary | ICD-10-CM

## 2025-01-23 PROCEDURE — 96365 THER/PROPH/DIAG IV INF INIT: CPT

## 2025-01-23 PROCEDURE — 6360000002 HC RX W HCPCS: Performed by: INTERNAL MEDICINE

## 2025-01-23 PROCEDURE — 2580000003 HC RX 258: Performed by: INTERNAL MEDICINE

## 2025-01-23 RX ORDER — SODIUM CHLORIDE 0.9 % (FLUSH) 0.9 %
5-40 SYRINGE (ML) INJECTION PRN
Status: CANCELLED | OUTPATIENT
Start: 2025-01-24

## 2025-01-23 RX ORDER — SODIUM CHLORIDE 0.9 % (FLUSH) 0.9 %
5-40 SYRINGE (ML) INJECTION PRN
Status: DISCONTINUED | OUTPATIENT
Start: 2025-01-23 | End: 2025-01-24 | Stop reason: HOSPADM

## 2025-01-23 RX ORDER — SODIUM CHLORIDE 9 MG/ML
5-250 INJECTION, SOLUTION INTRAVENOUS PRN
Status: CANCELLED | OUTPATIENT
Start: 2025-01-24

## 2025-01-23 RX ADMIN — DAPTOMYCIN 500 MG: 500 INJECTION, POWDER, LYOPHILIZED, FOR SOLUTION INTRAVENOUS at 12:54

## 2025-01-23 NOTE — PROGRESS NOTES
Srikanth wheeled himself into room for daptomycin infusion. Pt rights and responsibilities offered to him.   Picc line flushed and good blood return.  Daptomycin infused and Srikanth tolerated well. D/c instructions explained and Srikanth verbalized understanding. He did not want his AVS today.   Srikanth wheeled himself out via wheelchair self for d/c today.           __m__ Safety:       (Environmental)  [place] to environment  Ensure ID band is correct and in place/ allergy band as needed  Assess for fall risk  Initiate fall precautions as applicable (fall band, side rails, etc.)  Call light within reach  Bed in low position/ wheels locked    _m___ Pain:       Assess pain level and characteristics  Administer analgesics as ordered  Assess effectiveness of pain management and report to MD as needed    _m___ Knowledge Deficit:  Assess baseline knowledge  Provide teaching at level of understanding  Provide teaching via preferred learning method  Evaluate teaching effectiveness    _m___ Hemodynamic/Respiratory Status:       (Pre and Post Procedure Monitoring)  Assess/Monitor vital signs and LOC  Assess Baseline SpO2 prior to any sedation  Obtain weight/height  Assess vital signs/ LOC until patient meets discharge criteria  Monitor procedure site and notify MD of any issues    __m__ Infection-Risk of Central Venous Catheter:  Monitor for infection signs and symptoms (catheter site redness, temperature elevation, etc)  Assess for infection risks  Educate regarding infection prevention  Manage central venous catheter (flushes/ dressing changes per protocol)

## 2025-01-24 ENCOUNTER — HOSPITAL ENCOUNTER (OUTPATIENT)
Dept: NURSING | Age: 35
Discharge: HOME | End: 2025-01-24
Payer: MEDICAID

## 2025-01-24 VITALS — TEMPERATURE: 96.7 F | DIASTOLIC BLOOD PRESSURE: 67 MMHG | RESPIRATION RATE: 18 BRPM | SYSTOLIC BLOOD PRESSURE: 101 MMHG

## 2025-01-24 DIAGNOSIS — A41.9 SEPSIS SECONDARY TO UTI (HCC): Primary | ICD-10-CM

## 2025-01-24 DIAGNOSIS — N39.0 SEPSIS SECONDARY TO UTI (HCC): Primary | ICD-10-CM

## 2025-01-24 LAB
ALBUMIN SERPL BCG-MCNC: 3.1 G/DL (ref 3.5–5.1)
ALP SERPL-CCNC: 139 U/L (ref 38–126)
ALT SERPL W/O P-5'-P-CCNC: 12 U/L (ref 11–66)
ANION GAP SERPL CALC-SCNC: 13 MEQ/L (ref 8–16)
AST SERPL-CCNC: 18 U/L (ref 5–40)
BACTERIA BLD AEROBE CULT: NORMAL
BACTERIA BLD AEROBE CULT: NORMAL
BILIRUB CONJ SERPL-MCNC: < 0.1 MG/DL (ref 0.1–13.8)
BILIRUB SERPL-MCNC: < 0.2 MG/DL (ref 0.3–1.2)
BUN SERPL-MCNC: 10 MG/DL (ref 7–22)
CALCIUM SERPL-MCNC: 8.8 MG/DL (ref 8.5–10.5)
CHLORIDE SERPL-SCNC: 99 MEQ/L (ref 98–111)
CK SERPL-CCNC: 190 U/L (ref 55–170)
CO2 SERPL-SCNC: 24 MEQ/L (ref 23–33)
CREAT SERPL-MCNC: 1.2 MG/DL (ref 0.4–1.2)
DEPRECATED RDW RBC AUTO: 54.1 FL (ref 35–45)
ERYTHROCYTE [DISTWIDTH] IN BLOOD BY AUTOMATED COUNT: 17.2 % (ref 11.5–14.5)
GFR SERPL CREATININE-BSD FRML MDRD: 82 ML/MIN/1.73M2
GLUCOSE SERPL-MCNC: 111 MG/DL (ref 70–108)
HCT VFR BLD AUTO: 30.4 % (ref 42–52)
HGB BLD-MCNC: 9.5 GM/DL (ref 14–18)
MCH RBC QN AUTO: 26.8 PG (ref 26–33)
MCHC RBC AUTO-ENTMCNC: 31.3 GM/DL (ref 32.2–35.5)
MCV RBC AUTO: 85.9 FL (ref 80–94)
PLATELET # BLD AUTO: 382 THOU/MM3 (ref 130–400)
PMV BLD AUTO: 8.7 FL (ref 9.4–12.4)
POTASSIUM SERPL-SCNC: 4.3 MEQ/L (ref 3.5–5.2)
PROT SERPL-MCNC: 7.1 G/DL (ref 6.1–8)
RBC # BLD AUTO: 3.54 MILL/MM3 (ref 4.7–6.1)
SODIUM SERPL-SCNC: 136 MEQ/L (ref 135–145)
WBC # BLD AUTO: 6.7 THOU/MM3 (ref 4.8–10.8)

## 2025-01-24 PROCEDURE — 99212 OFFICE O/P EST SF 10 MIN: CPT

## 2025-01-24 PROCEDURE — 2580000003 HC RX 258: Performed by: INTERNAL MEDICINE

## 2025-01-24 PROCEDURE — 82550 ASSAY OF CK (CPK): CPT

## 2025-01-24 PROCEDURE — 36415 COLL VENOUS BLD VENIPUNCTURE: CPT

## 2025-01-24 PROCEDURE — 82248 BILIRUBIN DIRECT: CPT

## 2025-01-24 PROCEDURE — 80053 COMPREHEN METABOLIC PANEL: CPT

## 2025-01-24 PROCEDURE — 2500000003 HC RX 250 WO HCPCS: Performed by: INTERNAL MEDICINE

## 2025-01-24 PROCEDURE — 96365 THER/PROPH/DIAG IV INF INIT: CPT

## 2025-01-24 PROCEDURE — 6360000002 HC RX W HCPCS: Performed by: INTERNAL MEDICINE

## 2025-01-24 PROCEDURE — 85027 COMPLETE CBC AUTOMATED: CPT

## 2025-01-24 PROCEDURE — 36592 COLLECT BLOOD FROM PICC: CPT

## 2025-01-24 RX ORDER — SODIUM CHLORIDE 9 MG/ML
5-250 INJECTION, SOLUTION INTRAVENOUS PRN
Status: CANCELLED | OUTPATIENT
Start: 2025-01-25

## 2025-01-24 RX ORDER — SODIUM CHLORIDE 0.9 % (FLUSH) 0.9 %
5-40 SYRINGE (ML) INJECTION PRN
Status: DISCONTINUED | OUTPATIENT
Start: 2025-01-24 | End: 2025-01-25 | Stop reason: HOSPADM

## 2025-01-24 RX ORDER — SODIUM CHLORIDE 0.9 % (FLUSH) 0.9 %
5-40 SYRINGE (ML) INJECTION PRN
Status: CANCELLED | OUTPATIENT
Start: 2025-01-25

## 2025-01-24 RX ADMIN — DAPTOMYCIN 500 MG: 500 INJECTION, POWDER, LYOPHILIZED, FOR SOLUTION INTRAVENOUS at 13:36

## 2025-01-24 RX ADMIN — SODIUM CHLORIDE, PRESERVATIVE FREE 10 ML: 5 INJECTION INTRAVENOUS at 14:10

## 2025-01-24 NOTE — PROGRESS NOTES
1330 Patient in wheelchair to [facility] for IV antibiotic and dressing change. Patient verbalizes understanding of infusion. PT RIGHTS AND RESPONSIBILITIES OFFERED TO PT.    1406 Infusion complete. Patient tolerated well. Dressing changed to picc line. PICC line site intact. No signs of infection.     1410 AVS reviewed with patient. Patient verbalizes understanding. Patient left in wheelchair to discharge lobby.             _M___ Safety:       (Environmental)  [place] to environment  Ensure ID band is correct and in place/ allergy band as needed  Assess for fall risk  Initiate fall precautions as applicable (fall band, side rails, etc.)  Call light within reach  Bed in low position/ wheels locked    _M___ Pain:       Assess pain level and characteristics  Administer analgesics as ordered  Assess effectiveness of pain management and report to MD as needed    _M___ Knowledge Deficit:  Assess baseline knowledge  Provide teaching at level of understanding  Provide teaching via preferred learning method  Evaluate teaching effectiveness    _M___ Hemodynamic/Respiratory Status:       (Pre and Post Procedure Monitoring)  Assess/Monitor vital signs and LOC  Assess Baseline SpO2 prior to any sedation  Obtain weight/height  Assess vital signs/ LOC until patient meets discharge criteria  Monitor procedure site and notify MD of any issues    _M___ Infection-Risk of Central Venous Catheter:  Monitor for infection signs and symptoms (catheter site redness, temperature elevation, etc)  Assess for infection risks  Educate regarding infection prevention  Manage central venous catheter (flushes/ dressing changes per protocol)

## 2025-01-25 ENCOUNTER — HOSPITAL ENCOUNTER (OUTPATIENT)
Dept: GENERAL RADIOLOGY | Age: 35
Discharge: HOME | End: 2025-01-25
Payer: MEDICAID

## 2025-01-25 VITALS
HEART RATE: 112 BPM | SYSTOLIC BLOOD PRESSURE: 99 MMHG | RESPIRATION RATE: 18 BRPM | DIASTOLIC BLOOD PRESSURE: 66 MMHG | OXYGEN SATURATION: 96 % | TEMPERATURE: 98.4 F

## 2025-01-25 DIAGNOSIS — A41.9 SEPSIS SECONDARY TO UTI (HCC): Primary | ICD-10-CM

## 2025-01-25 DIAGNOSIS — N39.0 SEPSIS SECONDARY TO UTI (HCC): Primary | ICD-10-CM

## 2025-01-25 PROCEDURE — 2500000003 HC RX 250 WO HCPCS: Performed by: INTERNAL MEDICINE

## 2025-01-25 PROCEDURE — 6360000002 HC RX W HCPCS: Performed by: INTERNAL MEDICINE

## 2025-01-25 PROCEDURE — 96365 THER/PROPH/DIAG IV INF INIT: CPT

## 2025-01-25 PROCEDURE — 2580000003 HC RX 258: Performed by: INTERNAL MEDICINE

## 2025-01-25 RX ORDER — SODIUM CHLORIDE 9 MG/ML
5-250 INJECTION, SOLUTION INTRAVENOUS PRN
Status: DISCONTINUED | OUTPATIENT
Start: 2025-01-25 | End: 2025-01-26 | Stop reason: HOSPADM

## 2025-01-25 RX ORDER — SODIUM CHLORIDE 0.9 % (FLUSH) 0.9 %
5-40 SYRINGE (ML) INJECTION PRN
Status: DISCONTINUED | OUTPATIENT
Start: 2025-01-25 | End: 2025-01-26 | Stop reason: HOSPADM

## 2025-01-25 RX ORDER — SODIUM CHLORIDE 9 MG/ML
5-250 INJECTION, SOLUTION INTRAVENOUS PRN
Status: CANCELLED | OUTPATIENT
Start: 2025-01-26

## 2025-01-25 RX ORDER — SODIUM CHLORIDE 0.9 % (FLUSH) 0.9 %
5-40 SYRINGE (ML) INJECTION PRN
Status: CANCELLED | OUTPATIENT
Start: 2025-01-26

## 2025-01-25 RX ADMIN — SODIUM CHLORIDE, PRESERVATIVE FREE 10 ML: 5 INJECTION INTRAVENOUS at 13:40

## 2025-01-25 RX ADMIN — DAPTOMYCIN 500 MG: 500 INJECTION, POWDER, LYOPHILIZED, FOR SOLUTION INTRAVENOUS at 13:39

## 2025-01-25 NOTE — PROGRESS NOTES
____ Safety:       (Environmental)  [place] to environment  Ensure ID band is correct and in place/ allergy band as needed  Assess for fall risk  Initiate fall precautions as applicable (fall band, side rails, etc.)  Call light within reach  Bed in low position/ wheels locked    ____ Pain:       Assess pain level and characteristics  Administer analgesics as ordered  Assess effectiveness of pain management and report to MD as needed    ____ Knowledge Deficit:  Assess baseline knowledge  Provide teaching at level of understanding  Provide teaching via preferred learning method  Evaluate teaching effectiveness    ____ Hemodynamic/Respiratory Status:       (Pre and Post Procedure Monitoring)  Assess/Monitor vital signs and LOC  Assess Baseline SpO2 prior to any sedation  Obtain weight/height  Assess vital signs/ LOC until patient meets discharge criteria  Monitor procedure site and notify MD of any issues    ____ Infection-Risk of Central Venous Catheter:  Monitor for infection signs and symptoms (catheter site redness, temperature elevation, etc)  Assess for infection risks  Educate regarding infection prevention  Manage central venous catheter (flushes/ dressing changes per protocol)  ____ Safety:       (Environmental)  [place] to environment  Ensure ID band is correct and in place/ allergy band as needed  Assess for fall risk  Initiate fall precautions as applicable (fall band, side rails, etc.)  Call light within reach  Bed in low position/ wheels locked    ____ Pain:       Assess pain level and characteristics  Administer analgesics as ordered  Assess effectiveness of pain management and report to MD as needed    ____ Knowledge Deficit:  Assess baseline knowledge  Provide teaching at level of understanding  Provide teaching via preferred learning method  Evaluate teaching effectiveness    ____ Hemodynamic/Respiratory Status:       (Pre and Post Procedure Monitoring)  Assess/Monitor vital signs and LOC  Assess

## 2025-01-26 ENCOUNTER — HOSPITAL ENCOUNTER (OUTPATIENT)
Dept: GENERAL RADIOLOGY | Age: 35
Discharge: HOME | End: 2025-01-26

## 2025-01-26 DIAGNOSIS — N39.0 SEPSIS SECONDARY TO UTI (HCC): Primary | ICD-10-CM

## 2025-01-26 DIAGNOSIS — A41.9 SEPSIS SECONDARY TO UTI (HCC): Primary | ICD-10-CM

## 2025-01-26 RX ORDER — SODIUM CHLORIDE 0.9 % (FLUSH) 0.9 %
5-40 SYRINGE (ML) INJECTION PRN
Status: CANCELLED | OUTPATIENT
Start: 2025-01-27

## 2025-01-26 RX ORDER — SODIUM CHLORIDE 9 MG/ML
5-250 INJECTION, SOLUTION INTRAVENOUS PRN
Status: CANCELLED | OUTPATIENT
Start: 2025-01-27

## 2025-01-27 ENCOUNTER — HOSPITAL ENCOUNTER (OUTPATIENT)
Dept: NURSING | Age: 35
Discharge: HOME | End: 2025-01-27
Payer: MEDICAID

## 2025-01-27 VITALS
DIASTOLIC BLOOD PRESSURE: 65 MMHG | RESPIRATION RATE: 18 BRPM | SYSTOLIC BLOOD PRESSURE: 108 MMHG | HEART RATE: 108 BPM | TEMPERATURE: 97 F | OXYGEN SATURATION: 99 %

## 2025-01-27 DIAGNOSIS — N39.0 SEPSIS SECONDARY TO UTI (HCC): Primary | ICD-10-CM

## 2025-01-27 DIAGNOSIS — A41.9 SEPSIS SECONDARY TO UTI (HCC): Primary | ICD-10-CM

## 2025-01-27 PROCEDURE — 2580000003 HC RX 258: Performed by: INTERNAL MEDICINE

## 2025-01-27 PROCEDURE — 6360000002 HC RX W HCPCS: Performed by: INTERNAL MEDICINE

## 2025-01-27 PROCEDURE — 96365 THER/PROPH/DIAG IV INF INIT: CPT

## 2025-01-27 PROCEDURE — 2500000003 HC RX 250 WO HCPCS: Performed by: INTERNAL MEDICINE

## 2025-01-27 RX ORDER — SODIUM CHLORIDE 0.9 % (FLUSH) 0.9 %
5-40 SYRINGE (ML) INJECTION PRN
Status: CANCELLED | OUTPATIENT
Start: 2025-01-28

## 2025-01-27 RX ORDER — SODIUM CHLORIDE 9 MG/ML
5-250 INJECTION, SOLUTION INTRAVENOUS PRN
Status: CANCELLED | OUTPATIENT
Start: 2025-01-28

## 2025-01-27 RX ORDER — SODIUM CHLORIDE 0.9 % (FLUSH) 0.9 %
5-40 SYRINGE (ML) INJECTION PRN
Status: DISCONTINUED | OUTPATIENT
Start: 2025-01-27 | End: 2025-01-28 | Stop reason: HOSPADM

## 2025-01-27 RX ADMIN — SODIUM CHLORIDE, PRESERVATIVE FREE 10 ML: 5 INJECTION INTRAVENOUS at 14:16

## 2025-01-27 RX ADMIN — SODIUM CHLORIDE, PRESERVATIVE FREE 10 ML: 5 INJECTION INTRAVENOUS at 13:45

## 2025-01-27 RX ADMIN — DAPTOMYCIN 500 MG: 500 INJECTION, POWDER, LYOPHILIZED, FOR SOLUTION INTRAVENOUS at 13:45

## 2025-01-27 ASSESSMENT — PAIN - FUNCTIONAL ASSESSMENT: PAIN_FUNCTIONAL_ASSESSMENT: NONE - DENIES PAIN

## 2025-01-27 NOTE — PROGRESS NOTES
Patient admitted to room 11, vitals are stable. Patient offered rights and responsibilities.     __m       (Environmental)  [place] to environment  Ensure ID band is correct and in place/ allergy band as needed  Assess for fall risk  Initiate fall precautions as applicable (fall band, side rails, etc.)  Call light within reach  Bed in low position/ wheels locked    __m__ Pain:       Assess pain level and characteristics  Administer analgesics as ordered  Assess effectiveness of pain management and report to MD as needed    _m___ Knowledge Deficit:  Assess baseline knowledge  Provide teaching at level of understanding  Provide teaching via preferred learning method  Evaluate teaching effectiveness    __m__ Hemodynamic/Respiratory Status:       (Pre and Post Procedure Monitoring)  Assess/Monitor vital signs and LOC  Assess Baseline SpO2 prior to any sedation  Obtain weight/height  Assess vital signs/ LOC until patient meets discharge criteria  Monitor procedure site and notify MD of any issues    ___m_ Infection-Risk of Central Venous Catheter:  Monitor for infection signs and symptoms (catheter site redness, temperature elevation, etc)  Assess for infection risks  Educate regarding infection prevention  Manage central venous catheter (flushes/ dressing changes per protocol)

## 2025-01-27 NOTE — DISCHARGE INSTRUCTIONS
ACTIVITY:  Continue usual care with your doctor. Call your doctor immediately if any severe problems or go to the nearest emergency room.  Talk to Dr Multani about extending one day for missed dose also an order for removing your PICC line.  Call if you have any questions [phone].     I have been treated and hereby acknowledge receiving this instruction sheet.

## 2025-01-28 ENCOUNTER — HOSPITAL ENCOUNTER (OUTPATIENT)
Dept: NURSING | Age: 35
Discharge: HOME | End: 2025-01-28
Payer: MEDICAID

## 2025-01-28 VITALS
RESPIRATION RATE: 18 BRPM | SYSTOLIC BLOOD PRESSURE: 107 MMHG | DIASTOLIC BLOOD PRESSURE: 57 MMHG | OXYGEN SATURATION: 99 % | HEART RATE: 115 BPM

## 2025-01-28 DIAGNOSIS — N39.0 SEPSIS SECONDARY TO UTI (HCC): Primary | ICD-10-CM

## 2025-01-28 DIAGNOSIS — A41.9 SEPSIS SECONDARY TO UTI (HCC): Primary | ICD-10-CM

## 2025-01-28 PROCEDURE — 2580000003 HC RX 258: Performed by: INTERNAL MEDICINE

## 2025-01-28 PROCEDURE — 2500000003 HC RX 250 WO HCPCS: Performed by: INTERNAL MEDICINE

## 2025-01-28 PROCEDURE — 96365 THER/PROPH/DIAG IV INF INIT: CPT

## 2025-01-28 PROCEDURE — 6360000002 HC RX W HCPCS: Performed by: INTERNAL MEDICINE

## 2025-01-28 RX ORDER — SODIUM CHLORIDE 0.9 % (FLUSH) 0.9 %
5-40 SYRINGE (ML) INJECTION PRN
Status: DISCONTINUED | OUTPATIENT
Start: 2025-01-28 | End: 2025-01-29 | Stop reason: HOSPADM

## 2025-01-28 RX ORDER — SODIUM CHLORIDE 0.9 % (FLUSH) 0.9 %
5-40 SYRINGE (ML) INJECTION PRN
Status: CANCELLED | OUTPATIENT
Start: 2025-01-29

## 2025-01-28 RX ORDER — SODIUM CHLORIDE 9 MG/ML
5-250 INJECTION, SOLUTION INTRAVENOUS PRN
Status: CANCELLED | OUTPATIENT
Start: 2025-01-29

## 2025-01-28 RX ADMIN — SODIUM CHLORIDE, PRESERVATIVE FREE 10 ML: 5 INJECTION INTRAVENOUS at 11:08

## 2025-01-28 RX ADMIN — SODIUM CHLORIDE, PRESERVATIVE FREE 10 ML: 5 INJECTION INTRAVENOUS at 11:40

## 2025-01-28 RX ADMIN — DAPTOMYCIN 500 MG: 500 INJECTION, POWDER, LYOPHILIZED, FOR SOLUTION INTRAVENOUS at 11:10

## 2025-01-28 NOTE — PROGRESS NOTES
1100 pt arrives via wheelchair with mother for daptomycin infusion. Infusion explained and questions answered. PT RIGHTS AND RESPONSIBILITIES OFFERED TO PT.  1145 infusion complete. Pt tolerated it well with no complaints. Pt discharged via wheelchair with mother with no complaints. Pt verbalized understanding to discharge instructions and denies AVS.             _m___ Safety:       (Environmental)  [place] to environment  Ensure ID band is correct and in place/ allergy band as needed  Assess for fall risk  Initiate fall precautions as applicable (fall band, side rails, etc.)  Call light within reach  Bed in low position/ wheels locked    __m__ Pain:       Assess pain level and characteristics  Administer analgesics as ordered  Assess effectiveness of pain management and report to MD as needed    __m__ Knowledge Deficit:  Assess baseline knowledge  Provide teaching at level of understanding  Provide teaching via preferred learning method  Evaluate teaching effectiveness    _m___ Hemodynamic/Respiratory Status:       (Pre and Post Procedure Monitoring)  Assess/Monitor vital signs and LOC  Assess Baseline SpO2 prior to any sedation  Obtain weight/height  Assess vital signs/ LOC until patient meets discharge criteria  Monitor procedure site and notify MD of any issues    _m___ Infection-Risk of Central Venous Catheter:  Monitor for infection signs and symptoms (catheter site redness, temperature elevation, etc)  Assess for infection risks  Educate regarding infection prevention  Manage central venous catheter (flushes/ dressing changes per protocol)

## 2025-01-29 ENCOUNTER — HOSPITAL ENCOUNTER (OUTPATIENT)
Dept: WOUND CARE | Age: 35
Discharge: HOME | End: 2025-01-29
Attending: INTERNAL MEDICINE
Payer: MEDICAID

## 2025-01-29 ENCOUNTER — HOSPITAL ENCOUNTER (OUTPATIENT)
Dept: NURSING | Age: 35
Discharge: HOME | End: 2025-01-29
Payer: MEDICAID

## 2025-01-29 ENCOUNTER — TELEPHONE (OUTPATIENT)
Dept: WOUND CARE | Age: 35
End: 2025-01-29

## 2025-01-29 VITALS
TEMPERATURE: 97.2 F | SYSTOLIC BLOOD PRESSURE: 112 MMHG | DIASTOLIC BLOOD PRESSURE: 72 MMHG | HEART RATE: 102 BPM | OXYGEN SATURATION: 96 % | RESPIRATION RATE: 18 BRPM

## 2025-01-29 VITALS
TEMPERATURE: 98 F | HEART RATE: 95 BPM | SYSTOLIC BLOOD PRESSURE: 130 MMHG | OXYGEN SATURATION: 100 % | DIASTOLIC BLOOD PRESSURE: 88 MMHG | RESPIRATION RATE: 18 BRPM

## 2025-01-29 DIAGNOSIS — N15.1 PERINEPHRIC ABSCESS: Primary | ICD-10-CM

## 2025-01-29 DIAGNOSIS — A41.9 SEPSIS SECONDARY TO UTI (HCC): Primary | ICD-10-CM

## 2025-01-29 DIAGNOSIS — N39.0 SEPSIS SECONDARY TO UTI (HCC): Primary | ICD-10-CM

## 2025-01-29 PROCEDURE — 96365 THER/PROPH/DIAG IV INF INIT: CPT

## 2025-01-29 PROCEDURE — 6360000002 HC RX W HCPCS: Performed by: INTERNAL MEDICINE

## 2025-01-29 PROCEDURE — 2580000003 HC RX 258: Performed by: INTERNAL MEDICINE

## 2025-01-29 PROCEDURE — 99212 OFFICE O/P EST SF 10 MIN: CPT

## 2025-01-29 PROCEDURE — 2500000003 HC RX 250 WO HCPCS: Performed by: INTERNAL MEDICINE

## 2025-01-29 RX ORDER — SODIUM CHLORIDE 0.9 % (FLUSH) 0.9 %
5-40 SYRINGE (ML) INJECTION PRN
Status: CANCELLED | OUTPATIENT
Start: 2025-01-30

## 2025-01-29 RX ORDER — SODIUM CHLORIDE 0.9 % (FLUSH) 0.9 %
5-40 SYRINGE (ML) INJECTION PRN
Status: DISCONTINUED | OUTPATIENT
Start: 2025-01-29 | End: 2025-01-30 | Stop reason: HOSPADM

## 2025-01-29 RX ORDER — SODIUM CHLORIDE 9 MG/ML
5-250 INJECTION, SOLUTION INTRAVENOUS PRN
Status: CANCELLED | OUTPATIENT
Start: 2025-01-30

## 2025-01-29 RX ADMIN — SODIUM CHLORIDE, PRESERVATIVE FREE 10 ML: 5 INJECTION INTRAVENOUS at 11:15

## 2025-01-29 RX ADMIN — SODIUM CHLORIDE, PRESERVATIVE FREE 10 ML: 5 INJECTION INTRAVENOUS at 11:46

## 2025-01-29 RX ADMIN — DAPTOMYCIN 500 MG: 500 INJECTION, POWDER, LYOPHILIZED, FOR SOLUTION INTRAVENOUS at 11:15

## 2025-01-29 ASSESSMENT — PAIN - FUNCTIONAL ASSESSMENT: PAIN_FUNCTIONAL_ASSESSMENT: NONE - DENIES PAIN

## 2025-01-29 NOTE — PLAN OF CARE
Problem: Wound:  Goal: Will show signs of wound healing; wound closure and no evidence of infection  Description: Will show signs of wound healing; wound closure and no evidence of infection  Outcome: Progressing   Patient will finish antibiotic tomorrow. Ct scan pending for drain removal.  Care plan reviewed with patient and family.  Patient and family verbalize understanding of the plan of care and contribute to goal setting.

## 2025-01-29 NOTE — TELEPHONE ENCOUNTER
Patient's mom called regarding concern for patient's appointment being moved from the 12th to the 19th because of the CT being scheduled on 2/14/25. She was under the impression that he needed to be seen next Wednesday to evaluate if drain needs pulled. Notified Dr. Multani and Dr. Multani would like her to call him about how much patient is draining and will schedule with IR if it needs removed.     Called patient's mother back with no answer, unable to leave voicemail due to it being full

## 2025-01-29 NOTE — PATIENT INSTRUCTIONS
Visit Discharge/Physician Orders:  - Continue drain care as directed  - stop  IV antibiotics after dose on 1-30-25  -pull PICC Line after dose on 1-30-25  -CT scan ordered of abdomen -will call with a date and time      Keep all dressings clean & dry.    Follow up visit:   2 Weeks Wednesday February 12th at 10:45 am     Keep next scheduled appointment. Please give 24 hour notice if unable to keep appointment. [phone]    If you experience any of the following, please call the Wound Care Service during business hours: Monday through Friday 8:00 am - 4:30 pm  ([phone]).   *Increase in pain   *Temperature over 101   *Increase in drainage from your wound or a foul odor   *Uncontrolled swelling   *Need for compression bandage changes due to slippage, breakthrough drainage    If you need medical attention outside of business hours, please contact your Primary Care Doctor or go to the nearest emergency room.

## 2025-01-29 NOTE — PROGRESS NOTES
Patient admitted to room 13, vitals are stable. Patient offered rights and responsibilities.     __M__ Safety:       (Environmental)  [place] to environment  Ensure ID band is correct and in place/ allergy band as needed  Assess for fall risk  Initiate fall precautions as applicable (fall band, side rails, etc.)  Call light within reach  Bed in low position/ wheels locked    ___M_ Pain:       Assess pain level and characteristics  Administer analgesics as ordered  Assess effectiveness of pain management and report to MD as needed    __M__ Knowledge Deficit:  Assess baseline knowledge  Provide teaching at level of understanding  Provide teaching via preferred learning method  Evaluate teaching effectiveness    _M___ Hemodynamic/Respiratory Status:       (Pre and Post Procedure Monitoring)  Assess/Monitor vital signs and LOC  Assess Baseline SpO2 prior to any sedation  Obtain weight/height  Assess vital signs/ LOC until patient meets discharge criteria  Monitor procedure site and notify MD of any issues    __M__ Infection-Risk of Central Venous Catheter:  Monitor for infection signs and symptoms (catheter site redness, temperature elevation, etc)  Assess for infection risks  Educate regarding infection prevention  Manage central venous catheter (flushes/ dressing changes per protocol)

## 2025-01-29 NOTE — PROGRESS NOTES
Called and spoke with mom Brandy that CT Scan is Friday February 14th at 2:30 pm at [facility]. Rescheduled patient's next appointment to be after his ct scan.   
Called patient's insurance and a prior authorization is not needed for CT scan that was ordered by Dr Multani   
   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    Non-healing non-surgical wound T14.8XXA    Recurrent UTI N39.0    EARLINE (acute kidney injury) ([facility]) N17.9    Bacteremia R78.81    Sepsis without acute organ dysfunction ([facility]) A41.9    Hydronephrosis N13.30    Hyponatremia E87.1    Sacral wound, initial encounter S31.000A    Hypoalbuminemia E88.09    Muscle spasm M62.838    Normocytic anemia D64.9    Gastroesophageal reflux disease K21.9    Complicated UTI (urinary tract infection) N39.0    Stone, kidney N20.0    Perinephric abscess N15.1    Syncope and collapse R55       Plan:     Patient examined and evaluated     Antibiotics: yes     Please see attached Discharge Instructions    Written patient dismissal instructions given to patient and signed by patient or POA.              Electronically signed by Alex Multani MD on 1/29/2025 at 10:35 AM

## 2025-01-30 ENCOUNTER — HOSPITAL ENCOUNTER (OUTPATIENT)
Dept: NURSING | Age: 35
Discharge: HOME | End: 2025-01-30
Payer: MEDICAID

## 2025-01-30 VITALS
DIASTOLIC BLOOD PRESSURE: 69 MMHG | RESPIRATION RATE: 18 BRPM | TEMPERATURE: 98.6 F | OXYGEN SATURATION: 100 % | SYSTOLIC BLOOD PRESSURE: 125 MMHG | HEART RATE: 96 BPM

## 2025-01-30 DIAGNOSIS — A41.9 SEPSIS SECONDARY TO UTI (HCC): Primary | ICD-10-CM

## 2025-01-30 DIAGNOSIS — N39.0 SEPSIS SECONDARY TO UTI (HCC): Primary | ICD-10-CM

## 2025-01-30 PROCEDURE — 99212 OFFICE O/P EST SF 10 MIN: CPT

## 2025-01-30 PROCEDURE — 2580000003 HC RX 258: Performed by: INTERNAL MEDICINE

## 2025-01-30 PROCEDURE — 6360000002 HC RX W HCPCS: Performed by: INTERNAL MEDICINE

## 2025-01-30 PROCEDURE — 96365 THER/PROPH/DIAG IV INF INIT: CPT

## 2025-01-30 RX ORDER — SODIUM CHLORIDE 9 MG/ML
5-250 INJECTION, SOLUTION INTRAVENOUS PRN
Status: CANCELLED | OUTPATIENT
Start: 2025-01-31

## 2025-01-30 RX ORDER — SODIUM CHLORIDE 0.9 % (FLUSH) 0.9 %
5-40 SYRINGE (ML) INJECTION PRN
Status: CANCELLED | OUTPATIENT
Start: 2025-01-31

## 2025-01-30 RX ADMIN — DAPTOMYCIN 500 MG: 500 INJECTION, POWDER, LYOPHILIZED, FOR SOLUTION INTRAVENOUS at 13:19

## 2025-01-30 NOTE — PROGRESS NOTES
1310  Srikanth wheeled himself into room via electric wheelchair for daptomycin infusion. Pt rights and responsibilities offered to him. Mother at bedside. Infusion began.    1350  infusion complete and Srikanth tolerated well. D/c instructions discussed and Srikanth verbalized understanding. Srikanth wheeled himself out via electric wheelchair for d/c today. Picc line pulled,  38 cm length and tip intact. No issues noted. No bleeding noted.     no bleeding noted on picc line removal site.   Srikanth tolerated well.                       _m___ Safety:       (Environmental)  [place] to environment  Ensure ID band is correct and in place/ allergy band as needed  Assess for fall risk  Initiate fall precautions as applicable (fall band, side rails, etc.)  Call light within reach  Bed in low position/ wheels locked    __m__ Pain:       Assess pain level and characteristics  Administer analgesics as ordered  Assess effectiveness of pain management and report to MD as needed    _m___ Knowledge Deficit:  Assess baseline knowledge  Provide teaching at level of understanding  Provide teaching via preferred learning method  Evaluate teaching effectiveness    __m__ Hemodynamic/Respiratory Status:       (Pre and Post Procedure Monitoring)  Assess/Monitor vital signs and LOC  Assess Baseline SpO2 prior to any sedation  Obtain weight/height  Assess vital signs/ LOC until patient meets discharge criteria  Monitor procedure site and notify MD of any issues

## 2025-02-04 NOTE — PROGRESS NOTES
Physician Progress Note      PATIENT:               SHASHI MILLER  CSN #:                  [id number]  :                       1990  ADMIT DATE:       2025 11:15 AM  DISCH DATE:        2025 2:49 PM  RESPONDING  PROVIDER #:        Callum Good MD          QUERY TEXT:    Pt admitted with UTI. Noted to have conflicting queries returned on source of   UTI. If possible, please document in the progress notes and discharge summary   if you are evaluating and/or treating any of the following:    The medical record reflects the following:  Risk Factors: Paraplegic, neurogenic bladder, perinephric abscess, recent   renal stent placement and lithotripsy  Clinical Indicators: Per H&P, \"neurogenic bladder requiring suprapubic cath\".   Per H&P, \"11 cm perinephric abscess on CTAP . Patient with history of prior   complicated UTI secondary to obstructive nephrolithiasis with urine culture   growing Enterobacter Cloacea and stenotrophomonas maltophilia. Kidney stone   blasted recently. Patient obviously still has infection given positive UA with   concern for UTI and abscess on CTAP...started on cefepime. Also added   vancomycin due to recent urological procedure.  Treatment: Labs, imaging, IR consult, IV Cefepime, IV Vancomycin    Thank you,  Jv Babin RN - CDI Supervisor  [email or web address]  Options provided:  -- UTI was present on admission and is a complication from 12/3 lithotripsy   and stent placement procedure. UTI unrelated to suprapubic catheter placed   this admission  -- UTI was not present on admission and developed during this inpatient stay   due to suprapubic catheter placed on 25.  -- Other - I will add my own diagnosis  -- Disagree - Not applicable / Not valid  -- Disagree - Clinically unable to determine / Unknown  -- Refer to Clinical Documentation Reviewer    PROVIDER RESPONSE TEXT:    The patient has chronic bacteriuria related to the chronic suprapubic   catheter;

## 2025-02-18 ENCOUNTER — TELEPHONE (OUTPATIENT)
Dept: WOUND CARE | Age: 35
End: 2025-02-18

## 2025-02-18 NOTE — TELEPHONE ENCOUNTER
Patients mother called stating that she went to take patient to his CT scan and it was cancelled.She would like to know why it was cancelled. Advised her that in Epic is appears that the appt was cancelled through Mychart not by our office. She states she was getting a lot of messages regarding appt changes and she may have accidentally hit the wrong button. She states patient accidentally removed his drain at home on Sunday. She did state that she did have a follow up with his PCP yesterday but they will not order any additional testing because they have not been managing patients care for his. Spoke with Dr Multani who would like patient to keep his appt tomorrow so he can address his new onset pain and will order additional testing if needed. Patients mother verbalizes understanding and will bring patient to his appt tomorrow.

## 2025-02-19 ENCOUNTER — HOSPITAL ENCOUNTER (OUTPATIENT)
Dept: WOUND CARE | Age: 35
Discharge: HOME | End: 2025-02-19
Attending: INTERNAL MEDICINE
Payer: MEDICAID

## 2025-02-19 VITALS
SYSTOLIC BLOOD PRESSURE: 107 MMHG | RESPIRATION RATE: 18 BRPM | TEMPERATURE: 97.5 F | HEART RATE: 129 BPM | OXYGEN SATURATION: 100 % | DIASTOLIC BLOOD PRESSURE: 62 MMHG

## 2025-02-19 PROCEDURE — 99212 OFFICE O/P EST SF 10 MIN: CPT

## 2025-02-19 NOTE — PATIENT INSTRUCTIONS
Visit Discharge/Physician Orders:  - when changing catheter be careful not to insert to far   - keep foam dressing on drainage site for 5 days  - will hold order for CT scan for now, if symptoms get worse let Dr. Multani know  - keep nails trimmed    Keep all dressings clean & dry.    Follow up visit:   as needed    Keep next scheduled appointment. Please give 24 hour notice if unable to keep appointment. [phone]    If you experience any of the following, please call the Wound Care Service during business hours: Monday through Friday 8:00 am - 4:30 pm  ([phone]).   *Increase in pain   *Temperature over 101   *Increase in drainage from your wound or a foul odor   *Uncontrolled swelling   *Need for compression bandage changes due to slippage, breakthrough drainage    If you need medical attention outside of business hours, please contact your Primary Care Doctor or go to the nearest emergency room.       
right shoulder pain
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
## 2025-02-19 NOTE — PLAN OF CARE
Problem: Wound:  Goal: Will show signs of wound healing; wound closure and no evidence of infection  Description: Will show signs of wound healing; wound closure and no evidence of infection  Outcome: Adequate for Discharge   Patient seen in clinic today for drain management. No s/s of infection. See AVS. Follow up as needed. Care plan reviewed with patient and patient's mom. Patient and patient's mom verbalizes understanding of the plan of care and contributes to goal setting.

## 2025-02-27 ENCOUNTER — TELEPHONE (OUTPATIENT)
Dept: WOUND CARE | Age: 35
End: 2025-02-27

## 2025-02-27 NOTE — TELEPHONE ENCOUNTER
Received call from patient's mom Brandy. She states patient is having testicle pain, throwing up and chills. She is concerned because these are similar to patient's symptoms when he had a kidney abscess. Updated Dr Multani with this and he states patient needs to be seen in the ED or urgent care, he needs lab work and a CT. updated patient's mom

## 2025-03-02 ENCOUNTER — HOSPITAL ENCOUNTER (INPATIENT)
Age: 35
LOS: 1 days | Discharge: HOME | DRG: 048 | End: 2025-03-05
Attending: EMERGENCY MEDICINE | Admitting: HOSPITALIST
Payer: MEDICAID

## 2025-03-02 ENCOUNTER — APPOINTMENT (OUTPATIENT)
Dept: GENERAL RADIOLOGY | Age: 35
DRG: 048 | End: 2025-03-02
Payer: MEDICAID

## 2025-03-02 ENCOUNTER — APPOINTMENT (OUTPATIENT)
Dept: CT IMAGING | Age: 35
DRG: 048 | End: 2025-03-02
Payer: MEDICAID

## 2025-03-02 DIAGNOSIS — R11.0 NAUSEA: ICD-10-CM

## 2025-03-02 DIAGNOSIS — T83.510D URINARY TRACT INFECTION ASSOCIATED WITH CYSTOSTOMY CATHETER, SUBSEQUENT ENCOUNTER: ICD-10-CM

## 2025-03-02 DIAGNOSIS — N39.0 URINARY TRACT INFECTION ASSOCIATED WITH CYSTOSTOMY CATHETER, SUBSEQUENT ENCOUNTER: ICD-10-CM

## 2025-03-02 DIAGNOSIS — R55 NEAR SYNCOPE: Primary | ICD-10-CM

## 2025-03-02 DIAGNOSIS — R79.89 ELEVATED TROPONIN: ICD-10-CM

## 2025-03-02 LAB
ALBUMIN SERPL BCG-MCNC: 3.9 G/DL (ref 3.4–4.9)
ALP SERPL-CCNC: 129 U/L (ref 40–129)
ALT SERPL W/O P-5'-P-CCNC: 6 U/L (ref 10–50)
ANION GAP SERPL CALC-SCNC: 10 MEQ/L (ref 8–16)
AST SERPL-CCNC: 18 U/L (ref 10–50)
BACTERIA URNS QL MICRO: ABNORMAL /HPF
BASOPHILS ABSOLUTE: 0.1 THOU/MM3 (ref 0–0.1)
BASOPHILS NFR BLD AUTO: 1 %
BILIRUB SERPL-MCNC: < 0.2 MG/DL (ref 0.3–1.2)
BILIRUB UR QL STRIP.AUTO: NEGATIVE
BUN SERPL-MCNC: 8 MG/DL (ref 8–23)
CALCIUM SERPL-MCNC: 9.6 MG/DL (ref 8.6–10)
CASTS #/AREA URNS LPF: ABNORMAL /LPF
CASTS 2: ABNORMAL /LPF
CHARACTER UR: ABNORMAL
CHLORIDE SERPL-SCNC: 96 MEQ/L (ref 98–111)
CO2 SERPL-SCNC: 26 MEQ/L (ref 22–29)
COLOR, UA: YELLOW
CREAT SERPL-MCNC: 0.9 MG/DL (ref 0.7–1.2)
CRYSTALS URNS MICRO: ABNORMAL
DEPRECATED RDW RBC AUTO: 47.3 FL (ref 35–45)
EOSINOPHIL NFR BLD AUTO: 2.7 %
EOSINOPHILS ABSOLUTE: 0.3 THOU/MM3 (ref 0–0.4)
EPITHELIAL CELLS, UA: ABNORMAL /HPF
ERYTHROCYTE [DISTWIDTH] IN BLOOD BY AUTOMATED COUNT: 15.3 % (ref 11.5–14.5)
ETHANOL SERPL-MCNC: < 0.01 % (ref 0–0.08)
FLUAV RNA RESP QL NAA+PROBE: NOT DETECTED
FLUBV RNA RESP QL NAA+PROBE: NOT DETECTED
GFR SERPL CREATININE-BSD FRML MDRD: > 90 ML/MIN/1.73M2
GLUCOSE SERPL-MCNC: 86 MG/DL (ref 74–109)
GLUCOSE UR QL STRIP.AUTO: NEGATIVE MG/DL
HCT VFR BLD AUTO: 38 % (ref 42–52)
HGB BLD-MCNC: 12 GM/DL (ref 14–18)
HGB UR QL STRIP.AUTO: ABNORMAL
IMM GRANULOCYTES # BLD AUTO: 0.02 THOU/MM3 (ref 0–0.07)
IMM GRANULOCYTES NFR BLD AUTO: 0.2 %
KETONES UR QL STRIP.AUTO: NEGATIVE
LACTATE SERPL-SCNC: 1.4 MMOL/L (ref 0.5–2)
LYMPHOCYTES ABSOLUTE: 2.2 THOU/MM3 (ref 1–4.8)
LYMPHOCYTES NFR BLD AUTO: 23.5 %
MAGNESIUM SERPL-MCNC: 1.8 MG/DL (ref 1.6–2.6)
MCH RBC QN AUTO: 26.5 PG (ref 26–33)
MCHC RBC AUTO-ENTMCNC: 31.6 GM/DL (ref 32.2–35.5)
MCV RBC AUTO: 83.9 FL (ref 80–94)
MISCELLANEOUS 2: ABNORMAL
MONOCYTES ABSOLUTE: 0.5 THOU/MM3 (ref 0.4–1.3)
MONOCYTES NFR BLD AUTO: 5.8 %
NEUTROPHILS ABSOLUTE: 6.2 THOU/MM3 (ref 1.8–7.7)
NEUTROPHILS NFR BLD AUTO: 66.8 %
NITRITE UR QL STRIP: NEGATIVE
NRBC BLD AUTO-RTO: 0 /100 WBC
OSMOLALITY SERPL CALC.SUM OF ELEC: 262.2 MOSMOL/KG (ref 275–300)
PH UR STRIP.AUTO: 6 [PH] (ref 5–9)
PLATELET # BLD AUTO: 445 THOU/MM3 (ref 130–400)
PMV BLD AUTO: 8.2 FL (ref 9.4–12.4)
POTASSIUM SERPL-SCNC: 3.8 MEQ/L (ref 3.5–5.2)
PROT SERPL-MCNC: 8.4 G/DL (ref 6.4–8.3)
PROT UR STRIP.AUTO-MCNC: NEGATIVE MG/DL
RBC # BLD AUTO: 4.53 MILL/MM3 (ref 4.7–6.1)
RBC URINE: ABNORMAL /HPF
RENAL EPI CELLS #/AREA URNS HPF: ABNORMAL /[HPF]
SARS-COV-2 RNA RESP QL NAA+PROBE: NOT DETECTED
SODIUM SERPL-SCNC: 132 MEQ/L (ref 135–145)
SP GR UR REFRACT.AUTO: 1.02 (ref 1–1.03)
TROPONIN, HIGH SENSITIVITY: 33 NG/L (ref 0–12)
TROPONIN, HIGH SENSITIVITY: 43 NG/L (ref 0–12)
UROBILINOGEN, URINE: 0.2 EU/DL (ref 0–1)
WBC # BLD AUTO: 9.3 THOU/MM3 (ref 4.8–10.8)
WBC #/AREA URNS HPF: > 100 /HPF
WBC #/AREA URNS HPF: ABNORMAL /[HPF]
YEAST LIKE FUNGI URNS QL MICRO: ABNORMAL

## 2025-03-02 PROCEDURE — 36415 COLL VENOUS BLD VENIPUNCTURE: CPT

## 2025-03-02 PROCEDURE — G0378 HOSPITAL OBSERVATION PER HR: HCPCS

## 2025-03-02 PROCEDURE — 96365 THER/PROPH/DIAG IV INF INIT: CPT

## 2025-03-02 PROCEDURE — 84484 ASSAY OF TROPONIN QUANT: CPT

## 2025-03-02 PROCEDURE — 83735 ASSAY OF MAGNESIUM: CPT

## 2025-03-02 PROCEDURE — 81001 URINALYSIS AUTO W/SCOPE: CPT

## 2025-03-02 PROCEDURE — 6360000002 HC RX W HCPCS

## 2025-03-02 PROCEDURE — 96361 HYDRATE IV INFUSION ADD-ON: CPT

## 2025-03-02 PROCEDURE — 74177 CT ABD & PELVIS W/CONTRAST: CPT

## 2025-03-02 PROCEDURE — 87077 CULTURE AEROBIC IDENTIFY: CPT

## 2025-03-02 PROCEDURE — 2580000003 HC RX 258

## 2025-03-02 PROCEDURE — 96375 TX/PRO/DX INJ NEW DRUG ADDON: CPT

## 2025-03-02 PROCEDURE — 87086 URINE CULTURE/COLONY COUNT: CPT

## 2025-03-02 PROCEDURE — 99285 EMERGENCY DEPT VISIT HI MDM: CPT

## 2025-03-02 PROCEDURE — 87636 SARSCOV2 & INF A&B AMP PRB: CPT

## 2025-03-02 PROCEDURE — 2500000003 HC RX 250 WO HCPCS

## 2025-03-02 PROCEDURE — 80053 COMPREHEN METABOLIC PANEL: CPT

## 2025-03-02 PROCEDURE — 87040 BLOOD CULTURE FOR BACTERIA: CPT

## 2025-03-02 PROCEDURE — 85025 COMPLETE CBC W/AUTO DIFF WBC: CPT

## 2025-03-02 PROCEDURE — 87186 SC STD MICRODIL/AGAR DIL: CPT

## 2025-03-02 PROCEDURE — 6360000004 HC RX CONTRAST MEDICATION

## 2025-03-02 PROCEDURE — 93005 ELECTROCARDIOGRAM TRACING: CPT

## 2025-03-02 PROCEDURE — 71045 X-RAY EXAM CHEST 1 VIEW: CPT

## 2025-03-02 PROCEDURE — 96366 THER/PROPH/DIAG IV INF ADDON: CPT

## 2025-03-02 PROCEDURE — 80307 DRUG TEST PRSMV CHEM ANLYZR: CPT

## 2025-03-02 PROCEDURE — 83605 ASSAY OF LACTIC ACID: CPT

## 2025-03-02 PROCEDURE — 82077 ASSAY SPEC XCP UR&BREATH IA: CPT

## 2025-03-02 RX ORDER — ONDANSETRON 2 MG/ML
4 INJECTION INTRAMUSCULAR; INTRAVENOUS EVERY 6 HOURS PRN
Status: DISCONTINUED | OUTPATIENT
Start: 2025-03-02 | End: 2025-03-05 | Stop reason: HOSPADM

## 2025-03-02 RX ORDER — SODIUM CHLORIDE 0.9 % (FLUSH) 0.9 %
5-40 SYRINGE (ML) INJECTION PRN
Status: DISCONTINUED | OUTPATIENT
Start: 2025-03-02 | End: 2025-03-05 | Stop reason: HOSPADM

## 2025-03-02 RX ORDER — ONDANSETRON 4 MG/1
4 TABLET, ORALLY DISINTEGRATING ORAL EVERY 8 HOURS PRN
Status: DISCONTINUED | OUTPATIENT
Start: 2025-03-02 | End: 2025-03-02

## 2025-03-02 RX ORDER — POTASSIUM CHLORIDE 7.45 MG/ML
10 INJECTION INTRAVENOUS PRN
Status: DISCONTINUED | OUTPATIENT
Start: 2025-03-02 | End: 2025-03-05 | Stop reason: HOSPADM

## 2025-03-02 RX ORDER — IOPAMIDOL 755 MG/ML
80 INJECTION, SOLUTION INTRAVASCULAR
Status: COMPLETED | OUTPATIENT
Start: 2025-03-02 | End: 2025-03-02

## 2025-03-02 RX ORDER — SODIUM CHLORIDE 0.9 % (FLUSH) 0.9 %
5-40 SYRINGE (ML) INJECTION EVERY 12 HOURS SCHEDULED
Status: DISCONTINUED | OUTPATIENT
Start: 2025-03-02 | End: 2025-03-05 | Stop reason: HOSPADM

## 2025-03-02 RX ORDER — POLYETHYLENE GLYCOL 3350 17 G/17G
17 POWDER, FOR SOLUTION ORAL DAILY PRN
Status: DISCONTINUED | OUTPATIENT
Start: 2025-03-02 | End: 2025-03-05 | Stop reason: HOSPADM

## 2025-03-02 RX ORDER — ACETAMINOPHEN 650 MG/1
650 SUPPOSITORY RECTAL EVERY 6 HOURS PRN
Status: DISCONTINUED | OUTPATIENT
Start: 2025-03-02 | End: 2025-03-05 | Stop reason: HOSPADM

## 2025-03-02 RX ORDER — ENOXAPARIN SODIUM 100 MG/ML
40 INJECTION SUBCUTANEOUS DAILY
Status: DISCONTINUED | OUTPATIENT
Start: 2025-03-03 | End: 2025-03-05 | Stop reason: HOSPADM

## 2025-03-02 RX ORDER — POTASSIUM CHLORIDE 1500 MG/1
40 TABLET, EXTENDED RELEASE ORAL PRN
Status: DISCONTINUED | OUTPATIENT
Start: 2025-03-02 | End: 2025-03-05 | Stop reason: HOSPADM

## 2025-03-02 RX ORDER — MAGNESIUM SULFATE IN WATER 40 MG/ML
2000 INJECTION, SOLUTION INTRAVENOUS PRN
Status: DISCONTINUED | OUTPATIENT
Start: 2025-03-02 | End: 2025-03-05 | Stop reason: HOSPADM

## 2025-03-02 RX ORDER — 0.9 % SODIUM CHLORIDE 0.9 %
1000 INTRAVENOUS SOLUTION INTRAVENOUS ONCE
Status: COMPLETED | OUTPATIENT
Start: 2025-03-02 | End: 2025-03-02

## 2025-03-02 RX ORDER — ACETAMINOPHEN 325 MG/1
650 TABLET ORAL EVERY 6 HOURS PRN
Status: DISCONTINUED | OUTPATIENT
Start: 2025-03-02 | End: 2025-03-05 | Stop reason: HOSPADM

## 2025-03-02 RX ORDER — SODIUM CHLORIDE 9 MG/ML
INJECTION, SOLUTION INTRAVENOUS PRN
Status: DISCONTINUED | OUTPATIENT
Start: 2025-03-02 | End: 2025-03-05 | Stop reason: HOSPADM

## 2025-03-02 RX ADMIN — SODIUM CHLORIDE 1000 ML: 0.9 INJECTION, SOLUTION INTRAVENOUS at 18:42

## 2025-03-02 RX ADMIN — WATER 2000 MG: 1 INJECTION INTRAMUSCULAR; INTRAVENOUS; SUBCUTANEOUS at 21:39

## 2025-03-02 RX ADMIN — IOPAMIDOL 80 ML: 755 INJECTION, SOLUTION INTRAVENOUS at 18:22

## 2025-03-02 RX ADMIN — VANCOMYCIN HYDROCHLORIDE 1750 MG: 1 INJECTION, POWDER, LYOPHILIZED, FOR SOLUTION INTRAVENOUS at 21:45

## 2025-03-02 ASSESSMENT — PAIN - FUNCTIONAL ASSESSMENT
PAIN_FUNCTIONAL_ASSESSMENT: NONE - DENIES PAIN

## 2025-03-02 ASSESSMENT — HEART SCORE: ECG: NORMAL

## 2025-03-02 NOTE — ED PROVIDER NOTES
I performed a history and physical examination of the patient and discussed management with the resident. I reviewed the resident’s note and agree with the documented findings and plan of care. Any areas of disagreement are noted on the chart. I was personally present for the key portions of any procedures. I have documented in the chart those procedures where I was not present during the key portions. I have reviewed the emergency nurses triage note. I agree with the chief complaint, past medical history, past surgical history, allergies, medications, social and family history as documented unless otherwise noted below. Documentation of the HPI, Physical Exam and Medical Decision Making performed by medical students or scribes is based on my personal performance of the HPI, PE and MDM. For Phys Assistant/ Nurse Practitioner cases/documentation I have personally evaluated this patient and have completed at least one if not all key elements of the E/M (history, physical exam, and MDM). My findings are as noted below.    In other words, I personally saw and examined the patient I have reviewed and agreed with the resident findings including all diagnostic interpretations and treatment plans as written.  I was present for the key portion of any procedures performed and the inclusive time noted in any critical care statement.    Patient presents today for little fatigue and dizziness, patient states that he felt like he was going to pass out.  Patient states he has had a perinephric abscess in the past.  Patient states that he also has a history of kidney stones.  He is complaining about some right-sided abdominal pain, some flank pain, and he has some testicular pain.  Patient states he did not have any trauma to his testicles.  This is a 34-year-old male who is a paraplegic.  Coming in today for same complaints.  Patient is otherwise resting comfortably on the cot no apparent distress physical examination reveals  Osmolality Calc 262.2 (*)     All other components within normal limits   TROPONIN - Abnormal; Notable for the following components:    Troponin, High Sensitivity 33 (*)     All other components within normal limits   URINE WITH REFLEXED MICRO - Abnormal; Notable for the following components:    Blood, Urine SMALL (*)     Leukocyte Esterase, Urine LARGE (*)     Character, Urine CLOUDY (*)     All other components within normal limits   COVID-19 & INFLUENZA COMBO   CULTURE, REFLEXED, URINE    Narrative:     Source: urine, clean catch       Site: clean void          Current Antibiotics: not stated   CULTURE, BLOOD 1   CULTURE, BLOOD 2   ETHANOL   MAGNESIUM   ANION GAP   GLOMERULAR FILTRATION RATE, ESTIMATED   LACTIC ACID   MAGNESIUM   BASIC METABOLIC PANEL   CBC   URINE DRUG SCREEN   CORTISOL TOTAL   TROPONIN         Final diagnoses:   Near syncope   Elevated troponin   Urinary tract infection associated with cystostomy catheter, subsequent encounter   .  I have seen this patient with the resident Dr. Ellis and agree with her assessment and plan     Syed Gallegos, DO  03/03/25 0115
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
## 2025-03-02 NOTE — ED TRIAGE NOTES
Pt presents to the Ed via [facility] with c/o dizziness and fatigue. EMS states they were called to the pt's residence and upon arrival, pt was pale and diaphoretic. Pt states he feels like his normal self at this time. Pt states the sweating and dizziness lasted for about an hour. Pt states the last time he felt like this, he coded. Pt states he has a written DNR order. EKG complete.

## 2025-03-03 LAB
AMPHETAMINES UR QL SCN: NEGATIVE
ANION GAP SERPL CALC-SCNC: 10 MEQ/L (ref 8–16)
BARBITURATES UR QL SCN: NEGATIVE
BENZODIAZ UR QL SCN: NEGATIVE
BUN SERPL-MCNC: 7 MG/DL (ref 8–23)
BZE UR QL SCN: NEGATIVE
CALCIUM SERPL-MCNC: 9.6 MG/DL (ref 8.6–10)
CANNABINOIDS UR QL SCN: NORMAL
CHLORIDE SERPL-SCNC: 107 MEQ/L (ref 98–111)
CO2 SERPL-SCNC: 24 MEQ/L (ref 22–29)
CORTIS SERPL-MCNC: 7.7 UG/DL
CORTISOL COLLECTION INFO: NORMAL
CREAT SERPL-MCNC: 0.8 MG/DL (ref 0.7–1.2)
DEPRECATED RDW RBC AUTO: 46.4 FL (ref 35–45)
EKG ATRIAL RATE: 82 BPM
EKG P AXIS: 52 DEGREES
EKG P-R INTERVAL: 148 MS
EKG Q-T INTERVAL: 364 MS
EKG QRS DURATION: 90 MS
EKG QTC CALCULATION (BAZETT): 425 MS
EKG R AXIS: 72 DEGREES
EKG T AXIS: 71 DEGREES
EKG VENTRICULAR RATE: 82 BPM
ERYTHROCYTE [DISTWIDTH] IN BLOOD BY AUTOMATED COUNT: 15.4 % (ref 11.5–14.5)
FENTANYL: NEGATIVE
GFR SERPL CREATININE-BSD FRML MDRD: > 90 ML/MIN/1.73M2
GLUCOSE SERPL-MCNC: 97 MG/DL (ref 74–109)
HCT VFR BLD AUTO: 34.9 % (ref 42–52)
HGB BLD-MCNC: 11.1 GM/DL (ref 14–18)
MAGNESIUM SERPL-MCNC: 1.9 MG/DL (ref 1.6–2.6)
MCH RBC QN AUTO: 26.5 PG (ref 26–33)
MCHC RBC AUTO-ENTMCNC: 31.8 GM/DL (ref 32.2–35.5)
MCV RBC AUTO: 83.3 FL (ref 80–94)
OPIATES UR QL SCN: NEGATIVE
OXYCODONE: NEGATIVE
PCP UR QL SCN: NEGATIVE
PLATELET # BLD AUTO: 424 THOU/MM3 (ref 130–400)
PMV BLD AUTO: 8.3 FL (ref 9.4–12.4)
POTASSIUM SERPL-SCNC: 4.1 MEQ/L (ref 3.5–5.2)
RBC # BLD AUTO: 4.19 MILL/MM3 (ref 4.7–6.1)
SODIUM SERPL-SCNC: 141 MEQ/L (ref 135–145)
TROPONIN, HIGH SENSITIVITY: 29 NG/L (ref 0–12)
WBC # BLD AUTO: 8.8 THOU/MM3 (ref 4.8–10.8)

## 2025-03-03 PROCEDURE — 6370000000 HC RX 637 (ALT 250 FOR IP): Performed by: HOSPITALIST

## 2025-03-03 PROCEDURE — 6360000002 HC RX W HCPCS

## 2025-03-03 PROCEDURE — 99232 SBSQ HOSP IP/OBS MODERATE 35: CPT | Performed by: HOSPITALIST

## 2025-03-03 PROCEDURE — 82533 TOTAL CORTISOL: CPT

## 2025-03-03 PROCEDURE — 36415 COLL VENOUS BLD VENIPUNCTURE: CPT

## 2025-03-03 PROCEDURE — 83735 ASSAY OF MAGNESIUM: CPT

## 2025-03-03 PROCEDURE — 80048 BASIC METABOLIC PNL TOTAL CA: CPT

## 2025-03-03 PROCEDURE — 85027 COMPLETE CBC AUTOMATED: CPT

## 2025-03-03 PROCEDURE — 84484 ASSAY OF TROPONIN QUANT: CPT

## 2025-03-03 PROCEDURE — 96372 THER/PROPH/DIAG INJ SC/IM: CPT

## 2025-03-03 PROCEDURE — 93010 ELECTROCARDIOGRAM REPORT: CPT | Performed by: NUCLEAR MEDICINE

## 2025-03-03 PROCEDURE — 96366 THER/PROPH/DIAG IV INF ADDON: CPT

## 2025-03-03 PROCEDURE — 6370000000 HC RX 637 (ALT 250 FOR IP)

## 2025-03-03 PROCEDURE — G0378 HOSPITAL OBSERVATION PER HR: HCPCS

## 2025-03-03 PROCEDURE — 96375 TX/PRO/DX INJ NEW DRUG ADDON: CPT

## 2025-03-03 PROCEDURE — 2500000003 HC RX 250 WO HCPCS

## 2025-03-03 RX ORDER — BUSPIRONE HYDROCHLORIDE 10 MG/1
10 TABLET ORAL 3 TIMES DAILY
Status: DISCONTINUED | OUTPATIENT
Start: 2025-03-03 | End: 2025-03-05 | Stop reason: HOSPADM

## 2025-03-03 RX ORDER — GABAPENTIN 400 MG/1
800 CAPSULE ORAL 4 TIMES DAILY
Status: DISCONTINUED | OUTPATIENT
Start: 2025-03-03 | End: 2025-03-05 | Stop reason: HOSPADM

## 2025-03-03 RX ORDER — ONDANSETRON 4 MG/1
4 TABLET, FILM COATED ORAL EVERY 8 HOURS PRN
Status: DISCONTINUED | OUTPATIENT
Start: 2025-03-03 | End: 2025-03-05 | Stop reason: HOSPADM

## 2025-03-03 RX ORDER — PANTOPRAZOLE SODIUM 40 MG/1
40 TABLET, DELAYED RELEASE ORAL
Status: DISCONTINUED | OUTPATIENT
Start: 2025-03-03 | End: 2025-03-05 | Stop reason: HOSPADM

## 2025-03-03 RX ORDER — SODIUM HYPOCHLORITE 5 MG/ML
SOLUTION TOPICAL DAILY
Status: DISCONTINUED | OUTPATIENT
Start: 2025-03-03 | End: 2025-03-05 | Stop reason: HOSPADM

## 2025-03-03 RX ORDER — BUPRENORPHINE AND NALOXONE 8; 2 MG/1; MG/1
1 FILM, SOLUBLE BUCCAL; SUBLINGUAL 3 TIMES DAILY
Status: DISCONTINUED | OUTPATIENT
Start: 2025-03-03 | End: 2025-03-05 | Stop reason: HOSPADM

## 2025-03-03 RX ORDER — HYDROXYZINE HYDROCHLORIDE 25 MG/1
50 TABLET, FILM COATED ORAL 3 TIMES DAILY
Status: DISCONTINUED | OUTPATIENT
Start: 2025-03-03 | End: 2025-03-05 | Stop reason: HOSPADM

## 2025-03-03 RX ORDER — ALBUTEROL SULFATE 90 UG/1
2 INHALANT RESPIRATORY (INHALATION) EVERY 4 HOURS PRN
Status: DISCONTINUED | OUTPATIENT
Start: 2025-03-03 | End: 2025-03-05 | Stop reason: HOSPADM

## 2025-03-03 RX ORDER — POTASSIUM CHLORIDE 750 MG/1
10 TABLET, EXTENDED RELEASE ORAL DAILY
Status: DISCONTINUED | OUTPATIENT
Start: 2025-03-03 | End: 2025-03-05 | Stop reason: HOSPADM

## 2025-03-03 RX ORDER — CYCLOBENZAPRINE HCL 10 MG
10 TABLET ORAL 3 TIMES DAILY PRN
Status: DISCONTINUED | OUTPATIENT
Start: 2025-03-03 | End: 2025-03-05 | Stop reason: HOSPADM

## 2025-03-03 RX ADMIN — PANTOPRAZOLE SODIUM 40 MG: 40 TABLET, DELAYED RELEASE ORAL at 10:04

## 2025-03-03 RX ADMIN — GABAPENTIN 800 MG: 400 CAPSULE ORAL at 10:04

## 2025-03-03 RX ADMIN — Medication: at 20:41

## 2025-03-03 RX ADMIN — BUSPIRONE HYDROCHLORIDE 10 MG: 10 TABLET ORAL at 14:51

## 2025-03-03 RX ADMIN — GABAPENTIN 800 MG: 400 CAPSULE ORAL at 14:51

## 2025-03-03 RX ADMIN — ENOXAPARIN SODIUM 40 MG: 100 INJECTION SUBCUTANEOUS at 10:03

## 2025-03-03 RX ADMIN — HYDROXYZINE HYDROCHLORIDE 50 MG: 25 TABLET, FILM COATED ORAL at 10:04

## 2025-03-03 RX ADMIN — GABAPENTIN 800 MG: 400 CAPSULE ORAL at 20:36

## 2025-03-03 RX ADMIN — BUPRENORPHINE AND NALOXONE 1 FILM: 8; 2 FILM BUCCAL; SUBLINGUAL at 10:04

## 2025-03-03 RX ADMIN — ONDANSETRON 4 MG: 2 INJECTION, SOLUTION INTRAMUSCULAR; INTRAVENOUS at 11:59

## 2025-03-03 RX ADMIN — SODIUM CHLORIDE, PRESERVATIVE FREE 10 ML: 5 INJECTION INTRAVENOUS at 10:03

## 2025-03-03 RX ADMIN — POTASSIUM CHLORIDE 10 MEQ: 750 TABLET, EXTENDED RELEASE ORAL at 10:04

## 2025-03-03 RX ADMIN — BUSPIRONE HYDROCHLORIDE 10 MG: 10 TABLET ORAL at 10:04

## 2025-03-03 RX ADMIN — BUSPIRONE HYDROCHLORIDE 10 MG: 10 TABLET ORAL at 20:36

## 2025-03-03 RX ADMIN — HYDROXYZINE HYDROCHLORIDE 50 MG: 25 TABLET, FILM COATED ORAL at 14:51

## 2025-03-03 RX ADMIN — VENLAFAXINE HYDROCHLORIDE 225 MG: 150 CAPSULE, EXTENDED RELEASE ORAL at 10:03

## 2025-03-03 RX ADMIN — HYDROXYZINE HYDROCHLORIDE 50 MG: 25 TABLET, FILM COATED ORAL at 20:36

## 2025-03-03 RX ADMIN — SODIUM CHLORIDE, PRESERVATIVE FREE 10 ML: 5 INJECTION INTRAVENOUS at 01:06

## 2025-03-03 RX ADMIN — BUPRENORPHINE AND NALOXONE 1 FILM: 8; 2 FILM BUCCAL; SUBLINGUAL at 20:36

## 2025-03-03 RX ADMIN — GABAPENTIN 800 MG: 400 CAPSULE ORAL at 17:30

## 2025-03-03 RX ADMIN — SODIUM CHLORIDE, PRESERVATIVE FREE 10 ML: 5 INJECTION INTRAVENOUS at 20:38

## 2025-03-03 RX ADMIN — ACETAMINOPHEN 650 MG: 325 TABLET ORAL at 14:55

## 2025-03-03 ASSESSMENT — PAIN SCALES - GENERAL: PAINLEVEL_OUTOF10: 3

## 2025-03-03 ASSESSMENT — PAIN - FUNCTIONAL ASSESSMENT: PAIN_FUNCTIONAL_ASSESSMENT: ACTIVITIES ARE NOT PREVENTED

## 2025-03-03 ASSESSMENT — PAIN DESCRIPTION - DESCRIPTORS: DESCRIPTORS: ACHING;SORE

## 2025-03-03 ASSESSMENT — PAIN DESCRIPTION - LOCATION: LOCATION: ABDOMEN

## 2025-03-03 ASSESSMENT — PAIN DESCRIPTION - ORIENTATION: ORIENTATION: RIGHT

## 2025-03-03 NOTE — PLAN OF CARE
Pt arrived on the floor alert and oriented x4, abc's intact. Pt denies pain.   Skin check performed  with Kaila rn. Multiple skin issues noted and added to ADL. Superpubic cath checked, Colostomy checked. No issues noted.  Will continue to monitor for changes.      Problem: Discharge Planning  Goal: Discharge to home or other facility with appropriate resources  Outcome: Progressing

## 2025-03-03 NOTE — H&P
Internal Medicine Resident History & Physical      Patient: Srikanth Coy  : 1990  MRN: [id number]     Acct: [id number]    PCP: Johnathan Flores MD  Date of Admission: 3/2/2025  Date of Service: Pt seen/examined on 25  and Admitted to Observation with expected LOS less than two midnights due to medical therapy.     Hospital Problems             Last Modified POA    * (Principal) Pre-syncope 3/2/2025 Yes       ASSESSMENT AND PLAN     Active Problems:  Presyncope.  Concern for paroxysmal sympathetic hyperactivity in setting of known spinal cord injury.  Cardiac or thyroid etiology is possible, although less likely given recent workup. Associated with fatigue, diaphoresis, dizziness.  No loss of consciousness, no evidence of seizure-like activity.  No rigidity or fevers/chills. Per chart review, spinal autonomic dysfunction associated with sweating, heat, cold noted in 2017 by Dr. Didier Cervantes. EKG on presentation shows normal sinus rhythm, rate 82, NM interval 148, QRS 90, QTc 425, no acute ischemic changes.  Complete echo performed on 1/10/2025 showed normal LV systolic function, LVEF 55-60%, normal diastolic function, agitated saline study negative, no significant valvular pathologies.  TSH on 2025 was 0.664, no cortisol measurements for comparison.  Variable blood pressures, however vital signs otherwise stable, mild hyponatremia 132.  Continuous telemetry  Event monitor at discharge  A.m. cortisol ordered, follow results  Blood cultures collected, follow results.  Monitor for new episodes of diaphoresis, lightheadedness.  Attempt to obtain vital signs if episodes recur.    Troponin elevation, descending.  No chest pain, dyspnea, or ACS equivalents on presentation.  Initial diaphoresis and lightheadedness resolved prior to admission.  No EKG changes consistent with ACS.  Continue telemetry  Continue to monitor for S/S ACS  Will order third troponin.  Hyponatremia, mild.  Sodium

## 2025-03-03 NOTE — PLAN OF CARE
Problem: Discharge Planning  Goal: Discharge to home or other facility with appropriate resources  Outcome: Progressing  Flowsheets (Taken 3/3/2025 1633)  Discharge to home or other facility with appropriate resources: Identify barriers to discharge with patient and caregiver     Problem: Skin/Tissue Integrity  Goal: Skin integrity remains intact  Description: 1.  Monitor for areas of redness and/or skin breakdown  2.  Assess vascular access sites hourly  3.  Every 4-6 hours minimum:  Change oxygen saturation probe site  4.  Every 4-6 hours:  If on nasal continuous positive airway pressure, respiratory therapy assess nares and determine need for appliance change or resting period  Outcome: Progressing  Flowsheets (Taken 3/3/2025 1633)  Skin Integrity Remains Intact: Monitor for areas of redness and/or skin breakdown
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
## 2025-03-04 LAB
ANION GAP SERPL CALC-SCNC: 10 MEQ/L (ref 8–16)
BUN SERPL-MCNC: 6 MG/DL (ref 8–23)
CALCIUM SERPL-MCNC: 9.6 MG/DL (ref 8.6–10)
CHLORIDE SERPL-SCNC: 103 MEQ/L (ref 98–111)
CO2 SERPL-SCNC: 27 MEQ/L (ref 22–29)
CREAT SERPL-MCNC: 1 MG/DL (ref 0.7–1.2)
DEPRECATED RDW RBC AUTO: 46.9 FL (ref 35–45)
ERYTHROCYTE [DISTWIDTH] IN BLOOD BY AUTOMATED COUNT: 15.5 % (ref 11.5–14.5)
GFR SERPL CREATININE-BSD FRML MDRD: > 90 ML/MIN/1.73M2
GLUCOSE SERPL-MCNC: 114 MG/DL (ref 74–109)
HCT VFR BLD AUTO: 35.9 % (ref 42–52)
HGB BLD-MCNC: 11.3 GM/DL (ref 14–18)
MAGNESIUM SERPL-MCNC: 2 MG/DL (ref 1.6–2.6)
MCH RBC QN AUTO: 26.6 PG (ref 26–33)
MCHC RBC AUTO-ENTMCNC: 31.5 GM/DL (ref 32.2–35.5)
MCV RBC AUTO: 84.5 FL (ref 80–94)
PLATELET # BLD AUTO: 376 THOU/MM3 (ref 130–400)
PMV BLD AUTO: 8 FL (ref 9.4–12.4)
POTASSIUM SERPL-SCNC: 4.4 MEQ/L (ref 3.5–5.2)
RBC # BLD AUTO: 4.25 MILL/MM3 (ref 4.7–6.1)
SODIUM SERPL-SCNC: 140 MEQ/L (ref 135–145)
WBC # BLD AUTO: 8.1 THOU/MM3 (ref 4.8–10.8)

## 2025-03-04 PROCEDURE — 1200000000 HC SEMI PRIVATE

## 2025-03-04 PROCEDURE — 2500000003 HC RX 250 WO HCPCS

## 2025-03-04 PROCEDURE — 85027 COMPLETE CBC AUTOMATED: CPT

## 2025-03-04 PROCEDURE — 6370000000 HC RX 637 (ALT 250 FOR IP)

## 2025-03-04 PROCEDURE — 80048 BASIC METABOLIC PNL TOTAL CA: CPT

## 2025-03-04 PROCEDURE — 36415 COLL VENOUS BLD VENIPUNCTURE: CPT

## 2025-03-04 PROCEDURE — 99232 SBSQ HOSP IP/OBS MODERATE 35: CPT | Performed by: HOSPITALIST

## 2025-03-04 PROCEDURE — 83735 ASSAY OF MAGNESIUM: CPT

## 2025-03-04 RX ADMIN — SODIUM CHLORIDE, PRESERVATIVE FREE 10 ML: 5 INJECTION INTRAVENOUS at 20:17

## 2025-03-04 RX ADMIN — BUPRENORPHINE AND NALOXONE 1 FILM: 8; 2 FILM BUCCAL; SUBLINGUAL at 09:52

## 2025-03-04 RX ADMIN — POTASSIUM CHLORIDE 10 MEQ: 750 TABLET, EXTENDED RELEASE ORAL at 09:56

## 2025-03-04 RX ADMIN — BUSPIRONE HYDROCHLORIDE 10 MG: 10 TABLET ORAL at 13:42

## 2025-03-04 RX ADMIN — GABAPENTIN 800 MG: 400 CAPSULE ORAL at 20:17

## 2025-03-04 RX ADMIN — HYDROXYZINE HYDROCHLORIDE 50 MG: 25 TABLET, FILM COATED ORAL at 09:55

## 2025-03-04 RX ADMIN — GABAPENTIN 800 MG: 400 CAPSULE ORAL at 09:55

## 2025-03-04 RX ADMIN — BUPRENORPHINE AND NALOXONE 1 FILM: 8; 2 FILM BUCCAL; SUBLINGUAL at 13:44

## 2025-03-04 RX ADMIN — SODIUM CHLORIDE, PRESERVATIVE FREE 10 ML: 5 INJECTION INTRAVENOUS at 09:55

## 2025-03-04 RX ADMIN — GABAPENTIN 800 MG: 400 CAPSULE ORAL at 17:14

## 2025-03-04 RX ADMIN — HYDROXYZINE HYDROCHLORIDE 50 MG: 25 TABLET, FILM COATED ORAL at 13:43

## 2025-03-04 RX ADMIN — BUSPIRONE HYDROCHLORIDE 10 MG: 10 TABLET ORAL at 09:56

## 2025-03-04 RX ADMIN — HYDROXYZINE HYDROCHLORIDE 50 MG: 25 TABLET, FILM COATED ORAL at 20:17

## 2025-03-04 RX ADMIN — BUPRENORPHINE AND NALOXONE 1 FILM: 8; 2 FILM BUCCAL; SUBLINGUAL at 20:20

## 2025-03-04 RX ADMIN — BUSPIRONE HYDROCHLORIDE 10 MG: 10 TABLET ORAL at 20:17

## 2025-03-04 RX ADMIN — PANTOPRAZOLE SODIUM 40 MG: 40 TABLET, DELAYED RELEASE ORAL at 05:29

## 2025-03-04 RX ADMIN — VENLAFAXINE HYDROCHLORIDE 225 MG: 150 CAPSULE, EXTENDED RELEASE ORAL at 09:55

## 2025-03-04 RX ADMIN — GABAPENTIN 800 MG: 400 CAPSULE ORAL at 13:42

## 2025-03-04 ASSESSMENT — PAIN - FUNCTIONAL ASSESSMENT: PAIN_FUNCTIONAL_ASSESSMENT: ACTIVITIES ARE NOT PREVENTED

## 2025-03-04 ASSESSMENT — PAIN DESCRIPTION - PAIN TYPE: TYPE: ACUTE PAIN

## 2025-03-04 ASSESSMENT — PAIN DESCRIPTION - FREQUENCY: FREQUENCY: CONTINUOUS

## 2025-03-04 ASSESSMENT — PAIN DESCRIPTION - ORIENTATION: ORIENTATION: RIGHT

## 2025-03-04 ASSESSMENT — PAIN DESCRIPTION - DESCRIPTORS: DESCRIPTORS: ACHING;SORE

## 2025-03-04 ASSESSMENT — PAIN DESCRIPTION - ONSET: ONSET: ON-GOING

## 2025-03-04 ASSESSMENT — PAIN SCALES - GENERAL
PAINLEVEL_OUTOF10: 5
PAINLEVEL_OUTOF10: 0

## 2025-03-04 ASSESSMENT — PAIN DESCRIPTION - LOCATION: LOCATION: ABDOMEN

## 2025-03-04 NOTE — PROGRESS NOTES
Progress note: Infectious diseases    Patient - Srikanth Coy,  Age - 34 y.o.    - 1990      Room Number - 8A-19/019-A   MRN -  [id number]   [facility] # - [id number]  Date of Admission -  3/2/2025  5:21 PM    SUBJECTIVE:   He has no fever, no flank pain, no nausea or vomiting  OBJECTIVE   VITALS    height is 1.778 m (5' 10\") and weight is 74.8 kg (165 lb). His oral temperature is 98.1 °F (36.7 °C). His blood pressure is 130/95 (abnormal) and his pulse is 91. His respiration is 18 and oxygen saturation is 100%.       Wt Readings from Last 3 Encounters:   25 74.8 kg (165 lb)   25 77.1 kg (170 lb)   25 74.8 kg (165 lb)       I/O (24 Hours)    Intake/Output Summary (Last 24 hours) at 3/4/2025 1612  Last data filed at 3/4/2025 1231  Gross per 24 hour   Intake 0 ml   Output 2450 ml   Net -2450 ml       General Appearance  Awake, alert, oriented,  not  In acute distress  HEENT - normocephalic,dental caries  Neck - Supple, no mass  Lungs -  Bilateral   air entry, no rhonchi, no wheeze  Cardiovascular - Heart sounds are normal.     Abdomen - soft, not distended, nontender, has suprapubic catheter  Neurologic -paraplegic  Skin - No bruising or bleeding  Extremities - No edema, no cyanosis, clubbing   Has multiple scabbed wounds on the abdomen  MEDICATIONS:      buprenorphine-naloxone  1 Film SubLINGual TID    busPIRone  10 mg Oral TID    hydrOXYzine HCl  50 mg Oral TID    gabapentin  800 mg Oral 4x Daily    venlafaxine  225 mg Oral Daily    potassium chloride  10 mEq Oral Daily    pantoprazole  40 mg Oral QAM AC    sodium hypochlorite   Irrigation Daily    sodium chloride flush  5-40 mL IntraVENous 2 times per day    [Held by provider] enoxaparin  40 mg SubCUTAneous Daily      sodium chloride       albuterol sulfate HFA, ondansetron, cyclobenzaprine, tiZANidine, sodium chloride flush, sodium chloride, potassium
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
## 2025-03-04 NOTE — PLAN OF CARE
Problem: Discharge Planning  Goal: Discharge to home or other facility with appropriate resources  3/4/2025 0821 by Nicole Elena RN  Flowsheets (Taken 3/4/2025 0821)  Discharge to home or other facility with appropriate resources: Identify barriers to discharge with patient and caregiver  3/3/2025 2151 by Simin Renee, RN  Outcome: Progressing     Problem: Skin/Tissue Integrity  Goal: Skin integrity remains intact  Description: 1.  Monitor for areas of redness and/or skin breakdown  2.  Assess vascular access sites hourly  3.  Every 4-6 hours minimum:  Change oxygen saturation probe site  4.  Every 4-6 hours:  If on nasal continuous positive airway pressure, respiratory therapy assess nares and determine need for appliance change or resting period  3/4/2025 0821 by Nicole Elena RN  Flowsheets (Taken 3/4/2025 0821)  Skin Integrity Remains Intact: Monitor for areas of redness and/or skin breakdown  3/3/2025 2151 by Simin Renee, RN  Outcome: Progressing

## 2025-03-04 NOTE — PLAN OF CARE
Problem: Discharge Planning  Goal: Discharge to home or other facility with appropriate resources  3/3/2025 2151 by Simin Renee, RN  Outcome: Progressing  3/3/2025 1633 by Nicole Elena, RN  Outcome: Progressing  Flowsheets (Taken 3/3/2025 1633)  Discharge to home or other facility with appropriate resources: Identify barriers to discharge with patient and caregiver     Problem: Skin/Tissue Integrity  Goal: Skin integrity remains intact  Description: 1.  Monitor for areas of redness and/or skin breakdown  2.  Assess vascular access sites hourly  3.  Every 4-6 hours minimum:  Change oxygen saturation probe site  4.  Every 4-6 hours:  If on nasal continuous positive airway pressure, respiratory therapy assess nares and determine need for appliance change or resting period  3/3/2025 2151 by Simin Renee, RN  Outcome: Progressing  3/3/2025 1633 by Nicole Elena, RN  Outcome: Progressing  Flowsheets (Taken 3/3/2025 1633)  Skin Integrity Remains Intact: Monitor for areas of redness and/or skin breakdown

## 2025-03-04 NOTE — PROGRESS NOTES
[facility] Nurse  Progress Note       Srikanth Coy  AGE: 34 y.o.   GENDER: male  : 1990  UNIT: 8A-19/019-A  TODAY'S DATE:  3/4/2025  ADMISSION DATE: 3/2/2025  5:21 PM    Subjective   Reason for [facility] Evaluation and Assessment: sacrum, right ischium POA      Srikanth Coy is a 34 y.o. male referred by:   [] Physician/ Resident/ PA/ RANJIT-CNP  [x] Nursing  [] Other:     Wound Identification:  Wound Type:pressure  Wound Location: sacrum right ischium    Objective     Mono Risk Score: Mono Scale Score: 18      Assessment     Encounter: Present to pt room. Pt in bed. Wound photo, assessment and treatment recommendations below. Pt turns self easily. Family at side. Bed in low, call light in reach.      Wound 25 Sacrum (Active)   Wound Image   25 1133   Wound Etiology Pressure Unstageable 25 1133   Dressing Status New dressing applied 25 1133   Wound Cleansed Other (Comment) 25 1133   Dressing/Treatment Silicone border;Other (comment);Moist to dry 25 1133   Wound Length (cm) 2.5 cm 25 1133   Wound Width (cm) 1 cm 25 1133   Wound Depth (cm) 1.2 cm 25 1133   Wound Surface Area (cm^2) 2.5 cm^2 25 1133   Wound Volume (cm^3) 3 cm^3 25 1133   Wound Assessment Slough 25 1133   Drainage Amount Small (< 25%) 25 1133   Drainage Description Serosanguinous 25 1133   Odor None 25 1133   Kiana-wound Assessment Intact 25 1133   Number of days: 54       Wound 25 Ischium Right (Active)   Wound Image   25 1133   Dressing Status New dressing applied 25 1133   Wound Cleansed Cleansed with saline 25 1133   Dressing/Treatment Alginate;Foam 25 1133   Wound Length (cm) 1 cm 25 1133   Wound Width (cm) 0.4 cm 25 1133   Wound Depth (cm) 2.1 cm 25 1133   Wound Surface Area (cm^2) 0.4 cm^2 25 1133   Wound Volume (cm^3) 0.84 cm^3 25 1133   Drainage Amount Moderate  (25-50%) 03/04/25 1133   Drainage Description Serosanguinous 03/04/25 1133   Odor None 03/04/25 1133   Kiana-wound Assessment Intact 03/04/25 1133   Number of days: 0     Plan     Treatment Recommendations:   Right ischium: cleanse wound with dakins and gauze. Lightly pack a strip of opticell into wound bed. Cover with foam dressing. Change dressing daily and as needed.    Sacral wound: cleanse wound with dakins and gauze. Apply dakins moist gauze to wound bed. Cover with foam dressing. Change dressing daily and as needed.    Pressure Injury Prevention:   [x] Support Surface: hercules NOAH  [] Turned with wedges   [] Turned with pillows  [] Offloading boots   [] No depends  [] Chux pad  [] External urinary device  [] Other:    Discharge Plan:  Placement for patient upon discharge:   [] Home (self or family)  [] Home with home health  [] Inpatient Rehab  [] SNF/ECF  [] [facility]/ [facility]  Patient appropriate for Outpatient Wound Care Center: with Ines Bee

## 2025-03-05 VITALS
DIASTOLIC BLOOD PRESSURE: 83 MMHG | OXYGEN SATURATION: 99 % | SYSTOLIC BLOOD PRESSURE: 132 MMHG | BODY MASS INDEX: 23.62 KG/M2 | TEMPERATURE: 98.3 F | HEART RATE: 92 BPM | RESPIRATION RATE: 18 BRPM | HEIGHT: 70 IN | WEIGHT: 165 LBS

## 2025-03-05 LAB
ANION GAP SERPL CALC-SCNC: 13 MEQ/L (ref 8–16)
BACTERIA UR CULT: ABNORMAL
BUN SERPL-MCNC: 8 MG/DL (ref 8–23)
CALCIUM SERPL-MCNC: 10 MG/DL (ref 8.6–10)
CHLORIDE SERPL-SCNC: 102 MEQ/L (ref 98–111)
CO2 SERPL-SCNC: 27 MEQ/L (ref 22–29)
CREAT SERPL-MCNC: 0.9 MG/DL (ref 0.7–1.2)
DEPRECATED RDW RBC AUTO: 47.8 FL (ref 35–45)
ERYTHROCYTE [DISTWIDTH] IN BLOOD BY AUTOMATED COUNT: 15.5 % (ref 11.5–14.5)
GFR SERPL CREATININE-BSD FRML MDRD: > 90 ML/MIN/1.73M2
GLUCOSE SERPL-MCNC: 101 MG/DL (ref 74–109)
HCT VFR BLD AUTO: 36.3 % (ref 42–52)
HGB BLD-MCNC: 11.6 GM/DL (ref 14–18)
MAGNESIUM SERPL-MCNC: 1.9 MG/DL (ref 1.6–2.6)
MCH RBC QN AUTO: 26.9 PG (ref 26–33)
MCHC RBC AUTO-ENTMCNC: 32 GM/DL (ref 32.2–35.5)
MCV RBC AUTO: 84.2 FL (ref 80–94)
ORGANISM: ABNORMAL
PLATELET # BLD AUTO: 410 THOU/MM3 (ref 130–400)
PMV BLD AUTO: 8.2 FL (ref 9.4–12.4)
POTASSIUM SERPL-SCNC: 4.4 MEQ/L (ref 3.5–5.2)
RBC # BLD AUTO: 4.31 MILL/MM3 (ref 4.7–6.1)
SODIUM SERPL-SCNC: 142 MEQ/L (ref 135–145)
WBC # BLD AUTO: 8.5 THOU/MM3 (ref 4.8–10.8)

## 2025-03-05 PROCEDURE — 99239 HOSP IP/OBS DSCHRG MGMT >30: CPT | Performed by: NURSE PRACTITIONER

## 2025-03-05 PROCEDURE — 80048 BASIC METABOLIC PNL TOTAL CA: CPT

## 2025-03-05 PROCEDURE — 6370000000 HC RX 637 (ALT 250 FOR IP)

## 2025-03-05 PROCEDURE — 83735 ASSAY OF MAGNESIUM: CPT

## 2025-03-05 PROCEDURE — 36415 COLL VENOUS BLD VENIPUNCTURE: CPT

## 2025-03-05 PROCEDURE — 85027 COMPLETE CBC AUTOMATED: CPT

## 2025-03-05 PROCEDURE — 2500000003 HC RX 250 WO HCPCS

## 2025-03-05 RX ORDER — SULFAMETHOXAZOLE AND TRIMETHOPRIM 800; 160 MG/1; MG/1
1 TABLET ORAL 2 TIMES DAILY
Qty: 14 TABLET | Refills: 0 | Status: SHIPPED | OUTPATIENT
Start: 2025-03-05 | End: 2025-03-12

## 2025-03-05 RX ORDER — SODIUM HYPOCHLORITE 5 MG/ML
SOLUTION TOPICAL DAILY
Qty: 1 EACH | Refills: 1 | Status: SHIPPED | OUTPATIENT
Start: 2025-03-05 | End: 2025-03-14

## 2025-03-05 RX ADMIN — VENLAFAXINE HYDROCHLORIDE 225 MG: 150 CAPSULE, EXTENDED RELEASE ORAL at 09:50

## 2025-03-05 RX ADMIN — PANTOPRAZOLE SODIUM 40 MG: 40 TABLET, DELAYED RELEASE ORAL at 05:34

## 2025-03-05 RX ADMIN — HYDROXYZINE HYDROCHLORIDE 50 MG: 25 TABLET, FILM COATED ORAL at 09:51

## 2025-03-05 RX ADMIN — GABAPENTIN 800 MG: 400 CAPSULE ORAL at 09:50

## 2025-03-05 RX ADMIN — SODIUM CHLORIDE, PRESERVATIVE FREE 10 ML: 5 INJECTION INTRAVENOUS at 10:12

## 2025-03-05 RX ADMIN — BUPRENORPHINE AND NALOXONE 1 FILM: 8; 2 FILM BUCCAL; SUBLINGUAL at 09:51

## 2025-03-05 RX ADMIN — BUSPIRONE HYDROCHLORIDE 10 MG: 10 TABLET ORAL at 09:50

## 2025-03-05 RX ADMIN — POTASSIUM CHLORIDE 10 MEQ: 750 TABLET, EXTENDED RELEASE ORAL at 09:50

## 2025-03-05 ASSESSMENT — PAIN SCALES - GENERAL: PAINLEVEL_OUTOF10: 0

## 2025-03-05 NOTE — CARE COORDINATION
03/05/25 1004   Service Assessment   Patient Orientation Alert and Oriented   Cognition Alert   History Provided By Patient   Primary Caregiver Self   Accompanied By/Relationship mother   Support Systems Parent;Family Members;Friends/Neighbors   Patient's Healthcare Decision Maker is: Legal Next of Kin   PCP Verified by CM Yes   Prior Functional Level Assistance with the following:;Housework;Shopping;Other (see comment)  (wound care)   Current Functional Level Assistance with the following:;Housework;Shopping;Other (see comment)  (wound care)   Can patient return to prior living arrangement Yes   Ability to make needs known: Good   Family able to assist with home care needs: Yes   Would you like for me to discuss the discharge plan with any other family members/significant others, and if so, who? No   Financial Resources Medicaid   Community Resources None   Discharge Planning   Type of Residence House   Living Arrangements Other (Comment)  (cousin currently staying with him)   Current Services Prior To Admission Durable Medical Equipment   Current DME Prior to Arrival Hospital Bed;Wheelchair;Other (Comment)  (ulises lift, trapeze, platform lift)   Potential Assistance Needed N/A  (declined needs)   DME Ordered? No   Potential Assistance Purchasing Medications No   Type of Home Care Services None   Patient expects to be discharged to: House   Services At/After Discharge   Transition of Care Consult (CM Consult) Discharge Planning   Services At/After Discharge None   Mode of Transport at Discharge Other (see comment)  (family)   Confirm Follow Up Transport Family   Condition of Participation: Discharge Planning   The Plan for Transition of Care is related to the following treatment goals: return home today     Patient Goals/Plan/Treatment Preferences: Planning to return home today. Has DME, see above. Cousin is currently staying with him. His mother assists with his wound care daily. Denies any additional needs.      If patient is discharged prior to next notation, then this note serves as note for discharge by case management.

## 2025-03-05 NOTE — PROGRESS NOTES
[facility]   PROGRESS NOTE      Patient: Srikanth Coy  Room #: 8A-19/019-A            YOB: 1990  Age: 34 y.o.  Gender: male            Admit Date & Time: 3/2/2025  5:21 PM    Assessment:    The patient declined a visit today as he stated he was irritated.    Interventions:  The patient was provided information about Spiritual Care being available.     Outcomes:  The  wished the patient a positive day.     Plan:  1.Spiritual care will continue to follow the patient according to [facility] spiritual care SOP.       Electronically signed by Chaplain Santos, on 3/5/2025 at 10:42 AM.  Spiritual Care Department  [facility]  [phone]
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
## 2025-03-05 NOTE — DISCHARGE INSTRUCTIONS
Right ischium wound: plan is to cleanse the wound with Dakin's and gauze, lightly pack a strip of Opticell into the wound bed, cover with foam dressing, change dressing daily and as needed; needs continued follow-up in outpatient wound care center  Sacral wound: plan is to cleanse the wound with Dakin's and gauze, apply Dakin's moist gauze to wound bed, cover with foam dressing, change dressing daily and as needed; needs continued follow up in outpatient wound center    Frequent turns    Bactrim 1 twice daily for 7 days, please eat with this to avoid stomach upset    Please follow-up with the wound care center as directed previously    According to your home medications it looks like you have a lot of Zofran with refills

## 2025-03-05 NOTE — PLAN OF CARE
Problem: Discharge Planning  Goal: Discharge to home or other facility with appropriate resources  3/4/2025 2106 by Simin Renee, RN  Outcome: Progressing  3/4/2025 0821 by Nicole Elena RN  Flowsheets (Taken 3/4/2025 0821)  Discharge to home or other facility with appropriate resources: Identify barriers to discharge with patient and caregiver     Problem: Skin/Tissue Integrity  Goal: Skin integrity remains intact  Description: 1.  Monitor for areas of redness and/or skin breakdown  2.  Assess vascular access sites hourly  3.  Every 4-6 hours minimum:  Change oxygen saturation probe site  4.  Every 4-6 hours:  If on nasal continuous positive airway pressure, respiratory therapy assess nares and determine need for appliance change or resting period  3/4/2025 2106 by Simin Renee, RN  Outcome: Progressing  3/4/2025 0821 by Nicole Elena, RN  Flowsheets (Taken 3/4/2025 0821)  Skin Integrity Remains Intact: Monitor for areas of redness and/or skin breakdown

## 2025-03-07 LAB
BACTERIA BLD AEROBE CULT: NORMAL
BACTERIA BLD AEROBE CULT: NORMAL
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
## 2025-05-02 ENCOUNTER — LAB (OUTPATIENT)
Dept: LAB | Age: 35
End: 2025-05-02

## 2025-05-02 LAB
ALBUMIN SERPL BCG-MCNC: 3.8 G/DL (ref 3.4–4.9)
ALP SERPL-CCNC: 187 U/L (ref 40–129)
ALT SERPL W/O P-5'-P-CCNC: 12 U/L (ref 10–50)
ANION GAP SERPL CALC-SCNC: 13 MEQ/L (ref 8–16)
AST SERPL-CCNC: 18 U/L (ref 10–50)
BASOPHILS ABSOLUTE: 0.1 THOU/MM3 (ref 0–0.1)
BASOPHILS NFR BLD AUTO: 0.8 %
BILIRUB SERPL-MCNC: < 0.2 MG/DL (ref 0.3–1.2)
BUN SERPL-MCNC: 7 MG/DL (ref 8–23)
CALCIUM SERPL-MCNC: 9.3 MG/DL (ref 8.6–10)
CHLORIDE SERPL-SCNC: 103 MEQ/L (ref 98–111)
CO2 SERPL-SCNC: 24 MEQ/L (ref 22–29)
CREAT SERPL-MCNC: 1 MG/DL (ref 0.7–1.2)
DEPRECATED RDW RBC AUTO: 43.6 FL (ref 35–45)
EOSINOPHIL NFR BLD AUTO: 2.8 %
EOSINOPHILS ABSOLUTE: 0.2 THOU/MM3 (ref 0–0.4)
ERYTHROCYTE [DISTWIDTH] IN BLOOD BY AUTOMATED COUNT: 14.3 % (ref 11.5–14.5)
GFR SERPL CREATININE-BSD FRML MDRD: > 90 ML/MIN/1.73M2
GLUCOSE SERPL-MCNC: 89 MG/DL (ref 74–109)
HCT VFR BLD AUTO: 43.1 % (ref 42–52)
HGB BLD-MCNC: 13.6 GM/DL (ref 14–18)
IMM GRANULOCYTES # BLD AUTO: 0.02 THOU/MM3 (ref 0–0.07)
IMM GRANULOCYTES NFR BLD AUTO: 0.2 %
LYMPHOCYTES ABSOLUTE: 2 THOU/MM3 (ref 1–4.8)
LYMPHOCYTES NFR BLD AUTO: 24.1 %
MCH RBC QN AUTO: 26.6 PG (ref 26–33)
MCHC RBC AUTO-ENTMCNC: 31.6 GM/DL (ref 32.2–35.5)
MCV RBC AUTO: 84.3 FL (ref 80–94)
MONOCYTES ABSOLUTE: 0.5 THOU/MM3 (ref 0.4–1.3)
MONOCYTES NFR BLD AUTO: 5.4 %
NEUTROPHILS ABSOLUTE: 5.7 THOU/MM3 (ref 1.8–7.7)
NEUTROPHILS NFR BLD AUTO: 66.7 %
NRBC BLD AUTO-RTO: 0 /100 WBC
PLATELET # BLD AUTO: 393 THOU/MM3 (ref 130–400)
PMV BLD AUTO: 8.8 FL (ref 9.4–12.4)
POTASSIUM SERPL-SCNC: 4.5 MEQ/L (ref 3.5–5.2)
PROT SERPL-MCNC: 8.1 G/DL (ref 6.4–8.3)
RBC # BLD AUTO: 5.11 MILL/MM3 (ref 4.7–6.1)
SODIUM SERPL-SCNC: 140 MEQ/L (ref 135–145)
WBC # BLD AUTO: 8.5 THOU/MM3 (ref 4.8–10.8)

## 2025-05-22 ENCOUNTER — LAB (OUTPATIENT)
Dept: LAB | Age: 35
End: 2025-05-22

## 2025-05-22 ENCOUNTER — TELEPHONE (OUTPATIENT)
Dept: UROLOGY | Age: 35
End: 2025-05-22

## 2025-05-22 DIAGNOSIS — N39.0 RECURRENT UTI: Primary | ICD-10-CM

## 2025-05-22 DIAGNOSIS — N39.0 RECURRENT UTI: ICD-10-CM

## 2025-05-22 LAB
BACTERIA: ABNORMAL
BILIRUB UR QL STRIP: NEGATIVE
CASTS #/AREA URNS LPF: ABNORMAL /LPF
CASTS #/AREA URNS LPF: ABNORMAL /LPF
CHARACTER UR: ABNORMAL
CHARCOAL URNS QL MICRO: ABNORMAL
COLOR UR: YELLOW
CRYSTALS URNS QL MICRO: ABNORMAL
EPITHELIAL CELLS, UA: ABNORMAL /HPF
GLUCOSE UR QL STRIP.AUTO: NEGATIVE MG/DL
HGB UR QL STRIP.AUTO: ABNORMAL
KETONES UR QL STRIP.AUTO: NEGATIVE
LEUKOCYTE ESTERASE UR QL STRIP.AUTO: ABNORMAL
NITRITE UR QL STRIP.AUTO: POSITIVE
PH UR STRIP.AUTO: 7 [PH] (ref 5–9)
PROT UR STRIP.AUTO-MCNC: 30 MG/DL
RBC #/AREA URNS HPF: ABNORMAL /HPF
RENAL EPI CELLS #/AREA URNS HPF: ABNORMAL /[HPF]
SPECIFIC GRAVITY UA: 1.01 (ref 1–1.03)
UROBILINOGEN, URINE: 0.2 EU/DL (ref 0–1)
WBC #/AREA URNS HPF: ABNORMAL /HPF
YEAST LIKE FUNGI URNS QL MICRO: ABNORMAL

## 2025-05-22 RX ORDER — SULFAMETHOXAZOLE AND TRIMETHOPRIM 800; 160 MG/1; MG/1
1 TABLET ORAL 2 TIMES DAILY
Qty: 14 TABLET | Refills: 0 | Status: ON HOLD | OUTPATIENT
Start: 2025-05-22 | End: 2025-05-25 | Stop reason: HOSPADM

## 2025-05-22 NOTE — TELEPHONE ENCOUNTER
Patient had the catheter exchanged yesterday. Mother thinks he has an UTI. He has chills, no fever, his legs are warm to touch.     He is paraplegic.    Orders placed for a urine.    They would like antibiotics ordered.

## 2025-05-22 NOTE — TELEPHONE ENCOUNTER
Agree with checking urine.  Will send bactrim BID for 7 days but need urine before he starts this.     However if fevers, chills uncontrolled pain recommend ER.

## 2025-05-23 ENCOUNTER — RESULTS FOLLOW-UP (OUTPATIENT)
Dept: UROLOGY | Age: 35
End: 2025-05-23

## 2025-05-23 ENCOUNTER — HOSPITAL ENCOUNTER (INPATIENT)
Age: 35
LOS: 1 days | Discharge: HOME | DRG: 466 | End: 2025-05-25
Attending: STUDENT IN AN ORGANIZED HEALTH CARE EDUCATION/TRAINING PROGRAM | Admitting: STUDENT IN AN ORGANIZED HEALTH CARE EDUCATION/TRAINING PROGRAM
Payer: MEDICAID

## 2025-05-23 ENCOUNTER — APPOINTMENT (OUTPATIENT)
Dept: CT IMAGING | Age: 35
DRG: 466 | End: 2025-05-23
Payer: MEDICAID

## 2025-05-23 DIAGNOSIS — N15.1 PERINEPHRIC ABSCESS: Primary | ICD-10-CM

## 2025-05-23 DIAGNOSIS — G82.20 PARAPLEGIA (HCC): ICD-10-CM

## 2025-05-23 PROBLEM — G62.9 NEUROPATHY: Status: ACTIVE | Noted: 2025-05-23

## 2025-05-23 PROBLEM — F41.8 DEPRESSION WITH ANXIETY: Status: ACTIVE | Noted: 2017-11-29

## 2025-05-23 PROBLEM — D50.9 IRON DEFICIENCY ANEMIA: Status: ACTIVE | Noted: 2025-05-23

## 2025-05-23 PROBLEM — J44.9 CHRONIC OBSTRUCTIVE PULMONARY DISEASE (HCC): Status: ACTIVE | Noted: 2025-05-23

## 2025-05-23 LAB
ALBUMIN SERPL BCG-MCNC: 3.8 G/DL (ref 3.4–4.9)
ALP SERPL-CCNC: 197 U/L (ref 40–129)
ALT SERPL W/O P-5'-P-CCNC: 47 U/L (ref 10–50)
ANION GAP SERPL CALC-SCNC: 13 MEQ/L (ref 8–16)
AST SERPL-CCNC: 40 U/L (ref 10–50)
BACTERIA URNS QL MICRO: ABNORMAL /HPF
BASOPHILS ABSOLUTE: 0 THOU/MM3 (ref 0–0.1)
BASOPHILS NFR BLD AUTO: 0.5 %
BILIRUB SERPL-MCNC: 0.2 MG/DL (ref 0.3–1.2)
BILIRUB UR QL STRIP.AUTO: NEGATIVE
BUN SERPL-MCNC: 6 MG/DL (ref 8–23)
CALCIUM SERPL-MCNC: 9.3 MG/DL (ref 8.6–10)
CASTS #/AREA URNS LPF: ABNORMAL /LPF
CASTS 2: ABNORMAL /LPF
CHARACTER UR: CLEAR
CHLORIDE SERPL-SCNC: 102 MEQ/L (ref 98–111)
CO2 SERPL-SCNC: 20 MEQ/L (ref 22–29)
COLOR, UA: YELLOW
CREAT SERPL-MCNC: 0.9 MG/DL (ref 0.7–1.2)
CRYSTALS URNS MICRO: ABNORMAL
DEPRECATED RDW RBC AUTO: 43.7 FL (ref 35–45)
EOSINOPHIL NFR BLD AUTO: 2.6 %
EOSINOPHILS ABSOLUTE: 0.2 THOU/MM3 (ref 0–0.4)
EPITHELIAL CELLS, UA: ABNORMAL /HPF
ERYTHROCYTE [DISTWIDTH] IN BLOOD BY AUTOMATED COUNT: 14.6 % (ref 11.5–14.5)
GFR SERPL CREATININE-BSD FRML MDRD: > 90 ML/MIN/1.73M2
GLUCOSE SERPL-MCNC: 96 MG/DL (ref 74–109)
GLUCOSE UR QL STRIP.AUTO: NEGATIVE MG/DL
HCT VFR BLD AUTO: 41.1 % (ref 42–52)
HGB BLD-MCNC: 13.6 GM/DL (ref 14–18)
HGB UR QL STRIP.AUTO: NEGATIVE
IMM GRANULOCYTES # BLD AUTO: 0.03 THOU/MM3 (ref 0–0.07)
IMM GRANULOCYTES NFR BLD AUTO: 0.3 %
KETONES UR QL STRIP.AUTO: NEGATIVE
LACTIC ACID, SEPSIS: 1.2 MMOL/L (ref 0.5–1.9)
LIPASE SERPL-CCNC: 15 U/L (ref 13–60)
LYMPHOCYTES ABSOLUTE: 2.2 THOU/MM3 (ref 1–4.8)
LYMPHOCYTES NFR BLD AUTO: 24.6 %
MCH RBC QN AUTO: 27.3 PG (ref 26–33)
MCHC RBC AUTO-ENTMCNC: 33.1 GM/DL (ref 32.2–35.5)
MCV RBC AUTO: 82.5 FL (ref 80–94)
MISCELLANEOUS 2: ABNORMAL
MONOCYTES ABSOLUTE: 0.6 THOU/MM3 (ref 0.4–1.3)
MONOCYTES NFR BLD AUTO: 6.8 %
NEUTROPHILS ABSOLUTE: 5.9 THOU/MM3 (ref 1.8–7.7)
NEUTROPHILS NFR BLD AUTO: 65.2 %
NITRITE UR QL STRIP: NEGATIVE
NRBC BLD AUTO-RTO: 0 /100 WBC
OSMOLALITY SERPL CALC.SUM OF ELEC: 267.6 MOSMOL/KG (ref 275–300)
PH UR STRIP.AUTO: 7 [PH] (ref 5–9)
PLATELET # BLD AUTO: 383 THOU/MM3 (ref 130–400)
PMV BLD AUTO: 8.6 FL (ref 9.4–12.4)
POTASSIUM SERPL-SCNC: 4.1 MEQ/L (ref 3.5–5.2)
PROT SERPL-MCNC: 8.2 G/DL (ref 6.4–8.3)
PROT UR STRIP.AUTO-MCNC: NEGATIVE MG/DL
RBC # BLD AUTO: 4.98 MILL/MM3 (ref 4.7–6.1)
RBC URINE: ABNORMAL /HPF
REASON FOR REJECTION: NORMAL
REJECTED TEST: NORMAL
RENAL EPI CELLS #/AREA URNS HPF: ABNORMAL /[HPF]
SODIUM SERPL-SCNC: 135 MEQ/L (ref 135–145)
SP GR UR REFRACT.AUTO: 1.02 (ref 1–1.03)
UROBILINOGEN, URINE: 0.2 EU/DL (ref 0–1)
WBC # BLD AUTO: 9.1 THOU/MM3 (ref 4.8–10.8)
WBC #/AREA URNS HPF: ABNORMAL /HPF
WBC #/AREA URNS HPF: ABNORMAL /[HPF]
YEAST LIKE FUNGI URNS QL MICRO: ABNORMAL

## 2025-05-23 PROCEDURE — 85025 COMPLETE CBC W/AUTO DIFF WBC: CPT

## 2025-05-23 PROCEDURE — 87086 URINE CULTURE/COLONY COUNT: CPT

## 2025-05-23 PROCEDURE — 87186 SC STD MICRODIL/AGAR DIL: CPT

## 2025-05-23 PROCEDURE — 2500000003 HC RX 250 WO HCPCS

## 2025-05-23 PROCEDURE — 81001 URINALYSIS AUTO W/SCOPE: CPT

## 2025-05-23 PROCEDURE — 83690 ASSAY OF LIPASE: CPT

## 2025-05-23 PROCEDURE — 99223 1ST HOSP IP/OBS HIGH 75: CPT | Performed by: STUDENT IN AN ORGANIZED HEALTH CARE EDUCATION/TRAINING PROGRAM

## 2025-05-23 PROCEDURE — 51798 US URINE CAPACITY MEASURE: CPT

## 2025-05-23 PROCEDURE — 6360000004 HC RX CONTRAST MEDICATION

## 2025-05-23 PROCEDURE — 74177 CT ABD & PELVIS W/CONTRAST: CPT

## 2025-05-23 PROCEDURE — 36415 COLL VENOUS BLD VENIPUNCTURE: CPT

## 2025-05-23 PROCEDURE — 2580000003 HC RX 258

## 2025-05-23 PROCEDURE — 80053 COMPREHEN METABOLIC PANEL: CPT

## 2025-05-23 PROCEDURE — 96374 THER/PROPH/DIAG INJ IV PUSH: CPT

## 2025-05-23 PROCEDURE — G0378 HOSPITAL OBSERVATION PER HR: HCPCS

## 2025-05-23 PROCEDURE — 83605 ASSAY OF LACTIC ACID: CPT

## 2025-05-23 PROCEDURE — 99285 EMERGENCY DEPT VISIT HI MDM: CPT

## 2025-05-23 PROCEDURE — 87040 BLOOD CULTURE FOR BACTERIA: CPT

## 2025-05-23 PROCEDURE — 6360000002 HC RX W HCPCS

## 2025-05-23 PROCEDURE — 96375 TX/PRO/DX INJ NEW DRUG ADDON: CPT

## 2025-05-23 PROCEDURE — 87077 CULTURE AEROBIC IDENTIFY: CPT

## 2025-05-23 PROCEDURE — 6370000000 HC RX 637 (ALT 250 FOR IP)

## 2025-05-23 PROCEDURE — 87801 DETECT AGNT MULT DNA AMPLI: CPT

## 2025-05-23 PROCEDURE — 96376 TX/PRO/DX INJ SAME DRUG ADON: CPT

## 2025-05-23 RX ORDER — VENLAFAXINE HYDROCHLORIDE 75 MG/1
75 CAPSULE, EXTENDED RELEASE ORAL
Status: DISCONTINUED | OUTPATIENT
Start: 2025-05-24 | End: 2025-05-25 | Stop reason: HOSPADM

## 2025-05-23 RX ORDER — ACETAMINOPHEN 325 MG/1
650 TABLET ORAL EVERY 6 HOURS PRN
Status: DISCONTINUED | OUTPATIENT
Start: 2025-05-23 | End: 2025-05-25 | Stop reason: HOSPADM

## 2025-05-23 RX ORDER — ACETIC ACID 0.25 G/100ML
60 IRRIGANT IRRIGATION DAILY
Status: DISCONTINUED | OUTPATIENT
Start: 2025-05-24 | End: 2025-05-24

## 2025-05-23 RX ORDER — ONDANSETRON 4 MG/1
4 TABLET, ORALLY DISINTEGRATING ORAL EVERY 8 HOURS PRN
Status: DISCONTINUED | OUTPATIENT
Start: 2025-05-23 | End: 2025-05-25 | Stop reason: HOSPADM

## 2025-05-23 RX ORDER — HYDROXYZINE HYDROCHLORIDE 25 MG/1
50 TABLET, FILM COATED ORAL 3 TIMES DAILY
Status: DISCONTINUED | OUTPATIENT
Start: 2025-05-23 | End: 2025-05-25 | Stop reason: HOSPADM

## 2025-05-23 RX ORDER — POTASSIUM CHLORIDE 7.45 MG/ML
10 INJECTION INTRAVENOUS PRN
Status: DISCONTINUED | OUTPATIENT
Start: 2025-05-23 | End: 2025-05-25 | Stop reason: HOSPADM

## 2025-05-23 RX ORDER — POLYETHYLENE GLYCOL 3350 17 G/17G
17 POWDER, FOR SOLUTION ORAL DAILY PRN
Status: DISCONTINUED | OUTPATIENT
Start: 2025-05-23 | End: 2025-05-25 | Stop reason: HOSPADM

## 2025-05-23 RX ORDER — TROSPIUM CHLORIDE 20 MG/1
20 TABLET, FILM COATED ORAL
Status: DISCONTINUED | OUTPATIENT
Start: 2025-05-24 | End: 2025-05-25 | Stop reason: HOSPADM

## 2025-05-23 RX ORDER — FERROUS SULFATE 325(65) MG
325 TABLET ORAL 2 TIMES DAILY WITH MEALS
Status: DISCONTINUED | OUTPATIENT
Start: 2025-05-23 | End: 2025-05-25 | Stop reason: HOSPADM

## 2025-05-23 RX ORDER — 0.9 % SODIUM CHLORIDE 0.9 %
1000 INTRAVENOUS SOLUTION INTRAVENOUS ONCE
Status: COMPLETED | OUTPATIENT
Start: 2025-05-23 | End: 2025-05-23

## 2025-05-23 RX ORDER — PANTOPRAZOLE SODIUM 40 MG/1
40 TABLET, DELAYED RELEASE ORAL
Status: DISCONTINUED | OUTPATIENT
Start: 2025-05-24 | End: 2025-05-25 | Stop reason: HOSPADM

## 2025-05-23 RX ORDER — VENLAFAXINE HYDROCHLORIDE 150 MG/1
150 CAPSULE, EXTENDED RELEASE ORAL DAILY
Status: DISCONTINUED | OUTPATIENT
Start: 2025-05-24 | End: 2025-05-25 | Stop reason: HOSPADM

## 2025-05-23 RX ORDER — IOPAMIDOL 755 MG/ML
80 INJECTION, SOLUTION INTRAVASCULAR
Status: COMPLETED | OUTPATIENT
Start: 2025-05-23 | End: 2025-05-23

## 2025-05-23 RX ORDER — ONDANSETRON 2 MG/ML
4 INJECTION INTRAMUSCULAR; INTRAVENOUS EVERY 6 HOURS PRN
Status: DISCONTINUED | OUTPATIENT
Start: 2025-05-23 | End: 2025-05-25 | Stop reason: HOSPADM

## 2025-05-23 RX ORDER — GABAPENTIN 400 MG/1
800 CAPSULE ORAL 4 TIMES DAILY
Status: DISCONTINUED | OUTPATIENT
Start: 2025-05-23 | End: 2025-05-25 | Stop reason: HOSPADM

## 2025-05-23 RX ORDER — SODIUM CHLORIDE 9 MG/ML
INJECTION, SOLUTION INTRAVENOUS PRN
Status: DISCONTINUED | OUTPATIENT
Start: 2025-05-23 | End: 2025-05-25 | Stop reason: HOSPADM

## 2025-05-23 RX ORDER — SODIUM CHLORIDE 0.9 % (FLUSH) 0.9 %
5-40 SYRINGE (ML) INJECTION PRN
Status: DISCONTINUED | OUTPATIENT
Start: 2025-05-23 | End: 2025-05-25 | Stop reason: HOSPADM

## 2025-05-23 RX ORDER — CYCLOBENZAPRINE HCL 10 MG
10 TABLET ORAL 2 TIMES DAILY
Status: DISCONTINUED | OUTPATIENT
Start: 2025-05-23 | End: 2025-05-25 | Stop reason: HOSPADM

## 2025-05-23 RX ORDER — MIRABEGRON 50 MG/1
50 TABLET, FILM COATED, EXTENDED RELEASE ORAL DAILY
COMMUNITY

## 2025-05-23 RX ORDER — ALBUTEROL SULFATE 90 UG/1
2 INHALANT RESPIRATORY (INHALATION) EVERY 6 HOURS PRN
Status: DISCONTINUED | OUTPATIENT
Start: 2025-05-23 | End: 2025-05-25 | Stop reason: HOSPADM

## 2025-05-23 RX ORDER — SODIUM CHLORIDE 0.9 % (FLUSH) 0.9 %
5-40 SYRINGE (ML) INJECTION EVERY 12 HOURS SCHEDULED
Status: DISCONTINUED | OUTPATIENT
Start: 2025-05-23 | End: 2025-05-25 | Stop reason: HOSPADM

## 2025-05-23 RX ORDER — BUSPIRONE HYDROCHLORIDE 10 MG/1
10 TABLET ORAL 3 TIMES DAILY
Status: DISCONTINUED | OUTPATIENT
Start: 2025-05-23 | End: 2025-05-25 | Stop reason: HOSPADM

## 2025-05-23 RX ORDER — POTASSIUM CHLORIDE 1500 MG/1
40 TABLET, EXTENDED RELEASE ORAL PRN
Status: DISCONTINUED | OUTPATIENT
Start: 2025-05-23 | End: 2025-05-25 | Stop reason: HOSPADM

## 2025-05-23 RX ORDER — POTASSIUM CHLORIDE 750 MG/1
10 TABLET, EXTENDED RELEASE ORAL DAILY
Status: DISCONTINUED | OUTPATIENT
Start: 2025-05-24 | End: 2025-05-25 | Stop reason: HOSPADM

## 2025-05-23 RX ORDER — BUPRENORPHINE AND NALOXONE 8; 2 MG/1; MG/1
1 FILM, SOLUBLE BUCCAL; SUBLINGUAL 3 TIMES DAILY
Status: DISCONTINUED | OUTPATIENT
Start: 2025-05-23 | End: 2025-05-25 | Stop reason: HOSPADM

## 2025-05-23 RX ORDER — MAGNESIUM SULFATE IN WATER 40 MG/ML
2000 INJECTION, SOLUTION INTRAVENOUS PRN
Status: DISCONTINUED | OUTPATIENT
Start: 2025-05-23 | End: 2025-05-25 | Stop reason: HOSPADM

## 2025-05-23 RX ORDER — ACETAMINOPHEN 650 MG/1
650 SUPPOSITORY RECTAL EVERY 6 HOURS PRN
Status: DISCONTINUED | OUTPATIENT
Start: 2025-05-23 | End: 2025-05-25 | Stop reason: HOSPADM

## 2025-05-23 RX ADMIN — IOPAMIDOL 80 ML: 755 INJECTION, SOLUTION INTRAVENOUS at 15:34

## 2025-05-23 RX ADMIN — BUPRENORPHINE AND NALOXONE 1 FILM: 8; 2 FILM, SOLUBLE BUCCAL; SUBLINGUAL at 21:12

## 2025-05-23 RX ADMIN — MEROPENEM 1000 MG: 1 INJECTION INTRAVENOUS at 21:13

## 2025-05-23 RX ADMIN — SODIUM CHLORIDE, PRESERVATIVE FREE 10 ML: 5 INJECTION INTRAVENOUS at 21:12

## 2025-05-23 RX ADMIN — GABAPENTIN 800 MG: 400 CAPSULE ORAL at 21:12

## 2025-05-23 RX ADMIN — CYCLOBENZAPRINE 10 MG: 10 TABLET, FILM COATED ORAL at 21:12

## 2025-05-23 RX ADMIN — BUSPIRONE HYDROCHLORIDE 10 MG: 10 TABLET ORAL at 21:13

## 2025-05-23 RX ADMIN — HYDROXYZINE HYDROCHLORIDE 50 MG: 25 TABLET, FILM COATED ORAL at 21:12

## 2025-05-23 RX ADMIN — CEFTRIAXONE SODIUM 1000 MG: 1 INJECTION, POWDER, FOR SOLUTION INTRAMUSCULAR; INTRAVENOUS at 14:18

## 2025-05-23 RX ADMIN — SODIUM CHLORIDE 1000 ML: 0.9 INJECTION, SOLUTION INTRAVENOUS at 14:17

## 2025-05-23 RX ADMIN — TIZANIDINE 4 MG: 4 TABLET ORAL at 21:12

## 2025-05-23 RX ADMIN — FERROUS SULFATE TAB 325 MG (65 MG ELEMENTAL FE) 325 MG: 325 (65 FE) TAB at 21:13

## 2025-05-23 ASSESSMENT — PAIN SCALES - GENERAL
PAINLEVEL_OUTOF10: 6
PAINLEVEL_OUTOF10: 0

## 2025-05-23 ASSESSMENT — PAIN - FUNCTIONAL ASSESSMENT
PAIN_FUNCTIONAL_ASSESSMENT: ACTIVITIES ARE NOT PREVENTED
PAIN_FUNCTIONAL_ASSESSMENT: 0-10

## 2025-05-23 ASSESSMENT — PAIN DESCRIPTION - FREQUENCY: FREQUENCY: CONTINUOUS

## 2025-05-23 ASSESSMENT — PAIN DESCRIPTION - LOCATION: LOCATION: PENIS

## 2025-05-23 ASSESSMENT — PAIN DESCRIPTION - DESCRIPTORS: DESCRIPTORS: PRESSURE

## 2025-05-23 NOTE — ED PROVIDER NOTES
MOUTH IN THE MORNING BEFORE BREAKFAST, Disp: 90 tablet, Rfl: 3    albuterol sulfate HFA (VENTOLIN HFA) 108 (90 Base) MCG/ACT inhaler, Inhale 2 puffs into the lungs every 6 hours as needed for Wheezing, Disp: 1 Inhaler, Rfl: 3    EPINEPHrine (EPIPEN) 0.3 MG/0.3ML SOAJ injection, Use as directed for allergic reaction, Disp: 2 each, Rfl: 1    Previous Medications    ACETIC ACID 0.25 % IRRIGATION    Irrigate with 60 mL once daily.    ALBUTEROL SULFATE HFA (VENTOLIN HFA) 108 (90 BASE) MCG/ACT INHALER    Inhale 2 puffs into the lungs every 6 hours as needed for Wheezing    BUSPIRONE (BUSPAR) 10 MG TABLET    TAKE 1 TABLET BY MOUTH THREE TIMES A DAY    CYCLOBENZAPRINE (FLEXERIL) 10 MG TABLET    TAKE 1 TABLET BY MOUTH THREE TIMES A DAY AS NEEDED FOR MUSCLE SPASM    EPINEPHRINE (EPIPEN) 0.3 MG/0.3ML SOAJ INJECTION    Use as directed for allergic reaction    FERROUS SULFATE (IRON 325) 325 (65 FE) MG TABLET    Take 1 tablet by mouth 2 times daily (with meals)    GABAPENTIN (NEURONTIN) 800 MG TABLET    Take 1 tablet by mouth 4 times daily.    HYDROXYZINE HCL (ATARAX) 50 MG TABLET    Take 1 tablet by mouth in the morning, at noon, and at bedtime    NALOXONE 4 MG/0.1ML LIQD NASAL SPRAY    1 spray by Nasal route as needed for Opioid Reversal    NONFORMULARY    Sterile fluid every other day and as needed for flush    ONDANSETRON (ZOFRAN) 4 MG TABLET    Take 1 tablet by mouth every 8 hours as needed for Nausea or Vomiting    PANTOPRAZOLE (PROTONIX) 40 MG TABLET    TAKE 1 TABLET BY MOUTH IN THE MORNING BEFORE BREAKFAST    POTASSIUM CHLORIDE (MICRO-K) 10 MEQ EXTENDED RELEASE CAPSULE    Take 1 capsule by mouth daily    SUBOXONE 8-2 MG FILM SL FILM    Place 1 Film under the tongue in the morning, at noon, and at bedtime.    SULFAMETHOXAZOLE-TRIMETHOPRIM (BACTRIM DS;SEPTRA DS) 800-160 MG PER TABLET    Take 1 tablet by mouth 2 times daily for 7 days    TIZANIDINE (ZANAFLEX) 4 MG TABLET    Take 1 tablet by mouth 4 times daily as needed
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
## 2025-05-23 NOTE — ED NOTES
Bladder scan complete, 57ml   
ED to inpatient nurses report      Chief Complaint:  Chief Complaint   Patient presents with    Penis Pain     Present to ED from: home    MOA:     LOC: alert and orientated to name, place, date  Mobility: Requires assistance * 2  Oxygen Baseline: RA    Current needs required: RA     Code Status:   Prior    What abnormal results were found and what did you give/do to treat them? abscess  Any procedures or intervention occur? See MAR    Mental Status:  Level of Consciousness: Alert (0)    Psych Assessment:        Vitals:  Patient Vitals for the past 24 hrs:   BP Temp Temp src Pulse Resp SpO2 Height Weight   05/23/25 1531 (!) 132/98 -- -- 78 18 98 % -- --   05/23/25 1425 (!) 119/104 -- -- 81 17 98 % -- --   05/23/25 1254 111/73 98.1 °F (36.7 °C) Oral (!) 107 18 99 % 1.778 m (5' 10\") 74.8 kg (165 lb)        LDAs:   Peripheral IV 05/23/25 Right;Anterior Cephalic (Active)   Site Assessment Clean, dry & intact 05/23/25 1530   Line Status Infusing 05/23/25 1530   Phlebitis Assessment No symptoms 05/23/25 1530   Infiltration Assessment 0 05/23/25 1530   Dressing Status Clean, dry & intact 05/23/25 1530       Ambulatory Status:  No data recorded    Diagnosis:  DISPOSITION Decision To Admit 05/23/2025 04:11:45 PM   Final diagnoses:   Perinephric abscess   Paraplegia (HCC)        Consults:  None     Pain Score:  Pain Assessment  Pain Assessment: 0-10  Pain Level: 6  Pain Location: Penis  Pain Descriptors: Pressure  Functional Pain Assessment: Activities are not prevented  Pain Frequency: Continuous    C-SSRS:   Risk of Suicide: No Risk    Sepsis Screening:       Sunray Fall Risk:       Swallow Screening        Preferred Language:   English      ALLERGIES     Bee venom, Pork-derived products, Doxycycline, Tramadol, and Compazine [prochlorperazine]    SURGICAL HISTORY       Past Surgical History:   Procedure Laterality Date    ANKLE SURGERY Right 2/19/2021    BILAT TENDO ACHILLES LENGTHENING, FLEXOR DIGITORUM LONGUS TENDON AND 
Iv established. Urine specimen obtained. Pt medicated per MAR. No needs expressed at this time. VSS  
Multiple attempts to obtain IV access. Pt getting upset and states he is leaving  
Pt presents to ED c/o penis pain and urge to urinate. Pt has a suprapubic catheter but states that he has recently peed out of his penis. Pt states catheter is still draining urine. Pt states similar symptoms a couple months ago when he had an abscess on his kidney.   
Pt resting in bed, respirations even and unlabored. Pt taken to CT in stable condition. VSS  
Health Care Proxy (HCP)

## 2025-05-24 ENCOUNTER — APPOINTMENT (OUTPATIENT)
Dept: CT IMAGING | Age: 35
DRG: 466 | End: 2025-05-24
Payer: MEDICAID

## 2025-05-24 LAB
ANION GAP SERPL CALC-SCNC: 12 MEQ/L (ref 8–16)
BACTERIA UR CULT: ABNORMAL
BASOPHILS ABSOLUTE: 0.1 THOU/MM3 (ref 0–0.1)
BASOPHILS NFR BLD AUTO: 0.6 %
BUN SERPL-MCNC: 6 MG/DL (ref 8–23)
CALCIUM SERPL-MCNC: 9.3 MG/DL (ref 8.6–10)
CHLORIDE SERPL-SCNC: 105 MEQ/L (ref 98–111)
CO2 SERPL-SCNC: 23 MEQ/L (ref 22–29)
CREAT SERPL-MCNC: 1 MG/DL (ref 0.7–1.2)
DEPRECATED RDW RBC AUTO: 43.8 FL (ref 35–45)
EOSINOPHIL NFR BLD AUTO: 2.9 %
EOSINOPHILS ABSOLUTE: 0.2 THOU/MM3 (ref 0–0.4)
ERYTHROCYTE [DISTWIDTH] IN BLOOD BY AUTOMATED COUNT: 14.7 % (ref 11.5–14.5)
GFR SERPL CREATININE-BSD FRML MDRD: > 90 ML/MIN/1.73M2
GLUCOSE SERPL-MCNC: 103 MG/DL (ref 74–109)
HCT VFR BLD AUTO: 38 % (ref 42–52)
HGB BLD-MCNC: 12.5 GM/DL (ref 14–18)
IMM GRANULOCYTES # BLD AUTO: 0.03 THOU/MM3 (ref 0–0.07)
IMM GRANULOCYTES NFR BLD AUTO: 0.4 %
LYMPHOCYTES ABSOLUTE: 2.1 THOU/MM3 (ref 1–4.8)
LYMPHOCYTES NFR BLD AUTO: 24.9 %
MCH RBC QN AUTO: 27.2 PG (ref 26–33)
MCHC RBC AUTO-ENTMCNC: 32.9 GM/DL (ref 32.2–35.5)
MCV RBC AUTO: 82.6 FL (ref 80–94)
MONOCYTES ABSOLUTE: 0.6 THOU/MM3 (ref 0.4–1.3)
MONOCYTES NFR BLD AUTO: 7.5 %
NEUTROPHILS ABSOLUTE: 5.4 THOU/MM3 (ref 1.8–7.7)
NEUTROPHILS NFR BLD AUTO: 63.7 %
NRBC BLD AUTO-RTO: 0 /100 WBC
ORGANISM: ABNORMAL
OSMOLALITY SERPL CALC.SUM OF ELEC: 277.3 MOSMOL/KG (ref 275–300)
PLATELET # BLD AUTO: 349 THOU/MM3 (ref 130–400)
PMV BLD AUTO: 8.8 FL (ref 9.4–12.4)
POTASSIUM SERPL-SCNC: 3.6 MEQ/L (ref 3.5–5.2)
RBC # BLD AUTO: 4.6 MILL/MM3 (ref 4.7–6.1)
SODIUM SERPL-SCNC: 140 MEQ/L (ref 135–145)
WBC # BLD AUTO: 8.5 THOU/MM3 (ref 4.8–10.8)

## 2025-05-24 PROCEDURE — 6370000000 HC RX 637 (ALT 250 FOR IP)

## 2025-05-24 PROCEDURE — 2580000003 HC RX 258

## 2025-05-24 PROCEDURE — 2500000003 HC RX 250 WO HCPCS

## 2025-05-24 PROCEDURE — 6360000002 HC RX W HCPCS

## 2025-05-24 PROCEDURE — 99233 SBSQ HOSP IP/OBS HIGH 50: CPT | Performed by: STUDENT IN AN ORGANIZED HEALTH CARE EDUCATION/TRAINING PROGRAM

## 2025-05-24 PROCEDURE — 96365 THER/PROPH/DIAG IV INF INIT: CPT

## 2025-05-24 PROCEDURE — 36415 COLL VENOUS BLD VENIPUNCTURE: CPT

## 2025-05-24 PROCEDURE — 1200000000 HC SEMI PRIVATE

## 2025-05-24 PROCEDURE — 96366 THER/PROPH/DIAG IV INF ADDON: CPT

## 2025-05-24 PROCEDURE — 85025 COMPLETE CBC W/AUTO DIFF WBC: CPT

## 2025-05-24 PROCEDURE — 6370000000 HC RX 637 (ALT 250 FOR IP): Performed by: STUDENT IN AN ORGANIZED HEALTH CARE EDUCATION/TRAINING PROGRAM

## 2025-05-24 PROCEDURE — 80048 BASIC METABOLIC PNL TOTAL CA: CPT

## 2025-05-24 RX ADMIN — BUSPIRONE HYDROCHLORIDE 10 MG: 10 TABLET ORAL at 08:16

## 2025-05-24 RX ADMIN — VENLAFAXINE HYDROCHLORIDE 75 MG: 75 CAPSULE, EXTENDED RELEASE ORAL at 08:15

## 2025-05-24 RX ADMIN — BUPRENORPHINE AND NALOXONE 1 FILM: 8; 2 FILM, SOLUBLE BUCCAL; SUBLINGUAL at 08:17

## 2025-05-24 RX ADMIN — CYCLOBENZAPRINE 10 MG: 10 TABLET, FILM COATED ORAL at 08:18

## 2025-05-24 RX ADMIN — GABAPENTIN 800 MG: 400 CAPSULE ORAL at 14:03

## 2025-05-24 RX ADMIN — BUSPIRONE HYDROCHLORIDE 10 MG: 10 TABLET ORAL at 20:48

## 2025-05-24 RX ADMIN — BUPRENORPHINE AND NALOXONE 1 FILM: 8; 2 FILM, SOLUBLE BUCCAL; SUBLINGUAL at 20:48

## 2025-05-24 RX ADMIN — SODIUM CHLORIDE, PRESERVATIVE FREE 10 ML: 5 INJECTION INTRAVENOUS at 20:48

## 2025-05-24 RX ADMIN — FERROUS SULFATE TAB 325 MG (65 MG ELEMENTAL FE) 325 MG: 325 (65 FE) TAB at 17:44

## 2025-05-24 RX ADMIN — FERROUS SULFATE TAB 325 MG (65 MG ELEMENTAL FE) 325 MG: 325 (65 FE) TAB at 08:16

## 2025-05-24 RX ADMIN — TROSPIUM CHLORIDE 20 MG: 20 TABLET, FILM COATED ORAL at 05:12

## 2025-05-24 RX ADMIN — SODIUM CHLORIDE, PRESERVATIVE FREE 10 ML: 5 INJECTION INTRAVENOUS at 08:18

## 2025-05-24 RX ADMIN — VENLAFAXINE HYDROCHLORIDE 150 MG: 150 CAPSULE, EXTENDED RELEASE ORAL at 08:15

## 2025-05-24 RX ADMIN — MEROPENEM 1000 MG: 1 INJECTION INTRAVENOUS at 05:10

## 2025-05-24 RX ADMIN — MEROPENEM 1000 MG: 1 INJECTION INTRAVENOUS at 14:08

## 2025-05-24 RX ADMIN — GABAPENTIN 800 MG: 400 CAPSULE ORAL at 20:48

## 2025-05-24 RX ADMIN — POTASSIUM CHLORIDE 10 MEQ: 750 TABLET, FILM COATED, EXTENDED RELEASE ORAL at 08:14

## 2025-05-24 RX ADMIN — GABAPENTIN 800 MG: 400 CAPSULE ORAL at 08:16

## 2025-05-24 RX ADMIN — GABAPENTIN 800 MG: 400 CAPSULE ORAL at 17:44

## 2025-05-24 RX ADMIN — BUSPIRONE HYDROCHLORIDE 10 MG: 10 TABLET ORAL at 14:03

## 2025-05-24 RX ADMIN — TIZANIDINE 4 MG: 4 TABLET ORAL at 20:48

## 2025-05-24 RX ADMIN — ACETAMINOPHEN 650 MG: 325 TABLET ORAL at 08:13

## 2025-05-24 RX ADMIN — CYCLOBENZAPRINE 10 MG: 10 TABLET, FILM COATED ORAL at 20:48

## 2025-05-24 RX ADMIN — HYDROXYZINE HYDROCHLORIDE 50 MG: 25 TABLET, FILM COATED ORAL at 20:48

## 2025-05-24 RX ADMIN — HYDROXYZINE HYDROCHLORIDE 50 MG: 25 TABLET, FILM COATED ORAL at 08:18

## 2025-05-24 RX ADMIN — TROSPIUM CHLORIDE 20 MG: 20 TABLET, FILM COATED ORAL at 17:44

## 2025-05-24 RX ADMIN — TIZANIDINE 4 MG: 4 TABLET ORAL at 08:16

## 2025-05-24 RX ADMIN — PANTOPRAZOLE SODIUM 40 MG: 40 TABLET, DELAYED RELEASE ORAL at 05:12

## 2025-05-24 RX ADMIN — BUPRENORPHINE AND NALOXONE 1 FILM: 8; 2 FILM, SOLUBLE BUCCAL; SUBLINGUAL at 14:03

## 2025-05-24 RX ADMIN — HYDROXYZINE HYDROCHLORIDE 50 MG: 25 TABLET, FILM COATED ORAL at 14:02

## 2025-05-24 ASSESSMENT — PAIN SCALES - GENERAL
PAINLEVEL_OUTOF10: 3
PAINLEVEL_OUTOF10: 0
PAINLEVEL_OUTOF10: 0

## 2025-05-24 ASSESSMENT — PAIN DESCRIPTION - DESCRIPTORS: DESCRIPTORS: ACHING

## 2025-05-24 ASSESSMENT — PAIN DESCRIPTION - LOCATION: LOCATION: GENERALIZED

## 2025-05-24 ASSESSMENT — PAIN - FUNCTIONAL ASSESSMENT: PAIN_FUNCTIONAL_ASSESSMENT: ACTIVITIES ARE NOT PREVENTED
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
## 2025-05-24 NOTE — PROGRESS NOTES
LABAERO light growth 01/13/2025 09:20 PM     Lab Results   Component Value Date/Time    LABANAE  01/09/2025 10:41 AM     No anaerobes isolated- preliminary Culture yielded heavy mixed growth including anaerobic gram negative bacilli and anaerobic gram positive cocci. If a true mixed aerobic and anaerobic infection is suspected, then broad spectrum empiric antibiotic therapy is indicated and should include coverage for anaerobic organisms.          Urinalysis:      Lab Results   Component Value Date/Time    NITRU NEGATIVE 05/23/2025 03:45 PM    WBCUA 10-15 05/23/2025 03:45 PM    WBCUA 0-5 03/26/2022 06:01 PM    BACTERIA NONE SEEN 05/23/2025 03:45 PM    RBCUA 3-5 05/23/2025 03:45 PM    BLOODU NEGATIVE 05/23/2025 03:45 PM    GLUCOSEU NEGATIVE 05/23/2025 03:45 PM       Radiology:  CT ABDOMEN PELVIS W IV CONTRAST Additional Contrast? None   Final Result   1. The right perinephric thick-walled peripherally enhancing fluid collection is   not significantly changed measuring 32 x 21 x 56 mm (previously measured 34 x 18   x 57 mm). Right perinephric fat stranding and inflammation appears similar. The   urinary bladder is decompressed with a suprapubic catheter and grossly   unremarkable. Correlate with urinalysis. No obstructive uropathy is observed.      2. Chronic findings are discussed.            **This report has been created using voice recognition software.  It may contain   minor errors which are inherent in voice recognition technology.**      Electronically signed by Dr. Pennie Langley      CT ABSCESS DRAINAGE W CATH PLACEMENT S&I    (Results Pending)     Electronically signed by Morena Lynch MD on 5/24/2025 at 7:42 AM

## 2025-05-24 NOTE — PLAN OF CARE
Problem: Pain  Goal: Verbalizes/displays adequate comfort level or baseline comfort level  5/24/2025 1544 by Lisy Garcia RN  Outcome: Progressing    Patient states pain relief from PRN pain medications. Pain reassessed one hour post PRN pain medication given.  Patient rates pain 0 on IKER 0-10 scale.     Problem: Skin/Tissue Integrity  Goal: Skin integrity remains intact  Description: 1.  Monitor for areas of redness and/or skin breakdown2.  Assess vascular access sites hourly3.  Every 4-6 hours minimum:  Change oxygen saturation probe site4.  Every 4-6 hours:  If on nasal continuous positive airway pressure, respiratory therapy assess nares and determine need for appliance change or resting period  5/24/2025 1544 by Lisy Garcia RN  Outcome: Progressing  No skin breakdown this shift. Patient being assisted with turning. Patients states understanding of repositioning every two hours.     Problem: Discharge Planning  Goal: Discharge to home or other facility with appropriate resources  5/24/2025 1544 by Lisy Garcia RN  Outcome: Progressing    Discharge plan is in process. Plan discharge home with family.     Problem: Chronic Conditions and Co-morbidities  Goal: Patient's chronic conditions and co-morbidity symptoms are monitored and maintained or improved  5/24/2025 1544 by Lisy Garcia RN  Outcome: Progressing  Patient's chronic conditions and co-morbidity symptoms are monitored and maintained.     Problem: Safety - Adult  Goal: Free from fall injury  5/24/2025 1544 by Lisy Garcia RN  Outcome: Progressing    Fall assessment completed. Patient using call light appropriately to call for assistance with ambulation to bathroom.  Personal items within reach. Patient is also compliant with use of non-skid slippers.      Problem: Genitourinary - Adult  Goal: Absence of urinary retention  5/24/2025 1544 by Lisy Garcia RN  Outcome: Progressing    Patient voiding

## 2025-05-24 NOTE — PLAN OF CARE
Problem: Pain  Goal: Verbalizes/displays adequate comfort level or baseline comfort level  Outcome: Progressing   Pain Assessment: 0-10  Pain Level: 0       Is pain goal met at this time?  Yes         Problem: Skin/Tissue Integrity  Goal: Skin integrity remains intact  Description: 1.  Monitor for areas of redness and/or skin breakdown2.  Assess vascular access sites hourly3.  Every 4-6 hours minimum:  Change oxygen saturation probe site4.  Every 4-6 hours:  If on nasal continuous positive airway pressure, respiratory therapy assess nares and determine need for appliance change or resting period  Outcome: Progressing   Skin assessment completed.  Patient turned every 2 hours and as needed.  No skin breakdown this shift.       Problem: Discharge Planning  Goal: Discharge to home or other facility with appropriate resources  Outcome: Progressing     Problem: Chronic Conditions and Co-morbidities  Goal: Patient's chronic conditions and co-morbidity symptoms are monitored and maintained or improved  Outcome: Progressing     Problem: Safety - Adult  Goal: Free from fall injury  Outcome: Progressing   All fall precautions in place. Bed in low position, alarm activated and appropriate use of call light.      Problem: Genitourinary - Adult  Goal: Absence of urinary retention  Outcome: Progressing     Problem: Genitourinary - Adult  Goal: Urinary catheter remains patent  Outcome: Progressing     Care plan reviewed with patient.  Patient verbalizes understanding of the plan of care and contributes to goal setting.

## 2025-05-24 NOTE — PLAN OF CARE
History     Chief Complaint:  Sore Throat    HPI   Malgorzata Ramachandran is a 25 year old female who presents to the emergency room for evaluation of a sore throat. Her symptoms have been present for a week, however she has not received any medical care yet. She presents to the ED today because the pain has progressively worsened, and is now radiating up to her right ear. Here, she reports the pain in her throat is bilateral, but worse on the right side. She had a fever of 101 at home yesterday, but not today. She denies any cough or recent travel. Additionally, she has been managing the pain with ibuprofen at home with minimal relief. She reports there is no chance she is pregnant. Denies prior history of deep space neck infection.    Allergies:  NKDA    Medications:    The patient is currently on no regular medications.    Past Medical History:    The patient denies any significant past medical history.    Past Surgical History:     x2    Family History:    No past pertinent family history.    Social History:  Marital Status:   [2]  Negative for tobacco use.  Negative for alcohol use.     Review of Systems   Constitutional: Negative for fever.   HENT: Positive for ear pain, sore throat and trouble swallowing.    Respiratory: Negative for cough.    All other systems reviewed and are negative.      Physical Exam     Patient Vitals for the past 24 hrs:   BP Temp Temp src Pulse Heart Rate Resp SpO2 Weight   18 0510 133/81 98.5  F (36.9  C) Temporal 83 83 18 99 % 77.1 kg (170 lb)     Physical Exam  General:                        Well-nourished                        Speaking in full sentences                        Well appearing                        Normal phonation  Eyes:                        Conjunctiva without injection or scleral icterus                        PERRL                        EOM full w/out entrapment or proptosis  ENT:                        Moist mucous membranes           Problem: Pain  Goal: Verbalizes/displays adequate comfort level or baseline comfort level  5/24/2025 0010 by Aida Grant RN  Outcome: Progressing   Pain Assessment: 0-10  Pain Level: 0       Is pain goal met at this time?  Yes         Problem: Skin/Tissue Integrity  Goal: Skin integrity remains intact  Description: 1.  Monitor for areas of redness and/or skin breakdown2.  Assess vascular access sites hourly3.  Every 4-6 hours minimum:  Change oxygen saturation probe site4.  Every 4-6 hours:  If on nasal continuous positive airway pressure, respiratory therapy assess nares and determine need for appliance change or resting period  5/24/2025 0010 by Aida Grant RN  Outcome: Progressing   Skin assessment completed.  Patient turned every 2 hours and as needed.  No skin breakdown this shift.       Problem: Discharge Planning  Goal: Discharge to home or other facility with appropriate resources  5/24/2025 0010 by Aida Grant RN  Outcome: Progressing     Problem: Chronic Conditions and Co-morbidities  Goal: Patient's chronic conditions and co-morbidity symptoms are monitored and maintained or improved  5/24/2025 0757 by Aida Grant RN  Outcome: Progressing     Problem: Safety - Adult  Goal: Free from fall injury  5/24/2025 0757 by Aida Grant RN  Outcome: Progressing   All fall precautions in place. Bed in low position, alarm activated and appropriate use of call light.      Problem: Genitourinary - Adult  Goal: Absence of urinary retention  5/24/2025 0757 by Aida Grant RN  Outcome: Progressing     Problem: Genitourinary - Adult  Goal: Urinary catheter remains patent  5/24/2025 0757 by Aida Grant RN  Outcome: Progressing     Care plan reviewed with patient.  Patient verbalizes understanding of the plan of care and contributes to goal setting.                    Posterior oropharynx clear with bilateral tonsillar hypertrophy                        No uvular deviation                        Tolerating secretions without difficulty                        No trismus                        No tongue swelling                        No tongue elevation                        No tonsillar hypertrophy, exudate, asymmetry, nor uvular deviation                        No oral lesions                        Bilateral TM translucent and gray without air/fluid level or overlying erythema, bony      landmarks visualized.                        Nares patent                        Pinnae normal                        No midface swelling, erythema, or asymmetry  Neck:                        Full ROM                        No stiffness appreciated                        Bilateral submandibular lymphadenopathy  Resp:                        Lungs CTAB                        No crackles, wheezing or audible rubs                        Good air movement  CV:                                        Normal rate, regular rhythm                        S1 and S2 present                        No murmur, gallop or rub  GI:                        BS present                        Abdomen soft without distention                        Non-tender to light and deep palpation                        No guarding or rebound tenderness  Skin:                        Warm, dry, well perfused                        No rashes or open wounds on exposed skin  MSK:                        Moves all extremities                        No focal deformities or swelling  Neuro:                        Alert                        Answers questions appropriately                        Moves all extremities equally                        Gait stable  Psych:                        Normal affect, normal mood    Emergency Department Course   Laboratory:  Rapid strep screen: Positive    Interventions:  0532 NS 1L  IV   Decadron, 10 mg, IV   Toradol, 15 mg, IV    Emergency Department Course:  Nursing notes and vitals reviewed. (0514) I performed an exam of the patient as documented above.      IV inserted. Medicine administered as documented above. Throat swabbed. This was sent to the lab for further testing, results above.    (0611) I rechecked the patient and discussed the results of her workup thus far.      Findings and plan explained to the Patient. Patient discharged home with instructions regarding supportive care, medications, and reasons to return. The importance of close follow-up was reviewed. The patient was prescribed Veetid.      I personally reviewed the laboratory results with the Patient and answered all related questions prior to discharge.     Impression & Plan      Medical Decision Making:  Malgorzata Ramachandran is a 25 year old female who presents for evaluation of a sore throat and clinical evidence of pharyngitis.  The rapid strep test is positive. There is no clinical evidence of peritonsillar abscess, retropharyngeal abscess, Lemierre's Syndrome, epiglottis, or Sea's angina. The patient's symptoms are consistent with streptococcal pharyngitis.  Patient was noting improvement in symptoms following the above interventions. I have recommended treatment with antibiotics and analgesics.  Return if increasing pain, change in voice, neck pain, vomiting, fever, or shortness of breath. Follow-up with primary physician if not improving in 3-5 days.    Diagnosis:    ICD-10-CM    1. Streptococcal pharyngitis J02.0        Disposition:  discharged to home    Discharge Medications:  New Prescriptions    PENICILLIN V POTASSIUM (VEETID) 500 MG TABLET    Take 1 tablet (500 mg) by mouth 2 times daily     Scribe Disclosure:  I, Holly Dozier, am serving as a scribe on 11/6/2018 at 5:14 AM to personally document services performed by Ricardo Smith MD based on my observations and the provider's statements to me.          Holly Dozier  11/6/2018   [facility] EMERGENCY DEPARTMENT       Ricardo Smith MD  11/06/18 0649

## 2025-05-25 VITALS
TEMPERATURE: 97.4 F | RESPIRATION RATE: 16 BRPM | SYSTOLIC BLOOD PRESSURE: 119 MMHG | BODY MASS INDEX: 23.62 KG/M2 | HEIGHT: 70 IN | DIASTOLIC BLOOD PRESSURE: 80 MMHG | HEART RATE: 80 BPM | OXYGEN SATURATION: 98 % | WEIGHT: 165 LBS

## 2025-05-25 LAB
ANION GAP SERPL CALC-SCNC: 11 MEQ/L (ref 8–16)
BASOPHILS ABSOLUTE: 0.1 THOU/MM3 (ref 0–0.1)
BASOPHILS NFR BLD AUTO: 1.1 %
BUN SERPL-MCNC: 7 MG/DL (ref 8–23)
CALCIUM SERPL-MCNC: 9.3 MG/DL (ref 8.6–10)
CHLORIDE SERPL-SCNC: 105 MEQ/L (ref 98–111)
CO2 SERPL-SCNC: 24 MEQ/L (ref 22–29)
CREAT SERPL-MCNC: 1 MG/DL (ref 0.7–1.2)
DEPRECATED RDW RBC AUTO: 45 FL (ref 35–45)
EOSINOPHIL NFR BLD AUTO: 4.1 %
EOSINOPHILS ABSOLUTE: 0.3 THOU/MM3 (ref 0–0.4)
ERYTHROCYTE [DISTWIDTH] IN BLOOD BY AUTOMATED COUNT: 14.8 % (ref 11.5–14.5)
GFR SERPL CREATININE-BSD FRML MDRD: > 90 ML/MIN/1.73M2
GLUCOSE SERPL-MCNC: 104 MG/DL (ref 74–109)
HCT VFR BLD AUTO: 38.1 % (ref 42–52)
HGB BLD-MCNC: 12 GM/DL (ref 14–18)
IMM GRANULOCYTES # BLD AUTO: 0.02 THOU/MM3 (ref 0–0.07)
IMM GRANULOCYTES NFR BLD AUTO: 0.3 %
LYMPHOCYTES ABSOLUTE: 1.9 THOU/MM3 (ref 1–4.8)
LYMPHOCYTES NFR BLD AUTO: 29 %
MCH RBC QN AUTO: 26.4 PG (ref 26–33)
MCHC RBC AUTO-ENTMCNC: 31.5 GM/DL (ref 32.2–35.5)
MCV RBC AUTO: 83.9 FL (ref 80–94)
MONOCYTES ABSOLUTE: 0.5 THOU/MM3 (ref 0.4–1.3)
MONOCYTES NFR BLD AUTO: 8.3 %
NEUTROPHILS ABSOLUTE: 3.7 THOU/MM3 (ref 1.8–7.7)
NEUTROPHILS NFR BLD AUTO: 57.2 %
NRBC BLD AUTO-RTO: 0 /100 WBC
PLATELET # BLD AUTO: 338 THOU/MM3 (ref 130–400)
PMV BLD AUTO: 8.7 FL (ref 9.4–12.4)
POTASSIUM SERPL-SCNC: 4.2 MEQ/L (ref 3.5–5.2)
RBC # BLD AUTO: 4.54 MILL/MM3 (ref 4.7–6.1)
SODIUM SERPL-SCNC: 140 MEQ/L (ref 135–145)
WBC # BLD AUTO: 6.4 THOU/MM3 (ref 4.8–10.8)

## 2025-05-25 PROCEDURE — 2500000003 HC RX 250 WO HCPCS

## 2025-05-25 PROCEDURE — 6370000000 HC RX 637 (ALT 250 FOR IP)

## 2025-05-25 PROCEDURE — 99239 HOSP IP/OBS DSCHRG MGMT >30: CPT | Performed by: STUDENT IN AN ORGANIZED HEALTH CARE EDUCATION/TRAINING PROGRAM

## 2025-05-25 PROCEDURE — 36415 COLL VENOUS BLD VENIPUNCTURE: CPT

## 2025-05-25 PROCEDURE — 6370000000 HC RX 637 (ALT 250 FOR IP): Performed by: STUDENT IN AN ORGANIZED HEALTH CARE EDUCATION/TRAINING PROGRAM

## 2025-05-25 PROCEDURE — 85025 COMPLETE CBC W/AUTO DIFF WBC: CPT

## 2025-05-25 PROCEDURE — 80048 BASIC METABOLIC PNL TOTAL CA: CPT

## 2025-05-25 RX ORDER — FERROUS SULFATE 325(65) MG
325 TABLET ORAL EVERY OTHER DAY
Qty: 30 TABLET | Refills: 3 | Status: SHIPPED | OUTPATIENT
Start: 2025-05-25

## 2025-05-25 RX ADMIN — PANTOPRAZOLE SODIUM 40 MG: 40 TABLET, DELAYED RELEASE ORAL at 05:46

## 2025-05-25 RX ADMIN — HYDROXYZINE HYDROCHLORIDE 50 MG: 25 TABLET, FILM COATED ORAL at 14:02

## 2025-05-25 RX ADMIN — TROSPIUM CHLORIDE 20 MG: 20 TABLET, FILM COATED ORAL at 05:46

## 2025-05-25 RX ADMIN — POTASSIUM CHLORIDE 10 MEQ: 750 TABLET, FILM COATED, EXTENDED RELEASE ORAL at 09:30

## 2025-05-25 RX ADMIN — BUSPIRONE HYDROCHLORIDE 10 MG: 10 TABLET ORAL at 09:31

## 2025-05-25 RX ADMIN — GABAPENTIN 800 MG: 400 CAPSULE ORAL at 14:02

## 2025-05-25 RX ADMIN — HYDROXYZINE HYDROCHLORIDE 50 MG: 25 TABLET, FILM COATED ORAL at 09:30

## 2025-05-25 RX ADMIN — VENLAFAXINE HYDROCHLORIDE 75 MG: 75 CAPSULE, EXTENDED RELEASE ORAL at 09:31

## 2025-05-25 RX ADMIN — FERROUS SULFATE TAB 325 MG (65 MG ELEMENTAL FE) 325 MG: 325 (65 FE) TAB at 09:32

## 2025-05-25 RX ADMIN — BUPRENORPHINE AND NALOXONE 1 FILM: 8; 2 FILM, SOLUBLE BUCCAL; SUBLINGUAL at 14:02

## 2025-05-25 RX ADMIN — BUSPIRONE HYDROCHLORIDE 10 MG: 10 TABLET ORAL at 14:02

## 2025-05-25 RX ADMIN — SODIUM CHLORIDE, PRESERVATIVE FREE 10 ML: 5 INJECTION INTRAVENOUS at 09:35

## 2025-05-25 RX ADMIN — VENLAFAXINE HYDROCHLORIDE 150 MG: 150 CAPSULE, EXTENDED RELEASE ORAL at 09:36

## 2025-05-25 RX ADMIN — BUPRENORPHINE AND NALOXONE 1 FILM: 8; 2 FILM, SOLUBLE BUCCAL; SUBLINGUAL at 09:32

## 2025-05-25 RX ADMIN — TIZANIDINE 4 MG: 4 TABLET ORAL at 09:31

## 2025-05-25 RX ADMIN — CYCLOBENZAPRINE 10 MG: 10 TABLET, FILM COATED ORAL at 09:30

## 2025-05-25 RX ADMIN — GABAPENTIN 800 MG: 400 CAPSULE ORAL at 09:30

## 2025-05-25 NOTE — PLAN OF CARE
Problem: Pain  Goal: Verbalizes/displays adequate comfort level or baseline comfort level  5/24/2025 2306 by Aida Grant RN  Outcome: Progressing   Skin assessment completed.  Patient turned every 2 hours and as needed.  No skin breakdown this shift.       Problem: Skin/Tissue Integrity  Goal: Skin integrity remains intact  Description: 1.  Monitor for areas of redness and/or skin breakdown2.  Assess vascular access sites hourly3.  Every 4-6 hours minimum:  Change oxygen saturation probe site4.  Every 4-6 hours:  If on nasal continuous positive airway pressure, respiratory therapy assess nares and determine need for appliance change or resting period  5/24/2025 2306 by Aida Grant RN  Outcome: Progressing   Skin assessment completed.  Patient turned every 2 hours and as needed.  No skin breakdown this shift.       Problem: Discharge Planning  Goal: Discharge to home or other facility with appropriate resources  5/24/2025 2306 by Aida Grant RN  Outcome: Progressing     Problem: Chronic Conditions and Co-morbidities  Goal: Patient's chronic conditions and co-morbidity symptoms are monitored and maintained or improved  5/24/2025 2306 by Aida Grant RN  Outcome: Progressing     Problem: Safety - Adult  Goal: Free from fall injury  5/24/2025 2306 by Aida Grant RN  Outcome: Progressing   All fall precautions in place. Bed in low position, alarm activated and appropriate use of call light.      Problem: Genitourinary - Adult  Goal: Absence of urinary retention  5/24/2025 2306 by Aida Grant RN  Outcome: Progressing     Problem: Genitourinary - Adult  Goal: Urinary catheter remains patent  5/24/2025 2306 by Aida Grant RN  Outcome: Progressing     Care plan reviewed with patient.  Patient verbalizes understanding of the plan of care and contributes to goal setting.

## 2025-05-25 NOTE — DISCHARGE INSTRUCTIONS
You had a UTI with the abscess in your right kidney. We will be sending you home on a 14 day course of antibiotics, Augmentin. Take this to completion to prevent bacterial resistance. It is important that you call your Urologist and get in sooner than July for a follow up. They will need to decide if further treatment is needed or if a procedure is required for the right kidney cyst.
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
## 2025-05-26 LAB
ACB COMPLEX DNA BLD POS QL NAA+NON-PROBE: NOT DETECTED
B FRAGILIS DNA BLD POS QL NAA+NON-PROBE: NOT DETECTED
BLACTX-M ISLT/SPM QL: ABNORMAL
BLAIMP ISLT/SPM QL: ABNORMAL
BLAKPC ISLT/SPM QL: ABNORMAL
BLAOXA-48-LIKE ISLT/SPM QL: ABNORMAL
BLAVIM ISLT/SPM QL: ABNORMAL
BOTTLE TYPE: ABNORMAL
C ALBICANS DNA BLD POS QL NAA+NON-PROBE: NOT DETECTED
C AURIS DNA BLD POS QL NAA+NON-PROBE: NOT DETECTED
C GATTII+NEOFOR DNA BLD POS QL NAA+N-PRB: NOT DETECTED
C GLABRATA DNA BLD POS QL NAA+NON-PROBE: NOT DETECTED
C KRUSEI DNA BLD POS QL NAA+NON-PROBE: NOT DETECTED
C PARAP DNA BLD POS QL NAA+NON-PROBE: NOT DETECTED
C TROPICLS DNA BLD POS QL NAA+NON-PROBE: NOT DETECTED
COAG NEG STAPH DNA BLD QL NAA+PROBE: DETECTED
COLISTIN RES MCR-1 ISLT/SPM QL: ABNORMAL
E CLOAC COMP DNA BLD POS NAA+NON-PROBE: NOT DETECTED
E COLI DNA BLD POS QL NAA+NON-PROBE: NOT DETECTED
E FAECALIS DNA BLD POS QL NAA+NON-PROBE: NOT DETECTED
E FAECIUM DNA BLD POS QL NAA+NON-PROBE: NOT DETECTED
ENTEROBACTERALES DNA BLD POS NAA+N-PRB: NOT DETECTED
GP B STREP DNA SPEC QL NAA+PROBE: NOT DETECTED
GP B STREP DNA SPEC QL NAA+PROBE: NOT DETECTED
HAEM INFLU DNA BLD POS QL NAA+NON-PROBE: NOT DETECTED
K OXYTOCA DNA BLD POS QL NAA+NON-PROBE: NOT DETECTED
K OXYTOCA DNA BLD POS QL NAA+NON-PROBE: NOT DETECTED
KLEBSIELLA SP DNA BLD POS QL NAA+NON-PRB: NOT DETECTED
L MONOCYTOG DNA BLD POS QL NAA+NON-PROBE: NOT DETECTED
MECA ISLT/SPM QL: ABNORMAL
MECA+MECC+MREJ ISLT/SPM QL: ABNORMAL
N MEN DNA BLD POS QL NAA+NON-PROBE: NOT DETECTED
NDM: ABNORMAL
P AERUGINOSA DNA BLD POS NAA+NON-PROBE: NOT DETECTED
PROTEUS SPP: NOT DETECTED
S AUREUS DNA BLD POS QL NAA+NON-PROBE: NOT DETECTED
S EPIDERMIDIS DNA BLD POS QL NAA+NON-PRB: NOT DETECTED
S LUGDUNENSIS DNA BLD POS QL NAA+NON-PRB: NOT DETECTED
S MALTOPHILIA DNA BLD POS QL NAA+NON-PRB: NOT DETECTED
S MARCESCENS DNA BLD POS NAA+NON-PROBE: NOT DETECTED
S PYO DNA THROAT QL NAA+PROBE: NOT DETECTED
SALMONELLA DNA BLD POS QL NAA+NON-PROBE: NOT DETECTED
SOURCE OF BLOOD CULTURE: ABNORMAL
STREPTOCOCCUS DNA BLD QL NAA+PROBE: NOT DETECTED
VANA+VANB ISLT/SPM QL: ABNORMAL

## 2025-05-27 LAB
BACTERIA BLD AEROBE CULT: ABNORMAL
BACTERIA UR CULT: ABNORMAL
ORGANISM: ABNORMAL

## 2025-05-27 NOTE — TELEPHONE ENCOUNTER
Augmentin started by hospitalist- this is sensitive   Please schedule pt an appt -ideally tomorrow with the admin to discuss possible drainage of noted abscess.     The patient should go to the ED should they develop fever, chills, nausea, vomiting, significant gross hematuria, chest pain, SOB, calf pain, feelings of incomplete emptying, or should they otherwise feel they need evaluated.    Please have the patient follow-up as scheduled or sooner if needed

## 2025-05-28 ENCOUNTER — TELEPHONE (OUTPATIENT)
Dept: UROLOGY | Age: 35
End: 2025-05-28

## 2025-05-28 ENCOUNTER — OFFICE VISIT (OUTPATIENT)
Dept: UROLOGY | Age: 35
End: 2025-05-28
Payer: MEDICAID

## 2025-05-28 VITALS — HEIGHT: 70 IN | WEIGHT: 165 LBS | BODY MASS INDEX: 23.62 KG/M2 | RESPIRATION RATE: 16 BRPM

## 2025-05-28 DIAGNOSIS — N39.0 RECURRENT UTI: ICD-10-CM

## 2025-05-28 DIAGNOSIS — N15.1 PERINEPHRIC ABSCESS: ICD-10-CM

## 2025-05-28 DIAGNOSIS — N20.0 KIDNEY STONE: ICD-10-CM

## 2025-05-28 DIAGNOSIS — Q55.22 RETRACTILE TESTIS: ICD-10-CM

## 2025-05-28 DIAGNOSIS — N31.8 SPASTIC NEUROGENIC BLADDER: Primary | ICD-10-CM

## 2025-05-28 LAB — BACTERIA BLD AEROBE CULT: NORMAL

## 2025-05-28 PROCEDURE — 99214 OFFICE O/P EST MOD 30 MIN: CPT | Performed by: NURSE PRACTITIONER

## 2025-05-28 RX ORDER — LEVOFLOXACIN 500 MG/1
500 TABLET, FILM COATED ORAL DAILY
Qty: 10 TABLET | Refills: 0 | Status: SHIPPED | OUTPATIENT
Start: 2025-05-28 | End: 2025-06-07

## 2025-05-28 NOTE — PROGRESS NOTES
[facility] [place] [facility]  [facility] UROLOG[facility]  [address]  [address]  [facility] [address]  Dept: [phone]  Loc: [phone]    Visit Date: 5/28/2025        HPI:     Srikanth Coy is a 34 y.o. male who presents today for:  Chief Complaint   Patient presents with    Follow-up     From hospital stay        HPI  Patient presents to urology clinic for hospital follow-up.     Srikanth was seen in hospital for perinephric abscess. He was started on augmentin. possible outpatient drainage of renal abscess. Been on Augmentin since Sunday. He is doing well today. Denies flank pain, suprapubic pressure, gross hematuria, dysuria, fever or chills.     Additional Notes:  Hx of cystoscopy right stent insertion and SPT change on 11/29/24 for right hydronephrosis and 2.2 x 1.2 cm stone. Patient followed up and underwent cystoscopy, R ureteroscopy, laser lithotripsy, right ureteral stent exchange, and SPT exchange on 12/18/24 with Dr. Diaz. Course has been complicated by recurring UTI's, as patient has had 3 positive cultures since 12/17/24. Patient was also readmitted to [facility] from 1/8-1/16/24 for a right-sided perinephric abscess. He underwent I&D with drain placement on 1/9 and 1/15. Urine and abscess cultured were positive for Enterococcus faecalis and E. Coli with urine culture also positive for MRSA. He is currently receiving IV Daptomycin and PO Bactrim through 1/29 per ID. Presented back to the ED on 1/19 for right sided abdominal pressure near drain site. CT abdomen/pelvis demonstrating drain in appropriate position and abscess decreasing in size.      Today reports no concerns postoperatively. Denies urinary symptoms. Reports acetic acid and saline irrigations help with his spasms. Not interested in Botox.      Summary of Old Records  Hx of retractile testes. He is able to bring the testicle down into place. He does endorse some testicular pain. Scrotal US in 6/2024 appreciated a small L

## 2025-05-28 NOTE — TELEPHONE ENCOUNTER
Srikanth is receiving to much supply.    He was told to call the company to put a hold on the supply.  When he is ready to re order to reach out to the company to set up shipment.    Pat verbalized understanding.

## 2025-05-30 ENCOUNTER — TELEPHONE (OUTPATIENT)
Dept: UROLOGY | Age: 35
End: 2025-05-30

## 2025-05-30 NOTE — TELEPHONE ENCOUNTER
Patient scheduled for CT  at [facility] on 6/24/25.  Arrival of 130 PM for a 210 PM scan time.  Order mailed with instructions or given to the patient in the office

## 2025-09-02 ENCOUNTER — LAB (OUTPATIENT)
Dept: LAB | Age: 35
End: 2025-09-02

## 2025-09-03 ENCOUNTER — LAB (OUTPATIENT)
Dept: LAB | Age: 35
End: 2025-09-03

## 2025-09-03 LAB
BACTERIA URNS QL MICRO: ABNORMAL /HPF
BILIRUB UR QL STRIP.AUTO: NEGATIVE
CASTS #/AREA URNS LPF: ABNORMAL /LPF
CHARACTER UR: ABNORMAL
COLOR, UA: YELLOW
CRYSTALS URNS MICRO: ABNORMAL
EPITHELIAL CELLS, UA: ABNORMAL /HPF
GLUCOSE UR QL STRIP.AUTO: NEGATIVE MG/DL
HGB UR QL STRIP.AUTO: ABNORMAL
KETONES UR QL STRIP.AUTO: NEGATIVE
MISCELLANEOUS 2: ABNORMAL
NITRITE UR QL STRIP: NEGATIVE
PH UR STRIP.AUTO: 7 [PH] (ref 5–9)
PROT UR STRIP.AUTO-MCNC: 30 MG/DL
RBC URINE: ABNORMAL /HPF
RENAL EPI CELLS #/AREA URNS HPF: ABNORMAL /[HPF]
SP GR UR REFRACT.AUTO: 1.01 (ref 1–1.03)
UROBILINOGEN, URINE: 0.2 EU/DL (ref 0–1)
WBC #/AREA URNS HPF: > 200 /HPF
WBC #/AREA URNS HPF: ABNORMAL /[HPF]
YEAST LIKE FUNGI URNS QL MICRO: ABNORMAL

## 2025-09-05 LAB
BACTERIA UR CULT: ABNORMAL
ORGANISM: ABNORMAL

## (undated) DEVICE — FIBER LASER HOLM DISP SU200RT] LEONI FIBER OPTICS INC]

## (undated) DEVICE — BREAST HERNIA: Brand: MEDLINE INDUSTRIES, INC.

## (undated) DEVICE — PACK PROCEDURE SURG SET UP SRMC

## (undated) DEVICE — DRAPE,U/SHT,SPLIT,FILM,60X84,STERILE: Brand: MEDLINE

## (undated) DEVICE — Z DISCONTINUED BY MEDLINE USE 2711682 TRAY SKIN PREP DRY W/ PREM GLV

## (undated) DEVICE — PREP SOL PVP IODINE 4%  4 OZ/BTL

## (undated) DEVICE — POSITIONER HD W8XH4XL8.5IN RASPBERRY FOAM SLT

## (undated) DEVICE — PENCIL ES L3M BTTN SWCH HOLSTER W/ BLDE ELECTRD EDGE

## (undated) DEVICE — BANDAGE,GAUZE,4.5"X4.1YD,STERILE,LF: Brand: MEDLINE

## (undated) DEVICE — GLOVE ORANGE PI 7   MSG9070

## (undated) DEVICE — GOWN,SIRUS,NONRNF,SETINSLV,XL,20/CS: Brand: MEDLINE

## (undated) DEVICE — TUBING INSUFFLATOR HEAT HUMIDIFIED SMK EVAC SET PNEUMOCLEAR

## (undated) DEVICE — SPONGE LAP W18XL18IN WHT COT 4 PLY FLD STRUNG RADPQ DISP ST

## (undated) DEVICE — BASIC SINGLE BASIN BTC-LF: Brand: MEDLINE INDUSTRIES, INC.

## (undated) DEVICE — SOLUTION IRRIG 1000ML STRL H2O USP PLAS POUR BTL

## (undated) DEVICE — FAN SPRAY KIT: Brand: PULSAVAC®

## (undated) DEVICE — BANDAGE COMPR W6INXL12FT SMOOTH FOR LIMB EXSANG ESMARCH

## (undated) DEVICE — 1016 S-DRAPE IRRIG POUCH 10/BOX: Brand: STERI-DRAPE™

## (undated) DEVICE — 450 ML BOTTLE OF 0.05% CHLORHEXIDINE GLUCONATE IN 99.95% STERILE WATER FOR IRRIGATION, USP AND APPLICATOR.: Brand: IRRISEPT ANTIMICROBIAL WOUND LAVAGE

## (undated) DEVICE — CYSTO: Brand: MEDLINE INDUSTRIES, INC.

## (undated) DEVICE — PREMIUM DRY TRAY LF: Brand: MEDLINE INDUSTRIES, INC.

## (undated) DEVICE — YANKAUER,BULB TIP,W/O VENT,RIGID,STERILE: Brand: MEDLINE

## (undated) DEVICE — SINGLE ACTION PUMPING SYSTEM

## (undated) DEVICE — SYRINGE MED 10ML LUERLOCK TIP W/O SFTY DISP

## (undated) DEVICE — SPONGE GZ W4XL4IN COT 12 PLY TYP VII WVN C FLD DSGN

## (undated) DEVICE — INTENDED FOR TISSUE SEPARATION, AND OTHER PROCEDURES THAT REQUIRE A SHARP SURGICAL BLADE TO PUNCTURE OR CUT.: Brand: BARD-PARKER ® CARBON RIB-BACK BLADES

## (undated) DEVICE — GLOVE ORANGE PI 8   MSG9080

## (undated) DEVICE — COLUMN DRAPE

## (undated) DEVICE — GLOVE ORANGE PI 7 1/2   MSG9075

## (undated) DEVICE — GUIDEWIRE URO L150CM DIA .035IN STIFF NIT HYDRPHL STR TIP

## (undated) DEVICE — COVER ARMBRD W13XL28.5IN IMPERV BLU FOR OP RM

## (undated) DEVICE — BREAST HERNIA PACK: Brand: MEDLINE INDUSTRIES, INC.

## (undated) DEVICE — SUTURE VICRYL + SZ 4-0 L27IN ABSRB WHT FS-2 3/8 CIR REV CUT VCP422H

## (undated) DEVICE — 1LYRTR 16FR10ML100%SILTMPS SNP: Brand: MEDLINE INDUSTRIES, INC.

## (undated) DEVICE — BANDAGE COMPR W4INXL12FT E DISP ESMARCH EVEN

## (undated) DEVICE — CATHETER,FOLEY,SILI-ELAST,LTX,18FR,10ML: Brand: MEDLINE

## (undated) DEVICE — CONTAINER,SPECIMEN,PNEU TUBE,4OZ,OR STRL: Brand: MEDLINE

## (undated) DEVICE — PENCIL SMK EVAC ALL IN 1 DSGN ENH VISIBILITY IMPROVED AIR

## (undated) DEVICE — SEAL

## (undated) DEVICE — ARM DRAPE

## (undated) DEVICE — SUTURE VCRL SZ 3-0 L27IN ABSRB UD FS-2 L19MM 1/2 CIR J423H

## (undated) DEVICE — TIP COVER ACCESSORY

## (undated) DEVICE — TUBING, SUCTION, 1/4" X 20', STRAIGHT: Brand: MEDLINE INDUSTRIES, INC.

## (undated) DEVICE — GOWN,SIRUS,NON REINFRCD,LARGE,SET IN SL: Brand: MEDLINE

## (undated) DEVICE — HYPODERMIC SAFETY NEEDLE: Brand: MAGELLAN

## (undated) DEVICE — BANDAGE GZ W2XL75IN ST RAYON POLY CNFRM STRTCH LTWT

## (undated) DEVICE — APPLICATOR MEDICATED 26 CC SOLUTION HI LT ORNG CHLORAPREP

## (undated) DEVICE — SUTURE VICRYL SZ 3-0 L18IN ABSRB VLT L26MM SH 1/2 CIR J774D

## (undated) DEVICE — SUTURE PROL SZ 3-0 L18IN NONABSORBABLE BLU L30MM FS-1 3/8 8663G

## (undated) DEVICE — IMPREGNATED GAUZE DRESSING: Brand: CUTICERIN 7.5X7.5CM CTN 50

## (undated) DEVICE — ROYAL SILK SURGICAL GOWN, XXL: Brand: CONVERTORS

## (undated) DEVICE — GLOVE SURG SZ 65 THK91MIL LTX FREE SYN POLYISOPRENE

## (undated) DEVICE — ADAPTER URO SCP UROLOK LL

## (undated) DEVICE — TROCAR: Brand: KII FIOS FIRST ENTRY

## (undated) DEVICE — PADDING CAST W6INXL4YD COT LO LINTING WYTEX

## (undated) DEVICE — BLADE,CARBON-STEEL,10,STRL,DISPOSABLE,TB: Brand: MEDLINE

## (undated) DEVICE — SUTURE PROL SZ 4-0 L18IN NONABSORBABLE BLU L19MM PS-2 3/8 8682G

## (undated) DEVICE — SOLUTION SURG PREP POV IOD 7.5% 4 OZ

## (undated) DEVICE — PADDING,UNDERCAST,COTTON, 4"X4YD STERILE: Brand: MEDLINE

## (undated) DEVICE — SOLUTION SCRB 4OZ 7.5% POVIDONE IOD ANTIMIC BTL

## (undated) DEVICE — GLOVE ORTHO 7 1/2   MSG9475

## (undated) DEVICE — TETRA-FLEX CF WOVEN LATEX FREE ELASTIC BANDAGE 6" X 5.5 YD: Brand: TETRA-FLEX™CF

## (undated) DEVICE — FLEXIVA  PULSE  AND  FLEXIVA  PULSE  TRACTIP  LASER  FIBERS  ARE  HIGH  POWER  SINGLE-USE FIBER: Brand: FLEXIVA PULSE ID

## (undated) DEVICE — THE PRODUCT IS A STERILE DIALYSIS SOLUTION INTENDED FOR TREATMENT OF ACUTE KIDNEY DISEASE (RENAL FAILURE) USING CONTINUOUS RENAL REPLACEMENT THERAPIES, SUCH AS CONTINUOUS HEMODIALYSIS AND HEMODIAFILTRATION AIMED AT NORMALIZING THE COMPOSITION OF THE BLOOD. THE PRODUCT MAY ALSO BE USED IN CASE OF DRUG POISONING WITH DIALYZABLE OR FILTERABLE SUBSTANCES.: Brand: PRISMASATE

## (undated) DEVICE — DRAINBAG,ANTI-REFLUX TOWER,L/F,2000ML,LL: Brand: MEDLINE

## (undated) DEVICE — SUTURE NONABSORBABLE MONOFILAMENT 4-0 P-3 18 IN ETHILON 699H

## (undated) DEVICE — SOLUTION PREP PAINT POV IOD FOR SKIN MUCOUS MEM

## (undated) DEVICE — POLYETHYLENE WOUND CONTACT LAYER: Brand: CONFORMANT 2 WND VL4X4IN CS48

## (undated) DEVICE — SINGLE-USE DIGITAL FLEXIBLE URETEROSCOPE: Brand: LITHOVUE

## (undated) DEVICE — PAD,ABDOMINAL,5"X9",ST,LF,25/BX: Brand: MEDLINE INDUSTRIES, INC.

## (undated) DEVICE — SUTURE VICRYL + SZ 3-0 L27IN ABSRB UD L26MM SH 1/2 CIR VCP416H

## (undated) DEVICE — SUTURE VICRYL + SZ 0 L18IN ABSRB TIE VCP106G

## (undated) DEVICE — SPONGE LAP W18XL18IN WHT COT 4 PLY FLD STRUNG RADPQ DISP ST 2 PER PACK

## (undated) DEVICE — BANDAGE ADH W1XL3IN NAT FAB WVN FLX DURABLE N ADH PD SEAL

## (undated) DEVICE — SUTURE VCRL SZ 3-0 L27IN ABSRB UD L26MM SH 1/2 CIR J416H

## (undated) DEVICE — CHLORAPREP 26ML ORANGE

## (undated) DEVICE — DUAL LUMEN URETERAL CATHETER

## (undated) DEVICE — SYRINGE MED 30ML STD CLR PLAS LUERLOCK TIP N CTRL DISP

## (undated) DEVICE — SUTURE NOVAFIL SZ 1 L30IN NONABSORBABLE BLU GS-25 1/2 CIR 8886446571

## (undated) DEVICE — BANDAGE COMPR E SGL LAYERED CLSR BGE W/ CLP W4INXL15FT

## (undated) DEVICE — Device

## (undated) DEVICE — PUMP SUC IRR TBNG L10FT W/ HNDPC ASSEMB STRYKEFLOW 2

## (undated) DEVICE — INSUFFLATION NEEDLE TO ESTABLISH PNEUMOPERITONEUM.: Brand: INSUFFLATION NEEDLE

## (undated) DEVICE — GENERAL LAPAROSCOPY-LF: Brand: MEDLINE INDUSTRIES, INC.

## (undated) DEVICE — DRAPE,EXTREMITY,89X128,STERILE: Brand: MEDLINE

## (undated) DEVICE — ELECTRO LUBE IS A SINGLE PATIENT USE DEVICE THAT IS INTENDED TO BE USED ON ELECTROSURGICAL ELECTRODES TO REDUCE STICKING.: Brand: KEY SURGICAL ELECTRO LUBE

## (undated) DEVICE — DRESSING,GAUZE,XEROFORM,CURAD,5"X9",ST: Brand: CURAD

## (undated) DEVICE — SUTURE MCRYL + SZ 3-0 L27IN ABSRB UD PS1 L24MM 3/8 CIR REV MCP936H

## (undated) DEVICE — SUTURE VICRYL + SZ 2-0 L27IN ABSRB WHT SH 1/2 CIR TAPERCUT VCP417H

## (undated) DEVICE — BLADELESS OBTURATOR: Brand: WECK VISTA